# Patient Record
Sex: MALE | Race: WHITE | NOT HISPANIC OR LATINO | Employment: FULL TIME | URBAN - METROPOLITAN AREA
[De-identification: names, ages, dates, MRNs, and addresses within clinical notes are randomized per-mention and may not be internally consistent; named-entity substitution may affect disease eponyms.]

---

## 2017-01-23 ENCOUNTER — ALLSCRIPTS OFFICE VISIT (OUTPATIENT)
Dept: OTHER | Facility: OTHER | Age: 22
End: 2017-01-23

## 2017-01-23 DIAGNOSIS — M75.80 OTHER SHOULDER LESIONS, UNSPECIFIED SHOULDER: ICD-10-CM

## 2017-02-02 ENCOUNTER — APPOINTMENT (OUTPATIENT)
Dept: PHYSICAL THERAPY | Facility: CLINIC | Age: 22
End: 2017-02-02
Payer: COMMERCIAL

## 2017-02-02 DIAGNOSIS — M75.80 OTHER SHOULDER LESIONS, UNSPECIFIED SHOULDER: ICD-10-CM

## 2017-02-02 PROCEDURE — 97161 PT EVAL LOW COMPLEX 20 MIN: CPT

## 2017-02-07 ENCOUNTER — ALLSCRIPTS OFFICE VISIT (OUTPATIENT)
Dept: OTHER | Facility: OTHER | Age: 22
End: 2017-02-07

## 2017-02-08 ENCOUNTER — APPOINTMENT (OUTPATIENT)
Dept: PHYSICAL THERAPY | Facility: CLINIC | Age: 22
End: 2017-02-08
Payer: COMMERCIAL

## 2017-02-08 PROCEDURE — 97140 MANUAL THERAPY 1/> REGIONS: CPT

## 2017-02-08 PROCEDURE — 97110 THERAPEUTIC EXERCISES: CPT

## 2017-02-10 ENCOUNTER — APPOINTMENT (OUTPATIENT)
Dept: PHYSICAL THERAPY | Facility: CLINIC | Age: 22
End: 2017-02-10
Payer: COMMERCIAL

## 2017-02-10 PROCEDURE — 97110 THERAPEUTIC EXERCISES: CPT

## 2017-02-14 RX ORDER — SERTRALINE HYDROCHLORIDE 100 MG/1
100 TABLET, FILM COATED ORAL DAILY
COMMUNITY
Start: 2017-01-02 | End: 2018-03-21 | Stop reason: SDUPTHER

## 2017-02-14 RX ORDER — TRAZODONE HYDROCHLORIDE 50 MG/1
50 TABLET ORAL
COMMUNITY
Start: 2017-01-23 | End: 2018-05-05 | Stop reason: SDUPTHER

## 2017-02-15 ENCOUNTER — APPOINTMENT (OUTPATIENT)
Dept: PHYSICAL THERAPY | Facility: CLINIC | Age: 22
End: 2017-02-15
Payer: COMMERCIAL

## 2017-02-15 PROCEDURE — 97140 MANUAL THERAPY 1/> REGIONS: CPT

## 2017-02-15 PROCEDURE — 97110 THERAPEUTIC EXERCISES: CPT

## 2017-02-16 ENCOUNTER — APPOINTMENT (OUTPATIENT)
Dept: PHYSICAL THERAPY | Facility: CLINIC | Age: 22
End: 2017-02-16
Payer: COMMERCIAL

## 2017-02-16 PROCEDURE — 97140 MANUAL THERAPY 1/> REGIONS: CPT

## 2017-02-16 PROCEDURE — 97110 THERAPEUTIC EXERCISES: CPT

## 2017-02-17 ENCOUNTER — APPOINTMENT (OUTPATIENT)
Dept: PHYSICAL THERAPY | Facility: CLINIC | Age: 22
End: 2017-02-17
Payer: COMMERCIAL

## 2017-02-17 ENCOUNTER — HOSPITAL ENCOUNTER (OUTPATIENT)
Facility: HOSPITAL | Age: 22
Setting detail: OUTPATIENT SURGERY
Discharge: HOME | End: 2017-02-17
Attending: SURGERY | Admitting: SURGERY
Payer: COMMERCIAL

## 2017-02-17 VITALS
WEIGHT: 220.02 LBS | OXYGEN SATURATION: 98 % | BODY MASS INDEX: 30.8 KG/M2 | DIASTOLIC BLOOD PRESSURE: 72 MMHG | SYSTOLIC BLOOD PRESSURE: 128 MMHG | HEART RATE: 64 BPM | TEMPERATURE: 98.8 F | RESPIRATION RATE: 16 BRPM | HEIGHT: 71 IN

## 2017-02-17 DIAGNOSIS — L72.9 FOLLICULAR CYST OF SKIN AND SUBCUTANEOUS TISSUE: ICD-10-CM

## 2017-02-17 PROBLEM — L72.3 SEBACEOUS CYST: Status: ACTIVE | Noted: 2017-02-17

## 2017-02-17 PROCEDURE — 88304 TISSUE EXAM BY PATHOLOGIST: CPT | Performed by: SURGERY

## 2017-02-17 RX ORDER — ACETAMINOPHEN 325 MG/1
650 TABLET ORAL EVERY 6 HOURS PRN
Status: DISCONTINUED | OUTPATIENT
Start: 2017-02-17 | End: 2017-02-17 | Stop reason: HOSPADM

## 2017-02-17 RX ORDER — LIDOCAINE HYDROCHLORIDE 10 MG/ML
INJECTION, SOLUTION INFILTRATION; PERINEURAL AS NEEDED
Status: DISCONTINUED | OUTPATIENT
Start: 2017-02-17 | End: 2017-02-17 | Stop reason: HOSPADM

## 2017-02-22 ENCOUNTER — APPOINTMENT (OUTPATIENT)
Dept: PHYSICAL THERAPY | Facility: CLINIC | Age: 22
End: 2017-02-22
Payer: COMMERCIAL

## 2017-02-22 PROCEDURE — 97140 MANUAL THERAPY 1/> REGIONS: CPT

## 2017-02-22 PROCEDURE — 97110 THERAPEUTIC EXERCISES: CPT

## 2017-02-23 ENCOUNTER — ALLSCRIPTS OFFICE VISIT (OUTPATIENT)
Dept: OTHER | Facility: OTHER | Age: 22
End: 2017-02-23

## 2017-02-24 ENCOUNTER — APPOINTMENT (OUTPATIENT)
Dept: PHYSICAL THERAPY | Facility: CLINIC | Age: 22
End: 2017-02-24
Payer: COMMERCIAL

## 2017-02-24 PROCEDURE — 97110 THERAPEUTIC EXERCISES: CPT

## 2017-03-01 ENCOUNTER — APPOINTMENT (OUTPATIENT)
Dept: PHYSICAL THERAPY | Facility: CLINIC | Age: 22
End: 2017-03-01
Payer: COMMERCIAL

## 2017-03-01 PROCEDURE — 97110 THERAPEUTIC EXERCISES: CPT

## 2017-03-03 ENCOUNTER — APPOINTMENT (OUTPATIENT)
Dept: PHYSICAL THERAPY | Facility: CLINIC | Age: 22
End: 2017-03-03
Payer: COMMERCIAL

## 2017-03-03 ENCOUNTER — GENERIC CONVERSION - ENCOUNTER (OUTPATIENT)
Dept: OTHER | Facility: OTHER | Age: 22
End: 2017-03-03

## 2017-03-03 PROCEDURE — 97110 THERAPEUTIC EXERCISES: CPT

## 2017-03-09 ENCOUNTER — ALLSCRIPTS OFFICE VISIT (OUTPATIENT)
Dept: OTHER | Facility: OTHER | Age: 22
End: 2017-03-09

## 2017-04-28 ENCOUNTER — ALLSCRIPTS OFFICE VISIT (OUTPATIENT)
Dept: OTHER | Facility: OTHER | Age: 22
End: 2017-04-28

## 2017-06-08 ENCOUNTER — GENERIC CONVERSION - ENCOUNTER (OUTPATIENT)
Dept: OTHER | Facility: OTHER | Age: 22
End: 2017-06-08

## 2017-06-22 ENCOUNTER — GENERIC CONVERSION - ENCOUNTER (OUTPATIENT)
Dept: OTHER | Facility: OTHER | Age: 22
End: 2017-06-22

## 2017-07-21 ENCOUNTER — HOSPITAL ENCOUNTER (EMERGENCY)
Facility: HOSPITAL | Age: 22
Discharge: LEFT BEFORE DISCHARGE | End: 2017-07-21
Payer: COMMERCIAL

## 2017-07-21 VITALS
HEART RATE: 68 BPM | TEMPERATURE: 97.8 F | WEIGHT: 233 LBS | BODY MASS INDEX: 32.62 KG/M2 | SYSTOLIC BLOOD PRESSURE: 149 MMHG | HEIGHT: 71 IN | OXYGEN SATURATION: 100 % | DIASTOLIC BLOOD PRESSURE: 70 MMHG | RESPIRATION RATE: 19 BRPM

## 2017-07-21 PROCEDURE — 99283 EMERGENCY DEPT VISIT LOW MDM: CPT

## 2017-07-24 ENCOUNTER — GENERIC CONVERSION - ENCOUNTER (OUTPATIENT)
Dept: OTHER | Facility: OTHER | Age: 22
End: 2017-07-24

## 2017-07-27 ENCOUNTER — ALLSCRIPTS OFFICE VISIT (OUTPATIENT)
Dept: OTHER | Facility: OTHER | Age: 22
End: 2017-07-27

## 2017-07-27 DIAGNOSIS — N20.0 CALCULUS OF KIDNEY: ICD-10-CM

## 2017-09-21 ENCOUNTER — GENERIC CONVERSION - ENCOUNTER (OUTPATIENT)
Dept: OTHER | Facility: OTHER | Age: 22
End: 2017-09-21

## 2017-11-06 ENCOUNTER — GENERIC CONVERSION - ENCOUNTER (OUTPATIENT)
Dept: OTHER | Facility: OTHER | Age: 22
End: 2017-11-06

## 2017-11-14 ENCOUNTER — GENERIC CONVERSION - ENCOUNTER (OUTPATIENT)
Dept: OTHER | Facility: OTHER | Age: 22
End: 2017-11-14

## 2017-11-27 ENCOUNTER — GENERIC CONVERSION - ENCOUNTER (OUTPATIENT)
Dept: OTHER | Facility: OTHER | Age: 22
End: 2017-11-27

## 2017-12-11 ENCOUNTER — GENERIC CONVERSION - ENCOUNTER (OUTPATIENT)
Dept: FAMILY MEDICINE CLINIC | Facility: CLINIC | Age: 22
End: 2017-12-11

## 2018-01-12 VITALS
HEIGHT: 71 IN | DIASTOLIC BLOOD PRESSURE: 70 MMHG | OXYGEN SATURATION: 98 % | BODY MASS INDEX: 32.06 KG/M2 | HEART RATE: 71 BPM | SYSTOLIC BLOOD PRESSURE: 132 MMHG | WEIGHT: 229 LBS

## 2018-01-12 NOTE — PROGRESS NOTES
Assessment    1  Postoperative state (V45 89) (Z98 890)    Plan  Postoperative state    · Follow-up PRN Evaluation and Treatment  Follow-up  Status: Complete  Done:  45SZR8544 10:09AM   Ordered; For: Postoperative state; Ordered By: Yani Barrett Performed:  Due: 34UBR0078    Discussion/Summary    The patient did well after excision of sebaceous cyst from the back  He is discharged from my care and I will be glad to see him if any problem arises in the future  Chief Complaint  Pt is s/p excision of lump from left shoulder  Post-Op  HPI: I had the pleasure of seeing Leatha Khan in the office today for follow-up after excision of sebaceous cyst from the back  He offers no complaints at this time  Active Problems    1  Acute serous otitis media, recurrence not specified, unspecified laterality (381 01)   (H65 00)   2  Acute URI (465 9) (J06 9)   3  Allergic rhinitis due to pollen (477 0) (J30 1)   4  Anxiety, generalized (300 02) (F41 1)   5  Cervicalgia (723 1) (M54 2)   6  Cyst of skin (706 2) (L72 9)   7  Fatigue (780 79) (R53 83)   8  GERD without esophagitis (530 81) (K21 9)   9  Horseshoe kidney (753 3) (Q63 1)   10  Hydrocele (603 9) (N43 3)   11  Malaise and fatigue (780 79) (R53 81,R53 83)   12  Muscle strain (848 9) (T14 8)   13  Neck pain (723 1) (M54 2)   14  Need for influenza vaccination (V04 81) (Z23)   15  Numbness Of Both Hands (782 0)   16  Numbness Of Both Legs (782 0)   17  Right shoulder pain (719 41) (M25 511)   18  Sinusitis (473 9) (J32 9)   19  Tendonitis of shoulder (726 10) (M75 80)    Social History    · Denied: History of Alcohol Use (History)   · Denied: History of Current Smoker   · Denied: History of Drug Use   · Living With Parents   · Marital History - Single   · Never A Smoker   · Occupation:   · [employer] and student   · Sexual Activity Denied   · Denied: History of Sexually Active With Persons At Risk For HIV-related Disease    Current Meds   1  Omeprazole 40 MG Oral Capsule Delayed Release; take 1 capsule daily; Therapy: 79HTL6384 to (Evaluate:50Ihd7152)  Requested for: 30VJJ6104; Last   Rx:06Oct2016 Ordered   2  Sertraline HCl - 100 MG Oral Tablet; take one tablet by mouth every day; Therapy: 33Gax6581 to (Evaluate:90Dys2593)  Requested for: 59XWG1667; Last   Rx:02Jan2017 Ordered   3  TraZODone HCl - 50 MG Oral Tablet; TAKE ONE TABLET BY MOUTH AT BEDTIME; Therapy: 72IEG2591 to (Last Rx:06Mar2017)  Requested for: 79FDL8174 Ordered    Allergies    1  No Known Drug Allergies    2  Seasonal    Vitals   Recorded: 10PYC7490 09:56AM   Temperature 98 5 F, Oral   Heart Rate 70   Respiration 14   Systolic 821, LUE, Sitting   Diastolic 76, LUE, Sitting   Height 5 ft 11 in   Weight 219 lb    BMI Calculated 30 54   BSA Calculated 2 19     Physical Exam    Additional Exam:  The incision from the back is well-healed without evidence of infection        Signatures   Electronically signed by : Armando Jiménez MD; Mar  9 2017 10:10AM EST                       (Author)

## 2018-01-13 VITALS
OXYGEN SATURATION: 98 % | TEMPERATURE: 98.9 F | BODY MASS INDEX: 30.38 KG/M2 | WEIGHT: 217 LBS | DIASTOLIC BLOOD PRESSURE: 80 MMHG | SYSTOLIC BLOOD PRESSURE: 120 MMHG | HEIGHT: 71 IN | HEART RATE: 82 BPM

## 2018-01-14 VITALS
BODY MASS INDEX: 30.4 KG/M2 | DIASTOLIC BLOOD PRESSURE: 90 MMHG | SYSTOLIC BLOOD PRESSURE: 148 MMHG | HEART RATE: 92 BPM | WEIGHT: 218 LBS | OXYGEN SATURATION: 99 %

## 2018-01-14 VITALS
HEART RATE: 76 BPM | TEMPERATURE: 97.9 F | DIASTOLIC BLOOD PRESSURE: 78 MMHG | HEIGHT: 71 IN | BODY MASS INDEX: 30.66 KG/M2 | OXYGEN SATURATION: 98 % | SYSTOLIC BLOOD PRESSURE: 124 MMHG | WEIGHT: 219 LBS

## 2018-01-15 VITALS
RESPIRATION RATE: 14 BRPM | WEIGHT: 215 LBS | HEART RATE: 77 BPM | HEIGHT: 71 IN | BODY MASS INDEX: 30.1 KG/M2 | DIASTOLIC BLOOD PRESSURE: 80 MMHG | SYSTOLIC BLOOD PRESSURE: 116 MMHG | TEMPERATURE: 97.7 F

## 2018-01-18 ENCOUNTER — GENERIC CONVERSION - ENCOUNTER (OUTPATIENT)
Dept: FAMILY MEDICINE CLINIC | Facility: CLINIC | Age: 23
End: 2018-01-18

## 2018-01-22 VITALS
HEART RATE: 70 BPM | RESPIRATION RATE: 14 BRPM | BODY MASS INDEX: 30.66 KG/M2 | WEIGHT: 219 LBS | SYSTOLIC BLOOD PRESSURE: 122 MMHG | DIASTOLIC BLOOD PRESSURE: 76 MMHG | TEMPERATURE: 98.5 F | HEIGHT: 71 IN

## 2018-03-21 DIAGNOSIS — F41.9 ANXIETY: Primary | ICD-10-CM

## 2018-03-22 RX ORDER — SERTRALINE HYDROCHLORIDE 100 MG/1
TABLET, FILM COATED ORAL
Qty: 90 TABLET | Refills: 3 | Status: SHIPPED | OUTPATIENT
Start: 2018-03-22 | End: 2018-11-19 | Stop reason: SDUPTHER

## 2018-05-05 DIAGNOSIS — F51.01 PRIMARY INSOMNIA: Primary | ICD-10-CM

## 2018-05-07 RX ORDER — TRAZODONE HYDROCHLORIDE 50 MG/1
TABLET ORAL
Qty: 30 TABLET | Refills: 5 | Status: SHIPPED | OUTPATIENT
Start: 2018-05-07 | End: 2018-11-12 | Stop reason: SDUPTHER

## 2018-10-03 ENCOUNTER — OFFICE VISIT (OUTPATIENT)
Dept: FAMILY MEDICINE CLINIC | Facility: CLINIC | Age: 23
End: 2018-10-03
Payer: COMMERCIAL

## 2018-10-03 VITALS
HEART RATE: 72 BPM | WEIGHT: 226 LBS | HEIGHT: 71 IN | TEMPERATURE: 97.8 F | OXYGEN SATURATION: 99 % | DIASTOLIC BLOOD PRESSURE: 82 MMHG | SYSTOLIC BLOOD PRESSURE: 122 MMHG | BODY MASS INDEX: 31.64 KG/M2

## 2018-10-03 DIAGNOSIS — J32.9 SINUSITIS, UNSPECIFIED CHRONICITY, UNSPECIFIED LOCATION: Primary | ICD-10-CM

## 2018-10-03 PROCEDURE — 99213 OFFICE O/P EST LOW 20 MIN: CPT | Performed by: FAMILY MEDICINE

## 2018-10-03 NOTE — PATIENT INSTRUCTIONS
Rhinosinusitis   AMBULATORY CARE:   Rhinosinusitis (RS)  is inflammation of your nose and sinuses  It commonly begins as a virus, often as a common cold  Viruses usually last 7 to 10 days and do not need treatment  When the virus does not get better on its own, you may have bacterial RS  This means that bacteria have begun to grow inside your sinuses  Acute RS lasts less than 4 weeks  Chronic RS lasts 12 weeks or more  Recurrent RS is when you have 4 or more episodes of RS in one year  Your signs and symptoms  may be worse when you lie on your back or try to sleep  You may have any of the following:  · Stuffy nose and reduced sense of smell     · Runny nose with thick yellow or green mucus     · Pressure or pain on your face or a headache     · Pain in your teeth or bad breath     · Ear pain or pressure     · Fever or cough     · Tiredness  Seek care immediately if:   · You have double vision or you cannot see  · You have a stiff neck, a fever, or a bad headache  · Your eyeball bulges out or you cannot move your eye  · Your eye and eyelid are red, swollen, and painful  · You cannot open your eye  · You are more sleepy than normal, or you notice changes in your ability to think, move, or talk  · You have swelling of your forehead or scalp  Contact your healthcare provider if:   · Your symptoms are worse or do not improve after 3 to 5 days of treatment  · You have questions or concerns about your condition or care  Treatment for rhinosinusitis  may include any of the following:  · Acetaminophen  decreases pain and fever  It is available without a doctor's order  Ask how much to take and how often to take it  Follow directions  Acetaminophen can cause liver damage if not taken correctly  · NSAIDs , such as ibuprofen, help decrease swelling, pain, and fever  This medicine is available with or without a doctor's order   NSAIDs can cause stomach bleeding or kidney problems in certain people  If you take blood thinner medicine, always ask your healthcare provider if NSAIDs are safe for you  Always read the medicine label and follow directions  · Nasal steroid sprays  decrease inflammation in your nose and sinuses  · Decongestants  reduce swelling and drain mucus in the nose and sinuses  They may help you breathe easier  · Antihistamines  dry mucus in the nose and relieve sneezing  · Antibiotics  treat a bacterial infection and may be needed if your symptoms do not improve or they get worse  · Take your medicine as directed  Contact your healthcare provider if you think your medicine is not helping or if you have side effects  Tell him or her if you are allergic to any medicine  Keep a list of the medicines, vitamins, and herbs you take  Include the amounts, and when and why you take them  Bring the list or the pill bottles to follow-up visits  Carry your medicine list with you in case of an emergency  Self-care:   · Rinse your sinuses  Use a sinus rinse device to rinse your nasal passages with a saline (salt water) solution  This will help thin the mucus in your nose and rinse away pollen and dirt  It will also help reduce swelling so you can breathe normally  Ask your healthcare provider how often to do this  · Breathe in steam   Heat a bowl of water until you see steam  Lean over the bowl and make a tent over your head with a large towel  Breathe deeply for about 20 minutes  Be careful not to get too close to the steam or burn yourself  Do this 3 times a day  You can also breathe deeply when you take a hot shower  · Sleep with your head elevated  Place an extra pillow under your head before you go to sleep to help your sinuses drain  · Drink liquids as directed  Ask your healthcare provider how much liquid to drink each day and which liquids are best for you  Liquids will thin the mucus in your nose and help it drain   Avoid drinks that contain alcohol or caffeine  · Do not smoke, and avoid secondhand smoke  Nicotine and other chemicals in cigarettes and cigars can make your symptoms worse  Ask your healthcare provider for information if you currently smoke and need help to quit  E-cigarettes or smokeless tobacco still contain nicotine  Talk to your healthcare provider before you use these products  Follow up with your healthcare provider as directed: Follow up if your symptoms are worse or not better after 3 to 5 days of treatment  Write down your questions so you remember to ask them during your visits  © 2017 [employer] Information is for End User's use only and may not be sold, redistributed or otherwise used for commercial purposes  All illustrations and images included in CareNotes® are the copyrighted property of A D A M [employer] Inc  or [employer]  The above information is an  only  It is not intended as medical advice for individual conditions or treatments  Talk to your doctor, nurse or pharmacist before following any medical regimen to see if it is safe and effective for you

## 2018-10-03 NOTE — PROGRESS NOTES
Assessment/Plan:           Problem List Items Addressed This Visit     Sinusitis - Primary    Relevant Medications    clarithromycin (BIAXIN XL) 500 MG 24 hr tablet            Subjective:      Patient ID: Jenifer Solo is a 20 y o  male  Patient comes in with a 2 week history of sinus congestion  The following portions of the patient's history were reviewed and updated as appropriate: allergies, current medications, past family history, past medical history, past social history, past surgical history and problem list     Review of Systems   Constitutional: Negative  HENT: Positive for congestion and postnasal drip  Respiratory: Positive for cough  Gastrointestinal: Negative  Objective:      /82   Pulse 72   Temp 97 8 °F (36 6 °C)   Ht 5' 11" (1 803 m)   Wt 103 kg (226 lb)   SpO2 99%   BMI 31 52 kg/m²          Physical Exam   Constitutional: He is oriented to person, place, and time  He appears well-developed and well-nourished  HENT:   Head: Normocephalic and atraumatic  Right Ear: Tympanic membrane and external ear normal    Left Ear: Tympanic membrane and external ear normal    Paranasal sinuses are red and inflamed   Eyes: Pupils are equal, round, and reactive to light  EOM are normal    Neck: Neck supple  Cardiovascular: Normal rate, regular rhythm and normal heart sounds  Pulmonary/Chest: Effort normal and breath sounds normal    Abdominal: Soft  Bowel sounds are normal    Musculoskeletal: Normal range of motion  Neurological: He is alert and oriented to person, place, and time  Skin: Skin is warm and dry  Psychiatric: He has a normal mood and affect   Thought content normal

## 2018-11-12 DIAGNOSIS — F51.01 PRIMARY INSOMNIA: ICD-10-CM

## 2018-11-12 RX ORDER — TRAZODONE HYDROCHLORIDE 50 MG/1
50 TABLET ORAL
Qty: 30 TABLET | Refills: 5 | Status: SHIPPED | OUTPATIENT
Start: 2018-11-12 | End: 2019-05-16 | Stop reason: SDUPTHER

## 2018-11-12 NOTE — TELEPHONE ENCOUNTER
Pt mom called and needs refill for trazodone  [facility]  [phone]

## 2018-11-19 ENCOUNTER — OFFICE VISIT (OUTPATIENT)
Dept: FAMILY MEDICINE CLINIC | Facility: CLINIC | Age: 23
End: 2018-11-19
Payer: COMMERCIAL

## 2018-11-19 VITALS
WEIGHT: 234 LBS | HEART RATE: 80 BPM | BODY MASS INDEX: 32.76 KG/M2 | HEIGHT: 71 IN | TEMPERATURE: 97.7 F | RESPIRATION RATE: 16 BRPM

## 2018-11-19 DIAGNOSIS — M25.561 CHRONIC PAIN OF RIGHT KNEE: ICD-10-CM

## 2018-11-19 DIAGNOSIS — G89.29 CHRONIC PAIN OF RIGHT KNEE: ICD-10-CM

## 2018-11-19 DIAGNOSIS — F41.9 ANXIETY: Primary | ICD-10-CM

## 2018-11-19 PROCEDURE — 3008F BODY MASS INDEX DOCD: CPT | Performed by: FAMILY MEDICINE

## 2018-11-19 PROCEDURE — 1036F TOBACCO NON-USER: CPT | Performed by: FAMILY MEDICINE

## 2018-11-19 PROCEDURE — 99213 OFFICE O/P EST LOW 20 MIN: CPT | Performed by: FAMILY MEDICINE

## 2018-11-19 NOTE — PROGRESS NOTES
Assessment/Plan:       Diagnoses and all orders for this visit:    Anxiety  -     sertraline (ZOLOFT) 50 mg tablet; Take 1 tablet (50 mg total) by mouth daily    Chronic pain of right knee  -     Ambulatory referral to Orthopedic Surgery; Future        No problem-specific Assessment & Plan notes found for this encounter  He is to call in 1 month and let us know how he is doing with the decrease on the sertraline    Subjective:      Patient ID: Maria Luisa Fiore is a 20 y o  male  Patient comes in today for follow-up on his anxiety, he states that he is doing much better  He would like to decrease the sertraline to see if he can eventually weaned himself off of this  He would like to continue on the trazodone however as this helps him sleep  He does complain of right knee pain which he has had for approximately 6 weeks, he states the pain is constant, worse with activity  He states that it slightly improves with rest   He does notice a clicking in his knee when he tries to bend it  The following portions of the patient's history were reviewed and updated as appropriate:   He has a past medical history of Anxiety; GERD (gastroesophageal reflux disease); Liver disorder; and Nephrolithiasis  ,   does not have any pertinent problems on file  ,   has a past surgical history that includes Hernia repair; Lithotripsy; and Mass excision (Left, 2/17/2017)  ,  family history includes Hypertension in his mother  ,   reports that he has never smoked  He has never used smokeless tobacco  He reports that he drinks alcohol  He reports that he does not use drugs  ,  has No Known Allergies     Current Outpatient Prescriptions   Medication Sig Dispense Refill    sertraline (ZOLOFT) 50 mg tablet Take 1 tablet (50 mg total) by mouth daily 30 tablet 0    traZODone (DESYREL) 50 mg tablet Take 1 tablet (50 mg total) by mouth daily at bedtime 30 tablet 5     No current facility-administered medications for this visit  Review of Systems   Constitutional: Negative for chills, fatigue and fever  HENT: Negative for congestion, ear pain, hearing loss, postnasal drip, rhinorrhea and sore throat  Eyes: Negative for pain and visual disturbance  Respiratory: Negative for chest tightness, shortness of breath and wheezing  Cardiovascular: Negative for chest pain and leg swelling  Gastrointestinal: Negative for abdominal distention, abdominal pain, constipation, diarrhea and vomiting  Endocrine: Negative for cold intolerance and heat intolerance  Genitourinary: Negative for difficulty urinating, frequency and urgency  Musculoskeletal: Positive for arthralgias (Right knee)  Negative for gait problem  Skin: Negative for color change  Neurological: Negative for dizziness, tremors, syncope, numbness and headaches  Hematological: Negative for adenopathy  Psychiatric/Behavioral: Negative for agitation, confusion and sleep disturbance  The patient is not nervous/anxious  Objective:  Vitals:    11/19/18 1321   Pulse: 80   Resp: 16   Temp: 97 7 °F (36 5 °C)   Weight: 106 kg (234 lb)   Height: 5' 11" (1 803 m)     Body mass index is 32 64 kg/m²  Physical Exam   Constitutional: He is oriented to person, place, and time  He appears well-developed and well-nourished  HENT:   Head: Normocephalic  Mouth/Throat: Oropharynx is clear and moist    Eyes: Conjunctivae are normal  No scleral icterus  Neck: Normal range of motion  No thyromegaly present  Cardiovascular: Normal rate, regular rhythm and normal heart sounds  No murmur heard  Pulmonary/Chest: Effort normal and breath sounds normal  No respiratory distress  He has no wheezes  Abdominal: Soft  Bowel sounds are normal  He exhibits no distension  There is no tenderness  Musculoskeletal: Normal range of motion  He exhibits no edema or tenderness     Palpable click over the anterior right knee with flexion and extension   Lymphadenopathy:     He has no cervical adenopathy  Neurological: He is alert and oriented to person, place, and time  No cranial nerve deficit  Skin: Skin is warm and dry  No rash noted  No pallor  Psychiatric: He has a normal mood and affect  His behavior is normal    Nursing note and vitals reviewed

## 2018-12-11 ENCOUNTER — OFFICE VISIT (OUTPATIENT)
Dept: OBGYN CLINIC | Facility: CLINIC | Age: 23
End: 2018-12-11
Payer: COMMERCIAL

## 2018-12-11 ENCOUNTER — APPOINTMENT (OUTPATIENT)
Dept: RADIOLOGY | Facility: CLINIC | Age: 23
End: 2018-12-11
Payer: COMMERCIAL

## 2018-12-11 VITALS
HEIGHT: 71 IN | RESPIRATION RATE: 18 BRPM | SYSTOLIC BLOOD PRESSURE: 138 MMHG | DIASTOLIC BLOOD PRESSURE: 80 MMHG | BODY MASS INDEX: 33.15 KG/M2 | WEIGHT: 236.8 LBS | HEART RATE: 56 BPM

## 2018-12-11 DIAGNOSIS — M25.561 RIGHT KNEE PAIN, UNSPECIFIED CHRONICITY: ICD-10-CM

## 2018-12-11 DIAGNOSIS — M25.561 RIGHT KNEE PAIN, UNSPECIFIED CHRONICITY: Primary | ICD-10-CM

## 2018-12-11 DIAGNOSIS — M25.561 CHRONIC PAIN OF RIGHT KNEE: ICD-10-CM

## 2018-12-11 DIAGNOSIS — G89.29 CHRONIC PAIN OF RIGHT KNEE: ICD-10-CM

## 2018-12-11 PROCEDURE — 73562 X-RAY EXAM OF KNEE 3: CPT

## 2018-12-11 PROCEDURE — 99203 OFFICE O/P NEW LOW 30 MIN: CPT | Performed by: ORTHOPAEDIC SURGERY

## 2018-12-11 PROCEDURE — 20610 DRAIN/INJ JOINT/BURSA W/O US: CPT | Performed by: ORTHOPAEDIC SURGERY

## 2018-12-11 RX ORDER — METHYLPREDNISOLONE ACETATE 40 MG/ML
2 INJECTION, SUSPENSION INTRA-ARTICULAR; INTRALESIONAL; INTRAMUSCULAR; SOFT TISSUE
Status: COMPLETED | OUTPATIENT
Start: 2018-12-11 | End: 2018-12-11

## 2018-12-11 RX ORDER — LIDOCAINE HYDROCHLORIDE 5 MG/ML
2 INJECTION, SOLUTION INFILTRATION; PERINEURAL
Status: COMPLETED | OUTPATIENT
Start: 2018-12-11 | End: 2018-12-11

## 2018-12-11 RX ORDER — BUPIVACAINE HYDROCHLORIDE 7.5 MG/ML
2 INJECTION, SOLUTION EPIDURAL; RETROBULBAR
Status: COMPLETED | OUTPATIENT
Start: 2018-12-11 | End: 2018-12-11

## 2018-12-11 RX ADMIN — LIDOCAINE HYDROCHLORIDE 2 ML: 5 INJECTION, SOLUTION INFILTRATION; PERINEURAL at 09:06

## 2018-12-11 RX ADMIN — BUPIVACAINE HYDROCHLORIDE 2 ML: 7.5 INJECTION, SOLUTION EPIDURAL; RETROBULBAR at 09:06

## 2018-12-11 RX ADMIN — METHYLPREDNISOLONE ACETATE 2 ML: 40 INJECTION, SUSPENSION INTRA-ARTICULAR; INTRALESIONAL; INTRAMUSCULAR; SOFT TISSUE at 09:06

## 2018-12-11 NOTE — PROGRESS NOTES
_____________________________________________________  CHIEF COMPLAINT:  Chief Complaint   Patient presents with    Right Knee - Pain         SUBJECTIVE:  Christian Fagan is a 20y o  year old male who presents right knee pain  Patient states it has been going on for several months  Denies any injuries or trauma to the area  He states the pain is over the front and lateral aspect of his knee  He notes pain when his knee is bent for a period of time  He states he has had clicking and catching over the lateral aspect of his knee  He denies taking any anti-inflammatories to help with his pain  He denies any numbness or tingling  He denies any constitutional signs or symptoms                                                   PAST MEDICAL HISTORY:  Past Medical History:   Diagnosis Date    Anxiety     GERD (gastroesophageal reflux disease)     Liver disorder     Nephrolithiasis        PAST SURGICAL HISTORY:  Past Surgical History:   Procedure Laterality Date    ANKLE SURGERY Right 2017    ligament repair    HERNIA REPAIR      LITHOTRIPSY      MASS EXCISION Left 2/17/2017    Procedure: BACK/SHOULDER CYST EXCISION ;  Surgeon: Laurinda Lundborg, MD;  Location: [facility];  Service:        FAMILY HISTORY:  Family History   Problem Relation Age of Onset    Hypertension Mother     No Known Problems Father     Cancer Maternal Grandmother     Diabetes Maternal Grandmother     Alzheimer's disease Maternal Grandfather        SOCIAL HISTORY:  Social History   Substance Use Topics    Smoking status: Never Smoker    Smokeless tobacco: Never Used    Alcohol use Yes      Comment: rarely       MEDICATIONS:    Current Outpatient Prescriptions:     sertraline (ZOLOFT) 50 mg tablet, Take 1 tablet (50 mg total) by mouth daily, Disp: 30 tablet, Rfl: 0    traZODone (DESYREL) 50 mg tablet, Take 1 tablet (50 mg total) by mouth daily at bedtime, Disp: 30 tablet, Rfl: 5    ALLERGIES:  No Known Allergies    REVIEW OF SYSTEMS:  General: no fever, no chills  HEENT:  No loss of hearing or eyesight problems  Eyes:  No red eyes  Respiratory:  No coughing, shortness of breath or wheezing  Cardiovascular:  No chest pain, no palpitations  GI:  Abdomen soft nontender, denies nausea  Endocrine:  No muscle weakness, no frequent urination, no excessive thirst  Urinary:  No dysuria, no incontinence  Musculoskeletal: see HPI and PE  SKIN:  No skin rash, no dry skin  Neurological:  No headaches, no confusion  Psychiatric:  No suicide thoughts, no anxiety, no depression  Review of all other systems is negative    LABS:  HgA1c: No results found for: HGBA1C  BMP: No results found for: GLUCOSE, CALCIUM, NA, K, CO2, CL, BUN, CREATININE    _____________________________________________________  PHYSICAL EXAMINATION:  General: well developed and well nourished, alert, oriented times 3 and appears comfortable  Psychiatric: Normal  HEENT: Trachea Midline, No torticollis  Cardiovascular: No discernable arrhythmia  Pulmonary: No wheezing or stridor  Skin: No masses, erthema, lacerations, fluctation, ulcerations  Neurovascular:   L1-S1 motor and sensory intact, pulses were compared bilaterally and were equal, capillary refill less than 3 sec  MUSCULOSKELETAL EXAMINATION:  Right knee:  No erythema edema or ecchymosis noted  Skin is warm to touch  Tenderness to palpation over the lateral joint line  Range of motion 0-110 degrees  Strength is 5/5 for flexion extension  Negative Lachman test, negative anterior drawer, negative posterior drawer, positive Bunny's test, positive Apley's compression test, knee is stable to valgus and varus stress  Patient is neurovascularly intact     _____________________________________________________  STUDIES REVIEWED:  I personally reviewed the x-rays from 12/11/2018  X-rays demonstrate no acute fractures  Joint space is well preserved  ASSESSMENT/PLAN:    Assessment:   Right knee pain    Possible lateral meniscus tear  Plan:   Patient was given a cortisone injection today  He tolerated it well  He was advised to use some ice over the next 12:48 p m  to help with the pain for the injection was given  He can also use anti-inflammatories  We will start on a course of physical therapy to work on range of motion, stretching, strengthening, and modalities as needed for pain  We will see him back in 4 weeks  If at that point he continues to have lateral joint line pain and clicking we will order an MRI to rule out a meniscal tear      Follow Up:  Four weeks    PROCEDURES PERFORMED:  Large joint arthrocentesis  Date/Time: 12/11/2018 9:06 AM  Consent given by: patient  Site marked: site marked  Timeout: Immediately prior to procedure a time out was called to verify the correct patient, procedure, equipment, support staff and site/side marked as required   Supporting Documentation  Indications: pain   Procedure Details  Location: knee - R knee  Preparation: Patient was prepped and draped in the usual sterile fashion  Needle size: 22 G  Approach: anterolateral  Medications administered: 2 mL bupivacaine (PF) 0 75 %; 2 mL lidocaine 0 5 %; 2 mL methylPREDNISolone acetate 40 mg/mL    Patient tolerance: patient tolerated the procedure well with no immediate complications  Dressing:  Sterile dressing applied

## 2018-12-11 NOTE — PATIENT INSTRUCTIONS
The patient is advised to apply ice or cold packs intermittently as needed to relieve pain  The patient was advised that NSAID-type medications have two very important potential side effects: gastrointestinal irritation including hemorrhage and renal injuries  He was asked to take the medication with food and to stop if he experiences any GI upset  I asked him to call for vomiting, abdominal pain or black/bloody stools  The patient expresses understanding of these issues and questions were answered

## 2019-03-23 DIAGNOSIS — F41.1 ANXIETY, GENERALIZED: Primary | ICD-10-CM

## 2019-03-25 RX ORDER — SERTRALINE HYDROCHLORIDE 100 MG/1
TABLET, FILM COATED ORAL
Qty: 90 TABLET | Refills: 2 | Status: SHIPPED | OUTPATIENT
Start: 2019-03-25 | End: 2019-03-28 | Stop reason: SDUPTHER

## 2019-03-28 DIAGNOSIS — F41.1 ANXIETY, GENERALIZED: ICD-10-CM

## 2019-03-28 RX ORDER — SERTRALINE HYDROCHLORIDE 100 MG/1
TABLET, FILM COATED ORAL
Qty: 90 TABLET | Refills: 2 | Status: SHIPPED | OUTPATIENT
Start: 2019-03-28 | End: 2020-10-26 | Stop reason: HOSPADM

## 2019-05-16 DIAGNOSIS — F51.01 PRIMARY INSOMNIA: ICD-10-CM

## 2019-05-16 RX ORDER — TRAZODONE HYDROCHLORIDE 50 MG/1
50 TABLET ORAL
Qty: 30 TABLET | Refills: 5 | Status: SHIPPED | OUTPATIENT
Start: 2019-05-16 | End: 2019-11-14 | Stop reason: SDUPTHER

## 2019-10-30 PROBLEM — F33.42 RECURRENT MAJOR DEPRESSIVE DISORDER, IN FULL REMISSION (HCC): Status: ACTIVE | Noted: 2019-10-30

## 2019-11-14 DIAGNOSIS — F51.01 PRIMARY INSOMNIA: ICD-10-CM

## 2019-11-14 RX ORDER — TRAZODONE HYDROCHLORIDE 50 MG/1
50 TABLET ORAL
Qty: 90 TABLET | Refills: 1 | Status: SHIPPED | OUTPATIENT
Start: 2019-11-14 | End: 2020-05-18

## 2020-04-07 ENCOUNTER — TELEPHONE (OUTPATIENT)
Dept: FAMILY MEDICINE CLINIC | Facility: CLINIC | Age: 25
End: 2020-04-07

## 2020-04-08 ENCOUNTER — TELEMEDICINE (OUTPATIENT)
Dept: FAMILY MEDICINE CLINIC | Facility: CLINIC | Age: 25
End: 2020-04-08
Payer: OTHER GOVERNMENT

## 2020-04-08 DIAGNOSIS — Z20.828 EXPOSURE TO SARS-ASSOCIATED CORONAVIRUS: ICD-10-CM

## 2020-04-08 DIAGNOSIS — Z20.828 EXPOSURE TO SARS-ASSOCIATED CORONAVIRUS: Primary | ICD-10-CM

## 2020-04-08 PROCEDURE — 87635 SARS-COV-2 COVID-19 AMP PRB: CPT

## 2020-04-08 PROCEDURE — G2012 BRIEF CHECK IN BY MD/QHP: HCPCS | Performed by: FAMILY MEDICINE

## 2020-04-08 RX ORDER — ALBUTEROL SULFATE 90 UG/1
2 AEROSOL, METERED RESPIRATORY (INHALATION) EVERY 6 HOURS PRN
Qty: 1 INHALER | Refills: 5 | Status: SHIPPED | OUTPATIENT
Start: 2020-04-08 | End: 2020-10-26 | Stop reason: HOSPADM

## 2020-04-11 LAB — SARS-COV-2 RNA SPEC QL NAA+PROBE: NOT DETECTED

## 2020-05-17 DIAGNOSIS — F51.01 PRIMARY INSOMNIA: ICD-10-CM

## 2020-05-18 RX ORDER — TRAZODONE HYDROCHLORIDE 50 MG/1
TABLET ORAL
Qty: 30 TABLET | Refills: 5 | Status: SHIPPED | OUTPATIENT
Start: 2020-05-18 | End: 2020-11-23

## 2020-10-23 ENCOUNTER — HOSPITAL ENCOUNTER (INPATIENT)
Facility: HOSPITAL | Age: 25
LOS: 3 days | Discharge: HOME | DRG: 660 | End: 2020-10-26
Attending: EMERGENCY MEDICINE | Admitting: INTERNAL MEDICINE
Payer: COMMERCIAL

## 2020-10-23 ENCOUNTER — APPOINTMENT (EMERGENCY)
Dept: CT IMAGING | Facility: HOSPITAL | Age: 25
DRG: 660 | End: 2020-10-23
Payer: COMMERCIAL

## 2020-10-23 DIAGNOSIS — N20.1 URETERAL CALCULUS: Primary | ICD-10-CM

## 2020-10-23 DIAGNOSIS — N17.9 ACUTE KIDNEY INJURY (HCC): ICD-10-CM

## 2020-10-23 DIAGNOSIS — N20.0 NEPHROLITHIASIS: ICD-10-CM

## 2020-10-23 LAB
ALBUMIN SERPL BCP-MCNC: 4.3 G/DL (ref 3.5–5)
ALP SERPL-CCNC: 54 U/L (ref 46–116)
ALT SERPL W P-5'-P-CCNC: 46 U/L (ref 12–78)
ANION GAP SERPL CALCULATED.3IONS-SCNC: 9 MMOL/L (ref 4–13)
AST SERPL W P-5'-P-CCNC: 19 U/L (ref 5–45)
BACTERIA UR QL AUTO: ABNORMAL /HPF
BASOPHILS # BLD AUTO: 0.04 THOUSANDS/ΜL (ref 0–0.1)
BASOPHILS NFR BLD AUTO: 0 % (ref 0–1)
BILIRUB SERPL-MCNC: 0.5 MG/DL (ref 0.2–1)
BILIRUB UR QL STRIP: NEGATIVE
BUN SERPL-MCNC: 27 MG/DL (ref 5–25)
CALCIUM SERPL-MCNC: 9.7 MG/DL (ref 8.3–10.1)
CHLORIDE SERPL-SCNC: 103 MMOL/L (ref 100–108)
CLARITY UR: CLEAR
CO2 SERPL-SCNC: 28 MMOL/L (ref 21–32)
COLOR UR: YELLOW
CREAT SERPL-MCNC: 2.7 MG/DL (ref 0.6–1.3)
EOSINOPHIL # BLD AUTO: 0.19 THOUSAND/ΜL (ref 0–0.61)
EOSINOPHIL NFR BLD AUTO: 2 % (ref 0–6)
ERYTHROCYTE [DISTWIDTH] IN BLOOD BY AUTOMATED COUNT: 13.1 % (ref 11.6–15.1)
GFR SERPL CREATININE-BSD FRML MDRD: 31 ML/MIN/1.73SQ M
GLUCOSE SERPL-MCNC: 118 MG/DL (ref 65–140)
GLUCOSE UR STRIP-MCNC: NEGATIVE MG/DL
HCT VFR BLD AUTO: 43.5 % (ref 36.5–49.3)
HGB BLD-MCNC: 15 G/DL (ref 12–17)
HGB UR QL STRIP.AUTO: ABNORMAL
IMM GRANULOCYTES # BLD AUTO: 0.04 THOUSAND/UL (ref 0–0.2)
IMM GRANULOCYTES NFR BLD AUTO: 0 % (ref 0–2)
KETONES UR STRIP-MCNC: NEGATIVE MG/DL
LEUKOCYTE ESTERASE UR QL STRIP: NEGATIVE
LYMPHOCYTES # BLD AUTO: 1.28 THOUSANDS/ΜL (ref 0.6–4.47)
LYMPHOCYTES NFR BLD AUTO: 10 % (ref 14–44)
MCH RBC QN AUTO: 30.5 PG (ref 26.8–34.3)
MCHC RBC AUTO-ENTMCNC: 34.5 G/DL (ref 31.4–37.4)
MCV RBC AUTO: 88 FL (ref 82–98)
MONOCYTES # BLD AUTO: 0.52 THOUSAND/ΜL (ref 0.17–1.22)
MONOCYTES NFR BLD AUTO: 4 % (ref 4–12)
NEUTROPHILS # BLD AUTO: 10.21 THOUSANDS/ΜL (ref 1.85–7.62)
NEUTS SEG NFR BLD AUTO: 84 % (ref 43–75)
NITRITE UR QL STRIP: NEGATIVE
NON-SQ EPI CELLS URNS QL MICRO: ABNORMAL /HPF
NRBC BLD AUTO-RTO: 0 /100 WBCS
PH UR STRIP.AUTO: 7 [PH]
PLATELET # BLD AUTO: 201 THOUSANDS/UL (ref 149–390)
PMV BLD AUTO: 11 FL (ref 8.9–12.7)
POTASSIUM SERPL-SCNC: 4 MMOL/L (ref 3.5–5.3)
PROT SERPL-MCNC: 8 G/DL (ref 6.4–8.2)
PROT UR STRIP-MCNC: NEGATIVE MG/DL
RBC # BLD AUTO: 4.92 MILLION/UL (ref 3.88–5.62)
RBC #/AREA URNS AUTO: ABNORMAL /HPF
SODIUM SERPL-SCNC: 140 MMOL/L (ref 136–145)
SP GR UR STRIP.AUTO: 1.01 (ref 1–1.03)
TSH SERPL DL<=0.05 MIU/L-ACNC: 1.14 UIU/ML (ref 0.36–3.74)
UROBILINOGEN UR QL STRIP.AUTO: 0.2 E.U./DL
WBC # BLD AUTO: 12.28 THOUSAND/UL (ref 4.31–10.16)
WBC #/AREA URNS AUTO: ABNORMAL /HPF

## 2020-10-23 PROCEDURE — 99255 IP/OBS CONSLTJ NEW/EST HI 80: CPT | Performed by: INTERNAL MEDICINE

## 2020-10-23 PROCEDURE — 36415 COLL VENOUS BLD VENIPUNCTURE: CPT | Performed by: INTERNAL MEDICINE

## 2020-10-23 PROCEDURE — 99285 EMERGENCY DEPT VISIT HI MDM: CPT

## 2020-10-23 PROCEDURE — 85025 COMPLETE CBC W/AUTO DIFF WBC: CPT | Performed by: INTERNAL MEDICINE

## 2020-10-23 PROCEDURE — G1004 CDSM NDSC: HCPCS

## 2020-10-23 PROCEDURE — 99284 EMERGENCY DEPT VISIT MOD MDM: CPT | Performed by: EMERGENCY MEDICINE

## 2020-10-23 PROCEDURE — 81001 URINALYSIS AUTO W/SCOPE: CPT | Performed by: INTERNAL MEDICINE

## 2020-10-23 PROCEDURE — 96374 THER/PROPH/DIAG INJ IV PUSH: CPT

## 2020-10-23 PROCEDURE — 84443 ASSAY THYROID STIM HORMONE: CPT | Performed by: INTERNAL MEDICINE

## 2020-10-23 PROCEDURE — 99223 1ST HOSP IP/OBS HIGH 75: CPT | Performed by: INTERNAL MEDICINE

## 2020-10-23 PROCEDURE — 74176 CT ABD & PELVIS W/O CONTRAST: CPT

## 2020-10-23 PROCEDURE — 80053 COMPREHEN METABOLIC PANEL: CPT | Performed by: INTERNAL MEDICINE

## 2020-10-23 PROCEDURE — 96361 HYDRATE IV INFUSION ADD-ON: CPT

## 2020-10-23 PROCEDURE — 96375 TX/PRO/DX INJ NEW DRUG ADDON: CPT

## 2020-10-23 RX ORDER — ACETAMINOPHEN 325 MG/1
650 TABLET ORAL EVERY 6 HOURS PRN
Status: DISCONTINUED | OUTPATIENT
Start: 2020-10-23 | End: 2020-10-26 | Stop reason: HOSPADM

## 2020-10-23 RX ORDER — SIMETHICONE 80 MG
80 TABLET,CHEWABLE ORAL 4 TIMES DAILY PRN
Status: DISCONTINUED | OUTPATIENT
Start: 2020-10-23 | End: 2020-10-26 | Stop reason: HOSPADM

## 2020-10-23 RX ORDER — ONDANSETRON 2 MG/ML
4 INJECTION INTRAMUSCULAR; INTRAVENOUS EVERY 6 HOURS PRN
Status: DISCONTINUED | OUTPATIENT
Start: 2020-10-23 | End: 2020-10-23

## 2020-10-23 RX ORDER — KETOROLAC TROMETHAMINE 30 MG/ML
30 INJECTION, SOLUTION INTRAMUSCULAR; INTRAVENOUS ONCE
Status: COMPLETED | OUTPATIENT
Start: 2020-10-23 | End: 2020-10-23

## 2020-10-23 RX ORDER — ONDANSETRON 2 MG/ML
4 INJECTION INTRAMUSCULAR; INTRAVENOUS EVERY 6 HOURS PRN
Status: DISCONTINUED | OUTPATIENT
Start: 2020-10-23 | End: 2020-10-26 | Stop reason: HOSPADM

## 2020-10-23 RX ORDER — SODIUM CHLORIDE 9 MG/ML
200 INJECTION, SOLUTION INTRAVENOUS CONTINUOUS
Status: DISCONTINUED | OUTPATIENT
Start: 2020-10-23 | End: 2020-10-26 | Stop reason: HOSPADM

## 2020-10-23 RX ORDER — ALBUTEROL SULFATE 90 UG/1
2 AEROSOL, METERED RESPIRATORY (INHALATION) EVERY 6 HOURS PRN
Status: DISCONTINUED | OUTPATIENT
Start: 2020-10-23 | End: 2020-10-26 | Stop reason: HOSPADM

## 2020-10-23 RX ORDER — HYDROCODONE BITARTRATE AND ACETAMINOPHEN 5; 325 MG/1; MG/1
1 TABLET ORAL EVERY 4 HOURS PRN
Status: DISCONTINUED | OUTPATIENT
Start: 2020-10-23 | End: 2020-10-26 | Stop reason: HOSPADM

## 2020-10-23 RX ORDER — SODIUM CHLORIDE, SODIUM LACTATE, POTASSIUM CHLORIDE, CALCIUM CHLORIDE 600; 310; 30; 20 MG/100ML; MG/100ML; MG/100ML; MG/100ML
150 INJECTION, SOLUTION INTRAVENOUS CONTINUOUS
Status: DISCONTINUED | OUTPATIENT
Start: 2020-10-23 | End: 2020-10-24

## 2020-10-23 RX ORDER — TAMSULOSIN HYDROCHLORIDE 0.4 MG/1
0.4 CAPSULE ORAL
Status: DISCONTINUED | OUTPATIENT
Start: 2020-10-23 | End: 2020-10-26 | Stop reason: HOSPADM

## 2020-10-23 RX ORDER — HYDROMORPHONE HCL/PF 1 MG/ML
0.5 SYRINGE (ML) INJECTION
Status: DISCONTINUED | OUTPATIENT
Start: 2020-10-23 | End: 2020-10-23

## 2020-10-23 RX ORDER — TRAZODONE HYDROCHLORIDE 50 MG/1
50 TABLET ORAL
Status: DISCONTINUED | OUTPATIENT
Start: 2020-10-23 | End: 2020-10-26 | Stop reason: HOSPADM

## 2020-10-23 RX ORDER — HYDROMORPHONE HCL/PF 1 MG/ML
0.5 SYRINGE (ML) INJECTION
Status: DISCONTINUED | OUTPATIENT
Start: 2020-10-23 | End: 2020-10-26

## 2020-10-23 RX ORDER — ONDANSETRON 2 MG/ML
4 INJECTION INTRAMUSCULAR; INTRAVENOUS ONCE
Status: COMPLETED | OUTPATIENT
Start: 2020-10-23 | End: 2020-10-23

## 2020-10-23 RX ORDER — OXYCODONE HYDROCHLORIDE 5 MG/1
5 TABLET ORAL EVERY 4 HOURS PRN
Status: DISCONTINUED | OUTPATIENT
Start: 2020-10-23 | End: 2020-10-26 | Stop reason: HOSPADM

## 2020-10-23 RX ORDER — CEFAZOLIN SODIUM 2 G/50ML
2000 SOLUTION INTRAVENOUS
Status: COMPLETED | OUTPATIENT
Start: 2020-10-24 | End: 2020-10-24

## 2020-10-23 RX ORDER — TAMSULOSIN HYDROCHLORIDE 0.4 MG/1
0.4 CAPSULE ORAL ONCE
Status: DISCONTINUED | OUTPATIENT
Start: 2020-10-23 | End: 2020-10-23

## 2020-10-23 RX ADMIN — SODIUM CHLORIDE, SODIUM LACTATE, POTASSIUM CHLORIDE, AND CALCIUM CHLORIDE 150 ML/HR: .6; .31; .03; .02 INJECTION, SOLUTION INTRAVENOUS at 20:19

## 2020-10-23 RX ADMIN — TRAZODONE HYDROCHLORIDE 50 MG: 50 TABLET ORAL at 22:05

## 2020-10-23 RX ADMIN — SODIUM CHLORIDE 1000 ML: 0.9 INJECTION, SOLUTION INTRAVENOUS at 13:24

## 2020-10-23 RX ADMIN — SODIUM CHLORIDE 1000 ML: 0.9 INJECTION, SOLUTION INTRAVENOUS at 11:21

## 2020-10-23 RX ADMIN — ONDANSETRON 4 MG: 2 INJECTION INTRAMUSCULAR; INTRAVENOUS at 11:21

## 2020-10-23 RX ADMIN — KETOROLAC TROMETHAMINE 30 MG: 30 INJECTION, SOLUTION INTRAMUSCULAR at 11:21

## 2020-10-23 RX ADMIN — SODIUM CHLORIDE 125 ML/HR: 0.9 INJECTION, SOLUTION INTRAVENOUS at 15:48

## 2020-10-23 RX ADMIN — TAMSULOSIN HYDROCHLORIDE 0.4 MG: 0.4 CAPSULE ORAL at 17:05

## 2020-10-24 ENCOUNTER — ANESTHESIA (INPATIENT)
Dept: PERIOP | Facility: HOSPITAL | Age: 25
DRG: 660 | End: 2020-10-24
Payer: COMMERCIAL

## 2020-10-24 ENCOUNTER — APPOINTMENT (INPATIENT)
Dept: RADIOLOGY | Facility: HOSPITAL | Age: 25
DRG: 660 | End: 2020-10-24
Payer: COMMERCIAL

## 2020-10-24 ENCOUNTER — ANESTHESIA EVENT (INPATIENT)
Dept: PERIOP | Facility: HOSPITAL | Age: 25
DRG: 660 | End: 2020-10-24
Payer: COMMERCIAL

## 2020-10-24 VITALS — HEART RATE: 75 BPM

## 2020-10-24 PROBLEM — T18.108A ESOPHAGEAL FOREIGN BODY: Status: ACTIVE | Noted: 2020-10-24

## 2020-10-24 LAB
ANION GAP SERPL CALCULATED.3IONS-SCNC: 10 MMOL/L (ref 4–13)
BUN SERPL-MCNC: 26 MG/DL (ref 5–25)
CALCIUM SERPL-MCNC: 9 MG/DL (ref 8.3–10.1)
CHLORIDE SERPL-SCNC: 105 MMOL/L (ref 100–108)
CO2 SERPL-SCNC: 26 MMOL/L (ref 21–32)
CREAT SERPL-MCNC: 3.05 MG/DL (ref 0.6–1.3)
GFR SERPL CREATININE-BSD FRML MDRD: 27 ML/MIN/1.73SQ M
GLUCOSE SERPL-MCNC: 126 MG/DL (ref 65–140)
POTASSIUM SERPL-SCNC: 4 MMOL/L (ref 3.5–5.3)
SODIUM SERPL-SCNC: 141 MMOL/L (ref 136–145)

## 2020-10-24 PROCEDURE — C2617 STENT, NON-COR, TEM W/O DEL: HCPCS | Performed by: UROLOGY

## 2020-10-24 PROCEDURE — BT1F1ZZ FLUOROSCOPY OF LEFT KIDNEY, URETER AND BLADDER USING LOW OSMOLAR CONTRAST: ICD-10-PCS | Performed by: UROLOGY

## 2020-10-24 PROCEDURE — 82360 CALCULUS ASSAY QUANT: CPT | Performed by: UROLOGY

## 2020-10-24 PROCEDURE — C1769 GUIDE WIRE: HCPCS | Performed by: UROLOGY

## 2020-10-24 PROCEDURE — 99233 SBSQ HOSP IP/OBS HIGH 50: CPT | Performed by: INTERNAL MEDICINE

## 2020-10-24 PROCEDURE — 0TC78ZZ EXTIRPATION OF MATTER FROM LEFT URETER, VIA NATURAL OR ARTIFICIAL OPENING ENDOSCOPIC: ICD-10-PCS | Performed by: UROLOGY

## 2020-10-24 PROCEDURE — 74420 UROGRAPHY RTRGR +-KUB: CPT

## 2020-10-24 PROCEDURE — 99232 SBSQ HOSP IP/OBS MODERATE 35: CPT | Performed by: INTERNAL MEDICINE

## 2020-10-24 PROCEDURE — NC001 PR NO CHARGE: Performed by: INTERNAL MEDICINE

## 2020-10-24 PROCEDURE — 52356 CYSTO/URETERO W/LITHOTRIPSY: CPT | Performed by: UROLOGY

## 2020-10-24 PROCEDURE — C1758 CATHETER, URETERAL: HCPCS | Performed by: UROLOGY

## 2020-10-24 PROCEDURE — 0T778DZ DILATION OF LEFT URETER WITH INTRALUMINAL DEVICE, VIA NATURAL OR ARTIFICIAL OPENING ENDOSCOPIC: ICD-10-PCS | Performed by: UROLOGY

## 2020-10-24 PROCEDURE — 80048 BASIC METABOLIC PNL TOTAL CA: CPT | Performed by: INTERNAL MEDICINE

## 2020-10-24 PROCEDURE — 82360 CALCULUS ASSAY QUANT: CPT | Performed by: INTERNAL MEDICINE

## 2020-10-24 PROCEDURE — 99253 IP/OBS CNSLTJ NEW/EST LOW 45: CPT | Performed by: UROLOGY

## 2020-10-24 DEVICE — INLAY OPTIMA URETERAL STENT W/O GUIDEWIRE
Type: IMPLANTABLE DEVICE | Site: URETER | Status: FUNCTIONAL
Brand: BARD® INLAY OPTIMA® URETERAL STENT

## 2020-10-24 RX ORDER — FENTANYL CITRATE/PF 50 MCG/ML
50 SYRINGE (ML) INJECTION
Status: DISCONTINUED | OUTPATIENT
Start: 2020-10-24 | End: 2020-10-24 | Stop reason: HOSPADM

## 2020-10-24 RX ORDER — OXYBUTYNIN CHLORIDE 5 MG/1
5 TABLET ORAL 3 TIMES DAILY PRN
Qty: 15 TABLET | Refills: 0 | Status: ON HOLD | OUTPATIENT
Start: 2020-10-24 | End: 2021-02-08 | Stop reason: ALTCHOICE

## 2020-10-24 RX ORDER — PROPOFOL 10 MG/ML
INJECTION, EMULSION INTRAVENOUS AS NEEDED
Status: DISCONTINUED | OUTPATIENT
Start: 2020-10-24 | End: 2020-10-24

## 2020-10-24 RX ORDER — HYDROCODONE BITARTRATE AND ACETAMINOPHEN 5; 325 MG/1; MG/1
1 TABLET ORAL EVERY 6 HOURS PRN
Qty: 20 TABLET | Refills: 0 | Status: SHIPPED | OUTPATIENT
Start: 2020-10-24 | End: 2020-10-27

## 2020-10-24 RX ORDER — ONDANSETRON 2 MG/ML
4 INJECTION INTRAMUSCULAR; INTRAVENOUS ONCE AS NEEDED
Status: DISCONTINUED | OUTPATIENT
Start: 2020-10-24 | End: 2020-10-24 | Stop reason: HOSPADM

## 2020-10-24 RX ORDER — ONDANSETRON 2 MG/ML
INJECTION INTRAMUSCULAR; INTRAVENOUS AS NEEDED
Status: DISCONTINUED | OUTPATIENT
Start: 2020-10-24 | End: 2020-10-24

## 2020-10-24 RX ORDER — HYDROMORPHONE HCL/PF 1 MG/ML
0.4 SYRINGE (ML) INJECTION
Status: DISCONTINUED | OUTPATIENT
Start: 2020-10-24 | End: 2020-10-24 | Stop reason: HOSPADM

## 2020-10-24 RX ORDER — ALBUTEROL SULFATE 2.5 MG/3ML
2.5 SOLUTION RESPIRATORY (INHALATION) ONCE AS NEEDED
Status: DISCONTINUED | OUTPATIENT
Start: 2020-10-24 | End: 2020-10-24 | Stop reason: HOSPADM

## 2020-10-24 RX ORDER — MAGNESIUM HYDROXIDE 1200 MG/15ML
LIQUID ORAL AS NEEDED
Status: DISCONTINUED | OUTPATIENT
Start: 2020-10-24 | End: 2020-10-24 | Stop reason: HOSPADM

## 2020-10-24 RX ORDER — FENTANYL CITRATE 50 UG/ML
INJECTION, SOLUTION INTRAMUSCULAR; INTRAVENOUS AS NEEDED
Status: DISCONTINUED | OUTPATIENT
Start: 2020-10-24 | End: 2020-10-24

## 2020-10-24 RX ORDER — DEXAMETHASONE SODIUM PHOSPHATE 4 MG/ML
INJECTION, SOLUTION INTRA-ARTICULAR; INTRALESIONAL; INTRAMUSCULAR; INTRAVENOUS; SOFT TISSUE AS NEEDED
Status: DISCONTINUED | OUTPATIENT
Start: 2020-10-24 | End: 2020-10-24

## 2020-10-24 RX ORDER — METOCLOPRAMIDE HYDROCHLORIDE 5 MG/ML
10 INJECTION INTRAMUSCULAR; INTRAVENOUS ONCE AS NEEDED
Status: DISCONTINUED | OUTPATIENT
Start: 2020-10-24 | End: 2020-10-24 | Stop reason: HOSPADM

## 2020-10-24 RX ORDER — LIDOCAINE HYDROCHLORIDE 10 MG/ML
INJECTION, SOLUTION EPIDURAL; INFILTRATION; INTRACAUDAL; PERINEURAL AS NEEDED
Status: DISCONTINUED | OUTPATIENT
Start: 2020-10-24 | End: 2020-10-24

## 2020-10-24 RX ORDER — CEFAZOLIN SODIUM 2 G/50ML
2000 SOLUTION INTRAVENOUS ONCE
Status: COMPLETED | OUTPATIENT
Start: 2020-10-24 | End: 2020-10-24

## 2020-10-24 RX ORDER — SODIUM CHLORIDE, SODIUM LACTATE, POTASSIUM CHLORIDE, CALCIUM CHLORIDE 600; 310; 30; 20 MG/100ML; MG/100ML; MG/100ML; MG/100ML
50 INJECTION, SOLUTION INTRAVENOUS CONTINUOUS
Status: DISCONTINUED | OUTPATIENT
Start: 2020-10-24 | End: 2020-10-24

## 2020-10-24 RX ORDER — EPHEDRINE SULFATE 50 MG/ML
INJECTION INTRAVENOUS AS NEEDED
Status: DISCONTINUED | OUTPATIENT
Start: 2020-10-24 | End: 2020-10-24

## 2020-10-24 RX ORDER — PROMETHAZINE HYDROCHLORIDE 25 MG/ML
12.5 INJECTION, SOLUTION INTRAMUSCULAR; INTRAVENOUS ONCE AS NEEDED
Status: DISCONTINUED | OUTPATIENT
Start: 2020-10-24 | End: 2020-10-24 | Stop reason: HOSPADM

## 2020-10-24 RX ADMIN — TAMSULOSIN HYDROCHLORIDE 0.4 MG: 0.4 CAPSULE ORAL at 17:39

## 2020-10-24 RX ADMIN — FENTANYL CITRATE 100 MCG: 50 INJECTION, SOLUTION INTRAMUSCULAR; INTRAVENOUS at 10:22

## 2020-10-24 RX ADMIN — ONDANSETRON 4 MG: 2 INJECTION INTRAMUSCULAR; INTRAVENOUS at 10:23

## 2020-10-24 RX ADMIN — ONDANSETRON 4 MG: 2 INJECTION INTRAMUSCULAR; INTRAVENOUS at 08:27

## 2020-10-24 RX ADMIN — HYDROMORPHONE HYDROCHLORIDE 0.5 MG: 1 INJECTION, SOLUTION INTRAMUSCULAR; INTRAVENOUS; SUBCUTANEOUS at 02:53

## 2020-10-24 RX ADMIN — PROPOFOL 100 MG: 10 INJECTION, EMULSION INTRAVENOUS at 10:23

## 2020-10-24 RX ADMIN — PROPOFOL 200 MG: 10 INJECTION, EMULSION INTRAVENOUS at 10:22

## 2020-10-24 RX ADMIN — HYDROMORPHONE HYDROCHLORIDE 0.5 MG: 1 INJECTION, SOLUTION INTRAMUSCULAR; INTRAVENOUS; SUBCUTANEOUS at 05:58

## 2020-10-24 RX ADMIN — SODIUM CHLORIDE 200 ML/HR: 0.9 INJECTION, SOLUTION INTRAVENOUS at 18:33

## 2020-10-24 RX ADMIN — CEFAZOLIN SODIUM 2000 MG: 2 SOLUTION INTRAVENOUS at 10:11

## 2020-10-24 RX ADMIN — SODIUM CHLORIDE 200 ML/HR: 0.9 INJECTION, SOLUTION INTRAVENOUS at 23:18

## 2020-10-24 RX ADMIN — TRAZODONE HYDROCHLORIDE 50 MG: 50 TABLET ORAL at 21:37

## 2020-10-24 RX ADMIN — CEFAZOLIN SODIUM 2000 MG: 2 SOLUTION INTRAVENOUS at 05:13

## 2020-10-24 RX ADMIN — DEXAMETHASONE SODIUM PHOSPHATE 4 MG: 4 INJECTION, SOLUTION INTRAMUSCULAR; INTRAVENOUS at 10:23

## 2020-10-24 RX ADMIN — LIDOCAINE HYDROCHLORIDE 50 MG: 10 INJECTION, SOLUTION EPIDURAL; INFILTRATION; INTRACAUDAL; PERINEURAL at 10:22

## 2020-10-24 RX ADMIN — EPHEDRINE SULFATE 5 MG: 50 INJECTION INTRAVENOUS at 10:47

## 2020-10-25 LAB
ANION GAP SERPL CALCULATED.3IONS-SCNC: 10 MMOL/L (ref 4–13)
BASOPHILS # BLD AUTO: 0.02 THOUSANDS/ΜL (ref 0–0.1)
BASOPHILS NFR BLD AUTO: 0 % (ref 0–1)
BUN SERPL-MCNC: 27 MG/DL (ref 5–25)
CALCIUM SERPL-MCNC: 8.8 MG/DL (ref 8.3–10.1)
CHLORIDE SERPL-SCNC: 109 MMOL/L (ref 100–108)
CO2 SERPL-SCNC: 25 MMOL/L (ref 21–32)
CREAT SERPL-MCNC: 2.64 MG/DL (ref 0.6–1.3)
EOSINOPHIL # BLD AUTO: 0.02 THOUSAND/ΜL (ref 0–0.61)
EOSINOPHIL NFR BLD AUTO: 0 % (ref 0–6)
ERYTHROCYTE [DISTWIDTH] IN BLOOD BY AUTOMATED COUNT: 13.2 % (ref 11.6–15.1)
GFR SERPL CREATININE-BSD FRML MDRD: 32 ML/MIN/1.73SQ M
GLUCOSE SERPL-MCNC: 104 MG/DL (ref 65–140)
HCT VFR BLD AUTO: 38.3 % (ref 36.5–49.3)
HGB BLD-MCNC: 12.8 G/DL (ref 12–17)
IMM GRANULOCYTES # BLD AUTO: 0.04 THOUSAND/UL (ref 0–0.2)
IMM GRANULOCYTES NFR BLD AUTO: 0 % (ref 0–2)
LYMPHOCYTES # BLD AUTO: 1.59 THOUSANDS/ΜL (ref 0.6–4.47)
LYMPHOCYTES NFR BLD AUTO: 17 % (ref 14–44)
MCH RBC QN AUTO: 30.1 PG (ref 26.8–34.3)
MCHC RBC AUTO-ENTMCNC: 33.4 G/DL (ref 31.4–37.4)
MCV RBC AUTO: 90 FL (ref 82–98)
MONOCYTES # BLD AUTO: 0.69 THOUSAND/ΜL (ref 0.17–1.22)
MONOCYTES NFR BLD AUTO: 7 % (ref 4–12)
NEUTROPHILS # BLD AUTO: 6.99 THOUSANDS/ΜL (ref 1.85–7.62)
NEUTS SEG NFR BLD AUTO: 76 % (ref 43–75)
NRBC BLD AUTO-RTO: 0 /100 WBCS
PLATELET # BLD AUTO: 168 THOUSANDS/UL (ref 149–390)
PMV BLD AUTO: 11.1 FL (ref 8.9–12.7)
POTASSIUM SERPL-SCNC: 3.7 MMOL/L (ref 3.5–5.3)
RBC # BLD AUTO: 4.25 MILLION/UL (ref 3.88–5.62)
SODIUM SERPL-SCNC: 144 MMOL/L (ref 136–145)
WBC # BLD AUTO: 9.35 THOUSAND/UL (ref 4.31–10.16)

## 2020-10-25 PROCEDURE — 82360 CALCULUS ASSAY QUANT: CPT | Performed by: INTERNAL MEDICINE

## 2020-10-25 PROCEDURE — 99232 SBSQ HOSP IP/OBS MODERATE 35: CPT | Performed by: INTERNAL MEDICINE

## 2020-10-25 PROCEDURE — 85025 COMPLETE CBC W/AUTO DIFF WBC: CPT | Performed by: INTERNAL MEDICINE

## 2020-10-25 PROCEDURE — 80048 BASIC METABOLIC PNL TOTAL CA: CPT | Performed by: INTERNAL MEDICINE

## 2020-10-25 RX ADMIN — SODIUM CHLORIDE 200 ML/HR: 0.9 INJECTION, SOLUTION INTRAVENOUS at 13:47

## 2020-10-25 RX ADMIN — SODIUM CHLORIDE 200 ML/HR: 0.9 INJECTION, SOLUTION INTRAVENOUS at 09:01

## 2020-10-25 RX ADMIN — TRAZODONE HYDROCHLORIDE 50 MG: 50 TABLET ORAL at 21:39

## 2020-10-25 RX ADMIN — TAMSULOSIN HYDROCHLORIDE 0.4 MG: 0.4 CAPSULE ORAL at 17:16

## 2020-10-25 RX ADMIN — SODIUM CHLORIDE 200 ML/HR: 0.9 INJECTION, SOLUTION INTRAVENOUS at 23:15

## 2020-10-25 RX ADMIN — SODIUM CHLORIDE 200 ML/HR: 0.9 INJECTION, SOLUTION INTRAVENOUS at 18:27

## 2020-10-26 ENCOUNTER — TELEPHONE (OUTPATIENT)
Dept: UROLOGY | Facility: MEDICAL CENTER | Age: 25
End: 2020-10-26

## 2020-10-26 ENCOUNTER — TELEPHONE (OUTPATIENT)
Dept: NEPHROLOGY | Facility: CLINIC | Age: 25
End: 2020-10-26

## 2020-10-26 VITALS
HEART RATE: 69 BPM | SYSTOLIC BLOOD PRESSURE: 143 MMHG | TEMPERATURE: 98 F | OXYGEN SATURATION: 99 % | BODY MASS INDEX: 32.13 KG/M2 | RESPIRATION RATE: 18 BRPM | HEIGHT: 72 IN | DIASTOLIC BLOOD PRESSURE: 68 MMHG | WEIGHT: 237.22 LBS

## 2020-10-26 DIAGNOSIS — N17.9 ACUTE KIDNEY INJURY (HCC): Primary | ICD-10-CM

## 2020-10-26 LAB
ANION GAP SERPL CALCULATED.3IONS-SCNC: 8 MMOL/L (ref 4–13)
ANION GAP SERPL CALCULATED.3IONS-SCNC: 9 MMOL/L (ref 4–13)
BUN SERPL-MCNC: 24 MG/DL (ref 5–25)
BUN SERPL-MCNC: 27 MG/DL (ref 5–25)
CALCIUM SERPL-MCNC: 7.9 MG/DL (ref 8.3–10.1)
CALCIUM SERPL-MCNC: 8.2 MG/DL (ref 8.3–10.1)
CHLORIDE SERPL-SCNC: 109 MMOL/L (ref 100–108)
CHLORIDE SERPL-SCNC: 111 MMOL/L (ref 100–108)
CO2 SERPL-SCNC: 24 MMOL/L (ref 21–32)
CO2 SERPL-SCNC: 25 MMOL/L (ref 21–32)
CREAT SERPL-MCNC: 2.6 MG/DL (ref 0.6–1.3)
CREAT SERPL-MCNC: 2.6 MG/DL (ref 0.6–1.3)
GFR SERPL CREATININE-BSD FRML MDRD: 32 ML/MIN/1.73SQ M
GFR SERPL CREATININE-BSD FRML MDRD: 32 ML/MIN/1.73SQ M
GLUCOSE SERPL-MCNC: 96 MG/DL (ref 65–140)
GLUCOSE SERPL-MCNC: 97 MG/DL (ref 65–140)
POTASSIUM SERPL-SCNC: 3.7 MMOL/L (ref 3.5–5.3)
POTASSIUM SERPL-SCNC: 3.9 MMOL/L (ref 3.5–5.3)
SODIUM SERPL-SCNC: 143 MMOL/L (ref 136–145)
SODIUM SERPL-SCNC: 143 MMOL/L (ref 136–145)

## 2020-10-26 PROCEDURE — 80048 BASIC METABOLIC PNL TOTAL CA: CPT | Performed by: INTERNAL MEDICINE

## 2020-10-26 PROCEDURE — 99239 HOSP IP/OBS DSCHRG MGMT >30: CPT | Performed by: INTERNAL MEDICINE

## 2020-10-26 PROCEDURE — NC001 PR NO CHARGE: Performed by: NURSE PRACTITIONER

## 2020-10-26 PROCEDURE — 99232 SBSQ HOSP IP/OBS MODERATE 35: CPT | Performed by: INTERNAL MEDICINE

## 2020-10-26 RX ORDER — TAMSULOSIN HYDROCHLORIDE 0.4 MG/1
0.4 CAPSULE ORAL
Qty: 30 CAPSULE | Refills: 0 | Status: SHIPPED | OUTPATIENT
Start: 2020-10-26 | End: 2021-02-10 | Stop reason: HOSPADM

## 2020-10-26 RX ORDER — HYDROMORPHONE HCL/PF 1 MG/ML
0.5 SYRINGE (ML) INJECTION
Status: DISCONTINUED | OUTPATIENT
Start: 2020-10-26 | End: 2020-10-26 | Stop reason: HOSPADM

## 2020-10-26 RX ADMIN — SODIUM CHLORIDE 200 ML/HR: 0.9 INJECTION, SOLUTION INTRAVENOUS at 04:16

## 2020-10-27 ENCOUNTER — TELEMEDICINE (OUTPATIENT)
Dept: FAMILY MEDICINE CLINIC | Facility: CLINIC | Age: 25
End: 2020-10-27
Payer: COMMERCIAL

## 2020-10-27 ENCOUNTER — TRANSITIONAL CARE MANAGEMENT (OUTPATIENT)
Dept: FAMILY MEDICINE CLINIC | Facility: CLINIC | Age: 25
End: 2020-10-27

## 2020-10-27 VITALS — HEIGHT: 72 IN | WEIGHT: 237 LBS | BODY MASS INDEX: 32.1 KG/M2

## 2020-10-27 DIAGNOSIS — N20.1 URETERIC STONE: Primary | ICD-10-CM

## 2020-10-27 DIAGNOSIS — N17.9 ACUTE KIDNEY INJURY (HCC): ICD-10-CM

## 2020-10-27 DIAGNOSIS — G47.00 INSOMNIA, UNSPECIFIED TYPE: ICD-10-CM

## 2020-10-27 PROBLEM — F33.42 RECURRENT MAJOR DEPRESSIVE DISORDER, IN FULL REMISSION (HCC): Status: RESOLVED | Noted: 2019-10-30 | Resolved: 2020-10-27

## 2020-10-27 PROBLEM — J32.9 SINUSITIS: Status: RESOLVED | Noted: 2018-10-03 | Resolved: 2020-10-27

## 2020-10-27 PROCEDURE — 99496 TRANSJ CARE MGMT HIGH F2F 7D: CPT | Performed by: FAMILY MEDICINE

## 2020-10-27 PROCEDURE — 1111F DSCHRG MED/CURRENT MED MERGE: CPT | Performed by: FAMILY MEDICINE

## 2020-10-29 ENCOUNTER — PROCEDURE VISIT (OUTPATIENT)
Dept: UROLOGY | Facility: CLINIC | Age: 25
End: 2020-10-29
Payer: COMMERCIAL

## 2020-10-29 VITALS
BODY MASS INDEX: 31.15 KG/M2 | HEART RATE: 74 BPM | TEMPERATURE: 98 F | SYSTOLIC BLOOD PRESSURE: 122 MMHG | HEIGHT: 72 IN | WEIGHT: 230 LBS | DIASTOLIC BLOOD PRESSURE: 80 MMHG

## 2020-10-29 DIAGNOSIS — N20.1 URETERIC STONE: ICD-10-CM

## 2020-10-29 PROCEDURE — 99211 OFF/OP EST MAY X REQ PHY/QHP: CPT

## 2020-10-29 PROCEDURE — 1111F DSCHRG MED/CURRENT MED MERGE: CPT

## 2020-10-29 PROCEDURE — 3008F BODY MASS INDEX DOCD: CPT

## 2020-10-31 LAB
CALCIUM OXALATE DIHYDRATE MFR STONE IR: 10 %
COLOR STONE: NORMAL
COM MFR STONE: 100 %
COM MFR STONE: 100 %
COM MFR STONE: 90 %
COMMENT-STONE3: NORMAL
COMPOSITION: NORMAL
LABORATORY COMMENT REPORT: NORMAL
PHOTO: NORMAL
SIZE STONE: NORMAL MM
SPEC SOURCE SUBJ: NORMAL
STONE ANALYSIS-IMP: NORMAL
WT STONE: 13 MG
WT STONE: 40 MG
WT STONE: 40 MG

## 2020-11-02 ENCOUNTER — TELEPHONE (OUTPATIENT)
Dept: NEPHROLOGY | Facility: CLINIC | Age: 25
End: 2020-11-02

## 2020-11-06 ENCOUNTER — TELEPHONE (OUTPATIENT)
Dept: NEPHROLOGY | Facility: CLINIC | Age: 25
End: 2020-11-06

## 2020-11-11 ENCOUNTER — TELEPHONE (OUTPATIENT)
Dept: NEPHROLOGY | Facility: CLINIC | Age: 25
End: 2020-11-11

## 2020-11-12 ENCOUNTER — TELEPHONE (OUTPATIENT)
Dept: NEPHROLOGY | Facility: CLINIC | Age: 25
End: 2020-11-12

## 2020-11-21 DIAGNOSIS — F51.01 PRIMARY INSOMNIA: ICD-10-CM

## 2020-11-23 RX ORDER — TRAZODONE HYDROCHLORIDE 50 MG/1
TABLET ORAL
Qty: 90 TABLET | Refills: 1 | Status: SHIPPED | OUTPATIENT
Start: 2020-11-23 | End: 2021-05-27

## 2021-02-03 ENCOUNTER — HOSPITAL ENCOUNTER (EMERGENCY)
Facility: HOSPITAL | Age: 26
Discharge: HOME | End: 2021-02-03
Attending: EMERGENCY MEDICINE | Admitting: INTERNAL MEDICINE
Payer: COMMERCIAL

## 2021-02-03 ENCOUNTER — APPOINTMENT (EMERGENCY)
Dept: CT IMAGING | Facility: HOSPITAL | Age: 26
End: 2021-02-03
Payer: COMMERCIAL

## 2021-02-03 VITALS
DIASTOLIC BLOOD PRESSURE: 80 MMHG | SYSTOLIC BLOOD PRESSURE: 153 MMHG | RESPIRATION RATE: 16 BRPM | OXYGEN SATURATION: 100 % | TEMPERATURE: 97 F | HEART RATE: 74 BPM

## 2021-02-03 DIAGNOSIS — R10.9 FLANK PAIN: Primary | ICD-10-CM

## 2021-02-03 DIAGNOSIS — N20.0 KIDNEY STONE: ICD-10-CM

## 2021-02-03 DIAGNOSIS — N20.1 URETERIC STONE: ICD-10-CM

## 2021-02-03 LAB
ANION GAP SERPL CALCULATED.3IONS-SCNC: 9 MMOL/L (ref 4–13)
BACTERIA UR QL AUTO: ABNORMAL /HPF
BASOPHILS # BLD AUTO: 0.06 THOUSANDS/ΜL (ref 0–0.1)
BASOPHILS NFR BLD AUTO: 1 % (ref 0–1)
BILIRUB UR QL STRIP: NEGATIVE
BUN SERPL-MCNC: 28 MG/DL (ref 5–25)
CALCIUM SERPL-MCNC: 8.6 MG/DL (ref 8.3–10.1)
CHLORIDE SERPL-SCNC: 104 MMOL/L (ref 100–108)
CLARITY UR: CLEAR
CO2 SERPL-SCNC: 28 MMOL/L (ref 21–32)
COLOR UR: ABNORMAL
CREAT SERPL-MCNC: 3.49 MG/DL (ref 0.6–1.3)
EOSINOPHIL # BLD AUTO: 0.48 THOUSAND/ΜL (ref 0–0.61)
EOSINOPHIL NFR BLD AUTO: 7 % (ref 0–6)
ERYTHROCYTE [DISTWIDTH] IN BLOOD BY AUTOMATED COUNT: 13 % (ref 11.6–15.1)
GFR SERPL CREATININE-BSD FRML MDRD: 23 ML/MIN/1.73SQ M
GLUCOSE SERPL-MCNC: 102 MG/DL (ref 65–140)
GLUCOSE UR STRIP-MCNC: NEGATIVE MG/DL
HCT VFR BLD AUTO: 37.3 % (ref 36.5–49.3)
HGB BLD-MCNC: 12.3 G/DL (ref 12–17)
HGB UR QL STRIP.AUTO: ABNORMAL
IMM GRANULOCYTES # BLD AUTO: 0.01 THOUSAND/UL (ref 0–0.2)
IMM GRANULOCYTES NFR BLD AUTO: 0 % (ref 0–2)
KETONES UR STRIP-MCNC: NEGATIVE MG/DL
LEUKOCYTE ESTERASE UR QL STRIP: NEGATIVE
LYMPHOCYTES # BLD AUTO: 1.83 THOUSANDS/ΜL (ref 0.6–4.47)
LYMPHOCYTES NFR BLD AUTO: 26 % (ref 14–44)
MCH RBC QN AUTO: 29.9 PG (ref 26.8–34.3)
MCHC RBC AUTO-ENTMCNC: 33 G/DL (ref 31.4–37.4)
MCV RBC AUTO: 91 FL (ref 82–98)
MONOCYTES # BLD AUTO: 0.74 THOUSAND/ΜL (ref 0.17–1.22)
MONOCYTES NFR BLD AUTO: 11 % (ref 4–12)
NEUTROPHILS # BLD AUTO: 3.91 THOUSANDS/ΜL (ref 1.85–7.62)
NEUTS SEG NFR BLD AUTO: 55 % (ref 43–75)
NITRITE UR QL STRIP: NEGATIVE
NON-SQ EPI CELLS URNS QL MICRO: ABNORMAL /HPF
NRBC BLD AUTO-RTO: 0 /100 WBCS
PH UR STRIP.AUTO: 6 [PH]
PLATELET # BLD AUTO: 192 THOUSANDS/UL (ref 149–390)
PMV BLD AUTO: 11.1 FL (ref 8.9–12.7)
POTASSIUM SERPL-SCNC: 4.1 MMOL/L (ref 3.5–5.3)
PROT UR STRIP-MCNC: NEGATIVE MG/DL
RBC # BLD AUTO: 4.12 MILLION/UL (ref 3.88–5.62)
RBC #/AREA URNS AUTO: ABNORMAL /HPF
SODIUM SERPL-SCNC: 141 MMOL/L (ref 136–145)
SP GR UR STRIP.AUTO: 1.01 (ref 1–1.03)
UROBILINOGEN UR QL STRIP.AUTO: 0.2 E.U./DL
WBC # BLD AUTO: 7.03 THOUSAND/UL (ref 4.31–10.16)
WBC #/AREA URNS AUTO: ABNORMAL /HPF

## 2021-02-03 PROCEDURE — 85025 COMPLETE CBC W/AUTO DIFF WBC: CPT | Performed by: PHYSICIAN ASSISTANT

## 2021-02-03 PROCEDURE — 74176 CT ABD & PELVIS W/O CONTRAST: CPT

## 2021-02-03 PROCEDURE — 80048 BASIC METABOLIC PNL TOTAL CA: CPT | Performed by: PHYSICIAN ASSISTANT

## 2021-02-03 PROCEDURE — 81001 URINALYSIS AUTO W/SCOPE: CPT | Performed by: PHYSICIAN ASSISTANT

## 2021-02-03 PROCEDURE — G1004 CDSM NDSC: HCPCS

## 2021-02-03 PROCEDURE — 96374 THER/PROPH/DIAG INJ IV PUSH: CPT

## 2021-02-03 PROCEDURE — 99285 EMERGENCY DEPT VISIT HI MDM: CPT | Performed by: PHYSICIAN ASSISTANT

## 2021-02-03 PROCEDURE — 87086 URINE CULTURE/COLONY COUNT: CPT | Performed by: PHYSICIAN ASSISTANT

## 2021-02-03 PROCEDURE — 99284 EMERGENCY DEPT VISIT MOD MDM: CPT

## 2021-02-03 PROCEDURE — 96361 HYDRATE IV INFUSION ADD-ON: CPT

## 2021-02-03 PROCEDURE — 36415 COLL VENOUS BLD VENIPUNCTURE: CPT | Performed by: PHYSICIAN ASSISTANT

## 2021-02-03 RX ORDER — TAMSULOSIN HYDROCHLORIDE 0.4 MG/1
0.4 CAPSULE ORAL ONCE
Status: COMPLETED | OUTPATIENT
Start: 2021-02-03 | End: 2021-02-03

## 2021-02-03 RX ORDER — OXYCODONE HYDROCHLORIDE 5 MG/1
5 TABLET ORAL EVERY 6 HOURS PRN
Qty: 12 TABLET | Refills: 0 | Status: SHIPPED | OUTPATIENT
Start: 2021-02-03 | End: 2021-02-06

## 2021-02-03 RX ORDER — TAMSULOSIN HYDROCHLORIDE 0.4 MG/1
0.4 CAPSULE ORAL
Qty: 7 CAPSULE | Refills: 0 | Status: SHIPPED | OUTPATIENT
Start: 2021-02-03 | End: 2021-07-26

## 2021-02-03 RX ORDER — CEFAZOLIN SODIUM 2 G/50ML
2000 SOLUTION INTRAVENOUS ONCE
Status: CANCELLED | OUTPATIENT
Start: 2021-02-03 | End: 2021-02-03

## 2021-02-03 RX ORDER — OXYCODONE HYDROCHLORIDE 5 MG/1
5 TABLET ORAL ONCE
Status: COMPLETED | OUTPATIENT
Start: 2021-02-03 | End: 2021-02-03

## 2021-02-03 RX ORDER — SODIUM CHLORIDE 9 MG/ML
125 INJECTION, SOLUTION INTRAVENOUS CONTINUOUS
Status: DISCONTINUED | OUTPATIENT
Start: 2021-02-03 | End: 2021-02-04 | Stop reason: HOSPADM

## 2021-02-03 RX ORDER — MORPHINE SULFATE 4 MG/ML
4 INJECTION, SOLUTION INTRAMUSCULAR; INTRAVENOUS ONCE
Status: COMPLETED | OUTPATIENT
Start: 2021-02-03 | End: 2021-02-03

## 2021-02-03 RX ADMIN — TAMSULOSIN HYDROCHLORIDE 0.4 MG: 0.4 CAPSULE ORAL at 22:32

## 2021-02-03 RX ADMIN — MORPHINE SULFATE 4 MG: 4 INJECTION INTRAVENOUS at 20:44

## 2021-02-03 RX ADMIN — OXYCODONE HYDROCHLORIDE 5 MG: 5 TABLET ORAL at 22:32

## 2021-02-03 RX ADMIN — SODIUM CHLORIDE 1000 ML: 0.9 INJECTION, SOLUTION INTRAVENOUS at 21:40

## 2021-02-03 NOTE — Clinical Note
Case was discussed with Marc Philip and the patient's admission status was agreed to be Admission Status: observation status to the service of Dr Zahida Lackey

## 2021-02-04 LAB — BACTERIA UR CULT: NORMAL

## 2021-02-04 NOTE — ED PROVIDER NOTES
History  Chief Complaint   Patient presents with    Flank Pain     Pt states he has right sided flank pain that started about 11:30 this morning  Pt states he has a hx of kidney stones and this feels very similar      43-year-old male with relevant past medical history significant for CKD stage 3 and hyperoxaluria who presents to the emergency department for complaint of nonradiating right flank pain beginning at approximately 11:30AM today  Patient has an extensive history of kidney stones beginning at the age of 1years old, diagnosed with hyperoxaluria at age 4, has past approximately 150 kidney stones over the last 15 years, currently follows with [facility], next appt on Friday  Reports this pain feels exactly similar to past stones  His most recent lithotripsy and stenting occurred in October  He is currently being evaluated for therapy with [facility]  He takes potassium citrate currently  He denies any fever chills, nausea or vomiting, abdominal pain or discomfort, constipation or diarrhea, rash or skin color change, urinary symptoms  Prior to Admission Medications   Prescriptions Last Dose Informant Patient Reported?  Taking?   oxybutynin (DITROPAN) 5 mg tablet  Self No No   Sig: Take 1 tablet (5 mg total) by mouth 3 (three) times a day as needed (stent discomfort)   tamsulosin (FLOMAX) 0 4 mg  Self No No   Sig: Take 1 capsule (0 4 mg total) by mouth daily with dinner   traZODone (DESYREL) 50 mg tablet   No No   Sig: TAKE 1 TABLET BY MOUTH EVERYDAY AT BEDTIME      Facility-Administered Medications: None       Past Medical History:   Diagnosis Date    Anxiety     GERD (gastroesophageal reflux disease)     Liver disorder     Nephrolithiasis        Past Surgical History:   Procedure Laterality Date    ANKLE SURGERY Right 2017    ligament repair    FL RETROGRADE PYELOGRAM  10/24/2020    HERNIA REPAIR      LITHOTRIPSY      MASS EXCISION Left 2/17/2017    Procedure: BACK/SHOULDER CYST EXCISION ;  Surgeon: Fazal Arrington MD;  Location: [facility];  Service:     FL CYSTO/URETERO W/LITHOTRIPSY &INDWELL STENT INSRT Left 10/24/2020    Procedure: CYSTOSCOPY URETEROSCOPY WITH LITHOTRIPSY HOLMIUM LASER, RETROGRADE PYELOGRAM AND INSERTION STENT URETERAL;  Surgeon: Yazmin Farah MD;  Location: [facility];  Service: Urology       Family History   Problem Relation Age of Onset    Hypertension Mother     No Known Problems Father     Cancer Maternal Grandmother     Diabetes Maternal Grandmother     Alzheimer's disease Maternal Grandfather      I have reviewed and agree with the history as documented  E-Cigarette/Vaping    E-Cigarette Use Never User      E-Cigarette/Vaping Substances     Social History     Tobacco Use    Smoking status: Never Smoker    Smokeless tobacco: Never Used   Substance Use Topics    Alcohol use: Yes     Frequency: Monthly or less     Comment: rarely    Drug use: No       Review of Systems   Constitutional: Negative for chills, fatigue and fever  Respiratory: Negative for shortness of breath  Cardiovascular: Negative for chest pain and palpitations  Gastrointestinal: Negative for abdominal distention, abdominal pain, constipation, diarrhea, nausea and vomiting  Genitourinary: Positive for flank pain  Negative for decreased urine volume, difficulty urinating, dysuria, frequency, hematuria, testicular pain and urgency  Musculoskeletal: Negative for back pain and myalgias  Skin: Negative for color change and rash  Neurological: Negative for dizziness, weakness, light-headedness and headaches  Hematological: Negative for adenopathy  All other systems reviewed and are negative  Physical Exam  Physical Exam  Vitals signs reviewed  Constitutional:       General: He is awake  He is not in acute distress  Appearance: Normal appearance  He is well-developed and normal weight  He is not toxic-appearing  HENT:      Head: Normocephalic and atraumatic  Mouth/Throat:      Lips: Pink  Mouth: Mucous membranes are moist       Pharynx: Oropharynx is clear  Uvula midline  Eyes:      Extraocular Movements: Extraocular movements intact  Conjunctiva/sclera: Conjunctivae normal       Pupils: Pupils are equal, round, and reactive to light  Neck:      Musculoskeletal: Normal range of motion and neck supple  Cardiovascular:      Rate and Rhythm: Normal rate and regular rhythm  Pulses: Normal pulses  Pulmonary:      Effort: Pulmonary effort is normal       Breath sounds: Normal breath sounds  Abdominal:      General: Bowel sounds are normal  There is no distension  Palpations: Abdomen is soft  There is no hepatomegaly, splenomegaly or mass  Tenderness: There is no abdominal tenderness  There is right CVA tenderness  There is no left CVA tenderness, guarding or rebound  Hernia: No hernia is present  Musculoskeletal: Normal range of motion  Skin:     General: Skin is warm  Capillary Refill: Capillary refill takes less than 2 seconds  Findings: No erythema, lesion or rash  Neurological:      Mental Status: He is alert and oriented to person, place, and time           Vital Signs  ED Triage Vitals   Temperature Pulse Respirations Blood Pressure SpO2   02/03/21 2014 02/03/21 2014 02/03/21 2014 02/03/21 2014 02/03/21 2014   (!) 97 °F (36 1 °C) 74 16 153/80 100 %      Temp Source Heart Rate Source Patient Position - Orthostatic VS BP Location FiO2 (%)   02/03/21 2014 02/03/21 2014 02/03/21 2014 02/03/21 2014 --   Temporal Monitor Sitting Left arm       Pain Score       02/03/21 2044       8           Vitals:    02/03/21 2014   BP: 153/80   Pulse: 74   Patient Position - Orthostatic VS: Sitting         Visual Acuity      ED Medications  Medications   morphine (PF) 4 mg/mL injection 4 mg (4 mg Intravenous Given 2/3/21 2044)   sodium chloride 0 9 % bolus 1,000 mL (0 mL Intravenous Stopped 2/3/21 2231)   tamsulosin (FLOMAX) capsule 0 4 mg (0 4 mg Oral Given 2/3/21 2232)   oxyCODONE (ROXICODONE) IR tablet 5 mg (5 mg Oral Given 2/3/21 2232)       Diagnostic Studies  Results Reviewed     Procedure Component Value Units Date/Time    Urine culture [[id number]] Collected: 02/03/21 2047    Lab Status: Final result Specimen: Urine, Clean Catch Updated: 02/04/21 1623     Urine Culture No Growth <1000 cfu/mL    Urine Microscopic [[id number]]  (Abnormal) Collected: 02/03/21 2047    Lab Status: Final result Specimen: Urine, Clean Catch Updated: 02/03/21 2113     RBC, UA 2-4 /hpf      WBC, UA 30-50 /hpf      Epithelial Cells Occasional /hpf      Bacteria, UA None Seen /hpf     Basic metabolic panel [[id number]]  (Abnormal) Collected: 02/03/21 2046    Lab Status: Final result Specimen: Blood from Arm, Left Updated: 02/03/21 2105     Sodium 141 mmol/L      Potassium 4 1 mmol/L      Chloride 104 mmol/L      CO2 28 mmol/L      ANION GAP 9 mmol/L      BUN 28 mg/dL      Creatinine 3 49 mg/dL      Glucose 102 mg/dL      Calcium 8 6 mg/dL      eGFR 23 ml/min/1 73sq m     Narrative:      [place] guidelines for Chronic Kidney Disease (CKD):     Stage 1 with normal or high GFR (GFR > 90 mL/min/1 73 square meters)    Stage 2 Mild CKD (GFR = 60-89 mL/min/1 73 square meters)    Stage 3A Moderate CKD (GFR = 45-59 mL/min/1 73 square meters)    Stage 3B Moderate CKD (GFR = 30-44 mL/min/1 73 square meters)    Stage 4 Severe CKD (GFR = 15-29 mL/min/1 73 square meters)    Stage 5 End Stage CKD (GFR <15 mL/min/1 73 square meters)  Note: GFR calculation is accurate only with a steady state creatinine    UA w Reflex to Microscopic w Reflex to Culture [[id number]]  (Abnormal) Collected: 02/03/21 2047    Lab Status: Final result Specimen: Urine, Clean Catch Updated: 02/03/21 2054     Color, UA Light Yellow     Clarity, UA Clear     Specific Gravity, UA 1 015     pH, UA 6 0     Leukocytes, UA Negative     Nitrite, UA Negative     Protein, UA Negative mg/dl      Glucose, UA Negative mg/dl      Ketones, UA Negative mg/dl      Urobilinogen, UA 0 2 E U /dl      Bilirubin, UA Negative     Blood, UA Large    CBC and differential [[id number]]  (Abnormal) Collected: 02/03/21 2046    Lab Status: Final result Specimen: Blood from Arm, Left Updated: 02/03/21 2052     WBC 7 03 Thousand/uL      RBC 4 12 Million/uL      Hemoglobin 12 3 g/dL      Hematocrit 37 3 %      MCV 91 fL      MCH 29 9 pg      MCHC 33 0 g/dL      RDW 13 0 %      MPV 11 1 fL      Platelets 442 Thousands/uL      nRBC 0 /100 WBCs      Neutrophils Relative 55 %      Immat GRANS % 0 %      Lymphocytes Relative 26 %      Monocytes Relative 11 %      Eosinophils Relative 7 %      Basophils Relative 1 %      Neutrophils Absolute 3 91 Thousands/µL      Immature Grans Absolute 0 01 Thousand/uL      Lymphocytes Absolute 1 83 Thousands/µL      Monocytes Absolute 0 74 Thousand/µL      Eosinophils Absolute 0 48 Thousand/µL      Basophils Absolute 0 06 Thousands/µL                  CT renal stone study abdomen pelvis wo contrast   Final Result by Elvia Enriquez MD (02/03 2153)      1  Moderate right-sided hydroureteronephrosis secondary to a 6 mm calculus at the distal ureter  2   Bilateral intrarenal calculi again seen with medullary nephrocalcinosis  The study was marked in [facility] for immediate notification  Workstation performed: [id number]                    Procedures  Procedures         ED Course  ED Course as of Feb 07 0636   Wed Feb 03, 2021 2214 CT showing evidence of 6 mm kidney stone causing moderate hydro  Cr up trending but not MECHELLE criteria  Given complex hx, will benefit from admission and urology consult  Uro consulted, NPO after midnight, fluids, flomax, pain control, strain all urine         2216 Patient now refusing admission, stating he has passed stones larger than this current one on his own without procedural intervention, would like pain control and to go home and f/u with nephrologist in the morning  Discussed risks of leaving hospital, including worsening kidney function, acute renal failure, rupture of urinary tract, infection  Still wishes to leave ER tonight  Advised vigorous hydration  SBIRT 20yo+      Most Recent Value   SBIRT (22 yo +)   In order to provide better care to our patients, we are screening all of our patients for alcohol and drug use  Would it be okay to ask you these screening questions? Yes Filed at: 02/03/2021 2026   Initial Alcohol Screen: US AUDIT-C    1  How often do you have a drink containing alcohol? 1 Filed at: 02/03/2021 2026   2  How many drinks containing alcohol do you have on a typical day you are drinking? 1 Filed at: 02/03/2021 2026   3a  Male UNDER 65: How often do you have five or more drinks on one occasion? 1 Filed at: 02/03/2021 2026   Audit-C Score  3 Filed at: 02/03/2021 2026   RADHA: How many times in the past year have you    Used an illegal drug or used a prescription medication for non-medical reasons? Never Filed at: 02/03/2021 2026                    MDM  Number of Diagnoses or Management Options  Flank pain:   Kidney stone:   Diagnosis management comments: On exam, well-appearing male, no acute distress, nontoxic appearance, afebrile, vitals unremarkable, abdomen soft and nontender, positive right flank and CVA tenderness, no rash or skin color change,remainder of exam unremarkable as above      Will check UA for any UTI, labs for any worsening MECHELLE, CT to assess for progression of renal stones and complication such as large hydro, perforation, bleeding       Amount and/or Complexity of Data Reviewed  Clinical lab tests: reviewed and ordered  Tests in the radiology section of CPT®: reviewed and ordered  Discussion of test results with the performing providers: yes  Decide to obtain previous medical records or to obtain history from someone other than the patient: yes  Obtain history from someone other than the patient: yes  Review and summarize past medical records: yes  Discuss the patient with other providers: yes  Independent visualization of images, tracings, or specimens: yes    Patient Progress  Patient progress: improved (See ED course note for dispo and plan  I reviewed and discussed all lab and imaging findings with the patient at bedside  I discussed emergency department return parameters  I answered any and all questions the patient had regarding emergency department course of evaluation and treatment  The patient verbalized understanding of and agreement with plan   )      Disposition  Final diagnoses:   Flank pain   Kidney stone     Time reflects when diagnosis was documented in both MDM as applicable and the Disposition within this note     Time User Action Codes Description Comment    2/3/2021 10:09 PM Alie Rachel Add [R10 9] Flank pain     2/3/2021 10:09 PM Alie Rachel Add [N20 0] Kidney stone     2/3/2021 10:10 PM Kimani Monae Add [N20 1] Ureteric stone ----> hydroureteronephrosis moderate       ED Disposition     ED Disposition Condition Date/Time Comment    AMRYANN  Wed Feb 3, 2021 10:23 PM Date: 2/3/2021  Patient: Monika Jane  Admitted: 2/3/2021  8:22 PM  Attending Provider: Makenna Ba MD    Marcussonam Jane or his authorized caregiver has made the decision for the patient to leave the emergency department against the [employer] of his attending physician  He or his authorized caregiver has been informed and understands the inherent risks, including worsening kidney function progressing to renal failure requiring dialysis, rupture or urinary tract, infection, death  He or  his authorized caregiver has decided to accept the responsibility for this decision  Monika Jane and all necessary parties have been advised that he may return for further evaluation or treatment  His condition at time of discharge was stable     Monika Jane had current vital signs as follows:  BP 153/80 (BP Location: Left arm)   Pulse 74   Temp (!) 97 °F (36 1 °C) (Temporal)   Resp 16         Follow-up Information     Follow up With Specialties Details Why Contact Info Additional [address] [facility] Emergency Department Emergency Medicine Go to  If symptoms worsen [address] Emergency Department, [address] [facility], [facility], [address] [place] [address], [id number]          Discharge Medication List as of 2/3/2021 10:25 PM      START taking these medications    Details   oxyCODONE (ROXICODONE) 5 mg immediate release tablet Take 1 tablet (5 mg total) by mouth every 6 (six) hours as needed for severe pain for up to 3 daysMax Daily Amount: 20 mg, Starting Wed 2/3/2021, Until Sat 2/6/2021, Normal      !! tamsulosin (FLOMAX) 0 4 mg Take 1 capsule (0 4 mg total) by mouth daily with dinner for 7 days, Starting Wed 2/3/2021, Until Wed 2/10/2021, Normal       !! - Potential duplicate medications found  Please discuss with provider  CONTINUE these medications which have NOT CHANGED    Details   oxybutynin (DITROPAN) 5 mg tablet Take 1 tablet (5 mg total) by mouth 3 (three) times a day as needed (stent discomfort), Starting Sat 10/24/2020, Normal      !! tamsulosin (FLOMAX) 0 4 mg Take 1 capsule (0 4 mg total) by mouth daily with dinner, Starting Mon 10/26/2020, Normal      traZODone (DESYREL) 50 mg tablet TAKE 1 TABLET BY MOUTH EVERYDAY AT BEDTIME, Normal       !! - Potential duplicate medications found  Please discuss with provider  No discharge procedures on file      PDMP Review     None          ED Provider  Electronically Signed by           Ely Lowery PA-C  02/07/21 [id number]

## 2021-02-07 ENCOUNTER — APPOINTMENT (EMERGENCY)
Dept: RADIOLOGY | Facility: HOSPITAL | Age: 26
DRG: 660 | End: 2021-02-07
Payer: COMMERCIAL

## 2021-02-07 ENCOUNTER — HOSPITAL ENCOUNTER (INPATIENT)
Facility: HOSPITAL | Age: 26
LOS: 1 days | Discharge: HOME | DRG: 660 | End: 2021-02-10
Attending: EMERGENCY MEDICINE | Admitting: INTERNAL MEDICINE
Payer: COMMERCIAL

## 2021-02-07 DIAGNOSIS — N17.9 ACUTE KIDNEY INJURY (HCC): ICD-10-CM

## 2021-02-07 DIAGNOSIS — N13.2 HYDRONEPHROSIS WITH URINARY OBSTRUCTION DUE TO URETERAL CALCULUS: ICD-10-CM

## 2021-02-07 DIAGNOSIS — E72.53: ICD-10-CM

## 2021-02-07 DIAGNOSIS — N20.1 URETEROLITHIASIS: Primary | ICD-10-CM

## 2021-02-07 LAB
ALBUMIN SERPL BCP-MCNC: 4 G/DL (ref 3.5–5)
ALP SERPL-CCNC: 65 U/L (ref 46–116)
ALT SERPL W P-5'-P-CCNC: 32 U/L (ref 12–78)
ANION GAP SERPL CALCULATED.3IONS-SCNC: 9 MMOL/L (ref 4–13)
AST SERPL W P-5'-P-CCNC: 14 U/L (ref 5–45)
BASOPHILS # BLD AUTO: 0.07 THOUSANDS/ΜL (ref 0–0.1)
BASOPHILS NFR BLD AUTO: 1 % (ref 0–1)
BILIRUB DIRECT SERPL-MCNC: 0.07 MG/DL (ref 0–0.2)
BILIRUB SERPL-MCNC: 0.3 MG/DL (ref 0.2–1)
BUN SERPL-MCNC: 30 MG/DL (ref 5–25)
CALCIUM SERPL-MCNC: 8.8 MG/DL (ref 8.3–10.1)
CHLORIDE SERPL-SCNC: 101 MMOL/L (ref 100–108)
CO2 SERPL-SCNC: 30 MMOL/L (ref 21–32)
CREAT SERPL-MCNC: 3.71 MG/DL (ref 0.6–1.3)
EOSINOPHIL # BLD AUTO: 0.62 THOUSAND/ΜL (ref 0–0.61)
EOSINOPHIL NFR BLD AUTO: 8 % (ref 0–6)
ERYTHROCYTE [DISTWIDTH] IN BLOOD BY AUTOMATED COUNT: 12.4 % (ref 11.6–15.1)
GFR SERPL CREATININE-BSD FRML MDRD: 21 ML/MIN/1.73SQ M
GLUCOSE SERPL-MCNC: 98 MG/DL (ref 65–140)
HCT VFR BLD AUTO: 39.5 % (ref 36.5–49.3)
HGB BLD-MCNC: 13.1 G/DL (ref 12–17)
IMM GRANULOCYTES # BLD AUTO: 0.01 THOUSAND/UL (ref 0–0.2)
IMM GRANULOCYTES NFR BLD AUTO: 0 % (ref 0–2)
LYMPHOCYTES # BLD AUTO: 2.02 THOUSANDS/ΜL (ref 0.6–4.47)
LYMPHOCYTES NFR BLD AUTO: 27 % (ref 14–44)
MCH RBC QN AUTO: 29.7 PG (ref 26.8–34.3)
MCHC RBC AUTO-ENTMCNC: 33.2 G/DL (ref 31.4–37.4)
MCV RBC AUTO: 90 FL (ref 82–98)
MONOCYTES # BLD AUTO: 0.58 THOUSAND/ΜL (ref 0.17–1.22)
MONOCYTES NFR BLD AUTO: 8 % (ref 4–12)
NEUTROPHILS # BLD AUTO: 4.11 THOUSANDS/ΜL (ref 1.85–7.62)
NEUTS SEG NFR BLD AUTO: 56 % (ref 43–75)
NRBC BLD AUTO-RTO: 0 /100 WBCS
PLATELET # BLD AUTO: 216 THOUSANDS/UL (ref 149–390)
PMV BLD AUTO: 11.3 FL (ref 8.9–12.7)
POTASSIUM SERPL-SCNC: 4.1 MMOL/L (ref 3.5–5.3)
PROT SERPL-MCNC: 7.6 G/DL (ref 6.4–8.2)
RBC # BLD AUTO: 4.41 MILLION/UL (ref 3.88–5.62)
SODIUM SERPL-SCNC: 140 MMOL/L (ref 136–145)
WBC # BLD AUTO: 7.41 THOUSAND/UL (ref 4.31–10.16)

## 2021-02-07 PROCEDURE — 96375 TX/PRO/DX INJ NEW DRUG ADDON: CPT

## 2021-02-07 PROCEDURE — 93005 ELECTROCARDIOGRAM TRACING: CPT

## 2021-02-07 PROCEDURE — 85025 COMPLETE CBC W/AUTO DIFF WBC: CPT | Performed by: EMERGENCY MEDICINE

## 2021-02-07 PROCEDURE — 80048 BASIC METABOLIC PNL TOTAL CA: CPT | Performed by: EMERGENCY MEDICINE

## 2021-02-07 PROCEDURE — 99285 EMERGENCY DEPT VISIT HI MDM: CPT

## 2021-02-07 PROCEDURE — 96365 THER/PROPH/DIAG IV INF INIT: CPT

## 2021-02-07 PROCEDURE — 99285 EMERGENCY DEPT VISIT HI MDM: CPT | Performed by: EMERGENCY MEDICINE

## 2021-02-07 PROCEDURE — 80076 HEPATIC FUNCTION PANEL: CPT | Performed by: EMERGENCY MEDICINE

## 2021-02-07 PROCEDURE — 74018 RADEX ABDOMEN 1 VIEW: CPT

## 2021-02-07 PROCEDURE — 36415 COLL VENOUS BLD VENIPUNCTURE: CPT | Performed by: EMERGENCY MEDICINE

## 2021-02-07 RX ORDER — CEFAZOLIN SODIUM 2 G/50ML
2000 SOLUTION INTRAVENOUS ONCE
Status: COMPLETED | OUTPATIENT
Start: 2021-02-07 | End: 2021-02-07

## 2021-02-07 RX ORDER — HYDROMORPHONE HCL/PF 1 MG/ML
0.5 SYRINGE (ML) INJECTION
Status: DISCONTINUED | OUTPATIENT
Start: 2021-02-07 | End: 2021-02-07

## 2021-02-07 RX ORDER — ACETAMINOPHEN 325 MG/1
650 TABLET ORAL EVERY 6 HOURS PRN
Status: DISCONTINUED | OUTPATIENT
Start: 2021-02-07 | End: 2021-02-08 | Stop reason: HOSPADM

## 2021-02-07 RX ORDER — HYDROMORPHONE HCL/PF 1 MG/ML
1 SYRINGE (ML) INJECTION ONCE
Status: COMPLETED | OUTPATIENT
Start: 2021-02-07 | End: 2021-02-07

## 2021-02-07 RX ORDER — HYDROMORPHONE HCL/PF 1 MG/ML
0.5 SYRINGE (ML) INJECTION EVERY 4 HOURS PRN
Status: DISCONTINUED | OUTPATIENT
Start: 2021-02-07 | End: 2021-02-08

## 2021-02-07 RX ORDER — ONDANSETRON 2 MG/ML
4 INJECTION INTRAMUSCULAR; INTRAVENOUS ONCE
Status: COMPLETED | OUTPATIENT
Start: 2021-02-07 | End: 2021-02-07

## 2021-02-07 RX ADMIN — CEFAZOLIN SODIUM 2000 MG: 2 SOLUTION INTRAVENOUS at 22:50

## 2021-02-07 RX ADMIN — SODIUM CHLORIDE 1000 ML: 0.9 INJECTION, SOLUTION INTRAVENOUS at 22:50

## 2021-02-07 RX ADMIN — ONDANSETRON 4 MG: 2 INJECTION INTRAMUSCULAR; INTRAVENOUS at 22:44

## 2021-02-07 RX ADMIN — HYDROMORPHONE HYDROCHLORIDE 1 MG: 1 INJECTION, SOLUTION INTRAMUSCULAR; INTRAVENOUS; SUBCUTANEOUS at 22:44

## 2021-02-08 ENCOUNTER — ANESTHESIA EVENT (OUTPATIENT)
Dept: PERIOP | Facility: HOSPITAL | Age: 26
DRG: 660 | End: 2021-02-08
Payer: COMMERCIAL

## 2021-02-08 ENCOUNTER — ANESTHESIA (OUTPATIENT)
Dept: PERIOP | Facility: HOSPITAL | Age: 26
DRG: 660 | End: 2021-02-08
Payer: COMMERCIAL

## 2021-02-08 ENCOUNTER — APPOINTMENT (OUTPATIENT)
Dept: RADIOLOGY | Facility: HOSPITAL | Age: 26
DRG: 660 | End: 2021-02-08
Payer: COMMERCIAL

## 2021-02-08 ENCOUNTER — TELEPHONE (OUTPATIENT)
Dept: UROLOGY | Facility: MEDICAL CENTER | Age: 26
End: 2021-02-08

## 2021-02-08 VITALS — HEART RATE: 65 BPM

## 2021-02-08 DIAGNOSIS — N20.1 URETERIC STONE: Primary | ICD-10-CM

## 2021-02-08 PROBLEM — E72.53: Status: ACTIVE | Noted: 2021-02-08

## 2021-02-08 PROBLEM — N13.2 HYDRONEPHROSIS WITH URINARY OBSTRUCTION DUE TO URETERAL CALCULUS: Status: ACTIVE | Noted: 2021-02-08

## 2021-02-08 LAB
ANION GAP SERPL CALCULATED.3IONS-SCNC: 10 MMOL/L (ref 4–13)
ATRIAL RATE: 76 BPM
BACTERIA UR QL AUTO: ABNORMAL /HPF
BASOPHILS # BLD AUTO: 0.04 THOUSANDS/ΜL (ref 0–0.1)
BASOPHILS NFR BLD AUTO: 0 % (ref 0–1)
BILIRUB UR QL STRIP: NEGATIVE
BUN SERPL-MCNC: 31 MG/DL (ref 5–25)
CALCIUM SERPL-MCNC: 8.9 MG/DL (ref 8.3–10.1)
CHLORIDE SERPL-SCNC: 105 MMOL/L (ref 100–108)
CLARITY UR: CLEAR
CO2 SERPL-SCNC: 26 MMOL/L (ref 21–32)
COLOR UR: YELLOW
CREAT SERPL-MCNC: 3.79 MG/DL (ref 0.6–1.3)
EOSINOPHIL # BLD AUTO: 0.14 THOUSAND/ΜL (ref 0–0.61)
EOSINOPHIL NFR BLD AUTO: 1 % (ref 0–6)
ERYTHROCYTE [DISTWIDTH] IN BLOOD BY AUTOMATED COUNT: 12.4 % (ref 11.6–15.1)
GFR SERPL CREATININE-BSD FRML MDRD: 21 ML/MIN/1.73SQ M
GLUCOSE P FAST SERPL-MCNC: 110 MG/DL (ref 65–99)
GLUCOSE SERPL-MCNC: 110 MG/DL (ref 65–140)
GLUCOSE UR STRIP-MCNC: NEGATIVE MG/DL
HCT VFR BLD AUTO: 36.8 % (ref 36.5–49.3)
HGB BLD-MCNC: 12.4 G/DL (ref 12–17)
HGB UR QL STRIP.AUTO: ABNORMAL
IMM GRANULOCYTES # BLD AUTO: 0.04 THOUSAND/UL (ref 0–0.2)
IMM GRANULOCYTES NFR BLD AUTO: 0 % (ref 0–2)
KETONES UR STRIP-MCNC: NEGATIVE MG/DL
LEUKOCYTE ESTERASE UR QL STRIP: ABNORMAL
LYMPHOCYTES # BLD AUTO: 1.05 THOUSANDS/ΜL (ref 0.6–4.47)
LYMPHOCYTES NFR BLD AUTO: 10 % (ref 14–44)
MCH RBC QN AUTO: 30.4 PG (ref 26.8–34.3)
MCHC RBC AUTO-ENTMCNC: 33.7 G/DL (ref 31.4–37.4)
MCV RBC AUTO: 90 FL (ref 82–98)
MONOCYTES # BLD AUTO: 0.62 THOUSAND/ΜL (ref 0.17–1.22)
MONOCYTES NFR BLD AUTO: 6 % (ref 4–12)
NEUTROPHILS # BLD AUTO: 8.85 THOUSANDS/ΜL (ref 1.85–7.62)
NEUTS SEG NFR BLD AUTO: 83 % (ref 43–75)
NITRITE UR QL STRIP: NEGATIVE
NON-SQ EPI CELLS URNS QL MICRO: ABNORMAL /HPF
NRBC BLD AUTO-RTO: 0 /100 WBCS
P AXIS: 58 DEGREES
PH UR STRIP.AUTO: 5.5 [PH]
PHOSPHATE SERPL-MCNC: 4.3 MG/DL (ref 2.7–4.5)
PLATELET # BLD AUTO: 174 THOUSANDS/UL (ref 149–390)
PMV BLD AUTO: 11.3 FL (ref 8.9–12.7)
POTASSIUM SERPL-SCNC: 4.8 MMOL/L (ref 3.5–5.3)
PR INTERVAL: 158 MS
PROT UR STRIP-MCNC: NEGATIVE MG/DL
QRS AXIS: 23 DEGREES
QRSD INTERVAL: 78 MS
QT INTERVAL: 356 MS
QTC INTERVAL: 400 MS
RBC # BLD AUTO: 4.08 MILLION/UL (ref 3.88–5.62)
RBC #/AREA URNS AUTO: ABNORMAL /HPF
SODIUM SERPL-SCNC: 141 MMOL/L (ref 136–145)
SP GR UR STRIP.AUTO: 1.02 (ref 1–1.03)
T WAVE AXIS: 30 DEGREES
UROBILINOGEN UR QL STRIP.AUTO: 0.2 E.U./DL
VENTRICULAR RATE: 76 BPM
WBC # BLD AUTO: 10.74 THOUSAND/UL (ref 4.31–10.16)
WBC #/AREA URNS AUTO: ABNORMAL /HPF

## 2021-02-08 PROCEDURE — 93010 ELECTROCARDIOGRAM REPORT: CPT | Performed by: INTERNAL MEDICINE

## 2021-02-08 PROCEDURE — 0TF68ZZ FRAGMENTATION IN RIGHT URETER, VIA NATURAL OR ARTIFICIAL OPENING ENDOSCOPIC: ICD-10-PCS | Performed by: UROLOGY

## 2021-02-08 PROCEDURE — 81001 URINALYSIS AUTO W/SCOPE: CPT | Performed by: PHYSICIAN ASSISTANT

## 2021-02-08 PROCEDURE — BT1D1ZZ FLUOROSCOPY OF RIGHT KIDNEY, URETER AND BLADDER USING LOW OSMOLAR CONTRAST: ICD-10-PCS | Performed by: RADIOLOGY

## 2021-02-08 PROCEDURE — 52356 CYSTO/URETERO W/LITHOTRIPSY: CPT | Performed by: UROLOGY

## 2021-02-08 PROCEDURE — 85025 COMPLETE CBC W/AUTO DIFF WBC: CPT | Performed by: PHYSICIAN ASSISTANT

## 2021-02-08 PROCEDURE — C2617 STENT, NON-COR, TEM W/O DEL: HCPCS | Performed by: UROLOGY

## 2021-02-08 PROCEDURE — 36415 COLL VENOUS BLD VENIPUNCTURE: CPT | Performed by: PHYSICIAN ASSISTANT

## 2021-02-08 PROCEDURE — C1769 GUIDE WIRE: HCPCS | Performed by: UROLOGY

## 2021-02-08 PROCEDURE — 84100 ASSAY OF PHOSPHORUS: CPT | Performed by: PHYSICIAN ASSISTANT

## 2021-02-08 PROCEDURE — 0T768DZ DILATION OF RIGHT URETER WITH INTRALUMINAL DEVICE, VIA NATURAL OR ARTIFICIAL OPENING ENDOSCOPIC: ICD-10-PCS | Performed by: UROLOGY

## 2021-02-08 PROCEDURE — 80048 BASIC METABOLIC PNL TOTAL CA: CPT | Performed by: PHYSICIAN ASSISTANT

## 2021-02-08 PROCEDURE — 99205 OFFICE O/P NEW HI 60 MIN: CPT | Performed by: INTERNAL MEDICINE

## 2021-02-08 PROCEDURE — 99205 OFFICE O/P NEW HI 60 MIN: CPT | Performed by: UROLOGY

## 2021-02-08 PROCEDURE — 99220 PR INITIAL OBSERVATION CARE/DAY 70 MINUTES: CPT | Performed by: PHYSICIAN ASSISTANT

## 2021-02-08 PROCEDURE — C1758 CATHETER, URETERAL: HCPCS | Performed by: UROLOGY

## 2021-02-08 PROCEDURE — 74420 UROGRAPHY RTRGR +-KUB: CPT

## 2021-02-08 RX ORDER — TAMSULOSIN HYDROCHLORIDE 0.4 MG/1
0.4 CAPSULE ORAL
Status: DISCONTINUED | OUTPATIENT
Start: 2021-02-08 | End: 2021-02-10 | Stop reason: HOSPADM

## 2021-02-08 RX ORDER — DOCUSATE SODIUM 100 MG/1
100 CAPSULE, LIQUID FILLED ORAL 2 TIMES DAILY
Status: DISCONTINUED | OUTPATIENT
Start: 2021-02-08 | End: 2021-02-08 | Stop reason: HOSPADM

## 2021-02-08 RX ORDER — TRAZODONE HYDROCHLORIDE 50 MG/1
50 TABLET ORAL
Status: DISCONTINUED | OUTPATIENT
Start: 2021-02-08 | End: 2021-02-08 | Stop reason: HOSPADM

## 2021-02-08 RX ORDER — DEXAMETHASONE SODIUM PHOSPHATE 10 MG/ML
INJECTION, SOLUTION INTRAMUSCULAR; INTRAVENOUS AS NEEDED
Status: DISCONTINUED | OUTPATIENT
Start: 2021-02-08 | End: 2021-02-08

## 2021-02-08 RX ORDER — SODIUM CHLORIDE, SODIUM LACTATE, POTASSIUM CHLORIDE, CALCIUM CHLORIDE 600; 310; 30; 20 MG/100ML; MG/100ML; MG/100ML; MG/100ML
125 INJECTION, SOLUTION INTRAVENOUS CONTINUOUS
Status: DISCONTINUED | OUTPATIENT
Start: 2021-02-08 | End: 2021-02-08 | Stop reason: HOSPADM

## 2021-02-08 RX ORDER — TAMSULOSIN HYDROCHLORIDE 0.4 MG/1
0.4 CAPSULE ORAL
Status: DISCONTINUED | OUTPATIENT
Start: 2021-02-08 | End: 2021-02-08

## 2021-02-08 RX ORDER — LIDOCAINE HYDROCHLORIDE 20 MG/ML
JELLY TOPICAL AS NEEDED
Status: DISCONTINUED | OUTPATIENT
Start: 2021-02-08 | End: 2021-02-08 | Stop reason: HOSPADM

## 2021-02-08 RX ORDER — MAGNESIUM HYDROXIDE 1200 MG/15ML
LIQUID ORAL AS NEEDED
Status: DISCONTINUED | OUTPATIENT
Start: 2021-02-08 | End: 2021-02-08 | Stop reason: HOSPADM

## 2021-02-08 RX ORDER — ONDANSETRON 2 MG/ML
4 INJECTION INTRAMUSCULAR; INTRAVENOUS ONCE AS NEEDED
Status: DISCONTINUED | OUTPATIENT
Start: 2021-02-08 | End: 2021-02-08 | Stop reason: HOSPADM

## 2021-02-08 RX ORDER — HYDROMORPHONE HCL/PF 1 MG/ML
0.5 SYRINGE (ML) INJECTION EVERY 4 HOURS PRN
Status: DISCONTINUED | OUTPATIENT
Start: 2021-02-08 | End: 2021-02-08 | Stop reason: HOSPADM

## 2021-02-08 RX ORDER — FENTANYL CITRATE/PF 50 MCG/ML
25 SYRINGE (ML) INJECTION
Status: DISCONTINUED | OUTPATIENT
Start: 2021-02-08 | End: 2021-02-08 | Stop reason: HOSPADM

## 2021-02-08 RX ORDER — PROPOFOL 10 MG/ML
INJECTION, EMULSION INTRAVENOUS AS NEEDED
Status: DISCONTINUED | OUTPATIENT
Start: 2021-02-08 | End: 2021-02-08

## 2021-02-08 RX ORDER — LIDOCAINE HYDROCHLORIDE 10 MG/ML
INJECTION, SOLUTION EPIDURAL; INFILTRATION; INTRACAUDAL; PERINEURAL AS NEEDED
Status: DISCONTINUED | OUTPATIENT
Start: 2021-02-08 | End: 2021-02-08

## 2021-02-08 RX ORDER — OXYBUTYNIN CHLORIDE 5 MG/1
5 TABLET, EXTENDED RELEASE ORAL DAILY
Status: DISCONTINUED | OUTPATIENT
Start: 2021-02-08 | End: 2021-02-10 | Stop reason: HOSPADM

## 2021-02-08 RX ORDER — ONDANSETRON 2 MG/ML
INJECTION INTRAMUSCULAR; INTRAVENOUS AS NEEDED
Status: DISCONTINUED | OUTPATIENT
Start: 2021-02-08 | End: 2021-02-08

## 2021-02-08 RX ORDER — TRAZODONE HYDROCHLORIDE 50 MG/1
50 TABLET ORAL
Status: DISCONTINUED | OUTPATIENT
Start: 2021-02-08 | End: 2021-02-10 | Stop reason: HOSPADM

## 2021-02-08 RX ORDER — SODIUM CHLORIDE 9 MG/ML
125 INJECTION, SOLUTION INTRAVENOUS CONTINUOUS
Status: DISCONTINUED | OUTPATIENT
Start: 2021-02-08 | End: 2021-02-10

## 2021-02-08 RX ORDER — FENTANYL CITRATE 50 UG/ML
INJECTION, SOLUTION INTRAMUSCULAR; INTRAVENOUS AS NEEDED
Status: DISCONTINUED | OUTPATIENT
Start: 2021-02-08 | End: 2021-02-08

## 2021-02-08 RX ORDER — SODIUM CHLORIDE 9 MG/ML
125 INJECTION, SOLUTION INTRAVENOUS CONTINUOUS
Status: CANCELLED | OUTPATIENT
Start: 2021-02-08

## 2021-02-08 RX ORDER — CEFAZOLIN SODIUM 2 G/50ML
2000 SOLUTION INTRAVENOUS ONCE
Status: COMPLETED | OUTPATIENT
Start: 2021-02-08 | End: 2021-02-08

## 2021-02-08 RX ORDER — OXYCODONE HYDROCHLORIDE 5 MG/1
5 TABLET ORAL EVERY 6 HOURS PRN
Status: DISCONTINUED | OUTPATIENT
Start: 2021-02-08 | End: 2021-02-08 | Stop reason: HOSPADM

## 2021-02-08 RX ORDER — MIDAZOLAM HYDROCHLORIDE 2 MG/2ML
INJECTION, SOLUTION INTRAMUSCULAR; INTRAVENOUS AS NEEDED
Status: DISCONTINUED | OUTPATIENT
Start: 2021-02-08 | End: 2021-02-08

## 2021-02-08 RX ORDER — ONDANSETRON 2 MG/ML
4 INJECTION INTRAMUSCULAR; INTRAVENOUS EVERY 6 HOURS PRN
Status: DISCONTINUED | OUTPATIENT
Start: 2021-02-08 | End: 2021-02-08 | Stop reason: HOSPADM

## 2021-02-08 RX ORDER — PROMETHAZINE HYDROCHLORIDE 25 MG/ML
12.5 INJECTION, SOLUTION INTRAMUSCULAR; INTRAVENOUS ONCE AS NEEDED
Status: DISCONTINUED | OUTPATIENT
Start: 2021-02-08 | End: 2021-02-08 | Stop reason: HOSPADM

## 2021-02-08 RX ORDER — HYDROMORPHONE HCL/PF 1 MG/ML
0.5 SYRINGE (ML) INJECTION
Status: DISCONTINUED | OUTPATIENT
Start: 2021-02-08 | End: 2021-02-08 | Stop reason: HOSPADM

## 2021-02-08 RX ORDER — METOCLOPRAMIDE HYDROCHLORIDE 5 MG/ML
10 INJECTION INTRAMUSCULAR; INTRAVENOUS ONCE AS NEEDED
Status: DISCONTINUED | OUTPATIENT
Start: 2021-02-08 | End: 2021-02-08 | Stop reason: HOSPADM

## 2021-02-08 RX ADMIN — DEXAMETHASONE SODIUM PHOSPHATE 4 MG: 10 INJECTION, SOLUTION INTRAMUSCULAR; INTRAVENOUS at 11:52

## 2021-02-08 RX ADMIN — HYDROMORPHONE HYDROCHLORIDE 0.5 MG: 1 INJECTION, SOLUTION INTRAMUSCULAR; INTRAVENOUS; SUBCUTANEOUS at 00:16

## 2021-02-08 RX ADMIN — PROPOFOL 200 MG: 10 INJECTION, EMULSION INTRAVENOUS at 11:50

## 2021-02-08 RX ADMIN — SODIUM CHLORIDE, SODIUM LACTATE, POTASSIUM CHLORIDE, AND CALCIUM CHLORIDE 125 ML/HR: .6; .31; .03; .02 INJECTION, SOLUTION INTRAVENOUS at 02:11

## 2021-02-08 RX ADMIN — MIDAZOLAM HYDROCHLORIDE 2 MG: 1 INJECTION, SOLUTION INTRAMUSCULAR; INTRAVENOUS at 11:45

## 2021-02-08 RX ADMIN — FENTANYL CITRATE 50 MCG: 50 INJECTION, SOLUTION INTRAMUSCULAR; INTRAVENOUS at 11:48

## 2021-02-08 RX ADMIN — LIDOCAINE HYDROCHLORIDE 100 MG: 10 INJECTION, SOLUTION EPIDURAL; INFILTRATION; INTRACAUDAL; PERINEURAL at 11:49

## 2021-02-08 RX ADMIN — HYDROMORPHONE HYDROCHLORIDE 0.5 MG: 1 INJECTION, SOLUTION INTRAMUSCULAR; INTRAVENOUS; SUBCUTANEOUS at 04:02

## 2021-02-08 RX ADMIN — SODIUM CHLORIDE 125 ML/HR: 0.9 INJECTION, SOLUTION INTRAVENOUS at 16:07

## 2021-02-08 RX ADMIN — TRAZODONE HYDROCHLORIDE 50 MG: 50 TABLET ORAL at 22:46

## 2021-02-08 RX ADMIN — OXYBUTYNIN 5 MG: 5 TABLET, FILM COATED, EXTENDED RELEASE ORAL at 15:59

## 2021-02-08 RX ADMIN — TAMSULOSIN HYDROCHLORIDE 0.4 MG: 0.4 CAPSULE ORAL at 17:34

## 2021-02-08 RX ADMIN — TRAZODONE HYDROCHLORIDE 50 MG: 50 TABLET ORAL at 02:10

## 2021-02-08 RX ADMIN — ONDANSETRON 4 MG: 2 INJECTION INTRAMUSCULAR; INTRAVENOUS at 04:02

## 2021-02-08 RX ADMIN — CEFAZOLIN SODIUM 2000 MG: 2 SOLUTION INTRAVENOUS at 11:19

## 2021-02-08 RX ADMIN — OXYCODONE HYDROCHLORIDE 5 MG: 5 TABLET ORAL at 02:04

## 2021-02-08 RX ADMIN — ONDANSETRON 4 MG: 2 INJECTION INTRAMUSCULAR; INTRAVENOUS at 12:05

## 2021-02-08 RX ADMIN — SODIUM CHLORIDE 125 ML/HR: 0.9 INJECTION, SOLUTION INTRAVENOUS at 22:46

## 2021-02-08 RX ADMIN — OXYCODONE HYDROCHLORIDE 5 MG: 5 TABLET ORAL at 09:16

## 2021-02-08 NOTE — CONSULTS
CONSULT    Patient Name: Delmi Perez  Patient MRN: [id number]  Date of Service: 2/8/2021   Date / Time Note Created: 2/8/2021 8:23 AM   Referring Provider: Mayra Tyler MD    Provider Creating Note: LAURA Schulz    PCP: Saqib Martinez  Attending Provider:  Mayra Tyler MD    Reason for Consult: Flank Pain    HPI:  Delmi Perez is a 43-year-old male with history of primary hyperoxaluria, known to [address] [place] [address] Nephrology for management  Urologically, known to Dr Gill James urologist for significant nephrolithiasis, multiple episodes of spontaneous calculi expulsion and requirement for ureteroscopic stone treatment as well  He presents to [facility] emergency room with chief complaint of right flank pain; not accompanied by fever, chills, dysuria gross hematuria  Acute kidney injury with serum creatinine of 3 7 above baseline of 2 6  He is without leukocytosis or evidence of infection on recent urinalysis  CT of the abdomen and pelvis demonstrate mild right hydronephrosis secondary to 6 mm distal ureteral calculus  Patient is admitted to the Internal Medicine service  Urologic consultation requested for possible surgical intervention  Active Problems:    Patient Active Problem List   Diagnosis    Sebaceous cyst    Anxiety, generalized    Ureteric stone ----> hydroureteronephrosis moderate    Acute kidney injury ([address] )    Insomnia    Hydronephrosis with urinary obstruction due to ureteral calculus    Primary hyperoxaluria type 1 ([address] )            Impressions  · Right Ureteral Calculus  · Right Hydronephrosis  · Renal Colic--secondary to previous  · History of primary hyperoxaluria  · Bilateral nonobstructing intrarenal calculi      Recommendations  1  Initiate aggressive IVFs   2  Flomax  3  Analgesia/Narcotics   4  Anti-emetics   5  ATBs empirically while awaiting culture   6  Strain urine   7   NPO  for OR if no spontaneous expulsion achieved  Explained risk, benefits and potential complications of ureteroscopic stone extraction  Patient has verbalized understanding of possible need for ureteral stent only for any signs of infection and/or technical difficult prohibiting stone retrieval etc ; requiring staged ureteroscopy electively once recovered and infection free as OP  Formal consent by surgeon          Past Medical History:   Diagnosis Date    Anxiety     GERD (gastroesophageal reflux disease)     Liver disorder     Nephrolithiasis        Past Surgical History:   Procedure Laterality Date    ANKLE SURGERY Right 2017    ligament repair    FL RETROGRADE PYELOGRAM  10/24/2020    HERNIA REPAIR      LITHOTRIPSY      MASS EXCISION Left 2/17/2017    Procedure: BACK/SHOULDER CYST EXCISION ;  Surgeon: Jessy Mc MD;  Location: [facility];  Service:     PA CYSTO/URETERO W/LITHOTRIPSY &INDWELL STENT INSRT Left 10/24/2020    Procedure: CYSTOSCOPY URETEROSCOPY WITH LITHOTRIPSY HOLMIUM LASER, RETROGRADE PYELOGRAM AND INSERTION STENT URETERAL;  Surgeon: Emeterio Lange MD;  Location: [facility];  Service: Urology       Family History   Problem Relation Age of Onset    Hypertension Mother     No Known Problems Father     Cancer Maternal Grandmother     Diabetes Maternal Grandmother     Alzheimer's disease Maternal Grandfather        Social History     Socioeconomic History    Marital status: Single     Spouse name: Not on file    Number of children: Not on file    Years of education: Not on file    Highest education level: Not on file   Occupational History     Employer: [employer]   Social Needs    Financial resource strain: Not on file    Food insecurity     Worry: Not on file     Inability: Not on file    Transportation needs     Medical: Not on file     Non-medical: Not on file   Tobacco Use    Smoking status: Never Smoker    Smokeless tobacco: Never Used   Substance and Sexual Activity    Alcohol use: Yes     Frequency: Monthly or less     Comment: rarely    Drug use: No    Sexual activity: Never   Lifestyle    Physical activity     Days per week: Not on file     Minutes per session: Not on file    Stress: Not on file   Relationships    Social connections     Talks on phone: Not on file     Gets together: Not on file     Attends Congregation service: Not on file     Active member of club or organization: Not on file     Attends meetings of clubs or organizations: Not on file     Relationship status: Not on file    Intimate partner violence     Fear of current or ex partner: Not on file     Emotionally abused: Not on file     Physically abused: Not on file     Forced sexual activity: Not on file   Other Topics Concern    Not on file   Social History Narrative    Not on file       No Known Allergies    Review of Systems  10 point review of systems negative except as noted in HPI  Constitutional:   negative for - chills or fever  Cardiovascular:   no chest pain or dyspnea on exertion  Gastrointestinal:   positive for - abdominal pain and nausea/vomiting  Genito-Urinary:   no dysuria, trouble voiding, or hematuria  Neurological:   no TIA or stroke symptoms     Chart Review   Allergies, current medications, history, problem list    Vital Signs  /68   Pulse 69   Temp 98 1 °F (36 7 °C) (Oral)   Resp 18   Ht 6' (1 829 m)   Wt 104 kg (229 lb 4 5 oz)   SpO2 96%   BMI 31 10 kg/m²     Physical Exam  General appearance: alert and oriented, in no acute distress, appears stated age, cooperative and no distress  Head: Normocephalic, without obvious abnormality, atraumatic  Neck: no adenopathy, no carotid bruit, no JVD, supple, symmetrical, trachea midline and thyroid not enlarged, symmetric, no tenderness/mass/nodules  Lungs: clear to auscultation bilaterally  Heart: regular rate and rhythm, S1, S2 normal, no murmur, click, rub or gallop  Abdomen: abnormal findings:  mild tenderness in the lower abdomen  Extremities: extremities normal, warm and well-perfused; no cyanosis, clubbing, or edema  Pulses: 2+ and symmetric  Neurologic: Grossly normal  No urinary drains     Laboratory Studies  Lab Results   Component Value Date    K 4 8 02/08/2021     02/08/2021    CO2 26 02/08/2021    CREATININE 3 79 (H) 02/08/2021    BUN 31 (H) 02/08/2021    PHOS 4 3 02/08/2021     Lab Results   Component Value Date    WBC 10 74 (H) 02/08/2021    RBC 4 08 02/08/2021    HGB 12 4 02/08/2021    HCT 36 8 02/08/2021    MCV 90 02/08/2021    MCH 30 4 02/08/2021    RDW 12 4 02/08/2021     02/08/2021         Imaging and Other Studies  )  Ct Renal Stone Study Abdomen Pelvis Wo Contrast    Result Date: 2/3/2021  Narrative: CT ABDOMEN AND PELVIS WITHOUT IV CONTRAST - LOW DOSE RENAL STONE INDICATION:   Flank pain, kidney stone suspected R flank pain, hx of kidney stones due to hyperoxaluria  COMPARISON:  CT stonehunt on 10/23/2020 TECHNIQUE:  Low dose thin section CT examination of the abdomen and pelvis was performed without intravenous or oral contrast according to a protocol specifically designed to evaluate for urinary tract calculus  Axial, sagittal, and coronal 2D reformatted images were created from the source data and submitted for interpretation  Evaluation for pathology in the abdomen and pelvis that is unrelated to urinary tract calculi is limited  Radiation dose length product (DLP) for this visit:  422 17 mGy-cm   This examination, like all CT scans performed in the [facility], was performed utilizing techniques to minimize radiation dose exposure, including the use of iterative  reconstruction and automated exposure control  FINDINGS: RIGHT KIDNEY AND URETER: Moderate right-sided hydroureteronephrosis secondary to a 6 mm calculus at the distal ureter  There are nonobstructing intrarenal calculi measuring on the order of 5 mm at the upper pole  There are again findings compatible with medullary nephrocalcinosis   Right periureteral fat stranding  LEFT KIDNEY AND URETER: There are nonobstructing intrarenal calculi measuring on the order of 2 to 3 mm in size  Medullary nephrocalcinosis also noted  No hydronephrosis or hydroureter  URINARY BLADDER: Unremarkable  No significant abnormality in the visualized lung bases  Limited low radiation dose noncontrast CT evaluation demonstrates no clinically significant abnormality of liver, spleen, pancreas, or adrenal glands  No calcified gallstones or gallbladder wall thickening noted  No ascites or bulky lymphadenopathy on this limited noncontrast study  Bowel loops appear unremarkable  Limited evaluation demonstrates no evidence to suggest acute appendicitis  No acute fracture or destructive osseous lesion is identified  Impression: 1  Moderate right-sided hydroureteronephrosis secondary to a 6 mm calculus at the distal ureter  2   Bilateral intrarenal calculi again seen with medullary nephrocalcinosis  The study was marked in [facility] for immediate notification   Workstation performed: [id number]       Medications   Current Facility-Administered Medications   Medication Dose Route Frequency Provider Last Rate    acetaminophen  650 mg Oral Q6H PRN Deysi Roman PA-C      docusate sodium  100 mg Oral BID Deysi Roman PA-C      HYDROmorphone  0 5 mg Intravenous Q4H PRN Deysi Roman PA-C      lactated ringers  125 mL/hr Intravenous Continuous Deysi Roman PA-C 125 mL/hr (02/08/21 0211)    ondansetron  4 mg Intravenous Q6H PRN Deysi Roman PA-C      oxyCODONE  5 mg Oral Q6H PRN Deysi Roman PA-C      tamsulosin  0 4 mg Oral Daily With [employer] [place], [place]      traZODone  50 mg Oral HS Deysi Roman PA-C                 [facility]

## 2021-02-08 NOTE — ANESTHESIA PREPROCEDURE EVALUATION
Procedure:  CYSTOSCOPY URETEROSCOPY WITH LITHOTRIPSY HOLMIUM LASER, RETROGRADE PYELOGRAM AND INSERTION STENT URETERAL (Right Bladder)    Relevant Problems   /RENAL   (+) Acute kidney injury ([address] )   (+) Hydronephrosis with urinary obstruction due to ureteral calculus   (+) Primary hyperoxaluria type 1 (H[facility])      NEURO/PSYCH   (+) Anxiety, generalized        Physical Exam    Airway    Mallampati score: III  TM Distance: >3 FB  Neck ROM: full     Dental       Cardiovascular  Cardiovascular exam normal    Pulmonary  Pulmonary exam normal     Other Findings       Johnathan Vega is a 64-year-old male with history of primary hyperoxaluria, known to [address] [place] [address] Nephrology for management  Urologically, known to Dr Ana Charles urologist for significant nephrolithiasis, multiple episodes of spontaneous calculi expulsion and requirement for ureteroscopic stone treatment as well  He presents to [facility] emergency room with chief complaint of right flank pain; not accompanied by fever, chills, dysuria gross hematuria  Acute kidney injury with serum creatinine of 3 7 above baseline of 2 6  He is without leukocytosis or evidence of infection on recent urinalysis  CT of the abdomen and pelvis demonstrate mild right hydronephrosis secondary to 6 mm distal ureteral calculus      Patient is admitted to the Internal Medicine service  Urologic consultation requested for possible surgical intervention         Anesthesia Plan  ASA Score- 2     Anesthesia Type- general with ASA Monitors  Additional Monitors:   Airway Plan:           Plan Factors-    Chart reviewed  Imaging results reviewed  Existing labs reviewed  Patient summary reviewed  Induction- intravenous  Postoperative Plan- Plan for postoperative opioid use  Planned trial extubation    Informed Consent- Anesthetic plan and risks discussed with patient  I personally reviewed this patient with the CRNA   Discussed and agreed on the Anesthesia Plan with the CRNA  Chetna Rogers

## 2021-02-08 NOTE — TELEPHONE ENCOUNTER
Patient had stone removal and has stent on string  This can be removed in 3-5 days    Should follow-up in 3 months with x-ray and ultrasound

## 2021-02-08 NOTE — ASSESSMENT & PLAN NOTE
· POA, creatinine of 3 71  Patient with gradual up trending of creatinine on review of notes, patient's creatinine on 02/03 was 3 49    Previously patient's baseline appears to be near 2 6  · Likely secondary to ureteral obstruction  · Continue IV fluids  · Avoid NSAIDs, nephrotoxic, hypotension  · Bladder scan, UA, urinary retention protocol  · I/Os  · Trend Cr/BUN  · Nephrology consulted, appreciate recommendations

## 2021-02-08 NOTE — ED PROVIDER NOTES
History  Chief Complaint   Patient presents with    Flank Pain     Pt reports right sided flank pain since thursday  30year old male patient presents emergency department with known diagnosis of ureterolithiasis for which she was offered admission during his previous emergency department stay  He refused admission at that point does he want to speak with his nephrology team first   After discussing this with them, return to the emergency department with increased pain, and will be admitted to the medicine service for Urology follow-up tomorrow  History provided by:  Patient   used: No    Flank Pain  Pain quality: aching    Pain severity:  Moderate  Onset quality:  Gradual  Timing:  Constant  Progression:  Worsening  Chronicity:  Recurrent  Relieved by:  Nothing  Worsened by:  Nothing  Ineffective treatments:  None tried  Associated symptoms: no constipation, no diarrhea, no flatus and no shortness of breath        Prior to Admission Medications   Prescriptions Last Dose Informant Patient Reported?  Taking?   oxyCODONE (ROXICODONE) 5 mg immediate release tablet   No No   Sig: Take 1 tablet (5 mg total) by mouth every 6 (six) hours as needed for severe pain for up to 3 daysMax Daily Amount: 20 mg   tamsulosin (FLOMAX) 0 4 mg 2/7/2021 at Unknown time Self No Yes   Sig: Take 1 capsule (0 4 mg total) by mouth daily with dinner   tamsulosin (FLOMAX) 0 4 mg   No No   Sig: Take 1 capsule (0 4 mg total) by mouth daily with dinner for 7 days   traZODone (DESYREL) 50 mg tablet 2/7/2021 at Unknown time  No Yes   Sig: TAKE 1 TABLET BY MOUTH EVERYDAY AT BEDTIME      Facility-Administered Medications: None       Past Medical History:   Diagnosis Date    Anxiety     GERD (gastroesophageal reflux disease)     Kidney stone     Liver disorder     Nephrolithiasis     Primary hyperoxaluria type 1 ([facility] [address] )        Past Surgical History:   Procedure Laterality Date    ANKLE SURGERY Right 2017    ligament repair    CYSTOSCOPY      FL RETROGRADE PYELOGRAM  10/24/2020    HERNIA REPAIR      LITHOTRIPSY      MASS EXCISION Left 2/17/2017    Procedure: BACK/SHOULDER CYST EXCISION ;  Surgeon: Fazal Arrington MD;  Location: [facility];  Service:     CT CYSTO/URETERO W/LITHOTRIPSY &INDWELL STENT INSRT Left 10/24/2020    Procedure: CYSTOSCOPY URETEROSCOPY WITH LITHOTRIPSY HOLMIUM LASER, RETROGRADE PYELOGRAM AND INSERTION STENT URETERAL;  Surgeon: Yazmin Farah MD;  Location: [facility];  Service: Urology       Family History   Problem Relation Age of Onset    Hypertension Mother     No Known Problems Father     Cancer Maternal Grandmother     Diabetes Maternal Grandmother     Alzheimer's disease Maternal Grandfather      I have reviewed and agree with the history as documented  E-Cigarette/Vaping    E-Cigarette Use Never User      E-Cigarette/Vaping Substances     Social History     Tobacco Use    Smoking status: Never Smoker    Smokeless tobacco: Never Used   Substance Use Topics    Alcohol use: Yes     Frequency: Monthly or less     Comment: rarely    Drug use: No       Review of Systems   Respiratory: Negative for shortness of breath  Gastrointestinal: Negative for constipation, diarrhea and flatus  Genitourinary: Positive for flank pain  All other systems reviewed and are negative  Physical Exam  Physical Exam  Vitals signs and nursing note reviewed  Constitutional:       General: He is not in acute distress  Appearance: He is well-developed  He is not diaphoretic  HENT:      Head: Normocephalic and atraumatic  Right Ear: External ear normal       Left Ear: External ear normal    Eyes:      General: No scleral icterus  Right eye: No discharge  Left eye: No discharge  Conjunctiva/sclera: Conjunctivae normal    Neck:      Musculoskeletal: Normal range of motion and neck supple  Thyroid: No thyromegaly  Vascular: No JVD        Trachea: No tracheal deviation  Cardiovascular:      Rate and Rhythm: Normal rate and regular rhythm  Pulmonary:      Effort: Pulmonary effort is normal  No respiratory distress  Breath sounds: Normal breath sounds  No stridor  No wheezing or rales  Abdominal:      General: Bowel sounds are normal  There is no distension  Palpations: Abdomen is soft  Tenderness: There is no abdominal tenderness  Musculoskeletal: Normal range of motion  General: No tenderness or deformity  Skin:     General: Skin is warm and dry  Neurological:      Mental Status: He is alert and oriented to person, place, and time  Cranial Nerves: No cranial nerve deficit        Coordination: Coordination normal    Psychiatric:         Behavior: Behavior normal          Vital Signs  ED Triage Vitals   Temperature Pulse Respirations Blood Pressure SpO2   02/07/21 2153 02/07/21 2152 02/07/21 2152 02/07/21 2152 02/07/21 2152   98 1 °F (36 7 °C) 86 18 156/74 100 %      Temp Source Heart Rate Source Patient Position - Orthostatic VS BP Location FiO2 (%)   02/07/21 2153 02/07/21 2152 02/07/21 2152 02/07/21 2152 --   Oral Monitor Sitting Right arm       Pain Score       02/07/21 2152       8           Vitals:    02/08/21 1315 02/08/21 1330 02/08/21 1400 02/08/21 1505   BP: 134/64 136/65 134/63 130/70   Pulse: 70 71 71 63   Patient Position - Orthostatic VS:             Visual Acuity      ED Medications  Medications   tamsulosin (FLOMAX) capsule 0 4 mg (has no administration in time range)   oxybutynin (DITROPAN-XL) 24 hr tablet 5 mg (5 mg Oral Given 2/8/21 1559)   sodium chloride 0 9 % infusion (125 mL/hr Intravenous New Bag 2/8/21 1607)   sodium chloride 0 9 % bolus 1,000 mL (0 mL Intravenous Stopped 2/7/21 2350)   HYDROmorphone (DILAUDID) injection 1 mg (1 mg Intravenous Given 2/7/21 2244)   ondansetron (ZOFRAN) injection 4 mg (4 mg Intravenous Given 2/7/21 2244)   ceFAZolin (ANCEF) IVPB (premix in dextrose) 2,000 mg 50 mL (0 mg Intravenous Stopped 2/7/21 2320)   ceFAZolin (ANCEF) IVPB (premix in dextrose) 2,000 mg 50 mL (0 mg Intravenous Stopped 2/8/21 1150)       Diagnostic Studies  Results Reviewed     Procedure Component Value Units Date/Time    Basic metabolic panel [[id number]]  (Abnormal) Collected: 02/08/21 0551    Lab Status: Final result Specimen: Blood from Arm, Right Updated: 02/08/21 0619     Sodium 141 mmol/L      Potassium 4 8 mmol/L      Chloride 105 mmol/L      CO2 26 mmol/L      ANION GAP 10 mmol/L      BUN 31 mg/dL      Creatinine 3 79 mg/dL      Glucose 110 mg/dL      Glucose, Fasting 110 mg/dL      Calcium 8 9 mg/dL      eGFR 21 ml/min/1 73sq m     Narrative:      National Kidney Disease Foundation guidelines for Chronic Kidney Disease (CKD):     Stage 1 with normal or high GFR (GFR > 90 mL/min/1 73 square meters)    Stage 2 Mild CKD (GFR = 60-89 mL/min/1 73 square meters)    Stage 3A Moderate CKD (GFR = 45-59 mL/min/1 73 square meters)    Stage 3B Moderate CKD (GFR = 30-44 mL/min/1 73 square meters)    Stage 4 Severe CKD (GFR = 15-29 mL/min/1 73 square meters)    Stage 5 End Stage CKD (GFR <15 mL/min/1 73 square meters)  Note: GFR calculation is accurate only with a steady state creatinine    AM Phosphorus [[id number]]  (Normal) Collected: 02/08/21 0551    Lab Status: Final result Specimen: Blood from Arm, Right Updated: 02/08/21 0619     Phosphorus 4 3 mg/dL     CBC and differential [[id number]]  (Abnormal) Collected: 02/08/21 0551    Lab Status: Final result Specimen: Blood from Arm, Right Updated: 02/08/21 0603     WBC 10 74 Thousand/uL      RBC 4 08 Million/uL      Hemoglobin 12 4 g/dL      Hematocrit 36 8 %      MCV 90 fL      MCH 30 4 pg      MCHC 33 7 g/dL      RDW 12 4 %      MPV 11 3 fL      Platelets 502 Thousands/uL      nRBC 0 /100 WBCs      Neutrophils Relative 83 %      Immat GRANS % 0 %      Lymphocytes Relative 10 %      Monocytes Relative 6 %      Eosinophils Relative 1 %      Basophils Relative 0 % Neutrophils Absolute 8 85 Thousands/µL      Immature Grans Absolute 0 04 Thousand/uL      Lymphocytes Absolute 1 05 Thousands/µL      Monocytes Absolute 0 62 Thousand/µL      Eosinophils Absolute 0 14 Thousand/µL      Basophils Absolute 0 04 Thousands/µL     Urine Microscopic [[id number]]  (Abnormal) Collected: 02/08/21 0204    Lab Status: Final result Specimen: Urine, Clean Catch Updated: 02/08/21 0232     RBC, UA 4-10 /hpf      WBC, UA 2-4 /hpf      Epithelial Cells None Seen /hpf      Bacteria, UA None Seen /hpf     UA w Reflex to Microscopic w Reflex to Culture [[id number]]  (Abnormal) Collected: 02/08/21 0204    Lab Status: Final result Specimen: Urine, Clean Catch Updated: 02/08/21 0214     Color, UA Yellow     Clarity, UA Clear     Specific [place], UA 1 020     pH, UA 5 5     Leukocytes, UA Trace     Nitrite, UA Negative     Protein, UA Negative mg/dl      Glucose, UA Negative mg/dl      Ketones, UA Negative mg/dl      Urobilinogen, UA 0 2 E U /dl      Bilirubin, UA Negative     Blood, UA Small    Basic metabolic panel [[id number]]  (Abnormal) Collected: 02/07/21 2240    Lab Status: Final result Specimen: Blood from Arm, Right Updated: 02/07/21 2305     Sodium 140 mmol/L      Potassium 4 1 mmol/L      Chloride 101 mmol/L      CO2 30 mmol/L      ANION GAP 9 mmol/L      BUN 30 mg/dL      Creatinine 3 71 mg/dL      Glucose 98 mg/dL      Calcium 8 8 mg/dL      eGFR 21 ml/min/1 73sq m     Narrative:      [place] guidelines for Chronic Kidney Disease (CKD):     Stage 1 with normal or high GFR (GFR > 90 mL/min/1 73 square meters)    Stage 2 Mild CKD (GFR = 60-89 mL/min/1 73 square meters)    Stage 3A Moderate CKD (GFR = 45-59 mL/min/1 73 square meters)    Stage 3B Moderate CKD (GFR = 30-44 mL/min/1 73 square meters)    Stage 4 Severe CKD (GFR = 15-29 mL/min/1 73 square meters)    Stage 5 End Stage CKD (GFR <15 mL/min/1 73 square meters)  Note: GFR calculation is accurate only with a steady state creatinine    Hepatic function panel [[id number]]  (Normal) Collected: 02/07/21 2240    Lab Status: Final result Specimen: Blood from Arm, Right Updated: 02/07/21 2305     Total Bilirubin 0 30 mg/dL      Bilirubin, Direct 0 07 mg/dL      Alkaline Phosphatase 65 U/L      AST 14 U/L      ALT 32 U/L      Total Protein 7 6 g/dL      Albumin 4 0 g/dL     CBC and differential [[id number]]  (Abnormal) Collected: 02/07/21 2240    Lab Status: Final result Specimen: Blood from Arm, Right Updated: 02/07/21 2249     WBC 7 41 Thousand/uL      RBC 4 41 Million/uL      Hemoglobin 13 1 g/dL      Hematocrit 39 5 %      MCV 90 fL      MCH 29 7 pg      MCHC 33 2 g/dL      RDW 12 4 %      MPV 11 3 fL      Platelets 998 Thousands/uL      nRBC 0 /100 WBCs      Neutrophils Relative 56 %      Immat GRANS % 0 %      Lymphocytes Relative 27 %      Monocytes Relative 8 %      Eosinophils Relative 8 %      Basophils Relative 1 %      Neutrophils Absolute 4 11 Thousands/µL      Immature Grans Absolute 0 01 Thousand/uL      Lymphocytes Absolute 2 02 Thousands/µL      Monocytes Absolute 0 58 Thousand/µL      Eosinophils Absolute 0 62 Thousand/µL      Basophils Absolute 0 07 Thousands/µL                  XR abdomen 1 view kub   Final Result by Vanesa Church MD (02/08 9256)      Questionable punctate densities are seen projecting in the region of right kidney, which may be artifactual or related to patient's small nonobstructing calculi seen on prior CT  Otherwise, no radiopaque calculi projecting in the region of left kidney, ureters, or urinary bladder  Workstation performed: [id number]         FL retrograde pyelogram    (Results Pending)              Procedures  Procedures         ED Course                             SBIRT 22yo+      Most Recent Value   SBIRT (22 yo +)   In order to provide better care to our patients, we are screening all of our patients for alcohol and drug use   Would it be okay to ask you these screening questions? Yes Filed at: 02/07/2021 2159   Initial Alcohol Screen: US AUDIT-C    1  How often do you have a drink containing alcohol? 1 Filed at: 02/07/2021 2159   2  How many drinks containing alcohol do you have on a typical day you are drinking? 0 Filed at: 02/07/2021 2159   3a  Male UNDER 65: How often do you have five or more drinks on one occasion? 0 Filed at: 02/07/2021 2159   Audit-C Score  1 Filed at: 02/07/2021 2159   RADHA: How many times in the past year have you    Used an illegal drug or used a prescription medication for non-medical reasons? Never Filed at: 02/07/2021 2159                    MDM  Number of Diagnoses or Management Options  Ureterolithiasis: new and requires workup     Amount and/or Complexity of Data Reviewed  Clinical lab tests: ordered and reviewed  Tests in the radiology section of CPT®: ordered and reviewed  Decide to obtain previous medical records or to obtain history from someone other than the patient: yes  Review and summarize past medical records: yes    Patient Progress  Patient progress: stable      Disposition  Final diagnoses:   Ureterolithiasis     Time reflects when diagnosis was documented in both MDM as applicable and the Disposition within this note     Time User Action Codes Description Comment    2/7/2021 10:06 PM Thora Pablo Add [N20 1] Ureterolithiasis     2/8/2021  1:33 AM [facility], [address] [place] [N13 2] Hydronephrosis with urinary obstruction due to ureteral calculus     2/8/2021  1:40 AM Ethna Fortune Add [N17 9] Acute kidney injury ([facility] [address] )     2/8/2021 12:08 PM Velia Morrow Modify [N20 1] Ureterolithiasis       ED Disposition     ED Disposition Condition Date/Time Comment    Admit Stable Sun Feb 7, 2021 10:06 PM Case was discussed with Deya Reinoso and Dr Deisi Bob and the patient's admission status was agreed to be Admission Status: observation status to the service of Dr Victor M Briscoe           Follow-up Information     Follow up With Specialties Details Why Contact Info Additional [address] [facility] [address] [place] [facility] Follow up in 5 day(s) Urology--service will contact patient/caregiver with hospital follow-up appointment date and time once discharged  [address] [id number]  700  [facility] [address] [place] [facility], [address], [address]          Current Discharge Medication List      CONTINUE these medications which have NOT CHANGED    Details   !! tamsulosin (FLOMAX) 0 4 mg Take 1 capsule (0 4 mg total) by mouth daily with dinner  Qty: 30 capsule, Refills: 0    Associated Diagnoses: Ureteral calculus      traZODone (DESYREL) 50 mg tablet TAKE 1 TABLET BY MOUTH EVERYDAY AT BEDTIME  Qty: 90 tablet, Refills: 1    Associated Diagnoses: Primary insomnia      !! tamsulosin (FLOMAX) 0 4 mg Take 1 capsule (0 4 mg total) by mouth daily with dinner for 7 days  Qty: 7 capsule, Refills: 0    Associated Diagnoses: Flank pain; Kidney stone       !! - Potential duplicate medications found  Please discuss with provider  STOP taking these medications       oxyCODONE (ROXICODONE) 5 mg immediate release tablet Comments:   Reason for Stopping:             No discharge procedures on file      PDMP Review     None          ED Provider  Electronically Signed by           Christofer Monae DO  02/08/21 5957

## 2021-02-08 NOTE — ED NOTES
Pt up throughout the night  Frequently verbalizing c/o pain and nausea  Comfort measures and MAR actions performed  Will continue to monitor        Abena Bobo RN  02/08/21 0812

## 2021-02-08 NOTE — OP NOTE
OPERATIVE REPORT  PATIENT NAME: Monika Jane    :  1995  MRN: [id number]  Pt Location: [facility] OR ROOM 04    SURGERY DATE: 2021    Surgeon(s) and Role:     * Jazmine Manriquez MD - Primary    Preop Diagnosis:  Ureterolithiasis [N20 1]    Post-Op Diagnosis Codes:     * Ureterolithiasis [N20 1]    Procedure(s) (LRB):  CYSTOSCOPY URETEROSCOPY WITH LITHOTRIPSY HOLMIUM LASER, RETROGRADE PYELOGRAM AND INSERTION STENT URETERAL (Right)    Specimen(s):  * No specimens in log *    Estimated Blood Loss:   Minimal    Drains:  Ureteral Drain/Stent Left ureter 6 Fr  (Active)   Number of days: 107       Ureteral Drain/Stent Right ureter 6 Fr  (Active)   Site Assessment MINA 21 1300   Dressing Status Clean;Dry; Intact 21 1300   Dressing Type Other (Comment) 21 1300   Number of days: 0       Anesthesia Type:   General    Operative Indications:  Ureterolithiasis [N20 1]      Operative Findings:  1  Distal right ureteral stone  at the ureteral orifice  2  Fragmentation of the stone with a 365 nanometer laser  3  6 x 26 double-J stent placed with string attached the dorsal penis     Complications:   None    Procedure and Technique:  Monika Jane is a 30y o -year-old male  with a history of significant stone disease, he follows with the [address] [facility]  He was admitted with a 6 mm distal right stone  Risk and benefits of ureteroscopy were discussed and reviewed  Informed consent was obtained  The patient was brought to the operating room on 2021  After the smooth induction of LMA anesthesia, the patient was placed in the dorsal lithotomy position  His genitalia was prepped and draped in a sterile fashion  Intravenous antibiotics were administered in the form of Ancef  A timeout was performed with all members of the operative team confirmed the patient's identity, procedure to be performed, and laterality of the case      A 22 [place] rigid cystoscope with 30° lens was inserted  The bladder was thoroughly inspected  It was no evidence of mucosal abnormalities or lesions  There were no calculi identified  Attention was focused on the right ureteral orifice  The stone was clearly  at the right ureteral orifice  A Bard Solo Plus wire was passed proximal to the stone and secured as a safety  A long semirigid ureteroscope was then inserted adjacent to the wire  The stone was identified and was engaged and decimated with a 365nm  holmium laser fiber  The fragments were irrigated out of the ureter into the bladder and there were no additional fragments able to be basket extracted for specimen  The ureteroscope was then repassed multiple times to ensure that the distal ureter was free and clear of any residual stones  We were not able to advance the semi rigid ureteral scope above the pelvic brim  Additional contrast was injected as a roadmap for stent insertion  The ureteroscope was then removed  The wire was backloaded through the cystoscope  The cystoscope was repassed into the bladder  A 6 Russian 26 double-J ureteral stent was then placed over the previously banked safety wire without difficulty  The proximal coil was appreciated in the right renal pelvis and the distal coil was visualized within the bladder  The bladder was emptied and the cystoscope was removed  A string was left in place and secured to the dorsal penis with Tegaderm  2% viscous lidocaine was placed per urethra  Overall the patient tolerated the procedure well and were no complications  The patient was extubated in the operating room and transferred to the PACU in stable condition at the conclusion of the case      Plan-stent on string removal in 3-5 days     I was present for the entire procedure    Patient Disposition:  PACU     SIGNATURE: Jazmine Manriquez MD  DATE: 2021  TIME: 1:26 PM

## 2021-02-08 NOTE — H&P
H&P- Nicci Carrillo 1995, 30 y o  male MRN: [id number]    Unit/Bed#: ED 19 Encounter: [id number]    Primary Care Provider: Corby Balderrama DO   Date and time admitted to hospital: 2/7/2021  9:47 PM        * Hydronephrosis with urinary obstruction due to ureteral calculus  Assessment & Plan  Patient presents with right-sided flank pain, which started on 02/03  · CT scan done on 02/03 showing: Moderate right-sided hydroureteronephrosis secondary to a 6 mm calculus at the distal ureter  2   Bilateral intrarenal calculi again seen with medullary nephrocalcinosis  · Repeat KUB ordered in ED today, pending official read  · ED spoke with Urology, who will see for surgical intervention in a m   UA from 2/3 showed white blood cells without any bacteria, culture negative for growth  Repeat UA ordered, pending  Pain management, due to worsening renal function will avoid NSAIDs  Antiemetics PRN  Continuous IVF  Patient received a dose of cefazolin in ED per urology  Flomax initiated  Strain urine  Urology consulted; recommendations appreciated    Primary hyperoxaluria type 1 ([facility] [address] )  Assessment & Plan  · Patient reports known history  · Reports he follows with [address] nephrology  · Review of note shows patient's baseline creatinine appears to be near 2 6, patient with recent worsening creatinine with creatinine on admission of 3 71  · Nephrology following    Insomnia  Assessment & Plan  · Continue trazodone q h s  P r n  Acute kidney injury ([address] )  Assessment & Plan  · POA, creatinine of 3 71  Patient with gradual up trending of creatinine on review of notes, patient's creatinine on 02/03 was 3 49    Previously patient's baseline appears to be near 2 6  · Likely secondary to ureteral obstruction  · Continue IV fluids  · Avoid NSAIDs, nephrotoxic, hypotension  · Bladder scan, UA, urinary retention protocol  · I/Os  · Trend Cr/BUN  · Nephrology consulted, appreciate recommendations    VTE Prophylaxis: VTE screen not requiring pharmacologic prophylaxis  / sequential compression device   Code Status:  Full code  POLST: POLST is not applicable to this patient    Anticipated Length of Stay:  Patient will be admitted on an Observation basis with an anticipated length of stay of  < 2 midnights  Justification for Hospital Stay:  Patient presents with kidney stone and kidney injury requiring neurology consult and surgery    Total Time for Visit, including Counseling / Coordination of Care: 1 hour  Greater than 50% of this total time spent on direct patient counseling and coordination of care  Chief Complaint:     Chief Complaint   Patient presents with    Flank Pain     Pt reports right sided flank pain since thursday  History of Present Illness:    Vic Feliciano is a 30 y o  male with a past medical history of type 1 hyperoxaluria and frequent kidney stones who presents with right-sided flank pain  Patient was recently seen in the ED on 02/03 for right-sided flank pain, at that time he was found have a 6 mm kidney stone  At that time his creatinine had also worsened to 3 49, and he was to be admitted in seen by Urology, however he refused admission at that time  He now presents due to worsening of right-sided flank pain and nausea  Patient also reports decreased urination today, although he reports he has urinated earlier in the day he believes around 6:00 p m  Patient denies any fevers or chills  Patient denies abdominal pain but does report some nausea  He reports currently his pain is somewhat controlled, reports it is about a 6 5/10 after receiving pain medications  Patient denies any burning or blood in his urine, but does report he feels that if it is more difficult to urinate over the past day  Patient reports he has history of primary hyper type when hyperoxaluriaoxaluria type 1 and follows with Nephrology at [place]    He reports they have been following his creatinine closely and up until 10 days ago it did been stable near 2 6  His creatinine has been up trending since that time, with creatinine of 3 71 today on admission  Review of Systems:    Review of Systems   Constitutional: Positive for appetite change  Negative for activity change, chills, fatigue and fever  HENT: Negative for congestion, postnasal drip, sinus pain and sore throat  Eyes: Negative for visual disturbance  Respiratory: Negative for cough, chest tightness, shortness of breath and wheezing  Cardiovascular: Negative for chest pain, palpitations and leg swelling  Gastrointestinal: Positive for nausea  Negative for blood in stool, constipation and diarrhea  Genitourinary: Positive for decreased urine volume and flank pain  Negative for dysuria and hematuria  Musculoskeletal: Positive for back pain  Negative for myalgias  Skin: Negative for rash  Neurological: Negative for dizziness, light-headedness and headaches  Hematological: Does not bruise/bleed easily  Psychiatric/Behavioral: Negative for behavioral problems  Past Medical and Surgical History:     Past Medical History:   Diagnosis Date    Anxiety     GERD (gastroesophageal reflux disease)     Liver disorder     Nephrolithiasis        Past Surgical History:   Procedure Laterality Date    ANKLE SURGERY Right 2017    ligament repair    FL RETROGRADE PYELOGRAM  10/24/2020    HERNIA REPAIR      LITHOTRIPSY      MASS EXCISION Left 2/17/2017    Procedure: BACK/SHOULDER CYST EXCISION ;  Surgeon: Roxana Snider MD;  Location: [facility];  Service:     TX CYSTO/URETERO W/LITHOTRIPSY &INDWELL STENT INSRT Left 10/24/2020    Procedure: CYSTOSCOPY URETEROSCOPY WITH LITHOTRIPSY HOLMIUM LASER, RETROGRADE PYELOGRAM AND INSERTION STENT URETERAL;  Surgeon: Sarah Cuadra MD;  Location: [facility];  Service: Urology       Meds/Allergies:    Prior to Admission medications    Medication Sig Start Date End Date Taking?  Authorizing Provider   oxybutynin (DITROPAN) 5 mg tablet Take 1 tablet (5 mg total) by mouth 3 (three) times a day as needed (stent discomfort) 10/24/20   Kevin Carias MD   tamsulosin (FLOMAX) 0 4 mg Take 1 capsule (0 4 mg total) by mouth daily with dinner 10/26/20   Soy Miramontes MD   tamsulosin (FLOMAX) 0 4 mg Take 1 capsule (0 4 mg total) by mouth daily with dinner for 7 days 2/3/21 2/10/21  Romulo Alvarez PA-C   traZODone (DESYREL) 50 mg tablet TAKE 1 TABLET BY MOUTH EVERYDAY AT BEDTIME 11/23/20   Yamileth Fraction, DO     I have reviewed home medications with patient personally  Allergies: No Known Allergies    Social History:     Marital Status: Single   Substance Use History:   Social History     Substance and Sexual Activity   Alcohol Use Yes    Frequency: Monthly or less    Comment: rarely     Social History     Tobacco Use   Smoking Status Never Smoker   Smokeless Tobacco Never Used     Social History     Substance and Sexual Activity   Drug Use No       Family History:    non-contributory    Physical Exam:     Vitals:   Blood Pressure: 122/71 (02/08/21 0000)  Pulse: 76 (02/08/21 0000)  Temperature: 98 1 °F (36 7 °C) (02/08/21 0000)  Temp Source: Oral (02/08/21 0000)  Respirations: 18 (02/08/21 0000)  Height: 6' (182 9 cm) (02/08/21 0000)  Weight - Scale: 104 kg (229 lb 4 5 oz) (02/08/21 0000)  SpO2: 96 % (02/08/21 0000)    Physical Exam  Vitals signs reviewed  Constitutional:       General: He is not in acute distress  Appearance: He is not ill-appearing  Comments: Patient is sitting up in bed, no acute distress  Appears stated age  Is pleasant and answers all questions appropriately  Appears uncomfortable   HENT:      Head: Normocephalic and atraumatic  Eyes:      Pupils: Pupils are equal, round, and reactive to light  Neck:      Musculoskeletal: Normal range of motion  Cardiovascular:      Rate and Rhythm: Normal rate and regular rhythm  Pulmonary:      Effort: Pulmonary effort is normal  No respiratory distress        Breath sounds: Normal breath sounds  No wheezing  Abdominal:      General: Bowel sounds are normal       Palpations: Abdomen is soft  Tenderness: There is no abdominal tenderness  There is no right CVA tenderness or left CVA tenderness  Musculoskeletal: Normal range of motion  Skin:     General: Skin is warm and dry  Neurological:      General: No focal deficit present  Mental Status: He is alert and oriented to person, place, and time  Psychiatric:         Mood and Affect: Mood normal          Behavior: Behavior normal          Additional Data:     Lab Results: I have personally reviewed pertinent reports  Results from last 7 days   Lab Units 02/07/21  2240   WBC Thousand/uL 7 41   HEMOGLOBIN g/dL 13 1   HEMATOCRIT % 39 5   PLATELETS Thousands/uL 216   NEUTROS PCT % 56   LYMPHS PCT % 27   MONOS PCT % 8   EOS PCT % 8*     Results from last 7 days   Lab Units 02/07/21  2240   SODIUM mmol/L 140   POTASSIUM mmol/L 4 1   CHLORIDE mmol/L 101   CO2 mmol/L 30   BUN mg/dL 30*   CREATININE mg/dL 3 71*   ANION GAP mmol/L 9   CALCIUM mg/dL 8 8   ALBUMIN g/dL 4 0   TOTAL BILIRUBIN mg/dL 0 30   ALK PHOS U/L 65   ALT U/L 32   AST U/L 14   GLUCOSE RANDOM mg/dL 98                       Imaging: I have personally reviewed pertinent reports  XR abdomen 1 view kub    (Results Pending)       EKG, Pathology, and Other Studies Reviewed on Admission:   · EKG:  Normal sinus rhythm, no ischemic changes    Allscripts / Epic Records Reviewed: Yes     ** Please Note: This note has been constructed using a voice recognition system   **

## 2021-02-08 NOTE — ASSESSMENT & PLAN NOTE
Patient presents with right-sided flank pain, which started on 02/03  · CT scan done on 02/03 showing: Moderate right-sided hydroureteronephrosis secondary to a 6 mm calculus at the distal ureter  2   Bilateral intrarenal calculi again seen with medullary nephrocalcinosis    · Repeat KUB ordered in ED today, pending official read  · ED spoke with Urology, who will see for surgical intervention in a m   UA from 2/3 showed white blood cells without any bacteria, culture negative for growth  Repeat UA ordered, pending  Pain management, due to worsening renal function will avoid NSAIDs  Antiemetics PRN  Continuous IVF  Patient received a dose of cefazolin in ED per urology  Flomax initiated  Strain urine  Urology consulted; recommendations appreciated

## 2021-02-08 NOTE — DISCHARGE INSTRUCTIONS
You have stent on a string, which will be removed in 3-5 days  Please take care not to remove with dressing or undressing  Our office staff will contact you to arrange an appointment for removal     Ureteroscopy   WHAT YOU NEED TO KNOW:   A ureteroscopy is a procedure to examine in the inside of your urinary tract, which includes your urethra, bladder, ureters, and kidneys  A ureteroscope is a small, thin tube with a light and camera on the end  Ureteroscopy can help your healthcare provider diagnose and treat problems in your urinary tract, such as kidney stones  DISCHARGE INSTRUCTIONS:   Medicine:   · Antibiotics  may be given to treat or prevent an infection  · Take your medicine as directed  Contact your healthcare provider if you think your medicine is not helping or if you have side effects  Tell him or her if you are allergic to any medicine  Keep a list of the medicines, vitamins, and herbs you take  Include the amounts, and when and why you take them  Bring the list or the pill bottles to follow-up visits  Carry your medicine list with you in case of an emergency  Follow up with your healthcare provider as directed:  Write down your questions so you remember to ask them during your visits  Drink liquids as directed  Liquids can help prevent kidney stones and urinary tract infections  Drink water and limit the amount of caffeine you drink  Caffeine may be found in coffee, tea, soda, sports drinks, and foods  Ask your healthcare provider how much liquid to drink each day  Contact your healthcare provider if:   · You have a fever  · You cannot urinate  · You have blood in your urine  · You are vomiting  · You have pain in your abdomen or side  · You have questions or concerns about your condition or care  © Copyright [employer] 2018 Information is for End User's use only and may not be sold, redistributed or otherwise used for commercial purposes   All illustrations and images included in CareNotes® are the copyrighted property of A D A M , Inc  or Neptali Mcpherson   The above information is an  only  It is not intended as medical advice for individual conditions or treatments  Talk to your doctor, nurse or pharmacist before following any medical regimen to see if it is safe and effective for you

## 2021-02-08 NOTE — UTILIZATION REVIEW
Initial Clinical Review    Admission: Date/Time/Statement:   Admission Orders (From admission, onward)     Ordered        21 2307  Place in Observation  Once                   Orders Placed This Encounter   Procedures    Place in Observation     Standing Status:   Standing     Number of Occurrences:   1     Order Specific Question:   Level of Care     Answer:   Med Surg [16]     ED Arrival Information     Expected Arrival Acuity Means of Arrival Escorted By Service Admission Type    - 2021 21:43 Urgent Walk-In Self General Medicine Urgent    Arrival Complaint    kidney stone pain        Chief Complaint   Patient presents with    Flank Pain     Pt reports right sided flank pain since thursday  Assessment/Plan: 31 yo male to ED from home w/ R sided flank pain   Seen in ED 2/3 and found to have 6mm stone  Creat was 3 49 pt refused admission  On return w/ nausea , dec urination and pain   Creatine remains elevated at 3 71 , baseline 2 60  Admitted OBs status for hydronephrosis and MECHELLE plan for cont IVF , urology and nephrology consult , strain ua , flomax, pain control , f/u labs and cx   Urology Consult   R ureteral calculus , R hydronephrosis , renal colic   Treat w/ IVF , flomax, analgesics, abx while await cx , NPO for OR if no spontaneous expulsion achieved   Nephrology Consult    MECHELLE creat 3 72 was 3 49 on 2/3   Baseline 2 6-2 8  MECHELLE likely in setting of obstructive renal stone  Scheduled for lithotripsy and possible stent placement   Hold K citrate in setting o MECHELLE   Pain mngt    OP Note   CYSTOSCOPY URETEROSCOPY WITH LITHOTRIPSY HOLMIUM LASER, RETROGRADE PYELOGRAM AND INSERTION STENT URETERAL   Operative Findings:  1  Distal right ureteral stone  at the ureteral orifice  2   Fragmentation of the stone with a 365 nanometer laser  3  6 x 26 double-J stent placed with string attached the dorsal penis   ED Triage Vitals   Temperature Pulse Respirations Blood Pressure SpO2 02/07/21 2153 02/07/21 2152 02/07/21 2152 02/07/21 2152 02/07/21 2152   98 1 °F (36 7 °C) 86 18 156/74 100 %      Temp Source Heart Rate Source Patient Position - Orthostatic VS BP Location FiO2 (%)   02/07/21 2153 02/07/21 2152 02/07/21 2152 02/07/21 2152 --   Oral Monitor Sitting Right arm       Pain Score       02/07/21 2152       8          Wt Readings from Last 1 Encounters:   02/08/21 104 kg (229 lb 4 5 oz)     Additional Vital Signs:   02/08/21 0630  --  69  18  124/68  --  96 %  --  --   02/08/21 0445  --  71  16  124/68  --  92 %  None (Room air)  --   02/08/21 0345  --  75  21  141/70  --  97 %  None (Room air)  --   02/08/21 0342  --  71  20  --  --  98 %  --  --   02/08/21 0145  --  72  16  --  --  98 %  --  --   02/08/21 0000  98 1 °F (36 7 °C)  76  18  122/71  92  96 %  None (Room air)  --   02/07/21 2303  --  --  --  --  --  --  None (Room air)  --   02/07/21 2153  98 1 °F (36 7 °C)  --  --  --  --  --           Pertinent Labs/Diagnostic Test Results:   2/7 KUB     Results from last 7 days   Lab Units 02/08/21 0551 02/07/21 2240 02/03/21  2046   WBC Thousand/uL 10 74* 7 41 7 03   HEMOGLOBIN g/dL 12 4 13 1 12 3   HEMATOCRIT % 36 8 39 5 37 3   PLATELETS Thousands/uL 174 216 192   NEUTROS ABS Thousands/µL 8 85* 4 11 3 91     Results from last 7 days   Lab Units 02/08/21 0551 02/07/21 2240 02/03/21 2046   SODIUM mmol/L 141 140 141   POTASSIUM mmol/L 4 8 4 1 4 1   CHLORIDE mmol/L 105 101 104   CO2 mmol/L 26 30 28   ANION GAP mmol/L 10 9 9   BUN mg/dL 31* 30* 28*   CREATININE mg/dL 3 79* 3 71* 3 49*   EGFR ml/min/1 73sq m 21 21 23   CALCIUM mg/dL 8 9 8 8 8 6   PHOSPHORUS mg/dL 4 3  --   --      Results from last 7 days   Lab Units 02/07/21  2240   AST U/L 14   ALT U/L 32   ALK PHOS U/L 65   TOTAL PROTEIN g/dL 7 6   ALBUMIN g/dL 4 0   TOTAL BILIRUBIN mg/dL 0 30   BILIRUBIN DIRECT mg/dL 0 07     Results from last 7 days   Lab Units 02/08/21  0551 02/07/21  2240 02/03/21  2046   GLUCOSE RANDOM mg/dL 110 98 102     Results from last 7 days   Lab Units 02/08/21  0204 02/03/21 2047   CLARITY UA  Clear Clear   COLOR UA  Yellow Light Yellow   SPEC GRAV UA  1 020 1 015   PH UA  5 5 6 0   GLUCOSE UA mg/dl Negative Negative   KETONES UA mg/dl Negative Negative   BLOOD UA  Small* Large*   PROTEIN UA mg/dl Negative Negative   NITRITE UA  Negative Negative   BILIRUBIN UA  Negative Negative   UROBILINOGEN UA E U /dl 0 2 0 2   LEUKOCYTES UA  Trace* Negative   WBC UA /hpf 2-4 30-50*   RBC UA /hpf 4-10* 2-4   BACTERIA UA /hpf None Seen None Seen   EPITHELIAL CELLS WET PREP /hpf None Seen Occasional     Results from last 7 days   Lab Units 02/03/21 2047   URINE CULTURE  No Growth <1000 cfu/mL     ED Treatment:   Medication Administration from 02/07/2021 2143 to 02/08/2021 9176       Date/Time Order Dose Route Action     02/07/2021 2250 sodium chloride 0 9 % bolus 1,000 mL 1,000 mL Intravenous New Bag     02/07/2021 2244 HYDROmorphone (DILAUDID) injection 1 mg 1 mg Intravenous Given     02/07/2021 2244 ondansetron (ZOFRAN) injection 4 mg 4 mg Intravenous Given     02/07/2021 2250 ceFAZolin (ANCEF) IVPB (premix in dextrose) 2,000 mg 50 mL 2,000 mg Intravenous New Bag     02/08/2021 0016 HYDROmorphone (DILAUDID) injection 0 5 mg 0 5 mg Intravenous Given     02/08/2021 5010 oxyCODONE (ROXICODONE) IR tablet 5 mg 5 mg Oral Given     02/08/2021 0402 HYDROmorphone (DILAUDID) injection 0 5 mg 0 5 mg Intravenous Given     02/08/2021 0210 traZODone (DESYREL) tablet 50 mg 50 mg Oral Given     02/08/2021 0402 ondansetron (ZOFRAN) injection 4 mg 4 mg Intravenous Given     02/08/2021 0211 lactated ringers infusion 125 mL/hr Intravenous New Bag        Past Medical History:   Diagnosis Date    Anxiety     GERD (gastroesophageal reflux disease)     Liver disorder     Nephrolithiasis      Present on Admission:   Acute kidney injury ([facility] [address] )   Insomnia      Admitting Diagnosis: Flank pain [R10 9]  Age/Sex: 30 y o  male  Admission Orders:  Scheduled Medications:  docusate sodium, 100 mg, Oral, BID  tamsulosin, 0 4 mg, Oral, Daily With Dinner  traZODone, 50 mg, Oral, HS      Continuous IV Infusions:  lactated ringers, 125 mL/hr, Intravenous, Continuous      PRN Meds:  acetaminophen, 650 mg, Oral, Q6H PRN  HYDROmorphone, 0 5 mg, Intravenous, Q4H PRN  2/8 x1  ondansetron, 4 mg, Intravenous, Q6H PRN  2/8 x1  oxyCODONE, 5 mg, Oral, Q6H PRN        IP CONSULT TO NEPHROLOGY  IP CONSULT TO UROLOGY    Network Utilization Review Department  ATTENTION: Please call with any questions or concerns to [phone] and carefully listen to the prompts so that you are directed to the right person  All voicemails are confidential   Shaista Chowdhury all requests for admission clinical reviews, approved or denied determinations and any other requests to dedicated fax number below belonging to the campus where the patient is receiving treatment   List of dedicated fax numbers for the Facilities:  [address] DENIALS (Administrative/Medical Necessity) [phone]   [address] (Maternity/NICU/Pediatrics) [phone] [address] Reid Oliveira 6908 (Tenisha Perdomo "[address]) [address] [address] [address] [address] [place] [address] [phone]

## 2021-02-08 NOTE — ASSESSMENT & PLAN NOTE
· Patient reports known history  · Reports he follows with [facility] nephrology  · Review of note shows patient's baseline creatinine appears to be near 2 6, patient with recent worsening creatinine with creatinine on admission of 3 71  · Nephrology following

## 2021-02-08 NOTE — ANESTHESIA POSTPROCEDURE EVALUATION
Post-Op Assessment Note    CV Status:  Stable  Pain Score: 0    Pain management: adequate     Mental Status:  Alert and awake   Hydration Status:  Stable   PONV Controlled:  None   Airway Patency:  Patent and adequate      Post Op Vitals Reviewed: Yes      Staff: Anesthesiologist, CRNA         No complications documented      BP (P) 124/60 (02/08/21 1225)    Temp (P) 97 6 °F (36 4 °C) (02/08/21 1225)    Pulse (P) 82 (02/08/21 1225)   Resp (P) 20 (02/08/21 1225)    SpO2 (P) 100 % (02/08/21 1225)

## 2021-02-08 NOTE — CONSULTS
Consultation - Infectious Disease   Librado Hannah 30 y o  male MRN: [id number]  Unit/Bed#: OR [place] Encounter: [id number]      IMPRESSION & RECOMMENDATIONS:     MECHELLE    Assessment:  · POA, creatinine on admission 3 71 (up from 3 49 on 2/3/21)  · BL creatinine per chart review seems 2 6-2 8  · Patient has history of primary hyperoxaluria type 1 and follows with nephrology and urology at [facility] for recurrent nephrolithiasis with chronic kidney disease  · Presented to the ED on 2/3 because of R flank pain  CT showed moderate R hydronephrosis and 6mm calculus at distal ureter  Patient had MECHELLE at 3 49 at the time and was advised admission for urology evaluation however opted to go home and try to pass stone spontaneously  However was not able to pass the stone and now presents with worsening R flank pain  Denies fever/chills or hematuria  Plan:  · Etiology of MECHELLE is likely post renal in setting of obstructing renal stone  · Scheduled for lithotripsy and possible stent placement by urology at 11:30 this morning  · Continue IVF  · Would hold potassium citrate temporarily in setting of MECHELLE  Can likely resume at time of discharge  · Avoid nephrotoxic agents, including NSAIDs and iodine contrast media  · Pain management as per primary  · On empiric AB until UCx per urology  · Nephrology will follow and monitor creatinine and electrolytes in morning BMP            Have discussed the above management plan in detail with the primary service    Extensive review of the medical records in epic including review of the notes, radiographs, and laboratory results     HISTORY OF PRESENT ILLNESS:  Reason for Consult: MECHELLE  HPI: Librado Hannah is a 30y o  year old male with past medical history of primary hyperoxaluria type I with recurrent nephrolithiasis and chronic kidney disease, who presented initially on 2/3/21 with right sided flank pain and was found to have a 6 mm obstructing stone with moderate hydronephrosis on CT abdomen  He also had MECHELLE with creatinine of 3 49 up from a baseline creatinine of 2 6-2 8  He was advised hospitalization and urology evaluation for possible procedure however opted to go home to see if he would pass the stone spontaneously  He now presents with increased R sided flank pain and believes he has not passed the stone at home  Creatinine has increased to 3 71 > 3 79 today  Patient is on aggressive fluid hydration and scheduled for urology procedure today  On empirical antibiotic coverage with cefazolin until urine culture result per urology  Vital signs are within normal range  X-ray KUB on admission was read as questionable punctate densities seen projecting in the region of right kidney, which may be artifactual or related to patient's small nonobstructing calculi seen on prior CT  Otherwise, no radiopaque calculi projecting in the region of left kidney, ureters, or urinary bladder  REVIEW OF SYSTEMS:  A complete review of systems is negative other than that noted in the HPI      PAST MEDICAL HISTORY:  Past Medical History:   Diagnosis Date    Anxiety     GERD (gastroesophageal reflux disease)     Kidney stone     Liver disorder     Nephrolithiasis     Primary hyperoxaluria type 1 ([address] )      Past Surgical History:   Procedure Laterality Date    ANKLE SURGERY Right 2017    ligament repair    CYSTOSCOPY      FL RETROGRADE PYELOGRAM  10/24/2020    HERNIA REPAIR      LITHOTRIPSY      MASS EXCISION Left 2/17/2017    Procedure: BACK/SHOULDER CYST EXCISION ;  Surgeon: Sharad Connor MD;  Location: [facility];  Service:     W[place] CYSTO/URETERO W/LITHOTRIPSY &INDWELL STENT INSRT Left 10/24/2020    Procedure: CYSTOSCOPY URETEROSCOPY WITH LITHOTRIPSY HOLMIUM LASER, RETROGRADE PYELOGRAM AND INSERTION STENT URETERAL;  Surgeon: Venus Bowen MD;  Location: [facility];  Service: Urology       FAMILY HISTORY:  Non-contributory    SOCIAL HISTORY:  Social History   Social History     Substance and Sexual Activity   Alcohol Use Yes    Frequency: Monthly or less    Comment: rarely     Social History     Substance and Sexual Activity   Drug Use No     Social History     Tobacco Use   Smoking Status Never Smoker   Smokeless Tobacco Never Used       ALLERGIES:  No Known Allergies    MEDICATIONS:  All current active medications have been reviewed  PHYSICAL EXAM:  Temp:  [98 1 °F (36 7 °C)-99 8 °F (37 7 °C)] 99 8 °F (37 7 °C)  HR:  [69-86] 76  Resp:  [13-21] 13  BP: (122-156)/(68-81) 139/81  SpO2:  [92 %-100 %] 100 %  Temp (24hrs), Av 7 °F (37 1 °C), Min:98 1 °F (36 7 °C), Max:99 8 °F (37 7 °C)  Current: Temperature: 99 8 °F (37 7 °C)    Intake/Output Summary (Last 24 hours) at 2021 1144  Last data filed at 2021 2350  Gross per 24 hour   Intake 1050 ml   Output --   Net 1050 ml       General Appearance:  Appearing well, nontoxic, in mild distress from pain   Head:  Normocephalic, without obvious abnormality, atraumatic   Eyes:  Conjunctiva pink and sclera anicteric, both eyes   Back:   Symmetric, no curvature, ROM normal, no CVA tenderness   Lungs:   Clear to auscultation bilaterally, respirations unlabored   Chest Wall:  No tenderness or deformity   Heart:  RRR; no murmur, rub or gallop   Abdomen:   Soft, non-tender, non-distended, positive bowel sounds, bilateral flank pain present R>L    Extremities: No cyanosis, clubbing or edema   Skin: No rashes or lesions  No draining wounds noted  Lymph nodes: Cervical, supraclavicular nodes normal   Neurologic: Alert and oriented times 3, extremity strength 5/5 and symmetric       LABS, IMAGING, & OTHER STUDIES:  Lab Results:  I have personally reviewed pertinent labs    Results from last 7 days   Lab Units 21  0551 21  2046   WBC Thousand/uL 10 74* 7 41 7 03   HEMOGLOBIN g/dL 12 4 13 1 12 3   PLATELETS Thousands/uL 174 216 192     Results from last 7 days   Lab Units 21  0551 21  2046   SODIUM mmol/L 141 140 141   POTASSIUM mmol/L 4 8 4 1 4 1   CHLORIDE mmol/L 105 101 104   CO2 mmol/L 26 30 28   BUN mg/dL 31* 30* 28*   CREATININE mg/dL 3 79* 3 71* 3 49*   EGFR ml/min/1 73sq m 21 21 23   CALCIUM mg/dL 8 9 8 8 8 6   AST U/L  --  14  --    ALT U/L  --  32  --    ALK PHOS U/L  --  65  --      Results from last 7 days   Lab Units 02/03/21 2047   URINE CULTURE  No Growth <1000 cfu/mL                       Imaging Studies:   I have personally reviewed pertinent imaging study reports and images in PACS

## 2021-02-09 LAB
ANION GAP SERPL CALCULATED.3IONS-SCNC: 10 MMOL/L (ref 4–13)
ANION GAP SERPL CALCULATED.3IONS-SCNC: 6 MMOL/L (ref 4–13)
BASOPHILS # BLD AUTO: 0.01 THOUSANDS/ΜL (ref 0–0.1)
BASOPHILS NFR BLD AUTO: 0 % (ref 0–1)
BUN SERPL-MCNC: 36 MG/DL (ref 5–25)
BUN SERPL-MCNC: 40 MG/DL (ref 5–25)
CALCIUM SERPL-MCNC: 9.1 MG/DL (ref 8.3–10.1)
CALCIUM SERPL-MCNC: 9.3 MG/DL (ref 8.3–10.1)
CHLORIDE SERPL-SCNC: 103 MMOL/L (ref 100–108)
CHLORIDE SERPL-SCNC: 105 MMOL/L (ref 100–108)
CO2 SERPL-SCNC: 24 MMOL/L (ref 21–32)
CO2 SERPL-SCNC: 28 MMOL/L (ref 21–32)
CREAT SERPL-MCNC: 3.98 MG/DL (ref 0.6–1.3)
CREAT SERPL-MCNC: 4.03 MG/DL (ref 0.6–1.3)
EOSINOPHIL # BLD AUTO: 0.01 THOUSAND/ΜL (ref 0–0.61)
EOSINOPHIL NFR BLD AUTO: 0 % (ref 0–6)
ERYTHROCYTE [DISTWIDTH] IN BLOOD BY AUTOMATED COUNT: 12.6 % (ref 11.6–15.1)
GFR SERPL CREATININE-BSD FRML MDRD: 19 ML/MIN/1.73SQ M
GFR SERPL CREATININE-BSD FRML MDRD: 19 ML/MIN/1.73SQ M
GLUCOSE P FAST SERPL-MCNC: 114 MG/DL (ref 65–99)
GLUCOSE SERPL-MCNC: 102 MG/DL (ref 65–140)
GLUCOSE SERPL-MCNC: 114 MG/DL (ref 65–140)
HCT VFR BLD AUTO: 37.7 % (ref 36.5–49.3)
HGB BLD-MCNC: 12.5 G/DL (ref 12–17)
IMM GRANULOCYTES # BLD AUTO: 0.06 THOUSAND/UL (ref 0–0.2)
IMM GRANULOCYTES NFR BLD AUTO: 1 % (ref 0–2)
LYMPHOCYTES # BLD AUTO: 1.38 THOUSANDS/ΜL (ref 0.6–4.47)
LYMPHOCYTES NFR BLD AUTO: 12 % (ref 14–44)
MCH RBC QN AUTO: 30.2 PG (ref 26.8–34.3)
MCHC RBC AUTO-ENTMCNC: 33.2 G/DL (ref 31.4–37.4)
MCV RBC AUTO: 91 FL (ref 82–98)
MONOCYTES # BLD AUTO: 0.71 THOUSAND/ΜL (ref 0.17–1.22)
MONOCYTES NFR BLD AUTO: 6 % (ref 4–12)
NEUTROPHILS # BLD AUTO: 9.76 THOUSANDS/ΜL (ref 1.85–7.62)
NEUTS SEG NFR BLD AUTO: 81 % (ref 43–75)
NRBC BLD AUTO-RTO: 0 /100 WBCS
PLATELET # BLD AUTO: 182 THOUSANDS/UL (ref 149–390)
PMV BLD AUTO: 11.1 FL (ref 8.9–12.7)
POTASSIUM SERPL-SCNC: 4.7 MMOL/L (ref 3.5–5.3)
POTASSIUM SERPL-SCNC: 4.8 MMOL/L (ref 3.5–5.3)
RBC # BLD AUTO: 4.14 MILLION/UL (ref 3.88–5.62)
SODIUM SERPL-SCNC: 137 MMOL/L (ref 136–145)
SODIUM SERPL-SCNC: 139 MMOL/L (ref 136–145)
WBC # BLD AUTO: 11.93 THOUSAND/UL (ref 4.31–10.16)

## 2021-02-09 PROCEDURE — 90686 IIV4 VACC NO PRSV 0.5 ML IM: CPT | Performed by: FAMILY MEDICINE

## 2021-02-09 PROCEDURE — 99232 SBSQ HOSP IP/OBS MODERATE 35: CPT | Performed by: INTERNAL MEDICINE

## 2021-02-09 PROCEDURE — 99233 SBSQ HOSP IP/OBS HIGH 50: CPT | Performed by: INTERNAL MEDICINE

## 2021-02-09 PROCEDURE — 85025 COMPLETE CBC W/AUTO DIFF WBC: CPT | Performed by: INTERNAL MEDICINE

## 2021-02-09 PROCEDURE — 80048 BASIC METABOLIC PNL TOTAL CA: CPT | Performed by: INTERNAL MEDICINE

## 2021-02-09 PROCEDURE — 90471 IMMUNIZATION ADMIN: CPT | Performed by: FAMILY MEDICINE

## 2021-02-09 RX ADMIN — INFLUENZA VIRUS VACCINE 0.5 ML: 15; 15; 15; 15 SUSPENSION INTRAMUSCULAR at 18:09

## 2021-02-09 RX ADMIN — SODIUM CHLORIDE 125 ML/HR: 0.9 INJECTION, SOLUTION INTRAVENOUS at 16:14

## 2021-02-09 RX ADMIN — TAMSULOSIN HYDROCHLORIDE 0.4 MG: 0.4 CAPSULE ORAL at 18:02

## 2021-02-09 RX ADMIN — OXYBUTYNIN 5 MG: 5 TABLET, FILM COATED, EXTENDED RELEASE ORAL at 09:11

## 2021-02-09 RX ADMIN — SODIUM CHLORIDE 125 ML/HR: 0.9 INJECTION, SOLUTION INTRAVENOUS at 07:00

## 2021-02-09 RX ADMIN — TRAZODONE HYDROCHLORIDE 50 MG: 50 TABLET ORAL at 21:58

## 2021-02-09 NOTE — ASSESSMENT & PLAN NOTE
History of primary hyperoxaluria  Patient follows with nephrologist: Dr Vickie Arango  at [address], most recent visit after external records on February 5, 2021 at that documented plan:  Current outpatient prescriptions:  1) Lumasiran sodium 94 5 mg/0 5ml sc soln, inject 324 mg under the skin every 30 days for 3 doses  2) Potassium citrate 10 meq (1080 mg) po tbcr, take 1 tablet by mouth 2 times a day  Patient stayed that although he already has Lumasiran injection he has not been able to administer it, he is coordinated with nursing services to get the injection  Potassium citrate has been held by Nephrology Services in light of worsening acute kidney injury

## 2021-02-09 NOTE — ASSESSMENT & PLAN NOTE
Post renal Sha ID in the setting of right-sided hydronephrosis due to obstructive right ureteral stone  Status post cystourethroscopy with right ureteral stent and lithotripsy on February 8, 2021  Patient's creatinine has worsened today to 3 98 from 379  Nephrology more, recommendation to increase fluids;  NaCl 0 9% at 125 cc/hour  Trend renal function  Avoid nephrotoxin medications

## 2021-02-09 NOTE — PROGRESS NOTES
NEPHROLOGY PROGRESS NOTE    Patient: Valerie Suggs               Sex: male          DOA: 2/7/2021  9:47 PM   Date of Birth: @        Age: @        LOS:  LOS: 0 days   2/9/2021    REASON FOR THE CONSULTATION:  Further management of MECHELLE    HPI     This is a 30 y o  male admitted for Hydronephrosis with urinary obstruction due to ureteral calculus     SUBJECTIVE     - overnight breathing has remained stable and patient was also found to be nonoliguric  According to patient he has been passing stone since surgery yesterday  According to patient his urine is currently clear in color  Patient denies nausea, vomiting, headache or dizziness today  - Reviewed last 24 hrs events     CURRENT MEDICATIONS       Current Facility-Administered Medications:     influenza vaccine, quadrivalent (FLUARIX) IM injection 0 5 mL, 0 5 mL, Intramuscular, Once, Gilbert Plata MD    oxybutynin (DITROPAN-XL) 24 hr tablet 5 mg, 5 mg, Oral, Daily, Ana Reinoso MD, 5 mg at 02/09/21 0911    sodium chloride 0 9 % infusion, 125 mL/hr, Intravenous, Continuous, Aga Novoa MD, Last Rate: 125 mL/hr at 02/09/21 0700, 125 mL/hr at 02/09/21 0700    tamsulosin (FLOMAX) capsule 0 4 mg, 0 4 mg, Oral, Daily With Dinner, Ana Reinoso MD, 0 4 mg at 02/08/21 1734    traZODone (DESYREL) tablet 50 mg, 50 mg, Oral, HS, Jonny Simon PA-C, 50 mg at 02/08/21 2246    OBJECTIVE     Current Weight: Weight - Scale: 104 kg (229 lb 4 5 oz)  /60   Pulse (!) 51   Temp 98 2 °F (36 8 °C)   Resp 17   Ht 6' (1 829 m)   Wt 104 kg (229 lb 4 5 oz)   SpO2 96%   BMI 31 10 kg/m²   Vitals:    02/09/21 0702   BP: 117/60   Pulse: (!) 51   Resp: 17   Temp: 98 2 °F (36 8 °C)   SpO2: 96%     Body mass index is 31 1 kg/m²      Intake/Output Summary (Last 24 hours) at 2/9/2021 1122  Last data filed at 2/9/2021 1052  Gross per 24 hour   Intake 2702 09 ml   Output 3800 ml   Net -1097 91 ml       PHYSICAL EXAMINATION     Physical Exam  Constitutional:       General: He is not in acute distress  HENT:      Head: Normocephalic and atraumatic  Eyes:      General: No scleral icterus  Neck:      Musculoskeletal: Neck supple  Vascular: No JVD  Cardiovascular:      Rate and Rhythm: Normal rate  Pulmonary:      Effort: No accessory muscle usage or respiratory distress  Abdominal:      General: There is no distension  Palpations: Abdomen is soft  Musculoskeletal:      Right hand: He exhibits no laceration  Left hand: He exhibits no laceration  Skin:     General: Skin is warm  Comments: No Jaundice    Neurological:      Mental Status: He is alert  Psychiatric:         Speech: He is communicative  Behavior: Behavior is not combative  LAB RESULTS     Results from last 7 days   Lab Units 02/09/21  0516 02/08/21  0551 02/07/21  2240 02/03/21  2046   WBC Thousand/uL 11 93* 10 74* 7 41 7 03   HEMOGLOBIN g/dL 12 5 12 4 13 1 12 3   HEMATOCRIT % 37 7 36 8 39 5 37 3   PLATELETS Thousands/uL 182 174 216 192   SODIUM mmol/L 139 141 140 141   POTASSIUM mmol/L 4 7 4 8 4 1 4 1   CHLORIDE mmol/L 105 105 101 104   CO2 mmol/L 24 26 30 28   BUN mg/dL 36* 31* 30* 28*   CREATININE mg/dL 3 98* 3 79* 3 71* 3 49*   EGFR ml/min/1 73sq m 19 21 21 23   CALCIUM mg/dL 9 3 8 9 8 8 8 6   PHOSPHORUS mg/dL  --  4 3  --   --        I have personally reviewed the old medical records and patient's previously known baseline creatinine level is ~ 2 6-2 8    RADIOLOGY RESULTS      XR abdomen 1 view kub   Final Result by Tim Ruiz MD (02/08 3735)      Questionable punctate densities are seen projecting in the region of right kidney, which may be artifactual or related to patient's small nonobstructing calculi seen on prior CT  Otherwise, no radiopaque calculi projecting in the region of left kidney, ureters, or urinary bladder               Workstation performed: [id number]         FL retrograde pyelogram    (Results Pending) PLAN / RECOMMENDATIONS      1  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  Present on admission, suspected due to obstructive uropathy  Upon review of old medical records, previously known baseline creatinine is around 2 6-2 8  Admission creatinine was 3 71 and patient was found to have right hydronephrosis and now s/p lithotripsy with right ureteral stent placement by Urology yesterday  Currently patient is nonoliguric although patient's renal function has worsened to current creatinine of 3 98 today  Since breathing is stable and patient is nonoliguric, plan to continue IV fluids today  Plan to check BMP at 4,00 p m  And also with AM labs  No urgent indication for initiation of dialysis today  Overall above mentioned plan was also d/w Robinson Campbell MD  Nephrology  2/9/2021        Portions of the record may have been created with voice recognition software  Occasional wrong word or "sound a like" substitutions may have occurred due to the inherent limitations of voice recognition software  Read the chart carefully and recognize, using context, where substitutions have occurred

## 2021-02-09 NOTE — PROGRESS NOTES
Progress Note - Berline Severance 1995, 30 y o  male MRN: [id number]    Unit/Bed#: -01 Encounter: [id number]    Primary Care Provider: Dominic Lee DO   Date and time admitted to hospital: 2/7/2021  9:47 PM        * Hydronephrosis with urinary obstruction due to ureteral calculus  Assessment & Plan  CT renal stone study abdomen and pelvis without contrast on February 3, 2021 showed:  -moderate right-sided hydroureteronephrosis secondary to 6 mm calculus in the distal ureter   -bilateral intrarenal calculi again seen, with medullary nephrocalcinosis  Status post cystourethroscopy with right ureteral stent lithotripsy on February 8, 2021  Urologist on board, planning to remove stent on 3-5 days status post surgical procedure  Continue oxybutynin 5 mg oral daily  Continue tamsulosin 0 4 mg oral daily with dinner      Acute kidney injury [facility])  Assessment & Plan  Post renal Sha ID in the setting of right-sided hydronephrosis due to obstructive right ureteral stone  Status post cystourethroscopy with right ureteral stent and lithotripsy on February 8, 2021  Patient's creatinine has worsened today to 3 98 from 379  Nephrology more, recommendation to increase fluids; NaCl 0 9% at 125 cc/hour  Trend renal function  Avoid nephrotoxin medications    Primary hyperoxaluria type 1 ([facility] [address] )  Assessment & Plan  History of primary hyperoxaluria  Patient follows with nephrologist: Dr Oksana Alva  at [address], most recent visit after external records on February 5, 2021 at that documented plan:  Current outpatient prescriptions:  1) Lumasiran sodium 94 5 mg/0 5ml sc soln, inject 324 mg under the skin every 30 days for 3 doses  2) Potassium citrate 10 meq (1080 mg) po tbcr, take 1 tablet by mouth 2 times a day  Patient stayed that although he already has Lumasiran injection he has not been able to administer it, he is coordinated with nursing services to get the injection    Potassium citrate has been held by Nephrology Services in light of worsening acute kidney injury  Insomnia  Assessment & Plan  Continue trazodone 50 mg oral daily at bedtime  Progress Note   Nicci Carrillo 30 y o  male MRN: [id number]    Unit/Bed#: -01 Encounter: [id number]    Subjective:   Patient has been seen at bedside, was alert and cooperative  At the time of my assessment patient denied any abdominal or lower back pain  States he was able to sleep well overnight  Patient did mention that he noticed bloody urine the 1st time he urinate after cystourethroscopy but after that he has been having clear urine  Denies dysuria  Has remained afebrile  Eating and drinking well  Denies changes in bowel habits  As per nursing staff patient had an uneventful night  Objective:     Vitals: Blood pressure 125/59, pulse 59, temperature 98 2 °F (36 8 °C), resp  rate 17, height 6' (1 829 m), weight 104 kg (229 lb 4 5 oz), SpO2 97 %  ,Body mass index is 31 1 kg/m²  Intake/Output Summary (Last 24 hours) at 2/9/2021 1451  Last data filed at 2/9/2021 1350  Gross per 24 hour   Intake 2352 09 ml   Output 3775 ml   Net -1422 91 ml         Physical Exam:     Physical Exam  Constitutional:       Appearance: Normal appearance  HENT:      Head: Normocephalic and atraumatic  Nose: Nose normal       Mouth/Throat:      Mouth: Mucous membranes are moist    Eyes:      Extraocular Movements: Extraocular movements intact  Conjunctiva/sclera: Conjunctivae normal    Neck:      Musculoskeletal: Normal range of motion and neck supple  Cardiovascular:      Rate and Rhythm: Normal rate and regular rhythm  Pulses: Normal pulses  Heart sounds: Normal heart sounds  No murmur  No friction rub  No gallop  Pulmonary:      Effort: Pulmonary effort is normal  No respiratory distress  Breath sounds: No wheezing, rhonchi or rales  Abdominal:      General: Abdomen is flat  There is no distension  Tenderness: There is no abdominal tenderness  There is no guarding  Hernia: No hernia is present  Musculoskeletal: Normal range of motion  General: No tenderness or deformity  Right lower leg: No edema  Left lower leg: No edema  Skin:     General: Skin is warm and dry  Capillary Refill: Capillary refill takes less than 2 seconds  Neurological:      General: No focal deficit present  Mental Status: He is alert and oriented to person, place, and time  Psychiatric:         Mood and Affect: Mood normal          Behavior: Behavior normal            Invasive Devices     Peripheral Intravenous Line            Peripheral IV 02/07/21 Right Antecubital 1 day          Drain            Ureteral Drain/Stent Left ureter 6 Fr  108 days    Ureteral Drain/Stent Right ureter 6 Fr  1 day                        CBC:   Lab Results   Component Value Date    WBC 11 93 (H) 02/09/2021    HGB 12 5 02/09/2021    HCT 37 7 02/09/2021    MCV 91 02/09/2021     02/09/2021    MCH 30 2 02/09/2021    MCHC 33 2 02/09/2021    RDW 12 6 02/09/2021    MPV 11 1 02/09/2021    NRBC 0 02/09/2021   ,   CMP:   Lab Results   Component Value Date    K 4 7 02/09/2021     02/09/2021    CO2 24 02/09/2021    BUN 36 (H) 02/09/2021    CREATININE 3 98 (H) 02/09/2021    CALCIUM 9 3 02/09/2021    EGFR 19 02/09/2021   ,   PT/INR: No results found for: PT, INR,   Troponin: No results found for: TROPONINI,     Imaging Studies: I have personally reviewed pertinent reports  Counseling / Coordination of Care  Total time spent today  30 minutes  Greater than 50% of total time was spent with the patient and / or family counseling and / or coordination of care  I did personally talk the patient's mother Mrs Mj Blackwell at [phone], I gave her detailed information on patient's clinical condition, all questions were appropriately answered  Patient's mother concerns were satisfied    We will keep her updated with patient's clinical condition

## 2021-02-09 NOTE — ASSESSMENT & PLAN NOTE
CT renal stone study abdomen and pelvis without contrast on February 3, 2021 showed:  -moderate right-sided hydroureteronephrosis secondary to 6 mm calculus in the distal ureter   -bilateral intrarenal calculi again seen, with medullary nephrocalcinosis  Status post cystourethroscopy with right ureteral stent lithotripsy on February 8, 2021  Urologist on board, planning to remove stent on 3-5 days status post surgical procedure    Continue oxybutynin 5 mg oral daily  Continue tamsulosin 0 4 mg oral daily with dinner

## 2021-02-09 NOTE — PLAN OF CARE
Problem: PAIN - ADULT  Goal: Verbalizes/displays adequate comfort level or baseline comfort level  Description: Interventions:  - Encourage patient to monitor pain and request assistance  - Assess pain using appropriate pain scale  - Administer analgesics based on type and severity of pain and evaluate response  - Implement non-pharmacological measures as appropriate and evaluate response  - Consider cultural and social influences on pain and pain management  - Notify physician/advanced practitioner if interventions unsuccessful or patient reports new pain  Outcome: Progressing     Problem: INFECTION - ADULT  Goal: Absence or prevention of progression during hospitalization  Description: INTERVENTIONS:  - Assess and monitor for signs and symptoms of infection  - Monitor lab/diagnostic results  - Monitor all insertion sites, i e  indwelling lines, tubes, and drains  - Monitor endotracheal if appropriate and nasal secretions for changes in amount and color  - [place] appropriate cooling/warming therapies per order  - Administer medications as ordered  - Instruct and encourage patient and family to use good hand hygiene technique  - Identify and instruct in appropriate isolation precautions for identified infection/condition  Outcome: Progressing     Problem: DISCHARGE PLANNING  Goal: Discharge to home or other facility with appropriate resources  Description: INTERVENTIONS:  - Identify barriers to discharge w/patient and caregiver  - Arrange for needed discharge resources and transportation as appropriate  - Identify discharge learning needs (meds, wound care, etc )  - Arrange for interpretive services to assist at discharge as needed  - Refer to Case Management Department for coordinating discharge planning if the patient needs post-hospital services based on physician/advanced practitioner order or complex needs related to functional status, cognitive ability, or social support system  Outcome: Progressing Problem: Knowledge Deficit  Goal: Patient/family/caregiver demonstrates understanding of disease process, treatment plan, medications, and discharge instructions  Description: Complete learning assessment and assess knowledge base  Interventions:  - Provide teaching at level of understanding  - Provide teaching via preferred learning methods  Outcome: Progressing     Problem: Nutrition/Hydration-ADULT  Goal: Nutrient/Hydration intake appropriate for improving, restoring or maintaining nutritional needs  Description: Monitor and assess patient's nutrition/hydration status for malnutrition  Collaborate with interdisciplinary team and initiate plan and interventions as ordered  Monitor patient's weight and dietary intake as ordered or per policy  Utilize nutrition screening tool and intervene as necessary  Determine patient's food preferences and provide high-protein, high-caloric foods as appropriate       INTERVENTIONS:  - Monitor oral intake, urinary output, labs, and treatment plans  - Assess nutrition and hydration status and recommend course of action  - Evaluate amount of meals eaten  - Assist patient with eating if necessary   - Allow adequate time for meals  - Recommend/ encourage appropriate diets, oral nutritional supplements, and vitamin/mineral supplements  - Order, calculate, and assess calorie counts as needed  - Recommend, monitor, and adjust tube feedings and TPN/PPN based on assessed needs  - Assess need for intravenous fluids  - Provide specific nutrition/hydration education as appropriate  - Include patient/family/caregiver in decisions related to nutrition  Outcome: Progressing     Problem: GENITOURINARY - ADULT  Goal: Maintains or returns to baseline urinary function  Description: INTERVENTIONS:  - Assess urinary function  - Encourage oral fluids to ensure adequate hydration if ordered  - Administer IV fluids as ordered to ensure adequate hydration  - Administer ordered medications as needed  - Offer frequent toileting  - Follow urinary retention protocol if ordered  Outcome: Progressing  Goal: Absence of urinary retention  Description: INTERVENTIONS:  - Assess patients ability to void and empty bladder  - Monitor I/O  - Bladder scan as needed  - Discuss with physician/AP medications to alleviate retention as needed  - Discuss catheterization for long term situations as appropriate  Outcome: Progressing

## 2021-02-09 NOTE — UTILIZATION REVIEW
Continued Stay Review    OBS order 2/7 @ 2304 converted to IP on 2/9 @ 1305 for continued treatment of MECHELEL with worsening creatine r/t obstructive uropathy     Level of Care Med Surg    Estimated length of stay More than 2 Midnights    Certification I certify that inpatient services are medically necessary for this patient for a duration of greater than two midnights  See H&P and MD Progress Notes for additional information about the patient's course of treatment  Date:   2/9                       Current Patient Class:  OBS  Current Level of Care: MS    HPI:25 y o  male initially admitted on 2/7 for R sided flank pain , + hydronephrosis     Assessment/Plan:     2/9 Nephrology Note   Overnight pt was found to be nonoliguric   MECHELLE on top of CKD suspect d/t obstructive uropathy   Previously known baseline 2 6-2 8 , admission 3 71 worsened today to 3 98  plan to cont IVF and recheck am labs    No urgent indication for dialysis today     2/8 OP Note   CYSTOSCOPY URETEROSCOPY WITH LITHOTRIPSY HOLMIUM LASER, RETROGRADE PYELOGRAM AND INSERTION STENT URETERAL     Pertinent Labs/Diagnostic Results:     Results from last 7 days   Lab Units 02/09/21  0516 02/08/21  0551 02/07/21 2240 02/03/21  2046   WBC Thousand/uL 11 93* 10 74* 7 41 7 03   HEMOGLOBIN g/dL 12 5 12 4 13 1 12 3   HEMATOCRIT % 37 7 36 8 39 5 37 3   PLATELETS Thousands/uL 182 174 216 192   NEUTROS ABS Thousands/µL 9 76* 8 85* 4 11 3 91     Results from last 7 days   Lab Units 02/09/21  0516 02/08/21  0551 02/07/21 2240 02/03/21  2046   SODIUM mmol/L 139 141 140 141   POTASSIUM mmol/L 4 7 4 8 4 1 4 1   CHLORIDE mmol/L 105 105 101 104   CO2 mmol/L 24 26 30 28   ANION GAP mmol/L 10 10 9 9   BUN mg/dL 36* 31* 30* 28*   CREATININE mg/dL 3 98* 3 79* 3 71* 3 49*   EGFR ml/min/1 73sq m 19 21 21 23   CALCIUM mg/dL 9 3 8 9 8 8 8 6   PHOSPHORUS mg/dL  --  4 3  --   --      Results from last 7 days   Lab Units 02/07/21  2240   AST U/L 14   ALT U/L 32   ALK PHOS U/L 65   TOTAL PROTEIN g/dL 7 6   ALBUMIN g/dL 4 0   TOTAL BILIRUBIN mg/dL 0 30   BILIRUBIN DIRECT mg/dL 0 07     Results from last 7 days   Lab Units 02/09/21  0516 02/08/21  0551 02/07/21  2240 02/03/21  2046   GLUCOSE RANDOM mg/dL 114 110 98 102     Results from last 7 days   Lab Units 02/08/21  0204 02/03/21  2047   CLARITY UA  Clear Clear   COLOR UA  Yellow Light Yellow   SPEC GRAV UA  1 020 1 015   PH UA  5 5 6 0   GLUCOSE UA mg/dl Negative Negative   KETONES UA mg/dl Negative Negative   BLOOD UA  Small* Large*   PROTEIN UA mg/dl Negative Negative   NITRITE UA  Negative Negative   BILIRUBIN UA  Negative Negative   UROBILINOGEN UA E U /dl 0 2 0 2   LEUKOCYTES UA  Trace* Negative   WBC UA /hpf 2-4 30-50*   RBC UA /hpf 4-10* 2-4   BACTERIA UA /hpf None Seen None Seen   EPITHELIAL CELLS WET PREP /hpf None Seen Occasional     Results from last 7 days   Lab Units 02/03/21 2047   URINE CULTURE  No Growth <1000 cfu/mL       Vital Signs:   02/09/21 07:02:32  98 2 °F (36 8 °C)  51Abnormal   17  117/60  79  96 %           Medications:   Scheduled Medications:  influenza vaccine, 0 5 mL, Intramuscular, Once  oxybutynin, 5 mg, Oral, Daily  tamsulosin, 0 4 mg, Oral, Daily With Dinner  traZODone, 50 mg, Oral, HS      Continuous IV Infusions:  sodium chloride, 125 mL/hr, Intravenous, Continuous      PRN Meds:       Discharge Plan: D     Network Utilization Review Department  ATTENTION: Please call with any questions or concerns to [phone] and carefully listen to the prompts so that you are directed to the right person  All voicemails are confidential   Eun Muss all requests for admission clinical reviews, approved or denied determinations and any other requests to dedicated fax number below belonging to the campus where the patient is receiving treatment   List of dedicated fax numbers for the Facilities:  [address] [place] [address] DENIALS (Administrative/Medical Necessity) [phone]   [address] (Maternity/NICU/Pediatrics) [phone] [id number] [facility] [address] [place] [address]  [phone]   Suri Oliveira 6067 (  Vee Perdomo "Kat" 103) [address] [place] [address] [place] [address] [phone]   [address] 951 [phone]

## 2021-02-10 VITALS
HEART RATE: 57 BPM | HEIGHT: 72 IN | TEMPERATURE: 98 F | BODY MASS INDEX: 31.05 KG/M2 | RESPIRATION RATE: 18 BRPM | WEIGHT: 229.28 LBS | DIASTOLIC BLOOD PRESSURE: 80 MMHG | OXYGEN SATURATION: 99 % | SYSTOLIC BLOOD PRESSURE: 151 MMHG

## 2021-02-10 LAB
ANION GAP SERPL CALCULATED.3IONS-SCNC: 4 MMOL/L (ref 4–13)
BASOPHILS # BLD AUTO: 0.07 THOUSANDS/ΜL (ref 0–0.1)
BASOPHILS NFR BLD AUTO: 1 % (ref 0–1)
BUN SERPL-MCNC: 38 MG/DL (ref 5–25)
CALCIUM SERPL-MCNC: 8.9 MG/DL (ref 8.3–10.1)
CHLORIDE SERPL-SCNC: 107 MMOL/L (ref 100–108)
CO2 SERPL-SCNC: 29 MMOL/L (ref 21–32)
CREAT SERPL-MCNC: 3.82 MG/DL (ref 0.6–1.3)
EOSINOPHIL # BLD AUTO: 0.18 THOUSAND/ΜL (ref 0–0.61)
EOSINOPHIL NFR BLD AUTO: 2 % (ref 0–6)
ERYTHROCYTE [DISTWIDTH] IN BLOOD BY AUTOMATED COUNT: 12.7 % (ref 11.6–15.1)
GFR SERPL CREATININE-BSD FRML MDRD: 20 ML/MIN/1.73SQ M
GLUCOSE SERPL-MCNC: 93 MG/DL (ref 65–140)
HCT VFR BLD AUTO: 35 % (ref 36.5–49.3)
HGB BLD-MCNC: 11.3 G/DL (ref 12–17)
IMM GRANULOCYTES # BLD AUTO: 0.02 THOUSAND/UL (ref 0–0.2)
IMM GRANULOCYTES NFR BLD AUTO: 0 % (ref 0–2)
LYMPHOCYTES # BLD AUTO: 2.5 THOUSANDS/ΜL (ref 0.6–4.47)
LYMPHOCYTES NFR BLD AUTO: 33 % (ref 14–44)
MCH RBC QN AUTO: 30.1 PG (ref 26.8–34.3)
MCHC RBC AUTO-ENTMCNC: 32.3 G/DL (ref 31.4–37.4)
MCV RBC AUTO: 93 FL (ref 82–98)
MONOCYTES # BLD AUTO: 0.5 THOUSAND/ΜL (ref 0.17–1.22)
MONOCYTES NFR BLD AUTO: 7 % (ref 4–12)
NEUTROPHILS # BLD AUTO: 4.28 THOUSANDS/ΜL (ref 1.85–7.62)
NEUTS SEG NFR BLD AUTO: 57 % (ref 43–75)
NRBC BLD AUTO-RTO: 0 /100 WBCS
PLATELET # BLD AUTO: 168 THOUSANDS/UL (ref 149–390)
PMV BLD AUTO: 11.3 FL (ref 8.9–12.7)
POTASSIUM SERPL-SCNC: 4.7 MMOL/L (ref 3.5–5.3)
RBC # BLD AUTO: 3.76 MILLION/UL (ref 3.88–5.62)
SODIUM SERPL-SCNC: 140 MMOL/L (ref 136–145)
WBC # BLD AUTO: 7.55 THOUSAND/UL (ref 4.31–10.16)

## 2021-02-10 PROCEDURE — 99232 SBSQ HOSP IP/OBS MODERATE 35: CPT | Performed by: INTERNAL MEDICINE

## 2021-02-10 PROCEDURE — 99239 HOSP IP/OBS DSCHRG MGMT >30: CPT | Performed by: INTERNAL MEDICINE

## 2021-02-10 PROCEDURE — 85025 COMPLETE CBC W/AUTO DIFF WBC: CPT | Performed by: INTERNAL MEDICINE

## 2021-02-10 PROCEDURE — 80048 BASIC METABOLIC PNL TOTAL CA: CPT | Performed by: INTERNAL MEDICINE

## 2021-02-10 RX ORDER — OXYBUTYNIN CHLORIDE 5 MG/1
5 TABLET, EXTENDED RELEASE ORAL DAILY
Qty: 30 TABLET | Refills: 0 | Status: SHIPPED | OUTPATIENT
Start: 2021-02-11 | End: 2021-07-26

## 2021-02-10 RX ADMIN — OXYBUTYNIN 5 MG: 5 TABLET, FILM COATED, EXTENDED RELEASE ORAL at 09:32

## 2021-02-10 RX ADMIN — SODIUM CHLORIDE 125 ML/HR: 0.9 INJECTION, SOLUTION INTRAVENOUS at 00:22

## 2021-02-10 RX ADMIN — SODIUM CHLORIDE 125 ML/HR: 0.9 INJECTION, SOLUTION INTRAVENOUS at 08:21

## 2021-02-10 NOTE — DISCHARGE INSTR - AVS FIRST PAGE
· Follow up with primary care physician in 1 week after hospital discharge  · Follow-up with urology within 1 week for stent removal   · You can keep an appointment with [facility] nephrologist in 3 months  · Get the blood test: BMP on Monday to confirm downward trend of creatinine and to monitor potassium level  · Continue taking home medications  · New medication:  Oxybutynin 5 mg once daily  · You have stent on a string, which will be removed in 3-5 days  Please take care not to remove with dressing or undressing  Urology office staff will contact you to arrange an appointment for removal     · Return to the ED if symptoms recur or worsen

## 2021-02-10 NOTE — UTILIZATION REVIEW
Notification of Discharge  This is a Notification of Discharge from our facility [address]  Please be advised that this patient has been discharge from our facility  Below you will find the admission and discharge date and time including the patients disposition  PRESENTATION DATE: 2/7/2021  9:47 PM  OBS ADMISSION DATE: 02/07/2021  IP ADMISSION DATE: 2/9/21 1305   DISCHARGE DATE: 2/10/2021  2:40 PM  DISPOSITION: Home/Self Care Home/Self Care   Admission Orders listed below:  Admission Orders (From admission, onward)     Ordered        02/09/21 1305  Inpatient Admission  Once         02/07/21 2307  Place in Observation  Once                   Please contact the UR Department if additional information is required to close this patient's authorization/case  [address] [facility] Utilization Review Department  Main: [phone] x carefully listen to the prompts  All voicemails are confidential   [email or web address]  Send all requests for admission clinical reviews, approved or denied determinations and any other requests to dedicated fax number below belonging to the campus where the patient is receiving treatment   List of dedicated fax numbers:  [address] DENIALS (Administrative/Medical Necessity) [phone]   [address]  (Maternity/NICU/Pediatrics) [phone]   Wilfred Aguilera [phone]   Rhonda [facility] [phone]   [facility] [phone]   [address] [phone]   [facility]  [phone]   [address] [place] [address], [address] W  2401 [facility] 1000 [facility] [phone]

## 2021-02-10 NOTE — DISCHARGE SUMMARY
Discharge- Vic Feliciano 1995, 30 y o  male MRN: [id number]    Unit/Bed#: -01 Encounter: [id number]    Primary Care Provider: Georgena Alpers, DO   Date and time admitted to hospital: 2/7/2021  9:47 PM        * Hydronephrosis with urinary obstruction due to ureteral calculus  Assessment & Plan  CT renal stone study abdomen and pelvis without contrast on February 3, 2021 showed:  -moderate right-sided hydroureteronephrosis secondary to 6 mm calculus in the distal ureter   -bilateral intrarenal calculi again seen, with medullary nephrocalcinosis  Status post cystourethroscopy with right ureteral stent lithotripsy on February 8, 2021  Urologist on board, planning to remove stent on 3-5 days status post surgical procedure  Continue oxybutynin 5 mg oral daily  Continue tamsulosin 0 4 mg oral daily with dinner      Primary hyperoxaluria type 1 ([address] )  Assessment & Plan  History of primary hyperoxaluria  Patient follows with nephrologist: Dr Varghese Estrada  at [address], most recent visit after external records on February 5, 2021 at that documented plan:  Current outpatient prescriptions:  1) Lumasiran sodium 94 5 mg/0 5ml sc soln, inject 324 mg under the skin every 30 days for 3 doses  2) Potassium citrate 10 meq (1080 mg) po tbcr, take 1 tablet by mouth 2 times a day  Patient stayed that although he already has Lumasiran injection he has not been able to administer it, he is coordinated with nursing services to get the injection  Potassium citrate has been held by Nephrology Services in light of worsening acute kidney injury  As per nephrology he can resume potassium citrate after hospital discharge  Insomnia  Assessment & Plan  Continue trazodone 50 mg oral daily at bedtime  Acute kidney injury [facility])  Assessment & Plan  Post renal MECHELLE in the setting of right-sided hydronephrosis due to obstructive right ureteral stone    Status post cystourethroscopy with right ureteral stent and lithotripsy on February 8, 2021  Patient's creatinine has worsened today to 3 82  Nephrology on board, appreciate recommendations  He was advised to get BMP on Monday to monitor renal function and potassium  He can keep his appointment with CHERY Knapp nephrologist in 3 months as per Nephrology  Discharging Physician / Practitioner: Adalid Starr MD  PCP: Chito Hughes DO  Admission Date:   Admission Orders (From admission, onward)     Ordered        02/09/21 1305  Inpatient Admission  Once         02/07/21 2307  Place in Observation  Once                   Discharge Date: 02/10/21    Resolved Problems  Date Reviewed: 2/10/2021    None          Consultations During Hospital Stay:  · Nephrology, Urology    Procedures Performed:   · Cystourethroscopy with right ureteral stent lithotripsy on February 8, 2021    Significant Findings / Test Results:   FL retrograde pyelogram   Final Result by Eva Hays MD (02/09 2601)      Fluoroscopy provided for procedure guidance  Please see separate procedure note for details  Workstation performed: [id number]         XR abdomen 1 view kub   Final Result by Geoff Duval MD (02/08 3735)      Questionable punctate densities are seen projecting in the region of right kidney, which may be artifactual or related to patient's small nonobstructing calculi seen on prior CT  Otherwise, no radiopaque calculi projecting in the region of left kidney, ureters, or urinary bladder  Workstation performed: [id number]             Incidental Findings:   · As above    Test Results Pending at Discharge (will require follow up): · None     Outpatient Tests Requested:  · BMP    Complications:  None    Reason for Admission:  Hydronephrosis with urinary obstruction due to ureteric calculus    Hospital Course:     Jackie Stewart is a 30 y o  male patient who originally presented to the hospital on 2/7/2021 due to with right-sided flank pain    Patient was recently seen in the ED on 02/03 for right-sided flank pain, at that time he was found have a 6 mm kidney stone  At that time his creatinine had also worsened to 3 49, and he was to be admitted in seen by Urology, however he refused admission at that time  He presented due to worsening of right-sided flank pain and nausea and decreased urination  He was evaluated by Urology for ureteral stone and Nephrology for MECHELLE on CKD  He was found to have moderate right-sided hydroureteronephrosis secondary to 6 mm calculus in the distal ureter  Cystourethroscopy with right ureteral stent lithotripsy was done on February 8, 2021  He was advised to get BMP on Monday to monitor renal function and potassium level  He was also recommended to follow up with Urology for stent removal within 3-5 days after hospital discharge  Please see above list of diagnoses and related plan for additional information  Condition at Discharge: good     Discharge Day Visit / Exam:     Subjective:  He was seen and examined at the bedside  He reports no new complaint  He reports his pain is improved, urinating well, no bloody urine  No acute event overnight  Vitals: Blood Pressure: 151/80 (02/10/21 0748)  Pulse: 57 (02/10/21 0748)  Temperature: 98 °F (36 7 °C) (02/10/21 0748)  Temp Source: Temporal (02/08/21 1104)  Respirations: 18 (02/10/21 0748)  Height: 6' (182 9 cm) (02/08/21 0000)  Weight - Scale: 104 kg (229 lb 4 5 oz) (02/08/21 0000)  SpO2: 99 % (02/10/21 0748)  Exam:   Physical Exam  Vitals signs and nursing note reviewed  Constitutional:       General: He is not in acute distress  Appearance: Normal appearance  He is obese  He is not ill-appearing, toxic-appearing or diaphoretic  HENT:      Head: Normocephalic and atraumatic  Eyes:      General: No scleral icterus  Extraocular Movements: Extraocular movements intact        Conjunctiva/sclera: Conjunctivae normal    Neck:      Musculoskeletal: Normal range of motion and neck supple  Cardiovascular:      Rate and Rhythm: Normal rate and regular rhythm  Pulses: Normal pulses  Heart sounds: Normal heart sounds  No murmur  Pulmonary:      Effort: Pulmonary effort is normal  No respiratory distress  Breath sounds: Normal breath sounds  No stridor  No wheezing  Abdominal:      General: Bowel sounds are normal  There is no distension  Palpations: Abdomen is soft  Tenderness: There is no abdominal tenderness  There is no guarding  Musculoskeletal: Normal range of motion  Right lower leg: No edema  Left lower leg: No edema  Skin:     General: Skin is warm  Capillary Refill: Capillary refill takes less than 2 seconds  Neurological:      Mental Status: He is alert and oriented to person, place, and time  Psychiatric:         Mood and Affect: Mood normal          Discussion with Family:  Discussed with the patient  He is agreeable to above plans  Discharge instructions/Information to patient and family:   See after visit summary for information provided to patient and family  Provisions for Follow-Up Care:  See after visit summary for information related to follow-up care and any pertinent home health orders  Disposition:     Home    For Discharges to [facility] SNF:   · Not Applicable to this Patient - Not Applicable to this Patient    Planned Readmission: No     Discharge Statement:  I spent 30 minutes discharging the patient  This time was spent on the day of discharge  I had direct contact with the patient on the day of discharge  Greater than 50% of the total time was spent examining patient, answering all patient questions, arranging and discussing plan of care with patient as well as directly providing post-discharge instructions  Additional time then spent on discharge activities      Discharge Medications:  See after visit summary for reconciled discharge medications provided to patient and family        ** Please Note: This note has been constructed using a voice recognition system **

## 2021-02-10 NOTE — PLAN OF CARE
Problem: PAIN - ADULT  Goal: Verbalizes/displays adequate comfort level or baseline comfort level  Description: Interventions:  - Encourage patient to monitor pain and request assistance  - Assess pain using appropriate pain scale  - Administer analgesics based on type and severity of pain and evaluate response  - Implement non-pharmacological measures as appropriate and evaluate response  - Consider cultural and social influences on pain and pain management  - Notify physician/advanced practitioner if interventions unsuccessful or patient reports new pain  Outcome: Progressing     Problem: INFECTION - ADULT  Goal: Absence or prevention of progression during hospitalization  Description: INTERVENTIONS:  - Assess and monitor for signs and symptoms of infection  - Monitor lab/diagnostic results  - Monitor all insertion sites, i e  indwelling lines, tubes, and drains  - Monitor endotracheal if appropriate and nasal secretions for changes in amount and color  - [place] appropriate cooling/warming therapies per order  - Administer medications as ordered  - Instruct and encourage patient and family to use good hand hygiene technique  - Identify and instruct in appropriate isolation precautions for identified infection/condition  Outcome: Progressing     Problem: DISCHARGE PLANNING  Goal: Discharge to home or other facility with appropriate resources  Description: INTERVENTIONS:  - Identify barriers to discharge w/patient and caregiver  - Arrange for needed discharge resources and transportation as appropriate  - Identify discharge learning needs (meds, wound care, etc )  - Arrange for interpretive services to assist at discharge as needed  - Refer to Case Management Department for coordinating discharge planning if the patient needs post-hospital services based on physician/advanced practitioner order or complex needs related to functional status, cognitive ability, or social support system  Outcome: Progressing Problem: Knowledge Deficit  Goal: Patient/family/caregiver demonstrates understanding of disease process, treatment plan, medications, and discharge instructions  Description: Complete learning assessment and assess knowledge base  Interventions:  - Provide teaching at level of understanding  - Provide teaching via preferred learning methods  Outcome: Progressing     Problem: Nutrition/Hydration-ADULT  Goal: Nutrient/Hydration intake appropriate for improving, restoring or maintaining nutritional needs  Description: Monitor and assess patient's nutrition/hydration status for malnutrition  Collaborate with interdisciplinary team and initiate plan and interventions as ordered  Monitor patient's weight and dietary intake as ordered or per policy  Utilize nutrition screening tool and intervene as necessary  Determine patient's food preferences and provide high-protein, high-caloric foods as appropriate       INTERVENTIONS:  - Monitor oral intake, urinary output, labs, and treatment plans  - Assess nutrition and hydration status and recommend course of action  - Evaluate amount of meals eaten  - Assist patient with eating if necessary   - Allow adequate time for meals  - Recommend/ encourage appropriate diets, oral nutritional supplements, and vitamin/mineral supplements  - Order, calculate, and assess calorie counts as needed  - Recommend, monitor, and adjust tube feedings and TPN/PPN based on assessed needs  - Assess need for intravenous fluids  - Provide specific nutrition/hydration education as appropriate  - Include patient/family/caregiver in decisions related to nutrition  Outcome: Progressing     Problem: GENITOURINARY - ADULT  Goal: Maintains or returns to baseline urinary function  Description: INTERVENTIONS:  - Assess urinary function  - Encourage oral fluids to ensure adequate hydration if ordered  - Administer IV fluids as ordered to ensure adequate hydration  - Administer ordered medications as needed  - Offer frequent toileting  - Follow urinary retention protocol if ordered  Outcome: Progressing  Goal: Absence of urinary retention  Description: INTERVENTIONS:  - Assess patients ability to void and empty bladder  - Monitor I/O  - Bladder scan as needed  - Discuss with physician/AP medications to alleviate retention as needed  - Discuss catheterization for long term situations as appropriate  Outcome: Progressing     Problem: Potential for Falls  Goal: Patient will remain free of falls  Description: INTERVENTIONS:  - Assess patient frequently for physical needs  -  Identify cognitive and physical deficits and behaviors that affect risk of falls    -  [place] fall precautions as indicated by assessment   - Educate patient/family on patient safety including physical limitations  - Instruct patient to call for assistance with activity based on assessment  - Modify environment to reduce risk of injury  - Consider OT/PT consult to assist with strengthening/mobility  Outcome: Progressing

## 2021-02-10 NOTE — ASSESSMENT & PLAN NOTE
Post renal MECHELLE in the setting of right-sided hydronephrosis due to obstructive right ureteral stone  Status post cystourethroscopy with right ureteral stent and lithotripsy on February 8, 2021  Patient's creatinine has worsened today to 3 82  Nephrology on board, appreciate recommendations  He was advised to get BMP on Monday to monitor renal function and potassium  He can keep his appointment with CHERY Knapp nephrologist in 3 months as per Nephrology

## 2021-02-10 NOTE — ASSESSMENT & PLAN NOTE
History of primary hyperoxaluria  Patient follows with nephrologist: Dr Varghese Estrada  at [address], most recent visit after external records on February 5, 2021 at that documented plan:  Current outpatient prescriptions:  1) Lumasiran sodium 94 5 mg/0 5ml sc soln, inject 324 mg under the skin every 30 days for 3 doses  2) Potassium citrate 10 meq (1080 mg) po tbcr, take 1 tablet by mouth 2 times a day  Patient stayed that although he already has Lumasiran injection he has not been able to administer it, he is coordinated with nursing services to get the injection  Potassium citrate has been held by Nephrology Services in light of worsening acute kidney injury  As per nephrology he can resume potassium citrate after hospital discharge

## 2021-02-10 NOTE — PROGRESS NOTES
NEPHROLOGY PROGRESS NOTE    Patient: Daysi Jensen               Sex: male          DOA: 2/7/2021  9:47 PM   Date of Birth: @        Age: @        LOS:  LOS: 1 day   2/10/2021    REASON FOR THE CONSULTATION: MECHELLE      HPI     This is a 30 y o  male admitted for Hydronephrosis with urinary obstruction due to ureteral calculus     SUBJECTIVE     - Patient denies any symptoms including fever/chills, nausea, vomiting, headache or dizziness today  Flank pain has resolved  Reports mild discomfort during urination from the stent  Passed several stones yesterday  Denies hematuria  - Reviewed last 24 hrs events     CURRENT MEDICATIONS       Current Facility-Administered Medications:     oxybutynin (DITROPAN-XL) 24 hr tablet 5 mg, 5 mg, Oral, Daily, Estuardo Ayala MD, 5 mg at 02/10/21 0932    sodium chloride 0 9 % infusion, 125 mL/hr, Intravenous, Continuous, Emmett Bunch MD, Last Rate: 125 mL/hr at 02/10/21 0821, 125 mL/hr at 02/10/21 0821    tamsulosin (FLOMAX) capsule 0 4 mg, 0 4 mg, Oral, Daily With Natalie Moss MD, 0 4 mg at 02/09/21 1802    traZODone (DESYREL) tablet 50 mg, 50 mg, Oral, HS, Shaggy Ramirez PA-C, 50 mg at 02/09/21 2158    OBJECTIVE     Current Weight: Weight - Scale: 104 kg (229 lb 4 5 oz)  /80   Pulse 57   Temp 98 °F (36 7 °C)   Resp 18   Ht 6' (1 829 m)   Wt 104 kg (229 lb 4 5 oz)   SpO2 99%   BMI 31 10 kg/m²   Vitals:    02/10/21 0748   BP: 151/80   Pulse: 57   Resp: 18   Temp: 98 °F (36 7 °C)   SpO2: 99%     Body mass index is 31 1 kg/m²  Intake/Output Summary (Last 24 hours) at 2/10/2021 1156  Last data filed at 2/10/2021 0900  Gross per 24 hour   Intake 2090 ml   Output 3450 ml   Net -1360 ml       PHYSICAL EXAMINATION     Physical Exam  Constitutional:       General: He is not in acute distress  Appearance: Normal appearance  He is not ill-appearing     HENT:      Mouth/Throat:      Mouth: Mucous membranes are moist       Pharynx: Oropharynx is clear  Eyes:      General: No scleral icterus  Conjunctiva/sclera: Conjunctivae normal    Neck:      Musculoskeletal: Normal range of motion and neck supple  Cardiovascular:      Rate and Rhythm: Normal rate and regular rhythm  Pulses: Normal pulses  Heart sounds: Normal heart sounds  No murmur  No friction rub  No gallop  Pulmonary:      Effort: Pulmonary effort is normal  No respiratory distress  Breath sounds: Normal breath sounds  No stridor  No wheezing, rhonchi or rales  Abdominal:      General: Abdomen is flat  Bowel sounds are normal       Palpations: Abdomen is soft  Tenderness: There is no abdominal tenderness  There is no right CVA tenderness or left CVA tenderness  Musculoskeletal: Normal range of motion  Right lower leg: No edema  Left lower leg: No edema  Skin:     General: Skin is warm and dry  Neurological:      General: No focal deficit present  Mental Status: He is alert and oriented to person, place, and time     Psychiatric:         Mood and Affect: Mood normal          Behavior: Behavior normal            LAB RESULTS     Results from last 7 days   Lab Units 02/10/21  0451 02/09/21  1604 02/09/21  0516 02/08/21  0551 02/07/21  2240 02/03/21  2046   WBC Thousand/uL 7 55  --  11 93* 10 74* 7 41 7 03   HEMOGLOBIN g/dL 11 3*  --  12 5 12 4 13 1 12 3   HEMATOCRIT % 35 0*  --  37 7 36 8 39 5 37 3   PLATELETS Thousands/uL 168  --  182 174 216 192   SODIUM mmol/L 140 137 139 141 140 141   POTASSIUM mmol/L 4 7 4 8 4 7 4 8 4 1 4 1   CHLORIDE mmol/L 107 103 105 105 101 104   CO2 mmol/L 29 28 24 26 30 28   BUN mg/dL 38* 40* 36* 31* 30* 28*   CREATININE mg/dL 3 82* 4 03* 3 98* 3 79* 3 71* 3 49*   EGFR ml/min/1 73sq m 20 19 19 21 21 23   CALCIUM mg/dL 8 9 9 1 9 3 8 9 8 8 8 6   PHOSPHORUS mg/dL  --   --   --  4 3  --   --        I have personally reviewed the old medical records and patient's previously known baseline creatinine level is ~ 2 6-2 8    RADIOLOGY RESULTS      FL retrograde pyelogram   Final Result by Sudha Becker MD (02/09 1321)      Fluoroscopy provided for procedure guidance  Please see separate procedure note for details  Workstation performed: [id number]         XR abdomen 1 view kub   Final Result by Vanesa Church MD (02/08 3227)      Questionable punctate densities are seen projecting in the region of right kidney, which may be artifactual or related to patient's small nonobstructing calculi seen on prior CT  Otherwise, no radiopaque calculi projecting in the region of left kidney, ureters, or urinary bladder  Workstation performed: [id number]             PLAN / RECOMMENDATIONS      1  MECHELLE in setting of obstructing nephrolithiasis   Assessment:  · POA, creatinine on admission 3 71 (up from 3 49 on 2/3/21)  · BL creatinine per chart review seems 2 6-2 8  · Patient has history of primary hyperoxaluria type 1 and follows with nephrology at [facility] for recurrent nephrolithiasis with chronic kidney disease  · Presented to the ED on 2/3 because of R flank pain  CT showed moderate R hydronephrosis and 6mm calculus at distal ureter  Patient had MECHELLE at 3 49 at the time and was advised admission for urology evaluation however opted to go home and try to pass stone spontaneously  However was not able to pass the stone and now presents with worsening R flank pain  Denies fever/chills or hematuria  · Patient underwent lithotripsy and R ureteral stent placement on 2/8       Plan:  · Creatinine improved from 4 03 yesterday to 3 82 today  · Can discontinue IVF and stimulate p o  fluid intake  · Stable for discharge from nephrology standpoint  Patient can keep his appointment with [facility] nephrologist in 3 months  · Can resume potassium citrate on discharge  · Would get BMP on Monday to confirm downward trend of creatinine and to monitor potassium level       Disposition: stable for discharge from nephrology standpoint  Overall above mentioned plan discussed with attending nephrologist, Dr Mikey York  Hollandale Speaker, MD  [facility] PGY-2 resident   2/10/2021        Portions of the record may have been created with voice recognition software  Occasional wrong word or "sound a like" substitutions may have occurred due to the inherent limitations of voice recognition software  Read the chart carefully and recognize, using context, where substitutions have occurred

## 2021-02-10 NOTE — PLAN OF CARE
Problem: PAIN - ADULT  Goal: Verbalizes/displays adequate comfort level or baseline comfort level  Description: Interventions:  - Encourage patient to monitor pain and request assistance  - Assess pain using appropriate pain scale  - Administer analgesics based on type and severity of pain and evaluate response  - Implement non-pharmacological measures as appropriate and evaluate response  - Consider cultural and social influences on pain and pain management  - Notify physician/advanced practitioner if interventions unsuccessful or patient reports new pain  Outcome: Progressing     Problem: INFECTION - ADULT  Goal: Absence or prevention of progression during hospitalization  Description: INTERVENTIONS:  - Assess and monitor for signs and symptoms of infection  - Monitor lab/diagnostic results  - Monitor all insertion sites, i e  indwelling lines, tubes, and drains  - Monitor endotracheal if appropriate and nasal secretions for changes in amount and color  - [place] appropriate cooling/warming therapies per order  - Administer medications as ordered  - Instruct and encourage patient and family to use good hand hygiene technique  - Identify and instruct in appropriate isolation precautions for identified infection/condition  Outcome: Progressing     Problem: DISCHARGE PLANNING  Goal: Discharge to home or other facility with appropriate resources  Description: INTERVENTIONS:  - Identify barriers to discharge w/patient and caregiver  - Arrange for needed discharge resources and transportation as appropriate  - Identify discharge learning needs (meds, wound care, etc )  - Arrange for interpretive services to assist at discharge as needed  - Refer to Case Management Department for coordinating discharge planning if the patient needs post-hospital services based on physician/advanced practitioner order or complex needs related to functional status, cognitive ability, or social support system  Outcome: Progressing Problem: Knowledge Deficit  Goal: Patient/family/caregiver demonstrates understanding of disease process, treatment plan, medications, and discharge instructions  Description: Complete learning assessment and assess knowledge base  Interventions:  - Provide teaching at level of understanding  - Provide teaching via preferred learning methods  Outcome: Progressing     Problem: Nutrition/Hydration-ADULT  Goal: Nutrient/Hydration intake appropriate for improving, restoring or maintaining nutritional needs  Description: Monitor and assess patient's nutrition/hydration status for malnutrition  Collaborate with interdisciplinary team and initiate plan and interventions as ordered  Monitor patient's weight and dietary intake as ordered or per policy  Utilize nutrition screening tool and intervene as necessary  Determine patient's food preferences and provide high-protein, high-caloric foods as appropriate       INTERVENTIONS:  - Monitor oral intake, urinary output, labs, and treatment plans  - Assess nutrition and hydration status and recommend course of action  - Evaluate amount of meals eaten  - Assist patient with eating if necessary   - Allow adequate time for meals  - Recommend/ encourage appropriate diets, oral nutritional supplements, and vitamin/mineral supplements  - Order, calculate, and assess calorie counts as needed  - Recommend, monitor, and adjust tube feedings and TPN/PPN based on assessed needs  - Assess need for intravenous fluids  - Provide specific nutrition/hydration education as appropriate  - Include patient/family/caregiver in decisions related to nutrition  Outcome: Progressing     Problem: GENITOURINARY - ADULT  Goal: Maintains or returns to baseline urinary function  Description: INTERVENTIONS:  - Assess urinary function  - Encourage oral fluids to ensure adequate hydration if ordered  - Administer IV fluids as ordered to ensure adequate hydration  - Administer ordered medications as needed  - Offer frequent toileting  - Follow urinary retention protocol if ordered  Outcome: Progressing  Goal: Absence of urinary retention  Description: INTERVENTIONS:  - Assess patients ability to void and empty bladder  - Monitor I/O  - Bladder scan as needed  - Discuss with physician/AP medications to alleviate retention as needed  - Discuss catheterization for long term situations as appropriate  Outcome: Progressing     Problem: Potential for Falls  Goal: Patient will remain free of falls  Description: INTERVENTIONS:  - Assess patient frequently for physical needs  -  Identify cognitive and physical deficits and behaviors that affect risk of falls    -  [place] fall precautions as indicated by assessment   - Educate patient/family on patient safety including physical limitations  - Instruct patient to call for assistance with activity based on assessment  - Modify environment to reduce risk of injury  - Consider OT/PT consult to assist with strengthening/mobility  Outcome: Progressing

## 2021-02-11 ENCOUNTER — TRANSITIONAL CARE MANAGEMENT (OUTPATIENT)
Dept: FAMILY MEDICINE CLINIC | Facility: CLINIC | Age: 26
End: 2021-02-11

## 2021-02-11 NOTE — TELEPHONE ENCOUNTER
Patient returned call to the office  accepted call from call center   Patient scheduled for tomorrow at 9:00 for stent on string removal

## 2021-02-12 ENCOUNTER — PROCEDURE VISIT (OUTPATIENT)
Dept: UROLOGY | Facility: CLINIC | Age: 26
End: 2021-02-12
Payer: COMMERCIAL

## 2021-02-12 VITALS
HEIGHT: 71 IN | SYSTOLIC BLOOD PRESSURE: 122 MMHG | BODY MASS INDEX: 31.64 KG/M2 | WEIGHT: 226 LBS | HEART RATE: 76 BPM | DIASTOLIC BLOOD PRESSURE: 70 MMHG | TEMPERATURE: 98.2 F

## 2021-02-12 DIAGNOSIS — N20.0 NEPHROLITHIASIS: ICD-10-CM

## 2021-02-12 PROCEDURE — 3008F BODY MASS INDEX DOCD: CPT

## 2021-02-12 PROCEDURE — 1111F DSCHRG MED/CURRENT MED MERGE: CPT

## 2021-02-12 PROCEDURE — 1036F TOBACCO NON-USER: CPT

## 2021-02-12 PROCEDURE — 99211 OFF/OP EST MAY X REQ PHY/QHP: CPT

## 2021-02-12 NOTE — PATIENT INSTRUCTIONS
Ureteral Stent Placement   AMBULATORY CARE:   What you need to know about ureteral stent placement:  Ureteral stent placement is a procedure to open a blocked or narrow ureter  The ureter is the tube that carries urine from your kidney into your bladder  A stent is a thin hollow plastic tube used to hold your ureter open and allow urine to flow  The stent may stay in for several weeks  Long-term stents will stay in longer and need to be replaced within a certain period of time  How to prepare for ureteral stent placement:  Your healthcare provider will talk to you about how to prepare for this procedure  He or she may tell you not to eat or drink anything after midnight on the day of your procedure  Your provider will tell you what medicines to take or not take on the day of your procedure  You may be given an antibiotic through your IV to help prevent a bacterial infection  Arrange for a ride home after your procedure if you will have general anesthesia during this procedure  What will happen during ureteral stent placement:   · You may be given general anesthesia to keep you asleep and free from pain during the procedure  You may instead be given local anesthesia to numb the urethra  With local anesthesia, you may still feel pressure or pushing during the procedure, but you should not feel any pain  Your healthcare provider will use x-rays and contrast liquid to find the area where the stent needs to be placed  · A cystoscope (small tube with a light and camera on the end) will be placed into your bladder through your urethra  The urethra is the tube that urine flows through when you urinate  A wire will be put through the scope into your ureter and moved close to your kidney  The stent will be pushed over the wire into your ureter  The wire is used to guide the stent  It will be removed when the stent is in place   A string that is attached to the end of the stent may be left hanging down through the urethra and out of the body  This string may be used to remove the stent later on  What will happen after ureteral stent placement:  You may have pain when you urinate, or around your bladder or kidney  You may also need to urinate more frequently than normal, or feel a sudden, urgent need to urinate  You may have blood or brown discharge from your urethra for 48 to 72 hours  You may see blood in your urine  These symptoms are common and should get better with time  Risks of ureteral stent placement:  Your ureter may be damaged during the procedure and you will need surgery to fix it  You may need surgery if the stent cannot be put in safely  The stent may become blocked or move out of place  If the stent remains in place for a long time, minerals and bacteria may grow over it  This can cause a blockage or a bladder infection  Seek care immediately if:   · You urinate very little or not at all  · You have severe pain in your abdomen, even after you take medicine  · You have heavy bleeding from your urethra  · You see large blood clots in your urine, or your urine is bright red  Contact your healthcare provider or urologist if:   · You have a fever or chills  · You feel like you need to urinate very often  · Your symptoms get worse, or you develop new symptoms  · You have questions or concerns about your condition or care  Medicines: You may  need any of the following:  · Acetaminophen  decreases pain and fever  It is available without a doctor's order  Ask how much to take and how often to take it  Follow directions  Read the labels of all other medicines you are using to see if they also contain acetaminophen, or ask your doctor or pharmacist  Acetaminophen can cause liver damage if not taken correctly  Do not use more than 4 grams (4,000 milligrams) total of acetaminophen in one day  · Prescription pain medicine  may be given   Ask your healthcare provider how to take this medicine safely  Some prescription pain medicines contain acetaminophen  Do not take other medicines that contain acetaminophen without talking to your healthcare provider  Too much acetaminophen may cause liver damage  Prescription pain medicine may cause constipation  Ask your healthcare provider how to prevent or treat constipation  · Antibiotics  help prevent or treat bacterial infections  Your healthcare provider may prescribe these for you while you have a ureteral stent  · Take your medicine as directed  Contact your healthcare provider if you think your medicine is not helping or if you have side effects  Tell him or her if you are allergic to any medicine  Keep a list of the medicines, vitamins, and herbs you take  Include the amounts, and when and why you take them  Bring the list or the pill bottles to follow-up visits  Carry your medicine list with you in case of an emergency  Self-care:   · Drink liquids as directed  Liquids will help flush your urinary tract and prevent infection  Ask your healthcare provider how much liquid to drink each day and which liquids are best for you  · Ask when you can return to daily activities  You may be able to return to normal activities the day after your stent placement  Follow up with your urologist as directed: You will need regular follow-up visits with your urologist as long as you have a stent  He or she will check to make sure the stent is working properly  He or she will remove your temporary stent in several weeks  Your provider may do urine cultures to check for infection  Write down your questions so you remember to ask them during your visits  © Copyright [address] Information is for End User's use only and may not be sold, redistributed or otherwise used for commercial purposes   All illustrations and images included in CareNotes® are the copyrighted property of A D A M , Inc  or [facility] [employer]by [employer]  The above information is an  only  It is not intended as medical advice for individual conditions or treatments  Talk to your doctor, nurse or pharmacist before following any medical regimen to see if it is safe and effective for you

## 2021-02-12 NOTE — PROGRESS NOTES
2/12/2021  Christian Colindres is a 30 y o  male  [id number]        Diagnosis  Chief Complaint     Nephrolithiasis          Patient is s/p right ureteroscopy with stone extraction on 2/8/21 with Dr Noemi Mendoza  Follow up in 3 months with KUB and US prior to visit      Procedure Stent with String Removal    Vitals:    02/12/21 0904   BP: 122/70   BP Location: Right arm   Patient Position: Sitting   Cuff Size: Standard   Pulse: 76   Temp: 98 2 °F (36 8 °C)   Weight: 103 kg (226 lb)   Height: 5' 11" (1 803 m)       Stent with string removed intact without difficulty  Reviewed post stent removal symptoms including flank pain, dysuria, and hematuria  Instructed patient to increase oral fluid intake  Encouraged the use of NSAIDS and other prescribed pain medication as needed for discomfort  Patient instructed to call the office or report to the ER for uncontrolled pain, fever, chills, nausea or vomiting         Emilia Sanford RN

## 2021-03-30 DIAGNOSIS — Z23 ENCOUNTER FOR IMMUNIZATION: ICD-10-CM

## 2021-05-05 ENCOUNTER — TELEPHONE (OUTPATIENT)
Dept: UROLOGY | Facility: CLINIC | Age: 26
End: 2021-05-05

## 2021-05-05 NOTE — TELEPHONE ENCOUNTER
Letter mailed to patient that Dr Delmer Perkins will no longer be seeing patients in the [facility] location and asked patient to call back and schedule apt with any provider in [place] or can follow ZP to [place]

## 2021-05-25 DIAGNOSIS — F51.01 PRIMARY INSOMNIA: ICD-10-CM

## 2021-05-25 RX ORDER — TRAZODONE HYDROCHLORIDE 50 MG/1
TABLET ORAL
Qty: 90 TABLET | Refills: 1 | OUTPATIENT
Start: 2021-05-25

## 2021-05-26 DIAGNOSIS — F51.01 PRIMARY INSOMNIA: ICD-10-CM

## 2021-05-26 NOTE — TELEPHONE ENCOUNTER
Patient called and said that he went to the pharmacy to  his refill but it was not there  Can you please call him back when this is taken care of?  He is in need of this medication

## 2021-05-26 NOTE — TELEPHONE ENCOUNTER
T/c from pts mom - are looking for this refill - told her that Dr Jesus Antonio spoke with pt via [employer] and told him only a small supply would be sent until pt is seen in office again -- are looking for this small/courtesy refill  Please advise

## 2021-05-27 RX ORDER — TRAZODONE HYDROCHLORIDE 50 MG/1
50 TABLET ORAL
Qty: 90 TABLET | Refills: 0 | OUTPATIENT
Start: 2021-05-27

## 2021-05-27 RX ORDER — TRAZODONE HYDROCHLORIDE 50 MG/1
TABLET ORAL
Qty: 30 TABLET | Refills: 0 | Status: SHIPPED | OUTPATIENT
Start: 2021-05-27 | End: 2021-06-28 | Stop reason: SDUPTHER

## 2021-05-27 NOTE — TELEPHONE ENCOUNTER
Pt still has not made an appt to be seen  One time 30 day supply sent  Pt has not been seen in the office since 2018 and has called for refills but has failed to schedule an appt

## 2021-06-01 NOTE — TELEPHONE ENCOUNTER
Blake for pt - told him a refill was sent but no further refills will be sent until he schedules a follow up appt

## 2021-06-28 DIAGNOSIS — F51.01 PRIMARY INSOMNIA: ICD-10-CM

## 2021-06-28 RX ORDER — TRAZODONE HYDROCHLORIDE 50 MG/1
50 TABLET ORAL
Qty: 30 TABLET | Refills: 0 | Status: SHIPPED | OUTPATIENT
Start: 2021-06-28 | End: 2021-07-21

## 2021-07-21 DIAGNOSIS — F51.01 PRIMARY INSOMNIA: ICD-10-CM

## 2021-07-21 RX ORDER — TRAZODONE HYDROCHLORIDE 50 MG/1
TABLET ORAL
Qty: 30 TABLET | Refills: 0 | Status: SHIPPED | OUTPATIENT
Start: 2021-07-21 | End: 2021-07-26 | Stop reason: SDUPTHER

## 2021-07-26 ENCOUNTER — OFFICE VISIT (OUTPATIENT)
Dept: FAMILY MEDICINE CLINIC | Facility: CLINIC | Age: 26
End: 2021-07-26

## 2021-07-26 VITALS
OXYGEN SATURATION: 99 % | RESPIRATION RATE: 16 BRPM | SYSTOLIC BLOOD PRESSURE: 120 MMHG | HEIGHT: 71 IN | BODY MASS INDEX: 30.52 KG/M2 | DIASTOLIC BLOOD PRESSURE: 90 MMHG | WEIGHT: 218 LBS | HEART RATE: 59 BPM | TEMPERATURE: 97.4 F

## 2021-07-26 DIAGNOSIS — G47.00 INSOMNIA, UNSPECIFIED TYPE: ICD-10-CM

## 2021-07-26 DIAGNOSIS — Z13.6 SCREENING FOR CARDIOVASCULAR CONDITION: ICD-10-CM

## 2021-07-26 DIAGNOSIS — F51.01 PRIMARY INSOMNIA: ICD-10-CM

## 2021-07-26 DIAGNOSIS — N17.9 ACUTE KIDNEY INJURY (HCC): ICD-10-CM

## 2021-07-26 DIAGNOSIS — Z00.00 ANNUAL PHYSICAL EXAM: Primary | ICD-10-CM

## 2021-07-26 PROCEDURE — 99212 OFFICE O/P EST SF 10 MIN: CPT | Performed by: FAMILY MEDICINE

## 2021-07-26 RX ORDER — TRAZODONE HYDROCHLORIDE 50 MG/1
50 TABLET ORAL
Qty: 90 TABLET | Refills: 3 | Status: SHIPPED | OUTPATIENT
Start: 2021-07-26 | End: 2022-07-29

## 2021-07-26 RX ORDER — LUMASIRAN 94.5 MG/.5ML
INJECTION, SOLUTION SUBCUTANEOUS
COMMUNITY
End: 2022-05-10

## 2021-07-26 NOTE — PROGRESS NOTES
[facility] [address] [facility]    NAME: Girish Schultz  AGE: 27 y o  SEX: male  : 1995     DATE: 2021     Assessment and Plan:     Problem List Items Addressed This Visit        Genitourinary    Acute kidney injury [facility])     Follow-up with nephrologist as scheduled            Other    Insomnia      Other Visit Diagnoses     Annual physical exam    -  Primary    BMI 30 0-30 9,adult        Screening for cardiovascular condition        Relevant Orders    Ambulatory referral to Cardiology          Immunizations and preventive care screenings were discussed with patient today  Appropriate education was printed on patient's after visit summary  Counseling:  Alcohol/drug use: discussed moderation in alcohol intake, the recommendations for healthy alcohol use, and avoidance of illicit drug use  · Dental Health: discussed importance of regular tooth brushing, flossing, and dental visits  BMI Counseling: Body mass index is 30 4 kg/m²  The BMI is above normal  Nutrition recommendations include decreasing portion sizes and encouraging healthy choices of fruits and vegetables  Exercise recommendations include moderate physical activity 150 minutes/week  No pharmacotherapy was ordered  Return in 1 year (on 2022)  Chief Complaint:     Chief Complaint   Patient presents with    Follow-up     BMI f/u       History of Present Illness:     Adult Annual Physical   Patient here for a comprehensive physical exam  The patient reports no problems  Diet and Physical Activity  · Diet/Nutrition: well balanced diet  · Exercise: moderate cardiovascular exercise        Depression Screening  PHQ-9 Depression Screening    PHQ-9:   Frequency of the following problems over the past two weeks:      Little interest or pleasure in doing things: 0 - not at all  Feeling down, depressed, or hopeless: 0 - not at all  PHQ-2 Score: 0       General Health  · Sleep: sleeps well  · Hearing: normal - bilateral   · Vision: no vision problems  · Dental: regular dental visits   Health  · History of STDs?: no      Review of Systems:     Review of Systems   Constitutional: Negative for chills, fatigue and fever  HENT: Negative for congestion, ear pain, hearing loss, postnasal drip, rhinorrhea and sore throat  Eyes: Negative for pain and visual disturbance  Respiratory: Negative for chest tightness, shortness of breath and wheezing  Cardiovascular: Negative for chest pain and leg swelling  Gastrointestinal: Negative for abdominal distention, abdominal pain, constipation, diarrhea and vomiting  Endocrine: Negative for cold intolerance and heat intolerance  Genitourinary: Negative for difficulty urinating, frequency and urgency  Musculoskeletal: Negative for arthralgias and gait problem  Skin: Negative for color change  Neurological: Negative for dizziness, tremors, syncope, numbness and headaches  Hematological: Negative for adenopathy  Psychiatric/Behavioral: Positive for sleep disturbance  Negative for agitation and confusion  The patient is not nervous/anxious         Past Medical History:     Past Medical History:   Diagnosis Date    Anxiety     GERD (gastroesophageal reflux disease)     Kidney stone     Liver disorder     Nephrolithiasis     Primary hyperoxaluria type 1 ([facility] [address] )       Past Surgical History:     Past Surgical History:   Procedure Laterality Date    ANKLE SURGERY Right 2017    ligament repair    CYSTOSCOPY      FL RETROGRADE PYELOGRAM  10/24/2020    FL RETROGRADE PYELOGRAM  2/8/2021    HERNIA REPAIR      LITHOTRIPSY      MASS EXCISION Left 2/17/2017    Procedure: BACK/SHOULDER CYST EXCISION ;  Surgeon: Anam Boston MD;  Location: [facility];  Service:     CA CYSTO/URETERO W/LITHOTRIPSY &INDWELL STENT INSRT Left 10/24/2020    Procedure: CYSTOSCOPY URETEROSCOPY WITH LITHOTRIPSY HOLMIUM LASER, RETROGRADE PYELOGRAM AND INSERTION STENT URETERAL;  Surgeon: Azam Davies MD;  [facility];  Service: Urology    [place] CYSTO/URETERO W/LITHOTRIPSY &INDWELL STENT INSRT Right 2/8/2021    Procedure: CYSTOSCOPY URETEROSCOPY WITH LITHOTRIPSY HOLMIUM LASER, RETROGRADE PYELOGRAM AND INSERTION STENT URETERAL;  Surgeon: Kedar Alvarez MD;  [facility];  Service: Urology      Social History:     Social History     Socioeconomic History    Marital status: Single     Spouse name: None    Number of children: None    Years of education: None    Highest education level: None   Occupational History     Employer: [employer]   Tobacco Use    Smoking status: Never Smoker    Smokeless tobacco: Never Used   Vaping Use    Vaping Use: Never used   Substance and Sexual Activity    Alcohol use: Yes     Comment: rarely    Drug use: No    Sexual activity: Not Currently     Partners: Female   Other Topics Concern    None   Social History Narrative    None     Social Determinants of Health     Financial Resource Strain:     Difficulty of Paying Living Expenses:    Food Insecurity:     Worried About Running Out of Food in the Last Year:     Ran Out of Food in the Last Year:    Transportation Needs:     Lack of Transportation (Medical):      Lack of Transportation (Non-Medical):    Physical Activity:     Days of Exercise per Week:     Minutes of Exercise per Session:    Stress:     Feeling of Stress :    Social Connections:     Frequency of Communication with Friends and Family:     Frequency of Social Gatherings with Friends and Family:     Attends Baptism Services:     Active Member of Clubs or Organizations:     Attends Club or Organization Meetings:     Marital Status:    Intimate Partner Violence:     Fear of Current or Ex-Partner:     Emotionally Abused:     Physically Abused:     Sexually Abused:       Family History:     Family History   Problem Relation Age of Onset  Hypertension Mother     No Known Problems Father     Cancer Maternal Grandmother     Diabetes Maternal Grandmother     Alzheimer's disease Maternal Grandfather       Current Medications:     Current Outpatient Medications   Medication Sig Dispense Refill    citric acid-potassium citrate (POLYCITRA K) 1,100-334 mg/5 mL solution Take by mouth 3 (three) times a day with meals      Lumasiran Sodium (Oxlumo) 94 5 MG/0 5ML SOLN Inject under the skin 1 injection once every three months      traZODone (DESYREL) 50 mg tablet TAKE 1 TABLET BY MOUTH DAILY AT BEDTIME 30 tablet 0     No current facility-administered medications for this visit  Allergies:     No Known Allergies   Physical Exam:     /90   Pulse 59   Temp (!) 97 4 °F (36 3 °C)   Resp 16   Ht 5' 11" (1 803 m)   Wt 98 9 kg (218 lb)   SpO2 99%   BMI 30 40 kg/m²     Physical Exam  Constitutional:       Appearance: He is well-developed  HENT:      Head: Normocephalic  Right Ear: External ear normal       Left Ear: External ear normal       Nose: Nose normal    Eyes:      Conjunctiva/sclera: Conjunctivae normal       Pupils: Pupils are equal, round, and reactive to light  Neck:      Thyroid: No thyromegaly  Cardiovascular:      Rate and Rhythm: Normal rate and regular rhythm  Heart sounds: Normal heart sounds  Pulmonary:      Effort: Pulmonary effort is normal       Breath sounds: Normal breath sounds  Abdominal:      General: Bowel sounds are normal       Palpations: Abdomen is soft  Musculoskeletal:         General: Normal range of motion  Cervical back: Normal range of motion  Skin:     General: Skin is warm and dry  Neurological:      Mental Status: He is alert and oriented to person, place, and time     Psychiatric:         Behavior: Behavior normal           Joice Goldmann, DO [id number] [facility]

## 2021-07-26 NOTE — PATIENT INSTRUCTIONS

## 2022-05-10 ENCOUNTER — TELEPHONE (OUTPATIENT)
Dept: FAMILY MEDICINE CLINIC | Facility: CLINIC | Age: 27
End: 2022-05-10

## 2022-05-10 ENCOUNTER — TELEMEDICINE (OUTPATIENT)
Dept: FAMILY MEDICINE CLINIC | Facility: CLINIC | Age: 27
End: 2022-05-10
Payer: COMMERCIAL

## 2022-05-10 VITALS — BODY MASS INDEX: 30.48 KG/M2 | WEIGHT: 225 LBS | HEIGHT: 72 IN

## 2022-05-10 DIAGNOSIS — U07.1 COVID-19: ICD-10-CM

## 2022-05-10 DIAGNOSIS — U07.1 COVID-19: Primary | ICD-10-CM

## 2022-05-10 PROCEDURE — 1036F TOBACCO NON-USER: CPT | Performed by: PHYSICIAN ASSISTANT

## 2022-05-10 PROCEDURE — 3725F SCREEN DEPRESSION PERFORMED: CPT | Performed by: PHYSICIAN ASSISTANT

## 2022-05-10 PROCEDURE — 99214 OFFICE O/P EST MOD 30 MIN: CPT | Performed by: PHYSICIAN ASSISTANT

## 2022-05-10 PROCEDURE — 3008F BODY MASS INDEX DOCD: CPT | Performed by: PHYSICIAN ASSISTANT

## 2022-05-10 RX ORDER — POTASSIUM CITRATE 10 MEQ/1
TABLET, EXTENDED RELEASE ORAL
COMMUNITY
End: 2022-08-08 | Stop reason: ALTCHOICE

## 2022-05-10 RX ORDER — COVID-19 ANTIGEN TEST
KIT MISCELLANEOUS
COMMUNITY
Start: 2022-05-09 | End: 2022-08-08

## 2022-05-10 NOTE — PROGRESS NOTES
COVID-19 Outpatient Progress Note    Assessment/Plan:    Problem List Items Addressed This Visit     None      Visit Diagnoses     COVID-19    -  Primary    Relevant Medications    nirmatrelvir & ritonavir (Paxlovid) tablet therapy pack         Disposition:     Patient has COVID-19 infection  Based off CDC guidelines, they were recommended to isolate for 5 days from the date of the positive test  If they remain asymptomatic, isolation may be ended followed by 5 days of wearing a mask when around othes to minimize risk of infecting others  If they have a fever, continue to stay home until fever resolves for at least 24 hours  Discussed symptom directed medication options with patient  Patient meets criteria for PAXLOVID and they have been counseled appropriately according to EUA documentation released by the FDA  After discussion, patient agrees to treatment  [address] is an investigational medicine used to treat mild-to-moderate COVID-19 in adults and children (15years of age and older weighing at least 80 pounds (40 kg)) with positive results of direct SARS-CoV-2 viral testing, and who are at high risk for progression to severe COVID-19, including hospitalization or death  PAXLOVID is investigational because it is still being studied  There is limited information about the safety and effectiveness of using PAXLOVID to treat people with mild-to-moderate COVID-19  The FDA has authorized the emergency use of PAXLOVID for the treatment of mild-tomoderate COVID-19 in adults and children (15years of age and older weighing at least 80 pounds (40 kg)) with a positive test for the virus that causes COVID-19, and who are at high risk for progression to severe COVID-19, including hospitalization or death, under an EUA  What should I tell my healthcare provider before I take PAXLOVID?     Tell your healthcare provider if you:  - Have any allergies  - Have liver or kidney disease  - Are pregnant or plan to become pregnant  - Are breastfeeding a child  - Have any serious illnesses    Tell your healthcare provider about all the medicines you take, including prescription and over-the-counter medicines, vitamins, and herbal supplements  Some medicines may interact with PAXLOVID and may cause serious side effects  Keep a list of your medicines to show your healthcare provider and pharmacist when you get a new medicine  You can ask your healthcare provider or pharmacist for a list of medicines that interact with PAXLOVID  Do not start taking a new medicine without telling your healthcare provider  Your healthcare provider can tell you if it is safe to take PAXLOVID with other medicines  Tell your healthcare provider if you are taking combined hormonal contraceptive  PAXLOVID may affect how your birth control pills work  Females who are able to become pregnant should use another effective alternative form of contraception or an additional barrier method of contraception  Talk to your healthcare provider if you have any questions about contraceptive methods that might be right for you  How do I take PAXLOVID? PAXLOVID consists of 2 medicines: nirmatrelvir and ritonavir  - Take 2 pink tablets of nirmatrelvir with 1 white tablet of ritonavir by mouth 2 times each day (in the morning and in the evening) for 5 days  For each dose, take all 3 tablets at the same time  - If you have kidney disease, talk to your healthcare provider  You may need a different dose  - Swallow the tablets whole  Do not chew, break, or crush the tablets  - Take PAXLOVID with or without food  - Do not stop taking PAXLOVID without talking to your healthcare provider, even if you feel better  - If you miss a dose of PAXLOVID within 8 hours of the time it is usually taken, take it as soon as you remember  If you miss a dose by more than 8 hours, skip the missed dose and take the next dose at your regular time   Do not take 2 doses of PAXLOVID at the same time  - If you take too much PAXLOVID, call your healthcare provider or go to the nearest hospital emergency room right away  - If you are taking a ritonavir- or cobicistat-containing medicine to treat hepatitis C or Human Immunodeficiency Virus (HIV), you should continue to take your medicine as prescribed by your healthcare provider   - Talk to your healthcare provider if you do not feel better or if you feel worse after 5 days  Who should generally not take PAXLOVID? Do not take PAXLOVID if:  You are allergic to nirmatrelvir, ritonavir, or any of the ingredients in PAXLOVID  You are taking any of the following medicines:  - Alfuzosin  - Pethidine, piroxicam, propoxyphene  - Ranolazine  - Amiodarone, dronedarone, flecainide, propafenone, quinidine  - Colchicine  - Lurasidone, pimozide, clozapine  - Dihydroergotamine, ergotamine, methylergonovine  - Lovastatin, simvastatin  - Sildenafil (Revatio®) for pulmonary arterial hypertension (PAH)  - Triazolam, oral midazolam  - Apalutamide  - Carbamazepine, phenobarbital, phenytoin  - Rifampin  - St  Alessandros Wort (hypericum perforatum)    What are the important possible side effects of PAXLOVID? Possible side effects of PAXLOVID are:  - Liver Problems  Tell your healthcare provider right away if you have any of these signs and symptoms of liver problems: loss of appetite, yellowing of your skin and the whites of eyes (jaundice), dark-colored urine, pale colored stools and itchy skin, stomach area (abdominal) pain  - Resistance to HIV Medicines  If you have untreated HIV infection, PAXLOVID may lead to some HIV medicines not working as well in the future  - Other possible side effects include: altered sense of taste, diarrhea, high blood pressure, or muscle aches    These are not all the possible side effects of PAXLOVID  Not many people have taken PAXLOVID  Serious and unexpected side effects may happen   [address] is still being studied, so it is possible that all of the risks are not known at this time  What other treatment choices are there? Like [place] may allow for the emergency use of other medicines to treat people with COVID-19  Go to https://Pipelinefx/ for information on the emergency use of other medicines that are authorized by FDA to treat people with COVID-19  Your healthcare provider may talk with you about clinical trials for which you may be eligible  It is your choice to be treated or not to be treated with PAXLOVID  Should you decide not to receive it or for your child not to receive it, it will not change your standard medical care  What if I am pregnant or breastfeeding? There is no experience treating pregnant women or breastfeeding mothers with PAXLOVID  For a mother and unborn baby, the benefit of taking PAXLOVID may be greater than the risk from the treatment  If you are pregnant, discuss your options and specific situation with your healthcare provider  It is recommended that you use effective barrier contraception or do not have sexual activity while taking PAXLOVID  If you are breastfeeding, discuss your options and specific situation with your healthcare provider  How do I report side effects with PAXLOVID? Contact your healthcare provider if you have any side effects that bother you or do not go away  Report side effects to FDA MedWatch at www fda gov/medwatch or call 6-472-GBB7522 or you can report side effects to [facility]  at the contact information provided below  Website Fax number Telephone number   [employer] [phone]     How should I store Vijay Cason? Store PAXLOVID tablets at room temperature between 68°F to 77°F (20°C to 25°C)      Full fact sheet for patients, parents, and caregivers can be found at: Ja vera    I have spent 5 minutes directly with the patient  Greater than 50% of this time was spent in counseling/coordination of care regarding: diagnostic results, prognosis, risks and benefits of treatment options, instructions for management, patient and family education, importance of treatment compliance, risk factor reductions and impressions  Encounter provider Nadege Echols PA-C    Provider located at [facility] [address] [facility]  [address]  [facility] [address]  [place] [id number] [place] [address]  [phone]    Recent Visits  No visits were found meeting these conditions  Showing recent visits within past 7 days and meeting all other requirements  Today's Visits  Date Type Provider Dept   05/10/22 Telemedicine Nadege Echols PA-C  [facility] [address]   Showing today's visits and meeting all other requirements  Future Appointments  No visits were found meeting these conditions  Showing future appointments within next 150 days and meeting all other requirements     This virtual check-in was done via [employer] [address] and patient was informed that this is a secure, HIPAA-compliant platform  He agrees to proceed  Patient agrees to participate in a virtual check in via telephone or video visit instead of presenting to the office to address urgent/immediate medical needs  Patient is aware this is a billable service  After connecting through [facility], the patient was identified by name and date of birth  Nikita Franklin was informed that this was a telemedicine visit and that the exam was being conducted confidentially over secure lines  My office door was closed  No one else was in the room  Juliocesarsuleiman Franklin acknowledged consent and understanding of privacy and security of the telemedicine visit   I informed the patient that I have reviewed his record in Epic and presented the opportunity for him to ask any questions regarding the visit today  The patient agreed to participate  Verification of patient location:  Patient is located in the following state in which I hold an active license: [place]    Subjective:   Crystal Sanchez is a 27 y o  male who has been screened for COVID-19  Symptom change since last report: unchanged  Patient's symptoms include fatigue, nasal congestion, sore throat and myalgias  Patient denies fever, chills, rhinorrhea, cough, shortness of breath, nausea, vomiting, diarrhea and headaches  - Date of symptom onset: 5/9/2022  - Date of positive COVID-19 test: 5/9/2022  COVID-19 vaccination status: Fully vaccinated (primary series)    Hossein Amor has been staying home and has isolated themselves in his home  He is taking care to not share personal items and is cleaning all surfaces that are touched often, like counters, tabletops, and doorknobs using household cleaning sprays or wipes  He is wearing a mask when he leaves his room       Lab Results   Component Value Date    SARSCOV2 Not Detected 04/08/2020     Past Medical History:   Diagnosis Date    Anxiety     GERD (gastroesophageal reflux disease)     Kidney stone     Liver disorder     Nephrolithiasis     Primary hyperoxaluria type 1 ([facility] [address] )      Past Surgical History:   Procedure Laterality Date    ANKLE SURGERY Right 2017    ligament repair    CYSTOSCOPY      FL RETROGRADE PYELOGRAM  10/24/2020    FL RETROGRADE PYELOGRAM  2/8/2021    HERNIA REPAIR      LITHOTRIPSY      MASS EXCISION Left 2/17/2017    Procedure: BACK/SHOULDER CYST EXCISION ;  Surgeon: Stacy Lunsford MD;  [facility];  Service:     [place] CYSTO/URETERO W/LITHOTRIPSY &INDWELL STENT INSRT Left 10/24/2020    Procedure: CYSTOSCOPY URETEROSCOPY WITH LITHOTRIPSY HOLMIUM LASER, RETROGRADE PYELOGRAM AND INSERTION STENT URETERAL;  Surgeon: Moise Oquendo MD;  [facility];  Service: Urology    [place] CYSTO/URETERO W/LITHOTRIPSY &INDWELL [facility] Right 2/8/2021    Procedure: CYSTOSCOPY URETEROSCOPY WITH LITHOTRIPSY HOLMIUM LASER, RETROGRADE PYELOGRAM AND INSERTION STENT URETERAL;  Surgeon: Marquis Trent MD;  [facility];  Service: Urology     Current Outpatient Medications   Medication Sig Dispense Refill    traZODone (DESYREL) 50 mg tablet Take 1 tablet (50 mg total) by mouth daily at bedtime 90 tablet 3    Flowflex COVID-19 Ag Home Test KIT       nirmatrelvir & ritonavir (Paxlovid) tablet therapy pack Take 2 tablets by mouth 2 (two) times a day for 5 days Take 1 nirmatrelvir tablet + 1 ritonavir tablet together per dose 20 tablet 0    potassium citrate (UROCIT-K) 10 mEq potassium citrate ER 10 mEq (1,080 mg) tablet,extended release   TAKE 1 TABLET BY MOUTH TWICE A DAY       No current facility-administered medications for this visit  Allergies   Allergen Reactions    Seasonal Ic [Cholestatin] Sneezing       Review of Systems   Constitutional: Positive for fatigue  Negative for chills and fever  HENT: Positive for congestion and sore throat  Negative for rhinorrhea  Respiratory: Negative for cough and shortness of breath  Gastrointestinal: Negative for diarrhea, nausea and vomiting  Musculoskeletal: Positive for myalgias  Neurological: Negative for headaches  Objective:    Vitals:    05/10/22 0950   Weight: 102 kg (225 lb)   Height: 6' (1 829 m)       Physical Exam  Vitals and nursing note reviewed  Constitutional:       Appearance: He is well-developed  HENT:      Head: Normocephalic and atraumatic  Eyes:      Conjunctiva/sclera: Conjunctivae normal    Pulmonary:      Effort: Pulmonary effort is normal    Musculoskeletal:      Cervical back: Normal range of motion  Neurological:      Mental Status: He is alert and oriented to person, place, and time     Psychiatric:         Mood and Affect: Mood normal          Behavior: Behavior normal          VIRTUAL VISIT DISCLAIMER    Blaire Sanchez verbally agrees to participate in [employer]  Pt is aware that [employer] could be limited without vital signs or the ability to perform a full hands-on physical exam  Milton Irizarry understands he or the provider may request at any time to terminate the video visit and request the patient to seek care or treatment in person

## 2022-05-10 NOTE — TELEPHONE ENCOUNTER
Ronaldo Caldera from [address] [facility] called to verify  prescription sent in [place] [facility] - nirmatrelvir & ritonavir (Paxlovid) tablet therapy pack  Usually pharmacy dispenses quantity of 30 pills - 3 pills twice a day  [facility] sent in a prescription of quantity of 20 which is approved only for renally impaired pt  If pt was dx'd with dx above, please add a note to Pharmacy and/or sent a new prescription for pt  Please return call to # [phone] to discuss further       Please advise

## 2022-05-18 ENCOUNTER — TELEPHONE (OUTPATIENT)
Dept: FAMILY MEDICINE CLINIC | Facility: CLINIC | Age: 27
End: 2022-05-18

## 2022-05-18 NOTE — TELEPHONE ENCOUNTER
Have pt take Robitussin DM on a regular basis for a few days   If not improving make in office appointment

## 2022-05-18 NOTE — TELEPHONE ENCOUNTER
T/c from pt -- since finishing Paxlovid on Saturday has developed a cough which has gotten worse over the last 2 days  Pt asking what he should do?   Please advise      [phone]

## 2022-07-29 DIAGNOSIS — F51.01 PRIMARY INSOMNIA: ICD-10-CM

## 2022-07-29 RX ORDER — TRAZODONE HYDROCHLORIDE 50 MG/1
TABLET ORAL
Qty: 90 TABLET | Refills: 3 | Status: SHIPPED | OUTPATIENT
Start: 2022-07-29

## 2022-08-08 ENCOUNTER — OFFICE VISIT (OUTPATIENT)
Dept: FAMILY MEDICINE CLINIC | Facility: CLINIC | Age: 27
End: 2022-08-08
Payer: COMMERCIAL

## 2022-08-08 VITALS
OXYGEN SATURATION: 99 % | TEMPERATURE: 97.5 F | BODY MASS INDEX: 30.83 KG/M2 | HEART RATE: 74 BPM | SYSTOLIC BLOOD PRESSURE: 118 MMHG | WEIGHT: 227.6 LBS | DIASTOLIC BLOOD PRESSURE: 70 MMHG | HEIGHT: 72 IN

## 2022-08-08 DIAGNOSIS — N18.4 CHRONIC KIDNEY DISEASE, STAGE 4 (SEVERE) (HCC): ICD-10-CM

## 2022-08-08 DIAGNOSIS — Z13.6 SCREENING FOR CARDIOVASCULAR CONDITION: ICD-10-CM

## 2022-08-08 DIAGNOSIS — E72.53: ICD-10-CM

## 2022-08-08 DIAGNOSIS — Z00.00 ANNUAL PHYSICAL EXAM: Primary | ICD-10-CM

## 2022-08-08 DIAGNOSIS — N17.9 ACUTE KIDNEY INJURY (HCC): ICD-10-CM

## 2022-08-08 PROCEDURE — 99395 PREV VISIT EST AGE 18-39: CPT | Performed by: FAMILY MEDICINE

## 2022-08-08 NOTE — PATIENT INSTRUCTIONS

## 2022-08-08 NOTE — PROGRESS NOTES
[facility] [address] [place] [address]    NAME: Harpal Gallagher  AGE: 32 y o  SEX: male  : 1995     DATE: 2022     Assessment and Plan:     Problem List Items Addressed This Visit        Genitourinary    Acute kidney injury ([facility] [address] )    Relevant Orders    CBC    Comprehensive metabolic panel    Primary hyperoxaluria type 1 ([facility] [address] )    Chronic kidney disease, stage 4 (severe) ([facility] [address] )       Other    BMI 30 0-30 9,adult      Other Visit Diagnoses     Annual physical exam    -  Primary    Screening for cardiovascular condition        Relevant Orders    Lipid panel        He will get his blood work done, and we will call with results, evaluate the need for a nephrologist pending his blood work  Immunizations and preventive care screenings were discussed with patient today  Appropriate education was printed on patient's after visit summary  Counseling:  Exercise: the importance of regular exercise/physical activity was discussed  Recommend exercise 3-5 times per week for at least 30 minutes  BMI Counseling: Body mass index is 30 87 kg/m²  The BMI is above normal  Nutrition recommendations include decreasing portion sizes and encouraging healthy choices of fruits and vegetables  Exercise recommendations include moderate physical activity 150 minutes/week  No pharmacotherapy was ordered  Rationale for BMI follow-up plan is due to patient being overweight or obese  Return in 1 year (on 2023)  Chief Complaint:     Chief Complaint   Patient presents with    Annual Exam      History of Present Illness:     Adult Annual Physical   Patient here for a comprehensive physical exam  The patient reports no problems  He has not done any blood work or seen his nephrologist in the last year and a half due to insurance issues  Diet and Physical Activity  Diet/Nutrition: well balanced diet  Exercise: walking        Depression Screening  PHQ-2/9 Depression Screening         General Health  Sleep: sleeps well  Hearing: normal - bilateral   Vision: no vision problems  Dental: regular dental visits   Health  History of STDs?: no      Review of Systems:     Review of Systems   Constitutional: Negative for chills, fatigue and fever  HENT: Negative for congestion, ear pain, hearing loss, postnasal drip, rhinorrhea and sore throat  Eyes: Negative for pain and visual disturbance  Respiratory: Negative for chest tightness, shortness of breath and wheezing  Cardiovascular: Negative for chest pain and leg swelling  Gastrointestinal: Negative for abdominal distention, abdominal pain, constipation, diarrhea and vomiting  Endocrine: Negative for cold intolerance and heat intolerance  Genitourinary: Negative for difficulty urinating, frequency and urgency  Musculoskeletal: Negative for arthralgias and gait problem  Skin: Negative for color change  Neurological: Negative for dizziness, tremors, syncope, numbness and headaches  Hematological: Negative for adenopathy  Psychiatric/Behavioral: Negative for agitation, confusion and sleep disturbance  The patient is not nervous/anxious         Past Medical History:     Past Medical History:   Diagnosis Date    Anxiety     GERD (gastroesophageal reflux disease)     Kidney stone     Liver disorder     Nephrolithiasis     Primary hyperoxaluria type 1 ([facility] [address] )       Past Surgical History:     Past Surgical History:   Procedure Laterality Date    ANKLE SURGERY Right 2017    ligament repair    CYSTOSCOPY      FL RETROGRADE PYELOGRAM  10/24/2020    FL RETROGRADE PYELOGRAM  2/8/2021    HERNIA REPAIR      LITHOTRIPSY      MASS EXCISION Left 2/17/2017    Procedure: BACK/SHOULDER CYST EXCISION ;  Surgeon: Kristina Lawrence MD;  Location: [facility];  Service:     IA CYSTO/URETERO W/LITHOTRIPSY &INDWELL STENT INSRT Left 10/24/2020    Procedure: CYSTOSCOPY URETEROSCOPY WITH LITHOTRIPSY HOLMIUM LASER, RETROGRADE PYELOGRAM AND INSERTION STENT URETERAL;  Surgeon: Zohra Maria MD;  [facility];  Service: Urology    SD CYSTO/URETERO W/LITHOTRIPSY &INDWELL STENT INSRT Right 2/8/2021    Procedure: CYSTOSCOPY URETEROSCOPY WITH LITHOTRIPSY HOLMIUM LASER, RETROGRADE PYELOGRAM AND INSERTION STENT URETERAL;  Surgeon: Estuardo Soliman MD;  [facility];  Service: Urology      Social History:     Social History     Socioeconomic History    Marital status: Single     Spouse name: None    Number of children: None    Years of education: None    Highest education level: None   Occupational History     Employer: [employer]   Tobacco Use    Smoking status: Never Smoker    Smokeless tobacco: Never Used   Vaping Use    Vaping Use: Never used   Substance and Sexual Activity    Alcohol use: Yes     Comment: rarely    Drug use: No    Sexual activity: Not Currently     Partners: Female   Other Topics Concern    None   Social History Narrative    None     Social Determinants of Health     Financial Resource Strain: Not on file   Food Insecurity: Not on file   Transportation Needs: Not on file   Physical Activity: Not on file   Stress: Not on file   Social Connections: Not on file   Intimate Partner Violence: Not on file   Housing Stability: Not on file      Family History:     Family History   Problem Relation Age of Onset    Hypertension Mother     No Known Problems Father     Cancer Maternal Grandmother     Diabetes Maternal Grandmother     Alzheimer's disease Maternal Grandfather       Current Medications:     Current Outpatient Medications   Medication Sig Dispense Refill    traZODone (DESYREL) 50 mg tablet TAKE 1 TABLET BY MOUTH DAILY AT BEDTIME 90 tablet 3     No current facility-administered medications for this visit  Allergies:      Allergies   Allergen Reactions    Seasonal Ic [Cholestatin] Sneezing      Physical Exam:     /70   Pulse 74   Temp 97 5 °F (36 4 °C)   Ht 6' (1 829 m)   Wt 103 kg (227 lb 9 6 oz)   SpO2 99%   BMI 30 87 kg/m²     Physical Exam  Constitutional:       Appearance: He is well-developed  HENT:      Head: Normocephalic  Right Ear: External ear normal       Left Ear: External ear normal       Nose: Nose normal    Eyes:      Conjunctiva/sclera: Conjunctivae normal       Pupils: Pupils are equal, round, and reactive to light  Neck:      Thyroid: No thyromegaly  Cardiovascular:      Rate and Rhythm: Normal rate and regular rhythm  Heart sounds: Normal heart sounds  Pulmonary:      Effort: Pulmonary effort is normal       Breath sounds: Normal breath sounds  Abdominal:      General: Bowel sounds are normal       Palpations: Abdomen is soft  Musculoskeletal:         General: Normal range of motion  Cervical back: Normal range of motion  Skin:     General: Skin is warm and dry  Neurological:      Mental Status: He is alert and oriented to person, place, and time     Psychiatric:         Behavior: Behavior normal           DO Jonah Mason [id number] [facility]

## 2022-08-10 DIAGNOSIS — N18.4 CHRONIC KIDNEY DISEASE, STAGE 4 (SEVERE) (HCC): Primary | ICD-10-CM

## 2022-08-10 LAB
ALBUMIN SERPL-MCNC: 4.5 G/DL (ref 4.1–5.2)
ALBUMIN/GLOB SERPL: 2.1 {RATIO} (ref 1.2–2.2)
ALP SERPL-CCNC: 63 IU/L (ref 44–121)
ALT SERPL-CCNC: 16 IU/L (ref 0–44)
AST SERPL-CCNC: 15 IU/L (ref 0–40)
BASOPHILS # BLD AUTO: 0.1 X10E3/UL (ref 0–0.2)
BASOPHILS NFR BLD AUTO: 1 %
BILIRUB SERPL-MCNC: 0.7 MG/DL (ref 0–1.2)
BUN SERPL-MCNC: 19 MG/DL (ref 6–20)
BUN/CREAT SERPL: 7 (ref 9–20)
CALCIUM SERPL-MCNC: 9.4 MG/DL (ref 8.7–10.2)
CHLORIDE SERPL-SCNC: 101 MMOL/L (ref 96–106)
CHOLEST SERPL-MCNC: 180 MG/DL (ref 100–199)
CO2 SERPL-SCNC: 25 MMOL/L (ref 20–29)
CREAT SERPL-MCNC: 2.63 MG/DL (ref 0.76–1.27)
EGFR: 33 ML/MIN/1.73
EOSINOPHIL # BLD AUTO: 0.3 X10E3/UL (ref 0–0.4)
EOSINOPHIL NFR BLD AUTO: 4 %
ERYTHROCYTE [DISTWIDTH] IN BLOOD BY AUTOMATED COUNT: 13.7 % (ref 11.6–15.4)
GLOBULIN SER-MCNC: 2.1 G/DL (ref 1.5–4.5)
GLUCOSE SERPL-MCNC: 93 MG/DL (ref 65–99)
HCT VFR BLD AUTO: 47.7 % (ref 37.5–51)
HDLC SERPL-MCNC: 44 MG/DL
HGB BLD-MCNC: 16.4 G/DL (ref 13–17.7)
IMM GRANULOCYTES # BLD: 0 X10E3/UL (ref 0–0.1)
IMM GRANULOCYTES NFR BLD: 0 %
LDLC SERPL CALC-MCNC: 107 MG/DL (ref 0–99)
LYMPHOCYTES # BLD AUTO: 1.5 X10E3/UL (ref 0.7–3.1)
LYMPHOCYTES NFR BLD AUTO: 18 %
MCH RBC QN AUTO: 31.1 PG (ref 26.6–33)
MCHC RBC AUTO-ENTMCNC: 34.4 G/DL (ref 31.5–35.7)
MCV RBC AUTO: 90 FL (ref 79–97)
MONOCYTES # BLD AUTO: 0.7 X10E3/UL (ref 0.1–0.9)
MONOCYTES NFR BLD AUTO: 8 %
NEUTROPHILS # BLD AUTO: 6 X10E3/UL (ref 1.4–7)
NEUTROPHILS NFR BLD AUTO: 69 %
PLATELET # BLD AUTO: 206 X10E3/UL (ref 150–450)
POTASSIUM SERPL-SCNC: 4.2 MMOL/L (ref 3.5–5.2)
PROT SERPL-MCNC: 6.6 G/DL (ref 6–8.5)
RBC # BLD AUTO: 5.28 X10E6/UL (ref 4.14–5.8)
SL AMB VLDL CHOLESTEROL CALC: 29 MG/DL (ref 5–40)
SODIUM SERPL-SCNC: 140 MMOL/L (ref 134–144)
TRIGL SERPL-MCNC: 164 MG/DL (ref 0–149)
WBC # BLD AUTO: 8.7 X10E3/UL (ref 3.4–10.8)

## 2022-10-10 DIAGNOSIS — N20.1 URETERIC STONE: Primary | ICD-10-CM

## 2022-10-10 RX ORDER — TAMSULOSIN HYDROCHLORIDE 0.4 MG/1
0.4 CAPSULE ORAL
Qty: 30 CAPSULE | Refills: 0 | Status: SHIPPED | OUTPATIENT
Start: 2022-10-10

## 2022-10-10 RX ORDER — HYDROCODONE BITARTRATE AND ACETAMINOPHEN 5; 325 MG/1; MG/1
1 TABLET ORAL EVERY 6 HOURS PRN
Qty: 20 TABLET | Refills: 0 | Status: SHIPPED | OUTPATIENT
Start: 2022-10-10

## 2022-11-01 DIAGNOSIS — N20.1 URETERIC STONE: ICD-10-CM

## 2022-11-01 RX ORDER — TAMSULOSIN HYDROCHLORIDE 0.4 MG/1
CAPSULE ORAL
Qty: 90 CAPSULE | Refills: 1 | Status: SHIPPED | OUTPATIENT
Start: 2022-11-01

## 2023-01-17 ENCOUNTER — TELEPHONE (OUTPATIENT)
Dept: NEPHROLOGY | Facility: CLINIC | Age: 28
End: 2023-01-17

## 2023-01-17 DIAGNOSIS — N18.4 CKD (CHRONIC KIDNEY DISEASE) STAGE 4, GFR 15-29 ML/MIN (HCC): Primary | ICD-10-CM

## 2023-01-18 ENCOUNTER — APPOINTMENT (OUTPATIENT)
Dept: LAB | Facility: CLINIC | Age: 28
End: 2023-01-18

## 2023-01-18 DIAGNOSIS — N18.4 CKD (CHRONIC KIDNEY DISEASE) STAGE 4, GFR 15-29 ML/MIN (HCC): ICD-10-CM

## 2023-01-18 LAB
25(OH)D3 SERPL-MCNC: 16.5 NG/ML (ref 30–100)
ANION GAP SERPL CALCULATED.3IONS-SCNC: 7 MMOL/L (ref 4–13)
BACTERIA UR QL AUTO: ABNORMAL /HPF
BASOPHILS # BLD AUTO: 0.05 THOUSANDS/ÂΜL (ref 0–0.1)
BASOPHILS NFR BLD AUTO: 1 % (ref 0–1)
BILIRUB UR QL STRIP: NEGATIVE
BUN SERPL-MCNC: 24 MG/DL (ref 5–25)
CALCIUM SERPL-MCNC: 9.4 MG/DL (ref 8.3–10.1)
CHLORIDE SERPL-SCNC: 106 MMOL/L (ref 96–108)
CLARITY UR: CLEAR
CO2 SERPL-SCNC: 26 MMOL/L (ref 21–32)
COLOR UR: ABNORMAL
CREAT SERPL-MCNC: 2.82 MG/DL (ref 0.6–1.3)
CREAT UR-MCNC: 134 MG/DL
EOSINOPHIL # BLD AUTO: 0.45 THOUSAND/ÂΜL (ref 0–0.61)
EOSINOPHIL NFR BLD AUTO: 6 % (ref 0–6)
ERYTHROCYTE [DISTWIDTH] IN BLOOD BY AUTOMATED COUNT: 13.4 % (ref 11.6–15.1)
GFR SERPL CREATININE-BSD FRML MDRD: 28 ML/MIN/1.73SQ M
GLUCOSE P FAST SERPL-MCNC: 95 MG/DL (ref 65–99)
GLUCOSE UR STRIP-MCNC: NEGATIVE MG/DL
HCT VFR BLD AUTO: 45.2 % (ref 36.5–49.3)
HGB BLD-MCNC: 15.5 G/DL (ref 12–17)
HGB UR QL STRIP.AUTO: ABNORMAL
IMM GRANULOCYTES # BLD AUTO: 0.01 THOUSAND/UL (ref 0–0.2)
IMM GRANULOCYTES NFR BLD AUTO: 0 % (ref 0–2)
KETONES UR STRIP-MCNC: NEGATIVE MG/DL
LEUKOCYTE ESTERASE UR QL STRIP: NEGATIVE
LYMPHOCYTES # BLD AUTO: 1.8 THOUSANDS/ÂΜL (ref 0.6–4.47)
LYMPHOCYTES NFR BLD AUTO: 26 % (ref 14–44)
MCH RBC QN AUTO: 30.9 PG (ref 26.8–34.3)
MCHC RBC AUTO-ENTMCNC: 34.3 G/DL (ref 31.4–37.4)
MCV RBC AUTO: 90 FL (ref 82–98)
MONOCYTES # BLD AUTO: 0.47 THOUSAND/ÂΜL (ref 0.17–1.22)
MONOCYTES NFR BLD AUTO: 7 % (ref 4–12)
MUCOUS THREADS UR QL AUTO: ABNORMAL
NEUTROPHILS # BLD AUTO: 4.2 THOUSANDS/ÂΜL (ref 1.85–7.62)
NEUTS SEG NFR BLD AUTO: 60 % (ref 43–75)
NITRITE UR QL STRIP: NEGATIVE
NON-SQ EPI CELLS URNS QL MICRO: ABNORMAL /HPF
NRBC BLD AUTO-RTO: 0 /100 WBCS
PH UR STRIP.AUTO: 6 [PH]
PHOSPHATE SERPL-MCNC: 3.3 MG/DL (ref 2.7–4.5)
PLATELET # BLD AUTO: 256 THOUSANDS/UL (ref 149–390)
PMV BLD AUTO: 10.8 FL (ref 8.9–12.7)
POTASSIUM SERPL-SCNC: 3.7 MMOL/L (ref 3.5–5.3)
PROT UR STRIP-MCNC: ABNORMAL MG/DL
PROT UR-MCNC: 114 MG/DL
PROT/CREAT UR: 0.85 MG/G{CREAT} (ref 0–0.1)
PTH-INTACT SERPL-MCNC: 61.7 PG/ML (ref 18.4–80.1)
RBC # BLD AUTO: 5.02 MILLION/UL (ref 3.88–5.62)
RBC #/AREA URNS AUTO: ABNORMAL /HPF
SODIUM SERPL-SCNC: 139 MMOL/L (ref 135–147)
SP GR UR STRIP.AUTO: 1.01 (ref 1–1.03)
UROBILINOGEN UR STRIP-ACNC: <2 MG/DL
WBC # BLD AUTO: 6.98 THOUSAND/UL (ref 4.31–10.16)
WBC #/AREA URNS AUTO: ABNORMAL /HPF

## 2023-01-18 PROCEDURE — 82570 ASSAY OF URINE CREATININE: CPT

## 2023-01-18 PROCEDURE — 84156 ASSAY OF PROTEIN URINE: CPT

## 2023-01-18 PROCEDURE — 81001 URINALYSIS AUTO W/SCOPE: CPT

## 2023-01-19 ENCOUNTER — TELEPHONE (OUTPATIENT)
Dept: NEPHROLOGY | Facility: CLINIC | Age: 28
End: 2023-01-19

## 2023-01-19 NOTE — TELEPHONE ENCOUNTER
Patient has an appointment tomorrow with Dr Nguyen Payment  It looks that patient doesn't have active coverage  I called and spoke with patient as per patient he is waiting from the [employer] insurance to give the office a call to see if [employer] is  active

## 2023-01-20 ENCOUNTER — OFFICE VISIT (OUTPATIENT)
Dept: NEPHROLOGY | Facility: CLINIC | Age: 28
End: 2023-01-20

## 2023-01-20 ENCOUNTER — TELEPHONE (OUTPATIENT)
Dept: NEPHROLOGY | Facility: CLINIC | Age: 28
End: 2023-01-20

## 2023-01-20 VITALS
TEMPERATURE: 98 F | SYSTOLIC BLOOD PRESSURE: 130 MMHG | OXYGEN SATURATION: 100 % | BODY MASS INDEX: 32.61 KG/M2 | DIASTOLIC BLOOD PRESSURE: 100 MMHG | HEART RATE: 104 BPM | WEIGHT: 240.8 LBS | RESPIRATION RATE: 16 BRPM | HEIGHT: 72 IN

## 2023-01-20 DIAGNOSIS — M89.9 CHRONIC KIDNEY DISEASE-MINERAL AND BONE DISORDER: ICD-10-CM

## 2023-01-20 DIAGNOSIS — N18.4 CHRONIC KIDNEY DISEASE, STAGE 4 (SEVERE) (HCC): ICD-10-CM

## 2023-01-20 DIAGNOSIS — N13.2 HYDRONEPHROSIS WITH URINARY OBSTRUCTION DUE TO URETERAL CALCULUS: ICD-10-CM

## 2023-01-20 DIAGNOSIS — E83.9 CHRONIC KIDNEY DISEASE-MINERAL AND BONE DISORDER: ICD-10-CM

## 2023-01-20 DIAGNOSIS — N20.1 URETERIC STONE: ICD-10-CM

## 2023-01-20 DIAGNOSIS — E72.53: ICD-10-CM

## 2023-01-20 DIAGNOSIS — N17.9 ACUTE KIDNEY INJURY (HCC): Primary | ICD-10-CM

## 2023-01-20 DIAGNOSIS — N18.9 CHRONIC KIDNEY DISEASE-MINERAL AND BONE DISORDER: ICD-10-CM

## 2023-01-20 NOTE — PROGRESS NOTES
NEPHROLOGY OFFICE FOLLOW UP  Carlos Calhoun 32 y o  male MRN: [id number]    Encounter: [id number] 1/20/2023    REASON FOR VISIT: Carlos Calhoun is a 32 y o  male who is here on 1/20/2023 for Chronic Kidney Disease and Follow-up    HPI:    Thaddeus Baer came in today for follow-up of CKD  61-year-old gentleman with history of primary oxaluria leading to recurrent kidney stone and CKD    Patient was seen by Dr Mitchel Brower in January 2021  He lost follow-up because of insurance reason  In between he was seen in [facility] for the primary oxaluria and CKD    He is overall feeling well  He had a few kidney stone last year    Still has a flank pain    Denies any chest pain palpitation    Denies any urinary complaints      REVIEW OF SYSTEMS:    Review of Systems   Constitutional: Negative for activity change and fatigue  HENT: Negative for congestion and ear discharge  Eyes: Negative for photophobia and pain  Respiratory: Negative for apnea and choking  Cardiovascular: Negative for chest pain and palpitations  Gastrointestinal: Negative for abdominal distention and blood in stool  Endocrine: Negative for heat intolerance and polyphagia  Genitourinary: Negative for flank pain and urgency  Musculoskeletal: Positive for back pain  Negative for neck pain and neck stiffness  Skin: Negative for color change and wound  Allergic/Immunologic: Negative for food allergies and immunocompromised state  Neurological: Negative for seizures and facial asymmetry  Hematological: Negative for adenopathy  Does not bruise/bleed easily  Psychiatric/Behavioral: Negative for self-injury and suicidal ideas           PAST MEDICAL HISTORY:  Past Medical History:   Diagnosis Date   • Anxiety    • GERD (gastroesophageal reflux disease)    • Kidney stone    • Liver disorder    • Nephrolithiasis    • Primary hyperoxaluria type 1 ([facility] [address] )        PAST SURGICAL HISTORY:  Past Surgical History:   Procedure Laterality Date   • ANKLE SURGERY Right 2017    ligament repair   • CYSTOSCOPY     • FL RETROGRADE PYELOGRAM  10/24/2020   • FL RETROGRADE PYELOGRAM  2/8/2021   • HERNIA REPAIR     • LITHOTRIPSY     • MASS EXCISION Left 2/17/2017    Procedure: BACK/SHOULDER CYST EXCISION ;  Surgeon: Tia Bullock MD;  [facility];  Service:    • DE CYSTO/URETERO W/LITHOTRIPSY &INDWELL STENT INSRT Left 10/24/2020    Procedure: CYSTOSCOPY URETEROSCOPY WITH LITHOTRIPSY HOLMIUM LASER, RETROGRADE PYELOGRAM AND INSERTION STENT URETERAL;  Surgeon: Amara Valdovinos MD;  [facility];  Service: Urology   • DE CYSTO/URETERO W/LITHOTRIPSY &INDWELL STENT INSRT Right 2/8/2021    Procedure: CYSTOSCOPY URETEROSCOPY WITH LITHOTRIPSY HOLMIUM LASER, RETROGRADE PYELOGRAM AND INSERTION STENT URETERAL;  Surgeon: Kenia Fernandez MD;  [facility];  Service: Urology       SOCIAL HISTORY:  Social History     Substance and Sexual Activity   Alcohol Use Yes    Comment: rarely     Social History     Substance and Sexual Activity   Drug Use No     Social History     Tobacco Use   Smoking Status Never   Smokeless Tobacco Never       FAMILY HISTORY:  Family History   Problem Relation Age of Onset   • Hypertension Mother    • No Known Problems Father    • Cancer Maternal Grandmother    • Diabetes Maternal Grandmother    • Alzheimer's disease Maternal Grandfather        MEDICATIONS:    Current Outpatient Medications:   •  HYDROcodone-acetaminophen (Norco) 5-325 mg per tablet, Take 1 tablet by mouth every 6 (six) hours as needed for pain Max Daily Amount: 4 tablets (Patient taking differently: Take 1 tablet by mouth if needed for pain), Disp: 20 tablet, Rfl: 0  •  tamsulosin (FLOMAX) 0 4 mg, TAKE 1 CAPSULE BY MOUTH EVERY DAY WITH DINNER (Patient taking differently: if needed), Disp: 90 capsule, Rfl: 1  •  traZODone (DESYREL) 50 mg tablet, TAKE 1 TABLET BY MOUTH DAILY AT BEDTIME, Disp: 90 tablet, Rfl: 3    PHYSICAL EXAM:  Vitals:    01/20/23 1044   BP: 130/100 BP Location: Right arm   Patient Position: Sitting   Pulse: 104   Resp: 16   Temp: 98 °F (36 7 °C)   TempSrc: Temporal   SpO2: 100%   Weight: 109 kg (240 lb 12 8 oz)   Height: 6' (1 829 m)     Body mass index is 32 66 kg/m²  Physical Exam  Constitutional:       General: He is not in acute distress  Appearance: He is well-developed  He is obese  HENT:      Head: Normocephalic  Eyes:      General: No scleral icterus  Conjunctiva/sclera: Conjunctivae normal    Neck:      Vascular: No JVD  Cardiovascular:      Rate and Rhythm: Normal rate  Heart sounds: Normal heart sounds  Pulmonary:      Effort: Pulmonary effort is normal       Breath sounds: No wheezing  Abdominal:      Palpations: Abdomen is soft  Tenderness: There is no abdominal tenderness  Musculoskeletal:         General: Normal range of motion  Cervical back: Neck supple  Skin:     General: Skin is warm  Findings: No rash  Neurological:      Mental Status: He is alert and oriented to person, place, and time     Psychiatric:         Behavior: Behavior normal          LAB RESULTS:  Results for orders placed or performed in visit on 72/48/57   Basic metabolic panel   Result Value Ref Range    Sodium 139 135 - 147 mmol/L    Potassium 3 7 3 5 - 5 3 mmol/L    Chloride 106 96 - 108 mmol/L    CO2 26 21 - 32 mmol/L    ANION GAP 7 4 - 13 mmol/L    BUN 24 5 - 25 mg/dL    Creatinine 2 82 (H) 0 60 - 1 30 mg/dL    Glucose, Fasting 95 65 - 99 mg/dL    Calcium 9 4 8 3 - 10 1 mg/dL    eGFR 28 ml/min/1 73sq m   CBC and differential   Result Value Ref Range    WBC 6 98 4 31 - 10 16 Thousand/uL    RBC 5 02 3 88 - 5 62 Million/uL    Hemoglobin 15 5 12 0 - 17 0 g/dL    Hematocrit 45 2 36 5 - 49 3 %    MCV 90 82 - 98 fL    MCH 30 9 26 8 - 34 3 pg    MCHC 34 3 31 4 - 37 4 g/dL    RDW 13 4 11 6 - 15 1 %    MPV 10 8 8 9 - 12 7 fL    Platelets 609 573 - 768 Thousands/uL    nRBC 0 /100 WBCs    Neutrophils Relative 60 43 - 75 %    Immat GRANS % 0 0 - 2 %    Lymphocytes Relative 26 14 - 44 %    Monocytes Relative 7 4 - 12 %    Eosinophils Relative 6 0 - 6 %    Basophils Relative 1 0 - 1 %    Neutrophils Absolute 4 20 1 85 - 7 62 Thousands/µL    Immature Grans Absolute 0 01 0 00 - 0 20 Thousand/uL    Lymphocytes Absolute 1 80 0 60 - 4 47 Thousands/µL    Monocytes Absolute 0 47 0 17 - 1 22 Thousand/µL    Eosinophils Absolute 0 45 0 00 - 0 61 Thousand/µL    Basophils Absolute 0 05 0 00 - 0 10 Thousands/µL   Phosphorus   Result Value Ref Range    Phosphorus 3 3 2 7 - 4 5 mg/dL   PTH, intact   Result Value Ref Range    PTH 61 7 18 4 - 80 1 pg/mL   Vitamin D 25 hydroxy   Result Value Ref Range    Vit D, 25-Hydroxy 16 5 (L) 30 0 - 100 0 ng/mL       ASSESSMENT and PLAN:      Chronic kidney disease, stage 4 (severe) ([facility])  Lab Results   Component Value Date    EGFR 28 01/18/2023    EGFR 33 (L) 08/09/2022    EGFR 20 02/10/2021    CREATININE 2 82 (H) 01/18/2023    CREATININE 2 63 (H) 08/09/2022    CREATININE 3 82 (H) 02/10/2021   Patient does seems to be advanced CKD likely because of nephrolithiasis  Fluctuating based on hydration    Pathophysiology of kidney disease discussed with the patient  Advise hydration and avoiding nephrotoxic medication    Chronic kidney disease-mineral and bone disorder  Lab Results   Component Value Date    EGFR 28 01/18/2023    EGFR 33 (L) 08/09/2022    EGFR 20 02/10/2021    CREATININE 2 82 (H) 01/18/2023    CREATININE 2 63 (H) 08/09/2022    CREATININE 3 82 (H) 02/10/2021   PTH and phosphorus are within acceptable range  Vitamin D is running low but he will avoid any supplement because of recurrent kidney stone    Primary hyperoxaluria type 1 ([address] )  Does have genetic disorder  Will order 24 urine analysis for further work-up    Hydronephrosis with urinary obstruction due to ureteral calculus  I will order CT scan of the abdomen for kidney stone or any hydronephrosis in view of worsening renal function    Advised to follow-up with urologist      Everything discussed at length with the patient  He will do CT scan urine test and blood work in 1 month and I will see him back in 2 months        Portions of the record may have been created with voice recognition software  Occasional wrong word or "sound a like" substitutions may have occurred due to the inherent limitations of voice recognition software  Read the chart carefully and recognize, using context, where substitutions have occurred  If you have any questions, please contact the dictating provider

## 2023-01-20 NOTE — ASSESSMENT & PLAN NOTE
Lab Results   Component Value Date    EGFR 28 01/18/2023    EGFR 33 (L) 08/09/2022    EGFR 20 02/10/2021    CREATININE 2 82 (H) 01/18/2023    CREATININE 2 63 (H) 08/09/2022    CREATININE 3 82 (H) 02/10/2021   PTH and phosphorus are within acceptable range    Vitamin D is running low but he will avoid any supplement because of recurrent kidney stone

## 2023-01-20 NOTE — TELEPHONE ENCOUNTER
I called [employer] @ [phone] and spoke to Sebastien Choi () to check eligible for the patient and as of 1/202/2023 and according to Sebastien Choi () this patient plan terminated on 12/31/2022  The reference number for this call is: [id number]  Allie Madera,       I did call the patient and the patient is aware that is [employer] P[employer] plan terminated on 12/31/2022  Patient stated that this is a problem with his Employer and the patient is aware that until he gets his insurance straighten out that he will be a selfpay  Patient [employer] Estimate is scanned into the patient chart

## 2023-01-20 NOTE — ASSESSMENT & PLAN NOTE
Lab Results   Component Value Date    EGFR 28 01/18/2023    EGFR 33 (L) 08/09/2022    EGFR 20 02/10/2021    CREATININE 2 82 (H) 01/18/2023    CREATININE 2 63 (H) 08/09/2022    CREATININE 3 82 (H) 02/10/2021   Patient does seems to be advanced CKD likely because of nephrolithiasis  Fluctuating based on hydration    Pathophysiology of kidney disease discussed with the patient    Advise hydration and avoiding nephrotoxic medication

## 2023-01-20 NOTE — ASSESSMENT & PLAN NOTE
I will order CT scan of the abdomen for kidney stone or any hydronephrosis in view of worsening renal function    Advised to follow-up with urologist

## 2023-01-26 ENCOUNTER — HOSPITAL ENCOUNTER (OUTPATIENT)
Dept: CT IMAGING | Facility: CLINIC | Age: 28
Discharge: HOME | End: 2023-01-26

## 2023-01-26 DIAGNOSIS — N13.2 HYDRONEPHROSIS WITH URINARY OBSTRUCTION DUE TO URETERAL CALCULUS: ICD-10-CM

## 2023-01-26 DIAGNOSIS — N18.4 CHRONIC KIDNEY DISEASE, STAGE 4 (SEVERE) (HCC): ICD-10-CM

## 2023-01-26 DIAGNOSIS — N20.1 URETERIC STONE: ICD-10-CM

## 2023-01-26 DIAGNOSIS — E72.53: ICD-10-CM

## 2023-01-27 ENCOUNTER — OFFICE VISIT (OUTPATIENT)
Dept: FAMILY MEDICINE CLINIC | Facility: CLINIC | Age: 28
End: 2023-01-27

## 2023-01-27 VITALS
HEIGHT: 72 IN | WEIGHT: 238 LBS | OXYGEN SATURATION: 99 % | SYSTOLIC BLOOD PRESSURE: 122 MMHG | DIASTOLIC BLOOD PRESSURE: 74 MMHG | BODY MASS INDEX: 32.23 KG/M2 | HEART RATE: 80 BPM | TEMPERATURE: 97.4 F

## 2023-01-27 DIAGNOSIS — N18.4 CHRONIC KIDNEY DISEASE, STAGE 4 (SEVERE) (HCC): Primary | ICD-10-CM

## 2023-01-27 DIAGNOSIS — E83.9 CHRONIC KIDNEY DISEASE-MINERAL AND BONE DISORDER: ICD-10-CM

## 2023-01-27 DIAGNOSIS — N18.9 CHRONIC KIDNEY DISEASE-MINERAL AND BONE DISORDER: ICD-10-CM

## 2023-01-27 DIAGNOSIS — M89.9 CHRONIC KIDNEY DISEASE-MINERAL AND BONE DISORDER: ICD-10-CM

## 2023-01-27 DIAGNOSIS — N13.2 HYDRONEPHROSIS WITH URINARY OBSTRUCTION DUE TO URETERAL CALCULUS: ICD-10-CM

## 2023-01-27 NOTE — ASSESSMENT & PLAN NOTE
Lab Results   Component Value Date    EGFR 28 01/18/2023    EGFR 33 (L) 08/09/2022    EGFR 20 02/10/2021    CREATININE 2 82 (H) 01/18/2023    CREATININE 2 63 (H) 08/09/2022    CREATININE 3 82 (H) 02/10/2021   Follow-up with his nephrologist as scheduled, discussed with him increasing his fluids to at least 64 ounces of fluid a day, decreasing his protein intake  Recommended he look online for low protein diet options

## 2023-01-27 NOTE — PROGRESS NOTES
Name: Verner Pitman      : 1995      MRN: [id number]  Encounter Provider: Megan Alston DO  Encounter Date: 2023   Encounter department: [facility] [address] [place] [address]     1  Chronic kidney disease, stage 4 (severe) ([facility])  Assessment & Plan:  Lab Results   Component Value Date    EGFR 28 2023    EGFR 33 (L) 2022    EGFR 20 02/10/2021    CREATININE 2 82 (H) 2023    CREATININE 2 63 (H) 2022    CREATININE 3 82 (H) 02/10/2021   Follow-up with his nephrologist as scheduled, discussed with him increasing his fluids to at least 64 ounces of fluid a day, decreasing his protein intake  Recommended he look online for low protein diet options  2  Chronic kidney disease-mineral and bone disorder  Assessment & Plan:  Lab Results   Component Value Date    EGFR 28 2023    EGFR 33 (L) 2022    EGFR 20 02/10/2021    CREATININE 2 82 (H) 2023    CREATININE 2 63 (H) 2022    CREATININE 3 82 (H) 02/10/2021   Seeing nephrology, he does have an appointment with urology      3  Hydronephrosis with urinary obstruction due to ureteral calculus  Assessment & Plan:  Urology evaluation to see if anything needs to be done for his volume of stones  BMI Counseling: Body mass index is 32 28 kg/m²  The BMI is above normal  Nutrition recommendations include decreasing portion sizes and encouraging healthy choices of fruits and vegetables  Exercise recommendations include moderate physical activity 150 minutes/week  No pharmacotherapy was ordered  Rationale for BMI follow-up plan is due to patient being overweight or obese  Depression Screening and Follow-up Plan: Patient was screened for depression during today's encounter  They screened negative with a PHQ-2 score of 0  Subjective      Patient was in today for nephrology follow-up, he is concerned that dialysis was mentioned to him    He has reestablished with nephrology and is undergoing follow-up work-up  He states he is feeling well, his creatinine has slightly improved  It was recommended that he have an appoint with urology which she has scheduled  Review of Systems   Constitutional: Negative for chills, fatigue and fever  HENT: Negative for congestion, ear pain, hearing loss, postnasal drip, rhinorrhea and sore throat  Eyes: Negative for pain and visual disturbance  Respiratory: Negative for chest tightness, shortness of breath and wheezing  Cardiovascular: Negative for chest pain and leg swelling  Gastrointestinal: Negative for abdominal distention, abdominal pain, constipation, diarrhea and vomiting  Endocrine: Negative for cold intolerance and heat intolerance  Genitourinary: Negative for difficulty urinating, frequency and urgency  Musculoskeletal: Negative for arthralgias and gait problem  Skin: Negative for color change  Neurological: Negative for dizziness, tremors, syncope, numbness and headaches  Hematological: Negative for adenopathy  Psychiatric/Behavioral: Negative for agitation, confusion and sleep disturbance  The patient is not nervous/anxious  Current Outpatient Medications on File Prior to Visit   Medication Sig   • traZODone (DESYREL) 50 mg tablet TAKE 1 TABLET BY MOUTH EVERYDAY AT BEDTIME   • [DISCONTINUED] HYDROcodone-acetaminophen (Norco) 5-325 mg per tablet Take 1 tablet by mouth every 6 (six) hours as needed for pain Max Daily Amount: 4 tablets (Patient not taking: Reported on 1/27/2023)   • [DISCONTINUED] tamsulosin (FLOMAX) 0 4 mg TAKE 1 CAPSULE BY MOUTH EVERY DAY WITH DINNER (Patient not taking: Reported on 1/27/2023)       Objective     /74 (BP Location: Left arm, Patient Position: Sitting, Cuff Size: Large)   Pulse 80   Temp (!) 97 4 °F (36 3 °C)   Ht 6' (1 829 m)   Wt 108 kg (238 lb)   SpO2 99%   BMI 32 28 kg/m²     Physical Exam  Vitals and nursing note reviewed     Constitutional: Appearance: He is well-developed  HENT:      Head: Normocephalic  Eyes:      General: No scleral icterus  Conjunctiva/sclera: Conjunctivae normal    Neck:      Thyroid: No thyromegaly  Cardiovascular:      Rate and Rhythm: Normal rate and regular rhythm  Heart sounds: Normal heart sounds  No murmur heard  Pulmonary:      Effort: Pulmonary effort is normal  No respiratory distress  Breath sounds: Normal breath sounds  No wheezing  Abdominal:      General: Bowel sounds are normal  There is no distension  Palpations: Abdomen is soft  Tenderness: There is no abdominal tenderness  Musculoskeletal:         General: No tenderness  Normal range of motion  Cervical back: Normal range of motion  Lymphadenopathy:      Cervical: No cervical adenopathy  Skin:     General: Skin is warm and dry  Coloration: Skin is not pale  Findings: No rash  Neurological:      Mental Status: He is alert and oriented to person, place, and time  Cranial Nerves: No cranial nerve deficit     Psychiatric:         Behavior: Behavior normal        Carlos Shea DO

## 2023-01-27 NOTE — ASSESSMENT & PLAN NOTE
Lab Results   Component Value Date    EGFR 28 01/18/2023    EGFR 33 (L) 08/09/2022    EGFR 20 02/10/2021    CREATININE 2 82 (H) 01/18/2023    CREATININE 2 63 (H) 08/09/2022    CREATININE 3 82 (H) 02/10/2021   Seeing nephrology, he does have an appointment with urology

## 2023-02-01 NOTE — PROGRESS NOTES
2/2/2023      Chief Complaint   Patient presents with   • Nephrolithiasis         Assessment and Plan    32 y o  male     1  Nephrolithiasis   2  CKD, stage 4  - CT stone study (1/26/23) showing intrarenal, non-obstructing stones  No hydronephrosis  - Asymptomatic at this time  - Continue to follow up with nephrology  - Follow up in 1 year with [facility] and [address] prior to visit  - Call with any questions or concerns in the meantime  - All questions answered; patient understands and agrees with plan        History of Present Illness  Arias Sullivan is a 32 y o  male patient with history of nephrolithiasis and chronic kidney disease here for follow up  Patient has had multiple urinary tract calculi with multiple  intervention  Passed small fragments in the past year  He has chronic kidney disease and follows closely with nephrology  Patient had recent CT stone study showing intrarenal nonobstructing stones with no hydronephrosis  Denies gross hematuria, fever, chills, nausea, vomiting  Review of Systems   Constitutional: Negative for activity change, appetite change, chills and fever  HENT: Negative for congestion and trouble swallowing  Respiratory: Negative for cough and shortness of breath  Cardiovascular: Negative for chest pain, palpitations and leg swelling  Gastrointestinal: Negative for abdominal pain, constipation, diarrhea, nausea and vomiting  Genitourinary: Negative for difficulty urinating, dysuria, flank pain, frequency, hematuria and urgency  Musculoskeletal: Negative for back pain and gait problem  Skin: Negative for wound  Allergic/Immunologic: Negative for immunocompromised state  Neurological: Negative for dizziness and syncope  Hematological: Does not bruise/bleed easily  Psychiatric/Behavioral: Negative for confusion  All other systems reviewed and are negative            AUA SYMPTOM SCORE    Flowsheet Row Most Recent Value   AUA SYMPTOM SCORE    How often have you had a sensation of not emptying your bladder completely after you finished urinating? 2 (P)     How often have you had to urinate again less than two hours after you finished urinating? 2 (P)     How often have you found you stopped and started again several times when you urinate? 1 (P)     How often have you found it difficult to postpone urination? 0 (P)     How often have you had a weak urinary stream? 1 (P)     How often have you had to push or strain to begin urination? 2 (P)     How many times did you most typically get up to urinate from the time you went to bed at night until the time you got up in the morning? 5 (P)     Quality of Life: If you were to spend the rest of your life with your urinary condition just the way it is now, how would you feel about that? 2 (P)     AUA SYMPTOM SCORE 13 (P)            Vitals  Vitals:    02/02/23 0858   BP: 124/72   Pulse: 60   SpO2: 98%   Weight: 107 kg (236 lb)   Height: 6' (1 829 m)       Physical Exam  Constitutional:       General: He is not in acute distress  Appearance: Normal appearance  He is not ill-appearing, toxic-appearing or diaphoretic  HENT:      Head: Normocephalic  Nose: No congestion  Eyes:      General: No scleral icterus  Right eye: No discharge  Left eye: No discharge  Conjunctiva/sclera: Conjunctivae normal       Pupils: Pupils are equal, round, and reactive to light  Pulmonary:      Effort: Pulmonary effort is normal    Musculoskeletal:      Cervical back: Normal range of motion  Skin:     General: Skin is warm and dry  Coloration: Skin is not jaundiced or pale  Findings: No bruising, erythema, lesion or rash  Neurological:      General: No focal deficit present  Mental Status: He is alert and oriented to person, place, and time  Mental status is at baseline        Gait: Gait normal    Psychiatric:         Mood and Affect: Mood normal          Behavior: Behavior normal          Thought Content: Thought content normal          Judgment: Judgment normal            Past History  Past Medical History:   Diagnosis Date   • Anxiety    • Chronic kidney disease    • GERD (gastroesophageal reflux disease)    • Kidney stone    • Liver disorder    • Nephrolithiasis    • Primary hyperoxaluria type 1 (HCC)      Social History     Socioeconomic History   • Marital status: Single     Spouse name: None   • Number of children: None   • Years of education: None   • Highest education level: None   Occupational History     Employer: [employer]   Tobacco Use   • Smoking status: Never   • Smokeless tobacco: Never   Vaping Use   • Vaping Use: Never used   Substance and Sexual Activity   • Alcohol use: Not Currently     Comment: rarely   • Drug use: No   • Sexual activity: Not Currently     Partners: Female   Other Topics Concern   • None   Social History Narrative   • None     Social Determinants of Health     Financial Resource Strain: Not on file   Food Insecurity: Not on file   Transportation Needs: Not on file   Physical Activity: Not on file   Stress: Not on file   Social Connections: Not on file   Intimate Partner Violence: Not on file   Housing Stability: Not on file     Social History     Tobacco Use   Smoking Status Never   Smokeless Tobacco Never     Family History   Problem Relation Age of Onset   • Hypertension Mother    • No Known Problems Father    • Cancer Maternal Grandmother         Bladder   • Diabetes Maternal Grandmother    • Alzheimer's disease Maternal Grandfather        The following portions of the patient's history were reviewed and updated as appropriate: allergies, current medications, past medical history, past social history, past surgical history and problem list     Results  No results found for this or any previous visit (from the past 1 hour(s))  ]  No results found for: PSA  Lab Results   Component Value Date    CALCIUM 9 4 01/18/2023    K 3 7 01/18/2023    CO2 26 01/18/2023     01/18/2023 BUN 24 01/18/2023    CREATININE 2 82 (H) 01/18/2023     Lab Results   Component Value Date    WBC 6 98 01/18/2023    HGB 15 5 01/18/2023    HCT 45 2 01/18/2023    MCV 90 01/18/2023     01/18/2023       Lorenzo Geiger PA-C

## 2023-02-02 ENCOUNTER — OFFICE VISIT (OUTPATIENT)
Dept: UROLOGY | Facility: CLINIC | Age: 28
End: 2023-02-02

## 2023-02-02 VITALS
SYSTOLIC BLOOD PRESSURE: 124 MMHG | BODY MASS INDEX: 31.97 KG/M2 | WEIGHT: 236 LBS | HEART RATE: 60 BPM | OXYGEN SATURATION: 98 % | HEIGHT: 72 IN | DIASTOLIC BLOOD PRESSURE: 72 MMHG

## 2023-02-02 DIAGNOSIS — N20.0 NEPHROLITHIASIS: Primary | ICD-10-CM

## 2023-02-10 ENCOUNTER — TELEPHONE (OUTPATIENT)
Dept: NEPHROLOGY | Facility: CLINIC | Age: 28
End: 2023-02-10

## 2023-02-16 ENCOUNTER — OFFICE VISIT (OUTPATIENT)
Dept: FAMILY MEDICINE CLINIC | Facility: CLINIC | Age: 28
End: 2023-02-16

## 2023-02-16 VITALS
OXYGEN SATURATION: 99 % | TEMPERATURE: 97.4 F | BODY MASS INDEX: 31.97 KG/M2 | HEIGHT: 72 IN | DIASTOLIC BLOOD PRESSURE: 88 MMHG | HEART RATE: 86 BPM | SYSTOLIC BLOOD PRESSURE: 120 MMHG | WEIGHT: 236 LBS

## 2023-02-16 DIAGNOSIS — R05.2 SUBACUTE COUGH: ICD-10-CM

## 2023-02-16 DIAGNOSIS — D22.9 CHANGE IN SKIN MOLE: Primary | ICD-10-CM

## 2023-02-16 NOTE — PROGRESS NOTES
Name: Nakul Abebe      : 1995      MRN: [id number]  Encounter Provider: LAURA Hodges  Encounter Date: 2023   Encounter department: [facility] [address]     1  Change in skin mole  Comments:  Referred to dermatology, also given contact information for [facility]  Orders:  -     Ambulatory Referral to Dermatology; Future    2  Subacute cough  Comments:  Advised to monitor for triggers, humidified air at night  Can try anti-histamine/decongestant  To return for worsening/concerning sx  Subjective      Patient presents to the office for evaluation of skin moles  Patient states he has multiple moles to his anterior neck and chest wall area  As per patient, has been present for years  States last week some of his moles changed in color and size  Some redness and irritation to one of them  Patient denies recent trauma to area, denies diaphoresis, unexplained weight loss, fever, chills  Patient also complains of continued cough since last month  Patient states he had a "cold" last month  States symptoms were mild and did not require any treatment, but notes ever since he got over his initial illness has been dealing with a daily persistent cough  Patient notes cough happens throughout the day, but is worse at night  Patient denies environmental allergies or history of asthma  Patient denies symptoms related to acid reflux  Patient denies shortness of breath, wheezing, hemoptysis, fever, chills  Denies sinus issues, postnasal drip  Notes cough as dry and nonproductive  Has not tried any over-the-counter products for cough  Review of Systems   Constitutional: Negative for chills, diaphoresis, fatigue, fever and unexpected weight change  HENT: Negative for congestion, ear pain, postnasal drip, rhinorrhea, sinus pressure, sinus pain, sore throat and trouble swallowing  Eyes: Negative for photophobia and visual disturbance  Respiratory: Positive for cough  Negative for choking, chest tightness, shortness of breath and wheezing  Cardiovascular: Negative for chest pain and palpitations  Gastrointestinal: Negative for diarrhea, nausea and vomiting  Genitourinary: Negative for decreased urine volume  Musculoskeletal: Negative for arthralgias and myalgias  Skin: Positive for color change (to skin moles)  Negative for rash  Neurological: Negative for dizziness, weakness, numbness and headaches  Hematological: Negative for adenopathy  Does not bruise/bleed easily  Psychiatric/Behavioral: Negative for confusion  Current Outpatient Medications on File Prior to Visit   Medication Sig   • traZODone (DESYREL) 50 mg tablet TAKE 1 TABLET BY MOUTH EVERYDAY AT BEDTIME       Objective     /88 (BP Location: Left arm, Patient Position: Sitting, Cuff Size: Standard)   Pulse 86   Temp (!) 97 4 °F (36 3 °C) (Tympanic)   Ht 6' (1 829 m)   Wt 107 kg (236 lb)   SpO2 99%   BMI 32 01 kg/m²     Physical Exam  Vitals reviewed  Constitutional:       General: He is not in acute distress  Appearance: Normal appearance  He is not ill-appearing  HENT:      Head: Normocephalic and atraumatic  Right Ear: Tympanic membrane, ear canal and external ear normal       Left Ear: Tympanic membrane, ear canal and external ear normal       Nose: Nose normal       Mouth/Throat:      Mouth: Mucous membranes are moist       Pharynx: Oropharynx is clear  Eyes:      Conjunctiva/sclera: Conjunctivae normal       Pupils: Pupils are equal, round, and reactive to light  Cardiovascular:      Rate and Rhythm: Normal rate and regular rhythm  Pulses: Normal pulses  Heart sounds: Normal heart sounds  Pulmonary:      Effort: Pulmonary effort is normal       Breath sounds: Normal breath sounds  No wheezing     Abdominal:      General: Bowel sounds are normal       Palpations: Abdomen is soft  Tenderness: There is no abdominal tenderness  Musculoskeletal:         General: Normal range of motion  Cervical back: Normal range of motion and neck supple  Right lower leg: No edema  Left lower leg: No edema  Lymphadenopathy:      Cervical: No cervical adenopathy  Skin:     General: Skin is warm and dry  Capillary Refill: Capillary refill takes less than 2 seconds  Findings: Lesion present  Comments: Multiple lesions/skin moles noted to anterior neck/mid-sternal area  Area 1: 1 5 cm x 1 cm (on anterior neck)  Area 2: 1 3 cm x 0 6 cm  Area 3: 1 5 cm vertical linear row  Area 4: 7 cm vertical linear row   Neurological:      General: No focal deficit present  Mental Status: He is alert and oriented to person, place, and time     Psychiatric:         Mood and Affect: Mood normal          Behavior: Behavior normal        Ivin Severs, CRNP

## 2023-03-13 ENCOUNTER — TELEPHONE (OUTPATIENT)
Dept: NEPHROLOGY | Facility: CLINIC | Age: 28
End: 2023-03-13

## 2023-03-13 NOTE — TELEPHONE ENCOUNTER
I called and left a message on machine for patient to return our call about obtaining his insurance information for his upcoming appointment with Dr Sergio Perkins for 3/27/2023  I also explained that I was setting up an Estimate for his upcoming appointment in the amount of $105 with a Co-Pay of $30 for the day of his office visit with not insurance  Patient good tavares estimate is scanned into patient chart and it was also mailed to the patient

## 2023-08-14 ENCOUNTER — OFFICE VISIT (OUTPATIENT)
Dept: FAMILY MEDICINE CLINIC | Facility: CLINIC | Age: 28
End: 2023-08-14

## 2023-08-14 VITALS
SYSTOLIC BLOOD PRESSURE: 122 MMHG | HEART RATE: 84 BPM | TEMPERATURE: 96.6 F | OXYGEN SATURATION: 99 % | WEIGHT: 236.4 LBS | DIASTOLIC BLOOD PRESSURE: 82 MMHG | HEIGHT: 72 IN | BODY MASS INDEX: 32.02 KG/M2

## 2023-08-14 DIAGNOSIS — F41.1 ANXIETY, GENERALIZED: ICD-10-CM

## 2023-08-14 DIAGNOSIS — Z13.6 SCREENING FOR CARDIOVASCULAR CONDITION: ICD-10-CM

## 2023-08-14 DIAGNOSIS — Z00.00 ANNUAL PHYSICAL EXAM: Primary | ICD-10-CM

## 2023-08-14 PROCEDURE — 99395 PREV VISIT EST AGE 18-39: CPT | Performed by: FAMILY MEDICINE

## 2023-08-14 RX ORDER — TAMSULOSIN HYDROCHLORIDE 0.4 MG/1
0.4 CAPSULE ORAL
COMMUNITY
End: 2023-08-21

## 2023-08-14 NOTE — PROGRESS NOTES
[address]  [address] [facility]    NAME: Adriana Salcedo  AGE: 25 y.o. SEX: male  : 1995     DATE: 2023     Assessment and Plan:     Problem List Items Addressed This Visit        Other    Anxiety, generalized    Relevant Orders    TSH, 3rd generation   Other Visit Diagnoses     Annual physical exam    -  Primary    Screening for cardiovascular condition        Relevant Orders    Lipid panel          Immunizations and preventive care screenings were discussed with patient today. Appropriate education was printed on patient's after visit summary. Counseling:  Dental Health: discussed importance of regular tooth brushing, flossing, and dental visits. Depression Screening and Follow-up Plan: Patient was screened for depression during today's encounter. They screened negative with a PHQ-2 score of 0. Return in 1 year (on 2024). Chief Complaint:     Chief Complaint   Patient presents with   • Physical Exam     Says he's taking tamselosin that's for kidney stones. Not on list of medications anymore. History of Present Illness:     Adult Annual Physical   Patient here for a comprehensive physical exam. The patient reports problems - fatigue. Diet and Physical Activity  Diet/Nutrition: well balanced diet. Exercise: moderate cardiovascular exercise. Depression Screening  PHQ-2/9 Depression Screening    Little interest or pleasure in doing things: 0 - not at all  Feeling down, depressed, or hopeless: 0 - not at all  PHQ-2 Score: 0  PHQ-2 Interpretation: Negative depression screen       General Health  Sleep: sleeps well. Hearing: normal - bilateral.  Vision: no vision problems. Dental: regular dental visits.  Health  History of STDs?: no.     Review of Systems:     Review of Systems   Constitutional: Positive for fatigue. Negative for chills and fever.    HENT: Negative for congestion, ear pain, hearing loss, postnasal drip, rhinorrhea and sore throat. Eyes: Negative for pain and visual disturbance. Respiratory: Negative for chest tightness, shortness of breath and wheezing. Cardiovascular: Negative for chest pain and leg swelling. Gastrointestinal: Negative for abdominal distention, abdominal pain, constipation, diarrhea and vomiting. Endocrine: Negative for cold intolerance and heat intolerance. Genitourinary: Negative for difficulty urinating, frequency and urgency. Musculoskeletal: Negative for arthralgias and gait problem. Skin: Negative for color change. Neurological: Negative for dizziness, tremors, syncope, numbness and headaches. Hematological: Negative for adenopathy. Psychiatric/Behavioral: Negative for agitation, confusion and sleep disturbance. The patient is not nervous/anxious.        Past Medical History:     Past Medical History:   Diagnosis Date   • Anxiety    • Chronic kidney disease    • GERD (gastroesophageal reflux disease)    • Kidney stone    • Liver disorder    • Nephrolithiasis    • Primary hyperoxaluria type 1 ([address])       Past Surgical History:     Past Surgical History:   Procedure Laterality Date   • ANKLE SURGERY Right 2017    ligament repair   • CYSTOSCOPY     • FL RETROGRADE PYELOGRAM  10/24/2020   • FL RETROGRADE PYELOGRAM  02/08/2021   • HERNIA REPAIR     • KIDNEY STONE SURGERY     • LITHOTRIPSY     • MASS EXCISION Left 02/17/2017    Procedure: BACK/SHOULDER CYST EXCISION ;  Surgeon: Jayden Brownlee MD;  Location: [facility];  Service:    • VA CYSTO/URETERO W/LITHOTRIPSY &INDWELL STENT INSRT Left 10/24/2020    Procedure: CYSTOSCOPY URETEROSCOPY WITH LITHOTRIPSY HOLMIUM LASER, RETROGRADE PYELOGRAM AND INSERTION STENT URETERAL;  Surgeon: Kristina Singer MD;  Location: [facility];  Service: Urology   • VA CYSTO/URETERO W/LITHOTRIPSY &INDWELL STENT INSRT Right 02/08/2021    Procedure: CYSTOSCOPY URETEROSCOPY WITH LITHOTRIPSY HOLMIUM LASER, RETROGRADE PYELOGRAM AND INSERTION STENT URETERAL;  Surgeon: Clive Bailey MD;  Location: [facility];  Service: Urology      Social History:     Social History     Socioeconomic History   • Marital status: Single     Spouse name: None   • Number of children: None   • Years of education: None   • Highest education level: None   Occupational History     Employer: [employer]   Tobacco Use   • Smoking status: Never   • Smokeless tobacco: Never   Vaping Use   • Vaping Use: Never used   Substance and Sexual Activity   • Alcohol use: Yes     Comment: rarely   • Drug use: No   • Sexual activity: Not Currently     Partners: Female   Other Topics Concern   • None   Social History Narrative   • None     Social Determinants of Health     Financial Resource Strain: Not on file   Food Insecurity: Not on file   Transportation Needs: Not on file   Physical Activity: Not on file   Stress: Not on file   Social Connections: Not on file   Intimate Partner Violence: Not on file   Housing Stability: Not on file      Family History:     Family History   Problem Relation Age of Onset   • Hypertension Mother    • No Known Problems Father    • Cancer Maternal Grandmother         Bladder   • Diabetes Maternal Grandmother    • Alzheimer's disease Maternal Grandfather       Current Medications:     Current Outpatient Medications   Medication Sig Dispense Refill   • tamsulosin (FLOMAX) 0.4 mg Take 0.4 mg by mouth daily with dinner     • traZODone (DESYREL) 50 mg tablet TAKE 1 TABLET BY MOUTH EVERYDAY AT BEDTIME 90 tablet 3     No current facility-administered medications for this visit. Allergies:     No Known Allergies   Physical Exam:     /82 (BP Location: Left arm, Patient Position: Sitting, Cuff Size: Standard)   Pulse 84   Temp (!) 96.6 °F (35.9 °C) (Tympanic)   Ht 6' (1.829 m)   Wt 107 kg (236 lb 6.4 oz)   SpO2 99%   BMI 32.06 kg/m²     Physical Exam  Constitutional:       Appearance: He is well-developed. HENT:      Head: Normocephalic. Right Ear: External ear normal.      Left Ear: External ear normal.      Nose: Nose normal.   Eyes:      Conjunctiva/sclera: Conjunctivae normal.      Pupils: Pupils are equal, round, and reactive to light. Neck:      Thyroid: No thyromegaly. Cardiovascular:      Rate and Rhythm: Normal rate and regular rhythm. Heart sounds: Normal heart sounds. Pulmonary:      Effort: Pulmonary effort is normal.      Breath sounds: Normal breath sounds. Abdominal:      General: Bowel sounds are normal.      Palpations: Abdomen is soft. Musculoskeletal:         General: Normal range of motion. Cervical back: Normal range of motion. Skin:     General: Skin is warm and dry. Neurological:      Mental Status: He is alert and oriented to person, place, and time.    Psychiatric:         Behavior: Behavior normal.          Salud Carr DO   [address] [place] [address]

## 2023-08-21 ENCOUNTER — HOSPITAL ENCOUNTER (INPATIENT)
Facility: HOSPITAL | Age: 28
LOS: 11 days | Discharge: HOME | DRG: 469 | End: 2023-09-01
Attending: EMERGENCY MEDICINE | Admitting: INTERNAL MEDICINE
Payer: COMMERCIAL

## 2023-08-21 ENCOUNTER — NURSE TRIAGE (OUTPATIENT)
Dept: OTHER | Facility: OTHER | Age: 28
End: 2023-08-21

## 2023-08-21 ENCOUNTER — OFFICE VISIT (OUTPATIENT)
Dept: FAMILY MEDICINE CLINIC | Facility: CLINIC | Age: 28
End: 2023-08-21

## 2023-08-21 ENCOUNTER — HOSPITAL ENCOUNTER (OUTPATIENT)
Dept: CT IMAGING | Facility: HOSPITAL | Age: 28
Discharge: HOME | End: 2023-08-21

## 2023-08-21 ENCOUNTER — APPOINTMENT (OUTPATIENT)
Dept: LAB | Facility: HOSPITAL | Age: 28
End: 2023-08-21

## 2023-08-21 VITALS
OXYGEN SATURATION: 98 % | DIASTOLIC BLOOD PRESSURE: 88 MMHG | HEIGHT: 72 IN | BODY MASS INDEX: 31.31 KG/M2 | TEMPERATURE: 98.9 F | HEART RATE: 104 BPM | WEIGHT: 231.2 LBS | SYSTOLIC BLOOD PRESSURE: 124 MMHG

## 2023-08-21 DIAGNOSIS — R05.1 ACUTE COUGH: ICD-10-CM

## 2023-08-21 DIAGNOSIS — R11.14 BILIOUS VOMITING WITH NAUSEA: ICD-10-CM

## 2023-08-21 DIAGNOSIS — N17.9 ACUTE RENAL FAILURE (ARF) (HCC): Primary | ICD-10-CM

## 2023-08-21 DIAGNOSIS — R05.1 ACUTE COUGH: Primary | ICD-10-CM

## 2023-08-21 DIAGNOSIS — N18.4 CHRONIC KIDNEY DISEASE, STAGE 4 (SEVERE) (HCC): ICD-10-CM

## 2023-08-21 DIAGNOSIS — Z13.6 SCREENING FOR CARDIOVASCULAR CONDITION: ICD-10-CM

## 2023-08-21 DIAGNOSIS — F41.1 ANXIETY, GENERALIZED: ICD-10-CM

## 2023-08-21 DIAGNOSIS — N17.9 AKI (ACUTE KIDNEY INJURY) (HCC): ICD-10-CM

## 2023-08-21 PROBLEM — E87.20 METABOLIC ACIDOSIS: Status: ACTIVE | Noted: 2023-08-21

## 2023-08-21 PROBLEM — D64.9 ANEMIA: Status: ACTIVE | Noted: 2023-08-21

## 2023-08-21 PROBLEM — E87.6 HYPOKALEMIA: Status: ACTIVE | Noted: 2023-08-21

## 2023-08-21 LAB
ALBUMIN SERPL BCP-MCNC: 4.8 G/DL (ref 3.5–5)
ALP SERPL-CCNC: 60 U/L (ref 34–104)
ALT SERPL W P-5'-P-CCNC: 15 U/L (ref 7–52)
AMYLASE SERPL-CCNC: 72 IU/L (ref 29–103)
ANION GAP SERPL CALCULATED.3IONS-SCNC: 14 MMOL/L
ANION GAP SERPL CALCULATED.3IONS-SCNC: 16 MMOL/L
AST SERPL W P-5'-P-CCNC: 16 U/L (ref 13–39)
BACTERIA UR QL AUTO: ABNORMAL /HPF
BACTERIA UR QL AUTO: ABNORMAL /HPF
BASE EX.OXY STD BLDV CALC-SCNC: 68.4 % (ref 60–80)
BASE EXCESS BLDV CALC-SCNC: -8.2 MMOL/L
BASOPHILS # BLD AUTO: 0.06 THOUSANDS/ÂΜL (ref 0–0.1)
BASOPHILS NFR BLD AUTO: 1 % (ref 0–1)
BILIRUB SERPL-MCNC: 0.41 MG/DL (ref 0.2–1)
BILIRUB UR QL STRIP: NEGATIVE
BILIRUB UR QL STRIP: NEGATIVE
BUN SERPL-MCNC: 76 MG/DL (ref 5–25)
BUN SERPL-MCNC: 78 MG/DL (ref 5–25)
CALCIUM SERPL-MCNC: 9.3 MG/DL (ref 8.4–10.2)
CALCIUM SERPL-MCNC: 9.6 MG/DL (ref 8.4–10.2)
CHLORIDE SERPL-SCNC: 101 MMOL/L (ref 96–108)
CHLORIDE SERPL-SCNC: 104 MMOL/L (ref 96–108)
CHOLEST SERPL-MCNC: 197 MG/DL
CLARITY UR: CLEAR
CLARITY UR: CLEAR
CO2 SERPL-SCNC: 19 MMOL/L (ref 21–32)
CO2 SERPL-SCNC: 21 MMOL/L (ref 21–32)
COLOR UR: ABNORMAL
COLOR UR: COLORLESS
CREAT SERPL-MCNC: 14.58 MG/DL (ref 0.6–1.3)
CREAT SERPL-MCNC: 14.7 MG/DL (ref 0.6–1.3)
EOSINOPHIL # BLD AUTO: 0.3 THOUSAND/ÂΜL (ref 0–0.61)
EOSINOPHIL NFR BLD AUTO: 3 % (ref 0–6)
ERYTHROCYTE [DISTWIDTH] IN BLOOD BY AUTOMATED COUNT: 12.6 % (ref 11.6–15.1)
GFR SERPL CREATININE-BSD FRML MDRD: 3 ML/MIN/1.73SQ M
GFR SERPL CREATININE-BSD FRML MDRD: 3 ML/MIN/1.73SQ M
GLUCOSE P FAST SERPL-MCNC: 102 MG/DL (ref 65–99)
GLUCOSE SERPL-MCNC: 152 MG/DL (ref 65–140)
GLUCOSE UR STRIP-MCNC: NEGATIVE MG/DL
GLUCOSE UR STRIP-MCNC: NEGATIVE MG/DL
HCO3 BLDV-SCNC: 18.3 MMOL/L (ref 24–30)
HCT VFR BLD AUTO: 26.6 % (ref 36.5–49.3)
HDLC SERPL-MCNC: 32 MG/DL
HGB BLD-MCNC: 9.3 G/DL (ref 12–17)
HGB UR QL STRIP.AUTO: ABNORMAL
HGB UR QL STRIP.AUTO: ABNORMAL
IMM GRANULOCYTES # BLD AUTO: 0.02 THOUSAND/UL (ref 0–0.2)
IMM GRANULOCYTES NFR BLD AUTO: 0 % (ref 0–2)
KETONES UR STRIP-MCNC: NEGATIVE MG/DL
KETONES UR STRIP-MCNC: NEGATIVE MG/DL
LDLC SERPL CALC-MCNC: 127 MG/DL (ref 0–100)
LEUKOCYTE ESTERASE UR QL STRIP: ABNORMAL
LEUKOCYTE ESTERASE UR QL STRIP: ABNORMAL
LIPASE SERPL-CCNC: 38 U/L (ref 11–82)
LYMPHOCYTES # BLD AUTO: 1.2 THOUSANDS/ÂΜL (ref 0.6–4.47)
LYMPHOCYTES NFR BLD AUTO: 14 % (ref 14–44)
MCH RBC QN AUTO: 30.9 PG (ref 26.8–34.3)
MCHC RBC AUTO-ENTMCNC: 35 G/DL (ref 31.4–37.4)
MCV RBC AUTO: 88 FL (ref 82–98)
MONOCYTES # BLD AUTO: 0.68 THOUSAND/ÂΜL (ref 0.17–1.22)
MONOCYTES NFR BLD AUTO: 8 % (ref 4–12)
NEUTROPHILS # BLD AUTO: 6.58 THOUSANDS/ÂΜL (ref 1.85–7.62)
NEUTS SEG NFR BLD AUTO: 74 % (ref 43–75)
NITRITE UR QL STRIP: NEGATIVE
NITRITE UR QL STRIP: NEGATIVE
NON-SQ EPI CELLS URNS QL MICRO: ABNORMAL /HPF
NON-SQ EPI CELLS URNS QL MICRO: ABNORMAL /HPF
NONHDLC SERPL-MCNC: 165 MG/DL
NRBC BLD AUTO-RTO: 0 /100 WBCS
O2 CT BLDV-SCNC: 9.6 ML/DL
PCO2 BLDV: 41.5 MM HG (ref 42–50)
PH BLDV: 7.26 [PH] (ref 7.3–7.4)
PH UR STRIP.AUTO: 5.5 [PH]
PH UR STRIP.AUTO: 5.5 [PH]
PLATELET # BLD AUTO: 275 THOUSANDS/UL (ref 149–390)
PMV BLD AUTO: 10.7 FL (ref 8.9–12.7)
PO2 BLDV: 40.6 MM HG (ref 35–45)
POTASSIUM SERPL-SCNC: 3.4 MMOL/L (ref 3.5–5.3)
POTASSIUM SERPL-SCNC: 3.7 MMOL/L (ref 3.5–5.3)
PROT SERPL-MCNC: 8.2 G/DL (ref 6.4–8.4)
PROT UR STRIP-MCNC: ABNORMAL MG/DL
PROT UR STRIP-MCNC: ABNORMAL MG/DL
RBC # BLD AUTO: 3.01 MILLION/UL (ref 3.88–5.62)
RBC #/AREA URNS AUTO: ABNORMAL /HPF
RBC #/AREA URNS AUTO: ABNORMAL /HPF
SARS-COV-2 AG UPPER RESP QL IA: NEGATIVE
SODIUM SERPL-SCNC: 136 MMOL/L (ref 135–147)
SODIUM SERPL-SCNC: 139 MMOL/L (ref 135–147)
SP GR UR STRIP.AUTO: 1.01 (ref 1–1.03)
SP GR UR STRIP.AUTO: 1.01 (ref 1–1.03)
TRIGL SERPL-MCNC: 189 MG/DL
TSH SERPL DL<=0.05 MIU/L-ACNC: 1.55 UIU/ML (ref 0.45–4.5)
UROBILINOGEN UR STRIP-ACNC: <2 MG/DL
UROBILINOGEN UR STRIP-ACNC: <2 MG/DL
VALID CONTROL: NORMAL
WBC # BLD AUTO: 8.84 THOUSAND/UL (ref 4.31–10.16)
WBC #/AREA URNS AUTO: ABNORMAL /HPF
WBC #/AREA URNS AUTO: ABNORMAL /HPF

## 2023-08-21 PROCEDURE — 80048 BASIC METABOLIC PNL TOTAL CA: CPT | Performed by: PHYSICIAN ASSISTANT

## 2023-08-21 PROCEDURE — 5A1D70Z PERFORMANCE OF URINARY FILTRATION, INTERMITTENT, LESS THAN 6 HOURS PER DAY: ICD-10-PCS | Performed by: RADIOLOGY

## 2023-08-21 PROCEDURE — 96361 HYDRATE IV INFUSION ADD-ON: CPT

## 2023-08-21 PROCEDURE — 87811 SARS-COV-2 COVID19 W/OPTIC: CPT

## 2023-08-21 PROCEDURE — 85025 COMPLETE CBC W/AUTO DIFF WBC: CPT

## 2023-08-21 PROCEDURE — 99285 EMERGENCY DEPT VISIT HI MDM: CPT | Performed by: PHYSICIAN ASSISTANT

## 2023-08-21 PROCEDURE — 99214 OFFICE O/P EST MOD 30 MIN: CPT

## 2023-08-21 PROCEDURE — 87636 SARSCOV2 & INF A&B AMP PRB: CPT

## 2023-08-21 PROCEDURE — 81001 URINALYSIS AUTO W/SCOPE: CPT

## 2023-08-21 PROCEDURE — 83690 ASSAY OF LIPASE: CPT

## 2023-08-21 PROCEDURE — 84443 ASSAY THYROID STIM HORMONE: CPT

## 2023-08-21 PROCEDURE — 80061 LIPID PANEL: CPT

## 2023-08-21 PROCEDURE — 36415 COLL VENOUS BLD VENIPUNCTURE: CPT

## 2023-08-21 PROCEDURE — G1004 CDSM NDSC: HCPCS

## 2023-08-21 PROCEDURE — 82150 ASSAY OF AMYLASE: CPT

## 2023-08-21 PROCEDURE — 87086 URINE CULTURE/COLONY COUNT: CPT

## 2023-08-21 PROCEDURE — 87086 URINE CULTURE/COLONY COUNT: CPT | Performed by: INTERNAL MEDICINE

## 2023-08-21 PROCEDURE — 82805 BLOOD GASES W/O2 SATURATION: CPT | Performed by: PHYSICIAN ASSISTANT

## 2023-08-21 PROCEDURE — 96374 THER/PROPH/DIAG INJ IV PUSH: CPT

## 2023-08-21 PROCEDURE — 99223 1ST HOSP IP/OBS HIGH 75: CPT | Performed by: INTERNAL MEDICINE

## 2023-08-21 PROCEDURE — 02H633Z INSERTION OF INFUSION DEVICE INTO RIGHT ATRIUM, PERCUTANEOUS APPROACH: ICD-10-PCS | Performed by: RADIOLOGY

## 2023-08-21 PROCEDURE — 71250 CT THORAX DX C-: CPT

## 2023-08-21 PROCEDURE — 80053 COMPREHEN METABOLIC PANEL: CPT

## 2023-08-21 PROCEDURE — 99284 EMERGENCY DEPT VISIT MOD MDM: CPT

## 2023-08-21 PROCEDURE — 0JH63XZ INSERTION OF TUNNELED VASCULAR ACCESS DEVICE INTO CHEST SUBCUTANEOUS TISSUE AND FASCIA, PERCUTANEOUS APPROACH: ICD-10-PCS | Performed by: RADIOLOGY

## 2023-08-21 PROCEDURE — 74176 CT ABD & PELVIS W/O CONTRAST: CPT

## 2023-08-21 PROCEDURE — 81001 URINALYSIS AUTO W/SCOPE: CPT | Performed by: INTERNAL MEDICINE

## 2023-08-21 RX ORDER — FLUTICASONE PROPIONATE 50 MCG
1 SPRAY, SUSPENSION (ML) NASAL DAILY
Qty: 15.8 ML | Refills: 1 | Status: SHIPPED | OUTPATIENT
Start: 2023-08-21

## 2023-08-21 RX ORDER — FAMOTIDINE 20 MG/1
20 TABLET, FILM COATED ORAL 2 TIMES DAILY
Qty: 60 TABLET | Refills: 1 | Status: SHIPPED | OUTPATIENT
Start: 2023-08-21

## 2023-08-21 RX ORDER — ONDANSETRON 4 MG/1
4 TABLET, FILM COATED ORAL EVERY 8 HOURS PRN
Qty: 20 TABLET | Refills: 0 | Status: SHIPPED | OUTPATIENT
Start: 2023-08-21

## 2023-08-21 RX ORDER — TRAZODONE HYDROCHLORIDE 50 MG/1
50 TABLET ORAL
Status: DISCONTINUED | OUTPATIENT
Start: 2023-08-21 | End: 2023-09-01 | Stop reason: HOSPADM

## 2023-08-21 RX ORDER — SODIUM CHLORIDE, SODIUM LACTATE, POTASSIUM CHLORIDE, CALCIUM CHLORIDE 600; 310; 30; 20 MG/100ML; MG/100ML; MG/100ML; MG/100ML
175 INJECTION, SOLUTION INTRAVENOUS CONTINUOUS
Status: DISCONTINUED | OUTPATIENT
Start: 2023-08-21 | End: 2023-08-21

## 2023-08-21 RX ORDER — ONDANSETRON 2 MG/ML
4 INJECTION INTRAMUSCULAR; INTRAVENOUS ONCE
Status: COMPLETED | OUTPATIENT
Start: 2023-08-21 | End: 2023-08-21

## 2023-08-21 RX ORDER — ACETAMINOPHEN 325 MG/1
650 TABLET ORAL EVERY 6 HOURS PRN
Status: DISCONTINUED | OUTPATIENT
Start: 2023-08-21 | End: 2023-09-01 | Stop reason: HOSPADM

## 2023-08-21 RX ORDER — DEXTROMETHORPHAN HYDROBROMIDE AND PROMETHAZINE HYDROCHLORIDE 15; 6.25 MG/5ML; MG/5ML
5 SYRUP ORAL 4 TIMES DAILY PRN
Qty: 240 ML | Refills: 1 | Status: SHIPPED | OUTPATIENT
Start: 2023-08-21 | End: 2023-09-01

## 2023-08-21 RX ORDER — SODIUM CHLORIDE 9 MG/ML
125 INJECTION, SOLUTION INTRAVENOUS CONTINUOUS
Status: DISCONTINUED | OUTPATIENT
Start: 2023-08-21 | End: 2023-08-22

## 2023-08-21 RX ADMIN — ONDANSETRON 4 MG: 2 INJECTION INTRAMUSCULAR; INTRAVENOUS at 20:30

## 2023-08-21 RX ADMIN — SODIUM CHLORIDE 1000 ML: 0.9 INJECTION, SOLUTION INTRAVENOUS at 20:30

## 2023-08-21 RX ADMIN — TRAZODONE HYDROCHLORIDE 50 MG: 50 TABLET ORAL at 22:12

## 2023-08-21 RX ADMIN — SODIUM CHLORIDE 125 ML/HR: 0.9 INJECTION, SOLUTION INTRAVENOUS at 22:23

## 2023-08-21 RX ADMIN — SODIUM CHLORIDE, SODIUM LACTATE, POTASSIUM CHLORIDE, AND CALCIUM CHLORIDE 2000 ML: .6; .31; .03; .02 INJECTION, SOLUTION INTRAVENOUS at 21:46

## 2023-08-21 NOTE — TELEPHONE ENCOUNTER
Reason for Disposition  • [1] Caller has URGENT question (includes prescribed medication questions) AND [2] triager unable to answer question    Answer Assessment - Initial Assessment Questions  1. MAIN CONCERN OR SYMPTOM:  "What is your main concern right now?" "What question do you have?" "What's the main symptom you're worried about?" (e.g., breathing difficulty, cough, fever. pain)      Had an appointment with Jaocbo Lagunas today; labs and CT ordered. Results came across through [address] [place] [address] and patient  Would like to know if he should go to ED now or wait until tomorrow.     Protocols used: RECENT MEDICAL VISIT FOR ILLNESS FOLLOW-UP CALL-ADULT-

## 2023-08-21 NOTE — TELEPHONE ENCOUNTER
Dr. Lopez updated on bilirubin results of 8.5 at 42 hours of life (Low intermediate). No new orders. Ok to discharge home.    Regarding: test results concern  ----- Message from Ellyn Cardoza sent at 8/21/2023  5:38 PM EDT -----  "I had blood work done today and I have a history of kidney stone and my levels were high.  I was told based on the results I may have to go to the Ed"

## 2023-08-21 NOTE — PROGRESS NOTES
Assessment/Plan:         Problem List Items Addressed This Visit        Genitourinary    Chronic kidney disease, stage 4 (severe) ([facility])    Relevant Orders    Comprehensive metabolic panel    CBC and differential   Other Visit Diagnoses     Acute cough    -  Primary    CT, symptomatic treatment, concerned with fluid over load, also could be from acid reflux from vomiting. Will call with results of imaging. Relevant Medications    fluticasone (FLONASE) 50 mcg/act nasal spray    promethazine-dextromethorphan (PHENERGAN-DM) 6.25-15 mg/5 mL oral syrup    Other Relevant Orders    CT chest abdomen pelvis wo contrast    Bilious vomiting with nausea        Have CT, worried about pylonephritis due to kidney stones, will do non con due to kidney functions, tx symptoms instructions given. BRATY diet. Relevant Medications    ondansetron (ZOFRAN) 4 mg tablet    famotidine (PEPCID) 20 mg tablet    Other Relevant Orders    Amylase    Lipase    Urinalysis with microscopic    Urine culture    CT chest abdomen pelvis wo contrast            Subjective:      Patient ID: Malini Le is a 25 y.o. male. Alex Mora is here because he passed a large kidney stone last Monday night and has been throwing up since. He started with a cough on Friday. Tuesday-Thursday he vomited once in the am and has been throwing up more every day since. He is hydrating as best as he can with water. No food is staying down. He has lost 5 lbs since last Monday. The following portions of the patient's history were reviewed and updated as appropriate:   Past Medical History:  He has a past medical history of Anxiety, Chronic kidney disease, GERD (gastroesophageal reflux disease), Kidney stone, Liver disorder, Nephrolithiasis, and Primary hyperoxaluria type 1 ([address]). ,  _______________________________________________________________________  Medical Problems:  does not have any pertinent problems on file.,  _______________________________________________________________________  Past Surgical History:   has a past surgical history that includes Hernia repair; Lithotripsy; Mass excision (Left, 02/17/2017); Ankle surgery (Right, 2017); pr cysto/uretero w/lithotripsy &indwell stent insrt (Left, 10/24/2020); FL retrograde pyelogram (10/24/2020); Cystoscopy; pr cysto/uretero w/lithotripsy &indwell stent insrt (Right, 02/08/2021); FL retrograde pyelogram (02/08/2021); and Kidney stone surgery. ,  _______________________________________________________________________  Family History:  family history includes Alzheimer's disease in his maternal grandfather; Cancer in his maternal grandmother; Diabetes in his maternal grandmother; Hypertension in his mother; No Known Problems in his father.,  _______________________________________________________________________  Social History:   reports that he has never smoked. He has never used smokeless tobacco. He reports current alcohol use. He reports that he does not use drugs. ,  _______________________________________________________________________  Allergies:  has No Known Allergies. .  _______________________________________________________________________  Current Outpatient Medications   Medication Sig Dispense Refill   • famotidine (PEPCID) 20 mg tablet Take 1 tablet (20 mg total) by mouth 2 (two) times a day 60 tablet 1   • fluticasone (FLONASE) 50 mcg/act nasal spray 1 spray into each nostril daily 15.8 mL 1   • ondansetron (ZOFRAN) 4 mg tablet Take 1 tablet (4 mg total) by mouth every 8 (eight) hours as needed for nausea or vomiting 20 tablet 0   • promethazine-dextromethorphan (PHENERGAN-DM) 6.25-15 mg/5 mL oral syrup Take 5 mL by mouth 4 (four) times a day as needed for cough 240 mL 1   • traZODone (DESYREL) 50 mg tablet TAKE 1 TABLET BY MOUTH EVERYDAY AT BEDTIME 90 tablet 3     No current facility-administered medications for this visit. _______________________________________________________________________  Review of Systems   Constitutional: Negative for chills, diaphoresis, fatigue and fever. HENT: Negative for congestion, ear pain, postnasal drip, rhinorrhea, sinus pressure, sinus pain and sore throat. Eyes: Negative for pain and visual disturbance. Respiratory: Negative for cough, chest tightness, shortness of breath and wheezing. Cardiovascular: Negative for chest pain and palpitations. Gastrointestinal: Positive for vomiting. Negative for abdominal pain, constipation and diarrhea. Genitourinary: Negative for dysuria, frequency and hematuria. Musculoskeletal: Positive for back pain. Negative for arthralgias. Skin: Negative for color change and rash. Neurological: Positive for headaches. Negative for dizziness, seizures, syncope and light-headedness. Psychiatric/Behavioral: Negative for dysphoric mood and sleep disturbance. The patient is not nervous/anxious. All other systems reviewed and are negative. Objective:  Vitals:    08/21/23 1339   BP: 124/88   BP Location: Left arm   Patient Position: Sitting   Cuff Size: Standard   Pulse: 104   Temp: 98.9 °F (37.2 °C)   SpO2: 98%   Weight: 105 kg (231 lb 3.2 oz)   Height: 6' (1.829 m)     Body mass index is 31.36 kg/m².      Physical Exam

## 2023-08-22 LAB
ANION GAP SERPL CALCULATED.3IONS-SCNC: 10 MMOL/L
ANION GAP SERPL CALCULATED.3IONS-SCNC: 12 MMOL/L
ANION GAP SERPL CALCULATED.3IONS-SCNC: 12 MMOL/L
BACTERIA UR CULT: NORMAL
BUN SERPL-MCNC: 72 MG/DL (ref 5–25)
BUN SERPL-MCNC: 74 MG/DL (ref 5–25)
BUN SERPL-MCNC: 75 MG/DL (ref 5–25)
CALCIUM SERPL-MCNC: 8.1 MG/DL (ref 8.4–10.2)
CALCIUM SERPL-MCNC: 8.3 MG/DL (ref 8.4–10.2)
CALCIUM SERPL-MCNC: 8.6 MG/DL (ref 8.4–10.2)
CHLORIDE SERPL-SCNC: 106 MMOL/L (ref 96–108)
CHLORIDE SERPL-SCNC: 106 MMOL/L (ref 96–108)
CHLORIDE SERPL-SCNC: 107 MMOL/L (ref 96–108)
CO2 SERPL-SCNC: 18 MMOL/L (ref 21–32)
CO2 SERPL-SCNC: 21 MMOL/L (ref 21–32)
CO2 SERPL-SCNC: 21 MMOL/L (ref 21–32)
CREAT SERPL-MCNC: 13.57 MG/DL (ref 0.6–1.3)
CREAT SERPL-MCNC: 14.36 MG/DL (ref 0.6–1.3)
CREAT SERPL-MCNC: 14.36 MG/DL (ref 0.6–1.3)
CREAT UR-MCNC: 70.8 MG/DL
FLUAV RNA RESP QL NAA+PROBE: NEGATIVE
FLUBV RNA RESP QL NAA+PROBE: NEGATIVE
GFR SERPL CREATININE-BSD FRML MDRD: 4 ML/MIN/1.73SQ M
GLUCOSE SERPL-MCNC: 120 MG/DL (ref 65–140)
GLUCOSE SERPL-MCNC: 93 MG/DL (ref 65–140)
GLUCOSE SERPL-MCNC: 94 MG/DL (ref 65–140)
MICROALBUMIN UR-MCNC: 158 MG/L (ref 0–20)
MICROALBUMIN/CREAT 24H UR: 223 MG/G CREATININE (ref 0–30)
POTASSIUM SERPL-SCNC: 3.3 MMOL/L (ref 3.5–5.3)
POTASSIUM SERPL-SCNC: 3.5 MMOL/L (ref 3.5–5.3)
POTASSIUM SERPL-SCNC: 3.6 MMOL/L (ref 3.5–5.3)
SARS-COV-2 RNA RESP QL NAA+PROBE: NEGATIVE
SODIUM 24H UR-SCNC: 61 MOL/L
SODIUM SERPL-SCNC: 137 MMOL/L (ref 135–147)
SODIUM SERPL-SCNC: 137 MMOL/L (ref 135–147)
SODIUM SERPL-SCNC: 139 MMOL/L (ref 135–147)
UUN 24H UR-MCNC: 231 MG/DL

## 2023-08-22 PROCEDURE — 84540 ASSAY OF URINE/UREA-N: CPT | Performed by: INTERNAL MEDICINE

## 2023-08-22 PROCEDURE — 84300 ASSAY OF URINE SODIUM: CPT | Performed by: INTERNAL MEDICINE

## 2023-08-22 PROCEDURE — 82570 ASSAY OF URINE CREATININE: CPT | Performed by: INTERNAL MEDICINE

## 2023-08-22 PROCEDURE — 80048 BASIC METABOLIC PNL TOTAL CA: CPT | Performed by: INTERNAL MEDICINE

## 2023-08-22 PROCEDURE — 99233 SBSQ HOSP IP/OBS HIGH 50: CPT | Performed by: INTERNAL MEDICINE

## 2023-08-22 PROCEDURE — 99255 IP/OBS CONSLTJ NEW/EST HI 80: CPT | Performed by: INTERNAL MEDICINE

## 2023-08-22 PROCEDURE — 82043 UR ALBUMIN QUANTITATIVE: CPT | Performed by: INTERNAL MEDICINE

## 2023-08-22 PROCEDURE — 36415 COLL VENOUS BLD VENIPUNCTURE: CPT | Performed by: INTERNAL MEDICINE

## 2023-08-22 RX ORDER — POTASSIUM CHLORIDE 20 MEQ/1
20 TABLET, EXTENDED RELEASE ORAL ONCE
Status: COMPLETED | OUTPATIENT
Start: 2023-08-22 | End: 2023-08-22

## 2023-08-22 RX ORDER — ONDANSETRON 2 MG/ML
4 INJECTION INTRAMUSCULAR; INTRAVENOUS ONCE
Status: COMPLETED | OUTPATIENT
Start: 2023-08-22 | End: 2023-08-22

## 2023-08-22 RX ADMIN — POTASSIUM CHLORIDE 20 MEQ: 1500 TABLET, EXTENDED RELEASE ORAL at 10:19

## 2023-08-22 RX ADMIN — SODIUM BICARBONATE 125 ML/HR: 84 INJECTION, SOLUTION INTRAVENOUS at 20:16

## 2023-08-22 RX ADMIN — ONDANSETRON 4 MG: 2 INJECTION INTRAMUSCULAR; INTRAVENOUS at 17:46

## 2023-08-22 RX ADMIN — TRAZODONE HYDROCHLORIDE 50 MG: 50 TABLET ORAL at 22:23

## 2023-08-22 RX ADMIN — SODIUM BICARBONATE 125 ML/HR: 84 INJECTION, SOLUTION INTRAVENOUS at 10:18

## 2023-08-22 RX ADMIN — ACETAMINOPHEN 650 MG: 325 TABLET, FILM COATED ORAL at 19:07

## 2023-08-22 NOTE — PLAN OF CARE
Problem: PAIN - ADULT  Goal: Verbalizes/displays adequate comfort level or baseline comfort level  Description: Interventions:  - Encourage patient to monitor pain and request assistance  - Assess pain using appropriate pain scale  - Administer analgesics based on type and severity of pain and evaluate response  - Implement non-pharmacological measures as appropriate and evaluate response  - Consider cultural and social influences on pain and pain management  - Notify physician/advanced practitioner if interventions unsuccessful or patient reports new pain  Outcome: Progressing     Problem: INFECTION - ADULT  Goal: Absence or prevention of progression during hospitalization  Description: INTERVENTIONS:  - Assess and monitor for signs and symptoms of infection  - Monitor lab/diagnostic results  - Monitor all insertion sites, i.e. indwelling lines, tubes, and drains  - Monitor endotracheal if appropriate and nasal secretions for changes in amount and color  - Bureau appropriate cooling/warming therapies per order  - Administer medications as ordered  - Instruct and encourage patient and family to use good hand hygiene technique  - Identify and instruct in appropriate isolation precautions for identified infection/condition  Outcome: Progressing  Goal: Absence of fever/infection during neutropenic period  Description: INTERVENTIONS:  - Monitor WBC    Outcome: Progressing     Problem: SAFETY ADULT  Goal: Patient will remain free of falls  Description: INTERVENTIONS:  - Educate patient/family on patient safety including physical limitations  - Instruct patient to call for assistance with activity   - Consult OT/PT to assist with strengthening/mobility   - Keep Call bell within reach  - Keep bed low and locked with side rails adjusted as appropriate  - Keep care items and personal belongings within reach  - Initiate and maintain comfort rounds  - Make Fall Risk Sign visible to staff  - Offer Toileting every  Hours, in advance of need  - Initiate/Maintain alarm  - Obtain necessary fall risk management equipment:   - Apply yellow socks and bracelet for high fall risk patients  - Consider moving patient to room near nurses station  Outcome: Progressing  Goal: Maintain or return to baseline ADL function  Description: INTERVENTIONS:  -  Assess patient's ability to carry out ADLs; assess patient's baseline for ADL function and identify physical deficits which impact ability to perform ADLs (bathing, care of mouth/teeth, toileting, grooming, dressing, etc.)  - Assess/evaluate cause of self-care deficits   - Assess range of motion  - Assess patient's mobility; develop plan if impaired  - Assess patient's need for assistive devices and provide as appropriate  - Encourage maximum independence but intervene and supervise when necessary  - Involve family in performance of ADLs  - Assess for home care needs following discharge   - Consider OT consult to assist with ADL evaluation and planning for discharge  - Provide patient education as appropriate  Outcome: Progressing  Goal: Maintains/Returns to pre admission functional level  Description: INTERVENTIONS:  - Perform BMAT or MOVE assessment daily.   - Set and communicate daily mobility goal to care team and patient/family/caregiver. - Collaborate with rehabilitation services on mobility goals if consulted  - Perform Range of Motion  times a day. - Reposition patient every  hours.   - Dangle patient  times a day  - Stand patient  times a day  - Ambulate patient  times a day  - Out of bed to chair  times a day   - Out of bed for meal times a day  - Out of bed for toileting  - Record patient progress and toleration of activity level   Outcome: Progressing     Problem: DISCHARGE PLANNING  Goal: Discharge to home or other facility with appropriate resources  Description: INTERVENTIONS:  - Identify barriers to discharge w/patient and caregiver  - Arrange for needed discharge resources and transportation as appropriate  - Identify discharge learning needs (meds, wound care, etc.)  - Arrange for interpretive services to assist at discharge as needed  - Refer to Case Management Department for coordinating discharge planning if the patient needs post-hospital services based on physician/advanced practitioner order or complex needs related to functional status, cognitive ability, or social support system  Outcome: Progressing     Problem: Knowledge Deficit  Goal: Patient/family/caregiver demonstrates understanding of disease process, treatment plan, medications, and discharge instructions  Description: Complete learning assessment and assess knowledge base.   Interventions:  - Provide teaching at level of understanding  - Provide teaching via preferred learning methods  Outcome: Progressing

## 2023-08-22 NOTE — CONSULTS
NEPHROLOGY CONSULTATION NOTE    Patient: Kevin Soto               Sex: male          DOA: 8/21/2023  7:53 PM   YOB: 1995         Age: 25 y.o.         LOS:  LOS: 1 day   Encounter Date: 8/22/2023    REFERRING PHYSICIAN: DO Zahira Monte / CONSULTATION: Further management of MECHELLE    DATE OF CONSULTATION / SERVICE: 8/22/2023    ADMISSION DIAGNOSIS: Acute renal failure (ARF) [facility])     Chief Complaint   Patient presents with   • Abnormal Lab     Pt reports his was told to come in due to his creatinine levels and GFR. HPI     This is 47-year-old male, presented to ER for further management of abnormal blood work. On admission patient was found to have elevated creatinine and nephrology were consulted for further management of MECHELLE. I have reviewed patient's chart yesterday but physically seen and examined patient earlier this morning. Upon review of old medical record from epic chart review and also from [facility], patient has not been followed by nephrologist at [facility] and patient has underlying CKD stage IV with previously known baseline creatinine thought to be around 2.8. Her admission creatinine was 14.7. 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 In the past patient has received Lumasiran for 3 months but due to lack of insurance has stopped receiving that medication. According to patient he is not even using potassium citrate due to advanced chronic kidney disease and trying to hydrate himself for management of nephrolithiasis. Patient thought that he may have passed a kidney stone last week and has contacted PCP who has ordered CT scan along with blood work which revealed elevated creatinine and was advised to go to ER for further evaluation.   Patient is currently receiving IV fluid and breathing is currently stable. According to patient he was not nauseated earlier but now feeling better. Currently patient denies nausea, vomiting, headache, dizziness, abdominal pain, constipation or rash.     PAST MEDICAL HISTORY     Past Medical History:   Diagnosis Date   • Anxiety    • Chronic kidney disease    • GERD (gastroesophageal reflux disease)    • Kidney stone    • Liver disorder    • Nephrolithiasis    • Primary hyperoxaluria type 1 ([address])        PAST SURGICAL HISTORY     Past Surgical History:   Procedure Laterality Date   • ANKLE SURGERY Right 2017    ligament repair   • CYSTOSCOPY     • FL RETROGRADE PYELOGRAM  10/24/2020   • FL RETROGRADE PYELOGRAM  02/08/2021   • HERNIA REPAIR     • KIDNEY STONE SURGERY     • LITHOTRIPSY     • MASS EXCISION Left 02/17/2017    Procedure: BACK/SHOULDER CYST EXCISION ;  Surgeon: Malinda Garcia MD;  Location: [facility];  Service:    • FL CYSTO/URETERO W/LITHOTRIPSY &INDWELL STENT INSRT Left 10/24/2020    Procedure: CYSTOSCOPY URETEROSCOPY WITH LITHOTRIPSY HOLMIUM LASER, RETROGRADE PYELOGRAM AND INSERTION STENT URETERAL;  Surgeon: Kay Watson MD;  Location: [facility];  Service: Urology   • FL CYSTO/URETERO W/LITHOTRIPSY &INDWELL STENT INSRT Right 02/08/2021    Procedure: CYSTOSCOPY URETEROSCOPY WITH LITHOTRIPSY HOLMIUM LASER, RETROGRADE PYELOGRAM AND INSERTION STENT URETERAL;  Surgeon: Margarita Claros MD;  Location: [facility];  Service: Urology       ALLERGIES     No Known Allergies    SOCIAL HISTORY     Social History     Substance and Sexual Activity   Alcohol Use Yes    Comment: rarely     Social History     Substance and Sexual Activity   Drug Use No     Social History     Tobacco Use   Smoking Status Never   Smokeless Tobacco Never       FAMILY HISTORY     Family History   Problem Relation Age of Onset   • Hypertension Mother    • No Known Problems Father    • Cancer Maternal Grandmother         Bladder   • Diabetes Maternal Grandmother    • Alzheimer's disease Maternal Grandfather        CURRENT MEDICATIONS       Current Facility-Administered Medications:   •  acetaminophen (TYLENOL) tablet 650 mg, 650 mg, Oral, Q6H PRN, Janice Ortega DO  •  sodium bicarbonate 75 mEq in sodium chloride 0.45 % 1,000 mL infusion, 125 mL/hr, Intravenous, Continuous, Teresita Maya MD, Last Rate: 125 mL/hr at 08/22/23 1018, 125 mL/hr at 08/22/23 1018  •  traZODone (DESYREL) tablet 50 mg, 50 mg, Oral, HS, Janice Ortega DO, 50 mg at 08/21/23 2212    REVIEW OF SYSTEMS     Complete 10 points of review of systems were obtained and discussed in length with patient today. Complete 10 points of review of systems were negative/unremarkable except mentioned in the HPI section. OBJECTIVE     Current Weight: Weight - Scale: 105 kg (231 lb)  /77   Pulse 76   Temp 98.1 °F (36.7 °C)   Resp 18   Ht 6' (1.829 m)   Wt 105 kg (231 lb)   SpO2 99%   BMI 31.33 kg/m²   Vitals:    08/22/23 0848   BP: 126/77   Pulse: 76   Resp: 18   Temp: 98.1 °F (36.7 °C)   SpO2: 99%     Body mass index is 31.33 kg/m². Intake/Output Summary (Last 24 hours) at 8/22/2023 1257  Last data filed at 8/22/2023 0656  Gross per 24 hour   Intake 3000 ml   Output 1000 ml   Net 2000 ml       PHYSICAL EXAMINATION     Physical Exam  Constitutional:       General: He is not in acute distress. HENT:      Right Ear: External ear normal.   Eyes:      Conjunctiva/sclera:      Right eye: No hemorrhage. Neck:      Thyroid: No thyromegaly. Pulmonary:      Effort: No accessory muscle usage or respiratory distress. Abdominal:      General: There is no distension. Musculoskeletal:         General: No swelling. Skin:     General: Skin is warm. Coloration: Skin is not jaundiced. Psychiatric:         Behavior: Behavior is not combative.            LAB RESULTS        Results from last 7 days   Lab Units 08/22/23  0612 08/21/23  2021 08/21/23  1529   WBC Thousand/uL  --   --  8.84   HEMOGLOBIN g/dL  --   --  9.3*   HEMATOCRIT %  --   --  26.6*   PLATELETS Thousands/uL  --   --  275   SODIUM mmol/L 137 136 139   POTASSIUM mmol/L 3.3* 3.4* 3.7   CHLORIDE mmol/L 107 101 104   CO2 mmol/L 18* 21 19*   BUN mg/dL 72* 76* 78*   CREATININE mg/dL 13.57* 14.58* 14.70*   EGFR ml/min/1.73sq m 4 3 3   CALCIUM mg/dL 8.1* 9.3 9.6       I have personally reviewed the old medical records and patient's previously known baseline creatinine level is ~2.8    RADIOLOGY RESULTS     No orders to display       PLAN / RECOMMENDATIONS      1. MECHELLE on top of CKD stage IV. Present on admission, exact etiology is currently unclear but suspect patient may have component of underlying intravascular volume depletion versus progression of underlying chronic kidney disease. Upon review of old medical record from epic chart review and also from [facility], new baseline creatinine thought to be around 2.8 and now being followed by nephrologist at [facility]. Patient has underlying recurrent nephrolithiasis and genetic testing done in the past showed PH 1 mutation and was previously followed by nephrologist at [facility]. According to patient due to advanced chronic kidney disease he is no longer taking potassium citrate and trying to hydrate himself for management of nephrolithiasis. Patient would eventually need liver and kidney transplant in the future per primary nephrologist.    Patient's admission creatinine was 14.7. Started on IV fluid and overnight renal function has slightly improved though remains significantly above the baseline with current creatinine of 13.57. Patient was previously feeling nauseous but no longer feeling nauseous and also does not have any other uremic symptoms. No urgent indication for initiation of dialysis at this point.   Patient has underlying metabolic acidosis with bicarb of 18 and will plan to change IV fluid to 0.45% saline with 75 meq of bicarb at 125 mL/h today. Plan to check urine lites for further evaluation. Patient had underwent CT scan of abdomen and pelvis on admission which showed medullary nephrocalcinosis with multiple bilateral nonobstructive stone without any hydronephrosis. Discussed at length with patient today that if found to have worsening renal function or develops uremic symptoms, would consider initiation of dialysis. Plan to check BMP every 8 hours today. 2.  Hypokalemia. Multifactorial with current potassium of 3.3. Plan potassium chloride 20 meq p.o. x1 dose today. Thank you for the consultation to participate in patient's care. I have personally discussed my overall above mentioned plan and recommendation with the current [facility] physician and they agreed with my plan of changing IV fluid with bicarb and monitor renal function    Ifrah Engle MD  Nephrology  8/22/2023        Portions of the record may have been created with voice recognition software. Occasional wrong word or "sound a like" substitutions may have occurred due to the inherent limitations of voice recognition software. Read the chart carefully and recognize, using context, where substitutions have occurred.

## 2023-08-22 NOTE — PROGRESS NOTES
[address]  Progress Note  Name: Jane Faustin  MRN: [id number]  Unit/Bed#: -01 I Date of Admission: 8/21/2023   Date of Service: 8/22/2023 I Hospital Day: 1    Assessment/Plan   * Acute renal failure (ARF) ([address])  Assessment & Plan  · Patient with hx of CKD 4 coming in secondary to abnormal creatinine  · Patient states that about 1 week ago. Symptoms consistent with his prior kidney stone and did pass kidney stone about that time. Over the past couple days patient is woken up with episodes of nausea and vomiting. Patient denies any fevers chills, lightheadedness or dizziness on exam.  · Patient recently passed kidney stone  · Baseline kidney function appears to be around 2.6-2.8    · Change fluids to bicarbonate infusion  · Monitor BMP  · Avoid nephrotoxins  · Monitor urine output  · If no significant improvement patient may need renal replacement therapy during this hospitalization. Metabolic acidosis  Assessment & Plan  · pH 7.26 on presentation  · Secondary to underlying kidney dysfunction likely related to obstructive uropathy  · CT negative for any hydronephrosis  · Bicarbonate infusion    Anemia  Assessment & Plan  · Baseline hemoglobin appears to be around 11-12  · Hemoglobin 9.3 currently  · Patient denies any hematuria, bright red blood per rectum, hematemesis or melena  · In the setting of chronic disease, IV fluids  · Monitor hemoglobin    Hypokalemia  Assessment & Plan  · Potassium 3.4 on presentation  · Replace  · Recheck         VTE Pharmacologic Prophylaxis:   Pharmacologic: Pharmacologic VTE Prophylaxis contraindicated due to Patient is ambulatory  Mechanical VTE Prophylaxis in Place: Yes    Patient Centered Rounds: I have performed bedside rounds with nursing staff today.     Discussions with Specialists or Other Care Team Provider: Discussed with care management team    Education and Discussions with Family / Patient: Discussed with patient himself, he did not request me to talk to anyone today    Time Spent for Care: 1 hour. More than 50% of total time spent on counseling and coordination of care as described above. Current Length of Stay: 1 day(s)    Current Patient Status: Inpatient   Certification Statement: The patient will continue to require additional inpatient hospital stay due to need for IV fluids, kidney failure    Discharge Plan: Once stable 48-72h    Code Status: Level 1 - Full Code      Subjective:     Patient related this morning. He does mention feeling overall well. He does mention some nausea but denies any chest pain vomiting tingling  Kidney functions abnormal    Objective:     Vitals:   Temp (24hrs), Av.3 °F (36.8 °C), Min:97.9 °F (36.6 °C), Max:98.9 °F (37.2 °C)    Temp:  [97.9 °F (36.6 °C)-98.9 °F (37.2 °C)] 98.1 °F (36.7 °C)  HR:  [] 76  Resp:  [17-18] 18  BP: (103-177)/(54-91) 126/77  SpO2:  [93 %-100 %] 99 %  Body mass index is 31.33 kg/m². Input and Output Summary (last 24 hours): Intake/Output Summary (Last 24 hours) at 2023 1044  Last data filed at 2023 0656  Gross per 24 hour   Intake 3000 ml   Output 1000 ml   Net 2000 ml       Physical Exam:     Physical Exam  Vitals and nursing note reviewed. Constitutional:       Appearance: Normal appearance. He is obese. Comments: Male patient in bed, awake   HENT:      Head: Normocephalic and atraumatic. Right Ear: External ear normal.      Left Ear: External ear normal.      Nose: Nose normal. No congestion or rhinorrhea. Mouth/Throat:      Mouth: Mucous membranes are dry. Pharynx: Oropharynx is clear. No oropharyngeal exudate or posterior oropharyngeal erythema. Eyes:      General: No scleral icterus. Right eye: No discharge. Left eye: No discharge. Pupils: Pupils are equal, round, and reactive to light. Neck:      Vascular: No carotid bruit. Cardiovascular:      Rate and Rhythm: Normal rate and regular rhythm. Pulses: Normal pulses. Heart sounds: No murmur heard. No friction rub. No gallop. Pulmonary:      Effort: Pulmonary effort is normal. No respiratory distress. Breath sounds: Normal breath sounds. No stridor. No wheezing, rhonchi or rales. Abdominal:      General: Abdomen is flat. Bowel sounds are normal. There is no distension. Palpations: Abdomen is soft. There is no mass. Tenderness: There is no abdominal tenderness. There is no guarding or rebound. Hernia: No hernia is present. Musculoskeletal:         General: No swelling, tenderness, deformity or signs of injury. Normal range of motion. Cervical back: Normal range of motion. No rigidity. No muscular tenderness. Lymphadenopathy:      Cervical: No cervical adenopathy. Skin:     General: Skin is warm and dry. Capillary Refill: Capillary refill takes less than 2 seconds. Coloration: Skin is not jaundiced or pale. Findings: No bruising or erythema. Neurological:      General: No focal deficit present. Mental Status: He is alert and oriented to person, place, and time. Mental status is at baseline. Cranial Nerves: No cranial nerve deficit. Sensory: No sensory deficit. Motor: No weakness. Coordination: Coordination normal.      Deep Tendon Reflexes: Reflexes normal.   Psychiatric:         Mood and Affect: Mood normal.         Behavior: Behavior normal.         Thought Content:  Thought content normal.         Judgment: Judgment normal.         Additional Data:     Labs:    Results from last 7 days   Lab Units 08/21/23  1529   WBC Thousand/uL 8.84   HEMOGLOBIN g/dL 9.3*   HEMATOCRIT % 26.6*   PLATELETS Thousands/uL 275   NEUTROS PCT % 74   LYMPHS PCT % 14   MONOS PCT % 8   EOS PCT % 3     Results from last 7 days   Lab Units 08/22/23  0612 08/21/23  2021 08/21/23  1529   SODIUM mmol/L 137   < > 139   POTASSIUM mmol/L 3.3*   < > 3.7   CHLORIDE mmol/L 107   < > 104   CO2 mmol/L 18* < > 19*   BUN mg/dL 72*   < > 78*   CREATININE mg/dL 13.57*   < > 14.70*   ANION GAP mmol/L 12   < > 16   CALCIUM mg/dL 8.1*   < > 9.6   ALBUMIN g/dL  --   --  4.8   TOTAL BILIRUBIN mg/dL  --   --  0.41   ALK PHOS U/L  --   --  60   ALT U/L  --   --  15   AST U/L  --   --  16   GLUCOSE RANDOM mg/dL 94   < >  --     < > = values in this interval not displayed. * I Have Reviewed All Lab Data Listed Above. * Additional Pertinent Lab Tests Reviewed: [address] Admission Reviewed      Recent Cultures (last 7 days):           Last 24 Hours Medication List:   Current Facility-Administered Medications   Medication Dose Route Frequency Provider Last Rate   • acetaminophen  650 mg Oral Q6H PRN Luly Jameson DO     • sodium bicarbonate 75 mEq in sodium chloride 0.45 % 1,000 mL infusion  125 mL/hr Intravenous Continuous Domenic Liao  mL/hr (08/22/23 1018)   • traZODone  50 mg Oral HS Luly Jameson DO          Today, Patient Was Seen By: Joe Ramon MD    ** Please Note: Dictation voice to text software may have been used in the creation of this document.  **

## 2023-08-22 NOTE — UTILIZATION REVIEW
Initial Clinical Review    Admission: Date/Time/Statement:   Admission Orders (From admission, onward)     Ordered        08/21/23 2140  Inpatient Admission  Once                      Orders Placed This Encounter   Procedures   • Inpatient Admission     Standing Status:   Standing     Number of Occurrences:   1     Order Specific Question:   Level of Care     Answer:   Med Surg [16]     Order Specific Question:   Estimated length of stay     Answer:   More than 2 Midnights     Order Specific Question:   Certification     Answer:   I certify that inpatient services are medically necessary for this patient for a duration of greater than two midnights. See H&P and MD Progress Notes for additional information about the patient's course of treatment. ED Arrival Information     Expected   8/21/2023 18:30    Arrival   8/21/2023 18:36    Acuity   Urgent            Means of arrival   Walk-In    Escorted by   Family Member    Service   Hospitalist    Admission type   Emergency            Arrival complaint   Follow up for lab results           Chief Complaint   Patient presents with   • Abnormal Lab     Pt reports his was told to come in due to his creatinine levels and GFR. Initial Presentation: 25 y.o. male to ED from home w/ abnormal lab . 1 week ago sx consistent w/ kidney stone . Woke up w/ N/V . PMH of hyperoxaluria . CR 14.58, baseline 2.6-2.8. admitted IP status w/ acute renal failure and metabolic acidosis . Plan for IVF , nephrology consult . CT neg for hydronephrosis . Anemia 9.2 baseline 11-12 , monitor . K 3.4 monitor kidney function . Date:8/22   Day 2: change IVF to bicarb infusion , monitor BMP , urine output . K 3.4 replaced and recheck . Mm dry . CR 13.57 decreasing slowly . Cont IVF and monitor .      ED Triage Vitals [08/21/23 1854]   Temperature Pulse Respirations Blood Pressure SpO2   97.9 °F (36.6 °C) 101 18 (!) 177/84 100 %      Temp Source Heart Rate Source Patient Position - Orthostatic VS BP Location FiO2 (%)   Tympanic Monitor Sitting Left arm --      Pain Score       --          Wt Readings from Last 1 Encounters:   08/21/23 105 kg (231 lb)     Additional Vital Signs:   08/22/23 08:48:08 98.1 °F (36.7 °C) 76 18 126/77 93 99 % -- --   08/22/23 0800 -- 53 Abnormal  18 111/59 81 96 % None (Room air) Lying   08/22/23 0730 -- 55 18 117/58 83 98 % None (Room air) Sitting   08/22/23 0700 -- 67 18 117/71 89 97 % None (Room air) Lying   08/22/23 0630 -- 53 Abnormal  18 110/54 78 97 % None (Room air) Sitting   08/22/23 0600 -- 53 Abnormal  17 121/71 91 99 % None (Room air) Sitting   08/22/23 0430 -- 55 18 123/66 88 96 % None (Room air) Sitting   08/22/23 0330 -- 55 17 107/64 81 96 % None (Room air) Sitting   08/22/23 0230 -- 53 Abnormal  18 118/73 92 97 % None (Room air) Sitting   08/22/23 0200 -- 54 Abnormal  17 107/59 78 98 % -- Sitting   08/22/23 0100 -- 63 18 117/62 84 96 % None (Room air) Sitting   08/22/23 0030 -- 72 17 119/82 96 93 % None (Room air) Sitting   08/22/23 0000 -- 64 18 119/78 95 97 % None (Room air) Sitting   08/21/23 2330 -- 84 17 103/54 74 97 % None (Room air) Sitting   08/21/23 2300 -- 99 18 112/57 80 94 % -- Sitting   08/21/23 2230 -- 75 17 116/68 87 99 % None (Room air) Sitting   08/21/23 2215 -- 83 17 127/74 93 100 % None (Room air) --   08/21/23 2000 -- 97 17 169/91 121 99 % None (Room air) Sitting       Pertinent Labs/Diagnostic Test Results:   8/21 CT abd 1. No acute findings in the chest, abdomen or pelvis.     2. Medullary nephrocalcinosis evidenced by diffuse hyperattenuation of the renal pyramids bilaterally, which has increased from prior exams.     3. Multiple bilateral nonobstructing renal calculi.  No hydronephrosis.     No orders to display     Results from last 7 days   Lab Units 08/21/23  1503   SARS-COV-2  Negative     Results from last 7 days   Lab Units 08/21/23  1529   WBC Thousand/uL 8.84   HEMOGLOBIN g/dL 9.3*   HEMATOCRIT % 26.6*   PLATELETS Thousands/uL 275 NEUTROS ABS Thousands/µL 6.58         Results from last 7 days   Lab Units 08/22/23  0612 08/21/23 2021 08/21/23  1529   SODIUM mmol/L 137 136 139   POTASSIUM mmol/L 3.3* 3.4* 3.7   CHLORIDE mmol/L 107 101 104   CO2 mmol/L 18* 21 19*   ANION GAP mmol/L 12 14 16   BUN mg/dL 72* 76* 78*   CREATININE mg/dL 13.57* 14.58* 14.70*   EGFR ml/min/1.73sq m 4 3 3   CALCIUM mg/dL 8.1* 9.3 9.6     Results from last 7 days   Lab Units 08/21/23  1529   AST U/L 16   ALT U/L 15   ALK PHOS U/L 60   TOTAL PROTEIN g/dL 8.2   ALBUMIN g/dL 4.8   TOTAL BILIRUBIN mg/dL 0.41     Results from last 7 days   Lab Units 08/22/23  0612 08/21/23 2021   GLUCOSE RANDOM mg/dL 94 152*     Results from last 7 days   Lab Units 08/21/23 2021   PH MADALYN  7.262*   PCO2 MADALYN mm Hg 41.5*   PO2 MADALYN mm Hg 40.6   HCO3 MADALNY mmol/L 18.3*   BASE EXC MADALYN mmol/L -8.2   O2 CONTENT MADALYN ml/dL 9.6   O2 HGB, VENOUS % 68.4     Results from last 7 days   Lab Units 08/21/23  1529   TSH 3RD GENERATON uIU/mL 1.546     Results from last 7 days   Lab Units 08/21/23  1529   LIPASE u/L 38   AMYLASE IU/L 72     Results from last 7 days   Lab Units 08/22/23  1100 08/21/23  2207 08/21/23  1529   CLARITY UA   --  Clear Clear   COLOR UA   --  Colorless Light Yellow   SPEC GRAV UA   --  1.008 1.013   PH UA   --  5.5 5.5   GLUCOSE UA mg/dl  --  Negative Negative   KETONES UA mg/dl  --  Negative Negative   BLOOD UA   --  Small* Moderate*   PROTEIN UA mg/dl  --  50 (1+)* 100 (2+)*   NITRITE UA   --  Negative Negative   BILIRUBIN UA   --  Negative Negative   UROBILINOGEN UA (BE) mg/dl  --  <2.0 <2.0   LEUKOCYTES UA   --  Small* Moderate*   WBC UA /hpf  --  10-20* 20-30*   RBC UA /hpf  --  1-2 1-2   BACTERIA UA /hpf  --  None Seen None Seen   EPITHELIAL CELLS WET PREP /hpf  --  None Seen None Seen   SODIUM UR  61  --   --      Results from last 7 days   Lab Units 08/21/23  1503   INFLUENZA A PCR  Negative   INFLUENZA B PCR  Negative           ED Treatment:   Medication Administration from 08/21/2023 1800 to 08/22/2023 0840       Date/Time Order Dose Route Action     08/21/2023 2030 EDT sodium chloride 0.9 % bolus 1,000 mL 1,000 mL Intravenous New Bag     08/21/2023 2030 EDT ondansetron [facility]) injection 4 mg 4 mg Intravenous Given     08/21/2023 2146 EDT lactated ringers bolus 2,000 mL 2,000 mL Intravenous New Bag     08/21/2023 2212 EDT traZODone (DESYREL) tablet 50 mg 50 mg Oral Given     08/21/2023 2223 EDT sodium chloride 0.9 % infusion 125 mL/hr Intravenous New Bag        Past Medical History:   Diagnosis Date   • Anxiety    • Chronic kidney disease    • GERD (gastroesophageal reflux disease)    • Kidney stone    • Liver disorder    • Nephrolithiasis    • Primary hyperoxaluria type 1 ([address])      Present on Admission:  **None**      Admitting Diagnosis: Abnormal laboratory test [R89.9]  Acute renal failure (ARF) ([facility]) [N17.9]  MECHELLE (acute kidney injury) ([address]) [N17.9]  Age/Sex: 25 y.o. male  Admission Orders:  Scheduled Medications:  traZODone, 50 mg, Oral, HS      Continuous IV Infusions:  sodium bicarbonate 75 mEq in sodium chloride 0.45 % 1,000 mL infusion, 125 mL/hr, Intravenous, Continuous      PRN Meds:  acetaminophen, 650 mg, Oral, Q6H PRN      Up and OOB   Reg diet     IP CONSULT TO NEPHROLOGY    Network Utilization Review Department  ATTENTION: Please call with any questions or concerns to [phone] and carefully listen to the prompts so that you are directed to the right person. All voicemails are confidential.  Beny Echeverria all requests for admission clinical reviews, approved or denied determinations and any other requests to dedicated fax number below belonging to the campus where the patient is receiving treatment.  List of dedicated fax numbers for the Facilities:  FACILITY NAME UR FAX NUMBER   ADMISSION DENIALS (Administrative/Medical Necessity) [phone] 2303 [facility] (Maternity/NICU/Pediatrics) 800 [facility] [phone]   [facility] Yasmani Serra [address] [phone] 1505 [facility] [address] [place] [id number] [facility] [phone]   [address] [place] [address] [phone]

## 2023-08-22 NOTE — ASSESSMENT & PLAN NOTE
· pH 7.26 on presentation  · Secondary to underlying kidney dysfunction likely related to obstructive uropathy  · CT negative for any hydronephrosis

## 2023-08-22 NOTE — ASSESSMENT & PLAN NOTE
· pH 7.26 on presentation  · Secondary to underlying kidney dysfunction likely related to obstructive uropathy  · CT negative for any hydronephrosis  · Bicarbonate infusion

## 2023-08-22 NOTE — ED PROVIDER NOTES
History  Chief Complaint   Patient presents with   • Abnormal Lab     Pt reports his was told to come in due to his creatinine levels and GFR. This is a 24-year-old male patient with primary hyperoxaluria type I coming in for evaluation of abnormal outpatient lab testing. He states about 1 week ago he had symptoms that he felt were consistent with prior kidney stone and ultimately did pass a fairly large stone per his report. Since then each day he is woken up and had an episode of nausea and vomiting. Progressively worsening, first 1 episode today than 2 and has now had 3 episodes so far today. He denies any abdominal pain chest pain or shortness of breath. He has not had any fevers or chills. He went for outpatient testing today, had labs as well as a CT abdomen pelvis which showed no acute abnormalities but did show:    "Medullary nephrocalcinosis evidenced by diffuse hyperattenuation of the renal pyramids bilaterally, which has increased from prior exams"  Pt follows with [facility] nephrology, Dr. Erasto Chatman. History provided by:  Patient   used: No        Prior to Admission Medications   Prescriptions Last Dose Informant Patient Reported?  Taking?   famotidine (PEPCID) 20 mg tablet   No No   Sig: Take 1 tablet (20 mg total) by mouth 2 (two) times a day   fluticasone (FLONASE) 50 mcg/act nasal spray   No No   Si spray into each nostril daily   ondansetron (ZOFRAN) 4 mg tablet   No No   Sig: Take 1 tablet (4 mg total) by mouth every 8 (eight) hours as needed for nausea or vomiting   promethazine-dextromethorphan (PHENERGAN-DM) 6.25-15 mg/5 mL oral syrup   No No   Sig: Take 5 mL by mouth 4 (four) times a day as needed for cough   traZODone (DESYREL) 50 mg tablet  Self No No   Sig: TAKE 1 TABLET BY MOUTH EVERYDAY AT BEDTIME      Facility-Administered Medications: None       Past Medical History:   Diagnosis Date   • Anxiety    • Chronic kidney disease    • GERD (gastroesophageal reflux disease)    • Kidney stone    • Liver disorder    • Nephrolithiasis    • Primary hyperoxaluria type 1 [facility])        Past Surgical History:   Procedure Laterality Date   • ANKLE SURGERY Right 2017    ligament repair   • CYSTOSCOPY     • FL RETROGRADE PYELOGRAM  10/24/2020   • FL RETROGRADE PYELOGRAM  02/08/2021   • HERNIA REPAIR     • KIDNEY STONE SURGERY     • LITHOTRIPSY     • MASS EXCISION Left 02/17/2017    Procedure: BACK/SHOULDER CYST EXCISION ;  Surgeon: Malinda Garcia MD;  [facility];  Service:    • NM CYSTO/URETERO W/LITHOTRIPSY &INDWELL STENT INSRT Left 10/24/2020    Procedure: CYSTOSCOPY URETEROSCOPY WITH LITHOTRIPSY HOLMIUM LASER, RETROGRADE PYELOGRAM AND INSERTION STENT URETERAL;  Surgeon: Kay Watson MD;  Location: [facility];  Service: Urology   • NM CYSTO/URETERO W/LITHOTRIPSY &INDWELL STENT INSRT Right 02/08/2021    Procedure: CYSTOSCOPY URETEROSCOPY WITH LITHOTRIPSY HOLMIUM LASER, RETROGRADE PYELOGRAM AND INSERTION STENT URETERAL;  Surgeon: Margarita Claros MD;  [facility]: [facility];  Service: Urology       Family History   Problem Relation Age of Onset   • Hypertension Mother    • No Known Problems Father    • Cancer Maternal Grandmother         Bladder   • Diabetes Maternal Grandmother    • Alzheimer's disease Maternal Grandfather      I have reviewed and agree with the history as documented. E-Cigarette/Vaping   • E-Cigarette Use Never User      E-Cigarette/Vaping Substances     Social History     Tobacco Use   • Smoking status: Never   • Smokeless tobacco: Never   Vaping Use   • Vaping Use: Never used   Substance Use Topics   • Alcohol use: Yes     Comment: rarely   • Drug use: No       Review of Systems   Constitutional: Negative for chills and fever. HENT: Negative for ear pain and sore throat. Eyes: Negative for pain and visual disturbance. Respiratory: Negative for cough and shortness of breath. Cardiovascular: Negative for chest pain and palpitations. Gastrointestinal: Positive for nausea and vomiting. Negative for abdominal pain. Genitourinary: Negative for dysuria and hematuria. Musculoskeletal: Negative for arthralgias and back pain. Skin: Negative for color change and rash. Neurological: Negative for seizures and syncope. All other systems reviewed and are negative. Physical Exam  Physical Exam  Vitals and nursing note reviewed. Constitutional:       General: He is not in acute distress. Appearance: He is well-developed. HENT:      Head: Normocephalic and atraumatic. Eyes:      Conjunctiva/sclera: Conjunctivae normal.   Cardiovascular:      Rate and Rhythm: Normal rate and regular rhythm. Heart sounds: No murmur heard. Pulmonary:      Effort: Pulmonary effort is normal. No respiratory distress. Breath sounds: Normal breath sounds. Abdominal:      Palpations: Abdomen is soft. Tenderness: There is no abdominal tenderness. Musculoskeletal:         General: No swelling. Cervical back: Neck supple. Skin:     General: Skin is warm and dry. Capillary Refill: Capillary refill takes less than 2 seconds. Neurological:      Mental Status: He is alert.    Psychiatric:         Mood and Affect: Mood normal.         Vital Signs  ED Triage Vitals [08/21/23 1854]   Temperature Pulse Respirations Blood Pressure SpO2   97.9 °F (36.6 °C) 101 18 (!) 177/84 100 %      Temp Source Heart Rate Source Patient Position - Orthostatic VS BP Location FiO2 (%)   Tympanic Monitor Sitting Left arm --      Pain Score       --           Vitals:    08/21/23 1854 08/21/23 2000 08/21/23 2215   BP: (!) 177/84 169/91 127/74   Pulse: 101 97 83   Patient Position - Orthostatic VS: Sitting Sitting          Visual Acuity      ED Medications  Medications   lactated ringers bolus 2,000 mL (2,000 mL Intravenous New Bag 8/21/23 2146)   traZODone (DESYREL) tablet 50 mg (50 mg Oral Given 8/21/23 2212)   acetaminophen (TYLENOL) tablet 650 mg (has no administration in time range)   sodium chloride 0.9 % infusion (125 mL/hr Intravenous New Bag 8/21/23 2223)   sodium chloride 0.9 % bolus 1,000 mL (0 mL Intravenous Stopped 8/21/23 2145)   ondansetron (ZOFRAN) injection 4 mg (4 mg Intravenous Given 8/21/23 2030)       Diagnostic Studies  Results Reviewed     Procedure Component Value Units Date/Time    Urine Microscopic [[id number]]  (Abnormal) Collected: 08/21/23 2207    Lab Status: Final result Specimen: Urine, Clean Catch Updated: 08/21/23 2217     RBC, UA 1-2 /hpf      WBC, UA 10-20 /hpf      Epithelial Cells None Seen /hpf      Bacteria, UA None Seen /hpf     Urine culture [[id number]] Collected: 08/21/23 2207    Lab Status:  In process Specimen: Urine, Clean Catch Updated: 08/21/23 2217    UA w Reflex to Microscopic w Reflex to Culture [[id number]]  (Abnormal) Collected: 08/21/23 2207    Lab Status: Final result Specimen: Urine, Clean Catch Updated: 08/21/23 2215     Color, UA Colorless     Clarity, UA Clear     Specific Gravity, UA 1.008     pH, UA 5.5     Leukocytes, UA Small     Nitrite, UA Negative     Protein, UA 50 (1+) mg/dl      Glucose, UA Negative mg/dl      Ketones, UA Negative mg/dl      Urobilinogen, UA <2.0 mg/dl      Bilirubin, UA Negative     Occult Blood, UA Small    Basic metabolic panel [[id number]]  (Abnormal) Collected: 08/21/23 2021    Lab Status: Final result Specimen: Blood from Arm, Left Updated: 08/21/23 2042     Sodium 136 mmol/L      Potassium 3.4 mmol/L      Chloride 101 mmol/L      CO2 21 mmol/L      ANION GAP 14 mmol/L      BUN 76 mg/dL      Creatinine 14.58 mg/dL      Glucose 152 mg/dL      Calcium 9.3 mg/dL      eGFR 3 ml/min/1.73sq m     Narrative:      [place] guidelines for Chronic Kidney Disease (CKD):   •  Stage 1 with normal or high GFR (GFR > 90 mL/min/1.73 square meters)  •  Stage 2 Mild CKD (GFR = 60-89 mL/min/1.73 square meters)  •  Stage 3A Moderate CKD (GFR = 45-59 mL/min/1.73 square meters)  • Stage 3B Moderate CKD (GFR = 30-44 mL/min/1.73 square meters)  •  Stage 4 Severe CKD (GFR = 15-29 mL/min/1.73 square meters)  •  Stage 5 End Stage CKD (GFR <15 mL/min/1.73 square meters)  Note: GFR calculation is accurate only with a steady state creatinine    Blood gas, venous [[id number]]  (Abnormal) Collected: 08/21/23 2021    Lab Status: Final result Specimen: Blood from Arm, Left Updated: 08/21/23 2028     pH, Jose 7.262     pCO2, Jose 41.5 mm Hg      pO2, Jose 40.6 mm Hg      HCO3, Jose 18.3 mmol/L      Base Excess, Jose -8.2 mmol/L      O2 Content, Jose 9.6 ml/dL      O2 HGB, VENOUS 68.4 %                  No orders to display              Procedures  Procedures         ED Course                                             Medical Decision Making  Differential diagnosis includes but is not limited to acute kidney injury, obstruction, intrinsic renal failure, hypovolemia    Plan discussed with nephrology  MDM 78-year-old with MECHELLE. Discussed with nephrology. Aggressive fluid hydration and recheck creatinine/GFR in the morning. Admit to medicine. Patient in agreement. Spoke to both nephrology here as well as patient's nephrology group at [facility]. Patient understands and agrees with this plan. Amount and/or Complexity of Data Reviewed  Labs: ordered. Risk  Prescription drug management. Decision regarding hospitalization.           Disposition  Final diagnoses:   MECHELLE (acute kidney injury) ([address])     Time reflects when diagnosis was documented in both MDM as applicable and the Disposition within this note     Time User Action Codes Description Comment    8/21/2023  9:26 PM Anila [place] Add [N17.9] Acute renal failure (ARF) ([address])     8/21/2023  9:43 PM Peter Thurman L Add [N17.9] MECHELLE (acute kidney injury) [facility])       ED Disposition     ED Disposition   Admit    Condition   Stable    Date/Time   Mon Aug 21, 2023  9:43 PM    Comment   Case was discussed with Dr. Antoinette Puri and the patient's admission status was agreed to be Admission Status: inpatient status to the service of Dr. Marta Foss . Follow-up Information    None         Patient's Medications   Discharge Prescriptions    No medications on file       No discharge procedures on file.     PDMP Review       Value Time User    PDMP Reviewed  Yes 10/10/2022 11:54 AM Mery Chaves DO          ED Provider  Electronically Signed by           Rae Vega PA-C  08/21/23 1900

## 2023-08-22 NOTE — ASSESSMENT & PLAN NOTE
· Patient coming in secondary to abnormal lab value as outpatient  · Patient states that about 1 week ago. Symptoms consistent with his prior kidney stone and did pass kidney stone about that time. Over the past couple days patient is woken up with episodes of nausea and vomiting.   Patient denies any fevers chills, lightheadedness or dizziness on exam.  · Patient recently passed kidney stone  · Baseline kidney function appears to be around 2.6-2.8  · Creatinine 14.58  · Placed on normal saline 125 ml/hour  · Nephrology consulted

## 2023-08-22 NOTE — ASSESSMENT & PLAN NOTE
· Baseline hemoglobin appears to be around 11-12  · Hemoglobin 9.3 currently  · Patient denies any hematuria, bright red blood per rectum, hematemesis or melena  · In the setting of chronic disease, IV fluids  · Monitor hemoglobin

## 2023-08-22 NOTE — H&P
[address]  H&P  Name: Helen Crespo 25 y.o. male I MRN: [id number]  Unit/Bed#: ED 24 I Date of Admission: 8/21/2023   Date of Service: 8/21/2023 I Hospital Day: 0      Assessment/Plan   * Acute renal failure (ARF) ([address])  Assessment & Plan  · Patient coming in secondary to abnormal lab value as outpatient  · Patient states that about 1 week ago. Symptoms consistent with his prior kidney stone and did pass kidney stone about that time. Over the past couple days patient is woken up with episodes of nausea and vomiting. Patient denies any fevers chills, lightheadedness or dizziness on exam.  · Patient recently passed kidney stone  · Baseline kidney function appears to be around 2.6-2.8  · Creatinine 14.58  · Placed on normal saline 125 ml/hour  · Nephrology consulted    Metabolic acidosis  Assessment & Plan  · pH 7.26 on presentation  · Secondary to underlying kidney dysfunction likely related to obstructive uropathy  · CT negative for any hydronephrosis    Anemia  Assessment & Plan  · Baseline hemoglobin appears to be around 11-12  · Hemoglobin 9.3 currently  · Patient denies any hematuria, bright red blood per rectum, hematemesis or melena  · Continue to monitor    Hypokalemia  Assessment & Plan  · Potassium 3.4 on presentation  · Monitor at this time given kidney function    VTE Pharmacologic Prophylaxis: VTE Score: 1 Low Risk (Score 0-2) - Encourage Ambulation. Code Status: Level 1 - Full Code   Discussion with family: Patient declined call to . Anticipated Length of Stay: Patient will be admitted on an inpatient basis with an anticipated length of stay of greater than 2 midnights secondary to Acute kidney failure.     Total Time Spent on Date of Encounter in care of patient: 65 minutes This time was spent on one or more of the following: performing physical exam; counseling and coordination of care; obtaining or reviewing history; documenting in the medical record; reviewing/ordering tests, medications or procedures; communicating with other healthcare professionals and discussing with patient's family/caregivers. Chief Complaint: Abnormal lab    History of Present Illness:  Amara Lopez is a 25 y.o. male with a PMH of hyperoxaluria type I who presents with abnormal lab. Patient states about 1 week ago he had symptoms which were consistent with kidney stone as he has had multiple in the past.  Since that day patient states he has been waking up with nausea and vomiting. Patient denies any recent fever, chills, lightheadedness, or dizziness. Review of Systems:  Review of Systems   Constitutional: Negative for chills, fatigue, fever and unexpected weight change. HENT: Negative for congestion, ear pain, sore throat and tinnitus. Eyes: Negative for visual disturbance. Respiratory: Negative for cough, chest tightness, shortness of breath and wheezing. Cardiovascular: Negative for chest pain, palpitations and leg swelling. Gastrointestinal: Positive for nausea and vomiting. Negative for abdominal pain, constipation and diarrhea. Genitourinary: Negative for dysuria and frequency. Skin: Negative for rash. Neurological: Negative for dizziness, weakness, light-headedness, numbness and headaches. All other systems reviewed and are negative.       Past Medical and Surgical History:   Past Medical History:   Diagnosis Date   • Anxiety    • Chronic kidney disease    • GERD (gastroesophageal reflux disease)    • Kidney stone    • Liver disorder    • Nephrolithiasis    • Primary hyperoxaluria type 1 ([address])        Past Surgical History:   Procedure Laterality Date   • ANKLE SURGERY Right 2017    ligament repair   • CYSTOSCOPY     • FL RETROGRADE PYELOGRAM  10/24/2020   • FL RETROGRADE PYELOGRAM  02/08/2021   • HERNIA REPAIR     • KIDNEY STONE SURGERY     • LITHOTRIPSY     • MASS EXCISION Left 02/17/2017    Procedure: BACK/SHOULDER CYST EXCISION ;  Surgeon: Monica Zimmer Ibis Rodríguez MD;  Location[facility];  Service:    • [place] CYSTO/URETERO W/LITHOTRIPSY &INDWELL STENT INSRT Left 10/24/2020    Procedure: CYSTOSCOPY URETEROSCOPY WITH LITHOTRIPSY HOLMIUM LASER, RETROGRADE PYELOGRAM AND INSERTION STENT URETERAL;  Surgeon: David Olivier MD;  [facility];  Service: Urology   • [place] CYSTO/URETERO W/LITHOTRIPSY &INDWELL STENT INSRT Right 02/08/2021    Procedure: CYSTOSCOPY URETEROSCOPY WITH LITHOTRIPSY HOLMIUM LASER, RETROGRADE PYELOGRAM AND INSERTION STENT URETERAL;  Surgeon: Krysta Abdi MD;  Location: [facility];  Service: Urology       Meds/Allergies:  Prior to Admission medications    Medication Sig Start Date End Date Taking? Authorizing Provider   famotidine (PEPCID) 20 mg tablet Take 1 tablet (20 mg total) by mouth 2 (two) times a day 8/21/23   LAURA Basurto   fluticasone [facility]) 50 mcg/act nasal spray 1 spray into each nostril daily 8/21/23   LAURA Basurto   ondansetron [facility]) 4 mg tablet Take 1 tablet (4 mg total) by mouth every 8 (eight) hours as needed for nausea or vomiting 8/21/23   LAURA Basurto   promethazine-dextromethorphan [facility]) 6.25-15 mg/5 mL oral syrup Take 5 mL by mouth 4 (four) times a day as needed for cough 8/21/23   LAURA Basurto   traZODone (DESYREL) 50 mg tablet TAKE 1 TABLET BY MOUTH EVERYDAY AT BEDTIME 1/24/23   Julio Walsh DO   tamsulosin (FLOMAX) 0.4 mg Take 0.4 mg by mouth daily with dinner  Patient not taking: Reported on 8/21/2023 8/21/23  Historical Provider, MD     I have reviewed home medications with patient personally.     Allergies: No Known Allergies    Social History:  Marital Status: Single   Patient Pre-hospital Living Situation: Home  Patient Pre-hospital Level of Mobility: walks    Substance Use History:   Social History     Substance and Sexual Activity   Alcohol Use Yes    Comment: rarely     Social History     Tobacco Use   Smoking Status Never   Smokeless Tobacco Never     Social History     Substance and Sexual Activity   Drug Use No       Family History:  Family History   Problem Relation Age of Onset   • Hypertension Mother    • No Known Problems Father    • Cancer Maternal Grandmother         Bladder   • Diabetes Maternal Grandmother    • Alzheimer's disease Maternal Grandfather        Physical Exam:     Vitals:   Blood Pressure: 169/91 (08/21/23 2000)  Pulse: 97 (08/21/23 2000)  Temperature: 97.9 °F (36.6 °C) (08/21/23 1854)  Temp Source: Tympanic (08/21/23 1854)  Respirations: 17 (08/21/23 2000)  Height: 6' (182.9 cm) (08/21/23 1854)  Weight - Scale: 105 kg (231 lb) (08/21/23 1854)  SpO2: 99 % (08/21/23 2000)    Physical Exam  Vitals and nursing note reviewed. Constitutional:       General: He is not in acute distress. Appearance: He is well-developed. HENT:      Head: Normocephalic and atraumatic. Eyes:      General: No scleral icterus. Conjunctiva/sclera: Conjunctivae normal.   Cardiovascular:      Rate and Rhythm: Normal rate and regular rhythm. Heart sounds: Normal heart sounds. No murmur heard. No friction rub. No gallop. Pulmonary:      Effort: Pulmonary effort is normal. No respiratory distress. Breath sounds: Normal breath sounds. No wheezing or rales. Abdominal:      General: Bowel sounds are normal. There is no distension. Palpations: Abdomen is soft. Tenderness: There is no abdominal tenderness. Musculoskeletal:         General: Normal range of motion. Skin:     General: Skin is warm. Findings: No rash. Neurological:      Mental Status: He is alert and oriented to person, place, and time.         Additional Data:     Lab Results:  Results from last 7 days   Lab Units 08/21/23  1529   WBC Thousand/uL 8.84   HEMOGLOBIN g/dL 9.3*   HEMATOCRIT % 26.6*   PLATELETS Thousands/uL 275   NEUTROS PCT % 74   LYMPHS PCT % 14   MONOS PCT % 8   EOS PCT % 3     Results from last 7 days   Lab Units 08/21/23 2021 08/21/23  1529   SODIUM mmol/L 136 139   POTASSIUM mmol/L 3.4* 3.7   CHLORIDE mmol/L 101 104   CO2 mmol/L 21 19*   BUN mg/dL 76* 78*   CREATININE mg/dL 14.58* 14.70*   ANION GAP mmol/L 14 16   CALCIUM mg/dL 9.3 9.6   ALBUMIN g/dL  --  4.8   TOTAL BILIRUBIN mg/dL  --  0.41   ALK PHOS U/L  --  60   ALT U/L  --  15   AST U/L  --  16   GLUCOSE RANDOM mg/dL 152*  --                        Lines/Drains:  Invasive Devices     Peripheral Intravenous Line  Duration           Peripheral IV 08/21/23 Distal;Left;Upper;Ventral (anterior) Arm <1 day          Drain  Duration           Ureteral Drain/Stent Left ureter 6 Fr. 1031 days    Ureteral Drain/Stent Right ureter 6 Fr. 924 days                    Imaging: Reviewed radiology reports from this admission including: abdominal/pelvic CT  No orders to display         ** Please Note: This note has been constructed using a voice recognition system.  **

## 2023-08-22 NOTE — ASSESSMENT & PLAN NOTE
· Baseline hemoglobin appears to be around 11-12  · Hemoglobin 9.3 currently  · Patient denies any hematuria, bright red blood per rectum, hematemesis or melena  · Continue to monitor

## 2023-08-22 NOTE — ASSESSMENT & PLAN NOTE
· Patient with hx of CKD 4 coming in secondary to abnormal creatinine  · Patient states that about 1 week ago. Symptoms consistent with his prior kidney stone and did pass kidney stone about that time. Over the past couple days patient is woken up with episodes of nausea and vomiting. Patient denies any fevers chills, lightheadedness or dizziness on exam.  · Patient recently passed kidney stone  · Baseline kidney function appears to be around 2.6-2.8    · Change fluids to bicarbonate infusion  · Monitor BMP  · Avoid nephrotoxins  · Monitor urine output  · If no significant improvement patient may need renal replacement therapy during this hospitalization.

## 2023-08-23 ENCOUNTER — TELEPHONE (OUTPATIENT)
Dept: FAMILY MEDICINE CLINIC | Facility: CLINIC | Age: 28
End: 2023-08-23

## 2023-08-23 LAB
ANION GAP SERPL CALCULATED.3IONS-SCNC: 11 MMOL/L
ANION GAP SERPL CALCULATED.3IONS-SCNC: 12 MMOL/L
ANION GAP SERPL CALCULATED.3IONS-SCNC: 13 MMOL/L
BACTERIA UR CULT: NORMAL
BASOPHILS # BLD AUTO: 0.05 THOUSANDS/ÂΜL (ref 0–0.1)
BASOPHILS NFR BLD AUTO: 1 % (ref 0–1)
BUN SERPL-MCNC: 73 MG/DL (ref 5–25)
BUN SERPL-MCNC: 73 MG/DL (ref 5–25)
BUN SERPL-MCNC: 74 MG/DL (ref 5–25)
CALCIUM SERPL-MCNC: 8.2 MG/DL (ref 8.4–10.2)
CHLORIDE SERPL-SCNC: 104 MMOL/L (ref 96–108)
CHLORIDE SERPL-SCNC: 104 MMOL/L (ref 96–108)
CHLORIDE SERPL-SCNC: 106 MMOL/L (ref 96–108)
CO2 SERPL-SCNC: 21 MMOL/L (ref 21–32)
CO2 SERPL-SCNC: 24 MMOL/L (ref 21–32)
CO2 SERPL-SCNC: 24 MMOL/L (ref 21–32)
CREAT SERPL-MCNC: 14.37 MG/DL (ref 0.6–1.3)
CREAT SERPL-MCNC: 14.44 MG/DL (ref 0.6–1.3)
CREAT SERPL-MCNC: 15.17 MG/DL (ref 0.6–1.3)
EOSINOPHIL # BLD AUTO: 0.32 THOUSAND/ÂΜL (ref 0–0.61)
EOSINOPHIL NFR BLD AUTO: 4 % (ref 0–6)
ERYTHROCYTE [DISTWIDTH] IN BLOOD BY AUTOMATED COUNT: 12.3 % (ref 11.6–15.1)
GFR SERPL CREATININE-BSD FRML MDRD: 3 ML/MIN/1.73SQ M
GFR SERPL CREATININE-BSD FRML MDRD: 3 ML/MIN/1.73SQ M
GFR SERPL CREATININE-BSD FRML MDRD: 4 ML/MIN/1.73SQ M
GLUCOSE SERPL-MCNC: 102 MG/DL (ref 65–140)
GLUCOSE SERPL-MCNC: 105 MG/DL (ref 65–140)
GLUCOSE SERPL-MCNC: 110 MG/DL (ref 65–140)
HBV CORE AB SER QL: NORMAL
HBV SURFACE AB SER-ACNC: 6.51 MIU/ML
HBV SURFACE AG SER QL: NORMAL
HCT VFR BLD AUTO: 21.3 % (ref 36.5–49.3)
HGB BLD-MCNC: 7.4 G/DL (ref 12–17)
IMM GRANULOCYTES # BLD AUTO: 0.02 THOUSAND/UL (ref 0–0.2)
IMM GRANULOCYTES NFR BLD AUTO: 0 % (ref 0–2)
LYMPHOCYTES # BLD AUTO: 1.51 THOUSANDS/ÂΜL (ref 0.6–4.47)
LYMPHOCYTES NFR BLD AUTO: 21 % (ref 14–44)
MCH RBC QN AUTO: 30.2 PG (ref 26.8–34.3)
MCHC RBC AUTO-ENTMCNC: 34.7 G/DL (ref 31.4–37.4)
MCV RBC AUTO: 87 FL (ref 82–98)
MONOCYTES # BLD AUTO: 0.56 THOUSAND/ÂΜL (ref 0.17–1.22)
MONOCYTES NFR BLD AUTO: 8 % (ref 4–12)
NEUTROPHILS # BLD AUTO: 4.88 THOUSANDS/ÂΜL (ref 1.85–7.62)
NEUTS SEG NFR BLD AUTO: 66 % (ref 43–75)
NRBC BLD AUTO-RTO: 0 /100 WBCS
PLATELET # BLD AUTO: 210 THOUSANDS/UL (ref 149–390)
PMV BLD AUTO: 10.7 FL (ref 8.9–12.7)
POTASSIUM SERPL-SCNC: 3.2 MMOL/L (ref 3.5–5.3)
RBC # BLD AUTO: 2.45 MILLION/UL (ref 3.88–5.62)
SODIUM SERPL-SCNC: 139 MMOL/L (ref 135–147)
SODIUM SERPL-SCNC: 139 MMOL/L (ref 135–147)
SODIUM SERPL-SCNC: 141 MMOL/L (ref 135–147)
WBC # BLD AUTO: 7.34 THOUSAND/UL (ref 4.31–10.16)

## 2023-08-23 PROCEDURE — 85025 COMPLETE CBC W/AUTO DIFF WBC: CPT | Performed by: INTERNAL MEDICINE

## 2023-08-23 PROCEDURE — 99233 SBSQ HOSP IP/OBS HIGH 50: CPT | Performed by: INTERNAL MEDICINE

## 2023-08-23 PROCEDURE — 80048 BASIC METABOLIC PNL TOTAL CA: CPT | Performed by: INTERNAL MEDICINE

## 2023-08-23 PROCEDURE — 86706 HEP B SURFACE ANTIBODY: CPT | Performed by: INTERNAL MEDICINE

## 2023-08-23 PROCEDURE — 86704 HEP B CORE ANTIBODY TOTAL: CPT | Performed by: INTERNAL MEDICINE

## 2023-08-23 PROCEDURE — 99232 SBSQ HOSP IP/OBS MODERATE 35: CPT | Performed by: FAMILY MEDICINE

## 2023-08-23 PROCEDURE — 87340 HEPATITIS B SURFACE AG IA: CPT | Performed by: INTERNAL MEDICINE

## 2023-08-23 RX ORDER — POTASSIUM CHLORIDE 20 MEQ/1
20 TABLET, EXTENDED RELEASE ORAL ONCE
Status: COMPLETED | OUTPATIENT
Start: 2023-08-23 | End: 2023-08-23

## 2023-08-23 RX ADMIN — SODIUM BICARBONATE 125 ML/HR: 84 INJECTION, SOLUTION INTRAVENOUS at 14:37

## 2023-08-23 RX ADMIN — SODIUM BICARBONATE 125 ML/HR: 84 INJECTION, SOLUTION INTRAVENOUS at 23:07

## 2023-08-23 RX ADMIN — ACETAMINOPHEN 650 MG: 325 TABLET, FILM COATED ORAL at 13:13

## 2023-08-23 RX ADMIN — POTASSIUM CHLORIDE 20 MEQ: 1500 TABLET, EXTENDED RELEASE ORAL at 09:14

## 2023-08-23 RX ADMIN — SODIUM BICARBONATE 125 ML/HR: 84 INJECTION, SOLUTION INTRAVENOUS at 04:40

## 2023-08-23 RX ADMIN — TRAZODONE HYDROCHLORIDE 50 MG: 50 TABLET ORAL at 22:39

## 2023-08-23 NOTE — PLAN OF CARE
Problem: PAIN - ADULT  Goal: Verbalizes/displays adequate comfort level or baseline comfort level  Description: Interventions:  - Encourage patient to monitor pain and request assistance  - Assess pain using appropriate pain scale  - Administer analgesics based on type and severity of pain and evaluate response  - Implement non-pharmacological measures as appropriate and evaluate response  - Consider cultural and social influences on pain and pain management  - Notify physician/advanced practitioner if interventions unsuccessful or patient reports new pain  8/22/2023 2237 by Rianna Sorto RN  Outcome: Progressing  8/22/2023 2236 by Rianna Sorto RN  Outcome: Progressing     Problem: INFECTION - ADULT  Goal: Absence or prevention of progression during hospitalization  Description: INTERVENTIONS:  - Assess and monitor for signs and symptoms of infection  - Monitor lab/diagnostic results  - Monitor all insertion sites, i.e. indwelling lines, tubes, and drains  - Monitor endotracheal if appropriate and nasal secretions for changes in amount and color  - [place] appropriate cooling/warming therapies per order  - Administer medications as ordered  - Instruct and encourage patient and family to use good hand hygiene technique  - Identify and instruct in appropriate isolation precautions for identified infection/condition  8/22/2023 2237 by Rianna Sorto RN  Outcome: Progressing  8/22/2023 2236 by Rianna Sorto RN  Outcome: Progressing     Problem: METABOLIC, FLUID AND ELECTROLYTES - ADULT  Goal: Fluid balance maintained  Description: INTERVENTIONS:  - Monitor labs   - Monitor I/O and WT  - Instruct patient on fluid and nutrition as appropriate  - Assess for signs & symptoms of volume excess or deficit  Outcome: Progressing

## 2023-08-23 NOTE — PLAN OF CARE
Problem: PAIN - ADULT  Goal: Verbalizes/displays adequate comfort level or baseline comfort level  Description: Interventions:  - Encourage patient to monitor pain and request assistance  - Assess pain using appropriate pain scale  - Administer analgesics based on type and severity of pain and evaluate response  - Implement non-pharmacological measures as appropriate and evaluate response  - Consider cultural and social influences on pain and pain management  - Notify physician/advanced practitioner if interventions unsuccessful or patient reports new pain  Outcome: Progressing     Problem: INFECTION - ADULT  Goal: Absence or prevention of progression during hospitalization  Description: INTERVENTIONS:  - Assess and monitor for signs and symptoms of infection  - Monitor lab/diagnostic results  - Monitor all insertion sites, i.e. indwelling lines, tubes, and drains  - Monitor endotracheal if appropriate and nasal secretions for changes in amount and color  - Eva appropriate cooling/warming therapies per order  - Administer medications as ordered  - Instruct and encourage patient and family to use good hand hygiene technique  - Identify and instruct in appropriate isolation precautions for identified infection/condition  Outcome: Progressing     Problem: SAFETY ADULT  Goal: Patient will remain free of falls  Description: INTERVENTIONS:  - Educate patient/family on patient safety including physical limitations  - Instruct patient to call for assistance with activity   - Consult OT/PT to assist with strengthening/mobility   - Keep Call bell within reach  - Keep bed low and locked with side rails adjusted as appropriate  - Keep care items and personal belongings within reach  - Initiate and maintain comfort rounds  - Make Fall Risk Sign visible to staff  - Offer Toileting every 2 Hours, in advance of need  - Initiate/Maintain bed alarm  - Obtain necessary fall risk management equipment: call bell within reach   - Apply yellow socks and bracelet for high fall risk patients  - Consider moving patient to room near nurses station  Outcome: Progressing     Problem: SAFETY ADULT  Goal: Maintains/Returns to pre admission functional level  Description: INTERVENTIONS:  - Perform BMAT or MOVE assessment daily.   - Set and communicate daily mobility goal to care team and patient/family/caregiver. - Collaborate with rehabilitation services on mobility goals if consulted  - Perform Range of Motion 3 times a day. - Reposition patient every 3 hours.   - Dangle patient 3 times a day  - Stand patient 3 times a day  - Ambulate patient 3 times a day  - Out of bed to chair 3 times a day   - Out of bed for meals 3 times a day  - Out of bed for toileting  - Record patient progress and toleration of activity level   Outcome: Progressing

## 2023-08-23 NOTE — PROGRESS NOTES
NEPHROLOGY PROGRESS NOTE    Patient: Elton Mcguire               Sex: male          DOA: 8/21/2023  7:53 PM   YOB: 1995        Age: 25 y.o.         LOS:  LOS: 2 days   Encounter Date: 8/23/2023    REASON FOR THE CONSULTATION: Further management of MECHELLE    HPI     This is a 25 y.o. male admitted for Acute renal failure (ARF) ([address])     SUBJECTIVE     - Patient denies nausea, vomiting, headache or dizziness today. - Reviewed last 24 hrs events     CURRENT MEDICATIONS       Current Facility-Administered Medications:   •  acetaminophen (TYLENOL) tablet 650 mg, 650 mg, Oral, Q6H PRN, Orpah Bombard, DO, 650 mg at 08/22/23 1907  •  sodium bicarbonate 75 mEq in sodium chloride 0.45 % 1,000 mL infusion, 125 mL/hr, Intravenous, Continuous, Leatha Kebede MD, Last Rate: 125 mL/hr at 08/23/23 0440, 125 mL/hr at 08/23/23 0440  •  traZODone (DESYREL) tablet 50 mg, 50 mg, Oral, HS, Orpah Bombard, DO, 50 mg at 08/22/23 2223    OBJECTIVE     Current Weight: Weight - Scale: 105 kg (231 lb)  /70   Pulse 73   Temp 98.2 °F (36.8 °C)   Resp 18   Ht 6' (1.829 m)   Wt 105 kg (231 lb)   SpO2 99%   BMI 31.33 kg/m²   Vitals:    08/23/23 0720   BP: 116/70   Pulse: 73   Resp: 18   Temp: 98.2 °F (36.8 °C)   SpO2: 99%     Body mass index is 31.33 kg/m². Intake/Output Summary (Last 24 hours) at 8/23/2023 1239  Last data filed at 8/23/2023 0900  Gross per 24 hour   Intake 1080 ml   Output 2300 ml   Net -1220 ml       PHYSICAL EXAMINATION     Physical Exam  Constitutional:       General: He is not in acute distress. HENT:      Right Ear: External ear normal.   Eyes:      Conjunctiva/sclera:      Right eye: No hemorrhage. Neck:      Thyroid: No thyromegaly. Pulmonary:      Effort: No accessory muscle usage or respiratory distress. Abdominal:      General: There is no distension. Musculoskeletal:         General: No swelling. Skin:     General: Skin is warm. Coloration: Skin is not jaundiced. Psychiatric:         Behavior: Behavior is not combative. LAB RESULTS     Results from last 7 days   Lab Units 08/23/23  0426 08/22/23  2208 08/22/23  1402 08/22/23  0612 08/21/23 2021 08/21/23  1529   WBC Thousand/uL 7.34  --   --   --   --  8.84   HEMOGLOBIN g/dL 7.4*  --   --   --   --  9.3*   HEMATOCRIT % 21.3*  --   --   --   --  26.6*   PLATELETS Thousands/uL 210  --   --   --   --  275   SODIUM mmol/L 139 139 137 137 136 139   POTASSIUM mmol/L 3.2* 3.5 3.6 3.3* 3.4* 3.7   CHLORIDE mmol/L 106 106 106 107 101 104   CO2 mmol/L 21 21 21 18* 21 19*   BUN mg/dL 74* 74* 75* 72* 76* 78*   CREATININE mg/dL 14.37* 14.36* 14.36* 13.57* 14.58* 14.70*   EGFR ml/min/1.73sq m 4 4 4 4 3 3   CALCIUM mg/dL 8.2* 8.3* 8.6 8.1* 9.3 9.6       I have personally reviewed the old medical records and patient's previously known baseline creatinine level is ~2.8    RADIOLOGY RESULTS      No orders to display       PLAN / RECOMMENDATIONS      1. MECHELLE on top of CKD stage IV. Present on admission, urine lites were intrinsic in nature and renal function has failed to improve in last 24 hours suggesting patient may have progression of underlying chronic kidney disease. Upon review of old medical record from epic chart review and also from [facility], currently been followed by nephrologist at [facility] and previously known baseline creatinine was thought to be around 2.8. 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 Patient was previously being followed by nephrologist at [facility] but according to patient for his recurrent nephrolithiasis, due to advanced chronic kidney disease, he has been drinking lots of water and no longer taking potassium citrate. CT scan of abdomen pelvis done on admission showed medullary nephrocalcinosis with multiple bilateral nonobstructive kidney stones without hydronephrosis.     Patient's admission creatinine was 14.7 and with the use of IV fluid yesterday morning creatinine was 13.57 and IV fluid was continued overnight but patient's renal function has failed to improve further and current creatinine of 14.37. Patient also vomited twice this morning and I think patient is getting initial uremic symptoms. Since patient is not in volume overload state and does not have any other uremic symptoms, we will plan to continue IV fluids with bicarb today and monitor serial renal function. Discussed in length with patient today that if renal function fails to improve in last 24 hours, will recommend initiation of dialysis which patient is in agreement. 2.  Hypokalemia. Multifactorial with current potassium of 3.2. Plan potassium chloride 20 meq p.o. x1 dose today. 3.  Anemia. Multifactorial with current hemoglobin of 7.4. Plan to check iron panel. Recommend blood transfusion if hemoglobin level drops below 7. Overall above mentioned plan and recommendations were also d/w current [facility] physician and they agreed with my plan of monitoring renal function on IV fluid today    Janae Conrad MD  Nephrology  8/23/2023        Portions of the record may have been created with voice recognition software. Occasional wrong word or "sound a like" substitutions may have occurred due to the inherent limitations of voice recognition software. Read the chart carefully and recognize, using context, where substitutions have occurred.

## 2023-08-23 NOTE — ASSESSMENT & PLAN NOTE
C/c : + To hospital with symptoms of intermittent nausea/vomiting. No fever/chills/diarrhea/abdominal pain. No urinary symptoms/urgency/frequency/hematuria. 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 with symptoms of frequent kidney stones     · Creatinine 14+ on admission  · Baseline kidney function appears to be around 2.6-2.8  · Nephrology on board  · IV fluids :  0.45% saline with 75 meq of bicarb at 125 mL/h today  · If no significant improvement patient may need HD during this hospitalization.   · Monitor urine output closely  · Monitor creatinine closely

## 2023-08-23 NOTE — PROGRESS NOTES
[address]  Progress Note  Name: Vazquez Rogers  MRN: [id number]  Unit/Bed#: -01 I Date of Admission: 8/21/2023   Date of Service: 8/23/2023 I Hospital Day: 2    Assessment/Plan   * Acute renal failure (ARF) ([address])  Assessment & Plan  C/c : + To hospital with symptoms of intermittent nausea/vomiting. No fever/chills/diarrhea/abdominal pain. No urinary symptoms/urgency/frequency/hematuria. 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 with symptoms of frequent kidney stones     · Creatinine 14+ on admission  · Baseline kidney function appears to be around 2.6-2.8  · Nephrology on board  · IV fluids :  0.45% saline with 75 meq of bicarb at 125 mL/h today  · If no significant improvement patient may need HD during this hospitalization. · Monitor urine output closely  · Monitor creatinine closely    Hypokalemia  Assessment & Plan  · Being repleted    Metabolic acidosis  Assessment & Plan  · pH 7.26 on presentation  · Secondary to underlying kidney dysfunction likely related to obstructive uropathy  · CT negative for any hydronephrosis  · Bicarbonate infusion    Anemia  Assessment & Plan  · Baseline hemoglobin appears to be around 11-12  · Hemoglobin 7.4 currently(9.3 on admission); partly dilutional  · Patient denies any hematuria, bright red blood per rectum, hematemesis or melena  · In the setting of chronic disease, IV fluids  · Monitor hemoglobin closely         VTE Pharmacologic Prophylaxis:   Pharmacologic: ambulatory    Patient Centered Rounds: I have performed bedside rounds with nursing staff today. Discussions with Specialists or Other Care Team Provider: nephrology    Education and Discussions with Family / Patient: dw patient    Time Spent for Care: 30 minutes. More than 50% of total time spent on counseling and coordination of care as described above. Current Length of Stay: 2 day(s)    Current Patient Status: Inpatient   Certification Statement:  The patient will continue to require additional inpatient hospital stay due to needs HD ? Discharge Plan: Undetermined    Code Status: Level 1 - Full Code      Subjective:   Having sx of nausea/ vomiting this am    Objective:     Vitals:   Temp (24hrs), Av.1 °F (36.7 °C), Min:98 °F (36.7 °C), Max:98.2 °F (36.8 °C)    Temp:  [98 °F (36.7 °C)-98.2 °F (36.8 °C)] 98.2 °F (36.8 °C)  HR:  [68-73] 73  Resp:  [18] 18  BP: (116-123)/(68-70) 116/70  SpO2:  [97 %-100 %] 99 %  Body mass index is 31.33 kg/m². Input and Output Summary (last 24 hours): Intake/Output Summary (Last 24 hours) at 2023 1022  Last data filed at 2023 0900  Gross per 24 hour   Intake 1080 ml   Output 2700 ml   Net -1620 ml       Physical Exam:     Physical Exam  Constitutional:       General: He is not in acute distress. Appearance: He is obese. He is not toxic-appearing. HENT:      Mouth/Throat:      Mouth: Mucous membranes are moist.      Pharynx: Oropharynx is clear. Cardiovascular:      Rate and Rhythm: Normal rate and regular rhythm. Pulses: Normal pulses. Pulmonary:      Effort: Pulmonary effort is normal. No respiratory distress. Breath sounds: Normal breath sounds. Abdominal:      General: Abdomen is flat. Bowel sounds are normal. There is no distension. Palpations: Abdomen is soft. Tenderness: There is no abdominal tenderness. Musculoskeletal:      Right lower leg: No edema. Left lower leg: No edema. Neurological:      General: No focal deficit present. Mental Status: He is alert and oriented to person, place, and time.    Psychiatric:         Mood and Affect: Mood normal.           Additional Data:     Labs:    Results from last 7 days   Lab Units 23  0426   WBC Thousand/uL 7.34   HEMOGLOBIN g/dL 7.4*   HEMATOCRIT % 21.3*   PLATELETS Thousands/uL 210   NEUTROS PCT % 66   LYMPHS PCT % 21   MONOS PCT % 8   EOS PCT % 4     Results from last 7 days   Lab Units 23  0426 08/21/23 2021 08/21/23  1529   SODIUM mmol/L 139   < > 139   POTASSIUM mmol/L 3.2*   < > 3.7   CHLORIDE mmol/L 106   < > 104   CO2 mmol/L 21   < > 19*   BUN mg/dL 74*   < > 78*   CREATININE mg/dL 14.37*   < > 14.70*   ANION GAP mmol/L 12   < > 16   CALCIUM mg/dL 8.2*   < > 9.6   ALBUMIN g/dL  --   --  4.8   TOTAL BILIRUBIN mg/dL  --   --  0.41   ALK PHOS U/L  --   --  60   ALT U/L  --   --  15   AST U/L  --   --  16   GLUCOSE RANDOM mg/dL 110   < >  --     < > = values in this interval not displayed. * I Have Reviewed All Lab Data Listed Above. * Additional Pertinent Lab Tests Reviewed: All Labs Within Last 24 Hours Reviewed    Recent Cultures (last 7 days):     Results from last 7 days   Lab Units 08/21/23 2207 08/21/23  1529   URINE CULTURE  No Growth <1000 cfu/mL No Growth <1000 cfu/mL       Last 24 Hours Medication List:   Current Facility-Administered Medications   Medication Dose Route Frequency Provider Last Rate   • acetaminophen  650 mg Oral Q6H PRN Anila Spray, DO     • sodium bicarbonate 75 mEq in sodium chloride 0.45 % 1,000 mL infusion  125 mL/hr Intravenous Continuous Rivera Cardoza  mL/hr (08/23/23 0440)   • traZODone  50 mg Oral HS Anila Spray, DO          Today, Patient Was Seen By: Roc Murillo M.D.     ** Please Note: Dictation voice to text software may have been used in the creation of this document.  **

## 2023-08-23 NOTE — TELEPHONE ENCOUNTER
Spoke with patient- he is currently hospitalized at [facility]. They are telling him that he will need to start dialysis tomorrow. He is apprehensive and wants to know Dr. Leno Valdovinos opinion. He said that it sounded like he had options besides the dialysis and he wants to know what Dr. Amadou Aguilar thinks. He stated that Dr. Amadou Aguilar is aware of what is going on with him. Please call patient [phone]    Thank you!

## 2023-08-23 NOTE — CASE MANAGEMENT
Case Management Assessment & Discharge Planning Note    Patient name Mavis Rack  Location /-32 MRN [id number]  : 1995 Date 2023       Current Admission Date: 2023  Current Admission Diagnosis:Acute renal failure (ARF) [facility])   Patient Active Problem List    Diagnosis Date Noted   • Hypokalemia 2023   • Anemia    • Metabolic acidosis    • Chronic kidney disease-mineral and bone disorder 2023   • BMI 30.0-30.9,adult 2022   • Chronic kidney disease, stage 4 (severe) ([facility]) 2022   • Hydronephrosis with urinary obstruction due to ureteral calculus 2021   • Primary hyperoxaluria type 1 ([facility]) 2021   • Insomnia 10/27/2020   • Ureteric stone ----> hydroureteronephrosis moderate 10/23/2020   • Acute renal failure (ARF) ([facility]) 10/23/2020   • Sebaceous cyst 2017   • Anxiety, generalized 2015      LOS (days): 2  Geometric Mean LOS (GMLOS) (days):   Days to GMLOS:     OBJECTIVE:    Risk of Unplanned Readmission Score: 11.51         Current admission status: Inpatient       Preferred Pharmacy:   [id number] [address] [address]  Phone: [phone] Fax: [phone]    [id number] [place] - [address] [place] [address]  Phone: [phone] Fax: [phone]    Primary Care Provider: Devon Curry DO    Primary Insurance:   Secondary Insurance:     ASSESSMENT:  Reena Valle [address] Representative - Mother   Primary Phone: [phone] [facility]  Home Phone: [phone]               Advance Directives  Does patient have a [address]?: Yes  Does patient have Advance Directives?: Yes  Advance Directives: Power of  for health care  Primary Contact: Reena [address],Alan A Representative - Mother  Primary Phone: [phone]         Readmission Root Cause  30 Day Readmission: No    Patient Information  Mental Status: Alert  During Assessment patient was accompanied by: Not accompanied during assessment  Assessment information provided by[de-identified] Patient  Primary Caregiver: Self  Support Systems: Parent  [place] of Residence: [address] [place] [address] do you live in?: [address] entry access options.  Select all that apply.: No steps to enter home  Type of Current Residence: 2 story home  Upon entering residence, is there a bedroom on the main floor (no further steps)?: Yes  Upon entering residence, is there a bathroom on the main floor (no further steps)?: Yes  In the last 12 months, was there a time when you were not able to pay the mortgage or rent on time?: No  In the last 12 months, how many places have you lived?: 1  In the last 12 months, was there a time when you did not have a steady place to sleep or slept in a shelter (including now)?: No  Homeless/housing insecurity resource given?: No  Living Arrangements: Lives w/ Parent(s)  Is patient a ?: No    Activities of Daily Living Prior to Admission  Functional Status: Independent  Completes ADLs independently?: Yes  Ambulates independently?: Yes  Does patient use assisted devices?: No  Does patient currently own DME?: No  Does patient have a history of Outpatient Therapy (PT/OT)?: No  Does the patient have a history of Short-Term Rehab?: No  Does patient have a history of HHC?: No  Does patient currently have [address]?: No         Patient Information Continued  Income Source: Employed  Does patient have prescription coverage?: Yes  Within the past 12 months, you worried that your food would run out before you got the money to buy more.: Never true  Within the past 12 months, the food you bought just didn't last and you didn't have money to get more.: Never true  Food insecurity resource given?: No  Does patient receive dialysis treatments?: No  Does patient have a history of substance abuse?: No  Does patient have a history of Mental Health Diagnosis?: No         Means of Transportation  Means of Transport to Appts[de-identified] Drives Self  In the past 12 months, has lack of transportation kept you from medical appointments or from getting medications?: No  In the past 12 months, has lack of transportation kept you from meetings, work, or from getting things needed for daily living?: No  Was application for public transport provided?: No        DISCHARGE DETAILS:    Discharge planning discussed with[de-identified] Pt at bedside  [place] of Choice: Yes  Comments - Freedom of Choice: CM met with pt at bedside and introduced self/role. Pt is alert and oriented x3 able to make his needs knwon and encouraged to do so. FOC discussed with pt at length. Pt has no hx of DME or VNA/STR. Pt is aware and encouraged to seek CM for any questions or concerns. CM continues to follow.   CM contacted family/caregiver?: No- see comments (Pt declined)  Were Treatment Team discharge recommendations reviewed with patient/caregiver?: Yes  Did patient/caregiver verbalize understanding of patient care needs?: Yes  Were patient/caregiver advised of the risks associated with not following Treatment Team discharge recommendations?: Yes    Contacts  Reason/Outcome: Continuity of Care, Discharge [address]         Is the patient interested in [address] [facility] at discharge?: No    DME Referral Provided  Referral made for DME?: No    Other Referral/Resources/Interventions Provided:  Interventions: None Indicated    Would you like to participate in our [facility]" service program?  : No - Declined    Treatment Team Recommendation: Home  Discharge Destination Plan[de-identified] Home  Transport at Discharge : Family

## 2023-08-23 NOTE — ASSESSMENT & PLAN NOTE
· Baseline hemoglobin appears to be around 11-12  · Hemoglobin 7.4 currently(9.3 on admission); partly dilutional  · Patient denies any hematuria, bright red blood per rectum, hematemesis or melena  · In the setting of chronic disease, IV fluids  · Monitor hemoglobin closely

## 2023-08-23 NOTE — TELEPHONE ENCOUNTER
I reviewed his blood work and he had a dramatic drop in his kidney function. I do think he should consider dialysis at least for now and see if his kidneys recover.

## 2023-08-24 ENCOUNTER — APPOINTMENT (OUTPATIENT)
Dept: NON INVASIVE DIAGNOSTICS | Facility: HOSPITAL | Age: 28
DRG: 469 | End: 2023-08-24
Payer: COMMERCIAL

## 2023-08-24 LAB
ANION GAP SERPL CALCULATED.3IONS-SCNC: 14 MMOL/L
ANION GAP SERPL CALCULATED.3IONS-SCNC: 15 MMOL/L
BASOPHILS # BLD AUTO: 0.05 THOUSANDS/ÂΜL (ref 0–0.1)
BASOPHILS NFR BLD AUTO: 1 % (ref 0–1)
BUN SERPL-MCNC: 76 MG/DL (ref 5–25)
BUN SERPL-MCNC: 76 MG/DL (ref 5–25)
CALCIUM SERPL-MCNC: 8.3 MG/DL (ref 8.4–10.2)
CALCIUM SERPL-MCNC: 8.4 MG/DL (ref 8.4–10.2)
CHLORIDE SERPL-SCNC: 102 MMOL/L (ref 96–108)
CHLORIDE SERPL-SCNC: 102 MMOL/L (ref 96–108)
CO2 SERPL-SCNC: 25 MMOL/L (ref 21–32)
CO2 SERPL-SCNC: 25 MMOL/L (ref 21–32)
CREAT SERPL-MCNC: 15.64 MG/DL (ref 0.6–1.3)
CREAT SERPL-MCNC: 15.76 MG/DL (ref 0.6–1.3)
EOSINOPHIL # BLD AUTO: 0.32 THOUSAND/ÂΜL (ref 0–0.61)
EOSINOPHIL NFR BLD AUTO: 4 % (ref 0–6)
ERYTHROCYTE [DISTWIDTH] IN BLOOD BY AUTOMATED COUNT: 12.4 % (ref 11.6–15.1)
FERRITIN SERPL-MCNC: 283 NG/ML (ref 24–336)
GFR SERPL CREATININE-BSD FRML MDRD: 3 ML/MIN/1.73SQ M
GFR SERPL CREATININE-BSD FRML MDRD: 3 ML/MIN/1.73SQ M
GLUCOSE SERPL-MCNC: 102 MG/DL (ref 65–140)
GLUCOSE SERPL-MCNC: 91 MG/DL (ref 65–140)
HBV CORE AB SER QL: NORMAL
HBV CORE IGM SER QL: NORMAL
HBV SURFACE AB SER-ACNC: 4.77 MIU/ML
HBV SURFACE AG SER QL: NORMAL
HCT VFR BLD AUTO: 21.1 % (ref 36.5–49.3)
HCV AB SER QL: NORMAL
HGB BLD-MCNC: 7.4 G/DL (ref 12–17)
IMM GRANULOCYTES # BLD AUTO: 0.02 THOUSAND/UL (ref 0–0.2)
IMM GRANULOCYTES NFR BLD AUTO: 0 % (ref 0–2)
INR PPP: 1 (ref 0.84–1.19)
IRON SATN MFR SERPL: >73 % (ref 15–50)
IRON SERPL-MCNC: 152 UG/DL (ref 50–212)
LYMPHOCYTES # BLD AUTO: 1.39 THOUSANDS/ÂΜL (ref 0.6–4.47)
LYMPHOCYTES NFR BLD AUTO: 18 % (ref 14–44)
MCH RBC QN AUTO: 30.3 PG (ref 26.8–34.3)
MCHC RBC AUTO-ENTMCNC: 35.1 G/DL (ref 31.4–37.4)
MCV RBC AUTO: 87 FL (ref 82–98)
MONOCYTES # BLD AUTO: 0.57 THOUSAND/ÂΜL (ref 0.17–1.22)
MONOCYTES NFR BLD AUTO: 7 % (ref 4–12)
NEUTROPHILS # BLD AUTO: 5.37 THOUSANDS/ÂΜL (ref 1.85–7.62)
NEUTS SEG NFR BLD AUTO: 70 % (ref 43–75)
NRBC BLD AUTO-RTO: 0 /100 WBCS
PLATELET # BLD AUTO: 211 THOUSANDS/UL (ref 149–390)
PMV BLD AUTO: 10.6 FL (ref 8.9–12.7)
POTASSIUM SERPL-SCNC: 3.3 MMOL/L (ref 3.5–5.3)
POTASSIUM SERPL-SCNC: 3.3 MMOL/L (ref 3.5–5.3)
PROTHROMBIN TIME: 13.8 SECONDS (ref 11.6–14.5)
RBC # BLD AUTO: 2.44 MILLION/UL (ref 3.88–5.62)
SODIUM SERPL-SCNC: 141 MMOL/L (ref 135–147)
SODIUM SERPL-SCNC: 142 MMOL/L (ref 135–147)
TIBC SERPL-MCNC: <207 UG/DL (ref 250–450)
UIBC SERPL-MCNC: <55 UG/DL (ref 155–355)
WBC # BLD AUTO: 7.72 THOUSAND/UL (ref 4.31–10.16)

## 2023-08-24 PROCEDURE — 85610 PROTHROMBIN TIME: CPT

## 2023-08-24 PROCEDURE — 36558 INSERT TUNNELED CV CATH: CPT

## 2023-08-24 PROCEDURE — NC001 PR NO CHARGE

## 2023-08-24 PROCEDURE — 85025 COMPLETE CBC W/AUTO DIFF WBC: CPT | Performed by: INTERNAL MEDICINE

## 2023-08-24 PROCEDURE — 77001 FLUOROGUIDE FOR VEIN DEVICE: CPT

## 2023-08-24 PROCEDURE — 99232 SBSQ HOSP IP/OBS MODERATE 35: CPT | Performed by: FAMILY MEDICINE

## 2023-08-24 PROCEDURE — 83550 IRON BINDING TEST: CPT | Performed by: INTERNAL MEDICINE

## 2023-08-24 PROCEDURE — C1750 CATH, HEMODIALYSIS,LONG-TERM: HCPCS

## 2023-08-24 PROCEDURE — 86705 HEP B CORE ANTIBODY IGM: CPT | Performed by: INTERNAL MEDICINE

## 2023-08-24 PROCEDURE — 99153 MOD SED SAME PHYS/QHP EA: CPT

## 2023-08-24 PROCEDURE — 87081 CULTURE SCREEN ONLY: CPT | Performed by: FAMILY MEDICINE

## 2023-08-24 PROCEDURE — 83540 ASSAY OF IRON: CPT | Performed by: INTERNAL MEDICINE

## 2023-08-24 PROCEDURE — 76937 US GUIDE VASCULAR ACCESS: CPT | Performed by: RADIOLOGY

## 2023-08-24 PROCEDURE — 86803 HEPATITIS C AB TEST: CPT | Performed by: INTERNAL MEDICINE

## 2023-08-24 PROCEDURE — 36558 INSERT TUNNELED CV CATH: CPT | Performed by: RADIOLOGY

## 2023-08-24 PROCEDURE — 99233 SBSQ HOSP IP/OBS HIGH 50: CPT | Performed by: INTERNAL MEDICINE

## 2023-08-24 PROCEDURE — 77002 NEEDLE LOCALIZATION BY XRAY: CPT | Performed by: RADIOLOGY

## 2023-08-24 PROCEDURE — 80048 BASIC METABOLIC PNL TOTAL CA: CPT | Performed by: INTERNAL MEDICINE

## 2023-08-24 PROCEDURE — C1894 INTRO/SHEATH, NON-LASER: HCPCS

## 2023-08-24 PROCEDURE — 99152 MOD SED SAME PHYS/QHP 5/>YRS: CPT | Performed by: RADIOLOGY

## 2023-08-24 PROCEDURE — 86706 HEP B SURFACE ANTIBODY: CPT | Performed by: INTERNAL MEDICINE

## 2023-08-24 PROCEDURE — 99152 MOD SED SAME PHYS/QHP 5/>YRS: CPT

## 2023-08-24 PROCEDURE — 76937 US GUIDE VASCULAR ACCESS: CPT

## 2023-08-24 PROCEDURE — 82728 ASSAY OF FERRITIN: CPT | Performed by: INTERNAL MEDICINE

## 2023-08-24 PROCEDURE — 86704 HEP B CORE ANTIBODY TOTAL: CPT | Performed by: INTERNAL MEDICINE

## 2023-08-24 PROCEDURE — 87340 HEPATITIS B SURFACE AG IA: CPT | Performed by: INTERNAL MEDICINE

## 2023-08-24 RX ORDER — ONDANSETRON 2 MG/ML
4 INJECTION INTRAMUSCULAR; INTRAVENOUS EVERY 6 HOURS PRN
Status: DISCONTINUED | OUTPATIENT
Start: 2023-08-24 | End: 2023-09-01 | Stop reason: HOSPADM

## 2023-08-24 RX ORDER — MIDAZOLAM HYDROCHLORIDE 2 MG/2ML
INJECTION, SOLUTION INTRAMUSCULAR; INTRAVENOUS AS NEEDED
Status: COMPLETED | OUTPATIENT
Start: 2023-08-24 | End: 2023-08-24

## 2023-08-24 RX ORDER — CEFAZOLIN SODIUM 2 G/50ML
SOLUTION INTRAVENOUS
Status: COMPLETED | OUTPATIENT
Start: 2023-08-24 | End: 2023-08-24

## 2023-08-24 RX ORDER — FENTANYL CITRATE 50 UG/ML
INJECTION, SOLUTION INTRAMUSCULAR; INTRAVENOUS AS NEEDED
Status: COMPLETED | OUTPATIENT
Start: 2023-08-24 | End: 2023-08-24

## 2023-08-24 RX ADMIN — MIDAZOLAM HYDROCHLORIDE 1 MG: 1 INJECTION, SOLUTION INTRAMUSCULAR; INTRAVENOUS at 17:30

## 2023-08-24 RX ADMIN — MIDAZOLAM HYDROCHLORIDE 1 MG: 1 INJECTION, SOLUTION INTRAMUSCULAR; INTRAVENOUS at 17:45

## 2023-08-24 RX ADMIN — ACETAMINOPHEN 650 MG: 325 TABLET, FILM COATED ORAL at 21:09

## 2023-08-24 RX ADMIN — FENTANYL CITRATE 50 MCG: 50 INJECTION, SOLUTION INTRAMUSCULAR; INTRAVENOUS at 17:45

## 2023-08-24 RX ADMIN — Medication 10 ML: at 17:39

## 2023-08-24 RX ADMIN — TRAZODONE HYDROCHLORIDE 50 MG: 50 TABLET ORAL at 21:08

## 2023-08-24 RX ADMIN — ONDANSETRON 4 MG: 2 INJECTION INTRAMUSCULAR; INTRAVENOUS at 08:03

## 2023-08-24 RX ADMIN — FENTANYL CITRATE 50 MCG: 50 INJECTION, SOLUTION INTRAMUSCULAR; INTRAVENOUS at 17:31

## 2023-08-24 RX ADMIN — SODIUM BICARBONATE 125 ML/HR: 84 INJECTION, SOLUTION INTRAVENOUS at 07:33

## 2023-08-24 RX ADMIN — CEFAZOLIN SODIUM 2000 MG: 2 SOLUTION INTRAVENOUS at 17:29

## 2023-08-24 RX ADMIN — ONDANSETRON 4 MG: 2 INJECTION INTRAMUSCULAR; INTRAVENOUS at 18:25

## 2023-08-24 NOTE — ASSESSMENT & PLAN NOTE
C/c : + To hospital with symptoms of intermittent nausea/vomiting. No fever/chills/diarrhea/abdominal pain. No urinary symptoms/urgency/frequency/hematuria. 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 with symptoms of frequent kidney stones     · Creatinine 14+ on admission  · Baseline kidney function appears to be around 2.6-2.8  · Nephrology on board  · IV fluids :  0.45% saline with 75 meq of bicarb at 125 mL/h today  · Plan for tunneled dialysis catheter and initiating hemodialysis today.   · Monitor urine output closely  · Monitor creatinine closely

## 2023-08-24 NOTE — PROGRESS NOTES
NEPHROLOGY PROGRESS NOTE    Patient: Jefe Sheridan               Sex: male          DOA: 8/21/2023  7:53 PM   YOB: 1995        Age: 25 y.o.         LOS:  LOS: 3 days   Encounter Date: 8/24/2023    REASON FOR THE CONSULTATION: Further management of MECHELLE    HPI     This is a 25 y.o. male admitted for Acute renal failure (ARF) ([address])     SUBJECTIVE     -Patient vomited 3 times this morning. Breathing is currently stable. Patient denies headache or dizziness today. - Reviewed last 24 hrs events     CURRENT MEDICATIONS       Current Facility-Administered Medications:   •  acetaminophen (TYLENOL) tablet 650 mg, 650 mg, Oral, Q6H PRN, Daysi Look, DO, 650 mg at 08/23/23 1313  •  epoetin coleen (EPOGEN,PROCRIT) injection 5,000 Units, 5,000 Units, Intravenous, Once per day on Tue Thu Sat, Davonte William MD  •  ondansetron [facility]) injection 4 mg, 4 mg, Intravenous, Q6H PRN, Vilma Friedman MD, 4 mg at 08/24/23 0803  •  traZODone (DESYREL) tablet 50 mg, 50 mg, Oral, HS, Daysi Look, DO, 50 mg at 08/23/23 2239    OBJECTIVE     Current Weight: Weight - Scale: 105 kg (231 lb)  /76   Pulse 81   Temp 98.2 °F (36.8 °C)   Resp 16   Ht 6' (1.829 m)   Wt 105 kg (231 lb)   SpO2 97%   BMI 31.33 kg/m²   Vitals:    08/24/23 0650   BP: 143/76   Pulse: 81   Resp: 16   Temp: 98.2 °F (36.8 °C)   SpO2: 97%     Body mass index is 31.33 kg/m². Intake/Output Summary (Last 24 hours) at 8/24/2023 1005  Last data filed at 8/24/2023 0901  Gross per 24 hour   Intake --   Output 870 ml   Net -870 ml       PHYSICAL EXAMINATION     Physical Exam  Constitutional:       General: He is not in acute distress. HENT:      Right Ear: External ear normal.   Eyes:      Conjunctiva/sclera:      Right eye: No hemorrhage. Neck:      Thyroid: No thyromegaly. Pulmonary:      Effort: No accessory muscle usage or respiratory distress. Abdominal:      General: There is no distension.    Musculoskeletal:         General: No swelling. Skin:     General: Skin is warm. Coloration: Skin is not jaundiced. Psychiatric:         Behavior: Behavior is not combative. LAB RESULTS     Results from last 7 days   Lab Units 08/24/23  0752 08/24/23  0538 08/23/23  2316 08/23/23  1424 08/23/23  0426 08/22/23  2208 08/22/23  1402 08/22/23  0612 08/21/23 2021 08/21/23  1529   WBC Thousand/uL  --  7.72  --   --  7.34  --   --   --   --  8.84   HEMOGLOBIN g/dL  --  7.4*  --   --  7.4*  --   --   --   --  9.3*   HEMATOCRIT %  --  21.1*  --   --  21.3*  --   --   --   --  26.6*   PLATELETS Thousands/uL  --  211  --   --  210  --   --   --   --  275   SODIUM mmol/L 141  --  141 139 139 139 137 137   < > 139   POTASSIUM mmol/L 3.3*  --  3.2* 3.2* 3.2* 3.5 3.6 3.3*   < > 3.7   CHLORIDE mmol/L 102  --  104 104 106 106 106 107   < > 104   CO2 mmol/L 25  --  24 24 21 21 21 18*   < > 19*   BUN mg/dL 76*  --  73* 73* 74* 74* 75* 72*   < > 78*   CREATININE mg/dL 15.64*  --  15.17* 14.44* 14.37* 14.36* 14.36* 13.57*   < > 14.70*   EGFR ml/min/1.73sq m 3  --  3 3 4 4 4 4   < > 3   CALCIUM mg/dL 8.3*  --  8.2* 8.2* 8.2* 8.3* 8.6 8.1*   < > 9.6    < > = values in this interval not displayed. I have personally reviewed the old medical records and patient's previously known baseline creatinine level is ~ 2.8    RADIOLOGY RESULTS      IR tunneled dialysis catheter placement    (Results Pending)       PLAN / RECOMMENDATIONS      1. MECHELLE on top of CKD stage IV. Present on admission, urine lites were intrinsic in nature and renal function has failed to improve with IV fluid suggesting patient may have progression of underlying chronic kidney disease and now I think patient may have progressed to ESRD    Upon review of old medical record from epic chart review and also from [facility], patient is currently being followed by nephrologist at [facility] and previously known baseline creatinine was thought to be around 2.8. 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 According to patient at this point due to advanced chronic kidney disease he is just drinking more water and not taking any medication including potassium citrate for kidney stone    Patient was admitted with elevated creatinine of 14.7 which is failed to improve despite of IV fluid and morning creatinine was 15.17 with a EGFR of 3 and BUN of 76. Patient also been vomiting for last few days and in the last 24 hours she has vomited 3 times including sips of juice this morning. Discussed overall case in length with patient today and suspect patient is getting uremic symptoms [vomiting] due to progression of underlying chronic kidney disease. Dialysis been discussed in length with patient yesterday and also again today and patient is agreeable for initiation of hemodialysis in current clinical setting. Dialysis procedure has been fully reviewed with the patient and written informed consent has been obtained. I think patient has progressed to ESRD and will consult interventional radiologist for tunneled dialysis catheter placement for initiation of dialysis and depending on dialysis catheter placement timing, may plan for his dialysis today/tomorrow. We will also plan to check renal hepatitis panel, PTH, phosphorus and vitamin D level. Will change diet to n.p.o. for now but diet can be resumed after tunneled dialysis catheter placement    We will plan to discontinue IV fluid at this point. Plan to check renal hepatitis panel. Also consult  for outpatient dialysis unit set up with [address] [place] [address] unit since patient being followed by nephrologist at [address] [place] [address]    2. Anemia. Multifactorial and suspect due to anemia of chronic kidney disease/now in ESRD.   Iron panel is not showing iron deficiency and current hemoglobin is 7.4 which is below the goal and plan to start patient on Epogen 5000 units with dialysis tentatively on Monday Wednesday Friday schedule. Recommend blood transfusion if hemoglobin level drops below 7.    3.  Hypokalemia. Multifactorial with current potassium of 3.2. Since we are planning to start patient on dialysis, hold off potassium supplementation today. Overall above mentioned plan and recommendations were also d/w current [facility] physician and they agreed with my plan of initiation of hemodialysis. Beny Brown MD  Nephrology  8/24/2023        Portions of the record may have been created with voice recognition software. Occasional wrong word or "sound a like" substitutions may have occurred due to the inherent limitations of voice recognition software. Read the chart carefully and recognize, using context, where substitutions have occurred.

## 2023-08-24 NOTE — PLAN OF CARE
Problem: PAIN - ADULT  Goal: Verbalizes/displays adequate comfort level or baseline comfort level  Description: Interventions:  - Encourage patient to monitor pain and request assistance  - Assess pain using appropriate pain scale  - Administer analgesics based on type and severity of pain and evaluate response  - Implement non-pharmacological measures as appropriate and evaluate response  - Consider cultural and social influences on pain and pain management  - Notify physician/advanced practitioner if interventions unsuccessful or patient reports new pain  Outcome: Progressing     Problem: INFECTION - ADULT  Goal: Absence or prevention of progression during hospitalization  Description: INTERVENTIONS:  - Assess and monitor for signs and symptoms of infection  - Monitor lab/diagnostic results  - Monitor all insertion sites, i.e. indwelling lines, tubes, and drains  - Monitor endotracheal if appropriate and nasal secretions for changes in amount and color  - [place] appropriate cooling/warming therapies per order  - Administer medications as ordered  - Instruct and encourage patient and family to use good hand hygiene technique  - Identify and instruct in appropriate isolation precautions for identified infection/condition  Outcome: Progressing  Goal: Absence of fever/infection during neutropenic period  Description: INTERVENTIONS:  - Monitor WBC    Outcome: Progressing     Problem: SAFETY ADULT  Goal: Patient will remain free of falls  Description: INTERVENTIONS:  - Educate patient/family on patient safety including physical limitations  - Instruct patient to call for assistance with activity   - Consult OT/PT to assist with strengthening/mobility   - Keep Call bell within reach  - Keep bed low and locked with side rails adjusted as appropriate  - Keep care items and personal belongings within reach  - Initiate and maintain comfort rounds  - Make Fall Risk Sign visible to staff  - Offer Toileting every 2 Hours, in advance of need  - Initiate/Maintain bed alarm  - Obtain necessary fall risk management equipment:   - Apply yellow socks and bracelet for high fall risk patients  - Consider moving patient to room near nurses station  Outcome: Progressing  Goal: Maintain or return to baseline ADL function  Description: INTERVENTIONS:  -  Assess patient's ability to carry out ADLs; assess patient's baseline for ADL function and identify physical deficits which impact ability to perform ADLs (bathing, care of mouth/teeth, toileting, grooming, dressing, etc.)  - Assess/evaluate cause of self-care deficits   - Assess range of motion  - Assess patient's mobility; develop plan if impaired  - Assess patient's need for assistive devices and provide as appropriate  - Encourage maximum independence but intervene and supervise when necessary  - Involve family in performance of ADLs  - Assess for home care needs following discharge   - Consider OT consult to assist with ADL evaluation and planning for discharge  - Provide patient education as appropriate  Outcome: Progressing  Goal: Maintains/Returns to pre admission functional level  Description: INTERVENTIONS:  - Perform BMAT or MOVE assessment daily.   - Set and communicate daily mobility goal to care team and patient/family/caregiver. - Collaborate with rehabilitation services on mobility goals if consulted  - Perform Range of Motion 2 times a day. - Reposition patient every 2 hours.   - Dangle patient 2 times a day  - Stand patient 2 times a day  - Ambulate patient 2 times a day  - Out of bed to chair 2 times a day   - Out of bed for meals 2 times a day  - Out of bed for toileting  - Record patient progress and toleration of activity level   Outcome: Progressing     Problem: DISCHARGE PLANNING  Goal: Discharge to home or other facility with appropriate resources  Description: INTERVENTIONS:  - Identify barriers to discharge w/patient and caregiver  - Arrange for needed discharge resources and transportation as appropriate  - Identify discharge learning needs (meds, wound care, etc.)  - Arrange for interpretive services to assist at discharge as needed  - Refer to Case Management Department for coordinating discharge planning if the patient needs post-hospital services based on physician/advanced practitioner order or complex needs related to functional status, cognitive ability, or social support system  Outcome: Progressing     Problem: Knowledge Deficit  Goal: Patient/family/caregiver demonstrates understanding of disease process, treatment plan, medications, and discharge instructions  Description: Complete learning assessment and assess knowledge base.   Interventions:  - Provide teaching at level of understanding  - Provide teaching via preferred learning methods  Outcome: Progressing     Problem: METABOLIC, FLUID AND ELECTROLYTES - ADULT  Goal: Fluid balance maintained  Description: INTERVENTIONS:  - Monitor labs   - Monitor I/O and WT  - Instruct patient on fluid and nutrition as appropriate  - Assess for signs & symptoms of volume excess or deficit  Outcome: Progressing

## 2023-08-24 NOTE — CASE MANAGEMENT
Case Management Progress Note    Patient name Tao Wyatt  Location /-57 MRN [id number]  : 1995 Date 2023       LOS (days): 3  Geometric Mean LOS (GMLOS) (days):   Days to GMLOS:        OBJECTIVE:        Current admission status: Inpatient  Preferred Pharmacy:   [facility]/pharmacy #[id number] [address] [place] [address]  Phone: [phone] Fax: [phone]    [facility]/pharmacy #[id number]- [place] [address] [place] [address] - [address] [facility] [place] [address]  Phone: [phone] Fax: [phone]    Primary Care Provider: Chay Presley DO    Primary Insurance: [employer] PENDING  Secondary Insurance:     PROGRESS NOTE:  As per [facility], pt is a new HD start. Referral sent to [facility] as per pt request for review via [address] Pt is aware and in agreement. CM continues to follow.

## 2023-08-24 NOTE — BRIEF OP NOTE (RAD/CATH)
INTERVENTIONAL RADIOLOGY PROCEDURE NOTE    Date: 8/24/2023    Procedure: Tunneled dialysis catheter placement  Procedure Summary     Date:  Room / Location:     Anesthesia Start:  Anesthesia Stop:     Procedure:  Diagnosis:     Scheduled Providers:  Responsible Provider:     Anesthesia Type: Not recorded ASA Status: Not recorded          Preoperative diagnosis:   1. Acute renal failure (ARF) ([address])    2. MECHELLE (acute kidney injury) ([address])         Postoperative diagnosis: Same. Surgeon: Dash Ahuja MD     Assistant: None. No qualified resident was available. Blood loss: 4 mL    Specimens: None     Findings: Successful right IJV tunneled dialysis catheter placement. Complications: None immediate.     Anesthesia: conscious sedation and local

## 2023-08-24 NOTE — ASSESSMENT & PLAN NOTE
· Baseline hemoglobin appears to be around 11-12  · Hemoglobin 7.4 currently(9.3 on admission); partly dilutional  · Patient denies any hematuria, bright red blood per rectum, hematemesis or melena  · In the setting of chronic disease, IV fluids  · Iron panel ordered  · Monitor hemoglobin closely

## 2023-08-24 NOTE — PROGRESS NOTES
[address]  Progress Note  Name: Vazquez Rogers  MRN: [id number]  Unit/Bed#: -01 I Date of Admission: 8/21/2023   Date of Service: 8/24/2023 I Hospital Day: 3    Assessment/Plan   * Acute renal failure (ARF) ([address])  Assessment & Plan  C/c : + To hospital with symptoms of intermittent nausea/vomiting. No fever/chills/diarrhea/abdominal pain. No urinary symptoms/urgency/frequency/hematuria. 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 with symptoms of frequent kidney stones     · Creatinine 14+ on admission  · Baseline kidney function appears to be around 2.6-2.8  · Nephrology on board  · IV fluids :  0.45% saline with 75 meq of bicarb at 125 mL/h today  · Plan for tunneled dialysis catheter and initiating hemodialysis today. · Monitor urine output closely  · Monitor creatinine closely    Hypokalemia  Assessment & Plan  · repleted    Metabolic acidosis  Assessment & Plan  · pH 7.26 on presentation  · Secondary to underlying kidney dysfunction likely related to obstructive uropathy  · CT negative for any hydronephrosis  · Bicarbonate infusion    Anemia  Assessment & Plan  · Baseline hemoglobin appears to be around 11-12  · Hemoglobin 7.4 currently(9.3 on admission); partly dilutional  · Patient denies any hematuria, bright red blood per rectum, hematemesis or melena  · In the setting of chronic disease, IV fluids  · Iron panel ordered  · Monitor hemoglobin closely           VTE Pharmacologic Prophylaxis:   Pharmacologic: ambulatory    Patient Centered Rounds: I have performed bedside rounds with nursing staff today. Discussions with Specialists or Other Care Team Provider: nephrology    Education and Discussions with Family / Patient: patient    Time Spent for Care: 30 minutes. More than 50% of total time spent on counseling and coordination of care as described above.     Current Length of Stay: 3 day(s)    Current Patient Status: Inpatient   Certification Statement: The patient will continue to require additional inpatient hospital stay due to initiate dialysis    Discharge Plan: ?48hrs    Code Status: Level 1 - Full Code      Subjective:   Still nauseous/intermittent vomiting     Objective:     Vitals:   Temp (24hrs), Av.2 °F (36.8 °C), Min:98.2 °F (36.8 °C), Max:98.2 °F (36.8 °C)    Temp:  [98.2 °F (36.8 °C)] 98.2 °F (36.8 °C)  HR:  [74-81] 81  Resp:  [16-18] 16  BP: (122-143)/(68-76) 143/76  SpO2:  [97 %-99 %] 97 %  Body mass index is 31.33 kg/m². Input and Output Summary (last 24 hours): Intake/Output Summary (Last 24 hours) at 2023 5848  Last data filed at 2023 1439  Gross per 24 hour   Intake --   Output 420 ml   Net -420 ml       Physical Exam:     Physical Exam  Constitutional:       General: He is not in acute distress. Appearance: He is obese. He is not toxic-appearing. HENT:      Mouth/Throat:      Mouth: Mucous membranes are moist.   Cardiovascular:      Rate and Rhythm: Normal rate and regular rhythm. Pulses: Normal pulses. Pulmonary:      Effort: Pulmonary effort is normal. No respiratory distress. Breath sounds: Normal breath sounds. Abdominal:      General: Abdomen is flat. Bowel sounds are normal.      Palpations: Abdomen is soft. Neurological:      General: No focal deficit present. Mental Status: He is alert and oriented to person, place, and time.          Additional Data:     Labs:    Results from last 7 days   Lab Units 23  0538   WBC Thousand/uL 7.72   HEMOGLOBIN g/dL 7.4*   HEMATOCRIT % 21.1*   PLATELETS Thousands/uL 211   NEUTROS PCT % 70   LYMPHS PCT % 18   MONOS PCT % 7   EOS PCT % 4     Results from last 7 days   Lab Units 23  0752 23  1529   SODIUM mmol/L 141   < > 139   POTASSIUM mmol/L 3.3*   < > 3.7   CHLORIDE mmol/L 102   < > 104   CO2 mmol/L 25   < > 19*   BUN mg/dL 76*   < > 78*   CREATININE mg/dL 15.64*   < > 14.70*   ANION GAP mmol/L 14   < > 16   CALCIUM mg/dL 8.3*   < > 9.6   ALBUMIN g/dL  --   --  4.8   TOTAL BILIRUBIN mg/dL  --   --  0.41   ALK PHOS U/L  --   --  60   ALT U/L  --   --  15   AST U/L  --   --  16   GLUCOSE RANDOM mg/dL 102   < >  --     < > = values in this interval not displayed. * I Have Reviewed All Lab Data Listed Above. * Additional Pertinent Lab Tests Reviewed: All Labs Within Last 24 Hours Reviewed      Recent Cultures (last 7 days):     Results from last 7 days   Lab Units 08/21/23  2207 08/21/23  1529   URINE CULTURE  No Growth <1000 cfu/mL No Growth <1000 cfu/mL       Last 24 Hours Medication List:   Current Facility-Administered Medications   Medication Dose Route Frequency Provider Last Rate   • acetaminophen  650 mg Oral Q6H PRN Thuy Álvarez DO     • ondansetron  4 mg Intravenous Q6H PRN Emmett Tavarez MD     • sodium bicarbonate 75 mEq in sodium chloride 0.45 % 1,000 mL infusion  125 mL/hr Intravenous Continuous Oscar Goldberg  mL/hr (08/24/23 9764)   • traZODone  50 mg Oral HS Thuy Álvarez DO          Today, Patient Was Seen By: Kadi Hemphill M.D.     ** Please Note: Dictation voice to text software may have been used in the creation of this document.  **

## 2023-08-24 NOTE — DISCHARGE INSTRUCTIONS
Perma-cath Placement   WHAT YOU NEED TO KNOW:   A perma-cath is a catheter placed through a vein into or near your right atrium. Your right atrium is the right upper chamber of your heart. A perma-cath is used for dialysis in an emergency or until a long-term device is ready to use. After your procedure, you will have some pain and swelling on your chest and neck. You may have some bruises on your chest and neck. You may also have 2 dressings, one on your chest and one on your neck. DISCHARGE INSTRUCTIONS:   Call 911 for any of the following: You feel lightheaded, short of breath, and have chest pain. Your catheter comes out   Contact Interventional Radiology at [phone] Maria PATIENTS: Contact Interventional Radiology at [phone]) Cheryl Barrera PATIENTS: Contact Interventional Radiology at [phone]) if:  Blood soaks through your bandage. You have new swelling in your arm, neck, face, or chest on your right side. Your catheter gets wet. Your bruises or pain get worse. You have a fever or chills. Persistent nausea or vomiting. Your incision is red, swollen, or draining pus. You have questions or concerns about your condition or care. Self-care:       Resume your normal diet. Keep your dressings dry. Do not take a shower or swim. You may take a tub bath, but do not get your dressings wet. Water in your wound can cause bacteria to grow and cause an infection. If your dressing gets wet, dry it off and cover it with dry sterile gauze. Call your healthcare provider. Do not use soaps or ointments. Do not change your dressings. Your healthcare provider or dialysis nurse will change your dressings. Your dressings should stay in place until your healthcare provider removes them. The dressing on your chest will stay as long as you have the catheter in place. The dressing prevents infection. Do not remove the red and blue caps from the end of your catheter.  The caps prevent air from getting into your catheter. Follow up with your healthcare provider as directed: Write down your questions so you remember to ask them during your visits.

## 2023-08-24 NOTE — TELEMEDICINE
e-Consult (IPC)  - Interventional Radiology  Gala Horner 25 y.o. male MRN: [id number]  Unit/Bed#: -01 Encounter: [id number]          Interventional Radiology has been consulted to evaluate Gala Bullocks    We were consulted by Nephrology concerning this patient with acute renal failure. Inpatient Consult to IR  Consult performed by: [facility], [facility]  Consult ordered by: Nate Huerta MD        08/24/23    Assessment/Recommendation:   Martinez Calles, 28y male with a pmh of CKD stage IV. Patient with Acute renal failure. Latest Reference Range & Units 08/24/23 07:52   BUN 5 - 25 mg/dL 76 (H)   Creatinine 0.60 - 1.30 mg/dL 15.64 (H)   (H): Data is abnormally high   Latest Reference Range & Units 08/24/23 07:52   eGFR ml/min/1.73sq m 3     Nephrology discussed with patient that CKD has progressed to ESRD and the need for the patient to start hemodialysis. Patient is agreeable for the initiation of hemodialysis. Will need INR - order stat draw. Plan -  Tunneled hemodialysis catheter placement this afternoon - timing to be determined by team availability. NPO  Pending INR. Feel free to reach out to IR with any questions or concerns. 11-20 minutes, >50% of the total time devoted to medical consultative verbal/EMR discussion between providers. Written report will be generated in the EMR. Thank you for allowing Interventional Radiology to participate in the care of Galasang Horner. Please don't hesitate to call or TigerText us with any questions.      [facility] Cloyce Dakins

## 2023-08-25 ENCOUNTER — APPOINTMENT (INPATIENT)
Dept: DIALYSIS | Facility: HOSPITAL | Age: 28
DRG: 469 | End: 2023-08-25
Payer: COMMERCIAL

## 2023-08-25 LAB
25(OH)D3 SERPL-MCNC: 22.1 NG/ML (ref 30–100)
ANION GAP SERPL CALCULATED.3IONS-SCNC: 13 MMOL/L
BASOPHILS # BLD AUTO: 0.05 THOUSANDS/ÂΜL (ref 0–0.1)
BASOPHILS NFR BLD AUTO: 1 % (ref 0–1)
BUN SERPL-MCNC: 79 MG/DL (ref 5–25)
CALCIUM SERPL-MCNC: 8 MG/DL (ref 8.4–10.2)
CHLORIDE SERPL-SCNC: 102 MMOL/L (ref 96–108)
CO2 SERPL-SCNC: 25 MMOL/L (ref 21–32)
CREAT SERPL-MCNC: 17.07 MG/DL (ref 0.6–1.3)
EOSINOPHIL # BLD AUTO: 0.17 THOUSAND/ÂΜL (ref 0–0.61)
EOSINOPHIL NFR BLD AUTO: 2 % (ref 0–6)
ERYTHROCYTE [DISTWIDTH] IN BLOOD BY AUTOMATED COUNT: 12.4 % (ref 11.6–15.1)
GFR SERPL CREATININE-BSD FRML MDRD: 3 ML/MIN/1.73SQ M
GLUCOSE SERPL-MCNC: 165 MG/DL (ref 65–140)
HCT VFR BLD AUTO: 22 % (ref 36.5–49.3)
HGB BLD-MCNC: 7.7 G/DL (ref 12–17)
IMM GRANULOCYTES # BLD AUTO: 0.02 THOUSAND/UL (ref 0–0.2)
IMM GRANULOCYTES NFR BLD AUTO: 0 % (ref 0–2)
LYMPHOCYTES # BLD AUTO: 1.12 THOUSANDS/ÂΜL (ref 0.6–4.47)
LYMPHOCYTES NFR BLD AUTO: 14 % (ref 14–44)
MCH RBC QN AUTO: 30.6 PG (ref 26.8–34.3)
MCHC RBC AUTO-ENTMCNC: 35 G/DL (ref 31.4–37.4)
MCV RBC AUTO: 87 FL (ref 82–98)
MONOCYTES # BLD AUTO: 0.45 THOUSAND/ÂΜL (ref 0.17–1.22)
MONOCYTES NFR BLD AUTO: 6 % (ref 4–12)
NEUTROPHILS # BLD AUTO: 6.17 THOUSANDS/ÂΜL (ref 1.85–7.62)
NEUTS SEG NFR BLD AUTO: 77 % (ref 43–75)
NRBC BLD AUTO-RTO: 0 /100 WBCS
PHOSPHATE SERPL-MCNC: 5.7 MG/DL (ref 2.7–4.5)
PLATELET # BLD AUTO: 217 THOUSANDS/UL (ref 149–390)
PMV BLD AUTO: 10.1 FL (ref 8.9–12.7)
POTASSIUM SERPL-SCNC: 3.4 MMOL/L (ref 3.5–5.3)
PTH-INTACT SERPL-MCNC: 233.6 PG/ML (ref 12–88)
RBC # BLD AUTO: 2.52 MILLION/UL (ref 3.88–5.62)
SODIUM SERPL-SCNC: 140 MMOL/L (ref 135–147)
WBC # BLD AUTO: 7.98 THOUSAND/UL (ref 4.31–10.16)

## 2023-08-25 PROCEDURE — 80048 BASIC METABOLIC PNL TOTAL CA: CPT | Performed by: INTERNAL MEDICINE

## 2023-08-25 PROCEDURE — 82306 VITAMIN D 25 HYDROXY: CPT | Performed by: INTERNAL MEDICINE

## 2023-08-25 PROCEDURE — 83970 ASSAY OF PARATHORMONE: CPT | Performed by: INTERNAL MEDICINE

## 2023-08-25 PROCEDURE — 84100 ASSAY OF PHOSPHORUS: CPT | Performed by: INTERNAL MEDICINE

## 2023-08-25 PROCEDURE — 99232 SBSQ HOSP IP/OBS MODERATE 35: CPT | Performed by: FAMILY MEDICINE

## 2023-08-25 PROCEDURE — 90935 HEMODIALYSIS ONE EVALUATION: CPT | Performed by: INTERNAL MEDICINE

## 2023-08-25 PROCEDURE — 85025 COMPLETE CBC W/AUTO DIFF WBC: CPT | Performed by: INTERNAL MEDICINE

## 2023-08-25 RX ADMIN — EPOETIN ALFA 3000 UNITS: 3000 SOLUTION INTRAVENOUS; SUBCUTANEOUS at 10:38

## 2023-08-25 RX ADMIN — EPOETIN ALFA 2000 UNITS: 2000 SOLUTION INTRAVENOUS; SUBCUTANEOUS at 10:38

## 2023-08-25 RX ADMIN — ONDANSETRON 4 MG: 2 INJECTION INTRAMUSCULAR; INTRAVENOUS at 12:36

## 2023-08-25 RX ADMIN — ONDANSETRON 4 MG: 2 INJECTION INTRAMUSCULAR; INTRAVENOUS at 06:58

## 2023-08-25 RX ADMIN — TRAZODONE HYDROCHLORIDE 50 MG: 50 TABLET ORAL at 22:14

## 2023-08-25 RX ADMIN — ACETAMINOPHEN 650 MG: 325 TABLET, FILM COATED ORAL at 12:36

## 2023-08-25 NOTE — PROGRESS NOTES
[address]  Progress Note  Name: Andie Ortiz  MRN: [id number]  Unit/Bed#: -01 I Date of Admission: 8/21/2023   Date of Service: 8/25/2023 I Hospital Day: 4    Assessment/Plan   * Acute renal failure (ARF) ([address])  Assessment & Plan  C/c : + To hospital with symptoms of intermittent nausea/vomiting. No fever/chills/diarrhea/abdominal pain. No urinary symptoms/urgency/frequency/hematuria. 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 with symptoms of frequent kidney stones     · Creatinine 14+ on admission  · Baseline kidney function appears to be around 2.6-2.8  · Nephrology on board  · S/p IV fluids :  0.45% saline with 75 meq of bicarb at 125 mL/h with no improvement in kidney function   · S/p rt IVJ tunneled dialysis catheter placement on 8/24  · initiating hemodialysis today. · CM aware, helping arrange chair time at an OP dialysis unit    Metabolic acidosis  Assessment & Plan  · pH 7.26 on presentation  · Secondary to underlying kidney dysfunction likely related to obstructive uropathy  · CT negative for any hydronephrosis  · Bicarbonate infusion  · resolved    Anemia  Assessment & Plan  · Baseline hemoglobin appears to be around 11-12  · Hemoglobin 7.7 currently(9.3 on admission); partly dilutional  · Patient denies any hematuria, bright red blood per rectum, hematemesis or melena  · In the setting of chronic disease, IV fluids  · Iron panel ordered  · epogen with dialysis  · Monitor hemoglobin closely         VTE Pharmacologic Prophylaxis:   Pharmacologic: ambulatory    Patient Centered Rounds: I have performed bedside rounds with nursing staff today. Discussions with Specialists or Other Care Team Provider: nephrology    Education and Discussions with Family / Patient: patient    Time Spent for Care: 30 minutes. More than 50% of total time spent on counseling and coordination of care as described above.     Current Length of Stay: 4 day(s)    Current Patient Status: Inpatient   Certification Statement: The patient will continue to require additional inpatient hospital stay due to needs dialysis    Discharge Plan: once op dialysis chair arranged    Code Status: Level 1 - Full Code      Subjective:   No new concerns  Had dialysis cath placed yday, initiate HD today    Objective:     Vitals:   Temp (24hrs), Av.9 °F (36.6 °C), Min:97.8 °F (36.6 °C), Max:98 °F (36.7 °C)    Temp:  [97.8 °F (36.6 °C)-98 °F (36.7 °C)] 98 °F (36.7 °C)  HR:  [67-90] 74  Resp:  [16-20] 18  BP: (115-158)/(64-91) 148/80  SpO2:  [93 %-100 %] 93 %  Body mass index is 31.33 kg/m². Input and Output Summary (last 24 hours): Intake/Output Summary (Last 24 hours) at 2023 0946  Last data filed at 2023 0850  Gross per 24 hour   Intake 1000 ml   Output 1300 ml   Net -300 ml       Physical Exam:     Physical Exam  Constitutional:       General: He is not in acute distress. Appearance: He is obese. He is not toxic-appearing. HENT:      Mouth/Throat:      Mouth: Mucous membranes are moist.      Pharynx: Oropharynx is clear. Cardiovascular:      Rate and Rhythm: Normal rate and regular rhythm. Pulses: Normal pulses. Pulmonary:      Effort: Pulmonary effort is normal.      Breath sounds: Normal breath sounds. Abdominal:      General: Abdomen is flat. Bowel sounds are normal.      Palpations: Abdomen is soft. Musculoskeletal:      Right lower leg: No edema. Left lower leg: No edema. Neurological:      Mental Status: He is alert and oriented to person, place, and time.            Additional Data:     Labs:    Results from last 7 days   Lab Units 23  0846   WBC Thousand/uL 7.98   HEMOGLOBIN g/dL 7.7*   HEMATOCRIT % 22.0*   PLATELETS Thousands/uL 217   NEUTROS PCT % 77*   LYMPHS PCT % 14   MONOS PCT % 6   EOS PCT % 2     Results from last 7 days   Lab Units 23  0846 23  1529   SODIUM mmol/L 140   < > 139   POTASSIUM mmol/L 3.4*   < > 3.7 CHLORIDE mmol/L 102   < > 104   CO2 mmol/L 25   < > 19*   BUN mg/dL 79*   < > 78*   CREATININE mg/dL 17.07*   < > 14.70*   ANION GAP mmol/L 13   < > 16   CALCIUM mg/dL 8.0*   < > 9.6   ALBUMIN g/dL  --   --  4.8   TOTAL BILIRUBIN mg/dL  --   --  0.41   ALK PHOS U/L  --   --  60   ALT U/L  --   --  15   AST U/L  --   --  16   GLUCOSE RANDOM mg/dL 165*   < >  --     < > = values in this interval not displayed. Results from last 7 days   Lab Units 08/24/23  1358   INR  1.00                     * I Have Reviewed All Lab Data Listed Above. * Additional Pertinent Lab Tests Reviewed: All Labs Within Last 24 Hours Reviewed      Recent Cultures (last 7 days):     Results from last 7 days   Lab Units 08/21/23  2207 08/21/23  1529   URINE CULTURE  No Growth <1000 cfu/mL No Growth <1000 cfu/mL       Last 24 Hours Medication List:   Current Facility-Administered Medications   Medication Dose Route Frequency Provider Last Rate   • acetaminophen  650 mg Oral Q6H PRN Ansel Boxer, DO     • epoetin coleen  2,000 Units Subcutaneous Once per day on Mon Wed Fri Rush Skiff, MD     • epoetin coleen  3,000 Units Subcutaneous Once per day on Mon Wed Fri Rush Skiff, MD     • ondansetron  4 mg Intravenous Q6H PRN Angus Hernandez MD     • traZODone  50 mg Oral HS Ansel Boxer, DO          Today, Patient Was Seen By: Carolina Canada M.D.     ** Please Note: Dictation voice to text software may have been used in the creation of this document.  **

## 2023-08-25 NOTE — ASSESSMENT & PLAN NOTE
· Baseline hemoglobin appears to be around 11-12  · Hemoglobin 7.7 currently(9.3 on admission); partly dilutional  · Patient denies any hematuria, bright red blood per rectum, hematemesis or melena  · In the setting of chronic disease, IV fluids  · Iron panel ordered  · epogen with dialysis  · Monitor hemoglobin closely

## 2023-08-25 NOTE — PLAN OF CARE
Problem: PAIN - ADULT  Goal: Verbalizes/displays adequate comfort level or baseline comfort level  Description: Interventions:  - Encourage patient to monitor pain and request assistance  - Assess pain using appropriate pain scale  - Administer analgesics based on type and severity of pain and evaluate response  - Implement non-pharmacological measures as appropriate and evaluate response  - Consider cultural and social influences on pain and pain management  - Notify physician/advanced practitioner if interventions unsuccessful or patient reports new pain  Outcome: Progressing     Problem: INFECTION - ADULT  Goal: Absence or prevention of progression during hospitalization  Description: INTERVENTIONS:  - Assess and monitor for signs and symptoms of infection  - Monitor lab/diagnostic results  - Monitor all insertion sites, i.e. indwelling lines, tubes, and drains  - Monitor endotracheal if appropriate and nasal secretions for changes in amount and color  - [place] appropriate cooling/warming therapies per order  - Administer medications as ordered  - Instruct and encourage patient and family to use good hand hygiene technique  - Identify and instruct in appropriate isolation precautions for identified infection/condition  Outcome: Progressing  Goal: Absence of fever/infection during neutropenic period  Description: INTERVENTIONS:  - Monitor WBC    Outcome: Progressing     Problem: SAFETY ADULT  Goal: Patient will remain free of falls  Description: INTERVENTIONS:  - Educate patient/family on patient safety including physical limitations  - Instruct patient to call for assistance with activity   - Consult OT/PT to assist with strengthening/mobility   - Keep Call bell within reach  - Keep bed low and locked with side rails adjusted as appropriate  - Keep care items and personal belongings within reach  - Initiate and maintain comfort rounds  - Make Fall Risk Sign visible to staff  - Offer Toileting every 2 Hours, in advance of need  - Initiate/Maintain bed alarm  - Obtain necessary fall risk management equipment:   - Apply yellow socks and bracelet for high fall risk patients  - Consider moving patient to room near nurses station  Outcome: Progressing  Goal: Maintain or return to baseline ADL function  Description: INTERVENTIONS:  -  Assess patient's ability to carry out ADLs; assess patient's baseline for ADL function and identify physical deficits which impact ability to perform ADLs (bathing, care of mouth/teeth, toileting, grooming, dressing, etc.)  - Assess/evaluate cause of self-care deficits   - Assess range of motion  - Assess patient's mobility; develop plan if impaired  - Assess patient's need for assistive devices and provide as appropriate  - Encourage maximum independence but intervene and supervise when necessary  - Involve family in performance of ADLs  - Assess for home care needs following discharge   - Consider OT consult to assist with ADL evaluation and planning for discharge  - Provide patient education as appropriate  Outcome: Progressing  Goal: Maintains/Returns to pre admission functional level  Description: INTERVENTIONS:  - Perform BMAT or MOVE assessment daily.   - Set and communicate daily mobility goal to care team and patient/family/caregiver. - Collaborate with rehabilitation services on mobility goals if consulted  - Perform Range of Motion 2 times a day. - Reposition patient every 2 hours.   - Dangle patient 2 times a day  - Stand patient 2 times a day  - Ambulate patient 2 times a day  - Out of bed to chair 2 times a day   - Out of bed for meals 2 times a day  - Out of bed for toileting  - Record patient progress and toleration of activity level   Outcome: Progressing     Problem: DISCHARGE PLANNING  Goal: Discharge to home or other facility with appropriate resources  Description: INTERVENTIONS:  - Identify barriers to discharge w/patient and caregiver  - Arrange for needed discharge resources and transportation as appropriate  - Identify discharge learning needs (meds, wound care, etc.)  - Arrange for interpretive services to assist at discharge as needed  - Refer to Case Management Department for coordinating discharge planning if the patient needs post-hospital services based on physician/advanced practitioner order or complex needs related to functional status, cognitive ability, or social support system  Outcome: Progressing     Problem: Knowledge Deficit  Goal: Patient/family/caregiver demonstrates understanding of disease process, treatment plan, medications, and discharge instructions  Description: Complete learning assessment and assess knowledge base.   Interventions:  - Provide teaching at level of understanding  - Provide teaching via preferred learning methods  Outcome: Progressing     Problem: METABOLIC, FLUID AND ELECTROLYTES - ADULT  Goal: Fluid balance maintained  Description: INTERVENTIONS:  - Monitor labs   - Monitor I/O and WT  - Instruct patient on fluid and nutrition as appropriate  - Assess for signs & symptoms of volume excess or deficit  Outcome: Progressing  Goal: Electrolytes maintained within normal limits  Description: INTERVENTIONS:  - Monitor labs and assess patient for signs and symptoms of electrolyte imbalances  - Administer electrolyte replacement as ordered  - Monitor response to electrolyte replacements, including repeat lab results as appropriate  - Instruct patient on fluid and nutrition as appropriate  Outcome: Progressing

## 2023-08-25 NOTE — ASSESSMENT & PLAN NOTE
C/c : + To hospital with symptoms of intermittent nausea/vomiting. No fever/chills/diarrhea/abdominal pain. No urinary symptoms/urgency/frequency/hematuria. 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 with symptoms of frequent kidney stones     · Creatinine 14+ on admission  · Baseline kidney function appears to be around 2.6-2.8  · Nephrology on board  · S/p IV fluids :  0.45% saline with 75 meq of bicarb at 125 mL/h with no improvement in kidney function   · S/p rt IVJ tunneled dialysis catheter placement on 8/24  · initiating hemodialysis today.   · CM aware, helping arrange chair time at an OP dialysis unit

## 2023-08-25 NOTE — ASSESSMENT & PLAN NOTE
· pH 7.26 on presentation  · Secondary to underlying kidney dysfunction likely related to obstructive uropathy  · CT negative for any hydronephrosis  · Bicarbonate infusion  · resolved

## 2023-08-25 NOTE — PLAN OF CARE
Problem: PAIN - ADULT  Goal: Verbalizes/displays adequate comfort level or baseline comfort level  Description: Interventions:  - Encourage patient to monitor pain and request assistance  - Assess pain using appropriate pain scale  - Administer analgesics based on type and severity of pain and evaluate response  - Implement non-pharmacological measures as appropriate and evaluate response  - Consider cultural and social influences on pain and pain management  - Notify physician/advanced practitioner if interventions unsuccessful or patient reports new pain  Outcome: Progressing     Problem: INFECTION - ADULT  Goal: Absence or prevention of progression during hospitalization  Description: INTERVENTIONS:  - Assess and monitor for signs and symptoms of infection  - Monitor lab/diagnostic results  - Monitor all insertion sites, i.e. indwelling lines, tubes, and drains  - Monitor endotracheal if appropriate and nasal secretions for changes in amount and color  - [place] appropriate cooling/warming therapies per order  - Administer medications as ordered  - Instruct and encourage patient and family to use good hand hygiene technique  - Identify and instruct in appropriate isolation precautions for identified infection/condition  Outcome: Progressing  Goal: Absence of fever/infection during neutropenic period  Description: INTERVENTIONS:  - Monitor WBC    Outcome: Progressing     Problem: SAFETY ADULT  Goal: Patient will remain free of falls  Description: INTERVENTIONS:  - Educate patient/family on patient safety including physical limitations  - Instruct patient to call for assistance with activity   - Consult OT/PT to assist with strengthening/mobility   - Keep Call bell within reach  - Keep bed low and locked with side rails adjusted as appropriate  - Keep care items and personal belongings within reach  - Initiate and maintain comfort rounds  - Make Fall Risk Sign visible to staff  -   - Initiate/Maintain bed alarm  - Obtain necessary fall risk management equipment:   - Apply yellow socks and bracelet for high fall risk patients  - Consider moving patient to room near nurses station  Outcome: Progressing  Goal: Maintain or return to baseline ADL function  Description: INTERVENTIONS:  -  Assess patient's ability to carry out ADLs; assess patient's baseline for ADL function and identify physical deficits which impact ability to perform ADLs (bathing, care of mouth/teeth, toileting, grooming, dressing, etc.)  - Assess/evaluate cause of self-care deficits   - Assess range of motion  - Assess patient's mobility; develop plan if impaired  - Assess patient's need for assistive devices and provide as appropriate  - Encourage maximum independence but intervene and supervise when necessary  - Involve family in performance of ADLs  - Assess for home care needs following discharge   - Consider OT consult to assist with ADL evaluation and planning for discharge  - Provide patient education as appropriate  Outcome: Progressing  Goal: Maintains/Returns to pre admission functional level  Description: INTERVENTIONS:  - Perform BMAT or MOVE assessment daily.   - Set and communicate daily mobility goal to care team and patient/family/caregiver.    - Collaborate with rehabilitation services on mobility goals if consulte  -  - Dangle patient 3   times a day  - Stand patient 3 times a day  - Ambulate patient 3 times a day  - Out of bed to chair 3 times a day   - Out of bed for meals 3 times a day  - Out of bed for toileting  - Record patient progress and toleration of activity level   Outcome: Progressing     Problem: DISCHARGE PLANNING  Goal: Discharge to home or other facility with appropriate resources  Description: INTERVENTIONS:  - Identify barriers to discharge w/patient and caregiver  - Arrange for needed discharge resources and transportation as appropriate  - Identify discharge learning needs (meds, wound care, etc.)  - Arrange for interpretive services to assist at discharge as needed  - Refer to Case Management Department for coordinating discharge planning if the patient needs post-hospital services based on physician/advanced practitioner order or complex needs related to functional status, cognitive ability, or social support system  Outcome: Progressing     Problem: Knowledge Deficit  Goal: Patient/family/caregiver demonstrates understanding of disease process, treatment plan, medications, and discharge instructions  Description: Complete learning assessment and assess knowledge base.   Interventions:  - Provide teaching at level of understanding  - Provide teaching via preferred learning methods  Outcome: Progressing     Problem: METABOLIC, FLUID AND ELECTROLYTES - ADULT  Goal: Fluid balance maintained  Description: INTERVENTIONS:  - Monitor labs   - Monitor I/O and WT  - Instruct patient on fluid and nutrition as appropriate  - Assess for signs & symptoms of volume excess or deficit  Outcome: Progressing     Problem: METABOLIC, FLUID AND ELECTROLYTES - ADULT  Goal: Fluid balance maintained  Description: INTERVENTIONS:  - Monitor labs   - Monitor I/O and WT  - Instruct patient on fluid and nutrition as appropriate  - Assess for signs & symptoms of volume excess or deficit  Outcome: Progressing

## 2023-08-25 NOTE — PROGRESS NOTES
HEMODIALYSIS ROUNDING NOTE    Patient: Angela England               Sex: male          DOA: 8/21/2023  7:53 PM   YOB: 1995         Age: 25 y.o.         LOS:  LOS: 4 days   Encounter Date: 8/25/2023    REASON FOR THE CONSULTATION:  Further management of ESRD    HPI     This is a 25 y.o. male admitted for Acute renal failure (ARF) ([address])     SUBJECTIVE     - Patient was seen during 1st hemodialysis today. Patient denies nausea / vomiting / headache / dizziness / SOB / chest pain during hemodialysis. - Reviewed last 24 hrs events     CURRENT MEDICATIONS       Current Facility-Administered Medications:   •  acetaminophen (TYLENOL) tablet 650 mg, 650 mg, Oral, Q6H PRN, Redd Platts, DO, 650 mg at 08/24/23 2109  •  epoetin coleen (EPOGEN,PROCRIT) injection 2,000 Units, 2,000 Units, Subcutaneous, Once per day on Mon Wed Fri, Amalia Jewell MD  •  epoetin coleen (EPOGEN,PROCRIT) injection 3,000 Units, 3,000 Units, Subcutaneous, Once per day on Mon Wed Fri, Amalia Jewell MD  •  [facility]ansetron [facility]) injection 4 mg, 4 mg, Intravenous, Q6H PRN, Tatyana Willson MD, 4 mg at 08/25/23 3086  •  traZODone (DESYREL) tablet 50 mg, 50 mg, Oral, HS, Redd Felipe, DO, 50 mg at 08/24/23 2108    OBJECTIVE     Current Weight: Weight - Scale: 105 kg (231 lb)  /75   Pulse 74   Temp 98 °F (36.7 °C)   Resp 18   Ht 6' (1.829 m)   Wt 105 kg (231 lb)   SpO2 93%   BMI 31.33 kg/m²   Vitals:    08/25/23 0930   BP: 134/75   Pulse:    Resp: 18   Temp:    SpO2:      Body mass index is 31.33 kg/m². Intake/Output Summary (Last 24 hours) at 8/25/2023 1006  Last data filed at 8/25/2023 0850  Gross per 24 hour   Intake 1000 ml   Output 1300 ml   Net -300 ml       PHYSICAL EXAMINATION     Physical Exam  Constitutional:       General: He is not in acute distress. HENT:      Right Ear: External ear normal.   Eyes:      Conjunctiva/sclera:      Right eye: No hemorrhage. Neck:      Thyroid: No thyromegaly.    Pulmonary: Effort: No accessory muscle usage or respiratory distress. Abdominal:      General: There is no distension. Skin:     General: Skin is warm. Coloration: Skin is not jaundiced. Psychiatric:         Behavior: Behavior is not combative. LAB RESULTS     Results from last 7 days   Lab Units 08/25/23  0846 08/24/23  1358 08/24/23  0752 08/24/23  0538 08/23/23  2316 08/23/23  1424 08/23/23  0426 08/22/23  2208 08/21/23 2021 08/21/23  1529   WBC Thousand/uL 7.98  --   --  7.72  --   --  7.34  --   --  8.84   HEMOGLOBIN g/dL 7.7*  --   --  7.4*  --   --  7.4*  --   --  9.3*   HEMATOCRIT % 22.0*  --   --  21.1*  --   --  21.3*  --   --  26.6*   PLATELETS Thousands/uL 217  --   --  211  --   --  210  --   --  275   SODIUM mmol/L 140 142 141  --  141 139 139 139   < > 139   POTASSIUM mmol/L 3.4* 3.3* 3.3*  --  3.2* 3.2* 3.2* 3.5   < > 3.7   CHLORIDE mmol/L 102 102 102  --  104 104 106 106   < > 104   CO2 mmol/L 25 25 25  --  24 24 21 21   < > 19*   BUN mg/dL 79* 76* 76*  --  73* 73* 74* 74*   < > 78*   CREATININE mg/dL 17.07* 15.76* 15.64*  --  15.17* 14.44* 14.37* 14.36*   < > 14.70*   EGFR ml/min/1.73sq m 3 3 3  --  3 3 4 4   < > 3   CALCIUM mg/dL 8.0* 8.4 8.3*  --  8.2* 8.2* 8.2* 8.3*   < > 9.6   PHOSPHORUS mg/dL 5.7*  --   --   --   --   --   --   --   --   --     < > = values in this interval not displayed. RADIOLOGY RESULTS     IR tunneled dialysis catheter placement   Final Result by Radha Guzman MD (08/24 1811)      Insertion of right IJV tunneled dialysis catheter, with tip in the expected location of the right atrium. Plan:      The catheter may be used immediately. Workstation performed: [id number]             PLAN / RECOMMENDATIONS     1. End Stage Renal Disease. Patient was admitted with MECHELLE on top of CKD stage IV and thought to have progressed to ESRD. Tunneled hemodialysis catheter was placed by IR yesterday. Plan for Stimol dialysis today.   Plan second hemodialysis tomorrow. Pending outpatient dialysis unit approval at [facility] unit. Pending PTH and vitamin D level. At 8:51 AM on 8/25/2023, I saw and examined patient during 1st hemodialysis treatment. The patient was receiving hemodialysis for treatment of end stage renal disease. I have also reviewed vital signs, intake and output, lab results and recent events, and agreed with today's dialysis orders. Access: No issue    Sherice Pennington MD  Nephrology  8/25/2023        Portions of the record may have been created with voice recognition software. Occasional wrong word or "sound a like" substitutions may have occurred due to the inherent limitations of voice recognition software. Read the chart carefully and recognize, using context, where substitutions have occurred.

## 2023-08-25 NOTE — CASE MANAGEMENT
Case Management Progress Note    Patient name Collette Prude  Location /-03 MRN [id number]  : 1995 Date 2023       LOS (days): 4  Geometric Mean LOS (GMLOS) (days):   Days to GMLOS:        OBJECTIVE:        Current admission status: Inpatient  Preferred Pharmacy:   [id number] [address] [place] [address]  Phone: [phone] Fax: [phone]    [id number] [place] [facility] [address] [facility] [place] [address]  Phone: [phone] Fax: [phone]    Primary Care Provider: Fred Montenegro DO    Primary Insurance: [employer] PENDING  Secondary Insurance:     PROGRESS NOTE:    CM contacted [facility] OP Dialysis. Matt Del Rio is assigned to the case [phone]. . They are still in the process of reviewing the clinicals and their Business Office plans to reach out to patient to discuss financial status as he is MA pending. CM emailed [facility] financial counselors for status of Path applic. Barbara Vora- Dr. Miguel Sepulveda  updated that the referral for OP Dialysis is still pending financials for MA eligibility and clinical review  and no confirmed OP schedule at this time. Patient updated.

## 2023-08-25 NOTE — PLAN OF CARE
Post-Dialysis RN Treatment Note    Blood Pressure:  Pre  141/74 mm/Hg  Post 142/74 mmHg   EDW  TBD kg    Weight:  Pre  108.7 kg   Post 108.7 kg   Mode of weight measurement: Standing scale   Volume Removed 0  ml    Treatment duration  120 minutes    NS given     Treatment shortened? No   Medications given during Rx Epogen 5000 U   Estimated Kt/V  NA   Access type: CVC   Access Issues:  NA   Report called to primary nurse   Radha Barry RN      Goal of today's treatment is to introduce Pt to HD and answer all concerns and questions.   Pt will be run even today, balancing of electrolytes    Problem: METABOLIC, FLUID AND ELECTROLYTES - ADULT  Goal: Fluid balance maintained  Description: INTERVENTIONS:  - Monitor labs   - Monitor I/O and WT  - Instruct patient on fluid and nutrition as appropriate  - Assess for signs & symptoms of volume excess or deficit  8/25/2023 0907 by Rebeka Joy RN  Outcome: Progressing  8/25/2023 0906 by Rebeka Joy RN  Outcome: Progressing  Goal: Electrolytes maintained within normal limits  Description: INTERVENTIONS:  - Monitor labs and assess patient for signs and symptoms of electrolyte imbalances  - Administer electrolyte replacement as ordered  - Monitor response to electrolyte replacements, including repeat lab results as appropriate  - Instruct patient on fluid and nutrition as appropriate  Outcome: Progressing

## 2023-08-26 ENCOUNTER — APPOINTMENT (INPATIENT)
Dept: DIALYSIS | Facility: HOSPITAL | Age: 28
DRG: 469 | End: 2023-08-26
Payer: COMMERCIAL

## 2023-08-26 PROBLEM — E87.6 HYPOKALEMIA: Status: RESOLVED | Noted: 2023-08-21 | Resolved: 2023-08-26

## 2023-08-26 PROBLEM — E87.20 METABOLIC ACIDOSIS: Status: RESOLVED | Noted: 2023-08-21 | Resolved: 2023-08-26

## 2023-08-26 LAB
ANION GAP SERPL CALCULATED.3IONS-SCNC: 11 MMOL/L
BASOPHILS # BLD AUTO: 0.05 THOUSANDS/ÂΜL (ref 0–0.1)
BASOPHILS NFR BLD AUTO: 1 % (ref 0–1)
BUN SERPL-MCNC: 59 MG/DL (ref 5–25)
CALCIUM SERPL-MCNC: 8.1 MG/DL (ref 8.4–10.2)
CHLORIDE SERPL-SCNC: 100 MMOL/L (ref 96–108)
CO2 SERPL-SCNC: 29 MMOL/L (ref 21–32)
CREAT SERPL-MCNC: 15.27 MG/DL (ref 0.6–1.3)
EOSINOPHIL # BLD AUTO: 0.44 THOUSAND/ÂΜL (ref 0–0.61)
EOSINOPHIL NFR BLD AUTO: 6 % (ref 0–6)
ERYTHROCYTE [DISTWIDTH] IN BLOOD BY AUTOMATED COUNT: 12.3 % (ref 11.6–15.1)
GFR SERPL CREATININE-BSD FRML MDRD: 3 ML/MIN/1.73SQ M
GLUCOSE SERPL-MCNC: 101 MG/DL (ref 65–140)
HCT VFR BLD AUTO: 22.1 % (ref 36.5–49.3)
HGB BLD-MCNC: 7.7 G/DL (ref 12–17)
IMM GRANULOCYTES # BLD AUTO: 0.03 THOUSAND/UL (ref 0–0.2)
IMM GRANULOCYTES NFR BLD AUTO: 0 % (ref 0–2)
LYMPHOCYTES # BLD AUTO: 1.71 THOUSANDS/ÂΜL (ref 0.6–4.47)
LYMPHOCYTES NFR BLD AUTO: 22 % (ref 14–44)
MCH RBC QN AUTO: 30.8 PG (ref 26.8–34.3)
MCHC RBC AUTO-ENTMCNC: 34.8 G/DL (ref 31.4–37.4)
MCV RBC AUTO: 88 FL (ref 82–98)
MONOCYTES # BLD AUTO: 0.69 THOUSAND/ÂΜL (ref 0.17–1.22)
MONOCYTES NFR BLD AUTO: 9 % (ref 4–12)
MRSA NOSE QL CULT: NORMAL
NEUTROPHILS # BLD AUTO: 4.88 THOUSANDS/ÂΜL (ref 1.85–7.62)
NEUTS SEG NFR BLD AUTO: 62 % (ref 43–75)
NRBC BLD AUTO-RTO: 0 /100 WBCS
PLATELET # BLD AUTO: 205 THOUSANDS/UL (ref 149–390)
PMV BLD AUTO: 10.6 FL (ref 8.9–12.7)
POTASSIUM SERPL-SCNC: 3.5 MMOL/L (ref 3.5–5.3)
RBC # BLD AUTO: 2.5 MILLION/UL (ref 3.88–5.62)
SODIUM SERPL-SCNC: 140 MMOL/L (ref 135–147)
WBC # BLD AUTO: 7.8 THOUSAND/UL (ref 4.31–10.16)

## 2023-08-26 PROCEDURE — 90935 HEMODIALYSIS ONE EVALUATION: CPT | Performed by: INTERNAL MEDICINE

## 2023-08-26 PROCEDURE — 85025 COMPLETE CBC W/AUTO DIFF WBC: CPT | Performed by: INTERNAL MEDICINE

## 2023-08-26 PROCEDURE — 99232 SBSQ HOSP IP/OBS MODERATE 35: CPT | Performed by: FAMILY MEDICINE

## 2023-08-26 PROCEDURE — 80048 BASIC METABOLIC PNL TOTAL CA: CPT | Performed by: INTERNAL MEDICINE

## 2023-08-26 RX ADMIN — ACETAMINOPHEN 650 MG: 325 TABLET, FILM COATED ORAL at 14:39

## 2023-08-26 RX ADMIN — ONDANSETRON 4 MG: 2 INJECTION INTRAMUSCULAR; INTRAVENOUS at 14:36

## 2023-08-26 RX ADMIN — TRAZODONE HYDROCHLORIDE 50 MG: 50 TABLET ORAL at 22:12

## 2023-08-26 RX ADMIN — ONDANSETRON 4 MG: 2 INJECTION INTRAMUSCULAR; INTRAVENOUS at 07:53

## 2023-08-26 NOTE — ASSESSMENT & PLAN NOTE
C/c : + To hospital with symptoms of intermittent nausea/vomiting. No fever/chills/diarrhea/abdominal pain. No urinary symptoms/urgency/frequency/hematuria.   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

## 2023-08-26 NOTE — CASE MANAGEMENT
Case Management Progress Note    Patient name Nate Jhaveri  Location /-02 MRN [id number]  : 1995 Date 2023       LOS (days): 5  Geometric Mean LOS (GMLOS) (days):   Days to GMLOS:        OBJECTIVE:        Current admission status: Inpatient  Preferred Pharmacy:   [id number] - [address] [place] [address]  Phone: [phone] Fax: [phone]    [id number] [address] [place] [address] [facility] [address] [facility] [place] [address]  Phone: [phone] Fax: [phone]    Primary Care Provider: Dena Frankel, DO    Primary Insurance: [employer] PENDING  Secondary Insurance:     PROGRESS NOTE:  Updated clinicals provided to [facility] via [address] Pt referral continue to be pending. CM continues to follow.

## 2023-08-26 NOTE — PROGRESS NOTES
[address]  Progress Note  Name: Jose Rodriguez  MRN: [id number]  Unit/Bed#: -01 I Date of Admission: 8/21/2023   Date of Service: 8/26/2023 I Hospital Day: 5    Assessment/Plan   * Acute renal failure (ARF) ([address])  Assessment & Plan  C/c : + To hospital with symptoms of intermittent nausea/vomiting. No fever/chills/diarrhea/abdominal pain. No urinary symptoms/urgency/frequency/hematuria. 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 with symptoms of frequent kidney stones     · Creatinine 14+ on admission  · Baseline kidney function appears to be around 2.6-2.8  · Nephrology on board  · S/p IV fluids :  0.45% saline with 75 meq of bicarb at 125 mL/h with no improvement in kidney function   · S/p rt IVJ tunneled dialysis catheter placement on 8/24  · initiated hemodialysis   · CM aware, helping arrange chair time at an OP dialysis unit    Metabolic acidosis-resolved as of 8/26/2023  Assessment & Plan  · pH 7.26 on presentation  · Secondary to underlying kidney dysfunction likely related to obstructive uropathy  · CT negative for any hydronephrosis  · Bicarbonate infusion  · resolved    Anemia  Assessment & Plan  · Baseline hemoglobin appears to be around 11-12  · Hemoglobin 7.7 currently(9.3 on admission); partly dilutional  · Patient denies any hematuria, bright red blood per rectum, hematemesis or melena  · In the setting of chronic disease, IV fluids  · Iron panel ordered  · epogen with dialysis  · Monitor hemoglobin closely           VTE Pharmacologic Prophylaxis:   Pharmacologic: ambulatory    Patient Centered Rounds: I have performed bedside rounds with nursing staff today. Discussions with Specialists or Other Care Team Provider: nephrology    Education and Discussions with Family / Patient: [place] patient    Time Spent for Care: 30 minutes. More than 50% of total time spent on counseling and coordination of care as described above.     Current Length of Stay: 5 day(s)    Current Patient Status: Inpatient   Certification Statement: The patient will continue to require additional inpatient hospital stay due to needs op dialysis chair arranged    Discharge Plan: once op services arranged    Code Status: Level 1 - Full Code      Subjective:   No new concerns  Dialysis yday went smoothly per patient. Another session scheduled this afternoon    Objective:     Vitals:   Temp (24hrs), Av.5 °F (36.9 °C), Min:98 °F (36.7 °C), Max:98.8 °F (37.1 °C)    Temp:  [98 °F (36.7 °C)-98.8 °F (37.1 °C)] 98.8 °F (37.1 °C)  HR:  [67-91] 91  Resp:  [15-19] 19  BP: (130-150)/(62-79) 150/79  SpO2:  [95 %-98 %] 98 %  Body mass index is 31.33 kg/m². Input and Output Summary (last 24 hours): Intake/Output Summary (Last 24 hours) at 2023 7216  Last data filed at 2023 0701  Gross per 24 hour   Intake 1540 ml   Output 1850 ml   Net -310 ml       Physical Exam:     Physical Exam  Constitutional:       General: He is not in acute distress. Appearance: He is obese. He is not toxic-appearing. HENT:      Mouth/Throat:      Mouth: Mucous membranes are moist.      Pharynx: Oropharynx is clear. Cardiovascular:      Rate and Rhythm: Normal rate and regular rhythm. Pulses: Normal pulses. Pulmonary:      Effort: Pulmonary effort is normal. No respiratory distress. Breath sounds: Normal breath sounds. Abdominal:      General: Abdomen is flat. Bowel sounds are normal.      Palpations: Abdomen is soft. Neurological:      General: No focal deficit present. Mental Status: He is alert and oriented to person, place, and time.        Additional Data:     Labs:    Results from last 7 days   Lab Units 23  0443   WBC Thousand/uL 7.80   HEMOGLOBIN g/dL 7.7*   HEMATOCRIT % 22.1*   PLATELETS Thousands/uL 205   NEUTROS PCT % 62   LYMPHS PCT % 22   MONOS PCT % 9   EOS PCT % 6     Results from last 7 days   Lab Units 23  0443 23  1529   SODIUM mmol/L 140   < > 139   POTASSIUM mmol/L 3.5   < > 3.7   CHLORIDE mmol/L 100   < > 104   CO2 mmol/L 29   < > 19*   BUN mg/dL 59*   < > 78*   CREATININE mg/dL 15.27*   < > 14.70*   ANION GAP mmol/L 11   < > 16   CALCIUM mg/dL 8.1*   < > 9.6   ALBUMIN g/dL  --   --  4.8   TOTAL BILIRUBIN mg/dL  --   --  0.41   ALK PHOS U/L  --   --  60   ALT U/L  --   --  15   AST U/L  --   --  16   GLUCOSE RANDOM mg/dL 101   < >  --     < > = values in this interval not displayed. Results from last 7 days   Lab Units 08/24/23  1358   INR  1.00                       * I Have Reviewed All Lab Data Listed Above. * Additional Pertinent Lab Tests Reviewed: All Labs Within Last 24 Hours Reviewed      Recent Cultures (last 7 days):     Results from last 7 days   Lab Units 08/21/23  2207 08/21/23  1529   URINE CULTURE  No Growth <1000 cfu/mL No Growth <1000 cfu/mL       Last 24 Hours Medication List:   Current Facility-Administered Medications   Medication Dose Route Frequency Provider Last Rate   • acetaminophen  650 mg Oral Q6H PRN Farideh De La O DO     • epoetin coleen  2,000 Units Subcutaneous Once per day on Mon Wed Fri Matthias Gregg MD     • epoetin coleen  3,000 Units Subcutaneous Once per day on Mon Wed Fri Matthias Gregg MD     • ondansetron  4 mg Intravenous Q6H PRN Jeannine Pascal MD     • traZODone  50 mg Oral HS Farideh De La O DO          Today, Patient Was Seen By: Dickson Gore M.D.     ** Please Note: Dictation voice to text software may have been used in the creation of this document.  **

## 2023-08-26 NOTE — PLAN OF CARE
Problem: PAIN - ADULT  Goal: Verbalizes/displays adequate comfort level or baseline comfort level  Description: Interventions:  - Encourage patient to monitor pain and request assistance  - Assess pain using appropriate pain scale  - Administer analgesics based on type and severity of pain and evaluate response  - Implement non-pharmacological measures as appropriate and evaluate response  - Consider cultural and social influences on pain and pain management  - Notify physician/advanced practitioner if interventions unsuccessful or patient reports new pain  Outcome: Progressing     Problem: INFECTION - ADULT  Goal: Absence or prevention of progression during hospitalization  Description: INTERVENTIONS:  - Assess and monitor for signs and symptoms of infection  - Monitor lab/diagnostic results  - Monitor all insertion sites, i.e. indwelling lines, tubes, and drains  - Monitor endotracheal if appropriate and nasal secretions for changes in amount and color  - [place] appropriate cooling/warming therapies per order  - Administer medications as ordered  - Instruct and encourage patient and family to use good hand hygiene technique  - Identify and instruct in appropriate isolation precautions for identified infection/condition  Outcome: Progressing  Goal: Absence of fever/infection during neutropenic period  Description: INTERVENTIONS:  - Monitor WBC    Outcome: Progressing     Problem: SAFETY ADULT  Goal: Patient will remain free of falls  Description: INTERVENTIONS:  - Educate patient/family on patient safety including physical limitations  - Instruct patient to call for assistance with activity   - Consult OT/PT to assist with strengthening/mobility   - Keep Call bell within reach  - Keep bed low and locked with side rails adjusted as appropriate  - Keep care items and personal belongings within reach  - Initiate and maintain comfort rounds  - Make Fall Risk Sign visible to staff  - Offer Toileting every 2 Hours, in advance of need  - Initiate/Maintain bed alarm  - Obtain necessary fall risk management equipment:   - Apply yellow socks and bracelet for high fall risk patients  - Consider moving patient to room near nurses station  Outcome: Progressing  Goal: Maintain or return to baseline ADL function  Description: INTERVENTIONS:  -  Assess patient's ability to carry out ADLs; assess patient's baseline for ADL function and identify physical deficits which impact ability to perform ADLs (bathing, care of mouth/teeth, toileting, grooming, dressing, etc.)  - Assess/evaluate cause of self-care deficits   - Assess range of motion  - Assess patient's mobility; develop plan if impaired  - Assess patient's need for assistive devices and provide as appropriate  - Encourage maximum independence but intervene and supervise when necessary  - Involve family in performance of ADLs  - Assess for home care needs following discharge   - Consider OT consult to assist with ADL evaluation and planning for discharge  - Provide patient education as appropriate  Outcome: Progressing  Goal: Maintains/Returns to pre admission functional level  Description: INTERVENTIONS:  - Perform BMAT or MOVE assessment daily.   - Set and communicate daily mobility goal to care team and patient/family/caregiver. - Collaborate with rehabilitation services on mobility goals if consulted  - Perform Range of Motion 2 times a day. - Reposition patient every 2 hours.   - Dangle patient 2 times a day  - Stand patient 2 times a day  - Ambulate patient 2 times a day  - Out of bed to chair 2 times a day   - Out of bed for meals 2 times a day  - Out of bed for toileting  - Record patient progress and toleration of activity level   Outcome: Progressing     Problem: DISCHARGE PLANNING  Goal: Discharge to home or other facility with appropriate resources  Description: INTERVENTIONS:  - Identify barriers to discharge w/patient and caregiver  - Arrange for needed discharge resources and transportation as appropriate  - Identify discharge learning needs (meds, wound care, etc.)  - Arrange for interpretive services to assist at discharge as needed  - Refer to Case Management Department for coordinating discharge planning if the patient needs post-hospital services based on physician/advanced practitioner order or complex needs related to functional status, cognitive ability, or social support system  Outcome: Progressing     Problem: Knowledge Deficit  Goal: Patient/family/caregiver demonstrates understanding of disease process, treatment plan, medications, and discharge instructions  Description: Complete learning assessment and assess knowledge base.   Interventions:  - Provide teaching at level of understanding  - Provide teaching via preferred learning methods  Outcome: Progressing     Problem: METABOLIC, FLUID AND ELECTROLYTES - ADULT  Goal: Fluid balance maintained  Description: INTERVENTIONS:  - Monitor labs   - Monitor I/O and WT  - Instruct patient on fluid and nutrition as appropriate  - Assess for signs & symptoms of volume excess or deficit  Outcome: Progressing  Goal: Electrolytes maintained within normal limits  Description: INTERVENTIONS:  - Monitor labs and assess patient for signs and symptoms of electrolyte imbalances  - Administer electrolyte replacement as ordered  - Monitor response to electrolyte replacements, including repeat lab results as appropriate  - Instruct patient on fluid and nutrition as appropriate  Outcome: Progressing

## 2023-08-26 NOTE — PLAN OF CARE
Problem: PAIN - ADULT  Goal: Verbalizes/displays adequate comfort level or baseline comfort level  Description: Interventions:  - Encourage patient to monitor pain and request assistance  - Assess pain using appropriate pain scale  - Administer analgesics based on type and severity of pain and evaluate response  - Implement non-pharmacological measures as appropriate and evaluate response  - Consider cultural and social influences on pain and pain management  - Notify physician/advanced practitioner if interventions unsuccessful or patient reports new pain  Outcome: Progressing     Problem: INFECTION - ADULT  Goal: Absence or prevention of progression during hospitalization  Description: INTERVENTIONS:  - Assess and monitor for signs and symptoms of infection  - Monitor lab/diagnostic results  - Monitor all insertion sites, i.e. indwelling lines, tubes, and drains  - Monitor endotracheal if appropriate and nasal secretions for changes in amount and color  - [place] appropriate cooling/warming therapies per order  - Administer medications as ordered  - Instruct and encourage patient and family to use good hand hygiene technique  - Identify and instruct in appropriate isolation precautions for identified infection/condition  Outcome: Progressing  Goal: Absence of fever/infection during neutropenic period  Description: INTERVENTIONS:  - Monitor WBC    Outcome: Progressing     Problem: SAFETY ADULT  Goal: Patient will remain free of falls  Description: INTERVENTIONS:  - Educate patient/family on patient safety including physical limitations  - Instruct patient to call for assistance with activity   - Consult OT/PT to assist with strengthening/mobility   - Keep Call bell within reach  - Keep bed low and locked with side rails adjusted as appropriate  - Keep care items and personal belongings within reach  - Initiate and maintain comfort rounds  - Make Fall Risk Sign visible to staff  - Offer Toileting every 2 Hours, in advance of need  - Initiate/Maintain bed alarm  - Obtain necessary fall risk management equipment  - Apply yellow socks and bracelet for high fall risk patients  - Consider moving patient to room near nurses station  Outcome: Progressing  Goal: Maintain or return to baseline ADL function  Description: INTERVENTIONS:  -  Assess patient's ability to carry out ADLs; assess patient's baseline for ADL function and identify physical deficits which impact ability to perform ADLs (bathing, care of mouth/teeth, toileting, grooming, dressing, etc.)  - Assess/evaluate cause of self-care deficits   - Assess range of motion  - Assess patient's mobility; develop plan if impaired  - Assess patient's need for assistive devices and provide as appropriate  - Encourage maximum independence but intervene and supervise when necessary  - Involve family in performance of ADLs  - Assess for home care needs following discharge   - Consider OT consult to assist with ADL evaluation and planning for discharge  - Provide patient education as appropriate  Outcome: Progressing  Goal: Maintains/Returns to pre admission functional level  Description: INTERVENTIONS:  - Perform BMAT or MOVE assessment daily.   - Set and communicate daily mobility goal to care team and patient/family/caregiver. - Collaborate with rehabilitation services on mobility goals if consulted  - Perform Range of Motion 2 times a day. - Reposition patient every 2 hours.   - Dangle patient 2 times a day  - Stand patient 2 times a day  - Ambulate patient 2 times a day  - Out of bed to chair 2 times a day   - Out of bed for meals 2 times a day  - Out of bed for toileting  - Record patient progress and toleration of activity level   Outcome: Progressing     Problem: DISCHARGE PLANNING  Goal: Discharge to home or other facility with appropriate resources  Description: INTERVENTIONS:  - Identify barriers to discharge w/patient and caregiver  - Arrange for needed discharge resources and transportation as appropriate  - Identify discharge learning needs (meds, wound care, etc.)  - Arrange for interpretive services to assist at discharge as needed  - Refer to Case Management Department for coordinating discharge planning if the patient needs post-hospital services based on physician/advanced practitioner order or complex needs related to functional status, cognitive ability, or social support system  Outcome: Progressing     Problem: Knowledge Deficit  Goal: Patient/family/caregiver demonstrates understanding of disease process, treatment plan, medications, and discharge instructions  Description: Complete learning assessment and assess knowledge base.   Interventions:  - Provide teaching at level of understanding  - Provide teaching via preferred learning methods  Outcome: Progressing     Problem: METABOLIC, FLUID AND ELECTROLYTES - ADULT  Goal: Fluid balance maintained  Description: INTERVENTIONS:  - Monitor labs   - Monitor I/O and WT  - Instruct patient on fluid and nutrition as appropriate  - Assess for signs & symptoms of volume excess or deficit  Outcome: Progressing  Goal: Electrolytes maintained within normal limits  Description: INTERVENTIONS:  - Monitor labs and assess patient for signs and symptoms of electrolyte imbalances  - Administer electrolyte replacement as ordered  - Monitor response to electrolyte replacements, including repeat lab results as appropriate  - Instruct patient on fluid and nutrition as appropriate  Outcome: Progressing

## 2023-08-26 NOTE — HEMODIALYSIS
Post-Dialysis RN Treatment Note    Blood Pressure:  Pre 142/85  mm/Hg  Post 129/57 mmHg   EDW TBD kg    Weight:  Pre 111   kg   Post 111   kg   Mode of weight measurement:    Volume Removed  0 ml    Treatment duration 150 minutes    NS given     Treatment shortened?     Medications given during Rx    Estimated Kt/V  NA   Access type: CVC   Access Issues:  NA   Report called to primary nurse   Maia Hardin RN

## 2023-08-26 NOTE — PROGRESS NOTES
HEMODIALYSIS ROUNDING NOTE    Patient: Ferny Benites               Sex: male          DOA: 8/21/2023  7:53 PM   YOB: 1995         Age: 25 y.o.         LOS:  LOS: 5 days   Encounter Date: 8/26/2023    REASON FOR THE CONSULTATION:  Further management of ESRD    HPI     This is a 25 y.o. male admitted for Acute renal failure (ARF) ([address])     SUBJECTIVE     - Patient was seen during hemodialysis today. Patient has vomited earlier this morning.  - Reviewed last 24 hrs events     CURRENT MEDICATIONS       Current Facility-Administered Medications:   •  acetaminophen (TYLENOL) tablet 650 mg, 650 mg, Oral, Q6H PRN, Jeovany Collin, DO, 650 mg at 08/26/23 1439  •  epoetin coleen (EPOGEN,PROCRIT) injection 2,000 Units, 2,000 Units, Subcutaneous, Once per day on Mon Wed Fri, Zafar Thurston MD, 2,000 Units at 08/25/23 1038  •  epoetin coleen (EPOGEN,PROCRIT) injection 3,000 Units, 3,000 Units, Subcutaneous, Once per day on Mon Wed Fri, Zafar Thurston MD, 3,000 Units at 08/25/23 1038  •  ondansetron [facility]) injection 4 mg, 4 mg, Intravenous, Q6H PRN, Vicenta Serrano MD, 4 mg at 08/26/23 1436  •  traZODone (DESYREL) tablet 50 mg, 50 mg, Oral, HS, Jeovany Collin, DO, 50 mg at 08/25/23 2214    OBJECTIVE     Current Weight: Weight - Scale: 105 kg (231 lb)  /92   Pulse 76   Temp 98.3 °F (36.8 °C)   Resp 18   Ht 6' (1.829 m)   Wt 105 kg (231 lb)   SpO2 98%   BMI 31.33 kg/m²   Vitals:    08/26/23 1429   BP: 157/92   Pulse: 76   Resp: 18   Temp: 98.3 °F (36.8 °C)   SpO2: 98%     Body mass index is 31.33 kg/m². Intake/Output Summary (Last 24 hours) at 8/26/2023 1854  Last data filed at 8/26/2023 1801  Gross per 24 hour   Intake 1120 ml   Output 2051 ml   Net -931 ml       PHYSICAL EXAMINATION     Physical Exam  Constitutional:       General: He is not in acute distress. HENT:      Right Ear: External ear normal.   Eyes:      Conjunctiva/sclera:      Right eye: No hemorrhage.   Neck:      Thyroid: No thyromegaly. Pulmonary:      Effort: No accessory muscle usage or respiratory distress. Abdominal:      General: There is no distension. Skin:     General: Skin is warm. Coloration: Skin is not jaundiced. Psychiatric:         Behavior: Behavior is not combative. LAB RESULTS     Results from last 7 days   Lab Units 08/26/23  0443 08/25/23  0846 08/24/23  1358 08/24/23  0752 08/24/23  0538 08/23/23  2316 08/23/23  1424 08/23/23  0426 08/21/23 2021 08/21/23  1529   WBC Thousand/uL 7.80 7.98  --   --  7.72  --   --  7.34  --  8.84   HEMOGLOBIN g/dL 7.7* 7.7*  --   --  7.4*  --   --  7.4*  --  9.3*   HEMATOCRIT % 22.1* 22.0*  --   --  21.1*  --   --  21.3*  --  26.6*   PLATELETS Thousands/uL 205 217  --   --  211  --   --  210  --  275   SODIUM mmol/L 140 140 142 141  --  141 139 139   < > 139   POTASSIUM mmol/L 3.5 3.4* 3.3* 3.3*  --  3.2* 3.2* 3.2*   < > 3.7   CHLORIDE mmol/L 100 102 102 102  --  104 104 106   < > 104   CO2 mmol/L 29 25 25 25  --  24 24 21   < > 19*   BUN mg/dL 59* 79* 76* 76*  --  73* 73* 74*   < > 78*   CREATININE mg/dL 15.27* 17.07* 15.76* 15.64*  --  15.17* 14.44* 14.37*   < > 14.70*   EGFR ml/min/1.73sq m 3 3 3 3  --  3 3 4   < > 3   CALCIUM mg/dL 8.1* 8.0* 8.4 8.3*  --  8.2* 8.2* 8.2*   < > 9.6   PHOSPHORUS mg/dL  --  5.7*  --   --   --   --   --   --   --   --     < > = values in this interval not displayed. RADIOLOGY RESULTS     IR tunneled dialysis catheter placement   Final Result by Josephine Crow MD (08/24 1811)      Insertion of right IJV tunneled dialysis catheter, with tip in the expected location of the right atrium. Plan:      The catheter may be used immediately. Workstation performed: [id number]             PLAN / RECOMMENDATIONS     1. End Stage Renal Disease. Patient has progressed to ESRD    Plan 2nd hemodialysis today. On 8/26/2023, I saw and examined patient during hemodialysis treatment.  The patient was receiving hemodialysis for treatment of end stage renal disease. I have also reviewed vital signs, intake and output, lab results and recent events, and agreed with today's dialysis orders. Access: No issue    Ricco Kirk MD  Nephrology  8/26/2023        Portions of the record may have been created with voice recognition software. Occasional wrong word or "sound a like" substitutions may have occurred due to the inherent limitations of voice recognition software. Read the chart carefully and recognize, using context, where substitutions have occurred.

## 2023-08-27 LAB
ANION GAP SERPL CALCULATED.3IONS-SCNC: 10 MMOL/L
BASOPHILS # BLD AUTO: 0.05 THOUSANDS/ÂΜL (ref 0–0.1)
BASOPHILS NFR BLD AUTO: 1 % (ref 0–1)
BUN SERPL-MCNC: 41 MG/DL (ref 5–25)
CALCIUM SERPL-MCNC: 8.5 MG/DL (ref 8.4–10.2)
CHLORIDE SERPL-SCNC: 100 MMOL/L (ref 96–108)
CO2 SERPL-SCNC: 29 MMOL/L (ref 21–32)
CREAT SERPL-MCNC: 12.2 MG/DL (ref 0.6–1.3)
EOSINOPHIL # BLD AUTO: 0.52 THOUSAND/ÂΜL (ref 0–0.61)
EOSINOPHIL NFR BLD AUTO: 7 % (ref 0–6)
ERYTHROCYTE [DISTWIDTH] IN BLOOD BY AUTOMATED COUNT: 12.3 % (ref 11.6–15.1)
GFR SERPL CREATININE-BSD FRML MDRD: 4 ML/MIN/1.73SQ M
GLUCOSE SERPL-MCNC: 99 MG/DL (ref 65–140)
HCT VFR BLD AUTO: 21.7 % (ref 36.5–49.3)
HGB BLD-MCNC: 7.4 G/DL (ref 12–17)
IMM GRANULOCYTES # BLD AUTO: 0.03 THOUSAND/UL (ref 0–0.2)
IMM GRANULOCYTES NFR BLD AUTO: 0 % (ref 0–2)
LYMPHOCYTES # BLD AUTO: 1.49 THOUSANDS/ÂΜL (ref 0.6–4.47)
LYMPHOCYTES NFR BLD AUTO: 20 % (ref 14–44)
MCH RBC QN AUTO: 30.1 PG (ref 26.8–34.3)
MCHC RBC AUTO-ENTMCNC: 34.1 G/DL (ref 31.4–37.4)
MCV RBC AUTO: 88 FL (ref 82–98)
MONOCYTES # BLD AUTO: 0.6 THOUSAND/ÂΜL (ref 0.17–1.22)
MONOCYTES NFR BLD AUTO: 8 % (ref 4–12)
NEUTROPHILS # BLD AUTO: 4.86 THOUSANDS/ÂΜL (ref 1.85–7.62)
NEUTS SEG NFR BLD AUTO: 64 % (ref 43–75)
NRBC BLD AUTO-RTO: 0 /100 WBCS
PLATELET # BLD AUTO: 180 THOUSANDS/UL (ref 149–390)
PMV BLD AUTO: 11 FL (ref 8.9–12.7)
POTASSIUM SERPL-SCNC: 3.8 MMOL/L (ref 3.5–5.3)
RBC # BLD AUTO: 2.46 MILLION/UL (ref 3.88–5.62)
SODIUM SERPL-SCNC: 139 MMOL/L (ref 135–147)
WBC # BLD AUTO: 7.55 THOUSAND/UL (ref 4.31–10.16)

## 2023-08-27 PROCEDURE — 99232 SBSQ HOSP IP/OBS MODERATE 35: CPT | Performed by: INTERNAL MEDICINE

## 2023-08-27 PROCEDURE — 99232 SBSQ HOSP IP/OBS MODERATE 35: CPT | Performed by: FAMILY MEDICINE

## 2023-08-27 PROCEDURE — 85025 COMPLETE CBC W/AUTO DIFF WBC: CPT | Performed by: INTERNAL MEDICINE

## 2023-08-27 PROCEDURE — 80048 BASIC METABOLIC PNL TOTAL CA: CPT | Performed by: FAMILY MEDICINE

## 2023-08-27 RX ADMIN — TRAZODONE HYDROCHLORIDE 50 MG: 50 TABLET ORAL at 22:00

## 2023-08-27 RX ADMIN — ONDANSETRON 4 MG: 2 INJECTION INTRAMUSCULAR; INTRAVENOUS at 21:59

## 2023-08-27 RX ADMIN — PHENOL 1 SPRAY: 1.4 SPRAY ORAL at 04:39

## 2023-08-27 RX ADMIN — ONDANSETRON 4 MG: 2 INJECTION INTRAMUSCULAR; INTRAVENOUS at 13:52

## 2023-08-27 RX ADMIN — ACETAMINOPHEN 650 MG: 325 TABLET, FILM COATED ORAL at 15:46

## 2023-08-27 RX ADMIN — ONDANSETRON 4 MG: 2 INJECTION INTRAMUSCULAR; INTRAVENOUS at 07:18

## 2023-08-27 RX ADMIN — ACETAMINOPHEN 650 MG: 325 TABLET, FILM COATED ORAL at 04:37

## 2023-08-27 NOTE — PLAN OF CARE
Problem: PAIN - ADULT  Goal: Verbalizes/displays adequate comfort level or baseline comfort level  Description: Interventions:  - Encourage patient to monitor pain and request assistance  - Assess pain using appropriate pain scale  - Administer analgesics based on type and severity of pain and evaluate response  - Implement non-pharmacological measures as appropriate and evaluate response  - Consider cultural and social influences on pain and pain management  - Notify physician/advanced practitioner if interventions unsuccessful or patient reports new pain  Outcome: Progressing     Problem: INFECTION - ADULT  Goal: Absence or prevention of progression during hospitalization  Description: INTERVENTIONS:  - Assess and monitor for signs and symptoms of infection  - Monitor lab/diagnostic results  - Monitor all insertion sites, i.e. indwelling lines, tubes, and drains  - Monitor endotracheal if appropriate and nasal secretions for changes in amount and color  - [place] appropriate cooling/warming therapies per order  - Administer medications as ordered  - Instruct and encourage patient and family to use good hand hygiene technique  - Identify and instruct in appropriate isolation precautions for identified infection/condition  Outcome: Progressing  Goal: Absence of fever/infection during neutropenic period  Description: INTERVENTIONS:  - Monitor WBC    Outcome: Progressing     Problem: SAFETY ADULT  Goal: Patient will remain free of falls  Description: INTERVENTIONS:  - Educate patient/family on patient safety including physical limitations  - Instruct patient to call for assistance with activity   - Consult OT/PT to assist with strengthening/mobility   - Keep Call bell within reach  - Keep bed low and locked with side rails adjusted as appropriate  - Keep care items and personal belongings within reach  - Initiate and maintain comfort rounds  - Make Fall Risk Sign visible to staff  - Offer Toileting every 2 Hours, in advance of need  - Initiate/Maintain bed alarm  - Obtain necessary fall risk management equipment: chair alarm  - Apply yellow socks and bracelet for high fall risk patients  - Consider moving patient to room near nurses station  Outcome: Progressing  Goal: Maintain or return to baseline ADL function  Description: INTERVENTIONS:  -  Assess patient's ability to carry out ADLs; assess patient's baseline for ADL function and identify physical deficits which impact ability to perform ADLs (bathing, care of mouth/teeth, toileting, grooming, dressing, etc.)  - Assess/evaluate cause of self-care deficits   - Assess range of motion  - Assess patient's mobility; develop plan if impaired  - Assess patient's need for assistive devices and provide as appropriate  - Encourage maximum independence but intervene and supervise when necessary  - Involve family in performance of ADLs  - Assess for home care needs following discharge   - Consider OT consult to assist with ADL evaluation and planning for discharge  - Provide patient education as appropriate  Outcome: Progressing  Goal: Maintains/Returns to pre admission functional level  Description: INTERVENTIONS:  - Perform BMAT or MOVE assessment daily.   - Set and communicate daily mobility goal to care team and patient/family/caregiver. - Collaborate with rehabilitation services on mobility goals if consulted  - Perform Range of Motion 3 times a day. - Reposition patient every 3 hours.   - Dangle patient 3 times a day  - Stand patient 3 times a day  - Ambulate patient 3 times a day  - Out of bed to chair 3 times a day   - Out of bed for meals 3 times a day  - Out of bed for toileting  - Record patient progress and toleration of activity level   Outcome: Progressing     Problem: DISCHARGE PLANNING  Goal: Discharge to home or other facility with appropriate resources  Description: INTERVENTIONS:  - Identify barriers to discharge w/patient and caregiver  - Arrange for needed discharge resources and transportation as appropriate  - Identify discharge learning needs (meds, wound care, etc.)  - Arrange for interpretive services to assist at discharge as needed  - Refer to Case Management Department for coordinating discharge planning if the patient needs post-hospital services based on physician/advanced practitioner order or complex needs related to functional status, cognitive ability, or social support system  Outcome: Progressing     Problem: Knowledge Deficit  Goal: Patient/family/caregiver demonstrates understanding of disease process, treatment plan, medications, and discharge instructions  Description: Complete learning assessment and assess knowledge base.   Interventions:  - Provide teaching at level of understanding  - Provide teaching via preferred learning methods  Outcome: Progressing     Problem: METABOLIC, FLUID AND ELECTROLYTES - ADULT  Goal: Fluid balance maintained  Description: INTERVENTIONS:  - Monitor labs   - Monitor I/O and WT  - Instruct patient on fluid and nutrition as appropriate  - Assess for signs & symptoms of volume excess or deficit  Outcome: Progressing  Goal: Electrolytes maintained within normal limits  Description: INTERVENTIONS:  - Monitor labs and assess patient for signs and symptoms of electrolyte imbalances  - Administer electrolyte replacement as ordered  - Monitor response to electrolyte replacements, including repeat lab results as appropriate  - Instruct patient on fluid and nutrition as appropriate  Outcome: Progressing  Goal: Glucose maintained within target range  Description: INTERVENTIONS:  - Monitor Blood Glucose as ordered  - Assess for signs and symptoms of hyperglycemia and hypoglycemia  - Administer ordered medications to maintain glucose within target range  - Assess nutritional intake and initiate nutrition service referral as needed  Outcome: Progressing     Problem: GASTROINTESTINAL - ADULT  Goal: Minimal or absence of nausea and/or vomiting  Description: INTERVENTIONS:  - Administer IV fluids if ordered to ensure adequate hydration  - Maintain NPO status until nausea and vomiting are resolved  - Nasogastric tube if ordered  - Administer ordered antiemetic medications as needed  - Provide nonpharmacologic comfort measures as appropriate  - Advance diet as tolerated, if ordered  - Consider nutrition services referral to assist patient with adequate nutrition and appropriate food choices  Outcome: Progressing  Goal: Maintains adequate nutritional intake  Description: INTERVENTIONS:  - Monitor percentage of each meal consumed  - Identify factors contributing to decreased intake, treat as appropriate  - Assist with meals as needed  - Monitor I&O, weight, and lab values if indicated  - Obtain nutrition services referral as needed  Outcome: Progressing     Problem: SKIN/TISSUE INTEGRITY - ADULT  Goal: Skin Integrity remains intact(Skin Breakdown Prevention)  Description: Assess:  -Perform Martinez assessment every   -Clean and moisturize skin every   -Inspect skin when repositioning, toileting, and assisting with ADLS  -Assess under medical devices such as  every   -Assess extremities for adequate circulation and sensation     Bed Management:  -Have minimal linens on bed & keep smooth, unwrinkled  -Change linens as needed when moist or perspiring  -Avoid sitting or lying in one position for more than  hours while in bed  -Keep HOB at degrees     Toileting:  -Offer bedside commode  -Assess for incontinence every   -Use incontinent care products after each incontinent episode such as     Activity:  -Mobilize patient  times a day  -Encourage activity and walks on unit  -Encourage or provide ROM exercises   -Turn and reposition patient every  Hours  -Use appropriate equipment to lift or move patient in bed  -Instruct/ Assist with weight shifting every  when out of bed in chair  -Consider limitation of chair time  hour intervals    Skin Care:  -Avoid use of baby powder, tape, friction and shearing, hot water or constrictive clothing  -Relieve pressure over bony prominences using   -Do not massage red bony areas    Next Steps:  -Teach patient strategies to minimize risks such as    -Consider consults to  interdisciplinary teams such as   Outcome: Progressing     Problem: HEMATOLOGIC - ADULT  Goal: Maintains hematologic stability  Description: INTERVENTIONS  - Assess for signs and symptoms of bleeding or hemorrhage  - Monitor labs  - Administer supportive blood products/factors as ordered and appropriate  Outcome: Progressing

## 2023-08-27 NOTE — PROGRESS NOTES
[address]  Progress Note  Name: Nighat De Paz  MRN: [id number]  Unit/Bed#: -01 I Date of Admission: 2023   Date of Service: 2023 I Hospital Day: 6    Assessment/Plan   * Acute renal failure (ARF) ([address])  Assessment & Plan  C/c : + To hospital with symptoms of intermittent nausea/vomiting. No fever/chills/diarrhea/abdominal pain. No urinary symptoms/urgency/frequency/hematuria. 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 with symptoms of frequent kidney stones     · Creatinine 14+ on admission  · Baseline kidney function appears to be around 2.6-2.8  · Nephrology on board  · S/p IV fluids :  0.45% saline with 75 meq of bicarb at 125 mL/h with no improvement in kidney function   · S/p rt IVJ tunneled dialysis catheter placement on   · initiated hemodialysis   · CM aware, helping arrange chair time at an OP dialysis unit    Anemia  Assessment & Plan  · Baseline hemoglobin appears to be around 11-12  · Hemoglobin 7.4 currently(9.3 on admission)  · Patient denies any hematuria, bright red blood per rectum, hematemesis or melena  · In the setting of chronic disease, IV fluids  · Iron panel ordered  · epogen with dialysis  · Monitor hemoglobin closely         VTE Pharmacologic Prophylaxis:   Pharmacologic: ambulatory    Education and Discussions with Family / Patient: patient    Time Spent for Care: 30 minutes. More than 50% of total time spent on counseling and coordination of care as described above.     Current Length of Stay: 6 day(s)    Current Patient Status: Inpatient   Certification Statement: The patient will continue to require additional inpatient hospital stay due to needs OP dialysis chair arranged    Discharge Plan: ?once dialysis chair arranged    Code Status: Level 1 - Full Code      Subjective:   Nausea/vomiting mostly with meals improved with Zofran  Otherwise denies any acute concerns    Objective:     Vitals:   Temp (24hrs), Av.3 °F (36.8 °C), Min:98.2 °F (36.8 °C), Max:98.5 °F (36.9 °C)    Temp:  [98.2 °F (36.8 °C)-98.5 °F (36.9 °C)] 98.2 °F (36.8 °C)  HR:  [57-76] 71  Resp:  [15-20] 18  BP: (115-157)/(57-92) 128/73  SpO2:  [96 %-100 %] 96 %  Body mass index is 31.33 kg/m². Input and Output Summary (last 24 hours): Intake/Output Summary (Last 24 hours) at 8/27/2023 0934  Last data filed at 8/27/2023 0401  Gross per 24 hour   Intake 740 ml   Output 1575 ml   Net -835 ml       Physical Exam:     Physical Exam  Constitutional:       General: He is not in acute distress. Appearance: He is obese. He is not toxic-appearing. HENT:      Mouth/Throat:      Mouth: Mucous membranes are moist.      Pharynx: Oropharynx is clear. Cardiovascular:      Rate and Rhythm: Normal rate and regular rhythm. Pulses: Normal pulses. Pulmonary:      Effort: Pulmonary effort is normal.      Breath sounds: Normal breath sounds. Abdominal:      General: Abdomen is flat. Bowel sounds are normal.      Palpations: Abdomen is soft. Musculoskeletal:      Right lower leg: No edema. Left lower leg: No edema. Neurological:      Mental Status: He is alert and oriented to person, place, and time.        Additional Data:     Labs:    Results from last 7 days   Lab Units 08/27/23  0431   WBC Thousand/uL 7.55   HEMOGLOBIN g/dL 7.4*   HEMATOCRIT % 21.7*   PLATELETS Thousands/uL 180   NEUTROS PCT % 64   LYMPHS PCT % 20   MONOS PCT % 8   EOS PCT % 7*     Results from last 7 days   Lab Units 08/27/23  0431 08/21/23 2021 08/21/23  1529   SODIUM mmol/L 139   < > 139   POTASSIUM mmol/L 3.8   < > 3.7   CHLORIDE mmol/L 100   < > 104   CO2 mmol/L 29   < > 19*   BUN mg/dL 41*   < > 78*   CREATININE mg/dL 12.20*   < > 14.70*   ANION GAP mmol/L 10   < > 16   CALCIUM mg/dL 8.5   < > 9.6   ALBUMIN g/dL  --   --  4.8   TOTAL BILIRUBIN mg/dL  --   --  0.41   ALK PHOS U/L  --   --  60   ALT U/L  --   --  15   AST U/L  --   --  16   GLUCOSE RANDOM mg/dL 99   < >  -- < > = values in this interval not displayed. Results from last 7 days   Lab Units 08/24/23  1358   INR  1.00                       * I Have Reviewed All Lab Data Listed Above. * Additional Pertinent Lab Tests Reviewed: All Labs Within Last 24 Hours Reviewed    Recent Cultures (last 7 days):     Results from last 7 days   Lab Units 08/21/23  2207 08/21/23  1529   URINE CULTURE  No Growth <1000 cfu/mL No Growth <1000 cfu/mL       Last 24 Hours Medication List:   Current Facility-Administered Medications   Medication Dose Route Frequency Provider Last Rate   • acetaminophen  650 mg Oral Q6H PRN Roberta Ramirez DO     • epoetin coleen  2,000 Units Subcutaneous Once per day on Mon Wed Fri Marium Moore MD     • epoetin coleen  3,000 Units Subcutaneous Once per day on Mon Wed Fri Marium Moore MD     • ondansetron  4 mg Intravenous Q6H PRN Adri Guzmán MD     • phenol  1 spray Mouth/Throat Q2H PRN Adri Guzmán MD     • traZODone  50 mg Oral HS Roberta Ramirez DO          Today, Patient Was Seen By: Sabiha Cesar M.D.     ** Please Note: Dictation voice to text software may have been used in the creation of this document.  **

## 2023-08-27 NOTE — PROGRESS NOTES
NEPHROLOGY PROGRESS NOTE    Patient: Patrizia Goldman               Sex: male          DOA: 8/21/2023  7:53 PM   YOB: 1995        Age: 25 y.o.          LOS:  LOS: 6 days         Encounter Date: 8/27/2023    REASON FOR THE CONSULTATION:  Further management of  MECHELLE    HPI     This is a 25 y.o. male admitted for Acute renal failure (ARF) ([address])     SUBJECTIVE     -Underwent dialysis yesterday. According to patient vomited once earlier this morning feeling better after receiving Zofran. Patient denies headache / dizziness / SOB / chest pain today. - Reviewed last 24 hrs events     CURRENT MEDICATIONS       Current Facility-Administered Medications:   •  acetaminophen (TYLENOL) tablet 650 mg, 650 mg, Oral, Q6H PRN, Tomma Crea, DO, 650 mg at 08/27/23 8253  •  epoetin coleen (EPOGEN,PROCRIT) injection 2,000 Units, 2,000 Units, Subcutaneous, Once per day on Mon Wed Fri, Darrell Mcdaniel MD, 2,000 Units at 08/25/23 1038  •  epoetin coleen (EPOGEN,PROCRIT) injection 3,000 Units, 3,000 Units, Subcutaneous, Once per day on Mon Wed Fri, Darrell Mcdaniel MD, 3,000 Units at 08/25/23 1038  •  ondansetron [facility]) injection 4 mg, 4 mg, Intravenous, Q6H PRN, Jennifer Ellis MD, 4 mg at 08/27/23 1527  •  phenol (CHLORASEPTIC) 1.4 % mucosal liquid 1 spray, 1 spray, Mouth/Throat, Q2H PRN, Jennifer Ellis MD, 1 spray at 08/27/23 0439  •  traZODone (DESYREL) tablet 50 mg, 50 mg, Oral, HS, Tomma Crea, DO, 50 mg at 08/26/23 2212    OBJECTIVE     Current Weight: Weight - Scale: 105 kg (231 lb)  /73   Pulse 71   Temp 98.2 °F (36.8 °C)   Resp 18   Ht 6' (1.829 m)   Wt 105 kg (231 lb)   SpO2 96%   BMI 31.33 kg/m²   Vitals:    08/27/23 0753   BP: 128/73   Pulse: 71   Resp: 18   Temp: 98.2 °F (36.8 °C)   SpO2: 96%     Body mass index is 31.33 kg/m².     Intake/Output Summary (Last 24 hours) at 8/27/2023 1003  Last data filed at 8/27/2023 0401  Gross per 24 hour   Intake 740 ml   Output 1575 ml   Net -835 ml PHYSICAL EXAMINATION     Physical Exam  Constitutional:       General: He is not in acute distress. HENT:      Right Ear: External ear normal.   Eyes:      Conjunctiva/sclera:      Right eye: No hemorrhage. Neck:      Thyroid: No thyromegaly. Pulmonary:      Effort: No accessory muscle usage or respiratory distress. Abdominal:      General: There is no distension. Skin:     General: Skin is warm. Coloration: Skin is not jaundiced. Psychiatric:         Behavior: Behavior is not combative. LAB RESULTS     Results from last 7 days   Lab Units 08/27/23  0431 08/26/23  0443 08/25/23  0846 08/24/23  1358 08/24/23  0752 08/24/23  0538 08/23/23  2316 08/23/23  1424 08/23/23  0426 08/21/23  2021 08/21/23  1529   WBC Thousand/uL 7.55 7.80 7.98  --   --  7.72  --   --  7.34  --  8.84   HEMOGLOBIN g/dL 7.4* 7.7* 7.7*  --   --  7.4*  --   --  7.4*  --  9.3*   HEMATOCRIT % 21.7* 22.1* 22.0*  --   --  21.1*  --   --  21.3*  --  26.6*   PLATELETS Thousands/uL 180 205 217  --   --  211  --   --  210  --  275   SODIUM mmol/L 139 140 140 142 141  --  141 139 139   < > 139   POTASSIUM mmol/L 3.8 3.5 3.4* 3.3* 3.3*  --  3.2* 3.2* 3.2*   < > 3.7   CHLORIDE mmol/L 100 100 102 102 102  --  104 104 106   < > 104   CO2 mmol/L 29 29 25 25 25  --  24 24 21   < > 19*   BUN mg/dL 41* 59* 79* 76* 76*  --  73* 73* 74*   < > 78*   CREATININE mg/dL 12.20* 15.27* 17.07* 15.76* 15.64*  --  15.17* 14.44* 14.37*   < > 14.70*   EGFR ml/min/1.73sq m 4 3 3 3 3  --  3 3 4   < > 3   CALCIUM mg/dL 8.5 8.1* 8.0* 8.4 8.3*  --  8.2* 8.2* 8.2*   < > 9.6   PHOSPHORUS mg/dL  --   --  5.7*  --   --   --   --   --   --   --   --     < > = values in this interval not displayed. RADIOLOGY RESULTS      IR tunneled dialysis catheter placement   Final Result by Berlin Lema MD (08/24 1811)      Insertion of right IJV tunneled dialysis catheter, with tip in the expected location of the right atrium.       Plan:      The catheter may be used immediately. Workstation performed: [id number]             PLAN / RECOMMENDATIONS      1. ESRD. Patient had MECHELLE on top of CKD stage IV and suspected to have progression of underlying chronic kidney disease to ESRD and was started on dialysis during the hospital stay. Underwent second hemodialysis yesterday. Currently medically stable and no urgent need for dialysis today    We will plan third hemodialysis tomorrow and plan to keep patient tentatively on Monday Wednesday Friday dialysis schedule. Pending outpatient dialysis unit approval at [facility] dialysis unit. 2.  Secondary hyperparathyroidism of renal origin. PTH level is 233 [8/25/2023] which is at goal for ESRD and monitor without use of calcitriol. Recommend checking PTH level next month in outpatient dialysis clinic. 3.  Anemia. Multifactorial, suspect due to anemia of chronic kidney disease/now in ESRD. Current hemoglobin is 7.4 which is below the goal.  Iron panel is not showing iron deficiency. Continue Epogen 5000 units with dialysis. Recommend blood transfusion if hemoglobin level drops below 7    Overall above mentioned plan and recommendations were also d/w current [facility] physician and they agreed with my plan of next dialysis tomorrow. Jose Crenshaw MD  Nephrology  8/27/2023        Portions of the record may have been created with voice recognition software. Occasional wrong word or "sound a like" substitutions may have occurred due to the inherent limitations of voice recognition software. Read the chart carefully and recognize, using context, where substitutions have occurred.

## 2023-08-27 NOTE — ASSESSMENT & PLAN NOTE
· Baseline hemoglobin appears to be around 11-12  · Hemoglobin 7.4 currently(9.3 on admission)  · Patient denies any hematuria, bright red blood per rectum, hematemesis or melena  · In the setting of chronic disease, IV fluids  · Iron panel ordered  · epogen with dialysis  · Monitor hemoglobin closely

## 2023-08-28 ENCOUNTER — APPOINTMENT (INPATIENT)
Dept: DIALYSIS | Facility: HOSPITAL | Age: 28
DRG: 469 | End: 2023-08-28
Payer: COMMERCIAL

## 2023-08-28 LAB
ANION GAP SERPL CALCULATED.3IONS-SCNC: 11 MMOL/L
BASOPHILS # BLD AUTO: 0.06 THOUSANDS/ÂΜL (ref 0–0.1)
BASOPHILS NFR BLD AUTO: 1 % (ref 0–1)
BUN SERPL-MCNC: 45 MG/DL (ref 5–25)
CALCIUM SERPL-MCNC: 8.8 MG/DL (ref 8.4–10.2)
CHLORIDE SERPL-SCNC: 100 MMOL/L (ref 96–108)
CO2 SERPL-SCNC: 28 MMOL/L (ref 21–32)
CREAT SERPL-MCNC: 14.98 MG/DL (ref 0.6–1.3)
EOSINOPHIL # BLD AUTO: 0.5 THOUSAND/ÂΜL (ref 0–0.61)
EOSINOPHIL NFR BLD AUTO: 5 % (ref 0–6)
ERYTHROCYTE [DISTWIDTH] IN BLOOD BY AUTOMATED COUNT: 12.3 % (ref 11.6–15.1)
GFR SERPL CREATININE-BSD FRML MDRD: 3 ML/MIN/1.73SQ M
GLUCOSE SERPL-MCNC: 98 MG/DL (ref 65–140)
HCT VFR BLD AUTO: 23 % (ref 36.5–49.3)
HGB BLD-MCNC: 8.1 G/DL (ref 12–17)
IMM GRANULOCYTES # BLD AUTO: 0.04 THOUSAND/UL (ref 0–0.2)
IMM GRANULOCYTES NFR BLD AUTO: 0 % (ref 0–2)
LYMPHOCYTES # BLD AUTO: 1.57 THOUSANDS/ÂΜL (ref 0.6–4.47)
LYMPHOCYTES NFR BLD AUTO: 17 % (ref 14–44)
MCH RBC QN AUTO: 31.2 PG (ref 26.8–34.3)
MCHC RBC AUTO-ENTMCNC: 35.2 G/DL (ref 31.4–37.4)
MCV RBC AUTO: 89 FL (ref 82–98)
MONOCYTES # BLD AUTO: 0.6 THOUSAND/ÂΜL (ref 0.17–1.22)
MONOCYTES NFR BLD AUTO: 7 % (ref 4–12)
NEUTROPHILS # BLD AUTO: 6.41 THOUSANDS/ÂΜL (ref 1.85–7.62)
NEUTS SEG NFR BLD AUTO: 70 % (ref 43–75)
NRBC BLD AUTO-RTO: 0 /100 WBCS
PLATELET # BLD AUTO: 204 THOUSANDS/UL (ref 149–390)
PMV BLD AUTO: 10.5 FL (ref 8.9–12.7)
POTASSIUM SERPL-SCNC: 3.7 MMOL/L (ref 3.5–5.3)
RBC # BLD AUTO: 2.6 MILLION/UL (ref 3.88–5.62)
SODIUM SERPL-SCNC: 139 MMOL/L (ref 135–147)
WBC # BLD AUTO: 9.18 THOUSAND/UL (ref 4.31–10.16)

## 2023-08-28 PROCEDURE — 99232 SBSQ HOSP IP/OBS MODERATE 35: CPT | Performed by: FAMILY MEDICINE

## 2023-08-28 PROCEDURE — 80048 BASIC METABOLIC PNL TOTAL CA: CPT | Performed by: FAMILY MEDICINE

## 2023-08-28 PROCEDURE — 85025 COMPLETE CBC W/AUTO DIFF WBC: CPT | Performed by: FAMILY MEDICINE

## 2023-08-28 PROCEDURE — 99232 SBSQ HOSP IP/OBS MODERATE 35: CPT | Performed by: INTERNAL MEDICINE

## 2023-08-28 RX ADMIN — ONDANSETRON 4 MG: 2 INJECTION INTRAMUSCULAR; INTRAVENOUS at 07:27

## 2023-08-28 RX ADMIN — EPOETIN ALFA 3000 UNITS: 3000 SOLUTION INTRAVENOUS; SUBCUTANEOUS at 10:18

## 2023-08-28 RX ADMIN — ACETAMINOPHEN 650 MG: 325 TABLET, FILM COATED ORAL at 11:28

## 2023-08-28 RX ADMIN — EPOETIN ALFA 2000 UNITS: 2000 SOLUTION INTRAVENOUS; SUBCUTANEOUS at 10:18

## 2023-08-28 RX ADMIN — ONDANSETRON 4 MG: 2 INJECTION INTRAMUSCULAR; INTRAVENOUS at 16:38

## 2023-08-28 RX ADMIN — TRAZODONE HYDROCHLORIDE 50 MG: 50 TABLET ORAL at 22:02

## 2023-08-28 NOTE — PROGRESS NOTES
NEPHROLOGY PROGRESS NOTE    Patient: Malini Le               Sex: male          DOA: 8/21/2023  7:53 PM   YOB: 1995        Age:  25 y.o.        LOS:  LOS: 7 days       HPI     Patient with acute on chronic renal failure started on dialysis on this admission    SUBJECTIVE     Patient seen on dialysis    Tolerating quite well    Urine output remained low    No chest pain no palpitation or shortness of breath    CURRENT MEDICATIONS       Current Facility-Administered Medications:   •  acetaminophen (TYLENOL) tablet 650 mg, 650 mg, Oral, Q6H PRN, Bindu Coke, DO, 650 mg at 08/28/23 1128  •  epoetin coleen (EPOGEN,PROCRIT) injection 2,000 Units, 2,000 Units, Subcutaneous, Once per day on Mon Wed Fri, Mike Jennings MD, 2,000 Units at 08/28/23 1018  •  epoetin coleen (EPOGEN,PROCRIT) injection 3,000 Units, 3,000 Units, Subcutaneous, Once per day on Mon Wed Fri, Mike Jennings MD, 3,000 Units at 08/28/23 1018  •  ondansetron (ZOFRAN) injection 4 mg, 4 mg, Intravenous, Q6H PRN, Cesar Cano MD, 4 mg at 08/28/23 4118  •  phenol (CHLORASEPTIC) 1.4 % mucosal liquid 1 spray, 1 spray, Mouth/Throat, Q2H PRN, Cesar Cano MD, 1 spray at 08/27/23 0439  •  traZODone (DESYREL) tablet 50 mg, 50 mg, Oral, HS, Bindu Collins, DO, 50 mg at 08/27/23 2200    OBJECTIVE     Current Weight: Weight - Scale: 105 kg (231 lb)  Vitals:    08/28/23 1040   BP: 127/56   Pulse: 69   Resp: 16   Temp: 98.4 °F (36.9 °C)   SpO2:        Intake/Output Summary (Last 24 hours) at 8/28/2023 1447  Last data filed at 8/28/2023 1030  Gross per 24 hour   Intake 1160 ml   Output 1225 ml   Net -65 ml       PHYSICAL EXAMINATION     Physical Exam  Constitutional:       General: He is not in acute distress. Appearance: He is well-developed. HENT:      Head: Normocephalic. Eyes:      General: No scleral icterus. Conjunctiva/sclera: Conjunctivae normal.   Neck:      Vascular: No JVD.    Cardiovascular:      Rate and Rhythm: Normal rate. Heart sounds: Normal heart sounds. Pulmonary:      Effort: Pulmonary effort is normal.      Breath sounds: No wheezing. Abdominal:      Palpations: Abdomen is soft. Tenderness: There is no abdominal tenderness. Musculoskeletal:         General: Normal range of motion. Cervical back: Neck supple. Skin:     General: Skin is warm. Findings: No rash. Neurological:      Mental Status: He is alert and oriented to person, place, and time. Psychiatric:         Behavior: Behavior normal.          LAB RESULTS     Results from last 7 days   Lab Units 08/28/23  0734 08/27/23  0431 08/26/23  0443 08/25/23  0846 08/24/23  1358 08/24/23  0752 08/24/23  0538 08/23/23  2316 08/23/23  1424 08/23/23  0426 08/21/23  2021 08/21/23  1529   WBC Thousand/uL 9.18 7.55 7.80 7.98  --   --  7.72  --   --  7.34  --  8.84   HEMOGLOBIN g/dL 8.1* 7.4* 7.7* 7.7*  --   --  7.4*  --   --  7.4*  --  9.3*   HEMATOCRIT % 23.0* 21.7* 22.1* 22.0*  --   --  21.1*  --   --  21.3*  --  26.6*   PLATELETS Thousands/uL 204 180 205 217  --   --  211  --   --  210  --  275   POTASSIUM mmol/L 3.7 3.8 3.5 3.4* 3.3* 3.3*  --  3.2*   < > 3.2*   < > 3.7   CHLORIDE mmol/L 100 100 100 102 102 102  --  104   < > 106   < > 104   CO2 mmol/L 28 29 29 25 25 25  --  24   < > 21   < > 19*   BUN mg/dL 45* 41* 59* 79* 76* 76*  --  73*   < > 74*   < > 78*   CREATININE mg/dL 14.98* 12.20* 15.27* 17.07* 15.76* 15.64*  --  15.17*   < > 14.37*   < > 14.70*   EGFR ml/min/1.73sq m 3 4 3 3 3 3  --  3   < > 4   < > 3   CALCIUM mg/dL 8.8 8.5 8.1* 8.0* 8.4 8.3*  --  8.2*   < > 8.2*   < > 9.6   PHOSPHORUS mg/dL  --   --   --  5.7*  --   --   --   --   --   --   --   --     < > = values in this interval not displayed. RADIOLOGY RESULTS      No results found for this or any previous visit. No results found for this or any previous visit. No results found for this or any previous visit.     No results found for this or any previous visit. No results found for this or any previous visit. No results found for this or any previous visit. PLAN / RECOMMENDATIONS      Acute on chronic renal failure: On dialysis possible dialysis dependent. Will require monitoring though    Patient seen on dialysis. 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 Being monitored at [facility] for possible transplant    Hypertension: Very well controlled    Disposition: Okay to be discharged renal point of view. Will need outpatient dialysis unit before discharge    Cesar Art MD  Nephrology  8/28/2023        Portions of the record may have been created with voice recognition software. Occasional wrong word or "sound a like" substitutions may have occurred due to the inherent limitations of voice recognition software. Read the chart carefully and recognize, using context, where substitutions have occurred.

## 2023-08-28 NOTE — CASE MANAGEMENT
Case Management Discharge Planning Note    Patient name Galilea Womack  Location /-50 MRN [id number]  : 1995 Date 2023       Current Admission Date: 2023  Current Admission Diagnosis:Acute renal failure (ARF) [facility])   Patient Active Problem List    Diagnosis Date Noted   • Anemia 2023   • Chronic kidney disease-mineral and bone disorder 2023   • BMI 30.0-30.9,adult 2022   • Chronic kidney disease, stage 4 (severe) ([facility]) 2022   • Hydronephrosis with urinary obstruction due to ureteral calculus 2021   • Primary hyperoxaluria type 1 ([facility]) 2021   • Insomnia 10/27/2020   • Ureteric stone ----> hydroureteronephrosis moderate 10/23/2020   • Acute renal failure (ARF) ([facility]) 10/23/2020   • Sebaceous cyst 2017   • Anxiety, generalized 2015      LOS (days): 7  Geometric Mean LOS (GMLOS) (days):   Days to GMLOS:     OBJECTIVE:  Risk of Unplanned Readmission Score: 9.59         Current admission status: Inpatient   Preferred Pharmacy:   [id number] [address] [place] [address] [place] [address]  Phone: [phone] Fax: [phone]    [id number] [place] [address] [address]  Phone: [phone] Fax: [phone]    Primary Care Provider: Emir Dela Cruz DO    Primary Insurance: [employer] MCGILL PENDING  Secondary Insurance:     DISCHARGE DETAILS:  CM called and spoke with [facility] liarhina Bray at [phone] for an update on pt referral. As per [facility], pt is awaiting financial clearance . Pt is MA pending and [facility] is made aware. CM continues to be available to assist with pt dc plans.

## 2023-08-28 NOTE — CASE MANAGEMENT
Case Management Discharge Planning Note    Patient name Renetta Barrera  Location /-54 MRN [id number]  : 1995 Date 2023       Current Admission Date: 2023  Current Admission Diagnosis:Acute renal failure (ARF) [facility])   Patient Active Problem List    Diagnosis Date Noted   • Anemia 2023   • Chronic kidney disease-mineral and bone disorder 2023   • BMI 30.0-30.9,adult 2022   • Chronic kidney disease, stage 4 (severe) ([address]) 2022   • Hydronephrosis with urinary obstruction due to ureteral calculus 2021   • Primary hyperoxaluria type 1 ([facility]) 2021   • Insomnia 10/27/2020   • Ureteric stone ----> hydroureteronephrosis moderate 10/23/2020   • Acute renal failure (ARF) ([facility]) 10/23/2020   • Sebaceous cyst 2017   • Anxiety, generalized 2015      LOS (days): 7  Geometric Mean LOS (GMLOS) (days):   Days to GMLOS:     OBJECTIVE:  Risk of Unplanned Readmission Score: 9.62         Current admission status: Inpatient   Preferred Pharmacy:   [id number] [address] [address]  Phone: [phone] Fax: [phone]    [id number] [address] [address]  Phone: [phone] Fax: [phone]    Primary Care Provider: Asberry Cogan, DO    Primary Insurance: [employer] MCGILL PENDING  Secondary Insurance:     DISCHARGE DETAILS:   CM received a call from [facility] fiance specialist at [phone] and informed CM that due to pt diagnosis ( ARF), [facility] is requesting a single payment agreement with [facility] to offer a chair time to pt. CM TT director of CM and CC and made them aware. CM spoke with pt and informed pt that tx  Cost is $400. Pt would have to pay $29215 out of pocket until insurance kicks in. Pt is unable to afford cost as pt is MA pending. Pt is now open for referral to be sent to [facility].  [facility] via [address] [place] [address] Ave. Dr. Win Spikes made aware. CM continues to follow and assist with pt dc plans.

## 2023-08-28 NOTE — PLAN OF CARE
Post-Dialysis RN Treatment Note    Blood Pressure:  Pre 148/81  mm/Hg  Post 127/56  mmHg   EDW TBD  kg    Weight: 106.9    kg   Post 106.9 kg   Mode of weight measurement:    Volume Removed 0  ml    Treatment duration 180 minutes    NS given     Treatment shortened?     Medications given during Rx Epogen 5000    Estimated Kt/V  NA   Access type: CVC   Access Issues: NA    Report called to primary nurse   YES  Kizzy FINCH          Goal of treatment is the continued introduction to HD and expectations of treatment  Problem: METABOLIC, FLUID AND ELECTROLYTES - ADULT  Goal: Fluid balance maintained  Description: INTERVENTIONS:  - Monitor labs   - Monitor I/O and WT  - Instruct patient on fluid and nutrition as appropriate  - Assess for signs & symptoms of volume excess or deficit  Outcome: Progressing  Goal: Electrolytes maintained within normal limits  Description: INTERVENTIONS:  - Monitor labs and assess patient for signs and symptoms of electrolyte imbalances  - Administer electrolyte replacement as ordered  - Monitor response to electrolyte replacements, including repeat lab results as appropriate  - Instruct patient on fluid and nutrition as appropriate  Outcome: Progressing

## 2023-08-28 NOTE — PLAN OF CARE
Problem: SAFETY ADULT  Goal: Patient will remain free of falls  Description: INTERVENTIONS:  - Educate patient/family on patient safety including physical limitations  - Instruct patient to call for assistance with activity   - Consult OT/PT to assist with strengthening/mobility   - Keep Call bell within reach  - Keep bed low and locked with side rails adjusted as appropriate  - Keep care items and personal belongings within reach  - Initiate and maintain comfort rounds  - Make Fall Risk Sign visible to staff  - Offer Toileting every 2 Hours, in advance of need  - Initiate/Maintain bedalarm  - Obtain necessary fall risk management equipment:   - Apply yellow socks and bracelet for high fall risk patients  - Consider moving patient to room near nurses station  Outcome: Progressing     Problem: PAIN - ADULT  Goal: Verbalizes/displays adequate comfort level or baseline comfort level  Description: Interventions:  - Encourage patient to monitor pain and request assistance  - Assess pain using appropriate pain scale  - Administer analgesics based on type and severity of pain and evaluate response  - Implement non-pharmacological measures as appropriate and evaluate response  - Consider cultural and social influences on pain and pain management  - Notify physician/advanced practitioner if interventions unsuccessful or patient reports new pain  Outcome: Progressing     Problem: INFECTION - ADULT  Goal: Absence or prevention of progression during hospitalization  Description: INTERVENTIONS:  - Assess and monitor for signs and symptoms of infection  - Monitor lab/diagnostic results  - Monitor all insertion sites, i.e. indwelling lines, tubes, and drains  - Monitor endotracheal if appropriate and nasal secretions for changes in amount and color  - [place] appropriate cooling/warming therapies per order  - Administer medications as ordered  - Instruct and encourage patient and family to use good hand hygiene technique  - Identify and instruct in appropriate isolation precautions for identified infection/condition  Outcome: Progressing  Goal: Absence of fever/infection during neutropenic period  Description: INTERVENTIONS:  - Monitor WBC    Outcome: Progressing     Problem: SAFETY ADULT  Goal: Patient will remain free of falls  Description: INTERVENTIONS:  - Educate patient/family on patient safety including physical limitations  - Instruct patient to call for assistance with activity   - Consult OT/PT to assist with strengthening/mobility   - Keep Call bell within reach  - Keep bed low and locked with side rails adjusted as appropriate  - Keep care items and personal belongings within reach  - Initiate and maintain comfort rounds  - Make Fall Risk Sign visible to staff  - Offer Toileting every 2 Hours, in advance of need  - Initiate/Maintain bedalarm  - Obtain necessary fall risk management equipment:   - Apply yellow socks and bracelet for high fall risk patients  - Consider moving patient to room near nurses station  Outcome: Progressing  Goal: Maintain or return to baseline ADL function  Description: INTERVENTIONS:  -  Assess patient's ability to carry out ADLs; assess patient's baseline for ADL function and identify physical deficits which impact ability to perform ADLs (bathing, care of mouth/teeth, toileting, grooming, dressing, etc.)  - Assess/evaluate cause of self-care deficits   - Assess range of motion  - Assess patient's mobility; develop plan if impaired  - Assess patient's need for assistive devices and provide as appropriate  - Encourage maximum independence but intervene and supervise when necessary  - Involve family in performance of ADLs  - Assess for home care needs following discharge   - Consider OT consult to assist with ADL evaluation and planning for discharge  - Provide patient education as appropriate  Outcome: Progressing  Goal: Maintains/Returns to pre admission functional level  Description: INTERVENTIONS:  - Perform BMAT or MOVE assessment daily.   - Set and communicate daily mobility goal to care team and patient/family/caregiver. - Collaborate with rehabilitation services on mobility goals if consulted  - Perform Range of Motion 3 times a day. - Reposition patient every 2 hours. - Dangle patient 3 times a day  - Stand patient 3 times a day  - Ambulate patient 3 times a day  - Out of bed to chair 3 times a day   - Out of bed for meals 3 times a day  - Out of bed for toileting  - Record patient progress and toleration of activity level   Outcome: Progressing     Problem: DISCHARGE PLANNING  Goal: Discharge to home or other facility with appropriate resources  Description: INTERVENTIONS:  - Identify barriers to discharge w/patient and caregiver  - Arrange for needed discharge resources and transportation as appropriate  - Identify discharge learning needs (meds, wound care, etc.)  - Arrange for interpretive services to assist at discharge as needed  - Refer to Case Management Department for coordinating discharge planning if the patient needs post-hospital services based on physician/advanced practitioner order or complex needs related to functional status, cognitive ability, or social support system  Outcome: Progressing     Problem: Knowledge Deficit  Goal: Patient/family/caregiver demonstrates understanding of disease process, treatment plan, medications, and discharge instructions  Description: Complete learning assessment and assess knowledge base.   Interventions:  - Provide teaching at level of understanding  - Provide teaching via preferred learning methods  Outcome: Progressing     Problem: METABOLIC, FLUID AND ELECTROLYTES - ADULT  Goal: Fluid balance maintained  Description: INTERVENTIONS:  - Monitor labs   - Monitor I/O and WT  - Instruct patient on fluid and nutrition as appropriate  - Assess for signs & symptoms of volume excess or deficit  Outcome: Progressing  Goal: Electrolytes maintained within normal limits  Description: INTERVENTIONS:  - Monitor labs and assess patient for signs and symptoms of electrolyte imbalances  - Administer electrolyte replacement as ordered  - Monitor response to electrolyte replacements, including repeat lab results as appropriate  - Instruct patient on fluid and nutrition as appropriate  Outcome: Progressing  Goal: Glucose maintained within target range  Description: INTERVENTIONS:  - Monitor Blood Glucose as ordered  - Assess for signs and symptoms of hyperglycemia and hypoglycemia  - Administer ordered medications to maintain glucose within target range  - Assess nutritional intake and initiate nutrition service referral as needed  Outcome: Progressing     Problem: GASTROINTESTINAL - ADULT  Goal: Minimal or absence of nausea and/or vomiting  Description: INTERVENTIONS:  - Administer IV fluids if ordered to ensure adequate hydration  - Maintain NPO status until nausea and vomiting are resolved  - Nasogastric tube if ordered  - Administer ordered antiemetic medications as needed  - Provide nonpharmacologic comfort measures as appropriate  - Advance diet as tolerated, if ordered  - Consider nutrition services referral to assist patient with adequate nutrition and appropriate food choices  Outcome: Progressing  Goal: Maintains adequate nutritional intake  Description: INTERVENTIONS:  - Monitor percentage of each meal consumed  - Identify factors contributing to decreased intake, treat as appropriate  - Assist with meals as needed  - Monitor I&O, weight, and lab values if indicated  - Obtain nutrition services referral as needed  Outcome: Progressing     Problem: SKIN/TISSUE INTEGRITY - ADULT  Goal: Skin Integrity remains intact(Skin Breakdown Prevention)  Description: Assess:  -Perform Martinez assessment every 2  -Clean and moisturize skin every 2  -Inspect skin when repositioning, toileting, and assisting with ADLS  -Assess under medical devices such as wedges every   -Assess extremities for adequate circulation and sensation     Bed Management:  -Have minimal linens on bed & keep smooth, unwrinkled  -Change linens as needed when moist or perspiring  -Avoid sitting or lying in one position for more than 2 hours while in bed  -Keep HOB at 30degrees     Toileting:  -Offer bedside commode  -Assess for incontinence every 2  -Use incontinent care products after each incontinent episode such as calazime    Activity:  -Mobilize patient 3 times a day  -Encourage activity and walks on unit  -Encourage or provide ROM exercises   -Turn and reposition patient every 2 Hours  -Use appropriate equipment to lift or move patient in bed  -Instruct/ Assist with weight shifting every 2 when out of bed in chair  -Consider limitation of chair time 2 hour intervals    Skin Care:  -Avoid use of baby powder, tape, friction and shearing, hot water or constrictive clothing  -Relieve pressure over bony prominences using wedges  -Do not massage red bony areas    Next Steps:  -Teach patient strategies to minimize risks such as    -Consider consults to  interdisciplinary teams such as wound  Outcome: Progressing     Problem: HEMATOLOGIC - ADULT  Goal: Maintains hematologic stability  Description: INTERVENTIONS  - Assess for signs and symptoms of bleeding or hemorrhage  - Monitor labs  - Administer supportive blood products/factors as ordered and appropriate  Outcome: Progressing

## 2023-08-28 NOTE — ASSESSMENT & PLAN NOTE
· Baseline hemoglobin appears to be around 11-12  · Hemoglobin 8.1 currently(9.3 on admission)  · Patient denies any hematuria, bright red blood per rectum, hematemesis or melena  · In the setting of chronic disease, IV fluids  · Iron panel ordered  · epogen with dialysis

## 2023-08-28 NOTE — PROGRESS NOTES
[address]  Progress Note  Name: Sharri Damon  MRN: [id number]  Unit/Bed#: -01 I Date of Admission: 8/21/2023   Date of Service: 8/28/2023 I Hospital Day: 7    Assessment/Plan   * Acute renal failure (ARF) ([address])  Assessment & Plan  C/c : + To hospital with symptoms of intermittent nausea/vomiting. No fever/chills/diarrhea/abdominal pain. No urinary symptoms/urgency/frequency/hematuria. 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 with symptoms of frequent kidney stones     · Creatinine 14+ on admission  · Baseline kidney function appears to be around 2.6-2.8  · Nephrology on board  · S/p IV fluids :  0.45% saline with 75 meq of bicarb at 125 mL/h with no improvement in kidney function   · S/p rt IVJ tunneled dialysis catheter placement on 8/24  · initiated hemodialysis   · CM aware, helping arrange chair time at an OP dialysis unit    Anemia  Assessment & Plan  · Baseline hemoglobin appears to be around 11-12  · Hemoglobin 8.1 currently(9.3 on admission)  · Patient denies any hematuria, bright red blood per rectum, hematemesis or melena  · In the setting of chronic disease, IV fluids  · Iron panel ordered  · epogen with dialysis           VTE Pharmacologic Prophylaxis:   Pharmacologic: ambulatory    Patient Centered Rounds: I have performed bedside rounds with nursing staff today. Discussions with Specialists or Other Care Team Provider: nephrology    Education and Discussions with Family / Patient: patient    Time Spent for Care: 30 minutes. More than 50% of total time spent on counseling and coordination of care as described above.     Current Length of Stay: 7 day(s)    Current Patient Status: Inpatient   Certification Statement: The patient will continue to require additional inpatient hospital stay due to needs op dialysis chair arranged    Discharge Plan: ?24hrs    Code Status: Level 1 - Full Code      Subjective:   No new concerns    Objective:     Vitals: Temp (24hrs), Av.3 °F (36.8 °C), Min:97.9 °F (36.6 °C), Max:98.5 °F (36.9 °C)    Temp:  [97.9 °F (36.6 °C)-98.5 °F (36.9 °C)] 97.9 °F (36.6 °C)  HR:  [54-75] 59  Resp:  [16-18] 16  BP: (122-155)/(46-86) 122/66  SpO2:  [97 %-100 %] 97 %  Body mass index is 31.33 kg/m². Input and Output Summary (last 24 hours): Intake/Output Summary (Last 24 hours) at 2023 1232  Last data filed at 2023 1030  Gross per 24 hour   Intake 1160 ml   Output 1225 ml   Net -65 ml       Physical Exam:     Physical Exam  Constitutional:       General: He is not in acute distress. Appearance: He is normal weight. He is not toxic-appearing. HENT:      Mouth/Throat:      Mouth: Mucous membranes are moist.      Pharynx: Oropharynx is clear. Cardiovascular:      Rate and Rhythm: Normal rate and regular rhythm. Pulses: Normal pulses. Pulmonary:      Effort: Pulmonary effort is normal. No respiratory distress. Breath sounds: Normal breath sounds. Abdominal:      General: Abdomen is flat. Bowel sounds are normal.      Palpations: Abdomen is soft. Neurological:      General: No focal deficit present. Mental Status: He is alert and oriented to person, place, and time.            Additional Data:     Labs:    Results from last 7 days   Lab Units 23  0734   WBC Thousand/uL 9.18   HEMOGLOBIN g/dL 8.1*   HEMATOCRIT % 23.0*   PLATELETS Thousands/uL 204   NEUTROS PCT % 70   LYMPHS PCT % 17   MONOS PCT % 7   EOS PCT % 5     Results from last 7 days   Lab Units 23  0734 23  1529   SODIUM mmol/L 139   < > 139   POTASSIUM mmol/L 3.7   < > 3.7   CHLORIDE mmol/L 100   < > 104   CO2 mmol/L 28   < > 19*   BUN mg/dL 45*   < > 78*   CREATININE mg/dL 14.98*   < > 14.70*   ANION GAP mmol/L 11   < > 16   CALCIUM mg/dL 8.8   < > 9.6   ALBUMIN g/dL  --   --  4.8   TOTAL BILIRUBIN mg/dL  --   --  0.41   ALK PHOS U/L  --   --  60   ALT U/L  --   --  15   AST U/L  --   --  16   GLUCOSE RANDOM mg/dL 98   < >  --     < > = values in this interval not displayed. Results from last 7 days   Lab Units 08/24/23  1358   INR  1.00                   * I Have Reviewed All Lab Data Listed Above. * Additional Pertinent Lab Tests Reviewed: All Labs Within Last 24 Hours Reviewed      Recent Cultures (last 7 days):     Results from last 7 days   Lab Units 08/21/23  2207 08/21/23  1529   URINE CULTURE  No Growth <1000 cfu/mL No Growth <1000 cfu/mL       Last 24 Hours Medication List:   Current Facility-Administered Medications   Medication Dose Route Frequency Provider Last Rate   • acetaminophen  650 mg Oral Q6H PRN Anila Spray, DO     • epoetin coleen  2,000 Units Subcutaneous Once per day on Mon Wed Fri Rivera Cardoza MD     • epoetin coleen  3,000 Units Subcutaneous Once per day on Mon Wed Fri Rivera Cardoza MD     • ondansetron  4 mg Intravenous Q6H PRN Willi Nolen MD     • phenol  1 spray Mouth/Throat Q2H PRN Willi Nolen MD     • traZODone  50 mg Oral HS Anila Spray, DO          Today, Patient Was Seen By: Roc Murillo M.D.     ** Please Note: Dictation voice to text software may have been used in the creation of this document.  **

## 2023-08-29 ENCOUNTER — TELEPHONE (OUTPATIENT)
Dept: NEPHROLOGY | Facility: CLINIC | Age: 28
End: 2023-08-29

## 2023-08-29 ENCOUNTER — APPOINTMENT (INPATIENT)
Dept: RADIOLOGY | Facility: HOSPITAL | Age: 28
DRG: 469 | End: 2023-08-29
Payer: COMMERCIAL

## 2023-08-29 PROBLEM — N17.9 ACUTE RENAL FAILURE SUPERIMPOSED ON STAGE 5 CHRONIC KIDNEY DISEASE, NOT ON CHRONIC DIALYSIS (HCC): Status: ACTIVE | Noted: 2023-08-29

## 2023-08-29 PROBLEM — N18.5 ACUTE RENAL FAILURE SUPERIMPOSED ON STAGE 5 CHRONIC KIDNEY DISEASE, NOT ON CHRONIC DIALYSIS (HCC): Status: ACTIVE | Noted: 2023-08-29

## 2023-08-29 PROCEDURE — 71045 X-RAY EXAM CHEST 1 VIEW: CPT

## 2023-08-29 PROCEDURE — 99232 SBSQ HOSP IP/OBS MODERATE 35: CPT | Performed by: FAMILY MEDICINE

## 2023-08-29 PROCEDURE — 99232 SBSQ HOSP IP/OBS MODERATE 35: CPT | Performed by: INTERNAL MEDICINE

## 2023-08-29 RX ADMIN — TRAZODONE HYDROCHLORIDE 50 MG: 50 TABLET ORAL at 22:20

## 2023-08-29 RX ADMIN — ONDANSETRON 4 MG: 2 INJECTION INTRAMUSCULAR; INTRAVENOUS at 07:42

## 2023-08-29 RX ADMIN — ONDANSETRON 4 MG: 2 INJECTION INTRAMUSCULAR; INTRAVENOUS at 16:39

## 2023-08-29 NOTE — PROGRESS NOTES
[address]  Progress Note  Name: Brielle Robb  MRN: [id number]  Unit/Bed#: -01 I Date of Admission: 8/21/2023   Date of Service: 8/29/2023 I Hospital Day: 8    Assessment/Plan   * Acute renal failure superimposed on stage 5 chronic kidney disease, not on chronic dialysis [facility])  Assessment & Plan  Lab Results   Component Value Date    EGFR 3 08/28/2023    EGFR 4 08/27/2023    EGFR 3 08/26/2023    CREATININE 14.98 (H) 08/28/2023    CREATININE 12.20 (H) 08/27/2023    CREATININE 15.27 (H) 08/26/2023   C/c : + To hospital with symptoms of intermittent nausea/vomiting. No fever/chills/diarrhea/abdominal pain. No urinary symptoms/urgency/frequency/hematuria. 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 with symptoms of frequent kidney stones     · Creatinine 14+ on admission  · Baseline kidney function appears to be around 2.6-2.8  · Nephrology on board  · S/p IV fluids :  0.45% saline with 75 meq of bicarb at 125 mL/h with no improvement in kidney function   · S/p rt IVJ tunneled dialysis catheter placement on 8/24  · initiated hemodialysis   · CM aware, helping arrange chair time at an OP dialysis unit    Anemia  Assessment & Plan  · Baseline hemoglobin appears to be around 11-12  · Hemoglobin 8.1 currently(9.3 on admission)  · Patient denies any hematuria, bright red blood per rectum, hematemesis or melena  · In the setting of chronic disease, IV fluids  · Iron panel ordered  · epogen with dialysis           VTE Pharmacologic Prophylaxis:   Pharmacologic: ambulatory    Patient Centered Rounds: I have performed bedside rounds with nursing staff today. Discussions with Specialists or Other Care Team Provider: nephrology    Education and Discussions with Family / Patient: mother at bedside    Time Spent for Care: 30 minutes. More than 50% of total time spent on counseling and coordination of care as described above.     Current Length of Stay: 8 day(s)    Current Patient Status: Inpatient   Certification Statement: The patient will continue to require additional inpatient hospital stay due to await OP dialysis chair    Discharge Plan: once op services arranged    Code Status: Level 1 - Full Code      Subjective:   Continues to have nausea mostly associated with meals. Typically takes Zofran prior to eating. Objective:     Vitals:   Temp (24hrs), Av.6 °F (37 °C), Min:98.4 °F (36.9 °C), Max:98.8 °F (37.1 °C)    Temp:  [98.4 °F (36.9 °C)-98.8 °F (37.1 °C)] 98.5 °F (36.9 °C)  HR:  [54-96] 74  Resp:  [16-18] 16  BP: (122-155)/(46-81) 124/71  SpO2:  [95 %-100 %] 95 %  Body mass index is 31.33 kg/m². Input and Output Summary (last 24 hours): Intake/Output Summary (Last 24 hours) at 2023 0853  Last data filed at 2023 8641  Gross per 24 hour   Intake 780 ml   Output 1525 ml   Net -745 ml       Physical Exam:     Physical Exam  Constitutional:       General: He is not in acute distress. Appearance: He is obese. He is not toxic-appearing. HENT:      Mouth/Throat:      Mouth: Mucous membranes are moist.      Pharynx: Oropharynx is clear. Cardiovascular:      Rate and Rhythm: Normal rate and regular rhythm. Pulses: Normal pulses. Pulmonary:      Effort: Pulmonary effort is normal. No respiratory distress. Breath sounds: Normal breath sounds. Abdominal:      General: Abdomen is flat. Bowel sounds are normal.      Palpations: Abdomen is soft. Musculoskeletal:      Right lower leg: No edema. Left lower leg: No edema. Neurological:      Mental Status: He is alert and oriented to person, place, and time.        Additional Data:     Labs:    Results from last 7 days   Lab Units 23  0734   WBC Thousand/uL 9.18   HEMOGLOBIN g/dL 8.1*   HEMATOCRIT % 23.0*   PLATELETS Thousands/uL 204   NEUTROS PCT % 70   LYMPHS PCT % 17   MONOS PCT % 7   EOS PCT % 5     Results from last 7 days   Lab Units 23  0734   SODIUM mmol/L 139   POTASSIUM mmol/L 3.7   CHLORIDE mmol/L 100   CO2 mmol/L 28   BUN mg/dL 45*   CREATININE mg/dL 14.98*   ANION GAP mmol/L 11   CALCIUM mg/dL 8.8   GLUCOSE RANDOM mg/dL 98     Results from last 7 days   Lab Units 08/24/23  1358   INR  1.00                       * I Have Reviewed All Lab Data Listed Above. * Additional Pertinent Lab Tests Reviewed: All Labs Within Last 24 Hours Reviewed    Recent Cultures (last 7 days):           Last 24 Hours Medication List:   Current Facility-Administered Medications   Medication Dose Route Frequency Provider Last Rate   • acetaminophen  650 mg Oral Q6H PRN Luann Levi DO     • epoetin coleen  2,000 Units Subcutaneous Once per day on Mon Wed Fri Darrell Mcdaniel MD     • epoetin coleen  3,000 Units Subcutaneous Once per day on Mon Wed Fri Darrell Mcdaniel MD     • ondansetron  4 mg Intravenous Q6H PRN Jennifer Ellis MD     • phenol  1 spray Mouth/Throat Q2H PRN Jennifer Ellis MD     • traZODone  50 mg Oral HS Luann Levi DO          Today, Patient Was Seen By: Verna Ball M.D.     ** Please Note: Dictation voice to text software may have been used in the creation of this document.  **

## 2023-08-29 NOTE — TELEPHONE ENCOUNTER
Good Afternoon, Dr. Rhea Silva received a called from Dr. Yeimi Hunt from [facility] requesting to speak with you about a patient that is currently admitted at [facility] seen by you. Patient's name is Jennifer Valle : 1995 MRN# [id number]. Dr. Yeimi Hunt telephone number is [phone].  Thank you

## 2023-08-29 NOTE — CASE MANAGEMENT
Case Management Discharge Planning Note    Patient name Galilea Womack  Location /-90 MRN [id number]  : 1995 Date 2023       Current Admission Date: 2023  Current Admission Diagnosis:Acute renal failure superimposed on stage 5 chronic kidney disease, not on chronic dialysis [facility])   Patient Active Problem List    Diagnosis Date Noted   • Acute renal failure superimposed on stage 5 chronic kidney disease, not on chronic dialysis ([address]) 2023   • Anemia 2023   • Chronic kidney disease-mineral and bone disorder 2023   • BMI 30.0-30.9,adult 2022   • Chronic kidney disease, stage 4 (severe) ([address]) 2022   • Hydronephrosis with urinary obstruction due to ureteral calculus 2021   • Primary hyperoxaluria type 1 ([address] ) 2021   • Insomnia 10/27/2020   • Ureteric stone ----> hydroureteronephrosis moderate 10/23/2020   • Sebaceous cyst 2017   • Anxiety, generalized 2015      LOS (days): 8  Geometric Mean LOS (GMLOS) (days):   Days to GMLOS:     OBJECTIVE:  Risk of Unplanned Readmission Score: 9.75         Current admission status: Inpatient   Preferred Pharmacy:   [id number] [address] [place] [address]  Phone: [phone] Fax: [phone]    [id number] [address]  Phone: [phone] Fax: [phone]    Primary Care Provider: Emir Dela Cruz DO    Primary Insurance: [employer] PENDING  Secondary Insurance:     DISCHARGE DETAILS:  As per SLIM, pt continue to medically cleared for discharge pending out patient dialysis set up. Pt continue to be financially pending with [facility] and [facility]. CM continues to follow.

## 2023-08-29 NOTE — PROGRESS NOTES
NEPHROLOGY PROGRESS NOTE    Patient: Collette Prude               Sex: male          DOA: 8/21/2023  7:53 PM   YOB: 1995        Age:  25 y.o.        LOS:  LOS: 8 days       HPI     Patient with acute on chronic renal failure requiring dialysis    SUBJECTIVE     Overall feeling well denies any acute complaint    No chest pain no palpitation    No shortness of breath    CURRENT MEDICATIONS       Current Facility-Administered Medications:   •  acetaminophen (TYLENOL) tablet 650 mg, 650 mg, Oral, Q6H PRN, Don Orozco DO, 650 mg at 08/28/23 1128  •  epoetin coleen (EPOGEN,PROCRIT) injection 2,000 Units, 2,000 Units, Subcutaneous, Once per day on Mon Wed Fri, Elbert López MD, 2,000 Units at 08/28/23 1018  •  epoetin coleen (EPOGEN,PROCRIT) injection 3,000 Units, 3,000 Units, Subcutaneous, Once per day on Mon Wed Fri, Elbert López MD, 3,000 Units at 08/28/23 1018  •  ondansetron (ZOFRAN) injection 4 mg, 4 mg, Intravenous, Q6H PRN, Lisset Pantoja MD, 4 mg at 08/29/23 0742  •  phenol (CHLORASEPTIC) 1.4 % mucosal liquid 1 spray, 1 spray, Mouth/Throat, Q2H PRN, Lisset Pantoja MD, 1 spray at 08/27/23 0439  •  traZODone (DESYREL) tablet 50 mg, 50 mg, Oral, HS, Don Orozco DO, 50 mg at 08/28/23 2202    OBJECTIVE     Current Weight: Weight - Scale: 105 kg (231 lb)  Vitals:    08/29/23 0649   BP: 124/71   Pulse: 74   Resp: 16   Temp: 98.5 °F (36.9 °C)   SpO2: 95%       Intake/Output Summary (Last 24 hours) at 8/29/2023 1530  Last data filed at 8/29/2023 6709  Gross per 24 hour   Intake --   Output 1025 ml   Net -1025 ml       PHYSICAL EXAMINATION     Physical Exam  Constitutional:       General: He is not in acute distress. Appearance: He is well-developed. HENT:      Head: Normocephalic. Eyes:      General: No scleral icterus. Conjunctiva/sclera: Conjunctivae normal.   Neck:      Vascular: No JVD. Cardiovascular:      Rate and Rhythm: Normal rate.       Heart sounds: Normal heart sounds. Pulmonary:      Effort: Pulmonary effort is normal.      Breath sounds: No wheezing. Abdominal:      Palpations: Abdomen is soft. Tenderness: There is no abdominal tenderness. Musculoskeletal:         General: Normal range of motion. Cervical back: Neck supple. Skin:     General: Skin is warm. Findings: No rash. Neurological:      Mental Status: He is alert and oriented to person, place, and time. Psychiatric:         Behavior: Behavior normal.          LAB RESULTS     Results from last 7 days   Lab Units 08/28/23  0734 08/27/23  0431 08/26/23  0443 08/25/23  0846 08/24/23  1358 08/24/23  0752 08/24/23  0538 08/23/23  2316 08/23/23  1424 08/23/23  0426   WBC Thousand/uL 9.18 7.55 7.80 7.98  --   --  7.72  --   --  7.34   HEMOGLOBIN g/dL 8.1* 7.4* 7.7* 7.7*  --   --  7.4*  --   --  7.4*   HEMATOCRIT % 23.0* 21.7* 22.1* 22.0*  --   --  21.1*  --   --  21.3*   PLATELETS Thousands/uL 204 180 205 217  --   --  211  --   --  210   POTASSIUM mmol/L 3.7 3.8 3.5 3.4* 3.3* 3.3*  --  3.2*   < > 3.2*   CHLORIDE mmol/L 100 100 100 102 102 102  --  104   < > 106   CO2 mmol/L 28 29 29 25 25 25  --  24   < > 21   BUN mg/dL 45* 41* 59* 79* 76* 76*  --  73*   < > 74*   CREATININE mg/dL 14.98* 12.20* 15.27* 17.07* 15.76* 15.64*  --  15.17*   < > 14.37*   EGFR ml/min/1.73sq m 3 4 3 3 3 3  --  3   < > 4   CALCIUM mg/dL 8.8 8.5 8.1* 8.0* 8.4 8.3*  --  8.2*   < > 8.2*   PHOSPHORUS mg/dL  --   --   --  5.7*  --   --   --   --   --   --     < > = values in this interval not displayed. RADIOLOGY RESULTS      Results for orders placed during the hospital encounter of 08/21/23    XR chest portable    Narrative  CHEST    INDICATION:   ckd. COMPARISON:  None    EXAM PERFORMED/VIEWS:  XR CHEST PORTABLE      FINDINGS:    Right IJ dialysis catheter in place    Cardiomediastinal silhouette appears unremarkable. The lungs are clear. No pneumothorax or pleural effusion.     Osseous structures appear within normal limits for patient age. Impression  Right dialysis catheter present. No acute cardiopulmonary disease. Workstation performed: [id number]    No results found for this or any previous visit. No results found for this or any previous visit. No results found for this or any previous visit. No results found for this or any previous visit. No results found for this or any previous visit. PLAN / RECOMMENDATIONS      Acute on chronic failure: At present seems to be dialysis dependent. 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 Waiting for outpatient dialysis placement    Ravi Smith MD  Nephrology  8/29/2023        Portions of the record may have been created with voice recognition software. Occasional wrong word or "sound a like" substitutions may have occurred due to the inherent limitations of voice recognition software. Read the chart carefully and recognize, using context, where substitutions have occurred.

## 2023-08-29 NOTE — ASSESSMENT & PLAN NOTE
Lab Results   Component Value Date    EGFR 3 08/28/2023    EGFR 4 08/27/2023    EGFR 3 08/26/2023    CREATININE 14.98 (H) 08/28/2023    CREATININE 12.20 (H) 08/27/2023    CREATININE 15.27 (H) 08/26/2023   C/c : + To hospital with symptoms of intermittent nausea/vomiting. No fever/chills/diarrhea/abdominal pain. No urinary symptoms/urgency/frequency/hematuria.   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

## 2023-08-29 NOTE — PLAN OF CARE
Problem: PAIN - ADULT  Goal: Verbalizes/displays adequate comfort level or baseline comfort level  Description: Interventions:  - Encourage patient to monitor pain and request assistance  - Assess pain using appropriate pain scale  - Administer analgesics based on type and severity of pain and evaluate response  - Implement non-pharmacological measures as appropriate and evaluate response  - Consider cultural and social influences on pain and pain management  - Notify physician/advanced practitioner if interventions unsuccessful or patient reports new pain  Outcome: Progressing     Problem: INFECTION - ADULT  Goal: Absence or prevention of progression during hospitalization  Description: INTERVENTIONS:  - Assess and monitor for signs and symptoms of infection  - Monitor lab/diagnostic results  - Monitor all insertion sites, i.e. indwelling lines, tubes, and drains  - Monitor endotracheal if appropriate and nasal secretions for changes in amount and color  - [place] appropriate cooling/warming therapies per order  - Administer medications as ordered  - Instruct and encourage patient and family to use good hand hygiene technique  - Identify and instruct in appropriate isolation precautions for identified infection/condition  Outcome: Progressing  Goal: Absence of fever/infection during neutropenic period  Description: INTERVENTIONS:  - Monitor WBC    Outcome: Progressing     Problem: SAFETY ADULT  Goal: Patient will remain free of falls  Description: INTERVENTIONS:  - Educate patient/family on patient safety including physical limitations  - Instruct patient to call for assistance with activity   - Consult OT/PT to assist with strengthening/mobility   - Keep Call bell within reach  - Keep bed low and locked with side rails adjusted as appropriate  - Keep care items and personal belongings within reach  - Initiate and maintain comfort rounds  - Make Fall Risk Sign visible to staff  - Offer Toileting every 2 Hours, in advance of need  - Initiate/Maintain bed alarm  - Obtain necessary fall risk management equipment:   - Apply yellow socks and bracelet for high fall risk patients  - Consider moving patient to room near nurses station  Outcome: Progressing  Goal: Maintain or return to baseline ADL function  Description: INTERVENTIONS:  -  Assess patient's ability to carry out ADLs; assess patient's baseline for ADL function and identify physical deficits which impact ability to perform ADLs (bathing, care of mouth/teeth, toileting, grooming, dressing, etc.)  - Assess/evaluate cause of self-care deficits   - Assess range of motion  - Assess patient's mobility; develop plan if impaired  - Assess patient's need for assistive devices and provide as appropriate  - Encourage maximum independence but intervene and supervise when necessary  - Involve family in performance of ADLs  - Assess for home care needs following discharge   - Consider OT consult to assist with ADL evaluation and planning for discharge  - Provide patient education as appropriate  Outcome: Progressing  Goal: Maintains/Returns to pre admission functional level  Description: INTERVENTIONS:  - Perform BMAT or MOVE assessment daily.   - Set and communicate daily mobility goal to care team and patient/family/caregiver. - Collaborate with rehabilitation services on mobility goals if consulted  - Perform Range of Motion 2 times a day. - Reposition patient every 2 hours.   - Dangle patient 2 times a day  - Stand patient 2 times a day  - Ambulate patient 2 times a day  - Out of bed to chair 2 times a day   - Out of bed for meals 2 times a day  - Out of bed for toileting  - Record patient progress and toleration of activity level   Outcome: Progressing     Problem: DISCHARGE PLANNING  Goal: Discharge to home or other facility with appropriate resources  Description: INTERVENTIONS:  - Identify barriers to discharge w/patient and caregiver  - Arrange for needed discharge resources and transportation as appropriate  - Identify discharge learning needs (meds, wound care, etc.)  - Arrange for interpretive services to assist at discharge as needed  - Refer to Case Management Department for coordinating discharge planning if the patient needs post-hospital services based on physician/advanced practitioner order or complex needs related to functional status, cognitive ability, or social support system  Outcome: Progressing     Problem: Knowledge Deficit  Goal: Patient/family/caregiver demonstrates understanding of disease process, treatment plan, medications, and discharge instructions  Description: Complete learning assessment and assess knowledge base.   Interventions:  - Provide teaching at level of understanding  - Provide teaching via preferred learning methods  Outcome: Progressing     Problem: METABOLIC, FLUID AND ELECTROLYTES - ADULT  Goal: Fluid balance maintained  Description: INTERVENTIONS:  - Monitor labs   - Monitor I/O and WT  - Instruct patient on fluid and nutrition as appropriate  - Assess for signs & symptoms of volume excess or deficit  Outcome: Progressing  Goal: Electrolytes maintained within normal limits  Description: INTERVENTIONS:  - Monitor labs and assess patient for signs and symptoms of electrolyte imbalances  - Administer electrolyte replacement as ordered  - Monitor response to electrolyte replacements, including repeat lab results as appropriate  - Instruct patient on fluid and nutrition as appropriate  Outcome: Progressing  Goal: Glucose maintained within target range  Description: INTERVENTIONS:  - Monitor Blood Glucose as ordered  - Assess for signs and symptoms of hyperglycemia and hypoglycemia  - Administer ordered medications to maintain glucose within target range  - Assess nutritional intake and initiate nutrition service referral as needed  Outcome: Progressing     Problem: GASTROINTESTINAL - ADULT  Goal: Minimal or absence of nausea and/or vomiting  Description: INTERVENTIONS:  - Administer IV fluids if ordered to ensure adequate hydration  - Maintain NPO status until nausea and vomiting are resolved  - Nasogastric tube if ordered  - Administer ordered antiemetic medications as needed  - Provide nonpharmacologic comfort measures as appropriate  - Advance diet as tolerated, if ordered  - Consider nutrition services referral to assist patient with adequate nutrition and appropriate food choices  Outcome: Progressing  Goal: Maintains adequate nutritional intake  Description: INTERVENTIONS:  - Monitor percentage of each meal consumed  - Identify factors contributing to decreased intake, treat as appropriate  - Assist with meals as needed  - Monitor I&O, weight, and lab values if indicated  - Obtain nutrition services referral as needed  Outcome: Progressing          Problem: HEMATOLOGIC - ADULT  Goal: Maintains hematologic stability  Description: INTERVENTIONS  - Assess for signs and symptoms of bleeding or hemorrhage  - Monitor labs  - Administer supportive blood products/factors as ordered and appropriate  Outcome: Progressing

## 2023-08-30 ENCOUNTER — APPOINTMENT (INPATIENT)
Dept: DIALYSIS | Facility: HOSPITAL | Age: 28
DRG: 469 | End: 2023-08-30
Attending: INTERNAL MEDICINE
Payer: COMMERCIAL

## 2023-08-30 LAB
ANION GAP SERPL CALCULATED.3IONS-SCNC: 11 MMOL/L
BASOPHILS # BLD AUTO: 0.05 THOUSANDS/ÂΜL (ref 0–0.1)
BASOPHILS NFR BLD AUTO: 1 % (ref 0–1)
BUN SERPL-MCNC: 38 MG/DL (ref 5–25)
CALCIUM SERPL-MCNC: 9 MG/DL (ref 8.4–10.2)
CHLORIDE SERPL-SCNC: 100 MMOL/L (ref 96–108)
CO2 SERPL-SCNC: 27 MMOL/L (ref 21–32)
CREAT SERPL-MCNC: 15.32 MG/DL (ref 0.6–1.3)
EOSINOPHIL # BLD AUTO: 0.38 THOUSAND/ÂΜL (ref 0–0.61)
EOSINOPHIL NFR BLD AUTO: 4 % (ref 0–6)
ERYTHROCYTE [DISTWIDTH] IN BLOOD BY AUTOMATED COUNT: 12.4 % (ref 11.6–15.1)
GFR SERPL CREATININE-BSD FRML MDRD: 3 ML/MIN/1.73SQ M
GLUCOSE SERPL-MCNC: 147 MG/DL (ref 65–140)
HCT VFR BLD AUTO: 22.5 % (ref 36.5–49.3)
HGB BLD-MCNC: 7.5 G/DL (ref 12–17)
IMM GRANULOCYTES # BLD AUTO: 0.03 THOUSAND/UL (ref 0–0.2)
IMM GRANULOCYTES NFR BLD AUTO: 0 % (ref 0–2)
LYMPHOCYTES # BLD AUTO: 1.17 THOUSANDS/ÂΜL (ref 0.6–4.47)
LYMPHOCYTES NFR BLD AUTO: 13 % (ref 14–44)
MCH RBC QN AUTO: 30.2 PG (ref 26.8–34.3)
MCHC RBC AUTO-ENTMCNC: 33.3 G/DL (ref 31.4–37.4)
MCV RBC AUTO: 91 FL (ref 82–98)
MONOCYTES # BLD AUTO: 0.51 THOUSAND/ÂΜL (ref 0.17–1.22)
MONOCYTES NFR BLD AUTO: 6 % (ref 4–12)
NEUTROPHILS # BLD AUTO: 6.66 THOUSANDS/ÂΜL (ref 1.85–7.62)
NEUTS SEG NFR BLD AUTO: 76 % (ref 43–75)
NRBC BLD AUTO-RTO: 0 /100 WBCS
PLATELET # BLD AUTO: 212 THOUSANDS/UL (ref 149–390)
PMV BLD AUTO: 10.7 FL (ref 8.9–12.7)
POTASSIUM SERPL-SCNC: 3.8 MMOL/L (ref 3.5–5.3)
RBC # BLD AUTO: 2.48 MILLION/UL (ref 3.88–5.62)
SODIUM SERPL-SCNC: 138 MMOL/L (ref 135–147)
WBC # BLD AUTO: 8.8 THOUSAND/UL (ref 4.31–10.16)

## 2023-08-30 PROCEDURE — 85025 COMPLETE CBC W/AUTO DIFF WBC: CPT | Performed by: FAMILY MEDICINE

## 2023-08-30 PROCEDURE — 90935 HEMODIALYSIS ONE EVALUATION: CPT | Performed by: INTERNAL MEDICINE

## 2023-08-30 PROCEDURE — 99232 SBSQ HOSP IP/OBS MODERATE 35: CPT | Performed by: FAMILY MEDICINE

## 2023-08-30 PROCEDURE — 80048 BASIC METABOLIC PNL TOTAL CA: CPT | Performed by: FAMILY MEDICINE

## 2023-08-30 RX ADMIN — ONDANSETRON 4 MG: 2 INJECTION INTRAMUSCULAR; INTRAVENOUS at 15:54

## 2023-08-30 RX ADMIN — TRAZODONE HYDROCHLORIDE 50 MG: 50 TABLET ORAL at 23:00

## 2023-08-30 RX ADMIN — EPOETIN ALFA 3000 UNITS: 3000 SOLUTION INTRAVENOUS; SUBCUTANEOUS at 14:30

## 2023-08-30 RX ADMIN — ACETAMINOPHEN 650 MG: 325 TABLET, FILM COATED ORAL at 13:47

## 2023-08-30 RX ADMIN — ONDANSETRON 4 MG: 2 INJECTION INTRAMUSCULAR; INTRAVENOUS at 07:34

## 2023-08-30 RX ADMIN — EPOETIN ALFA 2000 UNITS: 2000 SOLUTION INTRAVENOUS; SUBCUTANEOUS at 14:30

## 2023-08-30 NOTE — ASSESSMENT & PLAN NOTE
· Baseline hemoglobin appears to be around 11-12  · Hemoglobin 7.5 currently(9.3 on admission)  · Patient denies any hematuria, bright red blood per rectum, hematemesis or melena  · In the setting of chronic disease, IV fluids  · Iron panel ordered  · epogen with dialysis

## 2023-08-30 NOTE — PROGRESS NOTES
[address]  Progress Note  Name: Treva Allen  MRN: [id number]  Unit/Bed#: -01 I Date of Admission: 8/21/2023   Date of Service: 8/30/2023 I Hospital Day: 9    Assessment/Plan   * Acute renal failure superimposed on stage 5 chronic kidney disease, not on chronic dialysis [facility])  Assessment & Plan  Lab Results   Component Value Date    EGFR 3 08/30/2023    EGFR 3 08/28/2023    EGFR 4 08/27/2023    CREATININE 15.32 (H) 08/30/2023    CREATININE 14.98 (H) 08/28/2023    CREATININE 12.20 (H) 08/27/2023   C/c : + To hospital with symptoms of intermittent nausea/vomiting. No fever/chills/diarrhea/abdominal pain. No urinary symptoms/urgency/frequency/hematuria. 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 with symptoms of frequent kidney stones     · Creatinine 14+ on admission  · Baseline kidney function appears to be around 2.6-2.8  · Nephrology on board  · S/p IV fluids :  0.45% saline with 75 meq of bicarb at 125 mL/h with no improvement in kidney function   · S/p rt IVJ tunneled dialysis catheter placement on 8/24  · initiated hemodialysis   · CM aware, helping arrange chair time at an OP dialysis unit    Anemia  Assessment & Plan  · Baseline hemoglobin appears to be around 11-12  · Hemoglobin 7.5 currently(9.3 on admission)  · Patient denies any hematuria, bright red blood per rectum, hematemesis or melena  · In the setting of chronic disease, IV fluids  · Iron panel ordered  · epogen with dialysis           VTE Pharmacologic Prophylaxis:   Pharmacologic: ambulatory    Patient Centered Rounds: I have performed bedside rounds with nursing staff today. Discussions with Specialists or Other Care Team Provider: cm    Education and Discussions with Family / Patient: dw patient    Time Spent for Care: 30 minutes. More than 50% of total time spent on counseling and coordination of care as described above.     Current Length of Stay: 9 day(s)    Current Patient Status: Inpatient   Certification Statement: The patient will continue to require additional inpatient hospital stay due to needs op dialysis chair arranged    Discharge Plan: once op services arranged    Code Status: Level 1 - Full Code      Subjective:   Undergoing dialysis today  No new concerns     Objective:     Vitals:   Temp (24hrs), Av.4 °F (36.9 °C), Min:97.7 °F (36.5 °C), Max:99.2 °F (37.3 °C)    Temp:  [97.7 °F (36.5 °C)-99.2 °F (37.3 °C)] 99.2 °F (37.3 °C)  HR:  [50-95] 88  Resp:  [18] 18  BP: (134-164)/(34-89) 142/71  SpO2:  [97 %-98 %] 97 %  Body mass index is 31.6 kg/m². Input and Output Summary (last 24 hours): Intake/Output Summary (Last 24 hours) at 2023 1518  Last data filed at 2023 1330  Gross per 24 hour   Intake 860 ml   Output 4750 ml   Net -3890 ml       Physical Exam:     Physical Exam  Constitutional:       General: He is not in acute distress. Appearance: He is obese. He is not toxic-appearing. HENT:      Mouth/Throat:      Mouth: Mucous membranes are moist.      Pharynx: Oropharynx is clear. Cardiovascular:      Rate and Rhythm: Normal rate and regular rhythm. Pulses: Normal pulses. Pulmonary:      Effort: Pulmonary effort is normal. No respiratory distress. Breath sounds: Normal breath sounds. Abdominal:      General: Abdomen is flat. Bowel sounds are normal.      Palpations: Abdomen is soft. Neurological:      Mental Status: He is alert and oriented to person, place, and time.            Additional Data:     Labs:    Results from last 7 days   Lab Units 23  0858   WBC Thousand/uL 8.80   HEMOGLOBIN g/dL 7.5*   HEMATOCRIT % 22.5*   PLATELETS Thousands/uL 212   NEUTROS PCT % 76*   LYMPHS PCT % 13*   MONOS PCT % 6   EOS PCT % 4     Results from last 7 days   Lab Units 23  0858   SODIUM mmol/L 138   POTASSIUM mmol/L 3.8   CHLORIDE mmol/L 100   CO2 mmol/L 27   BUN mg/dL 38*   CREATININE mg/dL 15.32*   ANION GAP mmol/L 11   CALCIUM mg/dL 9.0 GLUCOSE RANDOM mg/dL 147*     Results from last 7 days   Lab Units 08/24/23  1358   INR  1.00                   * I Have Reviewed All Lab Data Listed Above. * Additional Pertinent Lab Tests Reviewed: All Labs Within Last 24 Hours Reviewed    Recent Cultures (last 7 days):           Last 24 Hours Medication List:   Current Facility-Administered Medications   Medication Dose Route Frequency Provider Last Rate   • acetaminophen  650 mg Oral Q6H PRN Kerrie Davis, DO     • epoetin coleen  2,000 Units Intravenous Once per day on Mon Wed Fri Fran Figueredo MD     • epoetin coleen  3,000 Units Intravenous Once per day on Mon Wed Fri Fran Figueredo MD     • ondansetron  4 mg Intravenous Q6H PRN Jihan Hall MD     • phenol  1 spray Mouth/Throat Q2H PRN Jihan Hall MD     • traZODone  50 mg Oral HS Kerrie Davis, DO          Today, Patient Was Seen By: Margareth Courtney M.D.     ** Please Note: Dictation voice to text software may have been used in the creation of this document.  **

## 2023-08-30 NOTE — PROGRESS NOTES
HEMODIALYSIS ROUNDING NOTE    Patient: Morgan Rothman               Sex: male          DOA: 8/21/2023  7:53 PM   YOB: 1995        Age:  25 y.o.        LOS:  LOS: 9 days             SUBJECTIVE     - Patient was seen during hemodialysis today. - Reviewed last 24 hrs events    Tolerating well and denies any acute complaint    No chest pain no palpitation or shortness of breath    CURRENT MEDICATIONS       Current Facility-Administered Medications:   •  acetaminophen (TYLENOL) tablet 650 mg, 650 mg, Oral, Q6H PRN, Wilhelminia [place], DO, 650 mg at 08/30/23 1347  •  epoetin coleen (EPOGEN,PROCRIT) injection 2,000 Units, 2,000 Units, Intravenous, Once per day on Mon Wed Fri, Samantha Santiago MD, 2,000 Units at 08/30/23 1430  •  epoetin coleen (EPOGEN,PROCRIT) injection 3,000 Units, 3,000 Units, Intravenous, Once per day on Mon Wed Fri, Samantha Santiago MD, 3,000 Units at 08/30/23 1430  •  [facility] injection 4 mg, 4 mg, Intravenous, Q6H PRN, Krystina Lara MD, 4 mg at 08/30/23 0734  •  phenol (CHLORASEPTIC) 1.4 % mucosal liquid 1 spray, 1 spray, Mouth/Throat, Q2H PRN, Krystina Lara MD, 1 spray at 08/27/23 0439  •  traZODone (DESYREL) tablet 50 mg, 50 mg, Oral, HS, Wilhelminia [place], DO, 50 mg at 08/29/23 2220    OBJECTIVE     Current Weight: Weight - Scale: 106 kg (233 lb 0.4 oz)  Vitals:    08/30/23 1305   BP: 142/71   Pulse: 88   Resp: 18   Temp:    SpO2:        Intake/Output Summary (Last 24 hours) at 8/30/2023 1440  Last data filed at 8/30/2023 1330  Gross per 24 hour   Intake 860 ml   Output 4750 ml   Net -3890 ml       PHYSICAL EXAMINATION     Physical Exam  Constitutional:       General: He is not in acute distress. Appearance: He is well-developed. HENT:      Head: Normocephalic. Eyes:      General: No scleral icterus. Conjunctiva/sclera: Conjunctivae normal.   Neck:      Vascular: No JVD. Cardiovascular:      Rate and Rhythm: Normal rate. Heart sounds: Normal heart sounds. Pulmonary:      Effort: Pulmonary effort is normal.      Breath sounds: No wheezing. Abdominal:      Palpations: Abdomen is soft. Tenderness: There is no abdominal tenderness. Musculoskeletal:         General: Normal range of motion. Cervical back: Neck supple. Skin:     General: Skin is warm. Findings: No rash. Neurological:      Mental Status: He is alert and oriented to person, place, and time. Psychiatric:         Behavior: Behavior normal.           LAB RESULTS     Results from last 7 days   Lab Units 08/30/23  0858 08/28/23  0734 08/27/23  0431 08/26/23  0443 08/25/23  0846 08/24/23  1358 08/24/23  0752 08/24/23  0538   WBC Thousand/uL 8.80 9.18 7.55 7.80 7.98  --   --  7.72   HEMOGLOBIN g/dL 7.5* 8.1* 7.4* 7.7* 7.7*  --   --  7.4*   HEMATOCRIT % 22.5* 23.0* 21.7* 22.1* 22.0*  --   --  21.1*   PLATELETS Thousands/uL 212 204 180 205 217  --   --  211   POTASSIUM mmol/L 3.8 3.7 3.8 3.5 3.4* 3.3* 3.3*  --    CHLORIDE mmol/L 100 100 100 100 102 102 102  --    CO2 mmol/L 27 28 29 29 25 25 25  --    BUN mg/dL 38* 45* 41* 59* 79* 76* 76*  --    CREATININE mg/dL 15.32* 14.98* 12.20* 15.27* 17.07* 15.76* 15.64*  --    EGFR ml/min/1.73sq m 3 3 4 3 3 3 3  --    CALCIUM mg/dL 9.0 8.8 8.5 8.1* 8.0* 8.4 8.3*  --    PHOSPHORUS mg/dL  --   --   --   --  5.7*  --   --   --        RADIOLOGY RESULTS     Results for orders placed during the hospital encounter of 08/21/23    XR chest portable    Narrative  CHEST    INDICATION:   ckd. COMPARISON:  None    EXAM PERFORMED/VIEWS:  XR CHEST PORTABLE      FINDINGS:    Right IJ dialysis catheter in place    Cardiomediastinal silhouette appears unremarkable. The lungs are clear. No pneumothorax or pleural effusion. Osseous structures appear within normal limits for patient age. Impression  Right dialysis catheter present. No acute cardiopulmonary disease.             Workstation performed: [id number]    No results found for this or any previous visit. No results found for this or any previous visit. No results found for this or any previous visit. No results found for this or any previous visit. No results found for this or any previous visit. PLAN / RECOMMENDATIONS     1. End Stage Renal Disease:       I saw and examined patient during hemodialysis treatment. The patient was receiving hemodialysis for treatment of end stage renal disease. I have also reviewed vital signs, intake and output, lab results and recent events, and agreed with today's dialysis order. Access: No issue    2. Anemia:   Lucio Patterson MD  Nephrology  8/30/2023        Portions of the record may have been created with voice recognition software. Occasional wrong word or "sound a like" substitutions may have occurred due to the inherent limitations of voice recognition software. Read the chart carefully and recognize, using context, where substitutions have occurred.

## 2023-08-30 NOTE — PLAN OF CARE
Problem: PAIN - ADULT  Goal: Verbalizes/displays adequate comfort level or baseline comfort level  Description: Interventions:  - Encourage patient to monitor pain and request assistance  - Assess pain using appropriate pain scale  - Administer analgesics based on type and severity of pain and evaluate response  - Implement non-pharmacological measures as appropriate and evaluate response  - Consider cultural and social influences on pain and pain management  - Notify physician/advanced practitioner if interventions unsuccessful or patient reports new pain  Outcome: Progressing     Problem: INFECTION - ADULT  Goal: Absence or prevention of progression during hospitalization  Description: INTERVENTIONS:  - Assess and monitor for signs and symptoms of infection  - Monitor lab/diagnostic results  - Monitor all insertion sites, i.e. indwelling lines, tubes, and drains  - Monitor endotracheal if appropriate and nasal secretions for changes in amount and color  - [place] appropriate cooling/warming therapies per order  - Administer medications as ordered  - Instruct and encourage patient and family to use good hand hygiene technique  - Identify and instruct in appropriate isolation precautions for identified infection/condition  Outcome: Progressing  Goal: Absence of fever/infection during neutropenic period  Description: INTERVENTIONS:  - Monitor WBC    Outcome: Progressing     Problem: SAFETY ADULT  Goal: Patient will remain free of falls  Description: INTERVENTIONS:  - Educate patient/family on patient safety including physical limitations  - Instruct patient to call for assistance with activity   - Consult OT/PT to assist with strengthening/mobility   - Keep Call bell within reach  - Keep bed low and locked with side rails adjusted as appropriate  - Keep care items and personal belongings within reach  - Initiate and maintain comfort rounds  - Make Fall Risk Sign visible to staff  - Offer Toileting every 2 Hours, in advance of need  - Initiate/Maintain bed alarm  - Obtain necessary fall risk management equipment  - Apply yellow socks and bracelet for high fall risk patients  - Consider moving patient to room near nurses station  Outcome: Progressing  Goal: Maintain or return to baseline ADL function  Description: INTERVENTIONS:  -  Assess patient's ability to carry out ADLs; assess patient's baseline for ADL function and identify physical deficits which impact ability to perform ADLs (bathing, care of mouth/teeth, toileting, grooming, dressing, etc.)  - Assess/evaluate cause of self-care deficits   - Assess range of motion  - Assess patient's mobility; develop plan if impaired  - Assess patient's need for assistive devices and provide as appropriate  - Encourage maximum independence but intervene and supervise when necessary  - Involve family in performance of ADLs  - Assess for home care needs following discharge   - Consider OT consult to assist with ADL evaluation and planning for discharge  - Provide patient education as appropriate  Outcome: Progressing  Goal: Maintains/Returns to pre admission functional level  Description: INTERVENTIONS:  - Perform BMAT or MOVE assessment daily.   - Set and communicate daily mobility goal to care team and patient/family/caregiver. - Collaborate with rehabilitation services on mobility goals if consulted  - Perform Range of Motion 2 times a day. - Reposition patient every 2 hours.   - Dangle patient 2 times a day  - Stand patient 2 times a day  - Ambulate patient 2 times a day  - Out of bed to chair 2 times a day   - Out of bed for meals 2 times a day  - Out of bed for toileting  - Record patient progress and toleration of activity level   Outcome: Progressing     Problem: DISCHARGE PLANNING  Goal: Discharge to home or other facility with appropriate resources  Description: INTERVENTIONS:  - Identify barriers to discharge w/patient and caregiver  - Arrange for needed discharge resources and transportation as appropriate  - Identify discharge learning needs (meds, wound care, etc.)  - Arrange for interpretive services to assist at discharge as needed  - Refer to Case Management Department for coordinating discharge planning if the patient needs post-hospital services based on physician/advanced practitioner order or complex needs related to functional status, cognitive ability, or social support system  Outcome: Progressing     Problem: Knowledge Deficit  Goal: Patient/family/caregiver demonstrates understanding of disease process, treatment plan, medications, and discharge instructions  Description: Complete learning assessment and assess knowledge base.   Interventions:  - Provide teaching at level of understanding  - Provide teaching via preferred learning methods  Outcome: Progressing     Problem: METABOLIC, FLUID AND ELECTROLYTES - ADULT  Goal: Fluid balance maintained  Description: INTERVENTIONS:  - Monitor labs   - Monitor I/O and WT  - Instruct patient on fluid and nutrition as appropriate  - Assess for signs & symptoms of volume excess or deficit  Outcome: Progressing  Goal: Electrolytes maintained within normal limits  Description: INTERVENTIONS:  - Monitor labs and assess patient for signs and symptoms of electrolyte imbalances  - Administer electrolyte replacement as ordered  - Monitor response to electrolyte replacements, including repeat lab results as appropriate  - Instruct patient on fluid and nutrition as appropriate  Outcome: Progressing  Goal: Glucose maintained within target range  Description: INTERVENTIONS:  - Monitor Blood Glucose as ordered  - Assess for signs and symptoms of hyperglycemia and hypoglycemia  - Administer ordered medications to maintain glucose within target range  - Assess nutritional intake and initiate nutrition service referral as needed  Outcome: Progressing     Problem: GASTROINTESTINAL - ADULT  Goal: Minimal or absence of nausea and/or vomiting  Description: INTERVENTIONS:  - Administer IV fluids if ordered to ensure adequate hydration  - Maintain NPO status until nausea and vomiting are resolved  - Nasogastric tube if ordered  - Administer ordered antiemetic medications as needed  - Provide nonpharmacologic comfort measures as appropriate  - Advance diet as tolerated, if ordered  - Consider nutrition services referral to assist patient with adequate nutrition and appropriate food choices  Outcome: Progressing  Goal: Maintains adequate nutritional intake  Description: INTERVENTIONS:  - Monitor percentage of each meal consumed  - Identify factors contributing to decreased intake, treat as appropriate  - Assist with meals as needed  - Monitor I&O, weight, and lab values if indicated  - Obtain nutrition services referral as needed  Outcome: Progressing

## 2023-08-30 NOTE — PLAN OF CARE
Patient presents for a 4 hour HD session on a 4K2.25Ca bath for a serum potassium of 3.8 mmol/L drawn today with a net UF goal of 3 L as tolerated per discussion with Dr. Lakesha Godfrey.    Post-Dialysis RN Treatment Note    Blood Pressure:  Pre 143/82 mm/Hg  Post 142/71 mmHg   EDW  TBD kg    Weight:  Pre 108.7 kg   Post 105.7 kg   Mode of weight measurement: Bed Scale   Volume Removed  3000 ml    Treatment duration 240 minutes    NS given  No    Treatment shortened?  No   Medications given during Rx 5,000 units Epogen   Estimated Kt/V  Not Applicable   Access type: Permacath/TDC   Access Issues: No    Report called to primary nurse   Yes, Anusha Red RN     Problem: METABOLIC, FLUID AND ELECTROLYTES - ADULT  Goal: Fluid balance maintained  Description: INTERVENTIONS:  - Monitor labs   - Monitor I/O and WT  - Instruct patient on fluid and nutrition as appropriate  - Assess for signs & symptoms of volume excess or deficit  Outcome: Progressing  Goal: Electrolytes maintained within normal limits  Description: INTERVENTIONS:  - Monitor labs and assess patient for signs and symptoms of electrolyte imbalances  - Administer electrolyte replacement as ordered  - Monitor response to electrolyte replacements, including repeat lab results as appropriate  - Instruct patient on fluid and nutrition as appropriate  Outcome: Progressing

## 2023-08-30 NOTE — ASSESSMENT & PLAN NOTE
Lab Results   Component Value Date    EGFR 3 08/30/2023    EGFR 3 08/28/2023    EGFR 4 08/27/2023    CREATININE 15.32 (H) 08/30/2023    CREATININE 14.98 (H) 08/28/2023    CREATININE 12.20 (H) 08/27/2023   C/c : + To hospital with symptoms of intermittent nausea/vomiting. No fever/chills/diarrhea/abdominal pain. No urinary symptoms/urgency/frequency/hematuria.   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

## 2023-08-31 ENCOUNTER — APPOINTMENT (INPATIENT)
Dept: DIALYSIS | Facility: HOSPITAL | Age: 28
DRG: 469 | End: 2023-08-31
Payer: COMMERCIAL

## 2023-08-31 PROCEDURE — 90935 HEMODIALYSIS ONE EVALUATION: CPT | Performed by: INTERNAL MEDICINE

## 2023-08-31 PROCEDURE — 99232 SBSQ HOSP IP/OBS MODERATE 35: CPT | Performed by: STUDENT IN AN ORGANIZED HEALTH CARE EDUCATION/TRAINING PROGRAM

## 2023-08-31 RX ORDER — GUAIFENESIN 100 MG/5ML
200 SOLUTION ORAL EVERY 4 HOURS PRN
Status: DISCONTINUED | OUTPATIENT
Start: 2023-08-31 | End: 2023-09-01 | Stop reason: HOSPADM

## 2023-08-31 RX ADMIN — GUAIFENESIN 200 MG: 200 SOLUTION ORAL at 17:46

## 2023-08-31 RX ADMIN — ACETAMINOPHEN 650 MG: 325 TABLET, FILM COATED ORAL at 16:22

## 2023-08-31 RX ADMIN — TRAZODONE HYDROCHLORIDE 50 MG: 50 TABLET ORAL at 22:00

## 2023-08-31 RX ADMIN — ACETAMINOPHEN 650 MG: 325 TABLET, FILM COATED ORAL at 23:17

## 2023-08-31 RX ADMIN — ONDANSETRON 4 MG: 2 INJECTION INTRAMUSCULAR; INTRAVENOUS at 15:56

## 2023-08-31 RX ADMIN — ONDANSETRON 4 MG: 2 INJECTION INTRAMUSCULAR; INTRAVENOUS at 07:36

## 2023-08-31 NOTE — PLAN OF CARE
3000 ml as tolerated, 4 hrs, 4K bath  Problem: METABOLIC, FLUID AND ELECTROLYTES - ADULT  Goal: Fluid balance maintained  Description: INTERVENTIONS:  - Monitor labs   - Monitor I/O and WT  - Instruct patient on fluid and nutrition as appropriate  - Assess for signs & symptoms of volume excess or deficit  Outcome: Progressing  Goal: Electrolytes maintained within normal limits  Description: INTERVENTIONS:  - Monitor labs and assess patient for signs and symptoms of electrolyte imbalances  - Administer electrolyte replacement as ordered  - Monitor response to electrolyte replacements, including repeat lab results as appropriate  - Instruct patient on fluid and nutrition as appropriate  Outcome: Progressing

## 2023-08-31 NOTE — PROGRESS NOTES
[address]  Progress Note  Name: Andie Ortiz  MRN: [id number]  Unit/Bed#: -01 I Date of Admission: 8/21/2023   Date of Service: 8/31/2023 I Hospital Day: 10    Assessment/Plan   * Acute renal failure superimposed on stage 5 chronic kidney disease, not on chronic dialysis [facility])  Assessment & Plan  Lab Results   Component Value Date    EGFR 3 08/30/2023    EGFR 3 08/28/2023    EGFR 4 08/27/2023    CREATININE 15.32 (H) 08/30/2023    CREATININE 14.98 (H) 08/28/2023    CREATININE 12.20 (H) 08/27/2023   C/c : + To hospital with symptoms of intermittent nausea/vomiting. No fever/chills/diarrhea/abdominal pain. No urinary symptoms/urgency/frequency/hematuria. 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 with symptoms of frequent kidney stones     · Creatinine 14+ on admission  · Baseline kidney function appears to be around 2.6-2.8  · Nephrology on board  · S/p rt IVJ tunneled dialysis catheter placement on 8/24  · initiated hemodialysis   · ot was dialyzed this am.   · CM aware, helping arrange chair time at an OP dialysis unit    Anemia  Assessment & Plan  · Baseline hemoglobin appears to be around 11-12  · Hemoglobin 7.5 currently(9.3 on admission)  · Patient denies any hematuria, bright red blood per rectum, hematemesis or melena  · In the setting of chronic disease, IV fluids  · Iron panel ordered  · epogen with dialysis             VTE Pharmacologic Prophylaxis: VTE Score: 1 Low Risk (Score 0-2) - Encourage Ambulation. Patient Centered Rounds: I performed bedside rounds with nursing staff today. Discussions with Specialists or Other Care Team Provider: Nephrology    Current Length of Stay: 10 day(s)  Current Patient Status: Inpatient   Certification Statement: The patient will continue to require additional inpatient hospital stay due to MECHELLE on HD  Discharge Plan: Anticipate discharge in 48-72 hrs to home.     Code Status: Level 1 - Full Code    Subjective: Appears comfortable nondistressed. Reports decreased urine output however also mentions decreased p.o. intake due to just being here and not doing much. Denies chest pain, dyspnea, fever, chills, nausea, vomiting, any other new complaints. Objective:     Vitals:   Temp (24hrs), Av.5 °F (36.9 °C), Min:98.4 °F (36.9 °C), Max:98.6 °F (37 °C)    Temp:  [98.4 °F (36.9 °C)-98.6 °F (37 °C)] 98.6 °F (37 °C)  HR:  [] 60  Resp:  [14-18] 18  BP: (115-164)/(49-81) 131/59  SpO2:  [90 %-99 %] 90 %  Body mass index is 31.6 kg/m². Input and Output Summary (last 24 hours): Intake/Output Summary (Last 24 hours) at 2023 1659  Last data filed at 2023 1529  Gross per 24 hour   Intake 940 ml   Output 3002 ml   Net -2062 ml       Physical Exam:   Physical Exam  Constitutional:       General: He is not in acute distress. Appearance: Normal appearance. He is normal weight. He is not ill-appearing, toxic-appearing or diaphoretic. HENT:      Head: Normocephalic and atraumatic. Eyes:      Pupils: Pupils are equal, round, and reactive to light. Cardiovascular:      Rate and Rhythm: Normal rate. Pulses: Normal pulses. Pulmonary:      Effort: Pulmonary effort is normal. No respiratory distress. Breath sounds: Normal breath sounds. No wheezing. Abdominal:      General: Bowel sounds are normal. There is no distension. Palpations: Abdomen is soft. Tenderness: There is no abdominal tenderness. Musculoskeletal:      Cervical back: No rigidity. Neurological:      Mental Status: He is alert and oriented to person, place, and time.    Psychiatric:         Mood and Affect: Mood normal.         Behavior: Behavior normal.          Additional Data:     Labs:  Results from last 7 days   Lab Units 23  0858   WBC Thousand/uL 8.80   HEMOGLOBIN g/dL 7.5*   HEMATOCRIT % 22.5*   PLATELETS Thousands/uL 212   NEUTROS PCT % 76*   LYMPHS PCT % 13*   MONOS PCT % 6   EOS PCT % 4     Results from last 7 days   Lab Units 08/30/23  0858   SODIUM mmol/L 138   POTASSIUM mmol/L 3.8   CHLORIDE mmol/L 100   CO2 mmol/L 27   BUN mg/dL 38*   CREATININE mg/dL 15.32*   ANION GAP mmol/L 11   CALCIUM mg/dL 9.0   GLUCOSE RANDOM mg/dL 147*                       Lines/Drains:  Invasive Devices     Peripheral Intravenous Line  Duration           Peripheral IV 08/25/23 Proximal;Right;Ventral (anterior) Forearm 6 days    Peripheral IV 08/29/23 Left;Proximal;Ventral (anterior) Forearm 1 day          Hemodialysis Catheter  Duration           HD Permanent Double Catheter 6 days          Drain  Duration           Ureteral Drain/Stent Left ureter 6 Fr. 1041 days    Ureteral Drain/Stent Right ureter 6 Fr. 934 days                    Recent Cultures (last 7 days):         Last 24 Hours Medication List:   Current Facility-Administered Medications   Medication Dose Route Frequency Provider Last Rate   • acetaminophen  650 mg Oral Q6H PRN Sarahi Contreras DO     • epoetin coleen  2,000 Units Intravenous Once per day on Mon Wed Fri Luis Miguel Rogers MD     • epoetin coleen  3,000 Units Intravenous Once per day on Mon Wed Fri Luis Miguel Rogers MD     • guaiFENesin  200 mg Oral Q4H PRN Dimitry IRAHETA MD     • ondansetron  4 mg Intravenous Q6H PRN Wesley Contreras MD     • phenol  1 spray Mouth/Throat Q2H PRN Wesley Contreras MD     • traZODone  50 mg Oral HS Sarahi Contreras DO          Today, Patient Was Seen By: Nain Simon MD    **Please Note: This note may have been constructed using a voice recognition system. **

## 2023-08-31 NOTE — CASE MANAGEMENT
Case Management Progress Note    Patient name Teresa Coy  Location /-43 MRN [id number]  : 1995 Date 2023       LOS (days): 10  Geometric Mean LOS (GMLOS) (days):   Days to GMLOS:        OBJECTIVE:        Current admission status: Inpatient  Preferred Pharmacy:   [facility]pharm[facility][id number] [address] [place] [address]  Phone: [phone] Fax: [phone]    [facility]/pharmacy #[id number] [place] [place] - [address] [place] [address]  Phone: [phone] Fax: [phone]    Primary Care Provider: Jacek Calloway DO    Primary Insurance: [employer] PENDING  Secondary Insurance:     PROGRESS NOTE:  CM provided [facility] Cover with hospital agreement form. Pt is pending financial clearance. CM continues to follow. Pt is aware.

## 2023-08-31 NOTE — PLAN OF CARE
Post-Dialysis RN Treatment Note    Blood Pressure:  Pre: 164/81 mm/Hg  Post: 131/59 mmHg   EDW: TBD    Weight:  Pre: 105.0 kg   Post: 103.0 kg   Mode of weight measurement: Standing Scale pre treatment   Volume Removed: 2000 ml    Treatment duration: 180 minutes    NS given  No    Treatment shortened?  Yes, describe: System clotting, patient feeling dizzy and cramping   Medications given during Rx None Reported   Estimated Kt/V  Not Applicable   Access type: Permacath/TDC   Access Issues: No    Report called to primary nurse   Yes, Fátima    Problem: METABOLIC, FLUID AND ELECTROLYTES - ADULT  Goal: Fluid balance maintained  Description: INTERVENTIONS:  - Monitor labs   - Monitor I/O and WT  - Instruct patient on fluid and nutrition as appropriate  - Assess for signs & symptoms of volume excess or deficit  Outcome: Progressing  Goal: Electrolytes maintained within normal limits  Description: INTERVENTIONS:  - Monitor labs and assess patient for signs and symptoms of electrolyte imbalances  - Administer electrolyte replacement as ordered  - Monitor response to electrolyte replacements, including repeat lab results as appropriate  - Instruct patient on fluid and nutrition as appropriate  Outcome: Progressing

## 2023-08-31 NOTE — ASSESSMENT & PLAN NOTE
Lab Results   Component Value Date    EGFR 3 08/30/2023    EGFR 3 08/28/2023    EGFR 4 08/27/2023    CREATININE 15.32 (H) 08/30/2023    CREATININE 14.98 (H) 08/28/2023    CREATININE 12.20 (H) 08/27/2023   C/c : + To hospital with symptoms of intermittent nausea/vomiting. No fever/chills/diarrhea/abdominal pain. No urinary symptoms/urgency/frequency/hematuria.   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 with symptoms of frequent kidney stones     · Creatinine 14+ on admission  · Baseline kidney function appears to be around 2.6-2.8  · Nephrology on board  · S/p rt IVJ tunneled dialysis catheter placement on 8/24  · initiated hemodialysis   · ot was dialyzed this am.   · CM aware, helping arrange chair time at an OP dialysis unit

## 2023-08-31 NOTE — PLAN OF CARE
Problem: Knowledge Deficit  Goal: Patient/family/caregiver demonstrates understanding of disease process, treatment plan, medications, and discharge instructions  Description: Complete learning assessment and assess knowledge base.   Interventions:  - Provide teaching at level of understanding  - Provide teaching via preferred learning methods  Outcome: Progressing     Problem: METABOLIC, FLUID AND ELECTROLYTES - ADULT  Goal: Fluid balance maintained  Description: INTERVENTIONS:  - Monitor labs   - Monitor I/O and WT  - Instruct patient on fluid and nutrition as appropriate  - Assess for signs & symptoms of volume excess or deficit  Outcome: Progressing     Problem: METABOLIC, FLUID AND ELECTROLYTES - ADULT  Goal: Electrolytes maintained within normal limits  Description: INTERVENTIONS:  - Monitor labs and assess patient for signs and symptoms of electrolyte imbalances  - Administer electrolyte replacement as ordered  - Monitor response to electrolyte replacements, including repeat lab results as appropriate  - Instruct patient on fluid and nutrition as appropriate  Outcome: Progressing     Problem: GASTROINTESTINAL - ADULT  Goal: Minimal or absence of nausea and/or vomiting  Description: INTERVENTIONS:  - Administer IV fluids if ordered to ensure adequate hydration  - Maintain NPO status until nausea and vomiting are resolved  - Nasogastric tube if ordered  - Administer ordered antiemetic medications as needed  - Provide nonpharmacologic comfort measures as appropriate  - Advance diet as tolerated, if ordered  - Consider nutrition services referral to assist patient with adequate nutrition and appropriate food choices  Outcome: Progressing

## 2023-08-31 NOTE — PROGRESS NOTES
HEMODIALYSIS ROUNDING NOTE    Patient: Marilia Silva               Sex: male          DOA: 8/21/2023  7:53 PM   YOB: 1995        Age:  25 y.o.        LOS:  LOS: 10 days             SUBJECTIVE     - Patient was seen during hemodialysis today. - Reviewed last 24 hrs events    Overall tolerating well    No acute complaint    CURRENT MEDICATIONS       Current Facility-Administered Medications:   •  acetaminophen (TYLENOL) tablet 650 mg, 650 mg, Oral, Q6H PRN, Theoplis Cluster, DO, 650 mg at 08/30/23 1347  •  epoetin coleen (EPOGEN,PROCRIT) injection 2,000 Units, 2,000 Units, Intravenous, Once per day on Mon Wed Fri, Pablo Dickinson MD, 2,000 Units at 08/30/23 1430  •  epoetin coleen (EPOGEN,PROCRIT) injection 3,000 Units, 3,000 Units, Intravenous, Once per day on Mon Wed Fri, Pablo Dickinson MD, 3,000 Units at 08/30/23 1430  •  ondansetron [facility]) injection 4 mg, 4 mg, Intravenous, Q6H PRN, Luis Díaz MD, 4 mg at 08/31/23 0736  •  phenol (CHLORASEPTIC) 1.4 % mucosal liquid 1 spray, 1 spray, Mouth/Throat, Q2H PRN, Luis Díaz MD, 1 spray at 08/27/23 0439  •  traZODone (DESYREL) tablet 50 mg, 50 mg, Oral, HS, Theoplis Cluster, DO, 50 mg at 08/30/23 2300    OBJECTIVE     Current Weight: Weight - Scale: 106 kg (233 lb 0.4 oz)  Vitals:    08/31/23 1430   BP: 131/76   Pulse: 92   Resp: 16   Temp:    SpO2: 97%       Intake/Output Summary (Last 24 hours) at 8/31/2023 1502  Last data filed at 8/31/2023 1225  Gross per 24 hour   Intake 440 ml   Output 250 ml   Net 190 ml       PHYSICAL EXAMINATION     Physical Exam  Constitutional:       General: He is not in acute distress. Appearance: He is well-developed. HENT:      Head: Normocephalic. Eyes:      General: No scleral icterus. Conjunctiva/sclera: Conjunctivae normal.   Neck:      Vascular: No JVD. Cardiovascular:      Rate and Rhythm: Normal rate. Heart sounds: Normal heart sounds.    Pulmonary:      Effort: Pulmonary effort is normal. Breath sounds: No wheezing. Abdominal:      Palpations: Abdomen is soft. Tenderness: There is no abdominal tenderness. Musculoskeletal:         General: Normal range of motion. Cervical back: Neck supple. Skin:     General: Skin is warm. Findings: No rash. Neurological:      Mental Status: He is alert and oriented to person, place, and time. Psychiatric:         Behavior: Behavior normal.           LAB RESULTS     Results from last 7 days   Lab Units 08/30/23  0858 08/28/23  0734 08/27/23  0431 08/26/23  0443 08/25/23  0846   WBC Thousand/uL 8.80 9.18 7.55 7.80 7.98   HEMOGLOBIN g/dL 7.5* 8.1* 7.4* 7.7* 7.7*   HEMATOCRIT % 22.5* 23.0* 21.7* 22.1* 22.0*   PLATELETS Thousands/uL 212 204 180 205 217   POTASSIUM mmol/L 3.8 3.7 3.8 3.5 3.4*   CHLORIDE mmol/L 100 100 100 100 102   CO2 mmol/L 27 28 29 29 25   BUN mg/dL 38* 45* 41* 59* 79*   CREATININE mg/dL 15.32* 14.98* 12.20* 15.27* 17.07*   EGFR ml/min/1.73sq m 3 3 4 3 3   CALCIUM mg/dL 9.0 8.8 8.5 8.1* 8.0*   PHOSPHORUS mg/dL  --   --   --   --  5.7*       RADIOLOGY RESULTS     Results for orders placed during the hospital encounter of 08/21/23    XR chest portable    Narrative  CHEST    INDICATION:   ckd. COMPARISON:  None    EXAM PERFORMED/VIEWS:  XR CHEST PORTABLE      FINDINGS:    Right IJ dialysis catheter in place    Cardiomediastinal silhouette appears unremarkable. The lungs are clear. No pneumothorax or pleural effusion. Osseous structures appear within normal limits for patient age. Impression  Right dialysis catheter present. No acute cardiopulmonary disease. Workstation performed: [id number]    No results found for this or any previous visit. No results found for this or any previous visit. No results found for this or any previous visit. No results found for this or any previous visit. No results found for this or any previous visit. PLAN / RECOMMENDATIONS     1.  End Stage Renal Disease:       I saw and examined patient during hemodialysis treatment. The patient was receiving hemodialysis for treatment of end stage renal disease. I have also reviewed vital signs, intake and output, lab results and recent events, and agreed with today's dialysis order. Access: No issue    2. Anemia:   Roberta Montano MD  Nephrology  8/31/2023        Portions of the record may have been created with voice recognition software. Occasional wrong word or "sound a like" substitutions may have occurred due to the inherent limitations of voice recognition software. Read the chart carefully and recognize, using context, where substitutions have occurred.

## 2023-09-01 ENCOUNTER — APPOINTMENT (INPATIENT)
Dept: DIALYSIS | Facility: HOSPITAL | Age: 28
DRG: 469 | End: 2023-09-01
Payer: COMMERCIAL

## 2023-09-01 ENCOUNTER — TRANSITIONAL CARE MANAGEMENT (OUTPATIENT)
Dept: FAMILY MEDICINE CLINIC | Facility: CLINIC | Age: 28
End: 2023-09-01

## 2023-09-01 VITALS
HEART RATE: 88 BPM | HEIGHT: 72 IN | DIASTOLIC BLOOD PRESSURE: 69 MMHG | BODY MASS INDEX: 31.56 KG/M2 | RESPIRATION RATE: 16 BRPM | WEIGHT: 233.03 LBS | OXYGEN SATURATION: 96 % | TEMPERATURE: 98.6 F | SYSTOLIC BLOOD PRESSURE: 145 MMHG

## 2023-09-01 LAB
ANION GAP SERPL CALCULATED.3IONS-SCNC: 10 MMOL/L
BUN SERPL-MCNC: 19 MG/DL (ref 5–25)
CALCIUM SERPL-MCNC: 9.9 MG/DL (ref 8.4–10.2)
CHLORIDE SERPL-SCNC: 94 MMOL/L (ref 96–108)
CO2 SERPL-SCNC: 35 MMOL/L (ref 21–32)
CREAT SERPL-MCNC: 9.46 MG/DL (ref 0.6–1.3)
ERYTHROCYTE [DISTWIDTH] IN BLOOD BY AUTOMATED COUNT: 12.8 % (ref 11.6–15.1)
GFR SERPL CREATININE-BSD FRML MDRD: 6 ML/MIN/1.73SQ M
GLUCOSE SERPL-MCNC: 117 MG/DL (ref 65–140)
HCT VFR BLD AUTO: 26.1 % (ref 36.5–49.3)
HGB BLD-MCNC: 8.5 G/DL (ref 12–17)
MCH RBC QN AUTO: 30.1 PG (ref 26.8–34.3)
MCHC RBC AUTO-ENTMCNC: 32.6 G/DL (ref 31.4–37.4)
MCV RBC AUTO: 93 FL (ref 82–98)
PLATELET # BLD AUTO: 223 THOUSANDS/UL (ref 149–390)
PMV BLD AUTO: 10.5 FL (ref 8.9–12.7)
POTASSIUM SERPL-SCNC: 4.2 MMOL/L (ref 3.5–5.3)
RBC # BLD AUTO: 2.82 MILLION/UL (ref 3.88–5.62)
SODIUM SERPL-SCNC: 139 MMOL/L (ref 135–147)
WBC # BLD AUTO: 9.26 THOUSAND/UL (ref 4.31–10.16)

## 2023-09-01 PROCEDURE — 85027 COMPLETE CBC AUTOMATED: CPT | Performed by: STUDENT IN AN ORGANIZED HEALTH CARE EDUCATION/TRAINING PROGRAM

## 2023-09-01 PROCEDURE — 80048 BASIC METABOLIC PNL TOTAL CA: CPT | Performed by: STUDENT IN AN ORGANIZED HEALTH CARE EDUCATION/TRAINING PROGRAM

## 2023-09-01 PROCEDURE — 99239 HOSP IP/OBS DSCHRG MGMT >30: CPT | Performed by: STUDENT IN AN ORGANIZED HEALTH CARE EDUCATION/TRAINING PROGRAM

## 2023-09-01 PROCEDURE — 90935 HEMODIALYSIS ONE EVALUATION: CPT | Performed by: INTERNAL MEDICINE

## 2023-09-01 RX ORDER — CALCITRIOL 0.25 UG/1
0.25 CAPSULE, LIQUID FILLED ORAL 3 TIMES WEEKLY
Qty: 12 CAPSULE | Refills: 0 | Status: SHIPPED | OUTPATIENT
Start: 2023-09-04

## 2023-09-01 RX ORDER — CALCITRIOL 0.25 UG/1
0.25 CAPSULE, LIQUID FILLED ORAL 3 TIMES WEEKLY
Status: DISCONTINUED | OUTPATIENT
Start: 2023-09-01 | End: 2023-09-01 | Stop reason: HOSPADM

## 2023-09-01 RX ADMIN — EPOETIN ALFA 2000 UNITS: 2000 SOLUTION INTRAVENOUS; SUBCUTANEOUS at 12:40

## 2023-09-01 RX ADMIN — ONDANSETRON 4 MG: 2 INJECTION INTRAMUSCULAR; INTRAVENOUS at 07:33

## 2023-09-01 RX ADMIN — CALCITRIOL CAPSULES 0.25 MCG 0.25 MCG: 0.25 CAPSULE ORAL at 12:19

## 2023-09-01 RX ADMIN — EPOETIN ALFA 3000 UNITS: 3000 SOLUTION INTRAVENOUS; SUBCUTANEOUS at 12:40

## 2023-09-01 RX ADMIN — ACETAMINOPHEN 650 MG: 325 TABLET, FILM COATED ORAL at 12:19

## 2023-09-01 NOTE — PROGRESS NOTES
HEMODIALYSIS ROUNDING NOTE    Patient: Jw Leigh               Sex: male          DOA: 8/21/2023  7:53 PM   YOB: 1995        Age:  25 y.o.        LOS:  LOS: 11 days   9/1/2023    REASON FOR THE CONSULTATION:  Further management of ESRD    HPI     This is a 25 y.o. male admitted for Acute renal failure superimposed on stage 5 chronic kidney disease, not on chronic dialysis ([address])     SUBJECTIVE     - Patient was seen during hemodialysis today. Patient denies nausea / vomiting / headache / dizziness / SOB / chest pain during hemodialysis. - Reviewed last 24 hrs events     CURRENT MEDICATIONS       Current Facility-Administered Medications:   •  acetaminophen (TYLENOL) tablet 650 mg, 650 mg, Oral, Q6H PRN, Maria E Linog, DO, 650 mg at 08/31/23 2317  •  epoetin coleen (EPOGEN,PROCRIT) injection 2,000 Units, 2,000 Units, Intravenous, Once per day on Mon Wed Fri, Ramon Goldberg MD, 2,000 Units at 08/30/23 1430  •  epoetin coleen (EPOGEN,PROCRIT) injection 3,000 Units, 3,000 Units, Intravenous, Once per day on Mon Wed Fri, Ramon Goldberg MD, 3,000 Units at 08/30/23 1430  •  guaiFENesin (ROBITUSSIN) oral liquid 200 mg, 200 mg, Oral, Q4H PRN, Kasi IRAHETA MD, 200 mg at 08/31/23 1746  •  ondansetron (ZOFRAN) injection 4 mg, 4 mg, Intravenous, Q6H PRN, Brooke Villatoro MD, 4 mg at 09/01/23 0733  •  phenol (CHLORASEPTIC) 1.4 % mucosal liquid 1 spray, 1 spray, Mouth/Throat, Q2H PRN, Brooke Villatoro MD, 1 spray at 08/27/23 0439  •  traZODone (DESYREL) tablet 50 mg, 50 mg, Oral, HS, Maria E Linog, DO, 50 mg at 08/31/23 2200    OBJECTIVE     Current Weight: Weight - Scale: 106 kg (233 lb 0.4 oz)  Vitals:    09/01/23 1100   BP: 122/70   Pulse: 80   Resp: 16   Temp:    SpO2:      Body mass index is 31.6 kg/m².     Intake/Output Summary (Last 24 hours) at 9/1/2023 1126  Last data filed at 9/1/2023 0900  Gross per 24 hour   Intake 1020 ml   Output 2752 ml   Net -1732 ml       PHYSICAL EXAMINATION     Physical Exam  HENT:      Head: Normocephalic and atraumatic. Eyes:      Pupils: Pupils are equal, round, and reactive to light. Neck:      Vascular: No JVD. Cardiovascular:      Rate and Rhythm: Normal rate and regular rhythm. Heart sounds: Normal heart sounds. No murmur heard. No friction rub. Pulmonary:      Effort: Pulmonary effort is normal.      Breath sounds: Normal breath sounds. Abdominal:      General: Bowel sounds are normal. There is no distension. Palpations: Abdomen is soft. Tenderness: There is no abdominal tenderness. There is no rebound. Musculoskeletal:         General: No tenderness. Cervical back: Neck supple. Skin:     General: Skin is dry. Findings: No rash. Neurological:      Mental Status: He is alert and oriented to person, place, and time. LAB RESULTS     Results from last 7 days   Lab Units 09/01/23  0852 08/30/23  0858 08/28/23  0734 08/27/23  0431 08/26/23  0443   WBC Thousand/uL 9.26 8.80 9.18 7.55 7.80   HEMOGLOBIN g/dL 8.5* 7.5* 8.1* 7.4* 7.7*   HEMATOCRIT % 26.1* 22.5* 23.0* 21.7* 22.1*   PLATELETS Thousands/uL 223 212 204 180 205   POTASSIUM mmol/L 4.2 3.8 3.7 3.8 3.5   CHLORIDE mmol/L 94* 100 100 100 100   CO2 mmol/L 35* 27 28 29 29   BUN mg/dL 19 38* 45* 41* 59*   CREATININE mg/dL 9.46* 15.32* 14.98* 12.20* 15.27*   EGFR ml/min/1.73sq m 6 3 3 4 3   CALCIUM mg/dL 9.9 9.0 8.8 8.5 8.1*       RADIOLOGY RESULTS     Results for orders placed during the hospital encounter of 08/21/23    XR chest portable    Narrative  CHEST    INDICATION:   ckd. COMPARISON:  None    EXAM PERFORMED/VIEWS:  XR CHEST PORTABLE      FINDINGS:    Right IJ dialysis catheter in place    Cardiomediastinal silhouette appears unremarkable. The lungs are clear. No pneumothorax or pleural effusion. Osseous structures appear within normal limits for patient age. Impression  Right dialysis catheter present.     No acute cardiopulmonary disease. Workstation performed: [id number]    No results found for this or any previous visit. PLAN / RECOMMENDATIONS     1. End Stage Renal Disease. I saw and examined patient during hemodialysis treatment at 9:12 AM. The patient was receiving hemodialysis for treatment of end stage renal disease. I have also reviewed vital signs, intake and output, lab results and recent events, and agreed with today's dialysis order. Undergoing 4 hours hemodialysis with target UF of 2 L.    2. Hypertension: Patient does not have history of hypertension. Blood pressures within acceptable range    3. Anemia: Hemoglobin is 8.5 g/dL and below target. He remains on Epogen. 4: Bone mineral: PTH is 233.6. Initiate patient on calcitriol 0.25 mcg on Mondays, Wednesdays and Friday    5.  Access: Permacath in place    Disposition:     Celina Waddell MD  Nephrology  9/1/2023

## 2023-09-01 NOTE — PLAN OF CARE
Problem: PAIN - ADULT  Goal: Verbalizes/displays adequate comfort level or baseline comfort level  Description: Interventions:  - Encourage patient to monitor pain and request assistance  - Assess pain using appropriate pain scale  - Administer analgesics based on type and severity of pain and evaluate response  - Implement non-pharmacological measures as appropriate and evaluate response  - Consider cultural and social influences on pain and pain management  - Notify physician/advanced practitioner if interventions unsuccessful or patient reports new pain  Outcome: Progressing     Problem: INFECTION - ADULT  Goal: Absence or prevention of progression during hospitalization  Description: INTERVENTIONS:  - Assess and monitor for signs and symptoms of infection  - Monitor lab/diagnostic results  - Monitor all insertion sites, i.e. indwelling lines, tubes, and drains  - Monitor endotracheal if appropriate and nasal secretions for changes in amount and color  - [place] appropriate cooling/warming therapies per order  - Administer medications as ordered  - Instruct and encourage patient and family to use good hand hygiene technique  - Identify and instruct in appropriate isolation precautions for identified infection/condition  Outcome: Progressing

## 2023-09-01 NOTE — CASE MANAGEMENT
Case Management Progress Note    Patient name Jefe Coop  Location /-82 MRN [id number]  : 1995 Date 2023       LOS (days): 11  Geometric Mean LOS (GMLOS) (days):   Days to GMLOS:        OBJECTIVE:        Current admission status: Inpatient  Preferred Pharmacy:   [id number] [address] [place] [address]  Phone: [phone] Fax: [phone]    [id number] [address] [address] [place] [address], [place] - [address] [facility] [place] [address]  Phone: [phone] Fax: [phone]    Primary Care Provider: Ruddy Perry DO    Primary Insurance: [employer] MCGILL PENDING  Secondary Insurance:     PROGRESS NOTE:    CM received notice from [facility] via [address] that patient is approved to start OP Dialysis on  -  shift. This was confirmed even though it is Labor day Holiday. CM confirmed with Klever Sherman at [address], who will be calling patient tomorrow  to confirm and give exact time for for the first day - may be as early as 2pm.  SLIM updated , who confirmed with Nephrology, that patient is cleared for d/c today,  Patient updated , given contact information to [facility]. ,should he have questions. His Mother will transport home from the hospital and will also transport him to OP Dialysis moving forward . Plan- Cleared for d/c home and will start OP Dialysis on  at [facility] on -  shift. His Mother will provide transportation.

## 2023-09-01 NOTE — HEMODIALYSIS
Post-Dialysis RN Treatment Note    Blood Pressure:  Pre 147/79 mm/Hg  Post 139/70 mmHg   EDW  TBD kg    Weight:  Pre 104.6 kg   Post 103.1 kg   Mode of weight measurement: Standing Scale   Volume Removed  1247 ml    Treatment duration 190 minutes    NS given  No    Treatment shortened?  Yes, describe: system clotting, Dr Blanca Garcia aware, treatment ended 50 minutes ear;y   Medications given during Rx Epogen   Estimated Kt/V  N/A   Access type: Permacath/TDC   Access Issues: No    Report called to primary nurse   Yes Nata Lo RN

## 2023-09-01 NOTE — DISCHARGE SUMMARY
[address]  Discharge- Nancyyissel Garcia 1995, 25 y.o. male MRN: [id number]  Unit/Bed#: -01 Encounter: [id number]  Primary Care Provider: Alexandria Briceno DO   Date and time admitted to hospital: 8/21/2023  7:53 PM    * Acute renal failure superimposed on stage 5 chronic kidney disease, not on chronic dialysis [facility])  Assessment & Plan  Lab Results   Component Value Date    EGFR 6 09/01/2023    EGFR 3 08/30/2023    EGFR 3 08/28/2023    CREATININE 9.46 (H) 09/01/2023    CREATININE 15.32 (H) 08/30/2023    CREATININE 14.98 (H) 08/28/2023   C/c : + To hospital with symptoms of intermittent nausea/vomiting. No fever/chills/diarrhea/abdominal pain. No urinary symptoms/urgency/frequency/hematuria. 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 with symptoms of frequent kidney stones     · Creatinine 14+ on admission  · Baseline kidney function appears to be around 2.6-2.8  · Nephrology on board  · S/p rt IVJ tunneled dialysis catheter placement on 8/24  · Patient has been tolerating dialysis well. · Currently creatinine 9.46. · Was dialyzed this morning. · Case management confirmed patient will be dialyzed on Monday morning at [facility]. · Patient was started on calcitriol 0.25 mcg 3 times weekly per nephrology recommendation. · Continue with dialysis [facility] schedule. · Cleared by nephrology for discharge. Anemia  Assessment & Plan  · Baseline hemoglobin appears to be around 11-12  · Hemoglobin 8.5 currently(9.3 on admission)  · Patient denies any hematuria, bright red blood per rectum, hematemesis or melena  · He is on Epogen per nephrology. · Outpatient follow-up.       Medical Problems     Resolved Problems  Date Reviewed: 9/1/2023          Resolved    Hypokalemia 8/26/2023     Resolved by  Chandrika oBurne MD    Metabolic acidosis 0/04/7529     Resolved by  Chandrika Bourne MD        Discharging Physician / Practitioner: Alex Orozco MD  PCP: Rustam Lino [address], DO  Admission Date:   Admission Orders (From admission, onward)     Ordered        08/21/23 2140  Inpatient Admission  Once                      Discharge Date: 09/01/23    Consultations During Hospital Stay:  · Nephrology    Procedures Performed:   · Dialysis catheter placement initiated on hemodialysis on this admission. Reason for Admission: Acute renal failure on CKD. Hospital Course:   Ferny Benites is a 25 y.o. male patient with past medical history of CKD, hyperoxaluria, anemia, nephrolithiasis who originally presented to the hospital on 8/21/2023 due to having some episode of nausea, vomiting. In the past patient has received Lumasiran for 3 months but due to lack of insurance simply cannot stop receiving that medication. Patient reports that he was not using potassium citrate due to advanced CKD. Patient was followed up by primary care doctor with CT scan and lab work which revealed significant elevated creatinine and he was referred to emergency room. Baseline creatinine was around 2- 3 and presented to emergency room and noted with creatinine more than 14. Patient was seen by nephrology and eventually started on hemodialysis on this admission. Has IR guided dialysis catheter in place. She has been tolerating dialysis well. Currently on Monday Wednesday Friday schedule. He was dialyzed this morning. He is hemodynamically stable for discharge. Cleared by nephrology for discharge. Case management has confirmed outpatient dialysis chair at \[facility]\"" and confirmed that he will be dialyzed on Monday morning despite of holiday weekend. Above has been discussed with patient. No other events reported. Refer to earlier notes for further clarification. Please see above list of diagnoses and related plan for additional information. Condition at Discharge: good    Discharge Day Visit / Exam:   Subjective: Seen during AM rounds. Patient was dialyzed this morning.   Currently he reports feeling much better overall and eager to go home. No other events reported. Vitals: Blood Pressure: 145/69 (09/01/23 1211)  Pulse: 88 (09/01/23 1204)  Temperature: 98.6 °F (37 °C) (09/01/23 0842)  Temp Source: Oral (09/01/23 0842)  Respirations: 16 (09/01/23 1204)  Height: 6' (182.9 cm) (08/21/23 1854)  Weight - Scale: 106 kg (233 lb 0.4 oz) (08/30/23 1300)  SpO2: 96 % (09/01/23 0727)  Exam:   Physical Exam  Constitutional:       General: He is not in acute distress. Appearance: Normal appearance. He is normal weight. He is not ill-appearing, toxic-appearing or diaphoretic. HENT:      Head: Normocephalic and atraumatic. Eyes:      Pupils: Pupils are equal, round, and reactive to light. Cardiovascular:      Rate and Rhythm: Normal rate. Pulses: Normal pulses. Pulmonary:      Effort: Pulmonary effort is normal. No respiratory distress. Breath sounds: Normal breath sounds. No wheezing. Abdominal:      General: Bowel sounds are normal. There is no distension. Palpations: Abdomen is soft. Tenderness: There is no abdominal tenderness. Musculoskeletal:      Cervical back: No rigidity. Neurological:      Mental Status: He is alert and oriented to person, place, and time. Psychiatric:         Mood and Affect: Mood normal.         Behavior: Behavior normal.           Discharge instructions/Information to patient and family:   See after visit summary for information provided to patient and family. Provisions for Follow-Up Care:  See after visit summary for information related to follow-up care and any pertinent home health orders. Disposition:   Home    Planned Readmission:      Discharge Statement:  I spent 35 minutes discharging the patient. This time was spent on the day of discharge. I had direct contact with the patient on the day of discharge.  Greater than 50% of the total time was spent examining patient, answering all patient questions, arranging and discussing plan of care with patient as well as directly providing post-discharge instructions. Additional time then spent on discharge activities. Discharge Medications:  See after visit summary for reconciled discharge medications provided to patient and/or family.       **Please Note: This note may have been constructed using a voice recognition system**

## 2023-09-01 NOTE — DISCHARGE INSTR - AVS FIRST PAGE
Recommend consult with primary care provider in 3 to 5 days of discharge. Continue patient follow-up with nephrology.

## 2023-09-01 NOTE — PLAN OF CARE
Problem: PAIN - ADULT  Goal: Verbalizes/displays adequate comfort level or baseline comfort level  Description: Interventions:  - Encourage patient to monitor pain and request assistance  - Assess pain using appropriate pain scale  - Administer analgesics based on type and severity of pain and evaluate response  - Implement non-pharmacological measures as appropriate and evaluate response  - Consider cultural and social influences on pain and pain management  - Notify physician/advanced practitioner if interventions unsuccessful or patient reports new pain  Outcome: Adequate for Discharge     Problem: INFECTION - ADULT  Goal: Absence or prevention of progression during hospitalization  Description: INTERVENTIONS:  - Assess and monitor for signs and symptoms of infection  - Monitor lab/diagnostic results  - Monitor all insertion sites, i.e. indwelling lines, tubes, and drains  - Monitor endotracheal if appropriate and nasal secretions for changes in amount and color  - Arbon appropriate cooling/warming therapies per order  - Administer medications as ordered  - Instruct and encourage patient and family to use good hand hygiene technique  - Identify and instruct in appropriate isolation precautions for identified infection/condition  Outcome: Adequate for Discharge  Goal: Absence of fever/infection during neutropenic period  Description: INTERVENTIONS:  - Monitor WBC    Outcome: Adequate for Discharge     Problem: SAFETY ADULT  Goal: Patient will remain free of falls  Description: INTERVENTIONS:  - Educate patient/family on patient safety including physical limitations  - Instruct patient to call for assistance with activity   - Consult OT/PT to assist with strengthening/mobility   - Keep Call bell within reach  - Keep bed low and locked with side rails adjusted as appropriate  - Keep care items and personal belongings within reach  - Initiate and maintain comfort rounds  - Make Fall Risk Sign visible to staff  - Offer Toileting every 2 Hours, in advance of need  - Initiate/Maintain bed alarm  - Obtain necessary fall risk management equipment: 2  - Apply yellow socks and bracelet for high fall risk patients  - Consider moving patient to room near nurses station  Outcome: Adequate for Discharge  Goal: Maintain or return to baseline ADL function  Description: INTERVENTIONS:  -  Assess patient's ability to carry out ADLs; assess patient's baseline for ADL function and identify physical deficits which impact ability to perform ADLs (bathing, care of mouth/teeth, toileting, grooming, dressing, etc.)  - Assess/evaluate cause of self-care deficits   - Assess range of motion  - Assess patient's mobility; develop plan if impaired  - Assess patient's need for assistive devices and provide as appropriate  - Encourage maximum independence but intervene and supervise when necessary  - Involve family in performance of ADLs  - Assess for home care needs following discharge   - Consider OT consult to assist with ADL evaluation and planning for discharge  - Provide patient education as appropriate  Outcome: Adequate for Discharge  Goal: Maintains/Returns to pre admission functional level  Description: INTERVENTIONS:  - Perform BMAT or MOVE assessment daily.   - Set and communicate daily mobility goal to care team and patient/family/caregiver. - Collaborate with rehabilitation services on mobility goals if consulted  - Perform Range of Motion 2 times a day. - Reposition patient every 2 hours.   - Dangle patient 2 times a day  - Stand patient 2 times a day  - Ambulate patient 2 times a day  - Out of bed to chair 2 times a day   - Out of bed for meals 2 times a day  - Out of bed for toileting  - Record patient progress and toleration of activity level   Outcome: Adequate for Discharge     Problem: DISCHARGE PLANNING  Goal: Discharge to home or other facility with appropriate resources  Description: INTERVENTIONS:  - Identify barriers to discharge w/patient and caregiver  - Arrange for needed discharge resources and transportation as appropriate  - Identify discharge learning needs (meds, wound care, etc.)  - Arrange for interpretive services to assist at discharge as needed  - Refer to Case Management Department for coordinating discharge planning if the patient needs post-hospital services based on physician/advanced practitioner order or complex needs related to functional status, cognitive ability, or social support system  Outcome: Adequate for Discharge     Problem: Knowledge Deficit  Goal: Patient/family/caregiver demonstrates understanding of disease process, treatment plan, medications, and discharge instructions  Description: Complete learning assessment and assess knowledge base.   Interventions:  - Provide teaching at level of understanding  - Provide teaching via preferred learning methods  Outcome: Adequate for Discharge     Problem: METABOLIC, FLUID AND ELECTROLYTES - ADULT  Goal: Fluid balance maintained  Description: INTERVENTIONS:  - Monitor labs   - Monitor I/O and WT  - Instruct patient on fluid and nutrition as appropriate  - Assess for signs & symptoms of volume excess or deficit  Outcome: Adequate for Discharge  Goal: Electrolytes maintained within normal limits  Description: INTERVENTIONS:  - Monitor labs and assess patient for signs and symptoms of electrolyte imbalances  - Administer electrolyte replacement as ordered  - Monitor response to electrolyte replacements, including repeat lab results as appropriate  - Instruct patient on fluid and nutrition as appropriate  Outcome: Adequate for Discharge  Goal: Glucose maintained within target range  Description: INTERVENTIONS:  - Monitor Blood Glucose as ordered  - Assess for signs and symptoms of hyperglycemia and hypoglycemia  - Administer ordered medications to maintain glucose within target range  - Assess nutritional intake and initiate nutrition service referral as needed  Outcome: Adequate for Discharge     Problem: GASTROINTESTINAL - ADULT  Goal: Minimal or absence of nausea and/or vomiting  Description: INTERVENTIONS:  - Administer IV fluids if ordered to ensure adequate hydration  - Maintain NPO status until nausea and vomiting are resolved  - Nasogastric tube if ordered  - Administer ordered antiemetic medications as needed  - Provide nonpharmacologic comfort measures as appropriate  - Advance diet as tolerated, if ordered  - Consider nutrition services referral to assist patient with adequate nutrition and appropriate food choices  Outcome: Adequate for Discharge  Goal: Maintains adequate nutritional intake  Description: INTERVENTIONS:  - Monitor percentage of each meal consumed  - Identify factors contributing to decreased intake, treat as appropriate  - Assist with meals as needed  - Monitor I&O, weight, and lab values if indicated  - Obtain nutrition services referral as needed  Outcome: Adequate for Discharge     Problem: HEMATOLOGIC - ADULT  Goal: Maintains hematologic stability  Description: INTERVENTIONS  - Assess for signs and symptoms of bleeding or hemorrhage  - Monitor labs  - Administer supportive blood products/factors as ordered and appropriate  Outcome: Adequate for Discharge     Problem: Nutrition/Hydration-ADULT  Goal: Nutrient/Hydration intake appropriate for improving, restoring or maintaining nutritional needs  Description: Monitor and assess patient's nutrition/hydration status for malnutrition. Collaborate with interdisciplinary team and initiate plan and interventions as ordered. Monitor patient's weight and dietary intake as ordered or per policy. Utilize nutrition screening tool and intervene as necessary. Determine patient's food preferences and provide high-protein, high-caloric foods as appropriate.      INTERVENTIONS:  - Monitor oral intake, urinary output, labs, and treatment plans  - Assess nutrition and hydration status and recommend course of action  - Evaluate amount of meals eaten  - Assist patient with eating if necessary   - Allow adequate time for meals  - Recommend/ encourage appropriate diets, oral nutritional supplements, and vitamin/mineral supplements  - Order, calculate, and assess calorie counts as needed  - Recommend, monitor, and adjust tube feedings and TPN/PPN based on assessed needs  - Assess need for intravenous fluids  - Provide specific nutrition/hydration education as appropriate  - Include patient/family/caregiver in decisions related to nutrition  Outcome: Adequate for Discharge

## 2023-09-01 NOTE — PLAN OF CARE
Pt on HD treatment for 4 hours with a uf goal of 1/2 L as tolerated. Pt on a 4k 2.5 alley bath, pt on a uf profile 2/ potassium 3.8 on 08/30/23, labs pending.   Problem: METABOLIC, FLUID AND ELECTROLYTES - ADULT  Goal: Fluid balance maintained  Description: INTERVENTIONS:  - Monitor labs   - Monitor I/O and WT  - Instruct patient on fluid and nutrition as appropriate  - Assess for signs & symptoms of volume excess or deficit  Outcome: Progressing  Goal: Electrolytes maintained within normal limits  Description: INTERVENTIONS:  - Monitor labs and assess patient for signs and symptoms of electrolyte imbalances  - Administer electrolyte replacement as ordered  - Monitor response to electrolyte replacements, including repeat lab results as appropriate  - Instruct patient on fluid and nutrition as appropriate  Outcome: Progressing

## 2023-09-01 NOTE — ASSESSMENT & PLAN NOTE
Lab Results   Component Value Date    EGFR 6 09/01/2023    EGFR 3 08/30/2023    EGFR 3 08/28/2023    CREATININE 9.46 (H) 09/01/2023    CREATININE 15.32 (H) 08/30/2023    CREATININE 14.98 (H) 08/28/2023   C/c : + To hospital with symptoms of intermittent nausea/vomiting. No fever/chills/diarrhea/abdominal pain. No urinary symptoms/urgency/frequency/hematuria.   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 with symptoms of frequent kidney stones     · Creatinine 14+ on admission  · Baseline kidney function appears to be around 2.6-2.8  · Nephrology on board  · S/p rt IVJ tunneled dialysis catheter placement on 8/24  · initiated hemodialysis   · ot was dialyzed this am.   · CM aware, helping arrange chair time at an OP dialysis unit

## 2023-09-01 NOTE — NURSING NOTE
Patient left at this time with no needs. All belongings sent with patient. AVS reviewed. IV and Masbrittneyo removed.

## 2023-09-04 DIAGNOSIS — R05.1 ACUTE COUGH: ICD-10-CM

## 2023-09-04 DIAGNOSIS — R11.14 BILIOUS VOMITING WITH NAUSEA: ICD-10-CM

## 2023-09-04 RX ORDER — FAMOTIDINE 20 MG/1
20 TABLET, FILM COATED ORAL 2 TIMES DAILY
Qty: 180 TABLET | Refills: 1 | Status: SHIPPED | OUTPATIENT
Start: 2023-09-04

## 2023-09-05 ENCOUNTER — TELEPHONE (OUTPATIENT)
Dept: FAMILY MEDICINE CLINIC | Facility: CLINIC | Age: 28
End: 2023-09-05

## 2023-09-05 RX ORDER — FLUTICASONE PROPIONATE 50 MCG
SPRAY, SUSPENSION (ML) NASAL
Qty: 24 ML | Refills: 2 | Status: SHIPPED | OUTPATIENT
Start: 2023-09-05

## 2023-09-11 ENCOUNTER — OFFICE VISIT (OUTPATIENT)
Dept: FAMILY MEDICINE CLINIC | Facility: CLINIC | Age: 28
End: 2023-09-11

## 2023-09-11 VITALS
HEIGHT: 72 IN | DIASTOLIC BLOOD PRESSURE: 82 MMHG | OXYGEN SATURATION: 100 % | SYSTOLIC BLOOD PRESSURE: 122 MMHG | HEART RATE: 64 BPM | TEMPERATURE: 97.7 F | WEIGHT: 229.8 LBS | BODY MASS INDEX: 31.13 KG/M2

## 2023-09-11 DIAGNOSIS — K21.9 GASTROESOPHAGEAL REFLUX DISEASE WITHOUT ESOPHAGITIS: ICD-10-CM

## 2023-09-11 DIAGNOSIS — N13.2 HYDRONEPHROSIS WITH URINARY OBSTRUCTION DUE TO URETERAL CALCULUS: ICD-10-CM

## 2023-09-11 DIAGNOSIS — Z99.2 ANEMIA DUE TO CHRONIC KIDNEY DISEASE, ON CHRONIC DIALYSIS (HCC): ICD-10-CM

## 2023-09-11 DIAGNOSIS — M89.9 CHRONIC KIDNEY DISEASE-MINERAL AND BONE DISORDER: ICD-10-CM

## 2023-09-11 DIAGNOSIS — N17.9 ACUTE RENAL FAILURE SUPERIMPOSED ON STAGE 5 CHRONIC KIDNEY DISEASE, NOT ON CHRONIC DIALYSIS, UNSPECIFIED ACUTE RENAL FAILURE TYPE (HCC): Primary | ICD-10-CM

## 2023-09-11 DIAGNOSIS — N18.6 ANEMIA DUE TO CHRONIC KIDNEY DISEASE, ON CHRONIC DIALYSIS (HCC): ICD-10-CM

## 2023-09-11 DIAGNOSIS — D63.1 ANEMIA DUE TO CHRONIC KIDNEY DISEASE, ON CHRONIC DIALYSIS (HCC): ICD-10-CM

## 2023-09-11 DIAGNOSIS — N18.9 CHRONIC KIDNEY DISEASE-MINERAL AND BONE DISORDER: ICD-10-CM

## 2023-09-11 DIAGNOSIS — N18.5 ACUTE RENAL FAILURE SUPERIMPOSED ON STAGE 5 CHRONIC KIDNEY DISEASE, NOT ON CHRONIC DIALYSIS, UNSPECIFIED ACUTE RENAL FAILURE TYPE (HCC): Primary | ICD-10-CM

## 2023-09-11 DIAGNOSIS — R11.14 BILIOUS VOMITING WITH NAUSEA: ICD-10-CM

## 2023-09-11 DIAGNOSIS — E83.9 CHRONIC KIDNEY DISEASE-MINERAL AND BONE DISORDER: ICD-10-CM

## 2023-09-11 PROBLEM — N18.4 CHRONIC KIDNEY DISEASE, STAGE 4 (SEVERE) (HCC): Status: RESOLVED | Noted: 2022-08-08 | Resolved: 2023-09-11

## 2023-09-11 PROCEDURE — 99495 TRANSJ CARE MGMT MOD F2F 14D: CPT | Performed by: FAMILY MEDICINE

## 2023-09-11 RX ORDER — OMEPRAZOLE 40 MG/1
40 CAPSULE, DELAYED RELEASE ORAL DAILY
Qty: 30 CAPSULE | Refills: 1 | Status: SHIPPED | OUTPATIENT
Start: 2023-09-11

## 2023-09-11 RX ORDER — ONDANSETRON 4 MG/1
4 TABLET, FILM COATED ORAL EVERY 8 HOURS PRN
Qty: 50 TABLET | Refills: 0 | Status: SHIPPED | OUTPATIENT
Start: 2023-09-11

## 2023-09-11 NOTE — PROGRESS NOTES
Name: Terrie Houston      : 1995      MRN: [id number]  Encounter Provider: Malini Jeffers DO  Encounter Date: 2023   Encounter department: [address] [place] [address] [address]     1. Acute renal failure superimposed on stage 5 chronic kidney disease, not on chronic dialysis, unspecified acute renal failure type ([address] [facility])    2. Chronic kidney disease-mineral and bone disorder    3. Hydronephrosis with urinary obstruction due to ureteral calculus    4. Anemia due to chronic kidney disease, on chronic dialysis ([address] [facility])    5. Gastroesophageal reflux disease without esophagitis  -     omeprazole (PriLOSEC) 40 MG capsule; Take 1 capsule (40 mg total) by mouth daily    6. Bilious vomiting with nausea  Comments:  Have CT, worried about pylonephritis due to kidney stones, will do non con due to kidney functions, tx symptoms instructions given. BRATY diet. Orders:  -     ondansetron (ZOFRAN) 4 mg tablet; Take 1 tablet (4 mg total) by mouth every 8 (eight) hours as needed for nausea or vomiting     He is to continue his dialysis 3 days weekly. With his nephrologist as scheduled. He was given refills of his Zofran to take as needed. We will start him on omeprazole to see if this helps with his cough and reflux. He will get his routine blood work done with dialysis to monitor his anemia. We will see him back for his regular checkup. Subjective      TCM Call     Date and time call was made  2023 11:26 AM    Hospital care reviewed  Records reviewed    Patient was hospitialized at  [facility]    Date of Admission  23    Date of discharge  23    Diagnosis  Acute renal failure superimposed on stage 5 chronic kidney   disease, not on chronic dialysis    Disposition  Home    Were the patients medications reviewed and updated  Yes    Current Symptoms  None      TCM Call     Post hospital issues  None    Should patient be enrolled in anticoag monitoring? No    Scheduled for follow up? Yes    Did you obtain your prescribed medications  Yes    Do you need help managing your prescriptions or medications  No    Is transportation to your appointment needed  No    I have advised the patient to call PCP with any new or worsening symptoms    Luis Zavala [address]  Family    The type of support provided  Emotional    Do you have social support  Yes, as much as I need    Are you recieving any outpatient services  No    Are you recieving home care services  No    Are you using any community resources  No    Current waiver services  No    Have you fallen in the last 12 months  No    Interperter language line needed  No    Counseling  Patient    Counseling topics  Activities of daily living; patient and family   education    Comments  [facility] schuduled for 9/11 at 11 am. at [phone] location. Patient comes in today for transitional care management, he was admitted to [address] [place] [address] with acute renal failure. He was ultimately started on dialysis. He is doing his dialysis 3 days a week. He does complain of some nausea and reflux symptoms. Otherwise he is tolerating his dialysis well. He was noted to have a significant anemia and this is being followed by his nephrologist.  He is trying to decide if he wants to pursue peritoneal dialysis or continue hemodialysis while he is placed on a kidney transplant list.    Review of Systems   Constitutional: Negative for chills, fatigue and fever. HENT: Negative for congestion, ear pain, hearing loss, postnasal drip, rhinorrhea and sore throat. Eyes: Negative for pain and visual disturbance. Respiratory: Positive for cough. Negative for chest tightness, shortness of breath and wheezing. Cardiovascular: Negative for chest pain and leg swelling. Gastrointestinal: Positive for nausea. Negative for abdominal distention, abdominal pain, constipation, diarrhea and vomiting. Endocrine: Negative for cold intolerance and heat intolerance. Genitourinary: Negative for difficulty urinating, frequency and urgency. Musculoskeletal: Negative for arthralgias and gait problem. Skin: Negative for color change. Neurological: Negative for dizziness, tremors, syncope, numbness and headaches. Hematological: Negative for adenopathy. Psychiatric/Behavioral: Negative for agitation, confusion and sleep disturbance. The patient is not nervous/anxious. Current Outpatient Medications on File Prior to Visit   Medication Sig   • calcitriol (ROCALTROL) 0.25 mcg capsule Take 1 capsule (0.25 mcg total) by mouth 3 (three) times a week Do not start before September 4, 2023. • traZODone (DESYREL) 50 mg tablet TAKE 1 TABLET BY MOUTH EVERYDAY AT BEDTIME   • [DISCONTINUED] ondansetron (ZOFRAN) 4 mg tablet Take 1 tablet (4 mg total) by mouth every 8 (eight) hours as needed for nausea or vomiting   • famotidine (PEPCID) 20 mg tablet TAKE 1 TABLET BY MOUTH TWICE A DAY (Patient not taking: Reported on 9/11/2023)   • fluticasone (FLONASE) 50 mcg/act nasal spray SPRAY 1 SPRAY INTO EACH NOSTRIL EVERY DAY (Patient not taking: Reported on 9/11/2023)       Objective     /82 (BP Location: Left arm, Patient Position: Sitting, Cuff Size: Standard)   Pulse 64   Temp 97.7 °F (36.5 °C) (Tympanic)   Ht 6' (1.829 m)   Wt 104 kg (229 lb 12.8 oz)   SpO2 100%   BMI 31.17 kg/m²     Physical Exam  Vitals and nursing note reviewed. Constitutional:       Appearance: He is well-developed. HENT:      Head: Normocephalic. Eyes:      General: No scleral icterus. Conjunctiva/sclera: Conjunctivae normal.   Neck:      Thyroid: No thyromegaly. Cardiovascular:      Rate and Rhythm: Normal rate and regular rhythm. Heart sounds: Normal heart sounds. No murmur heard. Pulmonary:      Effort: Pulmonary effort is normal. No respiratory distress. Breath sounds: Normal breath sounds. No wheezing. Abdominal:      General: Bowel sounds are normal. There is no distension. Palpations: Abdomen is soft. Tenderness: There is no abdominal tenderness. Musculoskeletal:         General: No tenderness. Normal range of motion. Cervical back: Normal range of motion. Lymphadenopathy:      Cervical: No cervical adenopathy. Skin:     General: Skin is warm and dry. Coloration: Skin is not pale. Findings: No rash. Comments: Catheter in place in the right upper chest   Neurological:      Mental Status: He is alert and oriented to person, place, and time. Cranial Nerves: No cranial nerve deficit.    Psychiatric:         Behavior: Behavior normal.       José Miguel Ohara DO

## 2023-09-25 ENCOUNTER — TELEPHONE (OUTPATIENT)
Dept: NEPHROLOGY | Facility: CLINIC | Age: 28
End: 2023-09-25

## 2023-10-02 ENCOUNTER — TELEPHONE (OUTPATIENT)
Dept: FAMILY MEDICINE CLINIC | Facility: CLINIC | Age: 28
End: 2023-10-02

## 2023-10-02 NOTE — TELEPHONE ENCOUNTER
I spoke with the patient he stated that he is on his way to dialysis right now, Provider is aware, no need to see him today he will discuss it with the doctors during his dialysis.  Patient will let us know if they needed him to follow up with the primary

## 2023-10-02 NOTE — TELEPHONE ENCOUNTER
Patient called to say since starting dialysis he is experiencing numbness in fingers and toes. Also, today his fingertips and toes on right foot are bluish color. Patient is concerned. Please advise.  [phone]

## 2023-10-03 ENCOUNTER — OFFICE VISIT (OUTPATIENT)
Dept: FAMILY MEDICINE CLINIC | Facility: CLINIC | Age: 28
End: 2023-10-03

## 2023-10-03 VITALS
HEART RATE: 98 BPM | HEIGHT: 72 IN | TEMPERATURE: 98.5 F | BODY MASS INDEX: 30.5 KG/M2 | DIASTOLIC BLOOD PRESSURE: 82 MMHG | SYSTOLIC BLOOD PRESSURE: 126 MMHG | WEIGHT: 225.2 LBS | OXYGEN SATURATION: 98 %

## 2023-10-03 DIAGNOSIS — N18.6 END STAGE RENAL DISEASE ON DIALYSIS (HCC): ICD-10-CM

## 2023-10-03 DIAGNOSIS — Z99.2 ANEMIA DUE TO CHRONIC KIDNEY DISEASE, ON CHRONIC DIALYSIS (HCC): ICD-10-CM

## 2023-10-03 DIAGNOSIS — N18.6 ANEMIA DUE TO CHRONIC KIDNEY DISEASE, ON CHRONIC DIALYSIS (HCC): ICD-10-CM

## 2023-10-03 DIAGNOSIS — D63.1 ANEMIA DUE TO CHRONIC KIDNEY DISEASE, ON CHRONIC DIALYSIS (HCC): ICD-10-CM

## 2023-10-03 DIAGNOSIS — E72.53: ICD-10-CM

## 2023-10-03 DIAGNOSIS — G62.9 NEUROPATHY: Primary | ICD-10-CM

## 2023-10-03 DIAGNOSIS — Z99.2 END STAGE RENAL DISEASE ON DIALYSIS (HCC): ICD-10-CM

## 2023-10-03 PROBLEM — N17.9 ACUTE RENAL FAILURE SUPERIMPOSED ON STAGE 5 CHRONIC KIDNEY DISEASE, NOT ON CHRONIC DIALYSIS (HCC): Status: RESOLVED | Noted: 2023-08-29 | Resolved: 2023-10-03

## 2023-10-03 PROBLEM — N18.5 ACUTE RENAL FAILURE SUPERIMPOSED ON STAGE 5 CHRONIC KIDNEY DISEASE, NOT ON CHRONIC DIALYSIS (HCC): Status: RESOLVED | Noted: 2023-08-29 | Resolved: 2023-10-03

## 2023-10-03 PROCEDURE — 99214 OFFICE O/P EST MOD 30 MIN: CPT | Performed by: FAMILY MEDICINE

## 2023-10-03 RX ORDER — GABAPENTIN 100 MG/1
100 CAPSULE ORAL 3 TIMES DAILY PRN
Qty: 30 CAPSULE | Refills: 3 | Status: SHIPPED | OUTPATIENT
Start: 2023-10-03

## 2023-10-03 NOTE — TELEPHONE ENCOUNTER
Pt called - interested in an appt with , pt aware Jacquelyn File doesn't work on Tuesdays in our offices. I offered pt several openings with other providers for today, pt declined to see other provider but Jacquelyn File. I also offered an appt for tomorrow afternoon @ 1619 - "to squeeze him in as per provider", pt declined - stating he has a dialysis again, pt said he will call back, call ended.

## 2023-10-03 NOTE — TELEPHONE ENCOUNTER
I took care of it. Pt left a vm on our 1619 office line (earlier ) : Hi, my name is Ben Trejo, date of birth [birth year]. I called earlier and spoke to a nurse about an issue I was having and she spoke to Doctor Elijah Rodriguez and he had asked me to come in today but I had a dialysis treatment today so he said to call back depending on what they told me and they told me to make an appointment with him as soon as possible. So just wanted to call and get that scheduled for tomorrow, which is Tuesday. So if you could just give me a call at [phone], I would appreciate it. Thank you.  Jacob."

## 2023-10-03 NOTE — PROGRESS NOTES
Name: Amara Lopez      : 1995      MRN: [id number]  Encounter Provider: Richie Stacy MD  Encounter Date: 10/3/2023   Encounter department: [address] [place] [address]     1. Neuropathy  Assessment & Plan:  Discussed that this is most likely related to his dialysis and he is to discuss this with his nephrologist.    Orders:  -     gabapentin (Neurontin) 100 mg capsule; Take 1 capsule (100 mg total) by mouth 3 (three) times a day as needed (neuropathy)    2. Primary hyperoxaluria type 1 ([address])    3. End stage renal disease on dialysis ([address])    4. Anemia due to chronic kidney disease, on chronic dialysis ([address])         Subjective     Patient comes in with a 6-week history of numbness and bluish discoloration of his fingers and toes comes and goes. This began when he started on dialysis for end-stage renal disease due to his hyperoxaluria. His nephrologist has referred him here. Review of Systems   Constitutional: Negative. Respiratory: Negative.         Past Medical History:   Diagnosis Date   • Anxiety    • Chronic kidney disease    • GERD (gastroesophageal reflux disease)    • Kidney stone    • Liver disorder    • Nephrolithiasis    • Primary hyperoxaluria type 1 ([address])      Past Surgical History:   Procedure Laterality Date   • ANKLE SURGERY Right 2017    ligament repair   • CYSTOSCOPY     • FL RETROGRADE PYELOGRAM  10/24/2020   • FL RETROGRADE PYELOGRAM  2021   • HERNIA REPAIR     • IR TUNNELED DIALYSIS CATHETER PLACEMENT  2023   • KIDNEY STONE SURGERY     • LITHOTRIPSY     • MASS EXCISION Left 2017    Procedure: BACK/SHOULDER CYST EXCISION ;  Surgeon: Ayleen Campbell MD;  Location: [facility];  Service:    • NC CYSTO/URETERO W/LITHOTRIPSY &INDWELL STENT INSRT Left 10/24/2020    Procedure: CYSTOSCOPY URETEROSCOPY WITH LITHOTRIPSY HOLMIUM LASER, RETROGRADE PYELOGRAM AND INSERTION STENT URETERAL;  Surgeon: Claribel Harris MD; Location: [facility];  Service: Urology   • AK CYSTO/URETERO W/LITHOTRIPSY &INDWELL STENT INSRT Right 02/08/2021    Procedure: CYSTOSCOPY URETEROSCOPY WITH LITHOTRIPSY HOLMIUM LASER, RETROGRADE PYELOGRAM AND INSERTION STENT URETERAL;  Surgeon: Zaira Gorman MD;  Location: [facility];  Service: Urology     Family History   Problem Relation Age of Onset   • Hypertension Mother    • No Known Problems Father    • Cancer Maternal Grandmother         Bladder   • Diabetes Maternal Grandmother    • Alzheimer's disease Maternal Grandfather      Social History     Socioeconomic History   • Marital status: Single     Spouse name: None   • Number of children: None   • Years of education: None   • Highest education level: None   Occupational History     Employer: [employer]   Tobacco Use   • Smoking status: Never   • Smokeless tobacco: Never   Vaping Use   • Vaping Use: Never used   Substance and Sexual Activity   • Alcohol use: Yes     Comment: rarely   • Drug use: No   • Sexual activity: Not Currently     Partners: Female   Other Topics Concern   • None   Social History Narrative   • None     Social Determinants of Health     Financial Resource Strain: Not on file   Food Insecurity: No Food Insecurity (8/23/2023)    Hunger Vital Sign    • Worried About Running Out of Food in the Last Year: Never true    • Ran Out of Food in the Last Year: Never true   Transportation Needs: No Transportation Needs (8/23/2023)    PRAPARE - Transportation    • Lack of Transportation (Medical): No    • Lack of Transportation (Non-Medical):  No   Physical Activity: Not on file   Stress: Not on file   Social Connections: Not on file   Intimate Partner Violence: Not on file   Housing Stability: Low Risk  (8/23/2023)    Housing Stability Vital Sign    • Unable to Pay for Housing in the Last Year: No    • Number of Places Lived in the Last Year: 1    • Unstable Housing in the Last Year: No     Current Outpatient Medications on File Prior to Visit Medication Sig   • calcitriol (ROCALTROL) 0.25 mcg capsule Take 1 capsule (0.25 mcg total) by mouth 3 (three) times a week Do not start before September 4, 2023. • omeprazole (PriLOSEC) 40 MG capsule Take 1 capsule (40 mg total) by mouth daily   • ondansetron (ZOFRAN) 4 mg tablet Take 1 tablet (4 mg total) by mouth every 8 (eight) hours as needed for nausea or vomiting   • traZODone (DESYREL) 50 mg tablet TAKE 1 TABLET BY MOUTH EVERYDAY AT BEDTIME   • fluticasone (FLONASE) 50 mcg/act nasal spray SPRAY 1 SPRAY INTO EACH NOSTRIL EVERY DAY (Patient not taking: Reported on 9/11/2023)   • [DISCONTINUED] famotidine (PEPCID) 20 mg tablet TAKE 1 TABLET BY MOUTH TWICE A DAY (Patient not taking: Reported on 9/11/2023)     No Known Allergies  Immunization History   Administered Date(s) Administered   • DTaP 5 1995, 1995, 1993, 12/23/1994, 07/21/1998   • Hep B, Adolescent or Pediatric 1993, 1993, 1993   • Hib (PRP-OMP) 1993, 1993, 06/09/1994   • INFLUENZA 10/26/2018   • IPV 1993, 1993, 1993, 12/23/1994, 07/21/1998   • Influenza Quadrivalent Preservative Free 3 years and older IM 10/26/2018   • Influenza, injectable, quadrivalent, preservative free 0.5 mL 10/26/2018, 10/23/2019, 02/09/2021   • Influenza, seasonal, injectable 11/18/2008, 09/22/2012, 11/06/2016   • MMR 09/09/1994, 07/21/1998   • Meningococcal 07/27/2009   • Meningococcal, Unknown Serogroups 07/27/2009   • Pneumococcal Conjugate 13-Valent 09/14/2004   • Td (adult), adsorbed 07/02/2011   • Tdap 07/29/2009   • Varicella 08/26/2003, 07/27/2009       Objective     /82 (BP Location: Left arm, Patient Position: Sitting)   Pulse 98   Temp 98.5 °F (36.9 °C)   Ht 6' (1.829 m)   Wt 102 kg (225 lb 3.2 oz)   SpO2 98%   BMI 30.54 kg/m²     Physical Exam  Constitutional:       Appearance: Normal appearance. HENT:      Head: Normocephalic and atraumatic.    Musculoskeletal:      Comments: Good peripheral pulses in his arms and legs. Skin:     Capillary Refill: Capillary refill takes less than 2 seconds. Neurological:      Mental Status: He is alert.    Psychiatric:         Mood and Affect: Mood normal.         Behavior: Behavior normal.       Renee Nunez MD

## 2023-10-03 NOTE — ASSESSMENT & PLAN NOTE
Discussed that this is most likely related to his dialysis and he is to discuss this with his nephrologist.

## 2023-10-10 DIAGNOSIS — Z99.2 END STAGE RENAL DISEASE ON DIALYSIS (HCC): Primary | ICD-10-CM

## 2023-10-10 DIAGNOSIS — N18.6 END STAGE RENAL DISEASE ON DIALYSIS (HCC): Primary | ICD-10-CM

## 2023-10-11 ENCOUNTER — HOSPITAL ENCOUNTER (EMERGENCY)
Facility: HOSPITAL | Age: 28
Discharge: HOME | End: 2023-10-11
Attending: EMERGENCY MEDICINE | Admitting: EMERGENCY MEDICINE

## 2023-10-11 VITALS
BODY MASS INDEX: 30.48 KG/M2 | DIASTOLIC BLOOD PRESSURE: 96 MMHG | HEIGHT: 72 IN | RESPIRATION RATE: 16 BRPM | TEMPERATURE: 98.9 F | HEART RATE: 77 BPM | WEIGHT: 225 LBS | OXYGEN SATURATION: 96 % | SYSTOLIC BLOOD PRESSURE: 137 MMHG

## 2023-10-11 DIAGNOSIS — G57.93 NEUROPATHIC PAIN OF BOTH FEET: Primary | ICD-10-CM

## 2023-10-11 PROCEDURE — 99283 EMERGENCY DEPT VISIT LOW MDM: CPT

## 2023-10-11 RX ORDER — LIDOCAINE HYDROCHLORIDE 20 MG/ML
JELLY TOPICAL AS NEEDED
Qty: 30 ML | Refills: 0 | Status: SHIPPED | OUTPATIENT
Start: 2023-10-11

## 2023-10-11 NOTE — ED PROVIDER NOTES
History  Chief Complaint   Patient presents with    Medical Problem     Pt states he woke up this morning with a tingling feeling in his feet and states it was painful, pt states the symptoms have improved since his arrival to ED     75-year-old male patient who is on dialysis Monday Wednesday Friday because of increased oxalate in his blood. For the last 4 weeks she has had intermittent neuropathic burning pain of his feet was seen by his PCP and was told that it may be a side effect dialysis. He had an episode this morning that woke him up at 5:00 and now is resolved. No color changes in his feet. He is not having any other complaints besides the neuropathy. He is just concerned that he does not have an answer. They are trying to schedule him to have dialysis 5 days a week because of the oxalate level. He had dialysis 1130 today. He is in no acute distress. Prior to Admission Medications   Prescriptions Last Dose Informant Patient Reported? Taking?   calcitriol (ROCALTROL) 0.25 mcg capsule   No No   Sig: Take 1 capsule (0.25 mcg total) by mouth 3 (three) times a week Do not start before September 4, 2023.    fluticasone (FLONASE) 50 mcg/act nasal spray   No No   Sig: SPRAY 1 SPRAY INTO EACH NOSTRIL EVERY DAY   Patient not taking: Reported on 9/11/2023   gabapentin (Neurontin) 100 mg capsule   No No   Sig: Take 1 capsule (100 mg total) by mouth 3 (three) times a day as needed (neuropathy)   omeprazole (PriLOSEC) 40 MG capsule   No No   Sig: Take 1 capsule (40 mg total) by mouth daily   ondansetron (ZOFRAN) 4 mg tablet   No No   Sig: Take 1 tablet (4 mg total) by mouth every 8 (eight) hours as needed for nausea or vomiting   traZODone (DESYREL) 50 mg tablet  Self No No   Sig: TAKE 1 TABLET BY MOUTH EVERYDAY AT BEDTIME      Facility-Administered Medications: None       Past Medical History:   Diagnosis Date    Anxiety     Chronic kidney disease     GERD (gastroesophageal reflux disease)     Kidney stone Liver disorder     Nephrolithiasis     Primary hyperoxaluria type 1 [facility])        Past Surgical History:   Procedure Laterality Date    ANKLE SURGERY Right 2017    ligament repair    CYSTOSCOPY      FL RETROGRADE PYELOGRAM  10/24/2020    FL RETROGRADE PYELOGRAM  02/08/2021    HERNIA REPAIR      IR TUNNELED DIALYSIS CATHETER PLACEMENT  8/24/2023    KIDNEY STONE SURGERY      LITHOTRIPSY      MASS EXCISION Left 02/17/2017    Procedure: BACK/SHOULDER CYST EXCISION ;  Surgeon: Jared Bailey MD;  [facility];  Service:     MT CYSTO/URETERO W/LITHOTRIPSY &INDWELL STENT INSRT Left 10/24/2020    Procedure: CYSTOSCOPY URETEROSCOPY WITH LITHOTRIPSY HOLMIUM LASER, RETROGRADE PYELOGRAM AND INSERTION STENT URETERAL;  Surgeon: Aki Cruz MD;  [facility];  Service: Urology    MT CYSTO/URETERO W/LITHOTRIPSY &INDWELL STENT INSRT Right 02/08/2021    Procedure: CYSTOSCOPY URETEROSCOPY WITH LITHOTRIPSY HOLMIUM LASER, RETROGRADE PYELOGRAM AND INSERTION STENT URETERAL;  Surgeon: Arleth Antunez MD;  [facility];  Service: Urology       Family History   Problem Relation Age of Onset    Hypertension Mother     No Known Problems Father     Cancer Maternal Grandmother         Bladder    Diabetes Maternal Grandmother     Alzheimer's disease Maternal Grandfather      I have reviewed and agree with the history as documented. E-Cigarette/Vaping    E-Cigarette Use Never User      E-Cigarette/Vaping Substances    Nicotine No     THC No     CBD No     Flavoring No     Other No     Unknown No      Social History     Tobacco Use    Smoking status: Never    Smokeless tobacco: Never   Vaping Use    Vaping Use: Never used   Substance Use Topics    Alcohol use: Yes     Comment: rarely    Drug use: No       Review of Systems   Constitutional:  Negative for diaphoresis, fatigue and fever. HENT:  Negative for congestion, ear pain, nosebleeds and sore throat.     Eyes:  Negative for photophobia, pain, discharge and visual disturbance. Respiratory:  Negative for cough, choking, chest tightness, shortness of breath and wheezing. Cardiovascular:  Negative for chest pain and palpitations. Gastrointestinal:  Negative for abdominal distention, abdominal pain, diarrhea and vomiting. Genitourinary:  Negative for dysuria, flank pain and frequency. Musculoskeletal:  Negative for back pain, gait problem and joint swelling. Skin:  Negative for color change and rash. Neurological:  Positive for numbness (neuropathic pain in feet). Negative for dizziness, tremors, syncope, weakness and headaches. Psychiatric/Behavioral:  Negative for behavioral problems and confusion. The patient is not nervous/anxious. All other systems reviewed and are negative. Physical Exam  Physical Exam  Vitals and nursing note reviewed. Constitutional:       General: He is not in acute distress. Appearance: Normal appearance. He is well-developed. He is not ill-appearing, toxic-appearing or diaphoretic. HENT:      Head: Normocephalic and atraumatic. Right Ear: Tympanic membrane, ear canal and external ear normal.      Left Ear: Tympanic membrane, ear canal and external ear normal.      Nose: Nose normal.      Mouth/Throat:      Mouth: Mucous membranes are moist.      Pharynx: Oropharynx is clear. No oropharyngeal exudate or posterior oropharyngeal erythema. Eyes:      General: No scleral icterus. Right eye: No discharge. Left eye: No discharge. Conjunctiva/sclera: Conjunctivae normal.      Pupils: Pupils are equal, round, and reactive to light. Cardiovascular:      Rate and Rhythm: Normal rate and regular rhythm. Pulmonary:      Effort: Pulmonary effort is normal.      Breath sounds: Normal breath sounds. Abdominal:      General: Bowel sounds are normal.      Palpations: Abdomen is soft. Tenderness: There is no abdominal tenderness. Musculoskeletal:         General: Normal range of motion. Cervical back: Normal range of motion and neck supple. Right lower leg: No edema. Left lower leg: No edema. Comments: There is no sign of trauma deformity of the feet dorsalis pedis and posterior tibial pulse +2 neurovascular intact lower extremity. Does state that he has a little tingling sensation upon palpation of his toes this is unchanged from 4 weeks ago according to patient. Also no sign of trauma or infection   Skin:     General: Skin is warm. Capillary Refill: Capillary refill takes less than 2 seconds. Neurological:      General: No focal deficit present. Mental Status: He is alert and oriented to person, place, and time. Mental status is at baseline. Psychiatric:         Mood and Affect: Mood normal.         Behavior: Behavior normal.         Vital Signs  ED Triage Vitals [10/11/23 0534]   Temperature Pulse Respirations Blood Pressure SpO2   98.9 °F (37.2 °C) 77 16 137/96 96 %      Temp Source Heart Rate Source Patient Position - Orthostatic VS BP Location FiO2 (%)   Oral Monitor Sitting Left arm --      Pain Score       --           Vitals:    10/11/23 0534   BP: 137/96   Pulse: 77   Patient Position - Orthostatic VS: Sitting         Visual Acuity      ED Medications  Medications - No data to display    Diagnostic Studies  Results Reviewed       None                   No orders to display              Procedures  Procedures         ED Course                               SBIRT 20yo+      Flowsheet Row Most Recent Value   Initial Alcohol Screen: US AUDIT-C     1. How often do you have a drink containing alcohol? 1 Filed at: 10/11/2023 0536   2. How many drinks containing alcohol do you have on a typical day you are drinking? 0 Filed at: 10/11/2023 0536   3a. Male UNDER 65: How often do you have five or more drinks on one occasion? 0 Filed at: 10/11/2023 0536   Audit-C Score 1 Filed at: 10/11/2023 6997   RADHA: How many times in the past year have you. ..     Used an illegal drug or used a prescription medication for non-medical reasons? Never Filed at: 10/11/2023 0536                      Medical Decision Making  75-year-old male patient who is on dialysis comes in for neuropathic pain that is intermittent for the last 4 weeks. It woke him up this morning so he came in his family doctor has him on Neurontin and explained to him according to the note that I reviewed that he is getting neuropathic pain secondary to his dialysis. He has no other symptoms and a prescription of dialysis today. Differential diagnosis includes not limited to peripheral neuropathy secondary to dialysis, neuropathy secondary to oxalate excess, circulation issue unlikely has good pulses and cap refill. Problems Addressed:  Neuropathic pain of both feet: acute illness or injury     Details: Has been having issue for approximate 4 weeks is on Neurontin without improvement. Today I will give him lidocaine gel and given ambulatory referral ASAP to neurology for possible EMG and further evaluation    Amount and/or Complexity of Data Reviewed  Independent Historian: parent     Details: Mother was present who helped with past medical history  External Data Reviewed: notes. Details: I did review previous notes to gain past medical history and history of present illness  Labs: ordered. Details: Labs were not ordered today. He is scheduled for dialysis 1130 and having no complaints of chest pain or shortness of breath patient verbally understood and agreed with no blood work today during the visit  Discussion of management or test interpretation with external provider(s): Using joint decision-making with the patient patient agrees to lidocaine gel discharge with ambulatory referral to neurology for further evaluation and to return worsening symptoms questions comments concerns follow-up with dialysis today he verbalized understanding and agreement    Risk  Prescription drug management. Disposition  Final diagnoses:   Neuropathic pain of both feet     Time reflects when diagnosis was documented in both MDM as applicable and the Disposition within this note       Time User Action Codes Description Comment    10/11/2023  6:38 AM Ferny Gray Add [G57.93] Neuropathic pain of both feet           ED Disposition       ED Disposition   Discharge    Condition   Stable    Date/Time   Wed Oct 11, 2023 [address] discharge to home/self care.                    Follow-up Information       Follow up With Specialties Details Why Contact Info Additional Information    Neurology [address] [place] [address] Neurology Schedule an appointment as soon as possible for a visit   [place] [phone] Neurology [address] [place] [address], [address], [place], [place], [address] [place]            Discharge Medication List as of 10/11/2023  6:43 AM        START taking these medications    Details   lidocaine (XYLOCAINE) 2 % topical gel Apply topically as needed for mild pain, Starting Wed 10/11/2023, Normal           CONTINUE these medications which have NOT CHANGED    Details   calcitriol (ROCALTROL) 0.25 mcg capsule Take 1 capsule (0.25 mcg total) by mouth 3 (three) times a week Do not start before September 4, 2023., Starting Mon 9/4/2023, Normal      fluticasone (FLONASE) 50 mcg/act nasal spray SPRAY 1 SPRAY INTO EACH NOSTRIL EVERY DAY, Normal      gabapentin (Neurontin) 100 mg capsule Take 1 capsule (100 mg total) by mouth 3 (three) times a day as needed (neuropathy), Starting Tue 10/3/2023, Normal      omeprazole (PriLOSEC) 40 MG capsule Take 1 capsule (40 mg total) by mouth daily, Starting Mon 9/11/2023, Normal      ondansetron (ZOFRAN) 4 mg tablet Take 1 tablet (4 mg total) by mouth every 8 (eight) hours as needed for nausea or vomiting, Starting Mon 9/11/2023, Normal      traZODone (DESYREL) 50 mg tablet TAKE 1 TABLET BY MOUTH EVERYDAY AT BEDTIME, Normal                 PDMP Review         Value Time User    PDMP Reviewed  Yes 10/10/2022 11:54 AM Jacqueline Briones DO            ED Provider  Electronically Signed by             Ernst Corey, PA-C  10/11/23 3103

## 2023-10-11 NOTE — DISCHARGE INSTRUCTIONS
Call the neurologist listed to follow-up     Follow-up with dialysis 1130 today as planned    Return with any worsening symptoms questions comments or concerns

## 2023-10-16 ENCOUNTER — TELEPHONE (OUTPATIENT)
Dept: NEUROLOGY | Facility: CLINIC | Age: 28
End: 2023-10-16

## 2023-10-16 NOTE — TELEPHONE ENCOUNTER
Pt came into the [facility] Neurology office with discharge paperwork from [facility] ED date 10-11-23 showing a STAT referral for Neurology. (Pt stated he had called last week and has no received a call back. Pt stated his nephrologist and PCP are in agreement the pt needs to be seen by neurology ASAP. Advised I will forward a message to our MA who will call and triage pt to gather additional information. Pt was appreciative and voiced clear understanding. NOTE: I have held the HFU slot on 10/18 with Dr Peter Calhoun @ 11:00 am however it was only a 30 minute slot. Rosa Elena Mantilla, please contact pt to triage and forward your findings to Dr Peter Calhoun if appropriate or to the appropriate physicians. Thank you!

## 2023-10-30 ENCOUNTER — CONSULT (OUTPATIENT)
Dept: VASCULAR SURGERY | Facility: CLINIC | Age: 28
End: 2023-10-30

## 2023-10-30 VITALS — SYSTOLIC BLOOD PRESSURE: 132 MMHG | HEIGHT: 72 IN | DIASTOLIC BLOOD PRESSURE: 84 MMHG | BODY MASS INDEX: 30.52 KG/M2

## 2023-10-30 DIAGNOSIS — I73.9 CLAUDICATION OF BOTH LOWER EXTREMITIES (HCC): Primary | ICD-10-CM

## 2023-10-30 DIAGNOSIS — N18.6 END STAGE RENAL DISEASE ON DIALYSIS (HCC): ICD-10-CM

## 2023-10-30 DIAGNOSIS — G62.9 NEUROPATHY: ICD-10-CM

## 2023-10-30 DIAGNOSIS — Z99.2 END STAGE RENAL DISEASE ON DIALYSIS (HCC): ICD-10-CM

## 2023-10-30 PROCEDURE — 99243 OFF/OP CNSLTJ NEW/EST LOW 30: CPT | Performed by: NURSE PRACTITIONER

## 2023-10-30 NOTE — PROGRESS NOTES
Assessment/Plan:    Neuropathy  24 yo male non-smoker with ESRD on HD via catheter (new start 8/25/23), neuropathy, and anxiety presents with c/o BUE and BLE numbness, tingling and burning pain that began after the start of HD. He presents for vascular evaluation. - c/o new onset b/l hands and feet numbness after start of HD 8/25/23. Symptoms were constant x 1 month, now have improved with some better days then other.  - describes as burning pain. - no wounds on finger tips or BLE  - c/o b/l below knee fatigue "pre-cramp" in calf with ambulating short distances.  - BUE/ BLE warm, M/S  intact  - evidence of hair growth on feet and toes. - 2+ radial pulses bilaterally  - 2+ femoral pulses bilaterally  - 1+ PT pulses with monophasic DP signals    Plan:  - LEAD for baseline  - Return as previously scheduled to review (Dr Germaine Cervantes 11/20/23- vein mapping review for AVF creation)  - encouraged ambulation  - keep extremities warm and protected. Diagnoses and all orders for this visit:    Claudication of both lower extremities ([address])  -     VAS lower limb arterial duplex, complete bilateral; Future    Neuropathy  -     VAS lower limb arterial duplex, complete bilateral; Future    End stage renal disease on dialysis (HCC)  -     VAS lower limb arterial duplex, complete bilateral; Future          Subjective:      Patient ID: Kevin Soto is a 25 y.o. male. New patient, presents for eval of b/l upper & LE numbness. Denies open wounds, reports numbness in extremities x 2  month. HPI  See assessment and plan. 24 yo male male, non-smoker , non diabetic with ESRD newly started on HD 8/25/2023 presents with complaints of bilateral upper and lower extremity numbness since starting hemodialysis. Patient describes bilateral hand and feet numbness and burning sensation. Patient describes as constant and unbearable for 1 month after starting HD however has improved over the last month.   BUE/BLE warm, motor sensation intact with palpable pulses. Patient also describes bilateral below-knee pain "like it is going to cramp" with ambulation. Patient denies rest pain. No wounds. We discussed pathophysiology of PAD. We will get lower extremity duplex for baseline imaging. Symptoms most consistent with neuropathy secondary to ESRD. Patient is nondiabetic. We will get imaging and have patient return as already scheduled. Patient has visit with Dr. Wade Jaquez to review vein mapping in preparation for AVF creation. Further recommendations to come after imaging. Encouraged proper dressing, and keeping extremities warm and protected. Patient and family agreeable to plan without further questions. The following portions of the patient's history were reviewed and updated as appropriate: allergies, current medications, past family history, past medical history, past social history, past surgical history, and problem list.    Review of Systems   Constitutional: Negative. HENT: Negative. Eyes: Negative. Respiratory: Negative. Cardiovascular: Negative. Gastrointestinal: Negative. Endocrine: Negative. Genitourinary: Negative. Musculoskeletal: Negative. Skin: Negative. Allergic/Immunologic: Negative. Neurological:  Positive for numbness. Hematological: Negative. Psychiatric/Behavioral: Negative. I have reviewed and made appropriate changes to the review of systems input by the medical assistant. Objective:      /84 (BP Location: Right arm, Patient Position: Sitting, Cuff Size: Standard)   Ht 6' (1.829 m)   BMI 30.52 kg/m²          Physical Exam  Vitals reviewed. Constitutional:       General: He is not in acute distress. Cardiovascular:      Rate and Rhythm: Normal rate. Pulses:           Radial pulses are 2+ on the right side and 2+ on the left side. Femoral pulses are 2+ on the right side and 2+ on the left side.        Dorsalis pedis pulses are detected w/ Doppler on the right side and detected w/ Doppler on the left side. Posterior tibial pulses are 1+ on the right side and 1+ on the left side. Pulmonary:      Effort: Pulmonary effort is normal. No respiratory distress. Musculoskeletal:         General: Normal range of motion. Right lower leg: No edema. Left lower leg: No edema. Skin:     General: Skin is warm. Coloration: Skin is pale. Comments: Hair growth on feet and toes   Neurological:      Mental Status: He is alert and oriented to person, place, and time. Sensory: No sensory deficit. Motor: No weakness. Psychiatric:         Behavior: Behavior normal.         I have spent a total time of 25 minutes on 10/30/23 in caring for this patient including Instructions for management, Patient and family education, Risk factor reductions, Impressions, Documenting in the medical record, Reviewing / ordering tests, medicine, procedures  , and Obtaining or reviewing history  .       Vitals:    10/30/23 0951   BP: 132/84   BP Location: Right arm   Patient Position: Sitting   Cuff Size: Standard   Height: 6' (1.829 m)       Patient Active Problem List   Diagnosis    Sebaceous cyst    Anxiety, generalized    Ureteric stone ----> hydroureteronephrosis moderate    Insomnia    Hydronephrosis with urinary obstruction due to ureteral calculus    Primary hyperoxaluria type 1 (HCC)    BMI 30.0-30.9,adult    Chronic kidney disease-mineral and bone disorder    Anemia    Gastroesophageal reflux disease without esophagitis    End stage renal disease on dialysis [facility])    Neuropathy       Past Surgical History:   Procedure Laterality Date    ANKLE SURGERY Right 2017    ligament repair    CYSTOSCOPY      FL RETROGRADE PYELOGRAM  10/24/2020    FL RETROGRADE PYELOGRAM  02/08/2021    HERNIA REPAIR      IR TUNNELED DIALYSIS CATHETER PLACEMENT  8/24/2023    KIDNEY STONE SURGERY      LITHOTRIPSY      MASS EXCISION Left 02/17/2017 Procedure: BACK/SHOULDER CYST EXCISION ;  Surgeon: Zeina Green MD;  [facility];  Service:     NM CYSTO/URETERO W/LITHOTRIPSY &INDWELL STENT INSRT Left 10/24/2020    Procedure: CYSTOSCOPY URETEROSCOPY WITH LITHOTRIPSY HOLMIUM LASER, RETROGRADE PYELOGRAM AND INSERTION STENT URETERAL;  Surgeon: Jaclyn Arthur MD;  [facility];  Service: Urology    NM CYSTO/URETERO W/LITHOTRIPSY &INDWELL STENT INSRT Right 02/08/2021    Procedure: CYSTOSCOPY URETEROSCOPY WITH LITHOTRIPSY HOLMIUM LASER, RETROGRADE PYELOGRAM AND INSERTION STENT URETERAL;  Surgeon: Rima Lee MD;  [facility];  Service: Urology       Family History   Problem Relation Age of Onset    Hypertension Mother     No Known Problems Father     Cancer Maternal Grandmother         Bladder    Diabetes Maternal Grandmother     Alzheimer's disease Maternal Grandfather        Social History     Socioeconomic History    Marital status: Single     Spouse name: Not on file    Number of children: Not on file    Years of education: Not on file    Highest education level: Not on file   Occupational History     Employer: [employer]   Tobacco Use    Smoking status: Never    Smokeless tobacco: Never   Vaping Use    Vaping Use: Never used   Substance and Sexual Activity    Alcohol use: Yes     Comment: rarely    Drug use: No    Sexual activity: Not Currently     Partners: Female   Other Topics Concern    Not on file   Social History Narrative    Not on file     Social Determinants of Health     Financial Resource Strain: Not on file   Food Insecurity: No Food Insecurity (8/23/2023)    Hunger Vital Sign     Worried About Running Out of Food in the Last Year: Never true     Ran Out of Food in the Last Year: Never true   Transportation Needs: No Transportation Needs (8/23/2023)    PRAPARE - Transportation     Lack of Transportation (Medical): No     Lack of Transportation (Non-Medical):  No   Physical Activity: Not on file   Stress: Not on file   Social Connections: Not on file   Intimate Partner Violence: Not on file   Housing Stability: Low Risk  (8/23/2023)    Housing Stability Vital Sign     Unable to Pay for Housing in the Last Year: No     Number of Places Lived in the Last Year: 1     Unstable Housing in the Last Year: No       No Known Allergies      Current Outpatient Medications:     calcitriol (ROCALTROL) 0.25 mcg capsule, Take 1 capsule (0.25 mcg total) by mouth 3 (three) times a week Do not start before September 4, 2023., Disp: 12 capsule, Rfl: 0    gabapentin (Neurontin) 100 mg capsule, Take 1 capsule (100 mg total) by mouth 3 (three) times a day as needed (neuropathy), Disp: 30 capsule, Rfl: 3    lidocaine (XYLOCAINE) 2 % topical gel, Apply topically as needed for mild pain, Disp: 30 mL, Rfl: 0    ondansetron (ZOFRAN) 4 mg tablet, Take 1 tablet (4 mg total) by mouth every 8 (eight) hours as needed for nausea or vomiting, Disp: 50 tablet, Rfl: 0    traZODone (DESYREL) 50 mg tablet, TAKE 1 TABLET BY MOUTH EVERYDAY AT BEDTIME, Disp: 90 tablet, Rfl: 3

## 2023-10-30 NOTE — ASSESSMENT & PLAN NOTE
24 yo male non-smoker with ESRD on HD via catheter (new start 8/25/23), neuropathy, and anxiety presents with c/o BUE and BLE numbness, tingling and burning pain that began after the start of HD. He presents for vascular evaluation. - c/o new onset b/l hands and feet numbness after start of HD 8/25/23. Symptoms were constant x 1 month, now have improved with some better days then other.  - describes as burning pain. - no wounds on finger tips or BLE  - c/o b/l below knee fatigue "pre-cramp" in calf with ambulating short distances.  - BUE/ BLE warm, M/S  intact  - evidence of hair growth on feet and toes. - 2+ radial pulses bilaterally  - 2+ femoral pulses bilaterally  - 1+ PT pulses with monophasic DP signals    Plan:  - LEAD for baseline  - Return as previously scheduled to review (Dr Domenic Dawson 11/20/23- vein mapping review for AVF creation)  - encouraged ambulation  - keep extremities warm and protected.

## 2023-10-30 NOTE — PATIENT INSTRUCTIONS
Extremity arterial duplex for baseline imaging  Return as scheduled with Dr. Nathan Pryor to review.   Keep extremities warm and protected

## 2023-11-03 ENCOUNTER — TELEPHONE (OUTPATIENT)
Dept: NEUROLOGY | Facility: CLINIC | Age: 28
End: 2023-11-03

## 2023-11-09 ENCOUNTER — HOSPITAL ENCOUNTER (OUTPATIENT)
Dept: VASCULAR ULTRASOUND | Facility: HOSPITAL | Age: 28
Discharge: HOME | End: 2023-11-09
Attending: INTERNAL MEDICINE

## 2023-11-09 DIAGNOSIS — N18.6 END STAGE RENAL DISEASE ON DIALYSIS (HCC): ICD-10-CM

## 2023-11-09 DIAGNOSIS — Z99.2 END STAGE RENAL DISEASE ON DIALYSIS (HCC): ICD-10-CM

## 2023-11-09 PROCEDURE — 93985 DUP-SCAN HEMO COMPL BI STD: CPT | Performed by: SURGERY

## 2023-11-09 PROCEDURE — 93985 DUP-SCAN HEMO COMPL BI STD: CPT

## 2023-11-13 ENCOUNTER — TELEPHONE (OUTPATIENT)
Dept: NEUROLOGY | Facility: CLINIC | Age: 28
End: 2023-11-13

## 2023-11-13 NOTE — TELEPHONE ENCOUNTER
Patient called trying reschedule cancelled appointment. I offered soonest appointment in [facility] office which was 12/4/23 with Dr Becky Coy and patient refused appt, looking elsewhere.     Closing referral for now

## 2023-11-14 ENCOUNTER — HOSPITAL ENCOUNTER (OUTPATIENT)
Dept: VASCULAR ULTRASOUND | Facility: HOSPITAL | Age: 28
Discharge: HOME | End: 2023-11-14
Payer: COMMERCIAL

## 2023-11-14 ENCOUNTER — TELEPHONE (OUTPATIENT)
Dept: FAMILY MEDICINE CLINIC | Facility: CLINIC | Age: 28
End: 2023-11-14

## 2023-11-14 DIAGNOSIS — G62.9 NEUROPATHY: ICD-10-CM

## 2023-11-14 DIAGNOSIS — Z99.2 END STAGE RENAL DISEASE ON DIALYSIS (HCC): ICD-10-CM

## 2023-11-14 DIAGNOSIS — I73.9 CLAUDICATION OF BOTH LOWER EXTREMITIES (HCC): ICD-10-CM

## 2023-11-14 DIAGNOSIS — N18.6 END STAGE RENAL DISEASE ON DIALYSIS (HCC): ICD-10-CM

## 2023-11-14 PROCEDURE — 93923 UPR/LXTR ART STDY 3+ LVLS: CPT | Performed by: SURGERY

## 2023-11-14 PROCEDURE — 93925 LOWER EXTREMITY STUDY: CPT | Performed by: SURGERY

## 2023-11-14 PROCEDURE — 93925 LOWER EXTREMITY STUDY: CPT

## 2023-11-14 PROCEDURE — 93923 UPR/LXTR ART STDY 3+ LVLS: CPT

## 2023-11-14 NOTE — TELEPHONE ENCOUNTER
Can you put a STAT Ambulatory Neurology order in for him? He had one from the ER las month. The doctor he was scheduled with was ill and cancelled his appointment. They could not get him in until Dec. 4th. Patient said that was too long, he already waited a month to get in and it got cancelled. He is looking around to see if he can get in anywhere sooner than that.

## 2023-11-15 NOTE — TELEPHONE ENCOUNTER
Okay if he finds somewhere sooner, let us know that I can put in a referral for them. He already has a stat referral in for our neurology.

## 2023-11-17 NOTE — PROGRESS NOTES
Assessment/Plan:    End stage renal disease on dialysis [facility])  Lab Results   Component Value Date    EGFR 6 09/01/2023    EGFR 3 08/30/2023    EGFR 3 08/28/2023    CREATININE 9.46 (H) 09/01/2023    CREATININE 15.32 (H) 08/30/2023    CREATININE 14.98 (H) 08/28/2023     We will do the labs and he is likely a 78-year-old male presents with end-stage renal disease and vein mapping previously completed. He is right-handed and has appropriate left upper extremity cephalic vein for formation of a brachiocephalic fistula. Subjective:      Patient ID: Audrie Landau is a 25 y.o. male. Patient presents today to discuss HD access. Vein mapping done 11/9/23. R handed, on dialysis. Also review FLORIDALMA 11/14/23. HPI  Mr. Cande Mina is a 78-year-old male who has primary hyperoxaluria type I. He is on a transplant list at [facility] for both liver and kidney. He presents currently undergoing dialysis via right chest catheter. He underwent vein mapping which identified left upper extremity brachiocephalic fistula options. We also discussed lower extremity symptoms that he has been experiencing. He claims that he gets cramping in his calfs bilaterally after only walking 50 yards. He also experiences painful discoloration to his toes as well as his fingers bilaterally. He is aware that oxalate deposition can occur in his kidneys, liver, cardiac tissue and also extremities and soft tissues. I suspect this is what is causing his current symptoms. I did review his arterial duplex which demonstrated elevated velocities in the superficial femoral arteries bilaterally. I fear that arteriographic evaluation and possible intervention could leave him predisposed to things like dissection and/or stent thrombosis in his future. At this time I recommended he seek evaluation to rheumatology to determine if there is a treatment mechanism to manage the symptoms outside of surgical intervention.     Review of Systems   Constitutional: Negative. HENT: Negative. Eyes: Negative. Respiratory: Negative. Cardiovascular: Negative. Gastrointestinal: Negative. Endocrine: Negative. Genitourinary: Negative. Musculoskeletal:         Leg pain  Leg cramping with walking   Skin: Negative. Allergic/Immunologic: Negative. Neurological:  Positive for numbness. Hematological: Negative. Psychiatric/Behavioral: Negative. Objective:      /98 (BP Location: Right arm, Patient Position: Sitting)   Pulse 70   Ht 6' (1.829 m)   Wt 103 kg (226 lb)   BMI 30.65 kg/m²          Physical Exam  Constitutional:       Appearance: Normal appearance. HENT:      Head: Normocephalic and atraumatic. Nose: Nose normal. No rhinorrhea. Eyes:      Extraocular Movements: Extraocular movements intact. Pupils: Pupils are equal, round, and reactive to light. Cardiovascular:      Rate and Rhythm: Normal rate and regular rhythm. Pulses:           Radial pulses are 2+ on the right side and 2+ on the left side. Dorsalis pedis pulses are 2+ on the right side and 2+ on the left side. Comments: No extremity wounds  Pulmonary:      Effort: Pulmonary effort is normal.      Breath sounds: No stridor. Abdominal:      General: There is no distension. Tenderness: There is no abdominal tenderness. Musculoskeletal:         General: No tenderness. Normal range of motion. Cervical back: Normal range of motion and neck supple. Skin:     General: Skin is warm. Capillary Refill: Capillary refill takes less than 2 seconds. Coloration: Skin is not jaundiced. Neurological:      General: No focal deficit present. Mental Status: He is alert and oriented to person, place, and time.    Psychiatric:         Mood and Affect: Mood normal.         Behavior: Behavior normal.     Operative Scheduling Information:    Hospital:  [facility]    Physician:  Me    Surgery: JOHN brachiocephalic fistula    Urgency:  Standard    Level:  Level 4: Outpatients to be scheduled for screening procedures and elective surgery that can be delayed for longer than one month without reasonable expectation of detriment to patient.     Case Length:  Normal    Post-op Bed:  Outpatient    OR Table:  Standard    Equipment Needs:  None    Medication Instructions:  None    Hydration:  No

## 2023-11-17 NOTE — H&P (VIEW-ONLY)
Assessment/Plan:    End stage renal disease on dialysis [facility])  Lab Results   Component Value Date    EGFR 6 09/01/2023    EGFR 3 08/30/2023    EGFR 3 08/28/2023    CREATININE 9.46 (H) 09/01/2023    CREATININE 15.32 (H) 08/30/2023    CREATININE 14.98 (H) 08/28/2023     We will do the labs and he is likely a 68-year-old male presents with end-stage renal disease and vein mapping previously completed. He is right-handed and has appropriate left upper extremity cephalic vein for formation of a brachiocephalic fistula. Subjective:      Patient ID: Jame Officer is a 25 y.o. male. Patient presents today to discuss HD access. Vein mapping done 11/9/23. R handed, on dialysis. Also review FLORIDALMA 11/14/23. HPI  Mr. Saqib Sanchez is a 68-year-old male who has primary hyperoxaluria type I. He is on a transplant list at [facility] for both liver and kidney. He presents currently undergoing dialysis via right chest catheter. He underwent vein mapping which identified left upper extremity brachiocephalic fistula options. We also discussed lower extremity symptoms that he has been experiencing. He claims that he gets cramping in his calfs bilaterally after only walking 50 yards. He also experiences painful discoloration to his toes as well as his fingers bilaterally. He is aware that oxalate deposition can occur in his kidneys, liver, cardiac tissue and also extremities and soft tissues. I suspect this is what is causing his current symptoms. I did review his arterial duplex which demonstrated elevated velocities in the superficial femoral arteries bilaterally. I fear that arteriographic evaluation and possible intervention could leave him predisposed to things like dissection and/or stent thrombosis in his future. At this time I recommended he seek evaluation to rheumatology to determine if there is a treatment mechanism to manage the symptoms outside of surgical intervention.     Review of Systems   Constitutional: Negative. HENT: Negative. Eyes: Negative. Respiratory: Negative. Cardiovascular: Negative. Gastrointestinal: Negative. Endocrine: Negative. Genitourinary: Negative. Musculoskeletal:         Leg pain  Leg cramping with walking   Skin: Negative. Allergic/Immunologic: Negative. Neurological:  Positive for numbness. Hematological: Negative. Psychiatric/Behavioral: Negative. Objective:      /98 (BP Location: Right arm, Patient Position: Sitting)   Pulse 70   Ht 6' (1.829 m)   Wt 103 kg (226 lb)   BMI 30.65 kg/m²          Physical Exam  Constitutional:       Appearance: Normal appearance. HENT:      Head: Normocephalic and atraumatic. Nose: Nose normal. No rhinorrhea. Eyes:      Extraocular Movements: Extraocular movements intact. Pupils: Pupils are equal, round, and reactive to light. Cardiovascular:      Rate and Rhythm: Normal rate and regular rhythm. Pulses:           Radial pulses are 2+ on the right side and 2+ on the left side. Dorsalis pedis pulses are 2+ on the right side and 2+ on the left side. Comments: No extremity wounds  Pulmonary:      Effort: Pulmonary effort is normal.      Breath sounds: No stridor. Abdominal:      General: There is no distension. Tenderness: There is no abdominal tenderness. Musculoskeletal:         General: No tenderness. Normal range of motion. Cervical back: Normal range of motion and neck supple. Skin:     General: Skin is warm. Capillary Refill: Capillary refill takes less than 2 seconds. Coloration: Skin is not jaundiced. Neurological:      General: No focal deficit present. Mental Status: He is alert and oriented to person, place, and time.    Psychiatric:         Mood and Affect: Mood normal.         Behavior: Behavior normal.     Operative Scheduling Information:    Hospital:  [address] [place] [address]    Physician:  Me    Surgery: JOHN brachiocephalic fistula    Urgency:  Standard    Level:  Level 4: Outpatients to be scheduled for screening procedures and elective surgery that can be delayed for longer than one month without reasonable expectation of detriment to patient.     Case Length:  Normal    Post-op Bed:  Outpatient    OR Table:  Standard    Equipment Needs:  None    Medication Instructions:  None    Hydration:  No

## 2023-11-20 ENCOUNTER — TELEPHONE (OUTPATIENT)
Dept: VASCULAR SURGERY | Facility: CLINIC | Age: 28
End: 2023-11-20

## 2023-11-20 ENCOUNTER — OFFICE VISIT (OUTPATIENT)
Dept: FAMILY MEDICINE CLINIC | Facility: CLINIC | Age: 28
End: 2023-11-20
Payer: COMMERCIAL

## 2023-11-20 ENCOUNTER — OFFICE VISIT (OUTPATIENT)
Dept: VASCULAR SURGERY | Facility: CLINIC | Age: 28
End: 2023-11-20
Payer: COMMERCIAL

## 2023-11-20 VITALS
DIASTOLIC BLOOD PRESSURE: 90 MMHG | OXYGEN SATURATION: 98 % | BODY MASS INDEX: 30.61 KG/M2 | SYSTOLIC BLOOD PRESSURE: 142 MMHG | HEIGHT: 72 IN | WEIGHT: 226 LBS | HEART RATE: 72 BPM

## 2023-11-20 VITALS
SYSTOLIC BLOOD PRESSURE: 142 MMHG | WEIGHT: 226 LBS | BODY MASS INDEX: 30.61 KG/M2 | HEART RATE: 70 BPM | DIASTOLIC BLOOD PRESSURE: 98 MMHG | HEIGHT: 72 IN

## 2023-11-20 DIAGNOSIS — G62.9 NEUROPATHY: ICD-10-CM

## 2023-11-20 DIAGNOSIS — E72.53: Primary | ICD-10-CM

## 2023-11-20 DIAGNOSIS — N18.6 END STAGE RENAL DISEASE ON DIALYSIS (HCC): ICD-10-CM

## 2023-11-20 DIAGNOSIS — Z99.2 END STAGE RENAL DISEASE ON DIALYSIS (HCC): ICD-10-CM

## 2023-11-20 PROCEDURE — 99214 OFFICE O/P EST MOD 30 MIN: CPT | Performed by: SURGERY

## 2023-11-20 PROCEDURE — 99214 OFFICE O/P EST MOD 30 MIN: CPT | Performed by: FAMILY MEDICINE

## 2023-11-20 RX ORDER — CHLORHEXIDINE GLUCONATE ORAL RINSE 1.2 MG/ML
15 SOLUTION DENTAL ONCE
OUTPATIENT
Start: 2023-11-20 | End: 2023-11-20

## 2023-11-20 NOTE — TELEPHONE ENCOUNTER
Spoke to patient in office hours to schedule surgery 12-14-3          Operative Scheduling Information:     Hospital:  [facility]     Physician:  Me     Surgery: LUE brachiocephalic fistula     Urgency:  Standard     Level:  Level 4: Outpatients to be scheduled for screening procedures and elective surgery that can be delayed for longer than one month without reasonable expectation of detriment to patient.      Case Length:  Normal     Post-op Bed:  Outpatient     OR Table:  Standard     Equipment Needs:  None     Medication Instructions:  None     Hydration:  No

## 2023-11-20 NOTE — LETTER
November 20, 2023     Brooks Lacey DO  [id number] [place] [address]    Patient: Tito Dasilva   YOB: 1995   Date of Visit: 11/20/2023       Dear Dr. Shaquille Ball: Thank you for referring Roc Murillo to me for evaluation. Below are my notes for this consultation. If you have questions, please do not hesitate to call me. I look forward to following your patient along with you. Sincerely,        Laurel Rivera MD        CC: MD Katarzyna Carvalho. Yoan Booth MD  11/20/2023 11:51 AM  Sign when Signing Visit  Assessment/Plan:    End stage renal disease on dialysis [facility])  Lab Results   Component Value Date    EGFR 6 09/01/2023    EGFR 3 08/30/2023    EGFR 3 08/28/2023    CREATININE 9.46 (H) 09/01/2023    CREATININE 15.32 (H) 08/30/2023    CREATININE 14.98 (H) 08/28/2023     We will do the labs and he is likely a 78-year-old male presents with end-stage renal disease and vein mapping previously completed. He is right-handed and has appropriate left upper extremity cephalic vein for formation of a brachiocephalic fistula. Subjective:      Patient ID: Tito Dasilva is a 25 y.o. male. Patient presents today to discuss HD access. Vein mapping done 11/9/23. R handed, on dialysis. Also review FLORIDALMA 11/14/23. HPI  Mr. Inocencia Pulido is a 78-year-old male who has primary hyperoxaluria type I. He is on a transplant list at [facility] for both liver and kidney. He presents currently undergoing dialysis via right chest catheter. He underwent vein mapping which identified left upper extremity brachiocephalic fistula options. We also discussed lower extremity symptoms that he has been experiencing. He claims that he gets cramping in his calfs bilaterally after only walking 50 yards. He also experiences painful discoloration to his toes as well as his fingers bilaterally.   He is aware that oxalate deposition can occur in his kidneys, liver, cardiac tissue and also extremities and soft tissues. I suspect this is what is causing his current symptoms. I did review his arterial duplex which demonstrated elevated velocities in the superficial femoral arteries bilaterally. I fear that arteriographic evaluation and possible intervention could leave him predisposed to things like dissection and/or stent thrombosis in his future. At this time I recommended he seek evaluation to rheumatology to determine if there is a treatment mechanism to manage the symptoms outside of surgical intervention. Review of Systems   Constitutional: Negative. HENT: Negative. Eyes: Negative. Respiratory: Negative. Cardiovascular: Negative. Gastrointestinal: Negative. Endocrine: Negative. Genitourinary: Negative. Musculoskeletal:         Leg pain  Leg cramping with walking   Skin: Negative. Allergic/Immunologic: Negative. Neurological:  Positive for numbness. Hematological: Negative. Psychiatric/Behavioral: Negative. Objective:      /98 (BP Location: Right arm, Patient Position: Sitting)   Pulse 70   Ht 6' (1.829 m)   Wt 103 kg (226 lb)   BMI 30.65 kg/m²          Physical Exam  Constitutional:       Appearance: Normal appearance. HENT:      Head: Normocephalic and atraumatic. Nose: Nose normal. No rhinorrhea. Eyes:      Extraocular Movements: Extraocular movements intact. Pupils: Pupils are equal, round, and reactive to light. Cardiovascular:      Rate and Rhythm: Normal rate and regular rhythm. Pulses:           Radial pulses are 2+ on the right side and 2+ on the left side. Dorsalis pedis pulses are 2+ on the right side and 2+ on the left side. Comments: No extremity wounds  Pulmonary:      Effort: Pulmonary effort is normal.      Breath sounds: No stridor. Abdominal:      General: There is no distension. Tenderness: There is no abdominal tenderness.    Musculoskeletal: General: No tenderness. Normal range of motion. Cervical back: Normal range of motion and neck supple. Skin:     General: Skin is warm. Capillary Refill: Capillary refill takes less than 2 seconds. Coloration: Skin is not jaundiced. Neurological:      General: No focal deficit present. Mental Status: He is alert and oriented to person, place, and time. Psychiatric:         Mood and Affect: Mood normal.         Behavior: Behavior normal.     Operative Scheduling Information:    Hospital:  [place]    Physician:  Me    Surgery: LUE brachiocephalic fistula    Urgency:  Standard    Level:  Level 4: Outpatients to be scheduled for screening procedures and elective surgery that can be delayed for longer than one month without reasonable expectation of detriment to patient.     Case Length:  Normal    Post-op Bed:  Outpatient    OR Table:  Standard    Equipment Needs:  None    Medication Instructions:  None    Hydration:  No

## 2023-11-20 NOTE — ASSESSMENT & PLAN NOTE
Lab Results   Component Value Date    EGFR 6 09/01/2023    EGFR 3 08/30/2023    EGFR 3 08/28/2023    CREATININE 9.46 (H) 09/01/2023    CREATININE 15.32 (H) 08/30/2023    CREATININE 14.98 (H) 08/28/2023     We will do the labs and he is likely a 17-year-old male presents with end-stage renal disease and vein mapping previously completed. He is right-handed and has appropriate left upper extremity cephalic vein for formation of a brachiocephalic fistula.

## 2023-11-20 NOTE — PROGRESS NOTES
Assessment/Plan:     Chronic Problems:  No problem-specific Assessment & Plan notes found for this encounter. Visit Diagnosis:  Diagnoses and all orders for this visit:    Primary hyperoxaluria type 1 [facility])  Comments:  Referral placed for rheumatology per request, per note. Vascular  Orders:  -     Ambulatory Referral to Rheumatology; Future    Neuropathy  Comments:  Continue current gabapentin, neurology          Subjective:    Patient ID: Nancy Garcia is a 25 y.o. male. F/u vascular   Scheduled for fistula dec 2023 ,   Seen by vascular this morning who rec rheumatology for evaluation in regard to primary hyperoxaluria type I  No color changes in his fingers right , more pronounced when in cooler temps  presents currently undergoing dialysis via right chest catheter. underwent vein mapping which identified left upper extremity brachiocephalic   Neurologist appt needed to be cancelled has been calling for updated appt   Presently receiving         The following portions of the patient's history were reviewed and updated as appropriate: allergies, current medications, past family history, past medical history, past social history, past surgical history and problem list.    Review of Systems   Constitutional:  Negative for appetite change, chills, fever and unexpected weight change. HENT:  Negative for congestion, dental problem, ear pain, hearing loss, postnasal drip, rhinorrhea, sinus pressure, sinus pain, sneezing, sore throat, tinnitus and voice change. Eyes:  Negative for visual disturbance. Respiratory:  Negative for apnea, cough, chest tightness and shortness of breath. Cardiovascular:  Negative for chest pain, palpitations and leg swelling. Gastrointestinal:  Negative for abdominal pain, blood in stool, constipation, diarrhea, nausea and vomiting. Endocrine: Negative for cold intolerance, heat intolerance, polydipsia, polyphagia and polyuria.    Genitourinary:  Negative for decreased urine volume, difficulty urinating, dysuria, frequency and hematuria. Musculoskeletal:  Negative for arthralgias, back pain, gait problem, joint swelling and myalgias. Skin:  Negative for color change, rash and wound. Allergic/Immunologic: Negative for environmental allergies and food allergies. Neurological:  Negative for dizziness, syncope, weakness, light-headedness, numbness and headaches. Hematological:  Negative for adenopathy. Does not bruise/bleed easily. Psychiatric/Behavioral:  Negative for sleep disturbance and suicidal ideas. The patient is not nervous/anxious. /90   Pulse 72   Ht 6' (1.829 m)   Wt 103 kg (226 lb)   SpO2 98%   BMI 30.65 kg/m²   Social History     Socioeconomic History    Marital status: Single     Spouse name: Not on file    Number of children: Not on file    Years of education: Not on file    Highest education level: Not on file   Occupational History     Employer: [employer]   Tobacco Use    Smoking status: Never    Smokeless tobacco: Never   Vaping Use    Vaping Use: Never used   Substance and Sexual Activity    Alcohol use: Yes     Comment: rarely    Drug use: No    Sexual activity: Not Currently     Partners: Female   Other Topics Concern    Not on file   Social History Narrative    Not on file     Social Determinants of Health     Financial Resource Strain: Not on file   Food Insecurity: No Food Insecurity (8/23/2023)    Hunger Vital Sign     Worried About Running Out of Food in the Last Year: Never true     Ran Out of Food in the Last Year: Never true   Transportation Needs: No Transportation Needs (8/23/2023)    PRAPARE - Transportation     Lack of Transportation (Medical): No     Lack of Transportation (Non-Medical):  No   Physical Activity: Not on file   Stress: Not on file   Social Connections: Not on file   Intimate Partner Violence: Not on file   Housing Stability: Low Risk  (8/23/2023)    Housing Stability Vital Sign     Unable to Pay for Housing in the Last Year: No     Number of Places Lived in the Last Year: 1     Unstable Housing in the Last Year: No     Past Medical History:   Diagnosis Date    Anxiety     Chronic kidney disease     GERD (gastroesophageal reflux disease)     Kidney stone     Liver disorder     Nephrolithiasis     Primary hyperoxaluria type 1 ([address])      Family History   Problem Relation Age of Onset    Hypertension Mother     No Known Problems Father     Cancer Maternal Grandmother         Bladder    Diabetes Maternal Grandmother     Alzheimer's disease Maternal Grandfather      Past Surgical History:   Procedure Laterality Date    ANKLE SURGERY Right 2017    ligament repair    CYSTOSCOPY      FL RETROGRADE PYELOGRAM  10/24/2020    FL RETROGRADE PYELOGRAM  02/08/2021    HERNIA REPAIR      IR TUNNELED DIALYSIS CATHETER PLACEMENT  8/24/2023    KIDNEY STONE SURGERY      LITHOTRIPSY      MASS EXCISION Left 02/17/2017    Procedure: BACK/SHOULDER CYST EXCISION ;  Surgeon: Cori Cloud MD;  Location: [facility];  Service:     OR CYSTO/URETERO W/LITHOTRIPSY &INDWELL STENT INSRT Left 10/24/2020    Procedure: CYSTOSCOPY URETEROSCOPY WITH LITHOTRIPSY HOLMIUM LASER, RETROGRADE PYELOGRAM AND INSERTION STENT URETERAL;  Surgeon: Serafin Arango MD;  Location: [facility];  Service: Urology    OR CYSTO/URETERO W/LITHOTRIPSY &INDWELL STENT INSRT Right 02/08/2021    Procedure: CYSTOSCOPY URETEROSCOPY WITH LITHOTRIPSY HOLMIUM LASER, RETROGRADE PYELOGRAM AND INSERTION STENT URETERAL;  Surgeon: Mariama Barrios MD;  Location: [facility];  Service: Urology       Current Outpatient Medications:     gabapentin (Neurontin) 100 mg capsule, Take 1 capsule (100 mg total) by mouth 3 (three) times a day as needed (neuropathy), Disp: 30 capsule, Rfl: 3    ondansetron (ZOFRAN) 4 mg tablet, Take 1 tablet (4 mg total) by mouth every 8 (eight) hours as needed for nausea or vomiting, Disp: 50 tablet, Rfl: 0    traZODone (DESYREL) 50 mg tablet, TAKE 1 TABLET BY MOUTH EVERYDAY AT BEDTIME, Disp: 90 tablet, Rfl: 3    calcitriol (ROCALTROL) 0.25 mcg capsule, Take 1 capsule (0.25 mcg total) by mouth 3 (three) times a week Do not start before September 4, 2023. (Patient not taking: Reported on 11/20/2023), Disp: 12 capsule, Rfl: 0    lidocaine (XYLOCAINE) 2 % topical gel, Apply topically as needed for mild pain (Patient not taking: Reported on 11/20/2023), Disp: 30 mL, Rfl: 0    No Known Allergies       Lab Review   No results displayed because visit has over 200 results.       Appointment on 08/21/2023   Component Date Value    Cholesterol 08/21/2023 197     Triglycerides 08/21/2023 189 (H)     HDL, Direct 08/21/2023 32 (L)     LDL Calculated 08/21/2023 127 (H)     Non-HDL-Chol (CHOL-HDL) 08/21/2023 165     TSH 3RD GENERATON 08/21/2023 1.546     Sodium 08/21/2023 139     Potassium 08/21/2023 3.7     Chloride 08/21/2023 104     CO2 08/21/2023 19 (L)     ANION GAP 08/21/2023 16     BUN 08/21/2023 78 (H)     Creatinine 08/21/2023 14.70 (H)     Glucose, Fasting 08/21/2023 102 (H)     Calcium 08/21/2023 9.6     AST 08/21/2023 16     ALT 08/21/2023 15     Alkaline Phosphatase 08/21/2023 60     Total Protein 08/21/2023 8.2     Albumin 08/21/2023 4.8     Total Bilirubin 08/21/2023 0.41     eGFR 08/21/2023 3     WBC 08/21/2023 8.84     RBC 08/21/2023 3.01 (L)     Hemoglobin 08/21/2023 9.3 (L)     Hematocrit 08/21/2023 26.6 (L)     MCV 08/21/2023 88     MCH 08/21/2023 30.9     MCHC 08/21/2023 35.0     RDW 08/21/2023 12.6     MPV 08/21/2023 10.7     Platelets 96/51/8585 275     nRBC 08/21/2023 0     Neutrophils Relative 08/21/2023 74     Immat GRANS % 08/21/2023 0     Lymphocytes Relative 08/21/2023 14     Monocytes Relative 08/21/2023 8     Eosinophils Relative 08/21/2023 3     Basophils Relative 08/21/2023 1     Neutrophils Absolute 08/21/2023 6.58     Immature Grans Absolute 08/21/2023 0.02     Lymphocytes Absolute 08/21/2023 1.20     Monocytes Absolute 08/21/2023 0.68 Eosinophils Absolute 08/21/2023 0.30     Basophils Absolute 08/21/2023 0.06     Amylase 08/21/2023 72     Lipase 08/21/2023 38     Urine Culture 08/21/2023 No Growth <1000 cfu/mL    Office Visit on 08/21/2023   Component Date Value    Color, UA 08/21/2023 Light Yellow     Clarity, UA 08/21/2023 Clear     Specific Gravity, UA 08/21/2023 1.013     pH, UA 08/21/2023 5.5     Leukocytes, UA 08/21/2023 Moderate (A)     Nitrite, UA 08/21/2023 Negative     Protein, UA 08/21/2023 100 (2+) (A)     Glucose, UA 08/21/2023 Negative     Ketones, UA 08/21/2023 Negative     Urobilinogen, UA 08/21/2023 <2.0     Bilirubin, UA 08/21/2023 Negative     Occult Blood, UA 08/21/2023 Moderate (A)     RBC, UA 08/21/2023 1-2     WBC, UA 08/21/2023 20-30 (A)     Epithelial Cells 08/21/2023 None Seen     Bacteria, UA 08/21/2023 None Seen     POCT SARS-CoV-2 Ag 08/21/2023 Negative     VALID CONTROL 08/21/2023 Valid     SARS-CoV-2 08/21/2023 Negative     INFLUENZA A PCR 08/21/2023 Negative     INFLUENZA B PCR 08/21/2023 Negative         Imaging: VAS lower limb arterial duplex, complete bilateral    Result Date: 11/14/2023  Narrative:  [facility] REPORT CLINICAL: Indications:  Patient presents with numbness, extreme fatigue and coldness in the legs. Patient also reports pain in the feet. Patient states that all symptoms began after he started dialysis. Operative History: 2023-08-24 IR tunneled dialysis cath placement Risk Factors The patient has history of CKD. Clinical Right Pressure:  128/ mm Hg, Left Pressure:  135/ mm Hg.   FINDINGS:  Segment                Right       Left                                          PSV (cm/s)  PSV (cm/s)  Common Femoral Artery          99         123  Prox Profunda                  85          82  Prox SFA                       79          79  Mid SFA                       363         319  Dist SFA                      210         152  Proximal Pop                   63          99  Distal Pop 67          63  Tibioperoneal                  72          71  Dist Post Tibial               63          71  Dist. Ant. Tibial              27          18     CONCLUSION: Impression:  RIGHT LOWER LIMB: There appears to be a reduction in diameter throughout the proximal to mid superficial femoral artery. Elevated velocities were noted. Ankle/Brachial index:  1.0 which is within normal limits. PVR/ PPG tracings are dampened. Flatline at the first digit. Metatarsal pressure of 44 mm Hg. Great toe pressure could not be obtained. LEFT LOWER LIMB: There appears to be a reduction in diameter throughout the proximal to mid superficial femoral artery. Elevated velocities were noted. Ankle/Brachial index:  0.92 which is within normal limits. PVR/ PPG tracings are dampened. Flatline at the first digit. Metatarsal pressure of 78 mm Hg. Great toe pressure could not be obtained. No prior study to compare. SIGNATURE: Electronically Signed by: Rose Priest MD, RPVI on 2023-11-14 03:25:41 PM    VAS vessel mapping for hemodialysis upper    Result Date: 11/9/2023  Narrative:  [facility] REPORT CLINICAL: Indications: Pre-Op evaluation for future dialysis AVF. Patient has chronic kidney disease. Patient is Right handed. Operative History: 2023-08-24 IR tunneled dialysis cath placement Risk Factors The patient has history of CKD. FINDINGS:  Right                   Impression       PSV (cm/s)  Diameter AP (mm)  Subclavian                                      200                    Brachial Artery                                 122               4.1  Axillary                                         70                    Prox. Radial            Diffuse disease          44               0.9  Dist. Radial Artery     Diffuse disease          38               0.7  Bas Upper                                                         6.2  Mid Radial              Diffuse disease          33               1.0  Prox.  Ulnar 74               3.5  Bas Mid Arm                                                       6.4  Basilic Lower Arm                                                 5.8  Dist. Ulnar Artery                               90               1.4  Mid. Ulnar                                       92               1.4  Bas AC                                                            2.0  Basilic Upper Forearm                                             1.1  Bas Mid Forearm                                                   0.9  Basilic Lower Forearm                                             1.4   Vein                                                   5.0  Ceph Upper              Branch                                    2.5  Ceph Mid Arm                                                      3.6  Cephalic Lower Arm                                                3.5  Ceph AC                                                           4.2  Cephalic Upper Forearm                                            3.3  Ceph Mid Forearm                                                  3.1  Cephalic Lower Forearm                                            1.4  Ceph Wrist                                                        1.5   Left                    Impression       PSV (cm/s)  Diameter AP (mm)  Subclavian                                      152                    Brachial Artery                                 133               3.6  Axillary                                         69                    Prox. Radial            Diffuse disease          35               0.8  Dist. Radial Artery     Diffuse disease          19               0.5  Bas Upper                                                         5.0  Mid Radial              Diffuse disease          39               0.6  Prox.  Ulnar                                      95               2.8  Bas Mid Arm 3.8  Basilic Lower Arm                                                 1.8  Dist. Ulnar Artery                              140               1.9  Ulnar                                                             2.7  Mid. Ulnar                                      114               1.7  Bas AC                                                            1.3  Basilic Upper Forearm                                             1.1  Bas Mid Forearm                                                   0.8  Basilic Lower Forearm                                             0.8   Vein                                                   4.6  Ceph Upper                                                        3.8  Ceph Mid Arm                                                      2.5  Cephalic Lower Arm                                                2.4  Ceph AC                                                           3.8  Cephalic Upper Forearm                                            2.3  Ceph Mid Forearm                                                  1.9  Cephalic Lower Forearm  Branch                                    1.6  Ceph Wrist                                                        1.4     CONCLUSION:  Impression RIGHT ARM: The cephalic vein is patent and measures >2mm in diameter in the upper arm only The basilic vein is patent and measures >2mm in diameter in the upper arm only The brachial artery measures 4.1 mm and bifurcates at the proximal forearm. The cephalic and basilic veins communicate with the median cubital vein. The subclavian, axillary and brachial arteries are widely patent. The IJV, subclavian, axillary and brachial veins are patent. LEFT ARM: The cephalic vein is patent and measures >2mm in diameter in the upper arm only The basilic vein is patent and measures >2mm in diameter in the upper arm only The brachial artery measures 3.6 mm and bifurcates at the proximal forearm. The cephalic and basilic veins communicate with the median cubital vein. The subclavian, axillary and brachial arteries are widely patent. The IJV, subclavian, axillary and brachial veins are patent. ENDOAVF SCREENING: The  on the right measures 5.0 mm and connects to the Radial and Ulnar veins. The  on the left measures 4.6 mm and connects to the Radial and Ulnar veins. SIGNATURE: Electronically Signed by: Tanya Funk MD [facility] on 2023-11-09 02:57:04 PM      Objective:     Physical Exam  Constitutional:       General: He is not in acute distress. Appearance: He is not ill-appearing or toxic-appearing. HENT:      Head: Normocephalic and atraumatic. Cardiovascular:      Rate and Rhythm: Normal rate. Pulses: Normal pulses. Pulmonary:      Effort: Pulmonary effort is normal.   Musculoskeletal:         General: Normal range of motion. Skin:     General: Skin is warm and dry. Findings: No lesion or rash. Neurological:      General: No focal deficit present. Mental Status: He is oriented to person, place, and time. There are no Patient Instructions on file for this visit. LAURA Blancas    Portions of the record may have been created with voice recognition software. Occasional wrong word or "sound a like" substitutions may have occurred due to the inherent limitations of voice recognition software. Read the chart carefully and recognize, using context, where substitutions have occurred.

## 2023-11-20 NOTE — TELEPHONE ENCOUNTER
Verified patient's insurance    - Patient has no coverage; Referred to [facility] [phone]  Is patient requesting a call when authorization has been obtained? Patient did not request a call. Surgery Date: 12/14/23  Primary Surgeon: MARCOS // Elmer Tee (NPI: [id number])  Assisting Surgeon: Not Applicable (N/A)  Facility: [facility] (Tax: [id number] / NPI: [id number])  Inpatient / Outpatient: Outpatient  Level: 4    Clearance Received: No clearance ordered. Consent Received: Yes, scanned into Epic on 11/20/23. Medication Hold / Last Dose:  no med hold  IR Notified: Not Applicable (N/A)  Rep. Notified: Not Applicable (N/A)  Equipment Needs: Not Applicable (N/A)  Vas Lab Requested: Not Applicable (N/A)  Patient Contacted: 11/20/23 during patient hours with Dr. Anthony Cannon    Diagnosis: N18.6 , Z99.2  Procedure/ CPT Code(s): (AV) Arteriovenous Fistula // CPT: 02332    For varicose vein related procedures:   Last LEVDR: Not Applicable (N/A)  CEAP Classification: Not Applicable (N/A)  VCSS: Not Applicable (N/A)    Post Operative Date/ Time: 1/8/24 , 830AM Carter Eli) with Elmer Tee (NPI: [id number])     *Please review medication hold(s), PATs, and check H&P with patient. *  PATIENT WAS MAILED SURGERY/SHOWERING/DISCHARGE/COVID INSTRUCTIONS AFTER REVIEWING WITH THEM VIA PHONE CALL.

## 2023-11-24 NOTE — TELEPHONE ENCOUNTER
Authorization requirements reviewed. Please refer to April Leblanc / Derrick Shy number [id number] for case updates.

## 2023-11-28 ENCOUNTER — APPOINTMENT (EMERGENCY)
Dept: DIALYSIS | Facility: HOSPITAL | Age: 28
DRG: 682 | End: 2023-11-28
Payer: COMMERCIAL

## 2023-11-28 ENCOUNTER — HOSPITAL ENCOUNTER (INPATIENT)
Facility: HOSPITAL | Age: 28
LOS: 6 days | Discharge: HOME | DRG: 682 | End: 2023-12-06
Attending: EMERGENCY MEDICINE | Admitting: INTERNAL MEDICINE
Payer: COMMERCIAL

## 2023-11-28 ENCOUNTER — APPOINTMENT (EMERGENCY)
Dept: CT IMAGING | Facility: HOSPITAL | Age: 28
DRG: 682 | End: 2023-11-28
Payer: COMMERCIAL

## 2023-11-28 ENCOUNTER — APPOINTMENT (EMERGENCY)
Dept: VASCULAR ULTRASOUND | Facility: HOSPITAL | Age: 28
DRG: 682 | End: 2023-11-28
Payer: COMMERCIAL

## 2023-11-28 DIAGNOSIS — N18.6 END STAGE RENAL DISEASE ON DIALYSIS (HCC): Primary | ICD-10-CM

## 2023-11-28 DIAGNOSIS — R53.1 WEAKNESS: ICD-10-CM

## 2023-11-28 DIAGNOSIS — Z99.2 END STAGE RENAL DISEASE ON DIALYSIS (HCC): Primary | ICD-10-CM

## 2023-11-28 DIAGNOSIS — R94.39 ABNORMAL NUCLEAR STRESS TEST: ICD-10-CM

## 2023-11-28 DIAGNOSIS — M79.606 LEG PAIN: ICD-10-CM

## 2023-11-28 DIAGNOSIS — R11.2 NAUSEA & VOMITING: ICD-10-CM

## 2023-11-28 DIAGNOSIS — R79.89 ELEVATED TROPONIN: ICD-10-CM

## 2023-11-28 DIAGNOSIS — N17.9 AKI (ACUTE KIDNEY INJURY) (HCC): ICD-10-CM

## 2023-11-28 DIAGNOSIS — G62.9 NEUROPATHY: ICD-10-CM

## 2023-11-28 PROBLEM — I73.9 MICROANGIOPATHY (HCC): Status: ACTIVE | Noted: 2023-11-28

## 2023-11-28 LAB
2HR DELTA HS TROPONIN: -26 NG/L
2HR DELTA HS TROPONIN: -273 NG/L
4HR DELTA HS TROPONIN: -311 NG/L
4HR DELTA HS TROPONIN: 224 NG/L
ALBUMIN SERPL BCP-MCNC: 4.4 G/DL (ref 3.5–5)
ALP SERPL-CCNC: 48 U/L (ref 34–104)
ALT SERPL W P-5'-P-CCNC: 7 U/L (ref 7–52)
ANION GAP SERPL CALCULATED.3IONS-SCNC: 19 MMOL/L
AST SERPL W P-5'-P-CCNC: 8 U/L (ref 13–39)
ATRIAL RATE: 69 BPM
BASE EX.OXY STD BLDV CALC-SCNC: 79 % (ref 60–80)
BASE EXCESS BLDV CALC-SCNC: -5.8 MMOL/L
BASOPHILS # BLD AUTO: 0.07 THOUSANDS/ÂΜL (ref 0–0.1)
BASOPHILS NFR BLD AUTO: 1 % (ref 0–1)
BILIRUB SERPL-MCNC: 0.34 MG/DL (ref 0.2–1)
BUN SERPL-MCNC: 77 MG/DL (ref 5–25)
CALCIUM SERPL-MCNC: 8.7 MG/DL (ref 8.4–10.2)
CARDIAC TROPONIN I PNL SERPL HS: 1134 NG/L
CARDIAC TROPONIN I PNL SERPL HS: 1152 NG/L
CARDIAC TROPONIN I PNL SERPL HS: 1172 NG/L
CARDIAC TROPONIN I PNL SERPL HS: 1445 NG/L
CARDIAC TROPONIN I PNL SERPL HS: 902 NG/L
CARDIAC TROPONIN I PNL SERPL HS: 928 NG/L
CHLORIDE SERPL-SCNC: 97 MMOL/L (ref 96–108)
CO2 SERPL-SCNC: 22 MMOL/L (ref 21–32)
CREAT SERPL-MCNC: 22 MG/DL (ref 0.6–1.3)
EOSINOPHIL # BLD AUTO: 0.47 THOUSAND/ÂΜL (ref 0–0.61)
EOSINOPHIL NFR BLD AUTO: 6 % (ref 0–6)
ERYTHROCYTE [DISTWIDTH] IN BLOOD BY AUTOMATED COUNT: 14.4 % (ref 11.6–15.1)
GFR SERPL CREATININE-BSD FRML MDRD: 2 ML/MIN/1.73SQ M
GLUCOSE SERPL-MCNC: 91 MG/DL (ref 65–140)
HCO3 BLDV-SCNC: 19.9 MMOL/L (ref 24–30)
HCT VFR BLD AUTO: 28.2 % (ref 36.5–49.3)
HGB BLD-MCNC: 9.2 G/DL (ref 12–17)
IMM GRANULOCYTES # BLD AUTO: 0.01 THOUSAND/UL (ref 0–0.2)
IMM GRANULOCYTES NFR BLD AUTO: 0 % (ref 0–2)
LYMPHOCYTES # BLD AUTO: 1.28 THOUSANDS/ÂΜL (ref 0.6–4.47)
LYMPHOCYTES NFR BLD AUTO: 17 % (ref 14–44)
MAGNESIUM SERPL-MCNC: 3.4 MG/DL (ref 1.9–2.7)
MCH RBC QN AUTO: 31.6 PG (ref 26.8–34.3)
MCHC RBC AUTO-ENTMCNC: 32.6 G/DL (ref 31.4–37.4)
MCV RBC AUTO: 97 FL (ref 82–98)
MONOCYTES # BLD AUTO: 0.62 THOUSAND/ÂΜL (ref 0.17–1.22)
MONOCYTES NFR BLD AUTO: 8 % (ref 4–12)
NEUTROPHILS # BLD AUTO: 4.91 THOUSANDS/ÂΜL (ref 1.85–7.62)
NEUTS SEG NFR BLD AUTO: 68 % (ref 43–75)
NRBC BLD AUTO-RTO: 0 /100 WBCS
O2 CT BLDV-SCNC: 11.6 ML/DL
P AXIS: 36 DEGREES
PCO2 BLDV: 39.8 MM HG (ref 42–50)
PH BLDV: 7.32 [PH] (ref 7.3–7.4)
PHOSPHATE SERPL-MCNC: 11.3 MG/DL (ref 2.7–4.5)
PLATELET # BLD AUTO: 245 THOUSANDS/UL (ref 149–390)
PMV BLD AUTO: 10.9 FL (ref 8.9–12.7)
PO2 BLDV: 53.5 MM HG (ref 35–45)
POTASSIUM SERPL-SCNC: 5.3 MMOL/L (ref 3.5–5.3)
PR INTERVAL: 190 MS
PROT SERPL-MCNC: 7.3 G/DL (ref 6.4–8.4)
QRS AXIS: -8 DEGREES
QRSD INTERVAL: 100 MS
QT INTERVAL: 406 MS
QTC INTERVAL: 435 MS
RBC # BLD AUTO: 2.91 MILLION/UL (ref 3.88–5.62)
SODIUM SERPL-SCNC: 138 MMOL/L (ref 135–147)
T WAVE AXIS: 21 DEGREES
TSH SERPL DL<=0.05 MIU/L-ACNC: 4.22 UIU/ML (ref 0.45–4.5)
VENTRICULAR RATE: 69 BPM
WBC # BLD AUTO: 7.36 THOUSAND/UL (ref 4.31–10.16)

## 2023-11-28 PROCEDURE — NC001 PR NO CHARGE: Performed by: INTERNAL MEDICINE

## 2023-11-28 PROCEDURE — 82805 BLOOD GASES W/O2 SATURATION: CPT | Performed by: EMERGENCY MEDICINE

## 2023-11-28 PROCEDURE — 93005 ELECTROCARDIOGRAM TRACING: CPT

## 2023-11-28 PROCEDURE — 93925 LOWER EXTREMITY STUDY: CPT | Performed by: SURGERY

## 2023-11-28 PROCEDURE — 99284 EMERGENCY DEPT VISIT MOD MDM: CPT

## 2023-11-28 PROCEDURE — G0257 UNSCHED DIALYSIS ESRD PT HOS: HCPCS

## 2023-11-28 PROCEDURE — 36415 COLL VENOUS BLD VENIPUNCTURE: CPT | Performed by: EMERGENCY MEDICINE

## 2023-11-28 PROCEDURE — 80053 COMPREHEN METABOLIC PANEL: CPT | Performed by: EMERGENCY MEDICINE

## 2023-11-28 PROCEDURE — 96374 THER/PROPH/DIAG INJ IV PUSH: CPT

## 2023-11-28 PROCEDURE — 99223 1ST HOSP IP/OBS HIGH 75: CPT | Performed by: FAMILY MEDICINE

## 2023-11-28 PROCEDURE — G1004 CDSM NDSC: HCPCS

## 2023-11-28 PROCEDURE — 99245 OFF/OP CONSLTJ NEW/EST HI 55: CPT | Performed by: INTERNAL MEDICINE

## 2023-11-28 PROCEDURE — 74174 CTA ABD&PLVS W/CONTRAST: CPT

## 2023-11-28 PROCEDURE — 99215 OFFICE O/P EST HI 40 MIN: CPT

## 2023-11-28 PROCEDURE — 84100 ASSAY OF PHOSPHORUS: CPT | Performed by: EMERGENCY MEDICINE

## 2023-11-28 PROCEDURE — 84484 ASSAY OF TROPONIN QUANT: CPT | Performed by: EMERGENCY MEDICINE

## 2023-11-28 PROCEDURE — 96376 TX/PRO/DX INJ SAME DRUG ADON: CPT

## 2023-11-28 PROCEDURE — 93923 UPR/LXTR ART STDY 3+ LVLS: CPT

## 2023-11-28 PROCEDURE — 93922 UPR/L XTREMITY ART 2 LEVELS: CPT | Performed by: SURGERY

## 2023-11-28 PROCEDURE — 83735 ASSAY OF MAGNESIUM: CPT | Performed by: EMERGENCY MEDICINE

## 2023-11-28 PROCEDURE — 84443 ASSAY THYROID STIM HORMONE: CPT | Performed by: EMERGENCY MEDICINE

## 2023-11-28 PROCEDURE — 93925 LOWER EXTREMITY STUDY: CPT

## 2023-11-28 PROCEDURE — 99285 EMERGENCY DEPT VISIT HI MDM: CPT | Performed by: EMERGENCY MEDICINE

## 2023-11-28 PROCEDURE — 71275 CT ANGIOGRAPHY CHEST: CPT

## 2023-11-28 PROCEDURE — 85025 COMPLETE CBC W/AUTO DIFF WBC: CPT | Performed by: EMERGENCY MEDICINE

## 2023-11-28 PROCEDURE — 84484 ASSAY OF TROPONIN QUANT: CPT | Performed by: FAMILY MEDICINE

## 2023-11-28 RX ORDER — ONDANSETRON 2 MG/ML
4 INJECTION INTRAMUSCULAR; INTRAVENOUS EVERY 4 HOURS PRN
Status: DISCONTINUED | OUTPATIENT
Start: 2023-11-28 | End: 2023-12-03

## 2023-11-28 RX ORDER — OXYCODONE HYDROCHLORIDE 5 MG/1
5 TABLET ORAL EVERY 6 HOURS PRN
Status: DISCONTINUED | OUTPATIENT
Start: 2023-11-28 | End: 2023-12-03

## 2023-11-28 RX ORDER — MORPHINE SULFATE 4 MG/ML
4 INJECTION, SOLUTION INTRAMUSCULAR; INTRAVENOUS ONCE
Status: COMPLETED | OUTPATIENT
Start: 2023-11-28 | End: 2023-11-28

## 2023-11-28 RX ORDER — HYDROMORPHONE HCL/PF 1 MG/ML
0.5 SYRINGE (ML) INJECTION ONCE
Status: COMPLETED | OUTPATIENT
Start: 2023-11-28 | End: 2023-11-28

## 2023-11-28 RX ORDER — ACETAMINOPHEN 325 MG/1
975 TABLET ORAL EVERY 8 HOURS
Status: DISCONTINUED | OUTPATIENT
Start: 2023-11-28 | End: 2023-12-06 | Stop reason: HOSPADM

## 2023-11-28 RX ORDER — TRAZODONE HYDROCHLORIDE 50 MG/1
50 TABLET ORAL
Status: DISCONTINUED | OUTPATIENT
Start: 2023-11-28 | End: 2023-12-06 | Stop reason: HOSPADM

## 2023-11-28 RX ORDER — HYDROMORPHONE HCL/PF 1 MG/ML
0.5 SYRINGE (ML) INJECTION EVERY 4 HOURS PRN
Status: DISCONTINUED | OUTPATIENT
Start: 2023-11-28 | End: 2023-12-02

## 2023-11-28 RX ORDER — GABAPENTIN 100 MG/1
100 CAPSULE ORAL 3 TIMES DAILY PRN
Status: DISCONTINUED | OUTPATIENT
Start: 2023-11-28 | End: 2023-12-03

## 2023-11-28 RX ORDER — CALCIUM ACETATE 667 MG/1
2001 CAPSULE ORAL
Status: DISCONTINUED | OUTPATIENT
Start: 2023-11-28 | End: 2023-12-06 | Stop reason: HOSPADM

## 2023-11-28 RX ADMIN — ACETAMINOPHEN 975 MG: 325 TABLET, FILM COATED ORAL at 18:43

## 2023-11-28 RX ADMIN — Medication 7.5 MG: at 22:16

## 2023-11-28 RX ADMIN — ONDANSETRON 4 MG: 2 INJECTION INTRAMUSCULAR; INTRAVENOUS at 22:21

## 2023-11-28 RX ADMIN — HYDROMORPHONE HYDROCHLORIDE 0.5 MG: 1 INJECTION, SOLUTION INTRAMUSCULAR; INTRAVENOUS; SUBCUTANEOUS at 14:42

## 2023-11-28 RX ADMIN — MORPHINE SULFATE 4 MG: 4 INJECTION INTRAVENOUS at 13:21

## 2023-11-28 RX ADMIN — TRAZODONE HYDROCHLORIDE 50 MG: 50 TABLET ORAL at 22:16

## 2023-11-28 RX ADMIN — HYDROMORPHONE HYDROCHLORIDE 0.5 MG: 1 INJECTION, SOLUTION INTRAMUSCULAR; INTRAVENOUS; SUBCUTANEOUS at 22:31

## 2023-11-28 RX ADMIN — IOHEXOL 100 ML: 350 INJECTION, SOLUTION INTRAVENOUS at 10:52

## 2023-11-28 RX ADMIN — MORPHINE SULFATE 2 MG: 2 INJECTION, SOLUTION INTRAMUSCULAR; INTRAVENOUS at 08:48

## 2023-11-28 RX ADMIN — SODIUM ZIRCONIUM CYCLOSILICATE 10 G: 10 POWDER, FOR SUSPENSION ORAL at 13:21

## 2023-11-28 NOTE — ASSESSMENT & PLAN NOTE
Lab Results   Component Value Date    EGFR 2 11/28/2023    EGFR 6 09/01/2023    EGFR 3 08/30/2023    CREATININE 22.00 (H) 11/28/2023    CREATININE 9.46 (H) 09/01/2023    CREATININE 15.32 (H) 08/30/2023   Nephrology consulted for HD  Reviewed notes  Associated hyperphosphatemia  For HD

## 2023-11-28 NOTE — ASSESSMENT & PLAN NOTE
No associated chest pain.    EKG does not show any concerning ST T wave changes  Likely non MI troponin elevation, cont to trend until peak  Monitor on tele  Could be associated with ESRD

## 2023-11-28 NOTE — H&P
[address]  H&P  Name: Kayla Christine 25 y.o. male I MRN: [id number]  Unit/Bed#: ED 24 I Date of Admission: 11/28/2023   Date of Service: 11/28/2023 I Hospital Day: 0      Assessment/Plan   * Elevated troponin  Assessment & Plan  No associated chest pain. EKG does not show any concerning ST T wave changes  Likely non MI troponin elevation, cont to trend until peak  Monitor on tele  Could be associated with ESRD    Microangiopathy ([address])  Assessment & Plan  Seen by vascular surgery, no surgical intervention  Cont pain control  MICHELLE is abnormal on the right leg with severe range MICHELLE 0.62  CTA chest abd pelvis reviewed: no acute aortic pathology or stenosis noted    End stage renal disease on dialysis [facility])  Assessment & Plan  Lab Results   Component Value Date    EGFR 2 11/28/2023    EGFR 6 09/01/2023    EGFR 3 08/30/2023    CREATININE 22.00 (H) 11/28/2023    CREATININE 9.46 (H) 09/01/2023    CREATININE 15.32 (H) 08/30/2023   Nephrology consulted for HD  Reviewed notes  Associated hyperphosphatemia  For HD    Neuropathy  Assessment & Plan  Cont gapapentin    Insomnia  Assessment & Plan  Cont home meds    Anxiety, generalized  Assessment & Plan  Stable currently         VTE Pharmacologic Prophylaxis: VTE Score: 1 Low Risk (Score 0-2) - Encourage Ambulation. Code Status: Level 1 - Full Code   Discussion with family:  no family at bedside, will call later on. Anticipated Length of Stay: Patient will be admitted on an observation basis with an anticipated length of stay of less than 2 midnights secondary to monitoring troponin and telemetry. Total Time Spent on Date of Encounter in care of patient: 65 mins.  This time was spent on one or more of the following: performing physical exam; counseling and coordination of care; obtaining or reviewing history; documenting in the medical record; reviewing/ordering tests, medications or procedures; communicating with other healthcare professionals and discussing with patient's family/caregivers. Chief Complaint: severe pain in the left and right toes    History of Present Illness:  Sabino Marte is a 25 y.o. male with a PMH of microangiopathy, ESRD on HD who presents with severe pain in bilateral toes with skin mottling, unable to sleep because of the pain, pain is 10/10, nonradiating, worsens with any kind of pressure and is also worse without touch or pressure. No chest pain shortness of breath cough fever nausea vomiting or abdominal pain. He had initially had vascular surgery see the patient and they did not recommend any inpatient interventions. Patient underwent CTA dissection protocol chest abdomen pelvis which did not show any stenosis. Patient was also seen by nephrology. Troponins were checked for reasons unknown and was found to be elevated and the decision to observe the patient in the hospital was made. Patient does not endorse any chest pain or any other cardiac symptoms    Review of Systems:  Review of Systems   Constitutional:  Negative for chills and fever. HENT:  Negative for ear pain and sore throat. Eyes:  Negative for pain and visual disturbance. Respiratory:  Negative for cough and shortness of breath. Cardiovascular:  Negative for chest pain and palpitations. Gastrointestinal:  Negative for abdominal pain and vomiting. Genitourinary:  Negative for dysuria and hematuria. Musculoskeletal:  Positive for arthralgias and gait problem. Negative for back pain. Skin:  Positive for color change. Negative for rash. Neurological:  Negative for seizures and syncope. Psychiatric/Behavioral:  Negative for agitation, behavioral problems, confusion and decreased concentration. All other systems reviewed and are negative.       Past Medical and Surgical History:   Past Medical History:   Diagnosis Date    Anxiety     Chronic kidney disease     GERD (gastroesophageal reflux disease)     Kidney stone     Liver disorder Nephrolithiasis     Primary hyperoxaluria type 1 [facility])        Past Surgical History:   Procedure Laterality Date    ANKLE SURGERY Right 2017    ligament repair    CYSTOSCOPY      FL RETROGRADE PYELOGRAM  10/24/2020    FL RETROGRADE PYELOGRAM  02/08/2021    HERNIA REPAIR      IR TUNNELED DIALYSIS CATHETER PLACEMENT  8/24/2023    KIDNEY STONE SURGERY      LITHOTRIPSY      MASS EXCISION Left 02/17/2017    Procedure: BACK/SHOULDER CYST EXCISION ;  Surgeon: Betina Drake MD;  [facility];  Service:     MN CYSTO/URETERO W/LITHOTRIPSY &INDWELL STENT INSRT Left 10/24/2020    Procedure: CYSTOSCOPY URETEROSCOPY WITH LITHOTRIPSY HOLMIUM LASER, RETROGRADE PYELOGRAM AND INSERTION STENT URETERAL;  Surgeon: Kourtney Hoskins MD;  [facility];  Service: Urology    MN CYSTO/URETERO W/LITHOTRIPSY &INDWELL STENT INSRT Right 02/08/2021    Procedure: CYSTOSCOPY URETEROSCOPY WITH LITHOTRIPSY HOLMIUM LASER, RETROGRADE PYELOGRAM AND INSERTION STENT URETERAL;  Surgeon: Sharlene Fontanez MD;  [facility];  Service: Urology       Meds/Allergies:  Prior to Admission medications    Medication Sig Start Date End Date Taking? Authorizing Provider   calcitriol (ROCALTROL) 0.25 mcg capsule Take 1 capsule (0.25 mcg total) by mouth 3 (three) times a week Do not start before September 4, 2023.   Patient not taking: Reported on 11/20/2023 9/4/23   Adrian Muller MD   gabapentin (Neurontin) 100 mg capsule Take 1 capsule (100 mg total) by mouth 3 (three) times a day as needed (neuropathy) 10/3/23   Ric Casey MD   lidocaine (XYLOCAINE) 2 % topical gel Apply topically as needed for mild pain  Patient not taking: Reported on 11/20/2023 10/11/23   Ferny Knight PA-C   ondansetron (ZOFRAN) 4 mg tablet Take 1 tablet (4 mg total) by mouth every 8 (eight) hours as needed for nausea or vomiting 9/11/23   Alfreda Powers DO   traZODone (DESYREL) 50 mg tablet TAKE 1 TABLET BY MOUTH EVERYDAY AT BEDTIME 1/24/23   Artie Wheeler DO [place]     I have reviewed home medications with patient personally.     Allergies: No Known Allergies    Social History:  Marital Status: Single     Substance Use History:   Social History     Substance and Sexual Activity   Alcohol Use Yes    Comment: rarely     Social History     Tobacco Use   Smoking Status Never   Smokeless Tobacco Never     Social History     Substance and Sexual Activity   Drug Use No       Family History:  Family History   Problem Relation Age of Onset    Hypertension Mother     No Known Problems Father     Cancer Maternal Grandmother         Bladder    Diabetes Maternal Grandmother     Alzheimer's disease Maternal Grandfather        Physical Exam:     Vitals:   Blood Pressure: 169/81 (11/28/23 1642)  Pulse: 75 (11/28/23 1642)  Temperature: 99.2 °F (37.3 °C) (11/28/23 1642)  Temp Source: Oral (11/28/23 1642)  Respirations: 18 (11/28/23 1642)  SpO2: 93 % (11/28/23 1315)    Physical Exam General- Awake, alert and oriented x 3, looks comfortable  HEENT- Normocephalic, atraumatic, oral mucosa- moist  Neck- Supple, No carotid bruit, no JVD  CVS- Normal S1/ S2, Regular rate and rhythm, No murmur, No edema  Respiratory system- B/L clear breath sounds, no wheezing  Abdomen- Soft, Non distended, no tenderness, Bowel sound- present 4 quads  Genitourinary- No suprapubic tenderness, No CVA tenderness  Skin- mottling around the great toes, pulses present DP b/l, cold toes, sensations diminished b/l below mid foot area  Musculoskeletal- No gross deformity  Psych- No acute psychosis  CNS- No new acute focal neurologic deficit noted      Additional Data:     Lab Results:  Results from last 7 days   Lab Units 11/28/23  0848   WBC Thousand/uL 7.36   HEMOGLOBIN g/dL 9.2*   HEMATOCRIT % 28.2*   PLATELETS Thousands/uL 245   NEUTROS PCT % 68   LYMPHS PCT % 17   MONOS PCT % 8   EOS PCT % 6     Results from last 7 days   Lab Units 11/28/23  0848   SODIUM mmol/L 138   POTASSIUM mmol/L 5.3   CHLORIDE mmol/L 97 CO2 mmol/L 22   BUN mg/dL 77*   CREATININE mg/dL 22.00*   ANION GAP mmol/L 19   CALCIUM mg/dL 8.7   ALBUMIN g/dL 4.4   TOTAL BILIRUBIN mg/dL 0.34   ALK PHOS U/L 48   ALT U/L 7   AST U/L 8*   GLUCOSE RANDOM mg/dL 91                       Lines/Drains:  Invasive Devices       Peripheral Intravenous Line  Duration             Peripheral IV 11/28/23 Distal;Right;Upper;Ventral (anterior) Arm <1 day              Hemodialysis Catheter  Duration             HD Permanent Double Catheter 96 days              Drain  Duration             Ureteral Drain/Stent Left ureter 6 Fr. 1130 days    Ureteral Drain/Stent Right ureter 6 Fr. 1023 days                        Imaging: Reviewed radiology reports from this admission including: abdominal/pelvic CT  VAS lower limb arterial duplex, complete bilateral   Final Result by Desire Leonard MD (11/28 2749)      CTA dissection protocol chest/abdomen/pelvis   Final Result by Jeana Wiley MD (11/28 5046)      No acute aortic pathology or significant arterial stenosis. Workstation performed: [id number]             EKG and Other Studies Reviewed on Admission:   EKG:  NSR, no acute ST T wave elevation or depression. ** Please Note: This note has been constructed using a voice recognition system.  **

## 2023-11-28 NOTE — CONSULTS
Consult Note - Vascular Surgery   [facility]: [phone]    Assessment:  Roseann Hare is a 24 yo male, non-smoker, with ESRD on HD via catheter (new start 8/25/23), neuropathy, presented to the ED today with worsening bilateral, constant toe pain "nerve like" and difficulty ambulating. Patient was noted to have discoloration and bilateral toes cool to touch with no palpable pulses or doppler signals. Vascular was consulted for evaluation. Imaging-   LEAD 11/28/23: no significant arterial occlusive disease noted. Bilateral proximal to mid SFA with reduced diameter and elevated velocities. R MICHELLE 0.62/-/- (prior 1.0/44/-); L MICHELLE 1.09/-/- (prior 0.92/78/-). Labs-  Cr 22.00/ GFR 2  Hgb 9.2  Phosphorus 11.3  Calcium 8.7  2h troponin I 2hr     Plan:  -Severe bilateral toe pain with purple mottling noted at toe level not extending proximally. This is likely secondary to progressing neuropathy and small vessel disease. -LEAD demonstrates no significant arterial occlusive disease. Bilateral SFAs are small in diameter and further intervention to this area with arteriogram could result in arterial dissection or stent thrombosis, therefore no acute vascular intervention indicated at this time.   -Patient may have underlying oxalate deposition in surrounding tissues, will defer to nephrology for further investigation/management. -Recommend admission for pain control and need for dialysis. Patient also noted to have elevated troponin. -D/w ED attending. -D/w Dr. Cecilio Cornelius.  ______________________________________________________________________    Consulting Service: Emergency Medicine     Chief Complaint: "Severe toe pain"    HPI: Galilea Womack is a 25 y.o. male non-smoker, with ESRD on HD via catheter (new start 8/25/23), neuropathy, presented to the ED today with worsening bilateral, constant foot "nerve" pain and difficulty ambulating.  Patient was noted to have discoloration and bilateral toes cool to touch with no palpable pulses or doppler signals. He reports his symptoms initially started a few weeks after starting dialysis in August. He states his symptoms first started in his hands as a "pins and needles" sensation and progressed to both feet. He states over the past several days his toe pain has worsened and he has not been able to ambulate or sleep. He states both feet feel like they have been "buried in snow for hours" and is like a "cold burning" sensation. He has tried tylenol and OTC lidocaine. He is on low dose gabapentin due to his kidney function with no relief. He reports he was given morphine in the ED around 0800 and does not think it did anything for him. Friday was his last dialysis session, he missed yesterday. He reports whole body tremors that started yesterday. He states its off and on without any specific triggers but is aggressive. He has tried putting heavy socks on his feet and "anything to get them warm" but nothing helps. He has been having trouble ambulating. He is not on any blood thinner. Pain scale is a 9/10. He has not been producing urine.      Review of Systems:  General: positive for  - chills and fatigue  Cardiovascular: no chest pain or dyspnea on exertion  Respiratory: no cough, shortness of breath, or wheezing  Gastrointestinal: no abdominal pain, change in bowel habits, or black or bloody stools  Genitourinary ROS: reports minimal urination  Musculoskeletal ROS: bilateral foot pain  Neurological ROS: B toe numbness  Hematological and Lymphatic ROS: negative  Dermatological ROS: negative  Psychological ROS: negative  Ophthalmic ROS: negative  ENT ROS: negative    Past Medical History:  Past Medical History:   Diagnosis Date    Anxiety     Chronic kidney disease     GERD (gastroesophageal reflux disease)     Kidney stone     Liver disorder     Nephrolithiasis     Primary hyperoxaluria type 1 ([address])        Past Surgical History:  Past Surgical History: Procedure Laterality Date    ANKLE SURGERY Right 2017    ligament repair    CYSTOSCOPY      FL RETROGRADE PYELOGRAM  10/24/2020    FL RETROGRADE PYELOGRAM  02/08/2021    HERNIA REPAIR      IR TUNNELED DIALYSIS CATHETER PLACEMENT  8/24/2023    KIDNEY STONE SURGERY      LITHOTRIPSY      MASS EXCISION Left 02/17/2017    Procedure: BACK/SHOULDER CYST EXCISION ;  Surgeon: Ronda Pérez MD;  [facility];  Service:     AL CYSTO/URETERO W/LITHOTRIPSY &INDWELL STENT INSRT Left 10/24/2020    Procedure: CYSTOSCOPY URETEROSCOPY WITH LITHOTRIPSY HOLMIUM LASER, RETROGRADE PYELOGRAM AND INSERTION STENT URETERAL;  Surgeon: Leigh Vela MD;  [facility];  Service: Urology    AL CYSTO/URETERO W/LITHOTRIPSY &INDWELL STENT INSRT Right 02/08/2021    Procedure: CYSTOSCOPY URETEROSCOPY WITH LITHOTRIPSY HOLMIUM LASER, RETROGRADE PYELOGRAM AND INSERTION STENT URETERAL;  Surgeon: Jeremy Nunez MD;  [facility];  Service: Urology       Social History:  Social History     Substance and Sexual Activity   Alcohol Use Yes    Comment: rarely     Social History     Substance and Sexual Activity   Drug Use No     Social History     Tobacco Use   Smoking Status Never   Smokeless Tobacco Never       Family History:  Family History   Problem Relation Age of Onset    Hypertension Mother     No Known Problems Father     Cancer Maternal Grandmother         Bladder    Diabetes Maternal Grandmother     Alzheimer's disease Maternal Grandfather        Allergies:  No Known Allergies    Medications:  Current Facility-Administered Medications   Medication Dose Route Frequency    calcium acetate (PHOSLO) capsule 2,001 mg  2,001 mg Oral TID With Meals    Sodium Zirconium Cyclosilicate (Lokelma) 10 g  10 g Oral Once       Vitals:  Vitals:    11/28/23 0732   BP: 160/79   Pulse: 75   Resp: 18   Temp: 98 °F (36.7 °C)   SpO2: 95%       I/Os:  No intake/output data recorded. No intake/output data recorded.     Lab Results and Cultures:   CBC with diff:   Lab Results   Component Value Date    WBC 7.36 11/28/2023    HGB 9.2 (L) 11/28/2023    HCT 28.2 (L) 11/28/2023    MCV 97 11/28/2023     11/28/2023    RBC 2.91 (L) 11/28/2023    MCH 31.6 11/28/2023    MCHC 32.6 11/28/2023    RDW 14.4 11/28/2023    MPV 10.9 11/28/2023    NRBC 0 11/28/2023   ,   BMP/CMP:  Lab Results   Component Value Date    K 5.3 11/28/2023    K 4.2 08/09/2022    CL 97 11/28/2023     08/09/2022    CO2 22 11/28/2023    CO2 25 08/09/2022    BUN 77 (H) 11/28/2023    BUN 19 08/09/2022    CREATININE 22.00 (H) 11/28/2023    CALCIUM 8.7 11/28/2023    AST 8 (L) 11/28/2023    AST 15 08/09/2022    ALT 7 11/28/2023    ALT 16 08/09/2022    ALKPHOS 48 11/28/2023    EGFR 2 11/28/2023   ,   Lipid Panel: No results found for: "CHOL",   Coags:   Lab Results   Component Value Date    INR 1.00 08/24/2023   ,     Blood Culture: No results found for: "[employer]",   Urinalysis:   Lab Results   Component Value Date    COLORU Colorless 08/21/2023    CLARITYU Clear 08/21/2023    SPECGRAV 1.008 08/21/2023    PHUR 5.5 08/21/2023    LEUKOCYTESUR Small (A) 08/21/2023    NITRITE Negative 08/21/2023    GLUCOSEU Negative 08/21/2023    KETONESU Negative 08/21/2023    BILIRUBINUR Negative 08/21/2023    BLOODU Small (A) 08/21/2023   ,   Urine Culture:   Lab Results   Component Value Date    URINECX No Growth <1000 cfu/mL 08/21/2023   ,   Wound Culure: No results found for: "WOUNDCULT"    Imaging:  CTA dissection protocol chest/abdomen/pelvis   Final Result by Gaston Patel MD (11/28 1223)      No acute aortic pathology or significant arterial stenosis. Workstation performed: [id number]         VAS lower limb arterial duplex, complete bilateral    (Results Pending)         Physical Exam:    General appearance:  AAOx3. Ill appearing.    Head: Normocephalic, without obvious abnormality, atraumatic  Lungs: clear to auscultation bilaterally  Heart: regular rate and rhythm, S1, S2 normal, no murmur, click, rub or gallop  Extremities:  BLE warm from thigh to midfoot with cool to touch toes with mottling bilaterally. Motor and sensory diminished at toe level. Patient is able to flex at the ankle and knee. No ischemic ulcerations noted. Neurologic: Grossly normal    Wound/Incision:    Right    Right    Left    Left     Pulse exam:  Femoral: Right: 2+ Left: 2+  DP: Right: doppler signal Left: doppler signal  PT: Right: doppler signal Left: doppler signal    *Pulse exam limited due to patients body tremors.      Radha Tran PA-C  11/28/2023

## 2023-11-28 NOTE — CONSULTS
NEPHROLOGY CONSULTATION NOTE    Patient: Mendez Musa               Sex: male          DOA: 11/28/2023  8:02 AM   YOB: 1995        Age:  25 y.o.        LOS:  LOS: 0 days         REASON FOR THE REFERRAL / CONSULTATION: ESRD on HD    DATE OF CONSULTATION / SERVICE: 11/28/2023    ADMISSION DIAGNOSIS: <principal problem not specified>     CHIEF COMPLAINT     Bilateral foot pain    HPI     Mendez Musa is a 25 y.o. male  with a past medical history for ESRD on HD, GERD, primary hyperoxaluria type I, and presented to [id number] [place] [address] emergency department with worsening bilateral foot pain. A renal consultation is requested today for assistance in the management of ESRD on HD. Presented with bilateral foot pain that has been ongoing for several months and has acutely worsened over the past several days. Leg currently undergoing workup and evaluation by the emergency department provider. He undergoes regular outpatient dialysis on a Monday, Wednesday, Friday schedule at the [facility]. Patient's last hemodialysis treatment was on Friday, 11/24. He missed his dialysis treatment on Monday. According to the outpatient dialysis records, his current target weight is set at 102 kg, he denies shortness of breath. Does report overall urine output is low. Reviewed past 24 hour events.     PAST MEDICAL HISTORY     Past Medical History:   Diagnosis Date    Anxiety     Chronic kidney disease     GERD (gastroesophageal reflux disease)     Kidney stone     Liver disorder     Nephrolithiasis     Primary hyperoxaluria type 1 ([address])        PAST SURGICAL HISTORY     Past Surgical History:   Procedure Laterality Date    ANKLE SURGERY Right 2017    ligament repair    CYSTOSCOPY      FL RETROGRADE PYELOGRAM  10/24/2020    FL RETROGRADE PYELOGRAM  02/08/2021    HERNIA REPAIR      IR TUNNELED DIALYSIS CATHETER PLACEMENT  8/24/2023    KIDNEY STONE SURGERY      LITHOTRIPSY      MASS EXCISION Left 02/17/2017    Procedure: BACK/SHOULDER CYST EXCISION ;  Surgeon: Hildy Barthel, MD;  Location: [facility];  Service:     UT CYSTO/URETERO W/LITHOTRIPSY &INDWELL STENT INSRT Left 10/24/2020    Procedure: CYSTOSCOPY URETEROSCOPY WITH LITHOTRIPSY HOLMIUM LASER, RETROGRADE PYELOGRAM AND INSERTION STENT URETERAL;  Surgeon: Marge Hurtado MD;  [facility];  Service: Urology    UT CYSTO/URETERO W/LITHOTRIPSY &INDWELL STENT INSRT Right 02/08/2021    Procedure: CYSTOSCOPY URETEROSCOPY WITH LITHOTRIPSY HOLMIUM LASER, RETROGRADE PYELOGRAM AND INSERTION STENT URETERAL;  Surgeon: Corwin Tipton MD;  Location[facility];  Service: Urology       ALLERGIES     No Known Allergies    SOCIAL HISTORY     Social History     Substance and Sexual Activity   Alcohol Use Yes    Comment: rarely     Social History     Substance and Sexual Activity   Drug Use No     Social History     Tobacco Use   Smoking Status Never   Smokeless Tobacco Never       FAMILY HISTORY     Family History   Problem Relation Age of Onset    Hypertension Mother     No Known Problems Father     Cancer Maternal Grandmother         Bladder    Diabetes Maternal Grandmother     Alzheimer's disease Maternal Grandfather        CURRENT MEDICATIONS     No current facility-administered medications for this encounter. Current Outpatient Medications:     calcitriol (ROCALTROL) 0.25 mcg capsule, Take 1 capsule (0.25 mcg total) by mouth 3 (three) times a week Do not start before September 4, 2023.  (Patient not taking: Reported on 11/20/2023), Disp: 12 capsule, Rfl: 0    gabapentin (Neurontin) 100 mg capsule, Take 1 capsule (100 mg total) by mouth 3 (three) times a day as needed (neuropathy), Disp: 30 capsule, Rfl: 3    lidocaine (XYLOCAINE) 2 % topical gel, Apply topically as needed for mild pain (Patient not taking: Reported on 11/20/2023), Disp: 30 mL, Rfl: 0    ondansetron (ZOFRAN) 4 mg tablet, Take 1 tablet (4 mg total) by mouth every 8 (eight) hours as needed for nausea or vomiting, Disp: 50 tablet, Rfl: 0    traZODone (DESYREL) 50 mg tablet, TAKE 1 TABLET BY MOUTH EVERYDAY AT BEDTIME, Disp: 90 tablet, Rfl: 3    REVIEW OF SYSTEMS   Review of Systems   Constitutional:  Positive for activity change. Negative for chills, fatigue and fever. HENT:  Negative for hearing loss, nosebleeds and trouble swallowing. Respiratory:  Negative for cough and shortness of breath. Cardiovascular:  Negative for chest pain and leg swelling. Gastrointestinal:  Negative for nausea and vomiting. Genitourinary:  Positive for decreased urine volume. Musculoskeletal:  Negative for back pain. Skin:  Positive for color change (b/l foot) and pallor (b/l foot). Neurological:  Negative for dizziness, syncope, weakness and light-headedness. Psychiatric/Behavioral:  Negative for sleep disturbance. The patient is not nervous/anxious. OBJECTIVE     Current Weight:    Vitals:    11/28/23 0732   BP: 160/79   Pulse: 75   Resp: 18   Temp: 98 °F (36.7 °C)   SpO2: 95%     There is no height or weight on file to calculate BMI. No intake or output data in the 24 hours ending 11/28/23 1115    PHYSICAL EXAMINATION     Physical Exam  Vitals reviewed. Constitutional:       General: He is not in acute distress. HENT:      Head: Normocephalic. Mouth/Throat:      Lips: Pink. Mouth: Mucous membranes are moist.   Eyes:      General: Lids are normal. No scleral icterus. Cardiovascular:      Rate and Rhythm: Normal rate and regular rhythm. Heart sounds: S1 normal and S2 normal. No murmur heard. Pulmonary:      Effort: Pulmonary effort is normal. No accessory muscle usage or respiratory distress. Breath sounds: Normal breath sounds. Abdominal:      General: There is no distension. Tenderness: There is no abdominal tenderness. Musculoskeletal:      Cervical back: Normal range of motion and neck supple. No tenderness.       Comments: Bilateral feet cool to touch Skin:     General: Skin is warm. Coloration: Skin is not cyanotic or jaundiced. Neurological:      General: No focal deficit present. Mental Status: He is alert and oriented to person, place, and time. Psychiatric:         Attention and Perception: Attention normal.         Speech: Speech normal.         Behavior: Behavior is cooperative. LAB RESULTS        Results from last 7 days   Lab Units 11/28/23  0848   WBC Thousand/uL 7.36   HEMOGLOBIN g/dL 9.2*   HEMATOCRIT % 28.2*   PLATELETS Thousands/uL 245   POTASSIUM mmol/L 5.3   CHLORIDE mmol/L 97   CO2 mmol/L 22   BUN mg/dL 77*   CREATININE mg/dL 22.00*   EGFR ml/min/1.73sq m 2   CALCIUM mg/dL 8.7   MAGNESIUM mg/dL 3.4*   PHOSPHORUS mg/dL 11.3*       Recent Labs     11/28/23  0848   WBC 7.36     Recent Labs     11/28/23  0848   HGB 9.2*       Recent Labs     11/28/23  0848   HCT 28.2*     Recent Labs     11/28/23  0848        Recent Labs     11/28/23  0848   SODIUM 138     Recent Labs     11/28/23  0848   K 5.3     Recent Labs     11/28/23  0848   CL 97     Recent Labs     11/28/23  0848   CO2 22     Recent Labs     11/28/23  0848   BUN 77*     Recent Labs     11/28/23  0848   CREATININE 22.00*     Recent Labs     11/28/23  0848   EGFR 2     Recent Labs     11/28/23  0848   CALCIUM 8.7     Recent Labs     11/28/23  0848   MG 3.4*     Recent Labs     11/28/23  0848   PHOS 11.3*     Invalid input(s): "ALBUMIN"  No results for input(s): "PROT" in the last 72 hours. No results for input(s): "GLUCOSE" in the last 72 hours. RADIOLOGY RESULTS     Results for orders placed during the hospital encounter of 08/21/23    XR chest portable    Narrative  CHEST    INDICATION:   ckd. COMPARISON:  None    EXAM PERFORMED/VIEWS:  XR CHEST PORTABLE      FINDINGS:    Right IJ dialysis catheter in place    Cardiomediastinal silhouette appears unremarkable. The lungs are clear. No pneumothorax or pleural effusion.     Osseous structures appear within normal limits for patient age. Impression  Right dialysis catheter present. No acute cardiopulmonary disease. PLAN / RECOMMENDATIONS      70-year-old male with a past medical history for ESRD on HD, GERD, primary hyperoxaluria type I, and [id number] with worsening bilateral foot pain. ESRD on HD:  Undergoes outpatient dialysis on a Monday, Wednesday, Friday schedule at the [facility]. Underwent dialysis on 11/24 and patient left below set target weight of 1 and 1.7 kg. He did miss his dialysis treatment on Monday. Patient clinically appears euvolemic on examination today, no evidence of hyperkalemia on review of most recent lab values. No urgent indication for hemodialysis today and will plan for next scheduled treatment tomorrow in hospital if he is to remain hospitalized. As current potassium is 5.3, will provide a dose of Lokelma 10 g for 1 dose today. Hypertension:  Admitted with elevated blood pressure, 160/79, may be discomfort or pain mediated. No previously diagnosed history of hypertension and will continue to monitor. Clinically appears euvolemic on examination and near target weight of 102 kg. Anemia in ESRD:  Most recent hemoglobin is 9.2, receives Mircera as an outpatient. Secondary hyperparathyroidism:  Most recent outpatient labs on 11/13 revealed a calcium 8.4, and parathyroid hormone 364. Will continue calcitriol 0.25 mcg 3 times weekly with dialysis treatments. Thank you for the consultation to participate in patient's care. I have discussed this plan with my attending physician. [facility]    11/28/2023      Portions of the record may have been created with voice recognition software. Occasional wrong word or "sound a like" substitutions may have occurred due to the inherent limitations of voice recognition software.  Read the chart carefully and recognize, using context, where substitutions have occurred.

## 2023-11-28 NOTE — ED PROVIDER NOTES
History  Chief Complaint   Patient presents with   • Foot Pain     B/l feet pain, "feels like nerve pain" hasn't slept due to it, no inj, also missed dialysis yester      Patient is a 42-year-old male past medical history of end-stage renal disease secondary to kidney stones, anxiety, GERD presenting for foot pain. Patient notes bilateral foot pain which he describes as nerve pain which he states has been first occurring in his hands and started several months ago however worsened acutely over the past several days to the point where he has difficulty walking and sleeping. Has been taking gabapentin with no relief. States he began having tremors in his arms and legs intermittently yesterday, only one body part of time and has not found anything that seems to exacerbate them. Notes baseline and unchanged nausea and vomiting. States missed dialysis yesterday but had full session on Friday. Denies any fevers, rashes, vision changes, chest pain, shortness of breath, leg swelling, dizziness. States that the pain radiates from his toes of his foot. Prior to Admission Medications   Prescriptions Last Dose Informant Patient Reported? Taking?   calcitriol (ROCALTROL) 0.25 mcg capsule  Self No No   Sig: Take 1 capsule (0.25 mcg total) by mouth 3 (three) times a week Do not start before September 4, 2023.    Patient not taking: Reported on 11/20/2023   gabapentin (Neurontin) 100 mg capsule  Self No No   Sig: Take 1 capsule (100 mg total) by mouth 3 (three) times a day as needed (neuropathy)   lidocaine (XYLOCAINE) 2 % topical gel  Self No No   Sig: Apply topically as needed for mild pain   Patient not taking: Reported on 11/20/2023   ondansetron (ZOFRAN) 4 mg tablet  Self No No   Sig: Take 1 tablet (4 mg total) by mouth every 8 (eight) hours as needed for nausea or vomiting   traZODone (DESYREL) 50 mg tablet  Self No No   Sig: TAKE 1 TABLET BY MOUTH EVERYDAY AT BEDTIME      Facility-Administered Medications: None Past Medical History:   Diagnosis Date   • Anxiety    • Chronic kidney disease    • GERD (gastroesophageal reflux disease)    • Kidney stone    • Liver disorder    • Nephrolithiasis    • Primary hyperoxaluria type 1 ([address])        Past Surgical History:   Procedure Laterality Date   • ANKLE SURGERY Right 2017    ligament repair   • CYSTOSCOPY     • FL RETROGRADE PYELOGRAM  10/24/2020   • FL RETROGRADE PYELOGRAM  02/08/2021   • HERNIA REPAIR     • IR TUNNELED DIALYSIS CATHETER PLACEMENT  8/24/2023   • KIDNEY STONE SURGERY     • LITHOTRIPSY     • MASS EXCISION Left 02/17/2017    Procedure: BACK/SHOULDER CYST EXCISION ;  Surgeon: Esperanza Maria MD;  Location[facility];  Service:    • DC CYSTO/URETERO W/LITHOTRIPSY &INDWELL STENT INSRT Left 10/24/2020    Procedure: CYSTOSCOPY URETEROSCOPY WITH LITHOTRIPSY HOLMIUM LASER, RETROGRADE PYELOGRAM AND INSERTION STENT URETERAL;  Surgeon: Jose Alex MD;  [facility];  Service: Urology   • DC CYSTO/URETERO W/LITHOTRIPSY &INDWELL STENT INSRT Right 02/08/2021    Procedure: CYSTOSCOPY URETEROSCOPY WITH LITHOTRIPSY HOLMIUM LASER, RETROGRADE PYELOGRAM AND INSERTION STENT URETERAL;  Surgeon: Oscar Arreguin MD;  [facility];  Service: Urology       Family History   Problem Relation Age of Onset   • Hypertension Mother    • No Known Problems Father    • Cancer Maternal Grandmother         Bladder   • Diabetes Maternal Grandmother    • Alzheimer's disease Maternal Grandfather      I have reviewed and agree with the history as documented.     E-Cigarette/Vaping   • E-Cigarette Use Never User      E-Cigarette/Vaping Substances   • Nicotine No    • THC No    • CBD No    • Flavoring No    • Other No    • Unknown No      Social History     Tobacco Use   • Smoking status: Never   • Smokeless tobacco: Never   Vaping Use   • Vaping Use: Never used   Substance Use Topics   • Alcohol use: Yes     Comment: rarely   • Drug use: No       Review of Systems   All other systems reviewed and are negative. Physical Exam  Physical Exam  Vitals reviewed. Constitutional:       General: He is not in acute distress. Appearance: Normal appearance. He is not ill-appearing. HENT:      Mouth/Throat:      Mouth: Mucous membranes are moist.   Eyes:      Conjunctiva/sclera: Conjunctivae normal.   Cardiovascular:      Rate and Rhythm: Normal rate and regular rhythm. Pulses: Normal pulses. Heart sounds: Normal heart sounds. Pulmonary:      Effort: Pulmonary effort is normal.      Breath sounds: Normal breath sounds. Abdominal:      General: Abdomen is flat. Palpations: Abdomen is soft. Tenderness: There is no abdominal tenderness. Musculoskeletal:         General: No swelling or tenderness. Normal range of motion. Cervical back: Neck supple. Skin:     General: Skin is warm and dry. Neurological:      General: No focal deficit present. Mental Status: He is alert. Sensory: No sensory deficit. Motor: No weakness.       Coordination: Coordination normal.      Gait: Gait abnormal.      Comments: Ambulatory at bedside but appears unsteady on his feet   Psychiatric:         Mood and Affect: Mood normal.       Vital Signs  ED Triage Vitals [11/28/23 0732]   Temperature Pulse Respirations Blood Pressure SpO2   98 °F (36.7 °C) 75 18 160/79 95 %      Temp src Heart Rate Source Patient Position - Orthostatic VS BP Location FiO2 (%)   -- -- -- -- --      Pain Score       --           Vitals:    11/28/23 0732   BP: 160/79   Pulse: 75         Visual Acuity      ED Medications  Medications   morphine injection 2 mg (has no administration in time range)       Diagnostic Studies  Results Reviewed       Procedure Component Value Units Date/Time    Magnesium [[id number]]     Lab Status: No result Specimen: Blood     Phosphorus [[id number]]     Lab Status: No result Specimen: Blood     TSH, 3rd generation with Free T4 reflex [[id number]]     Lab Status: No result Specimen: Blood     HS Troponin 0hr (reflex protocol) [[id number]]     Lab Status: No result Specimen: Blood     Blood gas, venous [[id number]]     Lab Status: No result Specimen: Blood     Comprehensive metabolic panel [[id number]]     Lab Status: No result Specimen: Blood     CBC and differential [[id number]]     Lab Status: No result Specimen: Blood                    No orders to display              Procedures  ECG 12 Lead Documentation Only    Date/Time: 11/28/2023 9:12 AM    Performed by: Jobie Closs, DO  Authorized by: Jobie Closs, DO    Patient location:  ED  Previous ECG:     Previous ECG:  Compared to current    Comparison ECG info:  Possible small change in QRS duration, now with left axis    Similarity:  Changes noted  Interpretation:     Interpretation: normal    Rate:     ECG rate assessment: normal    Rhythm:     Rhythm: sinus rhythm    Ectopy:     Ectopy: none    QRS:     QRS axis:  Left    QRS intervals:  Normal  Conduction:     Conduction: normal    ST segments:     ST segments:  Normal  T waves:     T waves: normal             ED Course  ED Course as of 11/28/23 1513   Tue Nov 28, 2023   0959 On initial physical exam I did feel that I palpated pulses on patient's bilateral feet, patient was tremulous at the time, however after discussion with nephrology patient has had ongoing issues with lower extremity vasculature and on repeat exam with Doppler not able to appreciate a pulse in either foot. Will obtain vascular ultrasound, discussed with vascular surgery however on review of last vascular surgery note it does not appear that patient has had decreased sensation skin changes to the toes in the past.   1230 Have communicated with vascular regarding imaging who will evaluate the patient, CTA unremarkable, pending vascular evaluation will admit for elevated troponin which does seem to be downtrending.    1423 Patient currently at dialysis, vascular states nothing to do from their perspective, 4-hour delta troponin now 1100, will admit. SBIRT 22yo+      Flowsheet Row Most Recent Value   Initial Alcohol Screen: US AUDIT-C     1. How often do you have a drink containing alcohol? 0 Filed at: 11/28/2023 0732   2. How many drinks containing alcohol do you have on a typical day you are drinking? 0 Filed at: 11/28/2023 0732   3a. Male UNDER 65: How often do you have five or more drinks on one occasion? 0 Filed at: 11/28/2023 0732   3b. FEMALE Any Age, or MALE 65+: How often do you have 4 or more drinks on one occassion? 0 Filed at: 11/28/2023 0732   Audit-C Score 0 Filed at: 11/28/2023 5780   RADHA: How many times in the past year have you. .. Used an illegal drug or used a prescription medication for non-medical reasons? Never Filed at: 11/28/2023 0732                      Medical Decision Making  Patient is a 17-year-old male past medical history of GERD, end-stage renal disease on hemodialysis, anxiety, kidney stones presenting for tremors, nerve pain. Patient is well-appearing at bedside with stable vitals and in no acute distress. He has no gross amount is on neurologic exam with the exception of generalized weakness in the upper and lower extremities and appears unsteady when standing but is able to support his weight and ambulate. Has no swelling or tenderness, erythema or edema to the hands or feet and no other significant physical exam findings. Suspect worsening neuropathy however given new tremors will obtain labs to assess for electrolyte abnormalities, anemia, MECHELLE, EKG to assess for arrhythmia or sequelae of electrolyte abnormalities, give pain control continue to monitor    Amount and/or Complexity of Data Reviewed  Labs: ordered. Risk  Prescription drug management.            Disposition  Final diagnoses:   None     ED Disposition       None          Follow-up Information    None         Patient's Medications   Discharge Prescriptions    No medications on file       No discharge procedures on file.     PDMP Review         Value Time User    PDMP Reviewed  Yes 10/10/2022 11:54 AM Nupur Baron DO            ED Provider  Electronically Signed by             Yaw Castañeda DO  11/28/23 2377

## 2023-11-28 NOTE — ED NOTES
Patient returned from [address] [place] [address], [address] [place] [address]  11/28/23 1396

## 2023-11-28 NOTE — PLAN OF CARE
TX plan reviewed with Dr Chiara Snider and he is agreeable to the following: Goal is to UF 2 L on a 2 K+ bath for a morning serum K+ of 5.3, 4 HR TX. Time on [place] setting was left at 3 HRS and was being adjusted when TMP alarms started and venous chamber was clotting. Pt off of TX 1 HR early. Dr Chiara Snider contacted, do not restring tonight and add a heparin bolus for tomorrows TX. Problem: METABOLIC, FLUID AND ELECTROLYTES - ADULT  Goal: Electrolytes maintained within normal limits  Description: INTERVENTIONS:  - Monitor labs and assess patient for signs and symptoms of electrolyte imbalances  - Administer electrolyte replacement as ordered  - Monitor response to electrolyte replacements, including repeat lab results as appropriate  - Instruct patient on fluid and nutrition as appropriate  Outcome: Progressing  Goal: Fluid balance maintained  Description: INTERVENTIONS:  - Monitor labs   - Monitor I/O and WT  - Instruct patient on fluid and nutrition as appropriate  - Assess for signs & symptoms of volume excess or deficit  Outcome: Progressing     Post-Dialysis RN Treatment Note    Blood Pressure:  Pre 181/100 mm/Hg  Post 169/81 mmHg   EDW  102 kg    Weight:  Pre None Reported kg   Post None Reported kg   Mode of weight measurement: N/A, stretcher scale not working and pt unable to stand   Volume Removed  2000 ml net    Treatment duration 180 minutes    NS given  No    Treatment shortened?  Yes, describe: d/t clotting of the system   Medications given during Rx dilaudid 0.5 mg   Estimated Kt/V  None Reported   Access type: Permacath/TDC   Access Issues: No    Report called to primary nurse   Yes Daina Lua RN

## 2023-11-28 NOTE — ASSESSMENT & PLAN NOTE
Seen by vascular surgery, no surgical intervention  Cont pain control  MICHELLE is abnormal on the right leg with severe range MICHELLE 0.62  CTA chest abd pelvis reviewed: no acute aortic pathology or stenosis noted

## 2023-11-29 ENCOUNTER — APPOINTMENT (OUTPATIENT)
Dept: NON INVASIVE DIAGNOSTICS | Facility: HOSPITAL | Age: 28
DRG: 682 | End: 2023-11-29
Payer: COMMERCIAL

## 2023-11-29 ENCOUNTER — APPOINTMENT (OUTPATIENT)
Dept: DIALYSIS | Facility: HOSPITAL | Age: 28
DRG: 682 | End: 2023-11-29
Payer: COMMERCIAL

## 2023-11-29 PROBLEM — R11.10 INTRACTABLE VOMITING: Status: ACTIVE | Noted: 2023-11-29

## 2023-11-29 LAB
2HR DELTA HS TROPONIN: -82 NG/L
4HR DELTA HS TROPONIN: -157 NG/L
ANION GAP SERPL CALCULATED.3IONS-SCNC: 16 MMOL/L
AORTIC ROOT: 2.9 CM
APICAL FOUR CHAMBER EJECTION FRACTION: 61 %
ASCENDING AORTA: 3 CM
ATRIAL RATE: 97 BPM
BUN SERPL-MCNC: 48 MG/DL (ref 5–25)
CALCIUM SERPL-MCNC: 9.4 MG/DL (ref 8.4–10.2)
CARDIAC TROPONIN I PNL SERPL HS: 1026 NG/L
CARDIAC TROPONIN I PNL SERPL HS: 1108 NG/L
CARDIAC TROPONIN I PNL SERPL HS: 951 NG/L
CHLORIDE SERPL-SCNC: 96 MMOL/L (ref 96–108)
CO2 SERPL-SCNC: 25 MMOL/L (ref 21–32)
CREAT SERPL-MCNC: 16.25 MG/DL (ref 0.6–1.3)
E WAVE DECELERATION TIME: 183 MS
E/A RATIO: 1.01
ERYTHROCYTE [DISTWIDTH] IN BLOOD BY AUTOMATED COUNT: 14.5 % (ref 11.6–15.1)
FRACTIONAL SHORTENING: 38 (ref 28–44)
GFR SERPL CREATININE-BSD FRML MDRD: 3 ML/MIN/1.73SQ M
GLUCOSE SERPL-MCNC: 86 MG/DL (ref 65–140)
HCT VFR BLD AUTO: 29.2 % (ref 36.5–49.3)
HGB BLD-MCNC: 9.6 G/DL (ref 12–17)
INTERVENTRICULAR SEPTUM IN DIASTOLE (PARASTERNAL SHORT AXIS VIEW): 1 CM
INTERVENTRICULAR SEPTUM: 1 CM (ref 0.6–1.1)
LAAS-AP2: 23.5 CM2
LAAS-AP4: 20 CM2
LEFT ATRIUM AREA SYSTOLE SINGLE PLANE A4C: 19 CM2
LEFT ATRIUM SIZE: 3.9 CM
LEFT ATRIUM VOLUME (MOD BIPLANE): 64 ML
LEFT ATRIUM VOLUME INDEX (MOD BIPLANE): 28.4 ML/M2
LEFT INTERNAL DIMENSION IN SYSTOLE: 3.3 CM (ref 2.1–4)
LEFT VENTRICULAR INTERNAL DIMENSION IN DIASTOLE: 5.3 CM (ref 3.5–6)
LEFT VENTRICULAR POSTERIOR WALL IN END DIASTOLE: 1 CM
LEFT VENTRICULAR STROKE VOLUME: 90 ML
LVSV (TEICH): 90 ML
MAGNESIUM SERPL-MCNC: 2.7 MG/DL (ref 1.9–2.7)
MCH RBC QN AUTO: 31.7 PG (ref 26.8–34.3)
MCHC RBC AUTO-ENTMCNC: 32.9 G/DL (ref 31.4–37.4)
MCV RBC AUTO: 96 FL (ref 82–98)
MV E'TISSUE VEL-SEP: 9 CM/S
MV PEAK A VEL: 0.7 M/S
MV PEAK E VEL: 71 CM/S
MV STENOSIS PRESSURE HALF TIME: 53 MS
MV VALVE AREA P 1/2 METHOD: 4.15
PHOSPHATE SERPL-MCNC: 10.2 MG/DL (ref 2.7–4.5)
PLATELET # BLD AUTO: 233 THOUSANDS/UL (ref 149–390)
PMV BLD AUTO: 10.1 FL (ref 8.9–12.7)
POTASSIUM SERPL-SCNC: 4.3 MMOL/L (ref 3.5–5.3)
QRS AXIS: -12 DEGREES
QRSD INTERVAL: 82 MS
QT INTERVAL: 366 MS
QTC INTERVAL: 450 MS
RBC # BLD AUTO: 3.03 MILLION/UL (ref 3.88–5.62)
RIGHT ATRIUM AREA SYSTOLE A4C: 18.2 CM2
RIGHT VENTRICLE ID DIMENSION: 3.2 CM
SL CV LEFT ATRIUM LENGTH A2C: 5.7 CM
SL CV LV EF: 61
SL CV PED ECHO LEFT VENTRICLE DIASTOLIC VOLUME (MOD BIPLANE) 2D: 134 ML
SL CV PED ECHO LEFT VENTRICLE SYSTOLIC VOLUME (MOD BIPLANE) 2D: 44 ML
SODIUM SERPL-SCNC: 137 MMOL/L (ref 135–147)
T WAVE AXIS: 8 DEGREES
TRICUSPID ANNULAR PLANE SYSTOLIC EXCURSION: 3.9 CM
VENTRICULAR RATE: 91 BPM
WBC # BLD AUTO: 6.5 THOUSAND/UL (ref 4.31–10.16)

## 2023-11-29 PROCEDURE — 87081 CULTURE SCREEN ONLY: CPT | Performed by: INTERNAL MEDICINE

## 2023-11-29 PROCEDURE — G0257 UNSCHED DIALYSIS ESRD PT HOS: HCPCS

## 2023-11-29 PROCEDURE — 84484 ASSAY OF TROPONIN QUANT: CPT | Performed by: INTERNAL MEDICINE

## 2023-11-29 PROCEDURE — 83735 ASSAY OF MAGNESIUM: CPT | Performed by: FAMILY MEDICINE

## 2023-11-29 PROCEDURE — 99232 SBSQ HOSP IP/OBS MODERATE 35: CPT | Performed by: INTERNAL MEDICINE

## 2023-11-29 PROCEDURE — NC001 PR NO CHARGE

## 2023-11-29 PROCEDURE — 93306 TTE W/DOPPLER COMPLETE: CPT | Performed by: INTERNAL MEDICINE

## 2023-11-29 PROCEDURE — 99214 OFFICE O/P EST MOD 30 MIN: CPT | Performed by: INTERNAL MEDICINE

## 2023-11-29 PROCEDURE — 93306 TTE W/DOPPLER COMPLETE: CPT

## 2023-11-29 PROCEDURE — 80048 BASIC METABOLIC PNL TOTAL CA: CPT | Performed by: FAMILY MEDICINE

## 2023-11-29 PROCEDURE — 99232 SBSQ HOSP IP/OBS MODERATE 35: CPT | Performed by: NURSE PRACTITIONER

## 2023-11-29 PROCEDURE — 85027 COMPLETE CBC AUTOMATED: CPT | Performed by: FAMILY MEDICINE

## 2023-11-29 PROCEDURE — 84100 ASSAY OF PHOSPHORUS: CPT | Performed by: INTERNAL MEDICINE

## 2023-11-29 PROCEDURE — 93005 ELECTROCARDIOGRAM TRACING: CPT

## 2023-11-29 RX ORDER — HEPARIN SODIUM 1000 [USP'U]/ML
2000 INJECTION, SOLUTION INTRAVENOUS; SUBCUTANEOUS ONCE
Status: COMPLETED | OUTPATIENT
Start: 2023-11-29 | End: 2023-11-29

## 2023-11-29 RX ORDER — HEPARIN SODIUM 1000 [USP'U]/ML
2000 INJECTION, SOLUTION INTRAVENOUS; SUBCUTANEOUS ONCE
Status: DISCONTINUED | OUTPATIENT
Start: 2023-11-29 | End: 2023-11-29

## 2023-11-29 RX ADMIN — HYDROMORPHONE HYDROCHLORIDE 0.5 MG: 1 INJECTION, SOLUTION INTRAMUSCULAR; INTRAVENOUS; SUBCUTANEOUS at 08:42

## 2023-11-29 RX ADMIN — TRAZODONE HYDROCHLORIDE 50 MG: 50 TABLET ORAL at 21:05

## 2023-11-29 RX ADMIN — EPOETIN ALFA 2000 UNITS: 2000 SOLUTION INTRAVENOUS; SUBCUTANEOUS at 16:46

## 2023-11-29 RX ADMIN — ACETAMINOPHEN 975 MG: 325 TABLET, FILM COATED ORAL at 02:37

## 2023-11-29 RX ADMIN — CALCIUM ACETATE 2001 MG: 667 CAPSULE ORAL at 08:00

## 2023-11-29 RX ADMIN — EPOETIN ALFA 3000 UNITS: 3000 SOLUTION INTRAVENOUS; SUBCUTANEOUS at 16:45

## 2023-11-29 RX ADMIN — ACETAMINOPHEN 975 MG: 325 TABLET, FILM COATED ORAL at 17:26

## 2023-11-29 RX ADMIN — ACETAMINOPHEN 975 MG: 325 TABLET, FILM COATED ORAL at 10:26

## 2023-11-29 RX ADMIN — HYDROMORPHONE HYDROCHLORIDE 0.5 MG: 1 INJECTION, SOLUTION INTRAMUSCULAR; INTRAVENOUS; SUBCUTANEOUS at 17:20

## 2023-11-29 RX ADMIN — OXYCODONE HYDROCHLORIDE 5 MG: 5 TABLET ORAL at 06:21

## 2023-11-29 RX ADMIN — Medication 7.5 MG: at 21:05

## 2023-11-29 RX ADMIN — ONDANSETRON 4 MG: 2 INJECTION INTRAMUSCULAR; INTRAVENOUS at 17:20

## 2023-11-29 RX ADMIN — CALCIUM ACETATE 2001 MG: 667 CAPSULE ORAL at 17:27

## 2023-11-29 RX ADMIN — HEPARIN SODIUM 2000 UNITS: 1000 INJECTION INTRAVENOUS; SUBCUTANEOUS at 13:07

## 2023-11-29 RX ADMIN — ONDANSETRON 4 MG: 2 INJECTION INTRAMUSCULAR; INTRAVENOUS at 08:33

## 2023-11-29 NOTE — PLAN OF CARE
Post-Dialysis RN Treatment Note    Blood Pressure:  Pre 160/99 mm/Hg  Post 155/88 mmHg   EDW  102 kg    Weight:  Pre 103 kg   Post 102.2 kg   Mode of weight measurement: Bed Scale   Volume Removed  800 ml    Treatment duration 200 minutes    NS given  N/A    Treatment shortened? Yes, describe: Clotted venous chamber, Pt declined to continue, MD aware   Medications given during Rx Epogen 5000 U   Estimated Kt/V  Not Applicable   Access type: Permacath/TDC   Access Issues: Yes, describe: After running quite well at 400/ min arterial port unable to be aspirated. AP planning to activase prior to next treatment. Report called to primary nurse   Yes [place] RN        Goal of treatment is the removal of 1.0 kg fluid over this treatment session. Clearance of renal toxins and wastes. Balancing of electrolyte levels.        Problem: METABOLIC, FLUID AND ELECTROLYTES - ADULT  Goal: Electrolytes maintained within normal limits  Description: INTERVENTIONS:  - Monitor labs and assess patient for signs and symptoms of electrolyte imbalances  - Administer electrolyte replacement as ordered  - Monitor response to electrolyte replacements, including repeat lab results as appropriate  - Instruct patient on fluid and nutrition as appropriate  Outcome: Progressing  Goal: Fluid balance maintained  Description: INTERVENTIONS:  - Monitor labs   - Monitor I/O and WT  - Instruct patient on fluid and nutrition as appropriate  - Assess for signs & symptoms of volume excess or deficit  Outcome: Progressing

## 2023-11-29 NOTE — PLAN OF CARE
Problem: METABOLIC, FLUID AND ELECTROLYTES - ADULT  Goal: Electrolytes maintained within normal limits  Description: INTERVENTIONS:  - Monitor labs and assess patient for signs and symptoms of electrolyte imbalances  - Administer electrolyte replacement as ordered  - Monitor response to electrolyte replacements, including repeat lab results as appropriate  - Instruct patient on fluid and nutrition as appropriate  Outcome: Progressing     Problem: METABOLIC, FLUID AND ELECTROLYTES - ADULT  Goal: Fluid balance maintained  Description: INTERVENTIONS:  - Monitor labs   - Monitor I/O and WT  - Instruct patient on fluid and nutrition as appropriate  - Assess for signs & symptoms of volume excess or deficit  Outcome: Progressing     Problem: INFECTION - ADULT  Goal: Absence or prevention of progression during hospitalization  Description: INTERVENTIONS:  - Assess and monitor for signs and symptoms of infection  - Monitor lab/diagnostic results  - Monitor all insertion sites, i.e. indwelling lines, tubes, and drains  - Baudette appropriate cooling/warming therapies per order  - Administer medications as ordered  - Instruct and encourage patient and family to use good hand hygiene technique  - Identify and instruct in appropriate isolation precautions for identified infection/condition  Outcome: Progressing     Problem: PAIN - ADULT  Goal: Verbalizes/displays adequate comfort level or baseline comfort level  Description: Interventions:  - Encourage patient to monitor pain and request assistance  - Assess pain using appropriate pain scale  - Administer analgesics based on type and severity of pain and evaluate response  - Implement non-pharmacological measures as appropriate and evaluate response  - Consider cultural and social influences on pain and pain management  - Notify physician/advanced practitioner if interventions unsuccessful or patient reports new pain  Outcome: Progressing     Problem: SAFETY ADULT  Goal: Patient will remain free of falls  Description: INTERVENTIONS:  - Educate patient/family on patient safety including physical limitations  - Instruct patient to call for assistance with activity   - Consult OT/PT to assist with strengthening/mobility   - Keep Call bell within reach  - Keep bed low and locked with side rails adjusted as appropriate  - Keep care items and personal belongings within reach  - Initiate and maintain comfort rounds  - Make Fall Risk Sign visible to staff  - Apply yellow socks and bracelet for high fall risk patients  - Consider moving patient to room near nurses station  Outcome: Progressing

## 2023-11-29 NOTE — ASSESSMENT & PLAN NOTE
Patient presented to the ED with complaints of severe pain in bilateral toes with mottling of skin and difficulty sleeping due to pain. On arrival to the ER, patient noted to have elevated troponin levels, peaking at 1445. Now trending down, most recently, 951  EKG without concerns of ST/T wave changes. Patient is without chest pain, shortness of breath. Exact etiology of elevation in trop unclear. Check EKG this morning.   Telemetry

## 2023-11-29 NOTE — PROGRESS NOTES
NEPHROLOGY PROGRESS NOTE    Patient: Blaine Wayne               Sex: male          DOA: 11/28/2023  8:02 AM   YOB: 1995        Age:  25 y.o.        LOS:  LOS: 0 days   11/29/2023    REASON FOR THE CONSULTATION:      ESRD on hemodialysis    SUBJECTIVE     Patient is seen and examined next to the bedside. Notices improvement in pain at the level of feet. Pain level is 6 out of 10. Noted vascular duplex done which did not reveal any acute stenosis. Patient also underwent CTA dissection protocol of chest abdomen pelvis which did not reveal any acute abnormality. Patient underwent 3 hours hemodialysis yesterday consistent with clotted and hence hours hemodialysis were reduced to 3 hours    CURRENT MEDICATIONS       Current Facility-Administered Medications:     acetaminophen (TYLENOL) tablet 975 mg, 975 mg, Oral, Q8H, Radha Evans MD, 975 mg at 11/29/23 0237    calcium acetate (PHOSLO) capsule 2,001 mg, 2,001 mg, Oral, TID With Meals, Urszula Langley MD, 2,001 mg at 11/29/23 0800    gabapentin (NEURONTIN) capsule 100 mg, 100 mg, Oral, TID PRN, Radha Evans MD    heparin (porcine) injection 2,000 Units, 2,000 Units, Intravenous, Once, Urszula Langley MD    HYDROmorphone (DILAUDID) injection 0.5 mg, 0.5 mg, Intravenous, Q4H PRN, Radha Evans MD, 0.5 mg at 11/29/23 0842    ondansetron (ZOFRAN) injection 4 mg, 4 mg, Intravenous, Q4H PRN, Radha Evans MD, 4 mg at 11/29/23 6154    oxyCODONE (ROXICODONE) IR tablet 5 mg, 5 mg, Oral, Q6H PRN, 5 mg at 11/29/23 3640 **OR** oxyCODONE (ROXICODONE) split tablet 7.5 mg, 7.5 mg, Oral, Q4H PRN, Radha Evans MD, 7.5 mg at 11/28/23 2216    traZODone (DESYREL) tablet 50 mg, 50 mg, Oral, HS, Radha Evans MD, 50 mg at 11/28/23 2216    REVIEW OF SYSTEMS     Review of Systems   Constitutional: Negative. HENT: Negative. Eyes: Negative. Respiratory: Negative. Cardiovascular: Negative. Gastrointestinal: Negative. Endocrine: Negative. Genitourinary: Negative. Musculoskeletal: Negative. Skin: Negative. Allergic/Immunologic: Negative. Neurological: Negative. Hematological: Negative. All other systems reviewed and are negative. OBJECTIVE     Current Weight: Weight - Scale: 104 kg (228 lb 2.8 oz)  Vitals:    11/29/23 0652   BP: 140/79   Pulse: 81   Resp: 18   Temp: 98.3 °F (36.8 °C)   SpO2: 92%     Body mass index is 30.95 kg/m². Intake/Output Summary (Last 24 hours) at 11/29/2023 0958  Last data filed at 11/28/2023 1642  Gross per 24 hour   Intake 500 ml   Output 2504 ml   Net -2004 ml       PHYSICAL EXAMINATION     Physical Exam  HENT:      Head: Normocephalic and atraumatic. Eyes:      Pupils: Pupils are equal, round, and reactive to light. Neck:      Vascular: No JVD. Cardiovascular:      Rate and Rhythm: Normal rate and regular rhythm. Heart sounds: Normal heart sounds. No murmur heard. No friction rub. Pulmonary:      Effort: Pulmonary effort is normal.      Breath sounds: Normal breath sounds. Abdominal:      General: Bowel sounds are normal. There is no distension. Palpations: Abdomen is soft. Tenderness: There is no abdominal tenderness. There is no rebound. Musculoskeletal:         General: No tenderness. Cervical back: Neck supple. Skin:     General: Skin is dry. Findings: No rash. Comments: Cool feet. Neurological:      Mental Status: He is alert and oriented to person, place, and time.            LAB RESULTS     Results from last 7 days   Lab Units 11/29/23  0811 11/29/23  0505 11/29/23  0438 11/28/23  0848   WBC Thousand/uL  --   --  6.50 7.36   HEMOGLOBIN g/dL  --   --  9.6* 9.2*   HEMATOCRIT %  --   --  29.2* 28.2*   PLATELETS Thousands/uL  --   --  233 245   POTASSIUM mmol/L  --  4.3  --  5.3   CHLORIDE mmol/L  --  96  --  97   CO2 mmol/L  --  25  --  22   BUN mg/dL  --  48*  --  77*   CREATININE mg/dL  --  16.25*  --  22.00*   EGFR ml/min/1.73sq m  --  3  --  2   CALCIUM mg/dL  -- 9.4  --  8.7   MAGNESIUM mg/dL  --  2.7  --  3.4*   PHOSPHORUS mg/dL 10.2*  --   --  11.3*           RADIOLOGY RESULTS    CTA dissection protocol    IMPRESSION:     No acute aortic pathology or significant arterial stenosis. ASSESSMENT/PLAN     25years old male with past medical history of type 1 hyperoxaluria, nephrolithiasis, progressed to ESRD on hemodialysis, anemia who presents to our facility with acute onset of lower extremity pain with numbness. 1.  ESRD on hemodialysis: Undergoes hemodialysis on Mondays, Wednesdays and Friday at the [facility] dialysis unit. Underwent a session of dialysis for 3 hours yesterday which was cut short due to filter clotting. Will plan a 4-hour hemodialysis session for improved renal clearances. Will order 2000 units heparin bolus to prevent filter clotting. Will attempt to put patient on 4 days a week dialysis in order to reduce oxalate and phosphorus. 2.  Hyperphosphatemia: Noted serum phosphorus was 11 and elevated. Likely due to missed hemodialysis session along with dietary indiscretion and lack of phosphorus binders  Started patient on PhosLo 3 tablets 3 times daily with meals. Will plan on 4-hour hemodialysis today for improved phosphorus removal.    3.  Elevated troponins: Etiology is unclear. Patient does not have chest pain. 4.  Lower extremity numbness/pain: Acute arterial thrombosis is ruled out. However given underlying type I hyperoxaluria and development of ESKD, suspect deposition of calcium oxalate and tissues and blood vessels. Noted improvement in pain and numbness subsequent to hemodialysis yesterday. Plan at least 4 days/week hemodialysis as outpatient in an effort to control oxalate levels. 5.  Anemia: Hemoglobin is 9.6 and below target. Place patient on Epogen.           Zuleima Mabry MD  Nephrology  11/29/2023

## 2023-11-29 NOTE — UTILIZATION REVIEW
Initial Clinical Review      OBS order 11/28 1441 converted to IP on 11/30 1618 for continued cardiac workup     Admission: Date/Time/Statement:   Admission Orders (From admission, onward)       Ordered        11/30/23 1618  Inpatient Admission  Once            11/28/23 1441  Place in Observation  Once                          Orders Placed This Encounter   Procedures    Place in Observation     Standing Status:   Standing     Number of Occurrences:   1     Order Specific Question:   Level of Care     Answer:   Med Surg [16]    Inpatient Admission     Standing Status:   Standing     Number of Occurrences:   1     Order Specific Question:   Level of Care     Answer:   Med Surg [16]     Order Specific Question:   Estimated length of stay     Answer:   More than 2 Midnights     Order Specific Question:   Certification     Answer:   I certify that inpatient services are medically necessary for this patient for a duration of greater than two midnights. See H&P and MD Progress Notes for additional information about the patient's course of treatment. ED Arrival Information       Expected   -    Arrival   11/28/2023 07:22    Acuity   Urgent              Means of arrival   Walk-In    Escorted by   Family Member    Service   Hospitalist    Admission type   Emergency              Arrival complaint   FEET PAIN             Chief Complaint   Patient presents with    Foot Pain     B/l feet pain, "feels like nerve pain" hasn't slept due to it, no inj, also missed dialysis yester        Initial Presentation: 25 y.o. male PMH of microangiopathy, ESRD on HD who presents with severe pain in bilateral toes with skin mottling, unable to sleep because of the pain, pain is 10/10, nonradiating, worsens with any kind of pressure and is also worse without touch or pressure. Found to have elevated trop . EKG w/ no ST changes . Admitted OBS status for elevated trop , ESRD on HD . Plan to trend trop , tele , denies CP . Pain control . Nephrology consulted for HD .    11/28 Vascular Consult   evere bilateral toe pain with purple mottling noted at toe level not extending proximally. This is likely secondary to progressing neuropathy and small vessel disease. -LEAD demonstrates no significant arterial occlusive disease. Bilateral SFAs are small in diameter and further intervention to this area with arteriogram could result in arterial dissection or stent thrombosis, therefore no acute vascular intervention indicated at this time. 11/28 Nephrology Consult   ESRD on HD: Undergoes HD on MWF at [facility] dialysis unit  Patient missed a session of HD yesterday  No urgency for dialysis today  Plan HD in AM    11/29 IM Note   C/o margarita foot pain . Feeling weak . Plan for EKG in am , tele . 11/29 Nephrology Note   ESRD on hemodialysis: Undergoes hemodialysis on Mondays, Wednesdays and Friday at the [facility] dialysis unit. Underwent a session of dialysis for 3 hours yesterday which was cut short due to filter clotting. Will plan a 4-hour hemodialysis session for improved renal clearances. 11/30 Nephrology Note   Underwent dialysis yest . Tolerating phos . Elevated trop , stress test .     11/30 IM Note   Plan for stress test today . Tele . 11/30 Cardiology Note   Abnormal stress test . Plan for cardiac cath tomorrow . Start asa, statin , BB .    ED Triage Vitals   Temperature Pulse Respirations Blood Pressure SpO2   11/28/23 0732 11/28/23 0732 11/28/23 0732 11/28/23 0732 11/28/23 0732   98 °F (36.7 °C) 75 18 160/79 95 %      Temp Source Heart Rate Source Patient Position - Orthostatic VS BP Location FiO2 (%)   11/28/23 1340 11/28/23 1315 11/28/23 1315 11/28/23 1315 --   Oral Monitor Sitting Right arm       Pain Score       11/28/23 0848       10 - Worst Possible Pain          Wt Readings from Last 1 Encounters:   11/29/23 103 kg (228 lb)     Additional Vital Signs:   Date/Time Temp Pulse Resp BP MAP (mmHg) SpO2 O2 Device Patient Position - Orthostatic VS   11/29/23 1500 -- 69 16 148/87 -- -- -- Lying   11/29/23 1430 -- 80 16 142/88 -- -- -- --   11/29/23 1415 -- 69 16 156/86 -- -- -- --   11/29/23 1400 -- 68 16 140/82 -- -- -- --   11/29/23 1330 -- 79 16 148/85 84 -- -- --   11/29/23 1300 -- 71 16 165/93 -- -- -- Lying   11/29/23 1250 98.5 °F (36.9 °C) 82 16 160/99 -- -- -- Lying   11/29/23 1238 -- 94 -- 140/79 -- -- -- --   11/29/23 10:59:06 98.3 °F (36.8 °C) 82 18 139/82 101 93 % -- --   11/29/23 06:52:35 98.3 °F (36.8 °C) 81 18 140/79 99 92 % -- --   11/29/23 02:45:37 97.9 °F (36.6 °C) 70 14 150/89 109 90 % -- --   11/28/23 23:44:59 98.6 °F (37 °C) 68 14 132/76 95 87 % Abnormal  -- --   11/28/23 21:59:10 98.4 °F (36.9 °C) 74 -- 150/95 113 93 % -- --   11/28/23 2030 -- 75 18 144/97 115 95 % -- --   11/28/23 1843 -- -- 18 -- -- -- -- Lying   11/28/23 1642 99.2 °F (37.3 °C) 75 18 169/81 104 -- -- Lying   11/28/23 1630 -- 69 18 143/68 100 -- -- --   11/28/23 1600 -- 65 18 145/58 91 -- -- --   11/28/23 1530 -- 65 18 151/60 94 -- -- --   11/28/23 1500 -- 69 18 150/79 88 -- -- --   11/28/23 1430 -- 72 18 151/72 93 -- -- --   11/28/23 1400 -- 79 20 169/88 97 -- -- --   11/28/23 1345 -- 80 20 193/72 Abnormal  115 -- -- --   11/28/23 1342 -- 86 20 186/101 Abnormal  111 -- -- --   11/28/23 1340 99 °F (37.2 °C) 86 20 181/100 Abnormal  157 -- -- Lying   11/28/23 1315 -- 90 19 156/86 114 93 % None (Room air) Sittin     Pertinent Labs/Diagnostic Test Results:   VAS lower limb arterial duplex, complete bilateral   RIGHT LOWER LIMB:  There appears to be a reduction in diameter throughout the proximal to mid  superficial femoral artery. Elevated velocities were noted. Ankle/Brachial index:  0.62 which is within severe range. Prior 1.0  PVR/ PPG tracings are dampened. Flatline at the first digit. Metatarsal pressure not audible. Prior 44 mmhg  Great toe pressure not audible.      LEFT LOWER LIMB:  There appears to be a reduction in diameter throughout the proximal to mid  superficial femoral artery. Elevated velocities were noted. Ankle/Brachial index:  1.09 which is within normal limits. Prior 0.92  PVR/ PPG tracings are dampened. Flatline at the first digit. Metatarsal pressure not audible. Prior 78 mmHg  Great toe pressure not audible. Compared to previous study on 11/14/2023, there is a decrease in the right MICHELLE  and metatarsal pressure bilaterally. CTA dissection protocol chest/abdomen/pelvis   Final Result by Dhara Villegas MD (11/28 1223)      No acute aortic pathology or significant arterial stenosis. Workstation performed: [id number]         11/28 Echo  Left Ventricle: Left ventricular cavity size is normal. Wall thickness is normal. The left ventricular ejection fraction is 61%.  Systolic function is normal. Wall motion is normal. Diastolic function is normal   11/28 EKG Normal sinus rhythm       Results from last 7 days   Lab Units 11/30/23  0551 11/29/23  0438 11/28/23  0848   WBC Thousand/uL 7.09 6.50 7.36   HEMOGLOBIN g/dL 9.5* 9.6* 9.2*   HEMATOCRIT % 28.0* 29.2* 28.2*   PLATELETS Thousands/uL 208 233 245   NEUTROS ABS Thousands/µL  --   --  4.91         Results from last 7 days   Lab Units 11/30/23  0551 11/29/23  0811 11/29/23  0505 11/28/23  0848   SODIUM mmol/L 136  --  137 138   POTASSIUM mmol/L 4.4  --  4.3 5.3   CHLORIDE mmol/L 96  --  96 97   CO2 mmol/L 27  --  25 22   ANION GAP mmol/L 13  --  16 19   BUN mg/dL 39*  --  48* 77*   CREATININE mg/dL 13.00*  --  16.25* 22.00*   EGFR ml/min/1.73sq m 4  --  3 2   CALCIUM mg/dL 9.7  --  9.4 8.7   MAGNESIUM mg/dL  --   --  2.7 3.4*   PHOSPHORUS mg/dL  --  10.2*  --  11.3*     Results from last 7 days   Lab Units 11/28/23  0848   AST U/L 8*   ALT U/L 7   ALK PHOS U/L 48   TOTAL PROTEIN g/dL 7.3   ALBUMIN g/dL 4.4   TOTAL BILIRUBIN mg/dL 0.34     Results from last 7 days   Lab Units 11/30/23  0551 11/29/23  0505 11/28/23  0848   GLUCOSE RANDOM mg/dL 89 86 91 Results from last 7 days   Lab Units 11/28/23  0848   PH MADALYN  7.316   PCO2 MADALYN mm Hg 39.8*   PO2 MADALYN mm Hg 53.5*   HCO3 MADALYN mmol/L 19.9*   BASE EXC MADALYN mmol/L -5.8   O2 CONTENT MADALYN ml/dL 11.6   O2 HGB, VENOUS % 79.0     Results from last 7 days   Lab Units 11/29/23  0505 11/29/23  0243 11/29/23  0029 11/28/23  2203 11/28/23  1936 11/28/23  1755 11/28/23  1304 11/28/23  1124 11/28/23  0848   HS TNI 0HR ng/L  --   --  1,108*  --   --  1,445*  --   --  928*   HS TNI 2HR ng/L  --  1,026*  --   --  1,172*  --   --  902*  --    HSTNI D2 ng/L  --  -82  --   --  -273  --   --  -26  --    HS TNI 4HR ng/L 951*  --   --  1,134*  --   --  1,152*  --   --    HSTNI D4 ng/L -157  --   --  -311  --   --  224*  --   --        Results from last 7 days   Lab Units 11/28/23  0848   TSH 3RD GENERATON uIU/mL 4.225       ED Treatment:   Medication Administration from 11/28/2023 0722 to 11/28/2023 2145         Date/Time Order Dose Route Action     11/28/2023 0848 EST morphine injection 2 mg 2 mg Intravenous Given     11/28/2023 1321 EST Sodium Zirconium Cyclosilicate (Lokelma) 10 g 10 g Oral Given     11/28/2023 1321 EST morphine injection 4 mg 4 mg Intravenous Given     11/28/2023 1442 EST HYDROmorphone (DILAUDID) injection 0.5 mg 0.5 mg Intravenous Given     11/28/2023 1843 EST acetaminophen (TYLENOL) tablet 975 mg 975 mg Oral Given          Past Medical History:   Diagnosis Date    Anxiety     Chronic kidney disease     GERD (gastroesophageal reflux disease)     Kidney stone     Liver disorder     Nephrolithiasis     Primary hyperoxaluria type 1 ([address])      Present on Admission:   Elevated troponin   Anxiety, generalized   Insomnia   Neuropathy   Microangiopathy (HCC)      Admitting Diagnosis: Leg pain [M79.606]  Elevated troponin [R79.89]  End stage renal disease on dialysis ([address]) [N18.6, Z99.2]  Pain in both feet [M79.671, M79.672]  Age/Sex: 25 y.o. male  Admission Orders:  Scheduled Medications:  acetaminophen, 975 mg, Oral, Q8H  [START ON 12/1/2023] aspirin, 81 mg, Oral, Daily  atorvastatin, 40 mg, Oral, Daily With Dinner  calcium acetate, 2,001 mg, Oral, TID With Meals  [START ON 12/1/2023] epoetin coleen, 2,000 Units, Subcutaneous, Once   And  [START ON 12/1/2023] epoetin coleen, 3,000 Units, Subcutaneous, Once  [START ON 12/1/2023] metoprolol succinate, 25 mg, Oral, Daily  traZODone, 50 mg, Oral, HS      Continuous IV Infusions:     PRN Meds:  gabapentin, 100 mg, Oral, TID PRN  HYDROmorphone, 0.5 mg, Intravenous, Q4H PRN  ondansetron, 4 mg, Intravenous, Q4H PRN  oxyCODONE, 5 mg, Oral, Q6H PRN   Or  oxyCODONE, 7.5 mg, Oral, Q4H PRN    PT OT eval   Tele   Up and OOB   Cont pulse ox       IP CONSULT TO NEPHROLOGY  IP CONSULT TO VASCULAR SURGERY  IP CONSULT TO NEPHROLOGY  IP CONSULT TO CARDIOLOGY    Network Utilization Review Department  ATTENTION: Please call with any questions or concerns to [phone] and carefully listen to the prompts so that you are directed to the right person. All voicemails are confidential.   For Discharge needs, contact Care Management DC Support Team at [phone] opt. [phone]  Send all requests for admission clinical reviews, approved or denied determinations and any other requests to dedicated fax number below belonging to the campus where the patient is receiving treatment.  List of dedicated fax numbers for the Facilities:  [place] DENIALS (Administrative/Medical Necessity) [phone]   DISCHARGE SUPPORT TEAM (NETWORK) [phone] [address] [facility] ([facility]/NICU/Pediatrics) [phone]   [address] [place] [address] [address] [facility]  [facility] [phone]

## 2023-11-29 NOTE — PROGRESS NOTES
[address]  Progress Note  Name: Rubia Santos  MRN: [id number]  Unit/Bed#: -01 I Date of Admission: 11/28/2023   Date of Service: 11/29/2023 I Hospital Day: 0    Assessment/Plan   * Elevated troponin  Assessment & Plan  Patient presented to the ED with complaints of severe pain in bilateral toes with mottling of skin and difficulty sleeping due to pain. On arrival to the ER, patient noted to have elevated troponin levels, peaking at 1445. Now trending down, most recently, 951  EKG without concerns of ST/T wave changes. Patient is without chest pain, shortness of breath. Exact etiology of elevation in trop unclear. Check EKG this morning. Telemetry    Intractable vomiting  Assessment & Plan  Reports new symptom today with intractable vomiting with food. Swallowing fluids/pills without difficulty but reports inability to take in food at this time  Denies nausea  Denies dysphagia symptoms. Microangiopathy ([address])  Assessment & Plan  Seen by vascular surgery, no surgical intervention  Cont pain control  MICHELLE is abnormal on the right leg with severe range MICHELEL 0.62  CTA chest abd pelvis reviewed: no acute aortic pathology or stenosis noted    Neuropathy  Assessment & Plan  History of Neuropathy  Presenting with severe bilateral foot pain, mottling. Bilateral lower extremity arterial duplex was completed, will discuss findings with Vascular    End stage renal disease on dialysis [facility])  Assessment & Plan  Lab Results   Component Value Date    EGFR 3 11/29/2023    EGFR 2 11/28/2023    EGFR 6 09/01/2023    CREATININE 16.25 (H) 11/29/2023    CREATININE 22.00 (H) 11/28/2023    CREATININE 9.46 (H) 09/01/2023       Nephrology consulted for HD  Reviewed notes  Associated hyperphosphatemia  For HD    Insomnia  Assessment & Plan  Continue with Trazodone    Anxiety, generalized  Assessment & Plan  History of; takes Trazodone to sleep at night; no other psychiatric meds.   Stable currently           VTE Pharmacologic Prophylaxis: VTE Score: 1 Low Risk (Score 0-2) - Encourage Ambulation. Mobility:   Basic Mobility Inpatient Raw Score: 12  -HL Goal: 4: Move to chair/commode  -HL Achieved: 1: Laying in bed  [facility] Goal achieved. Continue to encourage appropriate mobility. Patient Centered Rounds: I performed bedside rounds with nursing staff today. Discussions with Specialists or Other Care Team Provider: Case Management    Education and Discussions with Family / Patient: Patient declined call to . Total Time Spent on Date of Encounter in care of patient: 38 mins. This time was spent on one or more of the following: performing physical exam; counseling and coordination of care; obtaining or reviewing history; documenting in the medical record; reviewing/ordering tests, medications or procedures; communicating with other healthcare professionals and discussing with patient's family/caregivers. Current Length of Stay: 0 day(s)  Current Patient Status: Observation   Certification Statement: The patient will continue to require additional inpatient hospital stay due to ongoing treatment in setting of elevated troponin levels, need for cardiology evaluation  Discharge Plan: Anticipate discharge in 48 hrs to home. Code Status: Level 1 - Full Code    Subjective:   Patient resting in bed, sleeping but easy to arouse to my voice. Reports bilateral foot pain has improved, going from an overall 8 to a 6. Reports he feels weak and has been weak for a few weeks. Notes some vomiting when he eats but is able to drink liquids and swallow his pills. Discussed with patient that he we need to obtain cardiology consult in setting of elevated troponin levels and will need further testing. He is agreeable to this plan.     Objective:     Vitals:   Temp (24hrs), Av.6 °F (37 °C), Min:97.9 °F (36.6 °C), Max:99.2 °F (37.3 °C)    Temp:  [97.9 °F (36.6 °C)-99.2 °F (37.3 °C)] 98.3 °F (36.8 °C)  HR:  [65-90] 81  Resp:  [14-20] 18  BP: (132-193)/() 140/79  SpO2:  [87 %-95 %] 92 %  Body mass index is 30.95 kg/m². Input and Output Summary (last 24 hours): Intake/Output Summary (Last 24 hours) at 11/29/2023 1055  Last data filed at 11/28/2023 1642  Gross per 24 hour   Intake 500 ml   Output 2504 ml   Net -2004 ml       Physical Exam:   Physical Exam  Vitals and nursing note reviewed. Constitutional:       General: He is not in acute distress. Appearance: He is obese. He is ill-appearing. Cardiovascular:      Rate and Rhythm: Normal rate. Pulses: Normal pulses. Heart sounds: Normal heart sounds. Pulmonary:      Effort: Pulmonary effort is normal.      Breath sounds: Normal breath sounds. Abdominal:      General: Bowel sounds are normal.      Palpations: Abdomen is soft. Musculoskeletal:         General: No tenderness. Normal range of motion. Skin:     General: Skin is warm and dry. Comments: Noted to have mottling to bilateral feet -- toes, cool to touch   Neurological:      Mental Status: He is alert and oriented to person, place, and time. Sensory: Sensory deficit (unable to feel bilateral foot - toes) present.    Psychiatric:         Mood and Affect: Mood normal.          Additional Data:     Labs:  Results from last 7 days   Lab Units 11/29/23  0438 11/28/23  0848   WBC Thousand/uL 6.50 7.36   HEMOGLOBIN g/dL 9.6* 9.2*   HEMATOCRIT % 29.2* 28.2*   PLATELETS Thousands/uL 233 245   NEUTROS PCT %  --  68   LYMPHS PCT %  --  17   MONOS PCT %  --  8   EOS PCT %  --  6     Results from last 7 days   Lab Units 11/29/23  0505 11/28/23  0848   SODIUM mmol/L 137 138   POTASSIUM mmol/L 4.3 5.3   CHLORIDE mmol/L 96 97   CO2 mmol/L 25 22   BUN mg/dL 48* 77*   CREATININE mg/dL 16.25* 22.00*   ANION GAP mmol/L 16 19   CALCIUM mg/dL 9.4 8.7   ALBUMIN g/dL  --  4.4   TOTAL BILIRUBIN mg/dL  --  0.34   ALK PHOS U/L  --  48   ALT U/L  --  7   AST U/L  --  8* GLUCOSE RANDOM mg/dL 86 91                       Lines/Drains:  Invasive Devices       Peripheral Intravenous Line  Duration             Peripheral IV 11/28/23 Distal;Right;Upper;Ventral (anterior) Arm 1 day              Hemodialysis Catheter  Duration             HD Permanent Double Catheter 96 days              Drain  Duration             Ureteral Drain/Stent Left ureter 6 Fr. 1131 days    Ureteral Drain/Stent Right ureter 6 Fr. 1023 days                      Telemetry:  Telemetry Orders (From admission, onward)               24 Hour Telemetry Monitoring  Continuous x 24 Hours (Telem)        Question:  Reason for 24 Hour Telemetry  Answer:  PCI/EP study (including pacer and ICD implementation), Cardiac surgery, MI, abnormal cardiac cath, and chest pain- rule out MI                     Telemetry Reviewed: Normal Sinus Rhythm  Indication for Continued Telemetry Use: Acute MI/Unstable Angina/Rule out ACS             Imaging: No pertinent imaging reviewed. Recent Cultures (last 7 days):         Last 24 Hours Medication List:   Current Facility-Administered Medications   Medication Dose Route Frequency Provider Last Rate    acetaminophen  975 mg Oral Q8H Ramsey Kramer MD      calcium acetate  2,001 mg Oral TID With Meals Aravind Gill MD      epoetin coleen  2,000 Units Subcutaneous Once Aravind Gill MD      And    epoetin coleen  3,000 Units Subcutaneous Once Aravind Gill MD      gabapentin  100 mg Oral TID PRN Ramsey Kramer MD      heparin (porcine)  2,000 Units Intravenous Once Aravind Gill MD      HYDROmorphone  0.5 mg Intravenous Q4H PRN Ramsey Kramer MD      ondansetron  4 mg Intravenous Q4H PRN Ramsey Kramer MD      oxyCODONE  5 mg Oral Q6H PRN Ramsey Kramer MD      Or    oxyCODONE  7.5 mg Oral Q4H PRN Ramsey Kramer MD      traZODone  50 mg Oral HS Ramsey Kramer MD          Today, Patient Was Seen By: LAURA Nichole    **Please Note: This note may have been constructed using a voice recognition system. **

## 2023-11-29 NOTE — PROGRESS NOTES
NEPHROLOGY PROGRESS NOTE    Patient: Mary La               Sex: male          DOA: 11/28/2023  8:02 AM   YOB: 1995        Age:  25 y.o.        LOS:  LOS: 0 days   11/29/2023    REASON FOR THE CONSULTATION:  ESRD on HD    SUBJECTIVE     Patient reports improvement in foot pain. States pain was previously 9/10 but now is 6/10. He states the pain medications are allowing him to sleep better which he attributes to his status improvement. He states he is still unable to walk due to the pain. He endorses two episodes of emesis yesterday. He also reports new onset tremors since 2 days ago. He states the episodes come on spontaneously and are characterized by "aggressive" shaking of his arm or leg. The episodes last for a few seconds. Of note, patient has prescription for gabapentin 100 mg TID but reports only using it sparingly. Otherwise, he denies any fevers, chills, abdominal pain, chest pain, or shortness of breath. Reviewed past 24 hour events.     CURRENT MEDICATIONS       Current Facility-Administered Medications:     acetaminophen (TYLENOL) tablet 975 mg, 975 mg, Oral, Q8H, Eleanor Snellen, MD, 975 mg at 11/29/23 0237    calcium acetate (PHOSLO) capsule 2,001 mg, 2,001 mg, Oral, TID With Meals, Williams Jain MD, 2,001 mg at 11/29/23 0800    gabapentin (NEURONTIN) capsule 100 mg, 100 mg, Oral, TID PRN, Eleanor Snellen, MD    heparin (porcine) injection 2,000 Units, 2,000 Units, Intravenous, Once, Williams Jain MD    HYDROmorphone (DILAUDID) injection 0.5 mg, 0.5 mg, Intravenous, Q4H PRN, Eleanor Snellen, MD, 0.5 mg at 11/29/23 0842    ondansetron (ZOFRAN) injection 4 mg, 4 mg, Intravenous, Q4H PRN, Eleanor Snellen, MD, 4 mg at 11/29/23 0710    oxyCODONE (ROXICODONE) IR tablet 5 mg, 5 mg, Oral, Q6H PRN, 5 mg at 11/29/23 8363 **OR** oxyCODONE (ROXICODONE) split tablet 7.5 mg, 7.5 mg, Oral, Q4H PRN, Eleanor Snellen, MD, 7.5 mg at 11/28/23 2216    traZODone (DESYREL) tablet 50 mg, 50 mg, Oral, HS, Eleanor Snellen, MD, 50 mg at 11/28/23 6275    REVIEW OF SYSTEMS     Review of Systems   Constitutional:  Negative for chills, diaphoresis and fever. Respiratory:  Negative for shortness of breath. Cardiovascular:  Negative for chest pain. Gastrointestinal:  Positive for vomiting. Negative for abdominal pain. Neurological:  Positive for tremors and numbness (feet bilaterally). OBJECTIVE     Current Weight: Weight - Scale: 104 kg (228 lb 2.8 oz)  Vitals:    11/29/23 0652   BP: 140/79   Pulse: 81   Resp: 18   Temp: 98.3 °F (36.8 °C)   SpO2: 92%     Body mass index is 30.95 kg/m². Intake/Output Summary (Last 24 hours) at 11/29/2023 1009  Last data filed at 11/28/2023 1642  Gross per 24 hour   Intake 500 ml   Output 2504 ml   Net -2004 ml       PHYSICAL EXAMINATION     Physical Exam  Vitals reviewed. Constitutional:       General: He is not in acute distress. HENT:      Head: Normocephalic and atraumatic. Eyes:      General: No scleral icterus. Conjunctiva/sclera: Conjunctivae normal.   Cardiovascular:      Rate and Rhythm: Normal rate and regular rhythm. Pulses:           Dorsalis pedis pulses are 1+ on the right side and 1+ on the left side. Heart sounds: Normal heart sounds. No murmur heard. No friction rub. No gallop. Pulmonary:      Effort: Pulmonary effort is normal. No respiratory distress. Breath sounds: Normal breath sounds. No wheezing. Skin:     Coloration: Skin is cyanotic (bluish discoloration to all toes bilaterally and cool to touch). Neurological:      General: No focal deficit present. Mental Status: He is alert and oriented to person, place, and time. Sensory: Sensory deficit (toes to midfoot with diminished sensation bilaterally) present. Motor: Weakness (3/5 strength in L foot, 1/5 strength in R foot) present.       Comments: Proprioception intact bilaterally   Psychiatric:         Mood and Affect: Mood normal.         Behavior: Behavior normal. LAB RESULTS     Results from last 7 days   Lab Units 11/29/23  0811 11/29/23  0505 11/29/23  0438 11/28/23  0848   WBC Thousand/uL  --   --  6.50 7.36   HEMOGLOBIN g/dL  --   --  9.6* 9.2*   HEMATOCRIT %  --   --  29.2* 28.2*   PLATELETS Thousands/uL  --   --  233 245   SODIUM mmol/L  --  137  --  138   POTASSIUM mmol/L  --  4.3  --  5.3   CHLORIDE mmol/L  --  96  --  97   CO2 mmol/L  --  25  --  22   BUN mg/dL  --  48*  --  77*   CREATININE mg/dL  --  16.25*  --  22.00*   EGFR ml/min/1.73sq m  --  3  --  2   CALCIUM mg/dL  --  9.4  --  8.7   MAGNESIUM mg/dL  --  2.7  --  3.4*   PHOSPHORUS mg/dL 10.2*  --   --  11.3*       RADIOLOGY RESULTS      CTA C/A/P 11/28/2023:     IMPRESSION:     No acute aortic pathology or significant arterial stenosis. VAS lower limb arterial 11/28/2023:  Impression:  RIGHT LOWER LIMB:  There appears to be a reduction in diameter throughout the proximal to mid  superficial femoral artery. Elevated velocities were noted. Ankle/Brachial index:  0.62 which is within severe range. Prior 1.0  PVR/ PPG tracings are dampened. Flatline at the first digit. Metatarsal pressure not audible. Prior 44 mmhg  Great toe pressure not audible. LEFT LOWER LIMB:  There appears to be a reduction in diameter throughout the proximal to mid  superficial femoral artery. Elevated velocities were noted. Ankle/Brachial index:  1.09 which is within normal limits. Prior 0.92  PVR/ PPG tracings are dampened. Flatline at the first digit. Metatarsal pressure not audible. Prior 78 mmHg  Great toe pressure not audible. Compared to previous study on 11/14/2023, there is a decrease in the right MICHELLE  and metatarsal pressure bilaterally. ASSESSMENT/PLAN     25year old male with PMHx of anemia and ESRD on HD secondary to type 1 primary hyperoxaluria who presented to the ED with complaints of lower extremity pain and numbness in the feet bilaterally.      ESRD on HD:   undergoes HD on M,W,F at [facility]  Underwent 3 hour session of HD yesterday with 2.5 L removed  Plan to repeat HD today to reduce oxalate levels and improve foot pain. Goal for 4 hour session  Hyperphosphatemia:  Phosphate on presentation was elevated to 11.3 likely due to missed dialysis session  Calcium acetate ordered yesterday but not given as patient did not eat lunch or dinner yesterday   Repeat phosphate today still elevated at 10.2  Plan to repeat HD today as well as continue calcium acetate TID with meals   Elevated Troponins:  Etiology unclear  Patient denies chest pain. EKG and telemetry show no ST segment changes   Lower extremity numbness and pain:  Acute arterial thrombosis has been r/o. Patient seen by vascular surgery who did not recommend vascular intervention   Suspect may be due to oxalate deposition in vasculature   Patient endorses improvement in pain today   Plan to repeat HD today with goal of 4 hour session to remove more oxalate  Plan to increase outpatient HD sessions to 4x weekly until patient is able to establish home dialysis in order to reduce oxalate levels  Anemia:  Most recent Hgb 9.5  Receives   Plan to start [address] [place] [address]  Nephrology  11/29/2023      Portions of the record may have been created with voice recognition software. Occasional wrong word or "sound a like" substitutions may have occurred due to the inherent limitations of voice recognition software. Read the chart carefully and recognize, using context, where substitutions have occurred.

## 2023-11-29 NOTE — ASSESSMENT & PLAN NOTE
Lab Results   Component Value Date    EGFR 3 11/29/2023    EGFR 2 11/28/2023    EGFR 6 09/01/2023    CREATININE 16.25 (H) 11/29/2023    CREATININE 22.00 (H) 11/28/2023    CREATININE 9.46 (H) 09/01/2023       Nephrology consulted for HD  Reviewed notes  Associated hyperphosphatemia  For HD

## 2023-11-29 NOTE — CASE MANAGEMENT
Case Management Assessment & Discharge Planning Note    Patient name Audrie Landau  Location /-77 MRN [id number]  : 1995 Date 2023       Current Admission Date: 2023  Current Admission Diagnosis:Elevated troponin   Patient Active Problem List    Diagnosis Date Noted    Elevated troponin 2023    Microangiopathy ([address]) 2023    End stage renal disease on dialysis ([address]) 10/03/2023    Neuropathy 10/03/2023    Gastroesophageal reflux disease without esophagitis 2023    Anemia 2023    Chronic kidney disease-mineral and bone disorder 2023    BMI 30.0-30.9,adult 2022    Hydronephrosis with urinary obstruction due to ureteral calculus 2021    Primary hyperoxaluria type 1 ([address]) 2021    Insomnia 10/27/2020    Ureteric stone ----> hydroureteronephrosis moderate 10/23/2020    Sebaceous cyst 2017    Anxiety, generalized 2015      LOS (days): 0  Geometric Mean LOS (GMLOS) (days):   Days to GMLOS:     OBJECTIVE:              Current admission status: Observation       Preferred Pharmacy:   [facility] [id number] [facility],[address], [address] [place] [address]  Phone: [phone] Fax: [phone]    Primary Care Provider: Rodolfo Roman DO    Primary Insurance: [address]  Secondary Insurance:     ASSESSMENT:  [address] [facility], [address] [facility] A Representative - Mother   Primary Phone: [phone] (Mobile)  Home Phone: [phone]                 Advance Directives  Does patient have a [address]?: No  Was patient offered paperwork?: Yes  Does patient currently have a Health Care decision maker?: Yes, please see Health Care Proxy section  Does patient have Advance Directives?: No  Was patient offered paperwork?: Yes (declined)         Readmission Root Cause  30 Day Readmission: No    Patient Information  Admitted from[de-identified] Home  Mental Status: Alert  During Assessment patient was accompanied by: Not accompanied during assessment  Assessment information provided by[de-identified] Patient  Primary Caregiver: Self  Support Systems: Family members  [place] of Residence: [address] [place] [address] do you live in?: [facility] entry access options. Select all that apply.: Stairs  Number of steps to enter home.: 1  Type of Current Residence: [facility]  Living Arrangements: Lives w/ Parent(s)  Is patient a ?: No    Activities of Daily Living Prior to Admission  Functional Status: Independent  Completes ADLs independently?: Yes  Ambulates independently?: Yes  Does patient use assisted devices?: No  Does patient currently own DME?: No  Does patient have a history of Outpatient Therapy (PT/OT)?: Yes  Does the patient have a history of Short-Term Rehab?: No  Does patient have a history of HHC?: No  Does patient currently have [address]?: No         Patient Information Continued  Income Source: Employed  Does patient have prescription coverage?: Yes  Does patient receive dialysis treatments?: Yes (Sarah Foster, MWF, 6am, drives self. Patient stated he is working on getting dialysis at home.)  Does patient have a history of substance abuse?: No  Does patient have a history of Mental Health Diagnosis?: Yes (depression)  Is patient receiving treatment for mental health?: No. Patient declined treatment information.   Has patient received inpatient treatment related to mental health in the last 2 years?: No    PHQ 2/9 Screening   Reviewed PHQ 2/9 Depression Screening Score?: No    Means of Transportation  Means of Transport to Appts[de-identified] [address]: Low Risk  (11/29/2023)    Housing Stability Vital Sign     Unable to Pay for Housing in the Last Year: No     Number of Places Lived in the Last Year: 2     Unstable Housing in the Last Year: No   Food Insecurity: No Food Insecurity (11/29/2023)    Hunger Vital Sign     Worried About Running Out of Food in the Last Year: Never true     [address] in the Last Year: Never true   Transportation Needs: No Transportation Needs (11/29/2023)    PRAPARE - Transportation     Lack of Transportation (Medical): No     Lack of Transportation (Non-Medical): No   Utilities: Not At Risk (11/29/2023)    [facility] Utilities     Threatened with loss of utilities: No       DISCHARGE DETAILS:    Discharge planning discussed with[de-identified] Patient at bedside  Freedom of Choice: Yes  Comments - Freedom of Choice: CM discussed freedom of choice as it pertains to discharge planning. Patient is denied having any needs. CM contacted family/caregiver?: No- see comments  Were Treatment Team discharge recommendations reviewed with patient/caregiver?: Yes  Did patient/caregiver verbalize understanding of patient care needs?: Yes  Were patient/caregiver advised of the risks associated with not following Treatment Team discharge recommendations?: Yes         [address]         Is the patient interested in [address] at discharge?: No    DME Referral Provided  Referral made for DME?: No (Patient stated he would consider a walker close to discharge if he needs it. He was concerned about his age and using a walker.  CM informed him that cm will ask for SLIM to order PT for patient.)    Other Referral/Resources/Interventions Provided:  Interventions: None Indicated    Would you like to participate in our [address] service program?  : No - Declined    Treatment Team Recommendation: Home  Discharge Destination Plan[de-identified] Home  Transport at Discharge : Family

## 2023-11-29 NOTE — ASSESSMENT & PLAN NOTE
Reports new symptom today with intractable vomiting with food. Swallowing fluids/pills without difficulty but reports inability to take in food at this time  Denies nausea  Denies dysphagia symptoms.

## 2023-11-29 NOTE — ASSESSMENT & PLAN NOTE
History of Neuropathy  Presenting with severe bilateral foot pain, mottling.   Bilateral lower extremity arterial duplex was completed, will discuss findings with Vascular

## 2023-11-30 ENCOUNTER — APPOINTMENT (OUTPATIENT)
Dept: NON INVASIVE DIAGNOSTICS | Facility: HOSPITAL | Age: 28
DRG: 682 | End: 2023-11-30
Payer: COMMERCIAL

## 2023-11-30 ENCOUNTER — APPOINTMENT (OUTPATIENT)
Dept: NUCLEAR MEDICINE | Facility: HOSPITAL | Age: 28
DRG: 682 | End: 2023-11-30
Payer: COMMERCIAL

## 2023-11-30 LAB
ANION GAP SERPL CALCULATED.3IONS-SCNC: 13 MMOL/L
BUN SERPL-MCNC: 39 MG/DL (ref 5–25)
CALCIUM SERPL-MCNC: 9.7 MG/DL (ref 8.4–10.2)
CHLORIDE SERPL-SCNC: 96 MMOL/L (ref 96–108)
CO2 SERPL-SCNC: 27 MMOL/L (ref 21–32)
CREAT SERPL-MCNC: 13 MG/DL (ref 0.6–1.3)
ERYTHROCYTE [DISTWIDTH] IN BLOOD BY AUTOMATED COUNT: 14.5 % (ref 11.6–15.1)
GFR SERPL CREATININE-BSD FRML MDRD: 4 ML/MIN/1.73SQ M
GLUCOSE P FAST SERPL-MCNC: 89 MG/DL (ref 65–99)
GLUCOSE SERPL-MCNC: 89 MG/DL (ref 65–140)
HCT VFR BLD AUTO: 28 % (ref 36.5–49.3)
HGB BLD-MCNC: 9.5 G/DL (ref 12–17)
MAX HR PERCENT: 85 %
MAX HR: 133 BPM
MCH RBC QN AUTO: 32.1 PG (ref 26.8–34.3)
MCHC RBC AUTO-ENTMCNC: 33.9 G/DL (ref 31.4–37.4)
MCV RBC AUTO: 95 FL (ref 82–98)
MRSA NOSE QL CULT: NORMAL
PLATELET # BLD AUTO: 208 THOUSANDS/UL (ref 149–390)
PMV BLD AUTO: 10.2 FL (ref 8.9–12.7)
POTASSIUM SERPL-SCNC: 4.4 MMOL/L (ref 3.5–5.3)
RATE PRESSURE PRODUCT: NORMAL
RBC # BLD AUTO: 2.96 MILLION/UL (ref 3.88–5.62)
SL CV REST NUCLEAR ISOTOPE DOSE: 10.8 MCI
SL CV STRESS NUCLEAR ISOTOPE DOSE: 33 MCI
SL CV STRESS RECOVERY BP: NORMAL MMHG
SL CV STRESS RECOVERY HR: 109 BPM
SL CV STRESS RECOVERY O2 SAT: 98 %
SODIUM SERPL-SCNC: 136 MMOL/L (ref 135–147)
STRESS ANGINA INDEX: 0
STRESS BASELINE BP: NORMAL MMHG
STRESS BASELINE BP: NORMAL MMHG
STRESS BASELINE HR: 67 BPM
STRESS BASELINE HR: 72 BPM
STRESS O2 SAT REST: 98 %
STRESS PEAK HR: 141 BPM
STRESS POST ESTIMATED WORKLOAD: 1 METS
STRESS POST O2 SAT PEAK: 95 %
STRESS POST PEAK BP: 146 MMHG
STRESS POST PEAK HR: 141 BPM
STRESS POST PEAK SYSTOLIC BP: 170 MMHG
WBC # BLD AUTO: 7.09 THOUSAND/UL (ref 4.31–10.16)

## 2023-11-30 PROCEDURE — 97163 PT EVAL HIGH COMPLEX 45 MIN: CPT

## 2023-11-30 PROCEDURE — 99232 SBSQ HOSP IP/OBS MODERATE 35: CPT | Performed by: INTERNAL MEDICINE

## 2023-11-30 PROCEDURE — NC001 PR NO CHARGE: Performed by: INTERNAL MEDICINE

## 2023-11-30 PROCEDURE — 93016 CV STRESS TEST SUPVJ ONLY: CPT | Performed by: INTERNAL MEDICINE

## 2023-11-30 PROCEDURE — G1004 CDSM NDSC: HCPCS

## 2023-11-30 PROCEDURE — 97166 OT EVAL MOD COMPLEX 45 MIN: CPT

## 2023-11-30 PROCEDURE — A9502 TC99M TETROFOSMIN: HCPCS

## 2023-11-30 PROCEDURE — 85027 COMPLETE CBC AUTOMATED: CPT | Performed by: NURSE PRACTITIONER

## 2023-11-30 PROCEDURE — 93018 CV STRESS TEST I&R ONLY: CPT | Performed by: INTERNAL MEDICINE

## 2023-11-30 PROCEDURE — 78452 HT MUSCLE IMAGE SPECT MULT: CPT | Performed by: INTERNAL MEDICINE

## 2023-11-30 PROCEDURE — 99223 1ST HOSP IP/OBS HIGH 75: CPT | Performed by: INTERNAL MEDICINE

## 2023-11-30 PROCEDURE — 93017 CV STRESS TEST TRACING ONLY: CPT

## 2023-11-30 PROCEDURE — 78452 HT MUSCLE IMAGE SPECT MULT: CPT

## 2023-11-30 PROCEDURE — 99232 SBSQ HOSP IP/OBS MODERATE 35: CPT | Performed by: NURSE PRACTITIONER

## 2023-11-30 PROCEDURE — 80048 BASIC METABOLIC PNL TOTAL CA: CPT | Performed by: NURSE PRACTITIONER

## 2023-11-30 RX ORDER — AMINOPHYLLINE 25 MG/ML
50 INJECTION, SOLUTION INTRAVENOUS ONCE
Status: COMPLETED | OUTPATIENT
Start: 2023-11-30 | End: 2023-11-30

## 2023-11-30 RX ORDER — REGADENOSON 0.08 MG/ML
0.4 INJECTION, SOLUTION INTRAVENOUS ONCE
Status: COMPLETED | OUTPATIENT
Start: 2023-11-30 | End: 2023-11-30

## 2023-11-30 RX ORDER — METOPROLOL SUCCINATE 25 MG/1
25 TABLET, EXTENDED RELEASE ORAL DAILY
Status: DISCONTINUED | OUTPATIENT
Start: 2023-12-01 | End: 2023-12-01

## 2023-11-30 RX ORDER — ATORVASTATIN CALCIUM 40 MG/1
40 TABLET, FILM COATED ORAL
Status: DISCONTINUED | OUTPATIENT
Start: 2023-11-30 | End: 2023-12-01

## 2023-11-30 RX ADMIN — Medication 7.5 MG: at 08:01

## 2023-11-30 RX ADMIN — CALCIUM ACETATE 2001 MG: 667 CAPSULE ORAL at 11:33

## 2023-11-30 RX ADMIN — CALCIUM ACETATE 2001 MG: 667 CAPSULE ORAL at 08:00

## 2023-11-30 RX ADMIN — REGADENOSON 0.4 MG: 0.08 INJECTION, SOLUTION INTRAVENOUS at 12:16

## 2023-11-30 RX ADMIN — ONDANSETRON 4 MG: 2 INJECTION INTRAMUSCULAR; INTRAVENOUS at 08:03

## 2023-11-30 RX ADMIN — Medication 7.5 MG: at 19:48

## 2023-11-30 RX ADMIN — TRAZODONE HYDROCHLORIDE 50 MG: 50 TABLET ORAL at 21:38

## 2023-11-30 RX ADMIN — HYDROMORPHONE HYDROCHLORIDE 0.5 MG: 1 INJECTION, SOLUTION INTRAMUSCULAR; INTRAVENOUS; SUBCUTANEOUS at 16:21

## 2023-11-30 RX ADMIN — ONDANSETRON 4 MG: 2 INJECTION INTRAMUSCULAR; INTRAVENOUS at 17:15

## 2023-11-30 RX ADMIN — HYDROMORPHONE HYDROCHLORIDE 0.5 MG: 1 INJECTION, SOLUTION INTRAMUSCULAR; INTRAVENOUS; SUBCUTANEOUS at 00:07

## 2023-11-30 RX ADMIN — AMINOPHYLLINE 50 MG: 25 INJECTION, SOLUTION INTRAVENOUS at 12:19

## 2023-11-30 RX ADMIN — HYDROMORPHONE HYDROCHLORIDE 0.5 MG: 1 INJECTION, SOLUTION INTRAMUSCULAR; INTRAVENOUS; SUBCUTANEOUS at 22:27

## 2023-11-30 RX ADMIN — ACETAMINOPHEN 975 MG: 325 TABLET, FILM COATED ORAL at 02:53

## 2023-11-30 RX ADMIN — HYDROMORPHONE HYDROCHLORIDE 0.5 MG: 1 INJECTION, SOLUTION INTRAMUSCULAR; INTRAVENOUS; SUBCUTANEOUS at 10:42

## 2023-11-30 RX ADMIN — ACETAMINOPHEN 975 MG: 325 TABLET, FILM COATED ORAL at 17:11

## 2023-11-30 RX ADMIN — Medication 7.5 MG: at 15:02

## 2023-11-30 RX ADMIN — ACETAMINOPHEN 975 MG: 325 TABLET, FILM COATED ORAL at 10:23

## 2023-11-30 RX ADMIN — ATORVASTATIN CALCIUM 40 MG: 40 TABLET, FILM COATED ORAL at 17:10

## 2023-11-30 NOTE — OCCUPATIONAL THERAPY NOTE
Occupational Therapy Evaluation     Patient Name: Kayla Christine  BFKKH'N Date: 11/30/2023  Problem List  Principal Problem:    Elevated troponin  Active Problems:    Anxiety, generalized    Insomnia    End stage renal disease on dialysis ([address])    Neuropathy    Microangiopathy (HCC)    Intractable vomiting    Past Medical History  Past Medical History:   Diagnosis Date    Anxiety     Chronic kidney disease     GERD (gastroesophageal reflux disease)     Kidney stone     Liver disorder     Nephrolithiasis     Primary hyperoxaluria type 1 ([address])      Past Surgical History  Past Surgical History:   Procedure Laterality Date    ANKLE SURGERY Right 2017    ligament repair    CYSTOSCOPY      FL RETROGRADE PYELOGRAM  10/24/2020    FL RETROGRADE PYELOGRAM  02/08/2021    HERNIA REPAIR      IR TUNNELED DIALYSIS CATHETER PLACEMENT  8/24/2023    KIDNEY STONE SURGERY      LITHOTRIPSY      MASS EXCISION Left 02/17/2017    Procedure: BACK/SHOULDER CYST EXCISION ;  Surgeon: Rito Charlton MD;  [facility]: [facility];  Service:     HI CYSTO/URETERO W/LITHOTRIPSY &INDWELL STENT INSRT Left 10/24/2020    Procedure: CYSTOSCOPY URETEROSCOPY WITH LITHOTRIPSY HOLMIUM LASER, RETROGRADE PYELOGRAM AND INSERTION STENT URETERAL;  Surgeon: Sammy Velasco MD;  [facility];  Service: Urology    HI CYSTO/URETERO W/LITHOTRIPSY &INDWELL STENT INSRT Right 02/08/2021    Procedure: CYSTOSCOPY URETEROSCOPY WITH LITHOTRIPSY HOLMIUM LASER, RETROGRADE PYELOGRAM AND INSERTION STENT URETERAL;  Surgeon: Lizett Kang MD;  [facility];  Service: Urology         11/30/23 1049   OT Last Visit   OT Visit Date 11/30/23   Note Type   Note type Evaluation   Pain Assessment   Pain Assessment Tool 0-10   Pain Score 9   Pain Location/Orientation Orientation: Bilateral;Location: Foot   Hospital Pain Intervention(s) Ambulation/increased activity;Repositioned; Emotional support   Restrictions/Precautions   Other Precautions Bed Alarm; Chair Alarm;Telemetry;Multiple lines; Fall Risk;Pain   Home Living   Type of [address] One level;Performs ADLs on one level; Able to live on main level with bedroom/bathroom;Stairs to enter with rails  (1 LOPEZ)   Bathroom Shower/Tub Tub/shower unit   Bathroom Toilet Standard   Bathroom Accessibility Accessible   Additional Comments pt independent in all areas of occupation w/ no AD   Prior Function   Level of [place] Independent with ADLs; Independent with functional mobility; Independent with IADLS   Lives With Family  (parents)   Receives Help From Family   IADLs Independent with driving; Independent with meal prep; Independent with medication management   Falls in the last 6 months 1 to 4  (4 falls monday)   Vocational Full time employment   Comments pt reports increased pain in b/l feet limiting him and reports weakness in b/l UEs and LEs for last weeks   Lifestyle   Autonomy per pt independent w/ ADLs, independent w/ functional transfers and mobility, independent w/ IADLs, driving, working   Reciprocal Relationships parents   Service to Others works in sales   Intrinsic Gratification watch tv   Subjective   Subjective "I have this pain that limits me"   ADL   Where [address] [facility] 5  Supervision/Setup   Grooming Assistance 4  Minimal Assistance   [address] 4  Minimal Assistance    [address] [facility] 3  Moderate Assistance   [address] [facility] 4  [address] 3  Moderate [address]  3  Moderate [facility] 3  Moderate Assistance   Additional Comments reports dropping items when eating and performing self-care   Bed Mobility   Supine to Sit 4  Minimal assistance   Additional items Assist x 1; Increased time required;Verbal cues;LE management;HOB elevated; Bedrails   Transfers   Sit to Stand 3  Moderate assistance   Additional items Assist x 2; Increased time required;Verbal cues; Bedrails;Armrests Stand to Sit 3  Moderate assistance   Additional items Assist x 2; Increased time required;Verbal cues;Armrests   Additional Comments cues for hand placement and positioning   Functional Mobility   Functional Mobility 3  Moderate assistance   Additional Comments assist x2 w/ increased time to complete and cues for sequencing and maneuvering RW   Additional items Rolling walker   Balance   Static Sitting Good   Dynamic Sitting Fair +   Static Standing Poor   Dynamic Standing Poor -   Ambulatory Poor -   Activity Tolerance   Activity Tolerance Patient limited by fatigue;Patient limited by pain;Treatment limited secondary to medical complications (Comment)   Medical Staff Made Aware PT Sary La: Pt seen for co-session with skilled Physical therapist 2* clinically unstable presentation, medical complexity, new precautions, performance deficits/functional limitations, limited activity tolerance and present impairments which are a regression from patient patient's baseline and impacting overall occupational performance   Nurse Made Aware appropriate to see per RN   RUE Assessment   RUE Assessment WFL  (4-/5 proximal, 3+/5 distal,  3/5)   LUE Assessment   LUE Assessment WFL  (4-/5 proximal, 3+/5 distal,  3/5)   Hand Function   Gross Motor Coordination Functional   Fine Motor Coordination Functional  (increased time)   Hand Function Comments difficulty making a full fist   Sensation   Additional Comments reports numbness in b/l hands and feet, however able to detect light touch   Proprioception   Proprioception No apparent deficits   Vision-Basic Assessment   Current Vision No visual deficits   Vision - Complex Assessment   Ocular Range of Motion Intact   Perception   Inattention/Neglect Appears intact   Cognition   Overall Cognitive Status WFL   Arousal/Participation Alert; Cooperative;Responsive   Attention Within functional limits   Orientation Level Oriented X4   Memory Within functional limits   Following Commands Follows all commands and directions without difficulty   Comments motivated to get better   Assessment   Limitation Decreased ADL status; Decreased Safe judgement during ADL;Decreased UE strength;Decreased cognition;Decreased endurance;Decreased self-care trans;Decreased high-level ADLs   Prognosis Good   Assessment Pt is a 25 y.o. male seen for OT evaluation s/p admit to [facility] on 11/28/2023 w/ Elevated troponin, sever pain inn b/l feet in toes  Comorbidities affecting pt's functional performance at time of assessment include: intractable vomiting, microangiopathy, neuropathy, ESRD on dialysis, insomnia, anxiety. Personal factors affecting pt at time of IE include:difficulty performing ADLS, difficulty performing IADLS , and decreased initiation and engagement . Prior to admission, pt was living w/ parents and reports increased assistance w/ ADLs and mobility due to pain in b/l feet, however typically independent w/ no AD and works in [employer]. Sofy Andres Upon evaluation: Pt requires setup self-feeding, MIN assist UB ADLs, MOD assist LB ADLs, MIN assist supine>sit bed mobility, MOD assist x2 sit<>stand w/ VCs for hand placement and positioning, MOD assist x2 functional mobility w/ RW and cues for sequencing 2* the following deficits impacting occupational performance: increased pain in b/l feet, decreased  strength, decreased UE strength, numbness in b/lfeet at toes and forefoot and reports numbness in b/l hands, fall risk, decreased standing tolerance, impaired postural control, impaired functional reach, decreased coordination. Pt to benefit from continued skilled OT tx while in the hospital to address deficits as defined above and maximize level of functional independence w ADL's and functional mobility. Occupational Performance areas to address include: grooming, bathing/shower, toilet hygiene, dressing, health maintenance, functional mobility, community mobility, clothing management, cleaning, and meal prep.  From OT standpoint, recommendation at time of d/c would be level I maximum resources. The patient's raw score on the AM-PAC Daily Activity Inpatient Short Form is 18. A raw score of less than 19 suggests the patient may benefit from discharge to post-acute rehabilitation services. Please refer to the recommendation of the Occupational Therapist for safe discharge planning. Goals   Patient Goals "get better, have less pain"   LTG Time Frame 10-14   Long Term Goal please see below goals   Plan   Treatment Interventions ADL retraining;Functional transfer training;UE strengthening/ROM; Endurance training;Cognitive reorientation;Equipment evaluation/education;Patient/family training; Compensatory technique education; Activityengagement; Energy conservation   Goal Expiration Date 12/14/23   OT Frequency 3-5x/wk   Discharge Recommendation   Rehab Resource Intensity Level, OT I (Maximum Resource Intensity)   AM-PAC Daily Activity Inpatient   Lower Body Dressing 2   Bathing 3   Toileting 3   Upper Body Dressing 3   Grooming 3   Eating 4   Daily Activity Raw Score 18   Daily Activity Standardized Score (Calc for Raw Score >=11) 38.66   AM-PAC Applied Cognition Inpatient   Following a Speech/Presentation 4   Understanding Ordinary Conversation 4   Taking Medications 4   Remembering Where Things Are Placed or Put Away 4   Remembering List of 4-5 Errands 4   Taking Care of Complicated Tasks 4   Applied Cognition Raw Score 24   Applied Cognition Standardized Score 62.21   Modified Jd Scale   Modified Jd Scale 4   End of Consult   Education Provided Yes   Patient Position at End of Consult Bedside chair; All needs within reach;Bed/Chair alarm activated   Nurse Communication Nurse aware of consult     Occupational Therapy Goals to be met in 10-14 days:  1) Pt will improve activity tolerance to G for 30 min txment sessions to enhance ADLs  2) Pt will complete ADLs/self care w/ mod I   3) Pt will complete toileting w/ mod I w/ G hygiene/thoroughness using DME PRN  4) Pt will improve functional transfers on/off all surfaces using DME PRN w/ G balance/safety including toileting w/ mod I  5) Pt will improve fx'l mobility during I/ADl/leisure tasks using DME PRN w/ g balance/safety w/ mod I  6) Pt will demonstrate G carryover of pt/caregiver education and training as appropriate w/ mod I  w/ G tolerance  7) Pt will engage in depression screen/leisure interest checklist w/ G participation to monitor s/s depression and ID 3 positive coping strategies to A w/ emotional regulation and management  8) Pt will demonstrate 100% carryover of E.C. techniques w/ mod I t/o fx'l I/ADL/leisure tasks w/o cues s/p skilled education  9) Pt will demonstrate improved bed mobility to MOD I to enhance ADLs  10) Pt will engage in activity configuration activity w/ G participation and mod I to increase time management skills and improve participation in a structured routine to improve overall quality of life  11) Pt will demonstrate improved standing tolerance to 3-5 minutes during functional tasks w/ no LOB to enhance ADL performance  12) Pt will demonstrate improved b/l UE strength by 1 MMT grade to enhance ADLS and functional transfers and demonstrate HEP at MOD I    Documentation completed by: Anselmo Amaro MS, OTR/L

## 2023-11-30 NOTE — PROGRESS NOTES
NEPHROLOGY PROGRESS NOTE    Patient: Nancy Garcia               Sex: male          DOA: 11/28/2023  8:02 AM   YOB: 1995        Age:  25 y.o.        LOS:  LOS: 0 days   11/30/2023    REASON FOR THE CONSULTATION:      ESRD on hemodialysis    SUBJECTIVE     Patient is seen and examined next to the bedside. No significant improvement in numbness and pain in lower extremities. The bluish tinge along toes have improved as well. Denies any chest pain. Underwent a session of dialysis yesterday which lasted 3 hours 40 minutes. CURRENT MEDICATIONS       Current Facility-Administered Medications:     acetaminophen (TYLENOL) tablet 975 mg, 975 mg, Oral, Q8H, Joanie Lugo MD, 975 mg at 11/30/23 0253    calcium acetate (PHOSLO) capsule 2,001 mg, 2,001 mg, Oral, TID With Meals, Kristin Costa MD, 2,001 mg at 11/30/23 0800    gabapentin (NEURONTIN) capsule 100 mg, 100 mg, Oral, TID PRN, Joanie Lugo MD    HYDROmorphone (DILAUDID) injection 0.5 mg, 0.5 mg, Intravenous, Q4H PRN, Joanie Lugo MD, 0.5 mg at 11/30/23 0007    ondansetron (ZOFRAN) injection 4 mg, 4 mg, Intravenous, Q4H PRN, Joanie Lugo MD, 4 mg at 11/30/23 2249    oxyCODONE (ROXICODONE) IR tablet 5 mg, 5 mg, Oral, Q6H PRN, 5 mg at 11/29/23 5444 **OR** oxyCODONE (ROXICODONE) split tablet 7.5 mg, 7.5 mg, Oral, Q4H PRN, Joanie Lugo MD, 7.5 mg at 11/30/23 0801    traZODone (DESYREL) tablet 50 mg, 50 mg, Oral, HS, Joanie Lugo MD, 50 mg at 11/29/23 2105    REVIEW OF SYSTEMS     Review of Systems   Constitutional: Negative. HENT: Negative. Eyes: Negative. Respiratory: Negative. Cardiovascular: Negative. Gastrointestinal: Negative. Endocrine: Negative. Genitourinary: Negative. Musculoskeletal: Negative. Skin: Negative. Allergic/Immunologic: Negative. Neurological: Negative. Hematological: Negative. All other systems reviewed and are negative.       OBJECTIVE     Current Weight: Weight - Scale: 103 kg (228 lb)  Vitals: 11/30/23 0756   BP: 140/82   Pulse: 87   Resp: 16   Temp: 98.7 °F (37.1 °C)   SpO2: 95%     Body mass index is 30.92 kg/m². Intake/Output Summary (Last 24 hours) at 11/30/2023 1015  Last data filed at 11/29/2023 1630  Gross per 24 hour   Intake 950 ml   Output 1670 ml   Net -720 ml         PHYSICAL EXAMINATION     Physical Exam  HENT:      Head: Normocephalic and atraumatic. Eyes:      Pupils: Pupils are equal, round, and reactive to light. Neck:      Vascular: No JVD. Cardiovascular:      Rate and Rhythm: Normal rate and regular rhythm. Heart sounds: Normal heart sounds. No murmur heard. No friction rub. Pulmonary:      Effort: Pulmonary effort is normal.      Breath sounds: Normal breath sounds. Abdominal:      General: Bowel sounds are normal. There is no distension. Palpations: Abdomen is soft. Tenderness: There is no abdominal tenderness. There is no rebound. Musculoskeletal:         General: No tenderness. Cervical back: Neck supple. Skin:     General: Skin is dry. Findings: No rash. Neurological:      Mental Status: He is alert and oriented to person, place, and time.            LAB RESULTS     Results from last 7 days   Lab Units 11/30/23  0551 11/29/23  0811 11/29/23  0505 11/29/23  0438 11/28/23  0848   WBC Thousand/uL 7.09  --   --  6.50 7.36   HEMOGLOBIN g/dL 9.5*  --   --  9.6* 9.2*   HEMATOCRIT % 28.0*  --   --  29.2* 28.2*   PLATELETS Thousands/uL 208  --   --  233 245   POTASSIUM mmol/L 4.4  --  4.3  --  5.3   CHLORIDE mmol/L 96  --  96  --  97   CO2 mmol/L 27  --  25  --  22   BUN mg/dL 39*  --  48*  --  77*   CREATININE mg/dL 13.00*  --  16.25*  --  22.00*   EGFR ml/min/1.73sq m 4  --  3  --  2   CALCIUM mg/dL 9.7  --  9.4  --  8.7   MAGNESIUM mg/dL  --   --  2.7  --  3.4*   PHOSPHORUS mg/dL  --  10.2*  --   --  11.3*             RADIOLOGY RESULTS    CTA dissection protocol    IMPRESSION:     No acute aortic pathology or significant arterial stenosis. ASSESSMENT/PLAN     25years old male with past medical history of type 1 hyperoxaluria, nephrolithiasis, progressed to ESRD on hemodialysis, anemia who presents to our facility with acute onset of lower extremity pain with numbness. 1.  ESRD on hemodialysis: Undergoes hemodialysis on Mondays, Wednesdays and Friday at the [facility] dialysis unit. Patient undergone back-to-back sessions of hemodialysis to reduce burden of oxalate and phosphorus. No plans for hemodialysis today and will schedule hemodialysis session in a.m. 2.  Hyperphosphatemia: Noted serum phosphorus was 11 and elevated. Likely due to missed hemodialysis session along with dietary indiscretion and lack of phosphorus binders  Able to tolerate PhosLo 3 tablets 3 times daily with meals. 3.  Elevated troponins: Etiology is unclear however in the setting of normal echocardiogram likely appears to be oxalate deposition in coronary vessel. Patient scheduled for stress test.    4.  Lower extremity numbness/pain: Acute arterial thrombosis is ruled out. However given underlying type I hyperoxaluria and development of ESKD, suspect deposition of oxalate in soft tissues and blood vessels. Noted improvement in pain and numbness subsequent to hemodialysis     5. Anemia: Hemoglobin is 9.5 deciliter and below target. Place patient on Epogen.           Nata Lo MD  Nephrology  11/30/2023

## 2023-11-30 NOTE — ASSESSMENT & PLAN NOTE
Reports new symptom today with intractable vomiting with food.   Swallowing fluids/pills without difficulty but reports inability to take in food at this time  Denies nausea  Denies dysphagia symptoms

## 2023-11-30 NOTE — PHYSICAL THERAPY NOTE
Physical Therapy Evaluation     Patient's Name: Blaine Wayne    Admitting Diagnosis  Leg pain [M79.606]  Elevated troponin [R79.89]  End stage renal disease on dialysis ([address]) [N18.6, Z99.2]  Pain in both feet [M79.671, M79.672]    Problem List  Patient Active Problem List   Diagnosis    Sebaceous cyst    Anxiety, generalized    Ureteric stone ----> hydroureteronephrosis moderate    Insomnia    Hydronephrosis with urinary obstruction due to ureteral calculus    Primary hyperoxaluria type 1 (HCC)    BMI 30.0-30.9,adult    Chronic kidney disease-mineral and bone disorder    Anemia    Gastroesophageal reflux disease without esophagitis    End stage renal disease on dialysis (HCC)    Neuropathy    Elevated troponin    Microangiopathy (HCC)    Intractable vomiting       Past Medical History  Past Medical History:   Diagnosis Date    Anxiety     Chronic kidney disease     GERD (gastroesophageal reflux disease)     Kidney stone     Liver disorder     Nephrolithiasis     Primary hyperoxaluria type 1 ([address])        Past Surgical History  Past Surgical History:   Procedure Laterality Date    ANKLE SURGERY Right 2017    ligament repair    CYSTOSCOPY      FL RETROGRADE PYELOGRAM  10/24/2020    FL RETROGRADE PYELOGRAM  02/08/2021    HERNIA REPAIR      IR TUNNELED DIALYSIS CATHETER PLACEMENT  8/24/2023    KIDNEY STONE SURGERY      LITHOTRIPSY      MASS EXCISION Left 02/17/2017    Procedure: BACK/SHOULDER CYST EXCISION ;  Surgeon: Dickson Maradiaga MD;  Location: [facility];  Service:     MN CYSTO/URETERO W/LITHOTRIPSY &INDWELL STENT INSRT Left 10/24/2020    Procedure: CYSTOSCOPY URETEROSCOPY WITH LITHOTRIPSY HOLMIUM LASER, RETROGRADE PYELOGRAM AND INSERTION STENT URETERAL;  Surgeon: Keegan Silvestre MD;  Location: [facility];  Service: Urology    MN CYSTO/URETERO W/LITHOTRIPSY &INDWELL STENT INSRT Right 02/08/2021    Procedure: CYSTOSCOPY URETEROSCOPY WITH LITHOTRIPSY HOLMIUM LASER, RETROGRADE PYELOGRAM AND INSERTION STENT URETERAL; Surgeon: Radha Doran MD;  Location: [facility];  Service: Urology          11/30/23 1035   PT Last Visit   PT Visit Date 11/30/23   Note Type   Note type Evaluation   Pain Assessment   Pain Assessment Tool 0-10   Pain Score 10 - Worst Possible Pain   Pain Location/Orientation   (forefoot)   Restrictions/Precautions   Other Precautions Chair Alarm; Bed Alarm;Telemetry;Multiple lines; Fall Risk;Pain   Home Living   Type of [address] One level;Performs ADLs on one level; Able to live on main level with bedroom/bathroom;Stairs to enter with rails   Bathroom Shower/Tub Tub/shower unit   Bathroom Toilet Standard   Bathroom Accessibility Accessible   Prior Function   Level of [place] Independent with ADLs; Independent with functional mobility; Independent with IADLS   Lives With Family  (parents)   Receives Help From Family   IADLs Independent with driving; Independent with meal prep; Independent with medication management   Falls in the last 6 months 1 to 4  (4 falls monday)   Vocational Full time employment   Cognition   Overall Cognitive Status WFL   Attention Within functional limits   Orientation Level Oriented X4   Memory Within functional limits   Following Commands Follows all commands and directions without difficulty   RUE Assessment   RUE Assessment WFL  (4-/5 proximal, 3+/5 distal,  3/5)   LUE Assessment   LUE Assessment WFL  (4-/5 proximal, 3+/5 distal,  3/5)   RLE Assessment   RLE Assessment X   Strength RLE   R Hip Flexion 4-/5   R Knee Extension 4-/5   R Ankle Dorsiflexion 2+/5   LLE Assessment   LLE Assessment X   Strength LLE   L Hip Flexion 4-/5   L Knee Extension 4-/5   L Ankle Dorsiflexion 2+/5   Vision-Basic Assessment   Current Vision No visual deficits   Light Touch   RLE Light Touch Impaired   RLE Light Touch Comments forefoot   LLE Light Touch Impaired   LLE Light Touch Comments forefoot   Bed Mobility   Supine to Sit 4  Minimal assistance   Additional items Assist x 1;Increased time required;Verbal cues;LE management;HOB elevated   Transfers   Sit to Stand 3  Moderate assistance   Additional items Assist x 2; Increased time required;Verbal cues   Stand to Sit 3  Moderate assistance   Additional items Assist x 2; Increased time required;Verbal cues   Additional Comments Utilized RW this date with cues for hand placement with transition. Pt. with notable increased pain to 10+/10 with walking per pt report. Ambulated approximately 4' required chair to be brought to him due to pain level. Ambulation/Elevation   Gait pattern Decreased foot clearance; Antalgic; Inconsistent tika; Short stride; Excessively slow;Decreased heel strike;Decreased toe off   Gait Assistance 3  Moderate assist   Additional items Assist x 2;Verbal cues; Tactile cues   Assistive Device Rolling walker   Distance 4'   Balance   Static Sitting Good   Dynamic Sitting Fair +   Static Standing Poor   Dynamic Standing Poor -   Ambulatory Poor -   Activity Tolerance   Activity Tolerance Patient limited by fatigue;Patient limited by pain;Treatment limited secondary to medical complications (Comment)   Medical Staff Made Aware OT Melissa : Pt seen for co-session with skilled OT 2* clinically unstable presentation, medical complexity, new precautions, performance deficits/functional limitations, limited activity tolerance and present impairments which are a regression from patient patient's baseline and impacting overall physical/functional performance   Assessment   Prognosis Good   Problem List Decreased strength;Decreased endurance; Impaired balance;Decreased mobility;Pain; Impaired sensation;Decreased range of motion   Assessment Pt is 25 y.o. male seen for PT evaluation s/p admit to [address] [facility] on 11/28/2023 w/ Elevated troponin. PT consulted to assess pt's functional mobility and d/c needs. Order placed for PT eval and tx, w/  activity  order.  Comorbidities affecting pt's physical performance at time of assessment include: Anxiety, Insomnia, ESRD, neuropathy, Microangiopathy, intractable vomitting. PTA, pt was independent w/ all functional mobility w/ no AD . Personal factors affecting pt at time of IE include: lives in one story house, ambulating w/ assistive device, stairs to enter home, and inability to ambulate household distances. Please find objective findings from PT assessment regarding body systems outlined above with impairments and limitations including weakness, decreased ROM, impaired balance, gait deviations, pain, decreased functional mobility tolerance, and fall risk. The following objective measures performed on IE also reveal limitations: AM-PAC 6-Clicks: 55/10. Pt's clinical presentation is currently unstable/unpredictable seen in pt's presentation. Pt to benefit from continued PT tx to address deficits as defined above and maximize level of functional independent mobility and consistency. From PT/mobility standpoint, recommendation at time of d/c would be Maximum Resource Intensity pending progress in order to facilitate return to PLOF. Barriers to Discharge Inaccessible home environment; Other (Comment)  (decline in functional baseline)   Goals   Patient Goals to get better, have less pain   STG Expiration Date 12/14/23   Short Term Goal #1 1. Pt will complete bed mobility with Mod I to increase functional mobility. 2. Pt will complete sit to stand transfers with Mod I to increase functional mobility. 3. Pt will ambulate 150 ft with RW with Mod I without LOB 4. Pt will increase B/L LE strength by 1 grade to facilitate improved functional mobility with decreased risk of falls. 5. Pt will increase standing balance to fair in order to decrease risk of falls. PT Treatment Day 0   Plan   Treatment/Interventions LE strengthening/ROM; Functional transfer training; Therapeutic exercise; Endurance training;Bed mobility;Gait training;Equipment eval/education;Patient/family training;Spoke to nursing;OT PT Frequency 3-5x/wk   Discharge Recommendation   Rehab Resource Intensity Level, PT I (Maximum Resource Intensity)   Equipment Recommended Wheelchair   AM-PAC Basic Mobility Inpatient   Turning in Flat Bed Without Bedrails 3   Lying on Back to Sitting on Edge of Flat Bed Without Bedrails 3   Moving Bed to Chair 2   Standing Up From Chair Using Arms 2   Walk in Room 1   Climb 3-5 Stairs With Railing 1   Basic Mobility Inpatient Raw Score 12   Basic Mobility Standardized Score 32.23   Highest Level Of Mobility   JH-HLM Goal 4: Move to chair/commode   JH-HLM Achieved 4: Move to chair/commode         Cole Shaw, PT

## 2023-11-30 NOTE — ASSESSMENT & PLAN NOTE
Patient presented to the ED with complaints of severe pain in bilateral toes with mottling of skin and difficulty sleeping due to pain. On arrival to the ER, patient noted to have elevated troponin levels, peaking at 1445. Now trending down, most recently, 951  EKG without concerns of ST/T wave changes. Patient is without chest pain, shortness of breath. Exact etiology of elevation in trop unclear. Echo (11/29/23): Left Ventricle: Left ventricular cavity size is normal. Wall thickness is normal. The left ventricular ejection fraction is 61%.  Systolic function is normal. Wall motion is normal. Diastolic function is normal.   Telemetry  Plan for stress test today

## 2023-11-30 NOTE — PROGRESS NOTES
[address]  Progress Note  Name: Nighat De Paz  MRN: [id number]  Unit/Bed#: -01 I Date of Admission: 11/28/2023   Date of Service: 11/30/2023 I Hospital Day: 0    Assessment/Plan   * Elevated troponin  Assessment & Plan  Patient presented to the ED with complaints of severe pain in bilateral toes with mottling of skin and difficulty sleeping due to pain. On arrival to the ER, patient noted to have elevated troponin levels, peaking at 1445. Now trending down, most recently, 951  EKG without concerns of ST/T wave changes. Patient is without chest pain, shortness of breath. Exact etiology of elevation in trop unclear. Echo (11/29/23): Left Ventricle: Left ventricular cavity size is normal. Wall thickness is normal. The left ventricular ejection fraction is 61%. Systolic function is normal. Wall motion is normal. Diastolic function is normal.   Telemetry  Plan for stress test today    Intractable vomiting  Assessment & Plan  Reports new symptom today with intractable vomiting with food. Swallowing fluids/pills without difficulty but reports inability to take in food at this time  Denies nausea  Denies dysphagia symptoms    Microangiopathy ([address])  Assessment & Plan  Seen by vascular surgery, no surgical intervention  Cont pain control  MICHELLE is abnormal on the right leg with severe range MICHELLE 0.62  CTA chest abd pelvis reviewed: no acute aortic pathology or stenosis noted    Neuropathy  Assessment & Plan  History of Neuropathy  Presenting with severe bilateral foot pain, mottling.   Bilateral lower extremity arterial duplex was completed    End stage renal disease on dialysis [facility])  Assessment & Plan  Lab Results   Component Value Date    EGFR 4 11/30/2023    EGFR 3 11/29/2023    EGFR 2 11/28/2023    CREATININE 13.00 (H) 11/30/2023    CREATININE 16.25 (H) 11/29/2023    CREATININE 22.00 (H) 11/28/2023       Nephrology consulted for HD  Associated hyperphosphatemia  Phoslo initiated  Patient did receive two HD sessions in a row. Insomnia  Assessment & Plan  Continue with Trazodone    Anxiety, generalized  Assessment & Plan  History of; takes Trazodone to sleep at night; no other psychiatric meds. Stable currently           VTE Pharmacologic Prophylaxis: VTE Score: 1 Low Risk (Score 0-2) - Encourage Ambulation. Mobility:   Basic Mobility Inpatient Raw Score: 14  -[facility] Goal: 4: Move to chair/commode  -[facility] Achieved: 1: Laying in bed  [facility] Goal NOT achieved. Continue with multidisciplinary rounding and encourage appropriate mobility to improve upon [facility] goals. Patient Centered Rounds: I performed bedside rounds with nursing staff today. Discussions with Specialists or Other Care Team Provider: Case Management, PT/OT    Education and Discussions with Family / Patient: Patient declined call to . Total Time Spent on Date of Encounter in care of patient: 38 mins. This time was spent on one or more of the following: performing physical exam; counseling and coordination of care; obtaining or reviewing history; documenting in the medical record; reviewing/ordering tests, medications or procedures; communicating with other healthcare professionals and discussing with patient's family/caregivers. Current Length of Stay: 0 day(s)  Current Patient Status: Observation   Certification Statement: The patient will continue to require additional inpatient hospital stay due to ongoing treatment in setting of need for stress test today in setting of elevated troponin levels. Discharge Plan: Anticipate discharge later today or tomorrow to discharge location to be determined pending rehab evaluations. Code Status: Level 1 - Full Code    Subjective:   Patient resting in bed, being seen by PT/OT. Denies complaints of chest pain, shortness of breath, fever, or chills.       Reports his toe pain was feeling better until he got OOB and put his weight on his feet this morning. Objective:     Vitals:   Temp (24hrs), Av.7 °F (37.1 °C), Min:98.2 °F (36.8 °C), Max:99.5 °F (37.5 °C)    Temp:  [98.2 °F (36.8 °C)-99.5 °F (37.5 °C)] 98.7 °F (37.1 °C)  HR:  [67-94] 87  Resp:  [14-18] 16  BP: (136-165)/() 140/82  SpO2:  [92 %-95 %] 95 %  Body mass index is 30.92 kg/m². Input and Output Summary (last 24 hours): Intake/Output Summary (Last 24 hours) at 2023 1039  Last data filed at 2023 1630  Gross per 24 hour   Intake 950 ml   Output 1670 ml   Net -720 ml       Physical Exam:   Physical Exam  Vitals and nursing note reviewed. Constitutional:       General: He is not in acute distress. Appearance: He is obese. Cardiovascular:      Rate and Rhythm: Normal rate. Pulses: Normal pulses. Pulmonary:      Effort: Pulmonary effort is normal.      Breath sounds: No stridor. Abdominal:      Palpations: Abdomen is soft. Musculoskeletal:         General: No tenderness. Normal range of motion. Right lower leg: No edema. Left lower leg: No edema. Skin:     General: Skin is warm and dry. Capillary Refill: Capillary refill takes less than 2 seconds. Comments: Bilateral feet, toes with better color, pain has improved until pressure was placed on them. Neurological:      Mental Status: He is alert and oriented to person, place, and time. Psychiatric:         Mood and Affect: Mood normal.          Additional Data:     Labs:  Results from last 7 days   Lab Units 23  0551 23  0438 23  0848   WBC Thousand/uL 7.09   < > 7.36   HEMOGLOBIN g/dL 9.5*   < > 9.2*   HEMATOCRIT % 28.0*   < > 28.2*   PLATELETS Thousands/uL 208   < > 245   NEUTROS PCT %  --   --  68   LYMPHS PCT %  --   --  17   MONOS PCT %  --   --  8   EOS PCT %  --   --  6    < > = values in this interval not displayed.      Results from last 7 days   Lab Units 23  0551 23  0505 23  0848   SODIUM mmol/L 136   < > 138   POTASSIUM mmol/L 4.4 < > 5.3   CHLORIDE mmol/L 96   < > 97   CO2 mmol/L 27   < > 22   BUN mg/dL 39*   < > 77*   CREATININE mg/dL 13.00*   < > 22.00*   ANION GAP mmol/L 13   < > 19   CALCIUM mg/dL 9.7   < > 8.7   ALBUMIN g/dL  --   --  4.4   TOTAL BILIRUBIN mg/dL  --   --  0.34   ALK PHOS U/L  --   --  48   ALT U/L  --   --  7   AST U/L  --   --  8*   GLUCOSE RANDOM mg/dL 89   < > 91    < > = values in this interval not displayed. Lines/Drains:  Invasive Devices       Peripheral Intravenous Line  Duration             Peripheral IV 23 Distal;Right;Upper;Ventral (anterior) Arm 2 days              Hemodialysis Catheter  Duration             HD Permanent Double Catheter 97 days              Drain  Duration             Ureteral Drain/Stent Left ureter 6 Fr. 1132 days    Ureteral Drain/Stent Right ureter 6 Fr. 1024 days                      Telemetry:  Telemetry Orders (From admission, onward)               24 Hour Telemetry Monitoring  Continuous x 24 Hours (Telem)           Question:  Reason for 24 Hour Telemetry  Answer:  PCI/EP study (including pacer and ICD implementation), Cardiac surgery, MI, abnormal cardiac cath, and chest pain- rule out MI                     Telemetry Reviewed: Normal Sinus Rhythm  Indication for Continued Telemetry Use: Acute MI/Unstable Angina/Rule out ACS             Imaging: No pertinent imaging reviewed.     Recent Cultures (last 7 days):         Last 24 Hours Medication List:   Current Facility-Administered Medications   Medication Dose Route Frequency Provider Last Rate    acetaminophen  975 mg Oral Q8H Gómez Keith MD      calcium acetate  2,001 mg Oral TID With Meals eVda Christian MD      gabapentin  100 mg Oral TID PRMARIA Keith MD      HYDROmorphone  0.5 mg Intravenous Q4H PRMARIA Keith MD      ondansetron  4 mg Intravenous Q4H PRN Gómez Keith MD      oxyCODONE  5 mg Oral Q6H PRN Gómez Keith MD      Or    oxyCODONE  7.5 mg Oral Q4H PRMARIA Keith MD      traZODone  50 mg Oral HS Jd Goldberg MD          Today, Patient Was Seen By: LAURA Trejo    **Please Note: This note may have been constructed using a voice recognition system. **

## 2023-11-30 NOTE — CONSULTS
Consultation - Cardiology   Tate Pope 25 y.o. male MRN: [id number]  Unit/Bed#: -01 Encounter: [id number]  11/30/23  11:58 AM    Assessment/ Plan:  Elevated troponins  Denies chest pain, endorses SHAH  Troponins with peak of 1445  EKG without acute ischemic abnormalities  Echo overall unremarkable with EF 61%  Possibly secondary to ESRD, however cannot rule out ischemic etiology. Plan for pharmacological MPI stress test today. 2.  Hyperlipidemia  3. ESRD on HD  4. Anemia    Update: Pharmacological MPI shows medium sized reversible perfusion defect in the apical inferior, apical, mid and apical anterior segments. Discussed the indications, alternatives, risks and benefit of cardiac catheterization and possible PCI. The procedure risks, benefits, and complications (including but not limited to bleeding, infection, arrhythmia, nephrotoxicity, vessel injury, myocardial infarction, CVA, and death) were reviewed. Patient is alert and oriented x3  and wishes to proceed. All questions answered. Case discussed with nephrology and primary service. Plan for cardiac catheterization tomorrow. History of Present Illness   Physician Requesting Consult: Jovanni Almanzar MD    Reason for Consult / Principal Problem: Elevated troponin    HPI: Tate Pope is a 25y.o. year old male with PMHx of ESRD on dialysis, microangiopathy, who presents with B/L foot pain. Vascular surgery suspects progressing neuropathy and small vessel disease. Nephrology suspects deposition of oxalate in soft tissues and blood vessels contributing. Patient found to have elevated troponins with peak of 1445. EKG is without acute ischemic abnormalities. Cardiology consulted for further evaluation and management. Patient denies chest pain however does note endorse of dyspnea on exertion. Denies previous history of cardiac disease. Denies immediate family history of CAD. Denies tobacco use.   Endorses compliance to home medications. Denies palpitations, LE edema, orthopnea, lightheadedness/dizziness, or syncope. Inpatient consult to Cardiology  Consult performed by: Alpa Arellano PA-C  Consult ordered by: LAURA Lemus        EKG: Normal sinus rhythm; voltage criteria for left ventricular hypertrophy; junctional ST depression, probably normal      Review of Systems   Constitutional:  Negative for chills and fever. HENT:  Negative for ear pain and sore throat. Eyes:  Negative for pain and visual disturbance. Respiratory:  Negative for cough. Shortness of breath: SHAH. Cardiovascular:  Negative for chest pain, palpitations and leg swelling. Gastrointestinal:  Negative for abdominal pain and vomiting. Genitourinary:  Negative for dysuria and hematuria. Musculoskeletal:  Negative for arthralgias and back pain. Skin:  Negative for color change and rash. Neurological:  Negative for seizures and syncope. All other systems reviewed and are negative.       Historical Information   Past Medical History:   Diagnosis Date    Anxiety     Chronic kidney disease     GERD (gastroesophageal reflux disease)     Kidney stone     Liver disorder     Nephrolithiasis     Primary hyperoxaluria type 1 ([address])      Past Surgical History:   Procedure Laterality Date    ANKLE SURGERY Right 2017    ligament repair    CYSTOSCOPY      FL RETROGRADE PYELOGRAM  10/24/2020    FL RETROGRADE PYELOGRAM  02/08/2021    HERNIA REPAIR      IR TUNNELED DIALYSIS CATHETER PLACEMENT  8/24/2023    KIDNEY STONE SURGERY      LITHOTRIPSY      MASS EXCISION Left 02/17/2017    Procedure: BACK/SHOULDER CYST EXCISION ;  Surgeon: Alejandro Limon MD;  Location: [facility];  Service:     AL CYSTO/URETERO W/LITHOTRIPSY &INDWELL STENT INSRT Left 10/24/2020    Procedure: CYSTOSCOPY URETEROSCOPY WITH LITHOTRIPSY HOLMIUM LASER, RETROGRADE PYELOGRAM AND INSERTION STENT URETERAL;  Surgeon: Chhaya Corey MD;  Location: [facility];  Service: Urology IN CYSTO/URETERO W/LITHOTRIPSY &INDWELL STENT INSRT Right 02/08/2021    Procedure: CYSTOSCOPY URETEROSCOPY WITH LITHOTRIPSY HOLMIUM LASER, RETROGRADE PYELOGRAM AND INSERTION STENT URETERAL;  Surgeon: Nathaniel Hunter MD;  Location: [facility];  Service: Urology     Social History     Substance and Sexual Activity   Alcohol Use Yes    Comment: rarely     Social History     Substance and Sexual Activity   Drug Use No     Social History     Tobacco Use   Smoking Status Never   Smokeless Tobacco Never       Family History:   Family History   Problem Relation Age of Onset    Hypertension Mother     No Known Problems Father     Cancer Maternal Grandmother         Bladder    Diabetes Maternal Grandmother     Alzheimer's disease Maternal Grandfather        Meds/Allergies   all current active meds have been reviewed  No Known Allergies    Objective   Vitals: Blood pressure 140/82, pulse 81, temperature 98.3 °F (36.8 °C), resp. rate 18, height 6' (1.829 m), weight 103 kg (228 lb), SpO2 93 %. , Body mass index is 30.92 kg/m².,   Orthostatic Blood Pressures      Flowsheet Row Most Recent Value   Blood Pressure 140/82 filed at 11/30/2023 1118   Patient Position - Orthostatic VS Lying filed at 11/29/2023 1600            Systolic (62ACW), NVW:940 , Min:136 , ULE:101     Diastolic (47GED), GILMAR:87, Min:76, Max:113        Intake/Output Summary (Last 24 hours) at 11/30/2023 1158  Last data filed at 11/29/2023 1630  Gross per 24 hour   Intake 950 ml   Output 1670 ml   Net -720 ml       Invasive Devices       Peripheral Intravenous Line  Duration             Peripheral IV 11/28/23 Distal;Right;Upper;Ventral (anterior) Arm 2 days              Hemodialysis Catheter  Duration             HD Permanent Double Catheter 97 days              Drain  Duration             Ureteral Drain/Stent Left ureter 6 Fr. 1132 days    Ureteral Drain/Stent Right ureter 6 Fr. 1024 days                        Physical Exam:  GEN: Alert and oriented x 3, in no acute distress. Well appearing and well nourished. HEENT: Sclera anicteric, conjunctivae pink, mucous membranes moist. Oropharynx clear. NECK: Supple, no carotid bruits, no significant JVD. Trachea midline, no thyromegaly. HEART: Regular rhythm, normal S1 and S2, no murmurs, clicks, gallops or rubs. PMI nondisplaced, no thrills. LUNGS: Clear to auscultation bilaterally; no wheezes, rales, or rhonchi. No increased work of breathing or signs of respiratory distress. ABDOMEN: Soft, nontender, nondistended, normoactive bowel sounds. EXTREMITIES: Skin warm and well perfused, no clubbing, cyanosis, or edema. NEURO: No focal findings. Normal speech. Mood and affect normal.   SKIN: Normal without suspicious lesions on exposed skin.       Lab Results:     Troponins:   Results from last 7 days   Lab Units 11/29/23  0505 11/29/23  0243 11/29/23  0029 11/28/23  2203 11/28/23  1936 11/28/23  1755 11/28/23  1304 11/28/23  1124 11/28/23  0848   HS TNI 0HR ng/L  --   --  1,108*  --   --  1,445*  --   --  928*   HS TNI 2HR ng/L  --  1,026*  --   --  1,172*  --   --  902*  --    HS TNI 4HR ng/L 951*  --   --  1,134*  --   --  1,152*  --   --    HSTNI D4 ng/L -157  --   --  -311  --   --  224*  --   --        CBC with diff:   Results from last 7 days   Lab Units 11/30/23  0551 11/29/23  0438 11/28/23  0848   WBC Thousand/uL 7.09 6.50 7.36   HEMOGLOBIN g/dL 9.5* 9.6* 9.2*   HEMATOCRIT % 28.0* 29.2* 28.2*   MCV fL 95 96 97   PLATELETS Thousands/uL 208 233 245   RBC Million/uL 2.96* 3.03* 2.91*   MCH pg 32.1 31.7 31.6   MCHC g/dL 33.9 32.9 32.6   RDW % 14.5 14.5 14.4   MPV fL 10.2 10.1 10.9   NRBC AUTO /100 WBCs  --   --  0         CMP:   Results from last 7 days   Lab Units 11/30/23  0551 11/29/23  0505 11/28/23  0848   POTASSIUM mmol/L 4.4 4.3 5.3   CHLORIDE mmol/L 96 96 97   CO2 mmol/L 27 25 22   BUN mg/dL 39* 48* 77*   CREATININE mg/dL 13.00* 16.25* 22.00*   CALCIUM mg/dL 9.7 9.4 8.7   AST U/L  --   --  8*   ALT U/L  --   --  7   ALK PHOS U/L  --   --  48   EGFR ml/min/1.73sq m 4 3 2

## 2023-11-30 NOTE — CONSULTS
NEPHROLOGY PROGRESS NOTE    Patient: Mendez Musa               Sex: male          DOA: 11/28/2023  8:02 AM   YOB: 1995        Age:  25 y.o.        LOS:  LOS: 0 days    11/30/2023    REASON FOR THE CONSULTATION:  ESRD on HD    SUBJECTIVE     Patient underwent a session of HD yesterday, which lasted about 3 hours and 40 minutes. He had 1.7 L removed. He tolerated the session well. Patient states pain is about the same as yesterday, about a 6/10. He does note that he feels the pain remains at a 6 for longer, which is allowing him to get more rest. He has not been OOB or walking due to pain. He endorses continued episodes of nausea and vomiting yesterday. He states these episodes tend to occur when his creatine is elevated. He took 2 doses of the calcium acetate yesterday but skipped the lunch time dose as he was nauseous and not hungry. He denies any chest pain or SOB. Reviewed past 24 hour events. CURRENT MEDICATIONS       Current Facility-Administered Medications:     acetaminophen (TYLENOL) tablet 975 mg, 975 mg, Oral, Q8H, Ramsey Kramer MD, 975 mg at 11/30/23 0253    calcium acetate (PHOSLO) capsule 2,001 mg, 2,001 mg, Oral, TID With Meals, Aravind Gill MD, 2,001 mg at 11/30/23 0800    gabapentin (NEURONTIN) capsule 100 mg, 100 mg, Oral, TID PRN, Ramsey Kramer MD    HYDROmorphone (DILAUDID) injection 0.5 mg, 0.5 mg, Intravenous, Q4H PRN, Ramsey Kramer MD, 0.5 mg at 11/30/23 0007    ondansetron (ZOFRAN) injection 4 mg, 4 mg, Intravenous, Q4H PRN, Ramsey Kramer MD, 4 mg at 11/30/23 0376    oxyCODONE (ROXICODONE) IR tablet 5 mg, 5 mg, Oral, Q6H PRN, 5 mg at 11/29/23 8806 **OR** oxyCODONE (ROXICODONE) split tablet 7.5 mg, 7.5 mg, Oral, Q4H PRN, Ramsey Kramer MD, 7.5 mg at 11/30/23 0801    traZODone (DESYREL) tablet 50 mg, 50 mg, Oral, HS, Ramsey Kramer MD, 50 mg at 11/29/23 9683    REVIEW OF SYSTEMS     Review of Systems   Constitutional:  Negative for chills, diaphoresis and fever. Respiratory:  Negative for shortness of breath. Cardiovascular:  Negative for chest pain. Gastrointestinal:  Positive for vomiting. Negative for abdominal pain. Neurological:  Positive for tremors and numbness (feet bilaterally). OBJECTIVE     Current Weight: Weight - Scale: 103 kg (228 lb)  Vitals:    11/30/23 0756   BP: 140/82   Pulse: 87   Resp: 16   Temp: 98.7 °F (37.1 °C)   SpO2: 95%     Body mass index is 30.92 kg/m². Intake/Output Summary (Last 24 hours) at 11/30/2023 1023  Last data filed at 11/29/2023 1630  Gross per 24 hour   Intake 950 ml   Output 1670 ml   Net -720 ml       PHYSICAL EXAMINATION     Physical Exam  Vitals reviewed. Constitutional:       General: He is not in acute distress. HENT:      Head: Normocephalic and atraumatic. Eyes:      General: No scleral icterus. Conjunctiva/sclera: Conjunctivae normal.   Cardiovascular:      Rate and Rhythm: Normal rate and regular rhythm. Heart sounds: Normal heart sounds. No murmur heard. No friction rub. No gallop. Pulmonary:      Effort: Pulmonary effort is normal. No respiratory distress. Breath sounds: Normal breath sounds. No wheezing. Skin:     Coloration: Skin is not cyanotic (previously bluish discoloration to all toes has improved, toes now with pink appearance; no longer cool to touch). Neurological:      General: No focal deficit present. Mental Status: He is alert and oriented to person, place, and time. Sensory: No sensory deficit (toes with decreased sensation to light touch but improved compared to yesterday's exam).    Psychiatric:         Mood and Affect: Mood normal.         Behavior: Behavior normal.           LAB RESULTS     Results from last 7 days   Lab Units 11/30/23  0551 11/29/23  0811 11/29/23  0505 11/29/23  0438 11/28/23  0848   WBC Thousand/uL 7.09  --   --  6.50 7.36   HEMOGLOBIN g/dL 9.5*  --   --  9.6* 9.2*   HEMATOCRIT % 28.0*  --   --  29.2* 28.2*   PLATELETS Thousands/uL 208  --   --  233 245   SODIUM mmol/L 136  --  137  --  138   POTASSIUM mmol/L 4.4  --  4.3  --  5.3   CHLORIDE mmol/L 96  --  96  --  97   CO2 mmol/L 27  --  25  --  22   BUN mg/dL 39*  --  48*  --  77*   CREATININE mg/dL 13.00*  --  16.25*  --  22.00*   EGFR ml/min/1.73sq m 4  --  3  --  2   CALCIUM mg/dL 9.7  --  9.4  --  8.7   MAGNESIUM mg/dL  --   --  2.7  --  3.4*   PHOSPHORUS mg/dL  --  10.2*  --   --  11.3*       RADIOLOGY RESULTS      CTA C/A/P 11/28/2023:     IMPRESSION:     No acute aortic pathology or significant arterial stenosis. VAS lower limb arterial 11/28/2023:  Impression:  RIGHT LOWER LIMB:  There appears to be a reduction in diameter throughout the proximal to mid  superficial femoral artery. Elevated velocities were noted. Ankle/Brachial index:  0.62 which is within severe range. Prior 1.0  PVR/ PPG tracings are dampened. Flatline at the first digit. Metatarsal pressure not audible. Prior 44 mmhg  Great toe pressure not audible. LEFT LOWER LIMB:  There appears to be a reduction in diameter throughout the proximal to mid  superficial femoral artery. Elevated velocities were noted. Ankle/Brachial index:  1.09 which is within normal limits. Prior 0.92  PVR/ PPG tracings are dampened. Flatline at the first digit. Metatarsal pressure not audible. Prior 78 mmHg  Great toe pressure not audible. Compared to previous study on 11/14/2023, there is a decrease in the right MICHELLE  and metatarsal pressure bilaterally. Echocardiogram 11/30/23:    Left Ventricle: Left ventricular cavity size is normal. Wall thickness is normal. The left ventricular ejection fraction is 61%. Systolic function is normal. Wall motion is normal. Diastolic function is normal.     ASSESSMENT/PLAN     25year old male with PMHx of anemia and ESRD on HD secondary to type 1 primary hyperoxaluria who presented to the ED with complaints of lower extremity pain and numbness in the feet bilaterally. ESRD on HD:   undergoes HD on M,W,F at [facility] 3 hour session of HD on Tuesday 11/28 with 2.5 L removed  Underwent 3 hour and 40 minute HD session yesterday with 1.7 L removed   Plan:  Next HD session planned for tomorrow  Orders placed to increase outpatient HD sessions to 4 times weekly until home dialysis can be established   Hyperphosphatemia:  Phosphate on presentation was elevated to 11.3 likely due to missed dialysis session  Repeat phosphate elevated at 10.2  Calcium acetate ordered TID. Patient tolerating but does not take when feeling nauseous and skipping meal  Plan:  Continue calcium acetate TID with meals   Next HD session tomorrow   Elevated Troponins:  Etiology unclear  Patient denies chest pain. EKG and telemetry show no ST segment changes   Plan:  Stress test per cardiology   Lower extremity numbness and pain:  Acute arterial thrombosis has been r/o. Patient seen by vascular surgery who did not recommend vascular intervention   Suspect may be due to oxalate deposition in vasculature   Patient endorses improvement in pain   Feet appear well-perfused with pink appearance and no longer cold to touch  Plan:  Increase outpatient HD sessions to 4x weekly until patient is able to establish home dialysis in order to reduce oxalate levels  Anemia:  Most recent Hgb 9.5  Epogen given with HD      [facility]  Nephrology  11/30/2023      Portions of the record may have been created with voice recognition software. Occasional wrong word or "sound a like" substitutions may have occurred due to the inherent limitations of voice recognition software. Read the chart carefully and recognize, using context, where substitutions have occurred.

## 2023-11-30 NOTE — PLAN OF CARE
Problem: PAIN - ADULT  Goal: Verbalizes/displays adequate comfort level or baseline comfort level  Description: Interventions:  - Encourage patient to monitor pain and request assistance  - Assess pain using appropriate pain scale  - Administer analgesics based on type and severity of pain and evaluate response  - Implement non-pharmacological measures as appropriate and evaluate response  - Consider cultural and social influences on pain and pain management  - Notify physician/advanced practitioner if interventions unsuccessful or patient reports new pain  Outcome: Progressing     Problem: INFECTION - ADULT  Goal: Absence or prevention of progression during hospitalization  Description: INTERVENTIONS:  - Assess and monitor for signs and symptoms of infection  - Monitor lab/diagnostic results  - Monitor all insertion sites, i.e. indwelling lines, tubes, and drains  - [place] appropriate cooling/warming therapies per order  - Administer medications as ordered  - Instruct and encourage patient and family to use good hand hygiene technique  - Identify and instruct in appropriate isolation precautions for identified infection/condition  Outcome: Progressing

## 2023-11-30 NOTE — PLAN OF CARE
Problem: PHYSICAL THERAPY ADULT  Goal: Performs mobility at highest level of function for planned discharge setting. See evaluation for individualized goals. Description: Treatment/Interventions: LE strengthening/ROM, Functional transfer training, Therapeutic exercise, Endurance training, Bed mobility, Gait training, Equipment eval/education, Patient/family training, Spoke to nursing, OT  Equipment Recommended: Wheelchair       See flowsheet documentation for full assessment, interventions and recommendations. Outcome: Progressing  Note: Prognosis: Good  Problem List: Decreased strength, Decreased endurance, Impaired balance, Decreased mobility, Pain, Impaired sensation, Decreased range of motion  Assessment: Pt is 25 y.o. male seen for PT evaluation s/p admit to [id number] on 11/28/2023 w/ Elevated troponin. PT consulted to assess pt's functional mobility and d/c needs. Order placed for PT eval and tx, w/  activity  order. Comorbidities affecting pt's physical performance at time of assessment include: Anxiety, Insomnia, ESRD, neuropathy, Microangiopathy, intractable vomitting. PTA, pt was independent w/ all functional mobility w/ no AD . Personal factors affecting pt at time of IE include: lives in one story house, ambulating w/ assistive device, stairs to enter home, and inability to ambulate household distances. Please find objective findings from PT assessment regarding body systems outlined above with impairments and limitations including weakness, decreased ROM, impaired balance, gait deviations, pain, decreased functional mobility tolerance, and fall risk. The following objective measures performed on IE also reveal limitations: AM-PAC 6-Clicks: 19/26. Pt's clinical presentation is currently unstable/unpredictable seen in pt's presentation. Pt to benefit from continued PT tx to address deficits as defined above and maximize level of functional independent mobility and consistency.  From PT/mobility standpoint, recommendation at time of d/c would be Maximum Resource Intensity pending progress in order to facilitate return to PLOF. Barriers to Discharge: Inaccessible home environment, Other (Comment) (decline in functional baseline)     Rehab Resource Intensity Level, PT: I (Maximum Resource Intensity)    See flowsheet documentation for full assessment.

## 2023-11-30 NOTE — ASSESSMENT & PLAN NOTE
History of Neuropathy  Presenting with severe bilateral foot pain, mottling.   Bilateral lower extremity arterial duplex was completed

## 2023-11-30 NOTE — ASSESSMENT & PLAN NOTE
Lab Results   Component Value Date    EGFR 4 11/30/2023    EGFR 3 11/29/2023    EGFR 2 11/28/2023    CREATININE 13.00 (H) 11/30/2023    CREATININE 16.25 (H) 11/29/2023    CREATININE 22.00 (H) 11/28/2023       Nephrology consulted for HD  Associated hyperphosphatemia  Phoslo initiated  Patient did receive two HD sessions in a row.

## 2023-12-01 ENCOUNTER — APPOINTMENT (INPATIENT)
Dept: DIALYSIS | Facility: HOSPITAL | Age: 28
DRG: 682 | End: 2023-12-01
Attending: INTERNAL MEDICINE
Payer: COMMERCIAL

## 2023-12-01 LAB
ANION GAP SERPL CALCULATED.3IONS-SCNC: 15 MMOL/L
BUN SERPL-MCNC: 57 MG/DL (ref 5–25)
CALCIUM SERPL-MCNC: 9.4 MG/DL (ref 8.4–10.2)
CHLORIDE SERPL-SCNC: 96 MMOL/L (ref 96–108)
CO2 SERPL-SCNC: 25 MMOL/L (ref 21–32)
CREAT SERPL-MCNC: 16.97 MG/DL (ref 0.6–1.3)
ERYTHROCYTE [DISTWIDTH] IN BLOOD BY AUTOMATED COUNT: 14.4 % (ref 11.6–15.1)
GFR SERPL CREATININE-BSD FRML MDRD: 3 ML/MIN/1.73SQ M
GLUCOSE SERPL-MCNC: 93 MG/DL (ref 65–140)
HCT VFR BLD AUTO: 27.7 % (ref 36.5–49.3)
HGB BLD-MCNC: 9.2 G/DL (ref 12–17)
MAGNESIUM SERPL-MCNC: 3 MG/DL (ref 1.9–2.7)
MCH RBC QN AUTO: 32.1 PG (ref 26.8–34.3)
MCHC RBC AUTO-ENTMCNC: 33.2 G/DL (ref 31.4–37.4)
MCV RBC AUTO: 97 FL (ref 82–98)
PLATELET # BLD AUTO: 186 THOUSANDS/UL (ref 149–390)
PMV BLD AUTO: 10.7 FL (ref 8.9–12.7)
POTASSIUM SERPL-SCNC: 4.4 MMOL/L (ref 3.5–5.3)
RBC # BLD AUTO: 2.87 MILLION/UL (ref 3.88–5.62)
SODIUM SERPL-SCNC: 136 MMOL/L (ref 135–147)
WBC # BLD AUTO: 6.97 THOUSAND/UL (ref 4.31–10.16)

## 2023-12-01 PROCEDURE — 99152 MOD SED SAME PHYS/QHP 5/>YRS: CPT | Performed by: INTERNAL MEDICINE

## 2023-12-01 PROCEDURE — NC001 PR NO CHARGE: Performed by: INTERNAL MEDICINE

## 2023-12-01 PROCEDURE — 99232 SBSQ HOSP IP/OBS MODERATE 35: CPT | Performed by: NURSE PRACTITIONER

## 2023-12-01 PROCEDURE — 93458 L HRT ARTERY/VENTRICLE ANGIO: CPT | Performed by: INTERNAL MEDICINE

## 2023-12-01 PROCEDURE — 99153 MOD SED SAME PHYS/QHP EA: CPT | Performed by: INTERNAL MEDICINE

## 2023-12-01 PROCEDURE — B211YZZ FLUOROSCOPY OF MULTIPLE CORONARY ARTERIES USING OTHER CONTRAST: ICD-10-PCS | Performed by: INTERNAL MEDICINE

## 2023-12-01 PROCEDURE — 85027 COMPLETE CBC AUTOMATED: CPT | Performed by: NURSE PRACTITIONER

## 2023-12-01 PROCEDURE — C1894 INTRO/SHEATH, NON-LASER: HCPCS | Performed by: INTERNAL MEDICINE

## 2023-12-01 PROCEDURE — 99232 SBSQ HOSP IP/OBS MODERATE 35: CPT | Performed by: INTERNAL MEDICINE

## 2023-12-01 PROCEDURE — 80048 BASIC METABOLIC PNL TOTAL CA: CPT | Performed by: NURSE PRACTITIONER

## 2023-12-01 PROCEDURE — 83735 ASSAY OF MAGNESIUM: CPT | Performed by: NURSE PRACTITIONER

## 2023-12-01 PROCEDURE — C1769 GUIDE WIRE: HCPCS | Performed by: INTERNAL MEDICINE

## 2023-12-01 PROCEDURE — 4A023N7 MEASUREMENT OF CARDIAC SAMPLING AND PRESSURE, LEFT HEART, PERCUTANEOUS APPROACH: ICD-10-PCS | Performed by: INTERNAL MEDICINE

## 2023-12-01 RX ORDER — HEPARIN SODIUM 1000 [USP'U]/ML
2000 INJECTION, SOLUTION INTRAVENOUS; SUBCUTANEOUS ONCE
Status: COMPLETED | OUTPATIENT
Start: 2023-12-02 | End: 2023-12-02

## 2023-12-01 RX ORDER — IODIXANOL 320 MG/ML
INJECTION, SOLUTION INTRAVASCULAR CODE/TRAUMA/SEDATION MEDICATION
Status: DISCONTINUED | OUTPATIENT
Start: 2023-12-01 | End: 2023-12-01 | Stop reason: HOSPADM

## 2023-12-01 RX ORDER — MIDAZOLAM HYDROCHLORIDE 2 MG/2ML
INJECTION, SOLUTION INTRAMUSCULAR; INTRAVENOUS CODE/TRAUMA/SEDATION MEDICATION
Status: DISCONTINUED | OUTPATIENT
Start: 2023-12-01 | End: 2023-12-01 | Stop reason: HOSPADM

## 2023-12-01 RX ORDER — FENTANYL CITRATE 50 UG/ML
INJECTION, SOLUTION INTRAMUSCULAR; INTRAVENOUS CODE/TRAUMA/SEDATION MEDICATION
Status: DISCONTINUED | OUTPATIENT
Start: 2023-12-01 | End: 2023-12-01 | Stop reason: HOSPADM

## 2023-12-01 RX ORDER — LIDOCAINE WITH 8.4% SOD BICARB 0.9%(10ML)
SYRINGE (ML) INJECTION CODE/TRAUMA/SEDATION MEDICATION
Status: DISCONTINUED | OUTPATIENT
Start: 2023-12-01 | End: 2023-12-01 | Stop reason: HOSPADM

## 2023-12-01 RX ORDER — HEPARIN SODIUM 1000 [USP'U]/ML
500 INJECTION, SOLUTION INTRAVENOUS; SUBCUTANEOUS ONCE
Status: COMPLETED | OUTPATIENT
Start: 2023-12-01 | End: 2023-12-01

## 2023-12-01 RX ORDER — HEPARIN SODIUM 1000 [USP'U]/ML
2000 INJECTION, SOLUTION INTRAVENOUS; SUBCUTANEOUS ONCE
Status: COMPLETED | OUTPATIENT
Start: 2023-12-01 | End: 2023-12-01

## 2023-12-01 RX ADMIN — OXYCODONE HYDROCHLORIDE 5 MG: 5 TABLET ORAL at 10:28

## 2023-12-01 RX ADMIN — Medication 7.5 MG: at 14:40

## 2023-12-01 RX ADMIN — HYDROMORPHONE HYDROCHLORIDE 0.5 MG: 1 INJECTION, SOLUTION INTRAMUSCULAR; INTRAVENOUS; SUBCUTANEOUS at 12:28

## 2023-12-01 RX ADMIN — HYDROMORPHONE HYDROCHLORIDE 0.5 MG: 1 INJECTION, SOLUTION INTRAMUSCULAR; INTRAVENOUS; SUBCUTANEOUS at 16:54

## 2023-12-01 RX ADMIN — HEPARIN SODIUM 2000 UNITS: 1000 INJECTION INTRAVENOUS; SUBCUTANEOUS at 14:44

## 2023-12-01 RX ADMIN — Medication 7.5 MG: at 21:46

## 2023-12-01 RX ADMIN — EPOETIN ALFA 3000 UNITS: 3000 SOLUTION INTRAVENOUS; SUBCUTANEOUS at 16:54

## 2023-12-01 RX ADMIN — CALCIUM ACETATE 2001 MG: 667 CAPSULE ORAL at 17:37

## 2023-12-01 RX ADMIN — ACETAMINOPHEN 975 MG: 325 TABLET, FILM COATED ORAL at 01:50

## 2023-12-01 RX ADMIN — ALTEPLASE 2 MG: 2.2 INJECTION, POWDER, LYOPHILIZED, FOR SOLUTION INTRAVENOUS at 12:43

## 2023-12-01 RX ADMIN — ONDANSETRON 4 MG: 2 INJECTION INTRAMUSCULAR; INTRAVENOUS at 17:39

## 2023-12-01 RX ADMIN — EPOETIN ALFA 2000 UNITS: 2000 SOLUTION INTRAVENOUS; SUBCUTANEOUS at 16:55

## 2023-12-01 RX ADMIN — ASPIRIN 81 MG: 81 TABLET, COATED ORAL at 09:20

## 2023-12-01 RX ADMIN — ALTEPLASE 2 MG: 2.2 INJECTION, POWDER, LYOPHILIZED, FOR SOLUTION INTRAVENOUS at 12:42

## 2023-12-01 RX ADMIN — HYDROMORPHONE HYDROCHLORIDE 0.5 MG: 1 INJECTION, SOLUTION INTRAMUSCULAR; INTRAVENOUS; SUBCUTANEOUS at 23:49

## 2023-12-01 RX ADMIN — TRAZODONE HYDROCHLORIDE 50 MG: 50 TABLET ORAL at 21:47

## 2023-12-01 RX ADMIN — ACETAMINOPHEN 975 MG: 325 TABLET, FILM COATED ORAL at 17:38

## 2023-12-01 RX ADMIN — Medication 7.5 MG: at 01:50

## 2023-12-01 RX ADMIN — ACETAMINOPHEN 975 MG: 325 TABLET, FILM COATED ORAL at 10:28

## 2023-12-01 RX ADMIN — HEPARIN SODIUM 500 UNITS: 1000 INJECTION INTRAVENOUS; SUBCUTANEOUS at 15:13

## 2023-12-01 NOTE — ASSESSMENT & PLAN NOTE
History of Neuropathy, complicated with high oxalate levels. Presenting with severe bilateral foot pain, mottling.   Bilateral lower extremity arterial duplex was completed  Pain improves with HD.

## 2023-12-01 NOTE — PROGRESS NOTES
Cardiology Progress Note - Helen Crespo 25 y.o. male MRN: [id number]    Unit/Bed#: -01 Encounter: [id number]      Assessment/Plan:  Elevated troponins  Denies chest pain, endorses SHAH/fatigue  Troponins with peak of 1445, EKG without acute ischemic abnormalities  TTE unremarkable with EF 61%, pharmacological MPI stress test revealed reversible apical inferior, apical, mid and apical anterior perfusion defect. Plan for cardiac catheterization today. Discussed the indications, alternatives, risks and benefit of cardiac catheterization and possible PCI. The procedure risks, benefits, and complications (including but not limited to bleeding, infection, arrhythmia, nephrotoxicity, vessel injury, myocardial infarction, CVA, and death) were reviewed. Patient is alert and oriented x3  and wishes to proceed. All questions answered. Continue ASA, atorvastatin, Toprol-XL    2. Hyperlipidemia  Continue atorvastatin    3. ESRD on HD  4. Anemia      Update: Cardiac catheterization revealed no significant obstructive epicardial CAD. Elevated troponins likely secondary to ESRD. We will discontinue ASA, atorvastatin, and Toprol-XL. Patient is stable from a cardiac standpoint so we will sign off at this time. Please reach out as needed. Subjective:   Patient seen and examined. No significant events overnight. Patient reports he did not sleep very well last night due to lower extremity pain. Denies any new complaints at this time otherwise. All questions were answered    Objective:     Vitals: Blood pressure 134/84, pulse 77, temperature 99.1 °F (37.3 °C), resp. rate 18, height 6' (1.829 m), weight 103 kg (228 lb), SpO2 95 %. , Body mass index is 30.92 kg/m².,   Orthostatic Blood Pressures      Flowsheet Row Most Recent Value   Blood Pressure 134/84 filed at 12/01/2023 0757   Patient Position - Orthostatic VS Lying filed at 11/29/2023 1600              Intake/Output Summary (Last 24 hours) at 12/1/2023 0900  Last data filed at 12/1/2023 0601  Gross per 24 hour   Intake 360 ml   Output 0 ml   Net 360 ml         Physical Exam:  GEN: Alert and oriented x 3, in no acute distress. Well appearing and well nourished. HEENT: Sclera anicteric, conjunctivae pink, mucous membranes moist. Oropharynx clear. NECK: Supple, no carotid bruits, no significant JVD. Trachea midline, no thyromegaly. HEART: Regular rhythm, normal S1 and S2, no murmurs, clicks, gallops or rubs. PMI nondisplaced, no thrills. LUNGS: Clear to auscultation bilaterally; no wheezes, rales, or rhonchi. No increased work of breathing or signs of respiratory distress. ABDOMEN: Soft, nontender, nondistended, normoactive bowel sounds. EXTREMITIES: Skin warm and well perfused, no clubbing, cyanosis, or edema. NEURO: No focal findings. Normal speech. Mood and affect normal.   SKIN: Normal without suspicious lesions on exposed skin.         Medications:      Current Facility-Administered Medications:     acetaminophen (TYLENOL) tablet 975 mg, 975 mg, Oral, Q8H, Ivis Beaver MD, 975 mg at 12/01/23 0150    aspirin (ECOTRIN LOW STRENGTH) EC tablet 81 mg, 81 mg, Oral, Daily, Dash Burerich Pavon PA-C    atorvastatin (LIPITOR) tablet 40 mg, 40 mg, Oral, Daily With Theldmywaves ISAAC Pavon, 40 mg at 11/30/23 1710    calcium acetate (PHOSLO) capsule 2,001 mg, 2,001 mg, Oral, TID With Meals, Eleonora Merrill MD, 2,001 mg at 11/30/23 1133    epoetin coleen (EPOGEN,PROCRIT) injection 2,000 Units, 2,000 Units, Subcutaneous, Once **AND** epoetin coleen (EPOGEN,PROCRIT) injection 3,000 Units, 3,000 Units, Subcutaneous, Once, LAURA Ladd    gabapentin (NEURONTIN) capsule 100 mg, 100 mg, Oral, TID PRN, Ivis Beaver MD    HYDROmorphone (DILAUDID) injection 0.5 mg, 0.5 mg, Intravenous, Q4H PRN, Ivis Beaver MD, 0.5 mg at 11/30/23 2226    metoprolol succinate (TOPROL-XL) 24 hr tablet 25 mg, 25 mg, Oral, Daily, Dash Pavon PA-C    ondansetron [facility]) injection 4 mg, 4 mg, Intravenous, Q4H PRN, Vadim Gilliam MD, 4 mg at 11/30/23 1715    oxyCODONE (ROXICODONE) IR tablet 5 mg, 5 mg, Oral, Q6H PRN, 5 mg at 11/29/23 9116 **OR** oxyCODONE (ROXICODONE) split tablet 7.5 mg, 7.5 mg, Oral, Q4H PRN, Vadim Gilliam MD, 7.5 mg at 12/01/23 0150    traZODone (DESYREL) tablet 50 mg, 50 mg, Oral, HS, Vadim Gilliam MD, 50 mg at 11/30/23 2138     Labs & Results:     Results from last 7 days   Lab Units 11/29/23  0505 11/29/23  0243 11/29/23  0029 11/28/23  2203 11/28/23  1936 11/28/23  1755 11/28/23  1304 11/28/23  1124 11/28/23  0848   HS TNI 0HR ng/L  --   --  1,108*  --   --  1,445*  --   --  928*   HS TNI 2HR ng/L  --  1,026*  --   --  1,172*  --   --  902*  --    HSTNI D2 ng/L  --  -82  --   --  -273  --   --  -26  --    HS TNI 4HR ng/L 951*  --   --  1,134*  --   --  1,152*  --   --    HSTNI D4 ng/L -157  --   --  -311  --   --  224*  --   --      Results from last 7 days   Lab Units 12/01/23  0536 11/30/23  0551 11/29/23  0438   WBC Thousand/uL 6.97 7.09 6.50   HEMOGLOBIN g/dL 9.2* 9.5* 9.6*   HEMATOCRIT % 27.7* 28.0* 29.2*   PLATELETS Thousands/uL 186 208 233         Results from last 7 days   Lab Units 12/01/23  0536 11/30/23  0551 11/29/23  0505 11/28/23  0848   POTASSIUM mmol/L 4.4 4.4 4.3 5.3   CHLORIDE mmol/L 96 96 96 97   CO2 mmol/L 25 27 25 22   BUN mg/dL 57* 39* 48* 77*   CREATININE mg/dL 16.97* 13.00* 16.25* 22.00*   CALCIUM mg/dL 9.4 9.7 9.4 8.7   ALK PHOS U/L  --   --   --  48   ALT U/L  --   --   --  7   AST U/L  --   --   --  8*         Results from last 7 days   Lab Units 12/01/23  0536 11/29/23  0505 11/28/23  0848   MAGNESIUM mg/dL 3.0* 2.7 3.4*       Vitals: Blood pressure 134/84, pulse 77, temperature 99.1 °F (37.3 °C), resp. rate 18, height 6' (1.829 m), weight 103 kg (228 lb), SpO2 95 %. , Body mass index is 30.92 kg/m².,   Orthostatic Blood Pressures      Flowsheet Row Most Recent Value   Blood Pressure 134/84 filed at 12/01/2023 0757   Patient Position - Orthostatic VS Lying filed at 11/29/2023 1600            Systolic (31CGI), WRQ:219 , Min:131 , BHV:756     Diastolic (22VIQ), VICTORIANO:47, Min:74, Max:86        Intake/Output Summary (Last 24 hours) at 12/1/2023 0900  Last data filed at 12/1/2023 0601  Gross per 24 hour   Intake 360 ml   Output 0 ml   Net 360 ml       Invasive Devices       Peripheral Intravenous Line  Duration             Peripheral IV 11/28/23 Distal;Right;Upper;Ventral (anterior) Arm 3 days              Hemodialysis Catheter  Duration             HD Permanent Double Catheter 98 days              Drain  Duration             Ureteral Drain/Stent Left ureter 6 Fr. 1132 days    Ureteral Drain/Stent Right ureter 6 Fr. 1025 days                      Telemetry:  Telemetry Orders (From admission, onward)               24 Hour Telemetry Monitoring  Continuous x 24 Hours (Telem)        Expiring   Question:  Reason for 24 Hour Telemetry  Answer:  PCI/EP study (including pacer and ICD implementation), Cardiac surgery, MI, abnormal cardiac cath, and chest pain- rule out MI                       BP Readings from Last 3 Encounters:   12/01/23 134/84   11/20/23 142/90   11/20/23 142/98      Wt Readings from Last 3 Encounters:   11/29/23 103 kg (228 lb)   11/20/23 103 kg (226 lb)   11/20/23 103 kg (226 lb)

## 2023-12-01 NOTE — ASSESSMENT & PLAN NOTE
Lab Results   Component Value Date    EGFR 3 12/01/2023    EGFR 4 11/30/2023    EGFR 3 11/29/2023    CREATININE 16.97 (H) 12/01/2023    CREATININE 13.00 (H) 11/30/2023    CREATININE 16.25 (H) 11/29/2023       Nephrology consulted for HD  Associated hyperphosphatemia  Phoslo initiated  Plan for HD today, tomorrow yet  Patient to eventually be set up with home dialysis to be completed 5 days a week in setting of high oxalate levels. Due to have AV Fistula placed on 12/14  High risk to be discharged with catheter to do dialysis at home, so until fistula placed, will continue with HD at [facility].

## 2023-12-01 NOTE — PROGRESS NOTES
NEPHROLOGY PROGRESS NOTE    Patient: Jefe Epps               Sex: male          DOA: 11/28/2023  8:02 AM   YOB: 1995        Age:  25 y.o.        LOS:  LOS: 1 day    12/1/2023    REASON FOR THE CONSULTATION:  ESRD on HD    SUBJECTIVE     Patient worked with PT yesterday and states he has been experiencing more pain since then. The pain is not as well-controlled on pain meds as it was previously. He had difficulty sleeping secondary to pain. He did endorse one episode of emesis yesterday after administratio of medication during the cardiac stress test. Otherwise, he reports improvement in nausea and has been able to eat more. He continues to deny any chest pain or shortness of breath. Reviewed past 24 hour events.     CURRENT MEDICATIONS       Current Facility-Administered Medications:     acetaminophen (TYLENOL) tablet 975 mg, 975 mg, Oral, Q8H, Kaylen Pavon PA-C, 975 mg at 12/01/23 1028    alteplase (CATHFLO) injection 2 mg, 2 mg, Intracatheter, Once, [facility], BARBARANP    alteplase (CATHFLO) injection 2 mg, 2 mg, Intracatheter, Once, [place], CRNP    aspirin (ECOTRIN LOW STRENGTH) EC tablet 81 mg, 81 mg, Oral, Daily, RICCO Akbar-ALLYN, 81 mg at 12/01/23 0920    atorvastatin (LIPITOR) tablet 40 mg, 40 mg, Oral, Daily With Charlotte Blind ISAAC Pavon, 40 mg at 11/30/23 1710    calcium acetate (PHOSLO) capsule 2,001 mg, 2,001 mg, Oral, TID With Meals, Gi Martinez PA-C, 2,001 mg at 11/30/23 1133    epoetin coleen (EPOGEN,PROCRIT) injection 2,000 Units, 2,000 Units, Subcutaneous, Once **AND** epoetin coleen (EPOGEN,PROCRIT) injection 3,000 Units, 3,000 Units, Subcutaneous, Once, Gi Martinez PA-C    gabapentin (NEURONTIN) capsule 100 mg, 100 mg, Oral, TID PRN, Gi Martinez PA-C    HYDROmorphone (DILAUDID) injection 0.5 mg, 0.5 mg, Intravenous, Q4H PRN, Gi Martinez PA-C, 0.5 mg at 11/30/23 2222    metoprolol succinate (TOPROL-XL) 24 hr tablet 25 mg, 25 mg, Oral, Daily, Elaina Cancer RICCO Pavon-ALLYN    ondansetron [facility]) injection 4 mg, 4 mg, Intravenous, Q4H PRN, Christen Soledad, PA-C, 4 mg at 11/30/23 1715    oxyCODONE (ROXICODONE) IR tablet 5 mg, 5 mg, Oral, Q6H PRN, 5 mg at 12/01/23 1028 **OR** oxyCODONE (ROXICODONE) split tablet 7.5 mg, 7.5 mg, Oral, Q4H PRN, Christen Soledad, PA-C, 7.5 mg at 12/01/23 0150    traZODone (DESYREL) tablet 50 mg, 50 mg, Oral, HS, Christen Soledad, PA-C, 50 mg at 11/30/23 2138    REVIEW OF SYSTEMS     Review of Systems   Constitutional:  Negative for chills, diaphoresis and fever. HENT: Negative. Eyes: Negative. Respiratory:  Negative for shortness of breath. Cardiovascular:  Negative for chest pain. Gastrointestinal:  Positive for vomiting. Negative for abdominal pain. Endocrine: Negative. Genitourinary: Negative. Skin:  Positive for color change. Neurological:  Positive for tremors and numbness (feet bilaterally). OBJECTIVE     Current Weight: Weight - Scale: 103 kg (228 lb)  Vitals:    12/01/23 1111   BP: 141/84   Pulse: 73   Resp:    Temp: 98.1 °F (36.7 °C)   SpO2: 93%     Body mass index is 30.92 kg/m². Intake/Output Summary (Last 24 hours) at 12/1/2023 1143  Last data filed at 12/1/2023 0601  Gross per 24 hour   Intake 360 ml   Output 0 ml   Net 360 ml         PHYSICAL EXAMINATION     Physical Exam  Vitals reviewed. Constitutional:       General: He is not in acute distress. HENT:      Head: Normocephalic and atraumatic. Eyes:      General: No scleral icterus. Conjunctiva/sclera: Conjunctivae normal.   Cardiovascular:      Rate and Rhythm: Normal rate and regular rhythm. Heart sounds: Normal heart sounds. No murmur heard. No friction rub. No gallop. Pulmonary:      Effort: Pulmonary effort is normal. No respiratory distress. Breath sounds: Normal breath sounds. No wheezing.    Skin:     Coloration: Skin is cyanotic (L foot with blue discoloration, most prominent on great toe with associated coolness and decreased sensation). Neurological:      General: No focal deficit present. Mental Status: He is alert and oriented to person, place, and time. Sensory: Sensory deficit (Left foot with tenderness to light touch; right foot with improving sensation to light touch) present. Psychiatric:         Mood and Affect: Mood normal.         Behavior: Behavior normal.           LAB RESULTS     Results from last 7 days   Lab Units 12/01/23  0536 11/30/23  0551 11/29/23  0811 11/29/23  0505 11/29/23  0438 11/28/23  0848   WBC Thousand/uL 6.97 7.09  --   --  6.50 7.36   HEMOGLOBIN g/dL 9.2* 9.5*  --   --  9.6* 9.2*   HEMATOCRIT % 27.7* 28.0*  --   --  29.2* 28.2*   PLATELETS Thousands/uL 186 208  --   --  233 245   SODIUM mmol/L 136 136  --  137  --  138   POTASSIUM mmol/L 4.4 4.4  --  4.3  --  5.3   CHLORIDE mmol/L 96 96  --  96  --  97   CO2 mmol/L 25 27  --  25  --  22   BUN mg/dL 57* 39*  --  48*  --  77*   CREATININE mg/dL 16.97* 13.00*  --  16.25*  --  22.00*   EGFR ml/min/1.73sq m 3 4  --  3  --  2   CALCIUM mg/dL 9.4 9.7  --  9.4  --  8.7   MAGNESIUM mg/dL 3.0*  --   --  2.7  --  3.4*   PHOSPHORUS mg/dL  --   --  10.2*  --   --  11.3*         RADIOLOGY RESULTS      CTA C/A/P 11/28/2023:     IMPRESSION:     No acute aortic pathology or significant arterial stenosis. VAS lower limb arterial 11/28/2023:  Impression:  RIGHT LOWER LIMB:  There appears to be a reduction in diameter throughout the proximal to mid  superficial femoral artery. Elevated velocities were noted. Ankle/Brachial index:  0.62 which is within severe range. Prior 1.0  PVR/ PPG tracings are dampened. Flatline at the first digit. Metatarsal pressure not audible. Prior 44 mmhg  Great toe pressure not audible. LEFT LOWER LIMB:  There appears to be a reduction in diameter throughout the proximal to mid  superficial femoral artery.  Elevated velocities were noted.  Ankle/Brachial index:  1.09 which is within normal limits. Prior 0.92  PVR/ PPG tracings are dampened. Flatline at the first digit. Metatarsal pressure not audible. Prior 78 mmHg  Great toe pressure not audible. Compared to previous study on 11/14/2023, there is a decrease in the right MICHELLE  and metatarsal pressure bilaterally. Echocardiogram 11/30/23:    Left Ventricle: Left ventricular cavity size is normal. Wall thickness is normal. The left ventricular ejection fraction is 61%. Systolic function is normal. Wall motion is normal. Diastolic function is normal.     Cardiac Catheterization 12/1/23:  No significant obstructive epicardial coronary artery disease. ASSESSMENT/PLAN     25year old male with PMHx of anemia and ESRD on HD secondary to type 1 primary hyperoxaluria who presented to the ED with complaints of lower extremity pain and numbness in the feet bilaterally. ESRD on HD:   undergoes HD on M,W,F at [facility] 3 hour session of HD on Tuesday 11/28 with 2.5 L removed  Underwent 3 hour and 40 minute HD session Wednesday 11/29 with 1.7 L removed   Plan:  HD session today and tomorrow to pull off as much oxalate as possible   Orders placed to increase outpatient HD sessions to 4 times weekly until home dialysis 5 days a week can be established   Hyperphosphatemia:  Phosphate on presentation was elevated to 11.3 likely due to missed dialysis session  Repeat phosphate elevated at 10.2  Calcium acetate ordered TID. Patient tolerating but does not take when feeling nauseous and skipping meal  Plan:  Continue calcium acetate TID with meals   Elevated Troponins:  Etiology unclear  Patient denies chest pain. EKG and telemetry show no ST segment changes   Stress test yesterday concerning for possible perfusion deficits.  Patient started on ASA, statin, and beta blocker per cardiology and scheduled for cardiac cath  Cardiac cath today showed no significant CAD  Lower extremity numbness and pain:  Acute arterial thrombosis has been r/o. Patient seen by vascular surgery who did not recommend vascular intervention   Suspect may be due to oxalate deposition in vasculature   Patient endorses improvement in pain as well as appearance and perfusion after back-to-back HD sessions  However, feet more tender today after not having HD yesterday. Physical exam showed blue discoloration and cool to touch of L > R foot, most notably of the L great toe   Plan:  HD session today and tomorrow   Increase outpatient HD sessions to 4x weekly until patient is able to establish home dialysis in order to reduce oxalate levels  Anemia:  Most recent Hgb 9.5  Epogen given with HD      [address] [facility]  12/1/2023      Portions of the record may have been created with voice recognition software. Occasional wrong word or "sound a like" substitutions may have occurred due to the inherent limitations of voice recognition software. Read the chart carefully and recognize, using context, where substitutions have occurred.

## 2023-12-01 NOTE — CASE MANAGEMENT
Case Management Discharge Planning Note    Patient name Blaine Wayne  Location /-58 MRN [id number]  : 1995 Date 2023       Current Admission Date: 2023  Current Admission Diagnosis:Elevated troponin   Patient Active Problem List    Diagnosis Date Noted    Intractable vomiting 2023    Elevated troponin 2023    Microangiopathy ([address]) 2023    End stage renal disease on dialysis ([address]) 10/03/2023    Neuropathy 10/03/2023    Gastroesophageal reflux disease without esophagitis 2023    Anemia 2023    Chronic kidney disease-mineral and bone disorder 2023    BMI 30.0-30.9,adult 2022    Hydronephrosis with urinary obstruction due to ureteral calculus 2021    Primary hyperoxaluria type 1 ([address]) 2021    Insomnia 10/27/2020    Ureteric stone ----> hydroureteronephrosis moderate 10/23/2020    Sebaceous cyst 2017    Anxiety, generalized 2015      LOS (days): 1  Geometric Mean LOS (GMLOS) (days):   Days to GMLOS:     OBJECTIVE:  Risk of Unplanned Readmission Score: 18.5         Current admission status: Inpatient   Preferred Pharmacy:   [facility], [place], operated [facility] [id number], [address] [place] [address]  Phone: [phone] Fax: [phone]    Primary Care Provider: Chela Shukla DO    Primary Insurance: [address] [place] [address]  Secondary Insurance:     DISCHARGE DETAILS:    Discharge planning discussed with[de-identified] Patient at bedside  Freedom of Choice: Yes  Comments - Freedom of Choice: CM discussed freedom of choice as it pertains to discharge planning. Patient is agreeable to cm sending referrals for rehab for acute and snf. Patient would prefer ACUTE REHAB. Patient is aware that cm can cancel referral if patient changes his mind.      Were Treatment Team discharge recommendations reviewed with patient/caregiver?: Yes  Did patient/caregiver verbalize understanding of patient care needs?: Yes  Were patient/caregiver advised of the risks associated with not following Treatment Team discharge recommendations?: Yes         Requested [address] [facility]         Is the patient interested in [facility] at discharge?: No    DME Referral Provided  Referral made for DME?: No    Other Referral/Resources/Interventions Provided:  Interventions: Acute Rehab, SNF  Referral Comments: CM sent referral for SNF and acute.  Patient prefers acute rehab    Would you like to participate in our [address] service program?  : No - Declined    Treatment Team Recommendation: Acute Rehab  Discharge Destination Plan[de-identified] Acute Rehab  Transport at Discharge : Paulina Martinez

## 2023-12-01 NOTE — PROGRESS NOTES
[address]  Progress Note  Name: Kosta Camargo  MRN: [id number]  Unit/Bed#: -01 I Date of Admission: 11/28/2023   Date of Service: 12/1/2023 I Hospital Day: 1    Assessment/Plan   * Elevated troponin  Assessment & Plan  Patient presented to the ED with complaints of severe pain in bilateral toes with mottling of skin and difficulty sleeping due to pain. On arrival to the ER, patient noted to have elevated troponin levels, peaking at 1445. Now trending down, most recently, 951  EKG without concerns of ST/T wave changes. Patient is without chest pain, shortness of breath. Echo (11/29/23): Left Ventricle: Left ventricular cavity size is normal. Wall thickness is normal. The left ventricular ejection fraction is 61%. Systolic function is normal. Wall motion is normal. Diastolic function is normal.   Telemetry  Cardiology consult, appreciate input  Stress test completed on 11/30, was noted to be abnormal with reversible defect. Plan for cardiac cath today. Neuropathy  Assessment & Plan  History of Neuropathy, complicated with high oxalate levels. Presenting with severe bilateral foot pain, mottling. Bilateral lower extremity arterial duplex was completed  Pain improves with HD. End stage renal disease on dialysis [facility])  Assessment & Plan  Lab Results   Component Value Date    EGFR 3 12/01/2023    EGFR 4 11/30/2023    EGFR 3 11/29/2023    CREATININE 16.97 (H) 12/01/2023    CREATININE 13.00 (H) 11/30/2023    CREATININE 16.25 (H) 11/29/2023       Nephrology consulted for HD  Associated hyperphosphatemia  Phoslo initiated  Plan for HD today, tomorrow yet  Patient to eventually be set up with home dialysis to be completed 5 days a week in setting of high oxalate levels. Due to have AV Fistula placed on 12/14  High risk to be discharged with catheter to do dialysis at home, so until fistula placed, will continue with HD at [facility].     Insomnia  Assessment & Plan  Continue with Trazodone    Anxiety, generalized  Assessment & Plan  History of; takes Trazodone to sleep at night; no other psychiatric meds. Stable currently           VTE Pharmacologic Prophylaxis: VTE Score: 1 Low Risk (Score 0-2) - Encourage Ambulation. Mobility:   Basic Mobility Inpatient Raw Score: 12  -[facility] Goal: 4: Move to chair/commode  -HL Achieved: 1: Laying in bed  [facility] Goal NOT achieved. Continue with multidisciplinary rounding and encourage appropriate mobility to improve upon [facility] goals. Patient Centered Rounds: I performed bedside rounds with nursing staff today. Discussions with Specialists or Other Care Team Provider: Case Management, Nephrology    Education and Discussions with Family / Patient: Patient declined call to . Total Time Spent on Date of Encounter in care of patient: 38 mins. This time was spent on one or more of the following: performing physical exam; counseling and coordination of care; obtaining or reviewing history; documenting in the medical record; reviewing/ordering tests, medications or procedures; communicating with other healthcare professionals and discussing with patient's family/caregivers. Current Length of Stay: 1 day(s)  Current Patient Status: Inpatient   Certification Statement: The patient will continue to require additional inpatient hospital stay due to ongoing treatment in setting of need for HD session today and tomorrow s/p cardiac cath. Discharge Plan: Anticipate discharge in 24-48 hrs to home with home services. Vs STR    Code Status: Level 1 - Full Code    Subjective:   Patient resting in bed, just returned from cardiac cath. Denies complaints of chest pain, shortness of breath, fever, or chills. Discussed his rehab recommendation from PT for STR/Acute Rehab.     Objective:     Vitals:   Temp (24hrs), Av.4 °F (36.9 °C), Min:98 °F (36.7 °C), Max:99.1 °F (37.3 °C)    Temp:  [98 °F (36.7 °C)-99.1 °F (37.3 °C)] 98.2 °F (36.8 °C)  HR:  [69-91] 76  Resp:  [18] 18  BP: (131-147)/(74-86) 143/86  SpO2:  [2 %-98 %] 96 %  Body mass index is 30.92 kg/m². Input and Output Summary (last 24 hours): Intake/Output Summary (Last 24 hours) at 12/1/2023 1043  Last data filed at 12/1/2023 0601  Gross per 24 hour   Intake 360 ml   Output 0 ml   Net 360 ml       Physical Exam:   Physical Exam  Vitals and nursing note reviewed. Constitutional:       General: He is not in acute distress. Appearance: He is obese. He is ill-appearing. Cardiovascular:      Rate and Rhythm: Normal rate. Pulses: Normal pulses. Heart sounds: Normal heart sounds. Pulmonary:      Effort: Pulmonary effort is normal.      Breath sounds: Normal breath sounds. Abdominal:      General: Bowel sounds are normal.      Palpations: Abdomen is soft. Musculoskeletal:         General: Tenderness (bilateral feet) present. Normal range of motion. Skin:     General: Skin is warm and dry. Neurological:      Mental Status: He is alert and oriented to person, place, and time. Psychiatric:         Mood and Affect: Mood normal.          Additional Data:     Labs:  Results from last 7 days   Lab Units 12/01/23  0536 11/29/23  0438 11/28/23  0848   WBC Thousand/uL 6.97   < > 7.36   HEMOGLOBIN g/dL 9.2*   < > 9.2*   HEMATOCRIT % 27.7*   < > 28.2*   PLATELETS Thousands/uL 186   < > 245   NEUTROS PCT %  --   --  68   LYMPHS PCT %  --   --  17   MONOS PCT %  --   --  8   EOS PCT %  --   --  6    < > = values in this interval not displayed.      Results from last 7 days   Lab Units 12/01/23  0536 11/29/23  0505 11/28/23  0848   SODIUM mmol/L 136   < > 138   POTASSIUM mmol/L 4.4   < > 5.3   CHLORIDE mmol/L 96   < > 97   CO2 mmol/L 25   < > 22   BUN mg/dL 57*   < > 77*   CREATININE mg/dL 16.97*   < > 22.00*   ANION GAP mmol/L 15   < > 19   CALCIUM mg/dL 9.4   < > 8.7   ALBUMIN g/dL  --   --  4.4   TOTAL BILIRUBIN mg/dL  --   --  0.34   ALK PHOS U/L  --   --  48   ALT U/L  -- --  7   AST U/L  --   --  8*   GLUCOSE RANDOM mg/dL 93   < > 91    < > = values in this interval not displayed. Lines/Drains:  Invasive Devices       Peripheral Intravenous Line  Duration             Peripheral IV 11/28/23 Distal;Right;Upper;Ventral (anterior) Arm 3 days              Hemodialysis Catheter  Duration             HD Permanent Double Catheter 98 days              Drain  Duration             Ureteral Drain/Stent Left ureter 6 Fr. 1133 days    Ureteral Drain/Stent Right ureter 6 Fr. 1025 days                      Telemetry:  Telemetry Orders (From admission, onward)               24 Hour Telemetry Monitoring  Continuous x 24 Hours (Telem)        Question:  Reason for 24 Hour Telemetry  Answer:  PCI/EP study (including pacer and ICD implementation), Cardiac surgery, MI, abnormal cardiac cath, and chest pain- rule out MI                     Telemetry Reviewed: Normal Sinus Rhythm  Indication for Continued Telemetry Use: No indication for continued use. Will discontinue. Cardiac cath completed, no obstructing CAD. Imaging: No pertinent imaging reviewed.     Recent Cultures (last 7 days):         Last 24 Hours Medication List:   Current Facility-Administered Medications   Medication Dose Route Frequency Provider Last Rate    acetaminophen  975 mg Oral Q8H Wang Abebe PA-C      alteplase  2 mg Intracatheter Once [place], CRNP      alteplase  2 mg Intracatheter Once [place], CRNP      aspirin  81 mg Oral Daily Wang Abebe PA-C      atorvastatin  40 mg Oral Daily With [employer]      calcium acetate  2,001 mg Oral TID With Meals Wang Abebe PA-C      epoetin coleen  2,000 Units Subcutaneous Once Wang Abebe PA-C      And    epoetin coleen  3,000 Units Subcutaneous Once Wang Abebe PA-C      gabapentin  100 mg Oral TID PRN Wang Abebe PA-C      HYDROmorphone  0.5 mg Intravenous Q4H PRN Halima Sers, PA-C      metoprolol succinate  25 mg Oral Daily Halima Sers, PA-C      ondansetron  4 mg Intravenous Q4H PRN Halima Sers, PA-C      oxyCODONE  5 mg Oral Q6H PRN Halima Sers, PA-C      Or    oxyCODONE  7.5 mg Oral Q4H PRN Halima Sers, PA-C      traZODone  50 mg Oral HS Halima Sers, PA-C          Today, Patient Was Seen By: LAURA Kan    **Please Note: This note may have been constructed using a voice recognition system. **

## 2023-12-01 NOTE — PROGRESS NOTES
NEPHROLOGY PROGRESS NOTE    Patient: Elton Mcguire               Sex: male          DOA: 11/28/2023  8:02 AM   YOB: 1995        Age:  25 y.o.        LOS:  LOS: 1 day       HPI     Patient with ESRD and primary oxaluria admitted with chest pain and elevated troponin    SUBJECTIVE     S/p cardiac cath which has been normal    Patient has a primary oxaluria which is causing problem with chest pain and neuropathy    Patient is feeling well at this point    Has seen neurologist as well as vascular surgeon    CURRENT MEDICATIONS       Current Facility-Administered Medications:     acetaminophen (TYLENOL) tablet 975 mg, 975 mg, Oral, Q8H, Arben Guevara PA-C, 975 mg at 12/01/23 1028    alteplase (CATHFLO) injection 2 mg, 2 mg, Intracatheter, Once, LAURA Ladd    alteplase (CATHFLO) injection 2 mg, 2 mg, Intracatheter, Once, [place], LAURA    aspirin (ECOTRIN LOW STRENGTH) EC tablet 81 mg, 81 mg, Oral, Daily, Maggie RICCO Jane-ALLYN, 81 mg at 12/01/23 0920    atorvastatin (LIPITOR) tablet 40 mg, 40 mg, Oral, Daily With RICCO Sanchez-ALLYN, 40 mg at 11/30/23 1710    calcium acetate (PHOSLO) capsule 2,001 mg, 2,001 mg, Oral, TID With Meals, RICCO Vick-ALLYN, 2,001 mg at 11/30/23 1133    epoetin coleen (EPOGEN,PROCRIT) injection 2,000 Units, 2,000 Units, Subcutaneous, Once **AND** epoetin coleen (EPOGEN,PROCRIT) injection 3,000 Units, 3,000 Units, Subcutaneous, Once, Arben Guevraa PA-C    gabapentin (NEURONTIN) capsule 100 mg, 100 mg, Oral, TID PRN, Arben Guevara PA-C    HYDROmorphone (DILAUDID) injection 0.5 mg, 0.5 mg, Intravenous, Q4H PRN, Arben Guevara PA-C, 0.5 mg at 11/30/23 2227    metoprolol succinate (TOPROL-XL) 24 hr tablet 25 mg, 25 mg, Oral, Daily, Maggieyves [place], PA-C    ondansetron [facility] injection 4 mg, 4 mg, Intravenous, Q4H PRN, Maggie Pavon PA-C, 4 mg at 11/30/23 9175    oxyCODONE (ROXICODONE) IR tablet 5 mg, 5 mg, Oral, Q6H PRN, 5 mg at 12/01/23 1028 **OR** oxyCODONE (ROXICODONE) split tablet 7.5 mg, 7.5 mg, Oral, Q4H PRN, Dorita Judge PA-C, 7.5 mg at 12/01/23 0150    traZODone (DESYREL) tablet 50 mg, 50 mg, Oral, HS, Dorita Judge PA-C, 50 mg at 11/30/23 2138    OBJECTIVE     Current Weight: Weight - Scale: 103 kg (228 lb)  Vitals:    12/01/23 1111   BP: 141/84   Pulse: 73   Resp:    Temp: 98.1 °F (36.7 °C)   SpO2: 93%       Intake/Output Summary (Last 24 hours) at 12/1/2023 1141  Last data filed at 12/1/2023 0601  Gross per 24 hour   Intake 360 ml   Output 0 ml   Net 360 ml       PHYSICAL EXAMINATION     Physical Exam  Constitutional:       General: He is not in acute distress. Appearance: He is well-developed. HENT:      Head: Normocephalic. Mouth/Throat:      Mouth: Mucous membranes are moist.   Eyes:      General: No scleral icterus. Conjunctiva/sclera: Conjunctivae normal.   Neck:      Vascular: No JVD. Cardiovascular:      Rate and Rhythm: Normal rate. Heart sounds: Normal heart sounds. Pulmonary:      Effort: Pulmonary effort is normal.      Breath sounds: No wheezing. Abdominal:      Palpations: Abdomen is soft. Tenderness: There is no abdominal tenderness. Musculoskeletal:         General: Normal range of motion. Cervical back: Neck supple. Skin:     General: Skin is warm. Findings: No rash. Neurological:      Mental Status: He is alert and oriented to person, place, and time.    Psychiatric:         Behavior: Behavior normal.          LAB RESULTS     Results from last 7 days   Lab Units 12/01/23  0536 11/30/23  0551 11/29/23  0811 11/29/23  0505 11/29/23  0438 11/28/23  0848   WBC Thousand/uL 6.97 7.09  --   --  6.50 7.36   HEMOGLOBIN g/dL 9.2* 9.5*  --   --  9.6* 9.2*   HEMATOCRIT % 27.7* 28.0*  --   --  29.2* 28.2*   PLATELETS Thousands/uL 186 208  --   --  233 245   POTASSIUM mmol/L 4.4 4.4  --  4.3  --  5.3   CHLORIDE mmol/L 96 96  --  96  -- 97   CO2 mmol/L 25 27  --  25  --  22   BUN mg/dL 57* 39*  --  48*  --  77*   CREATININE mg/dL 16.97* 13.00*  --  16.25*  --  22.00*   EGFR ml/min/1.73sq m 3 4  --  3  --  2   CALCIUM mg/dL 9.4 9.7  --  9.4  --  8.7   MAGNESIUM mg/dL 3.0*  --   --  2.7  --  3.4*   PHOSPHORUS mg/dL  --   --  10.2*  --   --  11.3*       RADIOLOGY RESULTS      Results for orders placed during the hospital encounter of 08/21/23    XR chest portable    Narrative  CHEST    INDICATION:   ckd. COMPARISON:  None    EXAM PERFORMED/VIEWS:  XR CHEST PORTABLE      FINDINGS:    Right IJ dialysis catheter in place    Cardiomediastinal silhouette appears unremarkable. The lungs are clear. No pneumothorax or pleural effusion. Osseous structures appear within normal limits for patient age. Impression  Right dialysis catheter present. No acute cardiopulmonary disease. Workstation performed: [id number]    No results found for this or any previous visit. No results found for this or any previous visit. No results found for this or any previous visit. No results found for this or any previous visit. No results found for this or any previous visit. PLAN / RECOMMENDATIONS      ESRD: We will get dialyzed today. Will dialyze him tomorrow also in hospital    Has discussed with outpatient unit about getting more dialysis. Also discussed with home dialysis to get him there to get more dialysis also    2. Primary oxaluria: Likely will need dialysis daily. Had discussed with outpatient unit. Fabien Shrestha MD  Nephrology  12/1/2023        Portions of the record may have been created with voice recognition software. Occasional wrong word or "sound a like" substitutions may have occurred due to the inherent limitations of voice recognition software. Read the chart carefully and recognize, using context, where substitutions have occurred.

## 2023-12-01 NOTE — QUICK NOTE
Patient seen and examined  Endorses fatigue. Noted to have elevated troponin. Seen by cardiology and scheduled for cardiac cath      I have discussed in detail with patient regarding the indications, alternatives, risks and benefit of cardiac catheterization and possible PCI. The procedure risks, benefits, and complications (including but not limited to bleeding, infection, arrhythmia, nephrotoxicity, vessel injury, amputation, myocardial infarction, CVA, and death) were reviewed. In particular, risk and consequences of vascular injury was discussed in detail. Patient is alert and oriented x3 and wishes to proceed.  All questions answered

## 2023-12-01 NOTE — PLAN OF CARE
Post-Dialysis RN Treatment Note    Blood Pressure:  Pre 172/91 mm/Hg  Post  mmHg   EDW  102 kg    Weight:  Pre 100.6 kg   Post 99.1 kg   Mode of weight measurement: Bed Scale   Volume Removed  1500 ml    Treatment duration 4 hrs    NS given  400 ml    Treatment shortened? No   Medications given during Rx Yes, describe: Epogen 5000 U   Estimated Kt/V  Not Applicable   Access type: Permacath/TDC   Access Issues:  Multiple arterial alarms, and reduced BFR   Report called to primary nurse   Yes  Rhea Cortes RN            Goal for today is the removal of 1-2 kg fluid in this treatment session. Clearance of renal toxins and wastes.       Problem: METABOLIC, FLUID AND ELECTROLYTES - ADULT  Goal: Electrolytes maintained within normal limits  Description: INTERVENTIONS:  - Monitor labs and assess patient for signs and symptoms of electrolyte imbalances  - Administer electrolyte replacement as ordered  - Monitor response to electrolyte replacements, including repeat lab results as appropriate  - Instruct patient on fluid and nutrition as appropriate  Outcome: Progressing  Goal: Fluid balance maintained  Description: INTERVENTIONS:  - Monitor labs   - Monitor I/O and WT  - Instruct patient on fluid and nutrition as appropriate  - Assess for signs & symptoms of volume excess or deficit  Outcome: Progressing

## 2023-12-01 NOTE — ARC ADMISSION
Referral received for consideration of patient for Inpatient Acute Rehab. We will review patient's case and continue to follow patient's functional progress until a determination can be made.

## 2023-12-01 NOTE — UTILIZATION REVIEW
Continued Stay Review    Date: 12/1/23                           Current Patient Class: IP  Current Level of Care: MS    HPI:28 y.o. male initially admitted on 11/30/23 - DX:  Elevated troponin  / Neuropathy  / End stage renal disease on dialysis      Date: 12/1/23   Day 2 - INPATIENT  Stress test completed on 11/30, was noted to be abnormal with reversible defect. Plan for cardiac cath today. Patient resting in bed, just returned from cardiac cath (Results - normal). rehab recommendation from PT for STR/Acute Rehab.    Plan: Plan for HD today, tomorrow; CM consulted for rehab placement; pain control; monitor labs    Vital Signs:   Date/Time Temp Pulse Resp BP MAP (mmHg) SpO2 Calculated FIO2 (%) - Nasal Cannula Nasal Cannula O2 Flow Rate (L/min) O2 Device Patient Position - Orthostatic VS   12/01/23 13:25:15 98.4 °F (36.9 °C) 82 -- 148/95 113 94 % -- -- -- --   12/01/23 11:53:47 98 °F (36.7 °C) 84 -- 150/96 114 95 % -- -- -- --   12/01/23 11:11:22 98.1 °F (36.7 °C) 73 -- 141/84 103 93 % -- -- -- --   12/01/23 1028 98.2 °F (36.8 °C) 76 18 143/86 105 96 % -- -- None (Room air) Lying   12/01/23 10:26:10 98.2 °F (36.8 °C) 69 -- 143/86 105 94 % -- -- -- --   12/01/23 09:20:29 -- -- -- -- -- 2 % Abnormal  28 2 L/min Nasal cannula --   12/01/23 07:57:17 99.1 °F (37.3 °C) 77 -- 134/84 101 95 % -- -- -- --   12/01/23 0300 98.1 °F (36.7 °C) -- -- 134/81 99 -- -- -- -- --   11/30/23 21:40:55 98.6 °F (37 °C) 91 -- 131/74 93 94 % -- -- -- --   11/30/23 2000 -- -- -- -- -- -- -- -- None (Room air) --   11/30/23 15:06:38 98 °F (36.7 °C) 89 18 147/86 106 96 % -- -- -- --       Pertinent Labs/Diagnostic Results:     Results from last 7 days   Lab Units 12/01/23  0536 11/30/23  0551 11/29/23  0438 11/28/23  0848   WBC Thousand/uL 6.97 7.09 6.50 7.36   HEMOGLOBIN g/dL 9.2* 9.5* 9.6* 9.2*   HEMATOCRIT % 27.7* 28.0* 29.2* 28.2*   PLATELETS Thousands/uL 186 208 233 245   NEUTROS ABS Thousands/µL  --   --   --  4.91       Results from last 7 days   Lab Units 12/01/23  0536 11/30/23  0551 11/29/23  0811 11/29/23  0505 11/28/23  0848   SODIUM mmol/L 136 136  --  137 138   POTASSIUM mmol/L 4.4 4.4  --  4.3 5.3   CHLORIDE mmol/L 96 96  --  96 97   CO2 mmol/L 25 27  --  25 22   ANION GAP mmol/L 15 13  --  16 19   BUN mg/dL 57* 39*  --  48* 77*   CREATININE mg/dL 16.97* 13.00*  --  16.25* 22.00*   EGFR ml/min/1.73sq m 3 4  --  3 2   CALCIUM mg/dL 9.4 9.7  --  9.4 8.7   MAGNESIUM mg/dL 3.0*  --   --  2.7 3.4*   PHOSPHORUS mg/dL  --   --  10.2*  --  11.3*     Results from last 7 days   Lab Units 11/28/23  0848   AST U/L 8*   ALT U/L 7   ALK PHOS U/L 48   TOTAL PROTEIN g/dL 7.3   ALBUMIN g/dL 4.4   TOTAL BILIRUBIN mg/dL 0.34       Results from last 7 days   Lab Units 12/01/23  0536 11/30/23  0551 11/29/23  0505 11/28/23  0848   GLUCOSE RANDOM mg/dL 93 89 86 91       Results from last 7 days   Lab Units 11/28/23  0848   PH MADALYN  7.316   PCO2 MADALYN mm Hg 39.8*   PO2 MADALYN mm Hg 53.5*   HCO3 MADALYN mmol/L 19.9*   BASE EXC MADALYN mmol/L -5.8   O2 CONTENT MADALYN ml/dL 11.6   O2 HGB, VENOUS % 79.0       Results from last 7 days   Lab Units 11/29/23  0505 11/29/23  0243 11/29/23  0029 11/28/23  2203 11/28/23  1936 11/28/23  1755 11/28/23  1304 11/28/23  1124 11/28/23  0848   HS TNI 0HR ng/L  --   --  1,108*  --   --  1,445*  --   --  928*   HS TNI 2HR ng/L  --  1,026*  --   --  1,172*  --   --  902*  --    HSTNI D2 ng/L  --  -82  --   --  -273  --   --  -26  --    HS TNI 4HR ng/L 951*  --   --  1,134*  --   --  1,152*  --   --    HSTNI D4 ng/L -157  --   --  -311  --   --  224*  --   --        Results from last 7 days   Lab Units 11/28/23  0848   TSH 3RD GENERATON uIU/mL 4.225       Medications:   Scheduled Medications:  acetaminophen, 975 mg, Oral, Q8H  calcium acetate, 2,001 mg, Oral, TID With Meals  epoetin coleen, 2,000 Units, Subcutaneous, Once   And  epoetin coleen, 3,000 Units, Subcutaneous, Once  heparin (porcine), 2,000 Units, Intravenous, Once  [START ON 12/2/2023] heparin (porcine), 2,000 Units, Intravenous, Once  heparin (porcine), 500 Units, Intravenous, Once  traZODone, 50 mg, Oral, HS      Continuous IV Infusions: None     PRN Meds:  gabapentin, 100 mg, Oral, TID PRN  HYDROmorphone, 0.5 mg, Intravenous, Q4H PRN  (11/30 rec'd x4)  (12/1 rec'd x1 so far today)   ondansetron, 4 mg, Intravenous, Q4H PRN  (11/30 rec'd x2    oxyCODONE, 5 mg, Oral, Q6H PRN  (12/1 rec'd x1 so far today)    Or  oxyCODONE, 7.5 mg, Oral, Q4H PRN  (11/30 rec'd x3)  (12/1 rec'd x2 so far today)         Discharge Plan: D    Network Utilization Review Department  ATTENTION: Please call with any questions or concerns to [phone] and carefully listen to the prompts so that you are directed to the right person. All voicemails are confidential.   For Discharge needs, contact Care Management DC Support Team at [phone] opt. 2  Send all requests for admission clinical reviews, approved or denied determinations and any other requests to dedicated fax number below belonging to the campus where the patient is receiving treatment.  List of dedicated fax numbers for the Facilities:  [place] DENIALS (Administrative/Medical Necessity) [phone]   DISCHARGE SUPPORT TEAM (NETWORK) [address] [facility] (Maternity/NICU/Pediatrics) [phone]   [address] [facility] [phone]   [address] [facility] [address] [facility] [address] [place] [address] [address] [address] [facility] [address] [facility] [address] [phone]

## 2023-12-01 NOTE — ASSESSMENT & PLAN NOTE
Patient presented to the ED with complaints of severe pain in bilateral toes with mottling of skin and difficulty sleeping due to pain. On arrival to the ER, patient noted to have elevated troponin levels, peaking at 1445. Now trending down, most recently, 951  EKG without concerns of ST/T wave changes. Patient is without chest pain, shortness of breath. Echo (11/29/23): Left Ventricle: Left ventricular cavity size is normal. Wall thickness is normal. The left ventricular ejection fraction is 61%. Systolic function is normal. Wall motion is normal. Diastolic function is normal.   Telemetry  Cardiology consult, appreciate input  Stress test completed on 11/30, was noted to be abnormal with reversible defect. Plan for cardiac cath today.

## 2023-12-02 ENCOUNTER — APPOINTMENT (INPATIENT)
Dept: DIALYSIS | Facility: HOSPITAL | Age: 28
DRG: 682 | End: 2023-12-02
Payer: COMMERCIAL

## 2023-12-02 LAB
ANION GAP SERPL CALCULATED.3IONS-SCNC: 13 MMOL/L
BUN SERPL-MCNC: 41 MG/DL (ref 5–25)
CALCIUM SERPL-MCNC: 9.9 MG/DL (ref 8.4–10.2)
CHLORIDE SERPL-SCNC: 96 MMOL/L (ref 96–108)
CO2 SERPL-SCNC: 27 MMOL/L (ref 21–32)
CREAT SERPL-MCNC: 12.38 MG/DL (ref 0.6–1.3)
ERYTHROCYTE [DISTWIDTH] IN BLOOD BY AUTOMATED COUNT: 14.6 % (ref 11.6–15.1)
GFR SERPL CREATININE-BSD FRML MDRD: 4 ML/MIN/1.73SQ M
GLUCOSE SERPL-MCNC: 96 MG/DL (ref 65–140)
HCT VFR BLD AUTO: 29.2 % (ref 36.5–49.3)
HGB BLD-MCNC: 10 G/DL (ref 12–17)
MAGNESIUM SERPL-MCNC: 2.6 MG/DL (ref 1.9–2.7)
MCH RBC QN AUTO: 31.7 PG (ref 26.8–34.3)
MCHC RBC AUTO-ENTMCNC: 34.2 G/DL (ref 31.4–37.4)
MCV RBC AUTO: 93 FL (ref 82–98)
PLATELET # BLD AUTO: 211 THOUSANDS/UL (ref 149–390)
PMV BLD AUTO: 10 FL (ref 8.9–12.7)
POTASSIUM SERPL-SCNC: 4.7 MMOL/L (ref 3.5–5.3)
RBC # BLD AUTO: 3.15 MILLION/UL (ref 3.88–5.62)
SODIUM SERPL-SCNC: 136 MMOL/L (ref 135–147)
WBC # BLD AUTO: 7.62 THOUSAND/UL (ref 4.31–10.16)

## 2023-12-02 PROCEDURE — 80048 BASIC METABOLIC PNL TOTAL CA: CPT | Performed by: NURSE PRACTITIONER

## 2023-12-02 PROCEDURE — 83735 ASSAY OF MAGNESIUM: CPT | Performed by: NURSE PRACTITIONER

## 2023-12-02 PROCEDURE — 85027 COMPLETE CBC AUTOMATED: CPT | Performed by: NURSE PRACTITIONER

## 2023-12-02 PROCEDURE — 99232 SBSQ HOSP IP/OBS MODERATE 35: CPT | Performed by: NURSE PRACTITIONER

## 2023-12-02 PROCEDURE — 90935 HEMODIALYSIS ONE EVALUATION: CPT

## 2023-12-02 RX ORDER — HEPARIN SODIUM 1000 [USP'U]/ML
500 INJECTION, SOLUTION INTRAVENOUS; SUBCUTANEOUS
Status: COMPLETED | OUTPATIENT
Start: 2023-12-02 | End: 2023-12-02

## 2023-12-02 RX ORDER — HEPARIN SODIUM 1000 [USP'U]/ML
500 INJECTION, SOLUTION INTRAVENOUS; SUBCUTANEOUS
Status: DISCONTINUED | OUTPATIENT
Start: 2023-12-02 | End: 2023-12-02

## 2023-12-02 RX ORDER — CALCITRIOL 0.25 UG/1
0.25 CAPSULE, LIQUID FILLED ORAL 3 TIMES WEEKLY
Status: DISCONTINUED | OUTPATIENT
Start: 2023-12-04 | End: 2023-12-06 | Stop reason: HOSPADM

## 2023-12-02 RX ADMIN — Medication 7.5 MG: at 16:20

## 2023-12-02 RX ADMIN — HEPARIN SODIUM 500 UNITS: 1000 INJECTION INTRAVENOUS; SUBCUTANEOUS at 10:32

## 2023-12-02 RX ADMIN — Medication 7.5 MG: at 20:49

## 2023-12-02 RX ADMIN — ACETAMINOPHEN 975 MG: 325 TABLET, FILM COATED ORAL at 17:53

## 2023-12-02 RX ADMIN — ACETAMINOPHEN 975 MG: 325 TABLET, FILM COATED ORAL at 02:18

## 2023-12-02 RX ADMIN — CALCIUM ACETATE 2001 MG: 667 CAPSULE ORAL at 17:03

## 2023-12-02 RX ADMIN — ONDANSETRON 4 MG: 2 INJECTION INTRAMUSCULAR; INTRAVENOUS at 13:23

## 2023-12-02 RX ADMIN — HEPARIN SODIUM 2000 UNITS: 1000 INJECTION INTRAVENOUS; SUBCUTANEOUS at 09:02

## 2023-12-02 RX ADMIN — HYDROMORPHONE HYDROCHLORIDE 0.5 MG: 1 INJECTION, SOLUTION INTRAMUSCULAR; INTRAVENOUS; SUBCUTANEOUS at 04:11

## 2023-12-02 RX ADMIN — ONDANSETRON 4 MG: 2 INJECTION INTRAMUSCULAR; INTRAVENOUS at 17:03

## 2023-12-02 RX ADMIN — TRAZODONE HYDROCHLORIDE 50 MG: 50 TABLET ORAL at 22:00

## 2023-12-02 RX ADMIN — CALCIUM ACETATE 2001 MG: 667 CAPSULE ORAL at 13:23

## 2023-12-02 RX ADMIN — Medication 7.5 MG: at 08:16

## 2023-12-02 RX ADMIN — ACETAMINOPHEN 975 MG: 325 TABLET, FILM COATED ORAL at 13:02

## 2023-12-02 RX ADMIN — HYDROMORPHONE HYDROCHLORIDE 0.5 MG: 1 INJECTION, SOLUTION INTRAMUSCULAR; INTRAVENOUS; SUBCUTANEOUS at 11:29

## 2023-12-02 RX ADMIN — HEPARIN SODIUM 500 UNITS: 1000 INJECTION INTRAVENOUS; SUBCUTANEOUS at 09:30

## 2023-12-02 NOTE — PROGRESS NOTES
HEMODIALYSIS ROUNDING NOTE    Patient: Marilia Silva               Sex: male          DOA: 11/28/2023  8:02 AM   YOB: 1995        Age:  25 y.o.        LOS:  LOS: 2 days   12/2/2023    REASON FOR THE CONSULTATION:  Further management of ESRD    HPI     This is a 25 y.o. male admitted for Elevated troponin     SUBJECTIVE     - Patient was seen during hemodialysis today. Patient denies nausea / vomiting / headache / dizziness / SOB / chest pain during hemodialysis. - Reports ongoing bilateral LE pain despite current pain management regimen.   - Reviewed last 24 hrs events     CURRENT MEDICATIONS       Current Facility-Administered Medications:     acetaminophen (TYLENOL) tablet 975 mg, 975 mg, Oral, Q8H, Joaquín Pavon, PA-C, 975 mg at 12/02/23 8935    calcium acetate (PHOSLO) capsule 2,001 mg, 2,001 mg, Oral, TID With Meals, Real Merfredy, PA-C, 2,001 mg at 12/01/23 1737    gabapentin (NEURONTIN) capsule 100 mg, 100 mg, Oral, TID PRN, Real Merles, PA-C    HYDROmorphone (DILAUDID) injection 0.5 mg, 0.5 mg, Intravenous, Q4H PRN, Real Merles, PA-C, 0.5 mg at 12/02/23 1129    ondansetron (ZOFRAN) injection 4 mg, 4 mg, Intravenous, Q4H PRN, Real Merles, PA-C, 4 mg at 12/01/23 1739    oxyCODONE (ROXICODONE) IR tablet 5 mg, 5 mg, Oral, Q6H PRN, 5 mg at 12/01/23 1028 **OR** oxyCODONE (ROXICODONE) split tablet 7.5 mg, 7.5 mg, Oral, Q4H PRN, Real Merles, PA-C, 7.5 mg at 12/02/23 0816    traZODone (DESYREL) tablet 50 mg, 50 mg, Oral, HS, Real Merles, PA-C, 50 mg at 12/01/23 2147    OBJECTIVE     Current Weight: Weight - Scale: 103 kg (228 lb)  Vitals:    12/02/23 1130   BP: 147/70   Pulse: 79   Resp: 17   Temp:    SpO2:      Body mass index is 30.92 kg/m².     Intake/Output Summary (Last 24 hours) at 12/2/2023 1213  Last data filed at 12/1/2023 1840  Gross per 24 hour   Intake 1220 ml   Output 2500 ml   Net -1280 ml       PHYSICAL EXAMINATION     Physical Exam  Vitals reviewed. Constitutional:       General: He is not in acute distress. HENT:      Head: Normocephalic. Mouth/Throat:      Lips: Pink. Mouth: Mucous membranes are moist.   Eyes:      General: Lids are normal. No scleral icterus. Cardiovascular:      Rate and Rhythm: Normal rate and regular rhythm. Heart sounds: S1 normal and S2 normal. No murmur heard. Pulmonary:      Effort: Pulmonary effort is normal. No accessory muscle usage or respiratory distress. Breath sounds: Normal breath sounds. Abdominal:      General: There is no distension. Tenderness: There is no abdominal tenderness. Musculoskeletal:      Cervical back: Normal range of motion and neck supple. No tenderness. Feet:      Comments: Cool to touch  Skin:     General: Skin is warm. Coloration: Skin is not cyanotic or jaundiced. Neurological:      General: No focal deficit present. Mental Status: He is alert and oriented to person, place, and time. Psychiatric:         Attention and Perception: Attention normal.         Speech: Speech normal.         Behavior: Behavior is cooperative.          LAB RESULTS     Results from last 7 days   Lab Units 12/02/23  0545 12/01/23  0536 11/30/23  0551 11/29/23  0811 11/29/23  0505 11/29/23  0438 11/28/23  0848   WBC Thousand/uL 7.62 6.97 7.09  --   --  6.50 7.36   HEMOGLOBIN g/dL 10.0* 9.2* 9.5*  --   --  9.6* 9.2*   HEMATOCRIT % 29.2* 27.7* 28.0*  --   --  29.2* 28.2*   PLATELETS Thousands/uL 211 186 208  --   --  233 245   POTASSIUM mmol/L 4.7 4.4 4.4  --  4.3  --  5.3   CHLORIDE mmol/L 96 96 96  --  96  --  97   CO2 mmol/L 27 25 27  --  25  --  22   BUN mg/dL 41* 57* 39*  --  48*  --  77*   CREATININE mg/dL 12.38* 16.97* 13.00*  --  16.25*  --  22.00*   EGFR ml/min/1.73sq m 4 3 4  --  3  --  2   CALCIUM mg/dL 9.9 9.4 9.7  --  9.4  --  8.7   MAGNESIUM mg/dL 2.6 3.0*  --   --  2.7  --  3.4*   PHOSPHORUS mg/dL  --   --   --  10.2*  --   --  11.3*       RADIOLOGY RESULTS Results for orders placed during the hospital encounter of 08/21/23    XR chest portable    Narrative  CHEST    INDICATION:   ckd. COMPARISON:  None    EXAM PERFORMED/VIEWS:  XR CHEST PORTABLE      FINDINGS:    Right IJ dialysis catheter in place    Cardiomediastinal silhouette appears unremarkable. The lungs are clear. No pneumothorax or pleural effusion. Osseous structures appear within normal limits for patient age. Impression  Right dialysis catheter present. No acute cardiopulmonary disease. Workstation performed: [id number]    No results found for this or any previous visit. PLAN / RECOMMENDATIONS     75-year-old male with a past medical history for ESRD on HD, GERD, primary hyperoxaluria type I, and presented to [id number] [facility] emergency department with worsening bilateral foot pain. End Stage Renal Disease:  Undergoes regular outpatient dialysis on a MWF schedule at [facility]. The patient was seen and examined on 12/2/2023 at 0915 a.m. during hemodialysis treatment for ESRD. Currently receiving 4 HD treatments a week for management of hyperoxaluria, plan for transition to home unit. - I have reviewed the patient's vital signs, I&O, lab results, recent events, and agreed with today's dialysis order. 2. Access:  TCC, access issues and clotting during treatment. Received cathflo prior to treatment yesterday. Provided with heparin bolus 2000 units prior to treatment, will provide additional 500 units Q1H as needed along with saline flushes. I spoke with patient's home HD unit, no clotting or access issues during treatment. If ongoing access issue, will need IR referral for evaluation. 3. Hypertension and Volume Status:  Blood pressure readings acceptable over the past 24 hours. 4: Anemia in ESRD:  Most recent hemoglobin 10.0, received Epogen with HD treatment yesterday.      5. Secondary Hyperparathyroidism:  Outpatient [facility] labs on 11/13/2023 - phosphorous 6.9, calcium 8.4, . Continue calcitriol on HD days. 6. Hyperphosphatemia:  Admitted with phosphorous level 11.3 and elevated. Initiated on calcium acetate 3 tablets TID with meals. Anyi Chu, [address] [place] [address]  Nephrology  12/2/2023      Portions of the record may have been created with voice recognition software. Occasional wrong word or "sound a like" substitutions may have occurred due to the inherent limitations of voice recognition software. Read the chart carefully and recognize, using context, where substitutions have occurred.

## 2023-12-02 NOTE — ASSESSMENT & PLAN NOTE
Lab Results   Component Value Date    EGFR 4 12/02/2023    EGFR 3 12/01/2023    EGFR 4 11/30/2023    CREATININE 12.38 (H) 12/02/2023    CREATININE 16.97 (H) 12/01/2023    CREATININE 13.00 (H) 11/30/2023       Nephrology consulted for HD  Associated hyperphosphatemia  Phoslo initiated  Patient to receive HD today. Patient to eventually be set up with home dialysis to be completed 5 days a week in setting of high oxalate levels. Due to have AV Fistula placed on 12/14  High risk to be discharged with catheter to do dialysis at home, so until fistula placed, will continue with HD at [facility].

## 2023-12-02 NOTE — PROGRESS NOTES
[address]  Progress Note  Name: Rubia Santos  MRN: [id number]  Unit/Bed#: -01 I Date of Admission: 11/28/2023   Date of Service: 12/2/2023 I Hospital Day: 2    Assessment/Plan   * Elevated troponin  Assessment & Plan  Patient presented to the ED with complaints of severe pain in bilateral toes with mottling of skin and difficulty sleeping due to pain. On arrival to the ER, patient noted to have elevated troponin levels, peaking at 1445. Now trending down, most recently, 951  EKG without concerns of ST/T wave changes. Patient is without chest pain, shortness of breath. Echo (11/29/23): Left Ventricle: Left ventricular cavity size is normal. Wall thickness is normal. The left ventricular ejection fraction is 61%. Systolic function is normal. Wall motion is normal. Diastolic function is normal.   Cardiology consult, appreciate input  Stress test completed on 11/30, was noted to be abnormal with reversible defect. Cardiac cath completed, noted to have normal coronaries. Elevated troponin levels likely related to ESRD. Neuropathy  Assessment & Plan  History of Neuropathy, complicated with high oxalate levels. Presenting with severe bilateral foot pain, mottling. Bilateral lower extremity arterial duplex was completed  Pain improves with HD. See plan under hyperoxaluria    End stage renal disease on dialysis [facility])  Assessment & Plan  Lab Results   Component Value Date    EGFR 4 12/02/2023    EGFR 3 12/01/2023    EGFR 4 11/30/2023    CREATININE 12.38 (H) 12/02/2023    CREATININE 16.97 (H) 12/01/2023    CREATININE 13.00 (H) 11/30/2023       Nephrology consulted for HD  Associated hyperphosphatemia  Phoslo initiated  Patient to receive HD today. Patient to eventually be set up with home dialysis to be completed 5 days a week in setting of high oxalate levels.   Due to have AV Fistula placed on 12/14  High risk to be discharged with catheter to do dialysis at home, so until fistula placed, will continue with HD at [facility]. Primary hyperoxaluria type 1 ([address])  Assessment & Plan  History of  Currently managed with HD which does improve his symptoms of feet/toe pain. Plan will be to increase outpatient sessions until patient can be started on home HD. Nephrology following    Insomnia  Assessment & Plan  Continue with Trazodone    Anxiety, generalized  Assessment & Plan  History of; takes Trazodone to sleep at night; no other psychiatric meds. Stable currently             VTE Pharmacologic Prophylaxis: VTE Score: 1 Low Risk (Score 0-2) - Encourage Ambulation. Mobility:   Basic Mobility Inpatient Raw Score: 12  -[facility] Goal: 4: Move to chair/commode  -[facility] Achieved: 1: Laying in bed  [facility] Goal NOT achieved. Continue with multidisciplinary rounding and encourage appropriate mobility to improve upon [facility] goals. Patient Centered Rounds: I performed bedside rounds with nursing staff today. Discussions with Specialists or Other Care Team Provider:     Education and Discussions with Family / Patient: Patient declined call to . Total Time Spent on Date of Encounter in care of patient: 25 mins. This time was spent on one or more of the following: performing physical exam; counseling and coordination of care; obtaining or reviewing history; documenting in the medical record; reviewing/ordering tests, medications or procedures; communicating with other healthcare professionals and discussing with patient's family/caregivers. Current Length of Stay: 2 day(s)  Current Patient Status: Inpatient   Certification Statement: The patient will continue to require additional inpatient hospital stay due to ongoing treatment pending possible acceptance to [facility]  Discharge Plan: Anticipate discharge in 24-48 hrs to rehab facility. Code Status: Level 1 - Full Code    Subjective:   Patient seen in HD. Feels okay today. No current complaints, no overnight issues.     Objective: Vitals:   Temp (24hrs), Av.3 °F (36.8 °C), Min:97.9 °F (36.6 °C), Max:98.7 °F (37.1 °C)    Temp:  [97.9 °F (36.6 °C)-98.7 °F (37.1 °C)] 97.9 °F (36.6 °C)  HR:  [66-96] 79  Resp:  [16-18] 16  BP: (114-172)/(70-98) 149/86  SpO2:  [93 %-96 %] 93 %  Body mass index is 30.92 kg/m². Input and Output Summary (last 24 hours): Intake/Output Summary (Last 24 hours) at 2023 1357  Last data filed at 2023 1200  Gross per 24 hour   Intake 1520 ml   Output 2500 ml   Net -980 ml       Physical Exam:   Physical Exam  Vitals and nursing note reviewed. Constitutional:       General: He is not in acute distress. Appearance: He is ill-appearing. Cardiovascular:      Rate and Rhythm: Normal rate. Pulses: Normal pulses. Pulmonary:      Effort: Pulmonary effort is normal.   Abdominal:      Palpations: Abdomen is soft. Musculoskeletal:         General: Tenderness (bilateral feet) present. Normal range of motion. Skin:     General: Skin is warm and dry. Comments: Bilateral feet, toes, blue-rehan in color, cool to touch. Neurological:      Mental Status: He is alert and oriented to person, place, and time. Psychiatric:         Mood and Affect: Mood normal.          Additional Data:     Labs:  Results from last 7 days   Lab Units 23  0545 23  0438 23  0848   WBC Thousand/uL 7.62   < > 7.36   HEMOGLOBIN g/dL 10.0*   < > 9.2*   HEMATOCRIT % 29.2*   < > 28.2*   PLATELETS Thousands/uL 211   < > 245   NEUTROS PCT %  --   --  68   LYMPHS PCT %  --   --  17   MONOS PCT %  --   --  8   EOS PCT %  --   --  6    < > = values in this interval not displayed.      Results from last 7 days   Lab Units 23  0545 23  0505 23  0848   SODIUM mmol/L 136   < > 138   POTASSIUM mmol/L 4.7   < > 5.3   CHLORIDE mmol/L 96   < > 97   CO2 mmol/L 27   < > 22   BUN mg/dL 41*   < > 77*   CREATININE mg/dL 12.38*   < > 22.00*   ANION GAP mmol/L 13   < > 19   CALCIUM mg/dL 9.9   < > 8.7 ALBUMIN g/dL  --   --  4.4   TOTAL BILIRUBIN mg/dL  --   --  0.34   ALK PHOS U/L  --   --  48   ALT U/L  --   --  7   AST U/L  --   --  8*   GLUCOSE RANDOM mg/dL 96   < > 91    < > = values in this interval not displayed. Lines/Drains:  Invasive Devices       Peripheral Intravenous Line  Duration             Peripheral IV 12/02/23 Right;Ventral (anterior) Forearm <1 day              Hemodialysis Catheter  Duration             HD Permanent Double Catheter 99 days              Drain  Duration             Ureteral Drain/Stent Left ureter 6 Fr. 1134 days    Ureteral Drain/Stent Right ureter 6 Fr. 1027 days                          Imaging: No pertinent imaging reviewed. Recent Cultures (last 7 days):         Last 24 Hours Medication List:   Current Facility-Administered Medications   Medication Dose Route Frequency Provider Last Rate    acetaminophen  975 mg Oral 7050 [address] ISAAC Amin      [START ON 12/4/2023] calcitriol  0.25 mcg Oral Once per day on Mon Wed Fri [place] LAURA Beavers      calcium acetate  2,001 mg Oral TID With Meals Lawerelolita Law PA-C      gabapentin  100 mg Oral TID PRN Lawerence Grad, PA-C      ondansetron  4 mg Intravenous Q4H PRN Lawerence Grad, PA-C      oxyCODONE  5 mg Oral Q6H PRN Lawerence RICCO Law-C      Or    oxyCODONE  7.5 mg Oral Q4H PRN Lawerence Grad, PA-C      traZODone  50 mg Oral HS Lawerence Thanh, PA-C          Today, Patient Was Seen By: LAURA Mariee    **Please Note: This note may have been constructed using a voice recognition system. **

## 2023-12-02 NOTE — ARC ADMISSION
Reviewed patient with [facility] physician. Patient will need to have pain controlled off of IV pain meds and will need to show increased tolerance with therapies before determination can be made. CM has been updated in [facility].

## 2023-12-02 NOTE — ASSESSMENT & PLAN NOTE
Patient presented to the ED with complaints of severe pain in bilateral toes with mottling of skin and difficulty sleeping due to pain. On arrival to the ER, patient noted to have elevated troponin levels, peaking at 1445. Now trending down, most recently, 951  EKG without concerns of ST/T wave changes. Patient is without chest pain, shortness of breath. Echo (11/29/23): Left Ventricle: Left ventricular cavity size is normal. Wall thickness is normal. The left ventricular ejection fraction is 61%. Systolic function is normal. Wall motion is normal. Diastolic function is normal.   Cardiology consult, appreciate input  Stress test completed on 11/30, was noted to be abnormal with reversible defect. Cardiac cath completed, noted to have normal coronaries. Elevated troponin levels likely related to ESRD.

## 2023-12-02 NOTE — PLAN OF CARE
Post-Dialysis RN Treatment Note    Blood Pressure:  Pre 147/95 mm/Hg  Post 158/86 mmHg   EDW  102 kg    Weight:  Pre 101 kg   Post 102.4 kg   Mode of weight measurement: Bed Scale   Volume Removed  0 ml    Treatment duration 210 minutes    NS given  1000ml    Treatment shortened? Yes, describe: Tx shortened D/T Pt clotting both chambers in curcuit/all blood returned   Medications given during Rx     Heparin, Dilaudid   Estimated Kt/V  Not Applicable   Access type: Permacath/TDC   Access Issues: Yes, describe: BFR lowered to 300 D/T arterial alarms. Lines reversed with no improvement/lines reversed back. Catheter arterial alarms throughout Tx. Several catheter flushes, Saline bolus's and doses of Heparin were administered.   Keshawn Valle aware     Report called to primary nurse   Yes, Orquidea Caraballo RN    300ml UF total for Tx

## 2023-12-02 NOTE — CASE MANAGEMENT
Case Management Discharge Planning Note    Patient name Jefe Coop  Location /-24 MRN [id number]  : 1995 Date 2023       Current Admission Date: 2023  Current Admission Diagnosis:Elevated troponin   Patient Active Problem List    Diagnosis Date Noted    Intractable vomiting 2023    Elevated troponin 2023    Microangiopathy ([address]) 2023    End stage renal disease on dialysis ([address]) 10/03/2023    Neuropathy 10/03/2023    Gastroesophageal reflux disease without esophagitis 2023    Anemia 2023    Chronic kidney disease-mineral and bone disorder 2023    BMI 30.0-30.9,adult 2022    Hydronephrosis with urinary obstruction due to ureteral calculus 2021    Primary hyperoxaluria type 1 ([address]) 2021    Insomnia 10/27/2020    Ureteric stone ----> hydroureteronephrosis moderate 10/23/2020    Sebaceous cyst 2017    Anxiety, generalized 2015      LOS (days): 2  Geometric Mean LOS (GMLOS) (days): 5.40  Days to GMLOS:3.7     OBJECTIVE:  Risk of Unplanned Readmission Score: 19.56         Current admission status: Inpatient   Preferred Pharmacy:   [facility] [place], [facility] [address], [facility]  [address] [place] [address]  Phone: [phone] Fax: [phone]    Primary Care Provider: Ruddy Perry DO    Primary Insurance: [address]  Secondary Insurance:     DISCHARGE DETAILS:                                          Other Referral/Resources/Interventions Provided:  Referral Comments: Per AP Aleena Solis, pt medically cleared for rehab placement. No acute bed offer at this time.   Aleena Solis made aware via TT.

## 2023-12-02 NOTE — ASSESSMENT & PLAN NOTE
History of Neuropathy, complicated with high oxalate levels. Presenting with severe bilateral foot pain, mottling. Bilateral lower extremity arterial duplex was completed  Pain improves with HD.   See plan under hyperoxaluria

## 2023-12-02 NOTE — ASSESSMENT & PLAN NOTE
History of  Currently managed with HD which does improve his symptoms of feet/toe pain. Plan will be to increase outpatient sessions until patient can be started on home HD.   Nephrology following

## 2023-12-03 PROBLEM — R11.10 VOMITING: Status: ACTIVE | Noted: 2023-12-03

## 2023-12-03 PROCEDURE — 99232 SBSQ HOSP IP/OBS MODERATE 35: CPT | Performed by: NURSE PRACTITIONER

## 2023-12-03 PROCEDURE — 97530 THERAPEUTIC ACTIVITIES: CPT

## 2023-12-03 PROCEDURE — 99232 SBSQ HOSP IP/OBS MODERATE 35: CPT

## 2023-12-03 RX ORDER — HYDROMORPHONE HYDROCHLORIDE 2 MG/1
1 TABLET ORAL ONCE
Status: COMPLETED | OUTPATIENT
Start: 2023-12-03 | End: 2023-12-03

## 2023-12-03 RX ORDER — PROCHLORPERAZINE 25 MG
25 SUPPOSITORY, RECTAL RECTAL EVERY 12 HOURS PRN
Status: DISCONTINUED | OUTPATIENT
Start: 2023-12-03 | End: 2023-12-03

## 2023-12-03 RX ORDER — HEPARIN SODIUM 1000 [USP'U]/ML
2000 INJECTION, SOLUTION INTRAVENOUS; SUBCUTANEOUS
Status: COMPLETED | OUTPATIENT
Start: 2023-12-04 | End: 2023-12-04

## 2023-12-03 RX ORDER — ONDANSETRON 2 MG/ML
4 INJECTION INTRAMUSCULAR; INTRAVENOUS EVERY 4 HOURS PRN
Status: DISCONTINUED | OUTPATIENT
Start: 2023-12-03 | End: 2023-12-06 | Stop reason: HOSPADM

## 2023-12-03 RX ORDER — OXYCODONE HYDROCHLORIDE 5 MG/1
5 TABLET ORAL EVERY 4 HOURS PRN
Status: DISCONTINUED | OUTPATIENT
Start: 2023-12-03 | End: 2023-12-03

## 2023-12-03 RX ORDER — HYDROMORPHONE HYDROCHLORIDE 2 MG/1
1 TABLET ORAL EVERY 4 HOURS PRN
Status: DISCONTINUED | OUTPATIENT
Start: 2023-12-03 | End: 2023-12-03

## 2023-12-03 RX ORDER — PROCHLORPERAZINE 25 MG
25 SUPPOSITORY, RECTAL RECTAL EVERY 12 HOURS PRN
Status: DISCONTINUED | OUTPATIENT
Start: 2023-12-03 | End: 2023-12-04

## 2023-12-03 RX ORDER — GABAPENTIN 100 MG/1
100 CAPSULE ORAL 3 TIMES DAILY
Status: DISCONTINUED | OUTPATIENT
Start: 2023-12-03 | End: 2023-12-06 | Stop reason: HOSPADM

## 2023-12-03 RX ORDER — HYDROMORPHONE HYDROCHLORIDE 2 MG/1
2 TABLET ORAL EVERY 4 HOURS PRN
Status: DISCONTINUED | OUTPATIENT
Start: 2023-12-03 | End: 2023-12-03

## 2023-12-03 RX ORDER — OXYCODONE HYDROCHLORIDE 5 MG/1
5 TABLET ORAL EVERY 6 HOURS PRN
Status: DISCONTINUED | OUTPATIENT
Start: 2023-12-03 | End: 2023-12-06 | Stop reason: HOSPADM

## 2023-12-03 RX ADMIN — Medication 7.5 MG: at 23:52

## 2023-12-03 RX ADMIN — Medication 2 G: at 21:26

## 2023-12-03 RX ADMIN — ACETAMINOPHEN 975 MG: 325 TABLET, FILM COATED ORAL at 09:15

## 2023-12-03 RX ADMIN — TRAZODONE HYDROCHLORIDE 50 MG: 50 TABLET ORAL at 21:26

## 2023-12-03 RX ADMIN — CALCIUM ACETATE 2001 MG: 667 CAPSULE ORAL at 16:49

## 2023-12-03 RX ADMIN — Medication 7.5 MG: at 05:11

## 2023-12-03 RX ADMIN — ACETAMINOPHEN 975 MG: 325 TABLET, FILM COATED ORAL at 01:10

## 2023-12-03 RX ADMIN — Medication 2 G: at 17:48

## 2023-12-03 RX ADMIN — ONDANSETRON 4 MG: 2 INJECTION INTRAMUSCULAR; INTRAVENOUS at 09:09

## 2023-12-03 RX ADMIN — ACETAMINOPHEN 975 MG: 325 TABLET, FILM COATED ORAL at 17:48

## 2023-12-03 RX ADMIN — Medication 7.5 MG: at 01:09

## 2023-12-03 RX ADMIN — GABAPENTIN 100 MG: 100 CAPSULE ORAL at 21:26

## 2023-12-03 RX ADMIN — PROCHLORPERAZINE 25 MG: 25 SUPPOSITORY RECTAL at 17:59

## 2023-12-03 RX ADMIN — CALCIUM ACETATE 2001 MG: 667 CAPSULE ORAL at 08:43

## 2023-12-03 RX ADMIN — GABAPENTIN 100 MG: 100 CAPSULE ORAL at 16:49

## 2023-12-03 RX ADMIN — ONDANSETRON 4 MG: 2 INJECTION INTRAMUSCULAR; INTRAVENOUS at 13:56

## 2023-12-03 RX ADMIN — Medication 7.5 MG: at 13:56

## 2023-12-03 RX ADMIN — HYDROMORPHONE HYDROCHLORIDE 1 MG: 2 TABLET ORAL at 06:16

## 2023-12-03 NOTE — CASE MANAGEMENT
Case Management Discharge Planning Note    Patient name Morgan Rothman  Location /-01 MRN [id number]  : 1995 Date 12/3/2023       Current Admission Date: 2023  Current Admission Diagnosis:Elevated troponin   Patient Active Problem List    Diagnosis Date Noted    Intractable vomiting 2023    Elevated troponin 2023    Microangiopathy ([address]) 2023    End stage renal disease on dialysis ([address]) 10/03/2023    Neuropathy 10/03/2023    Gastroesophageal reflux disease without esophagitis 2023    Anemia 2023    Chronic kidney disease-mineral and bone disorder 2023    BMI 30.0-30.9,adult 2022    Hydronephrosis with urinary obstruction due to ureteral calculus 2021    Primary hyperoxaluria type 1 ([address]) 2021    Insomnia 10/27/2020    Ureteric stone ----> hydroureteronephrosis moderate 10/23/2020    Sebaceous cyst 2017    Anxiety, generalized 2015      LOS (days): 3  Geometric Mean LOS (GMLOS) (days): 5.40  Days to GMLOS:2.7     OBJECTIVE:  Risk of Unplanned Readmission Score: 17.62         Current admission status: Inpatient   Preferred Pharmacy:   [facility] [address], [address] [place] [address]  Phone: [phone] Fax: [phone]    Primary Care Provider: Alfreda Powers DO    Primary Insurance: [address] [place] [address]  Secondary Insurance:     DISCHARGE DETAILS:                                          Other Referral/Resources/Interventions Provided:  Interventions: Acute Rehab  Referral Comments: Referrals placed to [facility], but still waiting for an acceptance.   CM asked PT to see pt for update

## 2023-12-03 NOTE — PLAN OF CARE
Problem: METABOLIC, FLUID AND ELECTROLYTES - ADULT  Goal: Electrolytes maintained within normal limits  Description: INTERVENTIONS:  - Monitor labs and assess patient for signs and symptoms of electrolyte imbalances  - Administer electrolyte replacement as ordered  - Monitor response to electrolyte replacements, including repeat lab results as appropriate  - Instruct patient on fluid and nutrition as appropriate  Outcome: Progressing  Goal: Fluid balance maintained  Description: INTERVENTIONS:  - Monitor labs   - Monitor I/O and WT  - Instruct patient on fluid and nutrition as appropriate  - Assess for signs & symptoms of volume excess or deficit  Outcome: Progressing     Problem: PAIN - ADULT  Goal: Verbalizes/displays adequate comfort level or baseline comfort level  Description: Interventions:  - Encourage patient to monitor pain and request assistance  - Assess pain using appropriate pain scale  - Administer analgesics based on type and severity of pain and evaluate response  - Implement non-pharmacological measures as appropriate and evaluate response  - Consider cultural and social influences on pain and pain management  - Notify physician/advanced practitioner if interventions unsuccessful or patient reports new pain  Outcome: Progressing     Problem: SAFETY ADULT  Goal: Patient will remain free of falls  Description: INTERVENTIONS:  - Educate patient/family on patient safety including physical limitations  - Instruct patient to call for assistance with activity   - Consult OT/PT to assist with strengthening/mobility   - Keep Call bell within reach  - Keep bed low and locked with side rails adjusted as appropriate  - Keep care items and personal belongings within reach  - Initiate and maintain comfort rounds  - Make Fall Risk Sign visible to staff  - Apply yellow socks and bracelet for high fall risk patients  - Consider moving patient to room near nurses station  Outcome: Progressing  Goal: Maintain or return to baseline ADL function  Description: INTERVENTIONS:  -  Assess patient's ability to carry out ADLs; assess patient's baseline for ADL function and identify physical deficits which impact ability to perform ADLs (bathing, care of mouth/teeth, toileting, grooming, dressing, etc.)  - Assess/evaluate cause of self-care deficits   - Assess range of motion  - Assess patient's mobility; develop plan if impaired  - Assess patient's need for assistive devices and provide as appropriate  - Encourage maximum independence but intervene and supervise when necessary  - Involve family in performance of ADLs  - Assess for home care needs following discharge   - Consider OT consult to assist with ADL evaluation and planning for discharge  - Provide patient education as appropriate  Outcome: Progressing  Goal: Maintains/Returns to pre admission functional level  Description: INTERVENTIONS:  - Perform AM-PAC 6 Click Basic Mobility/ Daily Activity assessment daily.  - Set and communicate daily mobility goal to care team and patient/family/caregiver.

## 2023-12-03 NOTE — PROGRESS NOTES
[facility]  [facility]  Name: Nicole Reagan  MRN: [id number]  Unit/Bed#: -01 I Date of Admission: 11/28/2023   Date of Service: 12/3/2023 I Hospital Day: 3    Assessment/Plan   * Elevated troponin  Assessment & Plan  Patient presented to the ED with complaints of severe pain in bilateral toes with mottling of skin and difficulty sleeping due to pain. On arrival to the ER, patient noted to have elevated troponin levels, peaking at 1445. Now trending down, most recently, 951  EKG without concerns of ST/T wave changes. Patient is without chest pain, shortness of breath. Echo (11/29/23): Left Ventricle: Left ventricular cavity size is normal. Wall thickness is normal. The left ventricular ejection fraction is 61%. Systolic function is normal. Wall motion is normal. Diastolic function is normal.   Cardiology consult, appreciate input  Stress test completed on 11/30, was noted to be abnormal with reversible defect. Cardiac cath completed, noted to have normal coronaries. Elevated troponin levels likely related to ESRD. Vomiting  Assessment & Plan  Patient with vomiting episodes when he is eating, without nausea. Reports that this has been ongoing for months, since starting on dialysis. No complaints of abdominal pain, tenderness. No complaints of dysphagia. Neuropathy  Assessment & Plan  History of Neuropathy, complicated with high oxalate levels. Presenting with severe bilateral foot pain, mottling. Bilateral lower extremity arterial duplex was completed  Pain improves with HD.   Will continue with Oxycodone for pain as needed  See plan under hyperoxaluria    End stage renal disease on dialysis [facility])  Assessment & Plan  Lab Results   Component Value Date    EGFR 4 12/02/2023    EGFR 3 12/01/2023    EGFR 4 11/30/2023    CREATININE 12.38 (H) 12/02/2023    CREATININE 16.97 (H) 12/01/2023    CREATININE 13.00 (H) 11/30/2023       Nephrology consulted for HD  Associated hyperphosphatemia  Phoslo initiated  Last HD session noted to be on 12/2, plan for 12/4 session. Patient to eventually be set up with home dialysis to be completed 5 days a week in setting of high oxalate levels. Due to have AV Fistula placed on 12/14  High risk to be discharged with catheter to do dialysis at home, so until fistula placed, will continue with HD at [facility]. Primary hyperoxaluria type 1 ([address])  Assessment & Plan  History of  Currently managed with HD which does improve his symptoms of feet/toe pain. Plan will be to increase outpatient sessions until patient can be started on home HD. Nephrology following  Unfortunately, patient does not currently have insurance and will likely be denied to try Oxlumo at this time    Insomnia  Assessment & Plan  Continue with Trazodone    Anxiety, generalized  Assessment & Plan  History of; takes Trazodone to sleep at night; no other psychiatric meds. Stable currently           VTE Pharmacologic Prophylaxis: VTE Score: 1 Low Risk (Score 0-2) - Encourage Ambulation. Mobility:   Basic Mobility Inpatient Raw Score: 12  -[facility] Goal: 4: Move to chair/commode  -[facility] Achieved: 1: Laying in bed  [facility] Goal NOT achieved. Continue with multidisciplinary rounding and encourage appropriate mobility to improve upon [facility] goals. Patient Centered Rounds: I performed bedside rounds with nursing staff today. Discussions with Specialists or Other Care Team Provider: Case Management, Nephrology    Education and Discussions with Family / Patient: Patient declined call to . Total Time Spent on Date of Encounter in care of patient: 38 mins.  This time was spent on one or more of the following: performing physical exam; counseling and coordination of care; obtaining or reviewing history; documenting in the medical record; reviewing/ordering tests, medications or procedures; communicating with other healthcare professionals and discussing with patient's family/caregivers. Current Length of Stay: 3 day(s)  Current Patient Status: Inpatient   Certification Statement: The patient will continue to require additional inpatient hospital stay due to ongoing treatment in setting of ARC placement, ongoing pain management. Discharge Plan: Anticipate discharge in 24-48 hrs to rehab facility. Code Status: Level 1 - Full Code    Subjective:   Patient reports he is concerned as he is still having so much pain in his feet that it is making it near impossible to walk. Reports the oxycodone is not helping the pain, at all but reports the IV Dilaudid was helping. Did receive a 1 x dose of PO Dilaudid this morning. Objective:     Vitals:   Temp (24hrs), Av.5 °F (36.9 °C), Min:98.2 °F (36.8 °C), Max:98.7 °F (37.1 °C)    Temp:  [98.2 °F (36.8 °C)-98.7 °F (37.1 °C)] 98.2 °F (36.8 °C)  HR:  [75-96] 91  Resp:  [16-18] 16  BP: (136-169)/(70-95) 138/84  SpO2:  [91 %-95 %] 95 %  Body mass index is 30.92 kg/m². Input and Output Summary (last 24 hours): Intake/Output Summary (Last 24 hours) at 12/3/2023 1057  Last data filed at 12/3/2023 0616  Gross per 24 hour   Intake 700 ml   Output 200 ml   Net 500 ml       Physical Exam:   Physical Exam  Vitals and nursing note reviewed. Constitutional:       General: He is not in acute distress. Appearance: He is obese. He is ill-appearing. Cardiovascular:      Rate and Rhythm: Normal rate. Pulses: Normal pulses. Pulmonary:      Effort: Pulmonary effort is normal.   Abdominal:      Palpations: Abdomen is soft. Musculoskeletal:         General: Tenderness present. Injury: bilateral feet, toes. Normal range of motion. Right lower leg: No edema. Left lower leg: No edema. Skin:     General: Skin is warm and dry. Neurological:      Mental Status: He is alert and oriented to person, place, and time.    Psychiatric:         Mood and Affect: Mood normal.          Additional Data:     Labs:  Results from last 7 days   Lab Units 12/02/23  0545 11/29/23  0438 11/28/23  0848   WBC Thousand/uL 7.62   < > 7.36   HEMOGLOBIN g/dL 10.0*   < > 9.2*   HEMATOCRIT % 29.2*   < > 28.2*   PLATELETS Thousands/uL 211   < > 245   NEUTROS PCT %  --   --  68   LYMPHS PCT %  --   --  17   MONOS PCT %  --   --  8   EOS PCT %  --   --  6    < > = values in this interval not displayed. Results from last 7 days   Lab Units 12/02/23  0545 11/29/23  0505 11/28/23  0848   SODIUM mmol/L 136   < > 138   POTASSIUM mmol/L 4.7   < > 5.3   CHLORIDE mmol/L 96   < > 97   CO2 mmol/L 27   < > 22   BUN mg/dL 41*   < > 77*   CREATININE mg/dL 12.38*   < > 22.00*   ANION GAP mmol/L 13   < > 19   CALCIUM mg/dL 9.9   < > 8.7   ALBUMIN g/dL  --   --  4.4   TOTAL BILIRUBIN mg/dL  --   --  0.34   ALK PHOS U/L  --   --  48   ALT U/L  --   --  7   AST U/L  --   --  8*   GLUCOSE RANDOM mg/dL 96   < > 91    < > = values in this interval not displayed. Lines/Drains:  Invasive Devices       Peripheral Intravenous Line  Duration             Peripheral IV 12/02/23 Right;Ventral (anterior) Forearm <1 day              Hemodialysis Catheter  Duration             HD Permanent Double Catheter 100 days              Drain  Duration             Ureteral Drain/Stent Left ureter 6 Fr. 1135 days    Ureteral Drain/Stent Right ureter 6 Fr. 1027 days                    Imaging: No pertinent imaging reviewed.     Recent Cultures (last 7 days):         Last 24 Hours Medication List:   Current Facility-Administered Medications   Medication Dose Route Frequency Provider Last Rate    acetaminophen  975 mg Oral 500 [facility] ISAAC Tineo      [START ON 12/4/2023] calcitriol  0.25 mcg Oral Once per day on Mon Wed Fri [facility] LAURA Irving      calcium acetate  2,001 mg Oral TID With Meals Elise Arriaga PA-C      gabapentin  100 mg Oral TID PRN Elise Arriaga PA-C      ondansetron  4 mg Intravenous Q4H PRN Elise Arriaga PA-C      oxyCODONE  5 mg Oral Q4H PRN LAURA Arce      Or    oxyCODONE  7.5 mg Oral Q4H PRN LAURA Arce      traZODone  50 mg Oral HS Wan Parada PA-C          Today, Patient Was Seen By: LAURA Arce    **Please Note: This note may have been constructed using a voice recognition system. **

## 2023-12-03 NOTE — ASSESSMENT & PLAN NOTE
Patient with vomiting episodes when he is eating, without nausea. Reports that this has been ongoing for months, since starting on dialysis. No complaints of abdominal pain, tenderness. No complaints of dysphagia.

## 2023-12-03 NOTE — ASSESSMENT & PLAN NOTE
History of Neuropathy, complicated with high oxalate levels. Presenting with severe bilateral foot pain, mottling. Bilateral lower extremity arterial duplex was completed  Pain improves with HD.   Will continue with Oxycodone for pain as needed  See plan under hyperoxaluria

## 2023-12-03 NOTE — OCCUPATIONAL THERAPY NOTE
Occupational Therapy Progress Note     Patient Name: Jw Leigh  RDPKH'R Date: 12/3/2023  Problem List  Principal Problem:    Elevated troponin  Active Problems:    Anxiety, generalized    Insomnia    Primary hyperoxaluria type 1 ([address])    End stage renal disease on dialysis [facility])    Neuropathy    Vomiting          12/03/23 1306   OT Last Visit   OT Visit Date 12/03/23   Note Type   Note Type Treatment for insurance authorization   Pain Assessment   Pain Assessment Tool 0-10   Pain Score 8   Pain Location/Orientation Orientation: Bilateral;Location: Foot   Hospital Pain Intervention(s) Emotional support; Ambulation/increased activity;Repositioned   Restrictions/Precautions   Other Precautions Chair Alarm; Bed Alarm;Multiple lines;Telemetry; Fall Risk;Pain   ADL   Where Assessed Chair   Grooming Assistance 5  Supervision/Setup   Grooming Deficit Setup;Verbal cueing;Supervision/safety; Increased time to complete   UB Bathing Assistance 5  Supervision/Setup   LB Bathing Assistance 3  Moderate Assistance   LB Bathing Comments simulated   UB Dressing Assistance 5  Supervision/Setup   UB Dressing Deficit Setup;Verbal cueing;Supervision/safety; Increased time to complete   LB Dressing Assistance 3  Moderate Assistance   LB Dressing Deficit Setup;Steadying; Requires assistive device for steadying;Verbal cueing;Supervision/safety; Increased time to complete; Don/doff L sock; Don/doff R sock   LB Dressing Comments able to reach and don/doff socks, educated on weight shifting and hip hiking to pull up pants and underwear due to impaired standing balance   Bed Mobility   Supine to Sit 5  Supervision   Additional items Assist x 1; Increased time required;Verbal cues; Bedrails;HOB elevated   Transfers   Sit to Stand 3  Moderate assistance   Additional items Assist x 1; Increased time required;Verbal cues; Bedrails   Stand to Sit 3  Moderate assistance   Additional items Assist x 1; Increased time required;Verbal cues;Armrests   Stand pivot 3  Moderate assistance   Additional items Assist x 1; Increased time required;Verbal cues;Armrests   Additional Comments cues for hand placement and positioning; initially w/ elevated bed height and max assist unable to achieve upright x2 trials; able to complete sit<>stand w/ MOD assist w/ reg height od bed and cues for positioning; reports pain increased to 8.5 w/ transfers   Functional Mobility   Functional Mobility 3  Moderate assistance   Additional Comments assistx1 w/ increased time to complete, improvement in control since intial eval   Additional items Rolling walker   Therapeutic Excerise-Strength   UE Strength Yes   Right Upper Extremity- Strength   RUE Strength Comment chair pushups [id number] tolerated well; improvement in   strength to 4-/5 and overall UE strength to 3+/5   Left Upper Extremity-Strength   LUE Strength Comment chair pushups [id number] tolerated well; improvement in  strength to 4-/5 and overall UE strength to 3+/5   Subjective   Subjective "I had more pain since I last walked"   Cognition   Overall Cognitive Status WFL   Arousal/Participation Responsive; Cooperative; Alert   Attention Within functional limits   Orientation Level Oriented X4   Memory Within functional limits   Following Commands Follows all commands and directions without difficulty   Comments motivated and wants to regain independence goal is also to have pain in b/l feet reduced   Additional Activities   Additional Activities   (education on positioning and safety)   Additional Activities Comments pt receptive   Activity Tolerance   Activity Tolerance Patient limited by pain; Patient limited by fatigue;Treatment limited secondary to medical complications (Comment)   Medical Staff Made Aware appropriate to see per RN, [place]   Assessment   Assessment Pt seen for skilled OT session focused on ADLs, functional transfers and bed mobility. Pt willing to participate however continues to have b/l foot pain, RN aware.  Pt w/ supervision supine>sit bed mobility w/ increased time to complete. Pt w/ cues for hand placement and positioning during transfers ; initially w/ elevated bed height and max assist unable to achieve upright x2 trials; able to complete sit<>stand w/ MOD assist w/ reg height of bed and cues for positioning; reports pain increased to 8.5 w/ transfers. Pt w/ MOD assist x1 w/ functional mobility w/ RW and improved in corodination and control this session to recliner. Pt completed UE chair positions while in chair to increase strength, pt reports completing UE and LE exercises during the day. Pt w/ improvement in overall  strength and UE strength to 3+/5 since initial eval. Pt w/ setup UB ADLs and mod assist LB ADLs w/ education on weight shifting and hip hiking techniques in chair. Pt continues to be limited due to increased pain, impaired balance, impaired activity tolerance, decreased strength and endurance, increased fatigue 6/10 CONRAD scale of exertion for transfers, decreased standing tolerance all causing a decline in aDLs, functional transfers and mobility. Recommend level I maximum resources when medically stable. The patient's raw score on the AM-PAC Daily Activity Inpatient Short Form is 16. A raw score of less than 19 suggests the patient may benefit from discharge to post-acute rehabilitation services. Please refer to the recommendation of the Occupational Therapist for safe discharge planning. Plan   Treatment Interventions ADL retraining;Functional transfer training; Endurance training;UE strengthening/ROM; Cognitive reorientation;Patient/family training;Equipment evaluation/education; Compensatory technique education; Energy conservation; Activityengagement   Goal Expiration Date 12/14/23   OT Treatment Day 1   OT Frequency 3-5x/wk   Discharge Recommendation   Rehab Resource Intensity Level, OT I (Maximum Resource Intensity)   AM-PAC Daily Activity Inpatient   Lower Body Dressing 2   Bathing 2   Toileting 3   Upper Body Dressing 3   Grooming 3   Eating 3   Daily Activity Raw Score 16   Daily Activity Standardized Score (Calc for Raw Score >=11) 35.96   AM-PAC Applied Cognition Inpatient   Following a Speech/Presentation 4   Understanding Ordinary Conversation 4   Taking Medications 4   Remembering Where Things Are Placed or Put Away 4   Remembering List of 4-5 Errands 4   Taking Care of Complicated Tasks 4   Applied Cognition Raw Score 24   Applied Cognition Standardized Score 62.21   Modified Jd Scale   Modified Jd Scale 4   End of Consult   Education Provided Yes   Patient Position at End of Consult Bedside chair;Bed/Chair alarm activated; All needs within reach   Nurse Communication Nurse aware of consult  (aware of transfer status and pt requesting medication via tigertext)     Documentation completed by: Westley Riedel, MS, OTR/L

## 2023-12-03 NOTE — PROGRESS NOTES
NEPHROLOGY PROGRESS NOTE    Patient: Malini Le               Sex: male          DOA: 11/28/2023  8:02 AM   YOB: 1995        Age:  25 y.o.        LOS:  LOS: 3 days   12/3/2023    REASON FOR THE CONSULTATION:  ESRD on HD    SUBJECTIVE     Patient was seen and examined at the bedside today. He reports ongoing bilateral foot pain, minimally relieved after HD yesterday and with current pain regimen. Reports unable to walk well due to the pain. Reviewed past 24 hour events. CURRENT MEDICATIONS       Current Facility-Administered Medications:     acetaminophen (TYLENOL) tablet 975 mg, 975 mg, Oral, Q8H, Palmer Pavon PA-C, 975 mg at 12/03/23 0915    [START ON 12/4/2023] calcitriol (ROCALTROL) capsule 0.25 mcg, 0.25 mcg, Oral, Once per day on Mon Wed Fri, [place]    calcium acetate (PHOSLO) capsule 2,001 mg, 2,001 mg, Oral, TID With Meals, Navarro Borges PA-C, 2,001 mg at 12/03/23 0843    gabapentin (NEURONTIN) capsule 100 mg, 100 mg, Oral, TID PRN, Navarro Borges PA-C    ondansetron [facility]) injection 4 mg, 4 mg, Intravenous, Q4H PRN, Navarro Borges PA-C, 4 mg at 12/03/23 7941    oxyCODONE (ROXICODONE) IR tablet 5 mg, 5 mg, Oral, Q4H PRN **OR** oxyCODONE (ROXICODONE) split tablet 7.5 mg, 7.5 mg, Oral, Q4H PRN, LAURA Trejo    traZODone (DESYREL) tablet 50 mg, 50 mg, Oral, HS, Palmer Pavon PA-C, 50 mg at 12/02/23 2200    REVIEW OF SYSTEMS     Review of Systems   Constitutional:  Positive for activity change. Negative for chills, fatigue and fever. HENT:  Negative for trouble swallowing. Respiratory:  Negative for shortness of breath. Cardiovascular:  Negative for leg swelling. Gastrointestinal:  Positive for nausea and vomiting. Genitourinary:  Positive for decreased urine volume. Musculoskeletal:  Negative for back pain. B/L foot pain   Skin:  Negative for pallor.    Neurological:  Negative for dizziness, syncope, weakness and light-headedness. Psychiatric/Behavioral:  Negative for sleep disturbance. The patient is not nervous/anxious. OBJECTIVE     Current Weight: Weight - Scale: 103 kg (228 lb)  Vitals:    12/03/23 0657   BP: 138/84   Pulse: 91   Resp: 16   Temp: 98.2 °F (36.8 °C)   SpO2: 95%     Body mass index is 30.92 kg/m². Intake/Output Summary (Last 24 hours) at 12/3/2023 1203  Last data filed at 12/3/2023 0616  Gross per 24 hour   Intake 400 ml   Output 200 ml   Net 200 ml       PHYSICAL EXAMINATION     Physical Exam  Vitals reviewed. Constitutional:       General: He is not in acute distress. HENT:      Head: Normocephalic. Mouth/Throat:      Lips: Pink. Mouth: Mucous membranes are moist.   Eyes:      General: Lids are normal. No scleral icterus. Cardiovascular:      Rate and Rhythm: Normal rate and regular rhythm. Heart sounds: S1 normal and S2 normal. No murmur heard. Pulmonary:      Effort: Pulmonary effort is normal. No accessory muscle usage or respiratory distress. Breath sounds: Normal breath sounds. Abdominal:      General: There is no distension. Tenderness: There is no abdominal tenderness. Musculoskeletal:      Cervical back: Normal range of motion and neck supple. No tenderness. Skin:     General: Skin is warm. Coloration: Skin is not cyanotic or jaundiced. Neurological:      General: No focal deficit present. Mental Status: He is alert and oriented to person, place, and time. Psychiatric:         Attention and Perception: Attention normal.         Speech: Speech normal.         Behavior: Behavior is cooperative.            LAB RESULTS     Results from last 7 days   Lab Units 12/02/23  0545 12/01/23  0536 11/30/23  0551 11/29/23  0811 11/29/23  0505 11/29/23  0438 11/28/23  0848   WBC Thousand/uL 7.62 6.97 7.09  --   --  6.50 7.36   HEMOGLOBIN g/dL 10.0* 9.2* 9.5*  --   --  9.6* 9.2*   HEMATOCRIT % 29.2* 27.7* 28.0*  --   --  29.2* 28.2* PLATELETS Thousands/uL 211 186 208  --   --  233 245   SODIUM mmol/L 136 136 136  --  137  --  138   POTASSIUM mmol/L 4.7 4.4 4.4  --  4.3  --  5.3   CHLORIDE mmol/L 96 96 96  --  96  --  97   CO2 mmol/L 27 25 27  --  25  --  22   BUN mg/dL 41* 57* 39*  --  48*  --  77*   CREATININE mg/dL 12.38* 16.97* 13.00*  --  16.25*  --  22.00*   EGFR ml/min/1.73sq m 4 3 4  --  3  --  2   CALCIUM mg/dL 9.9 9.4 9.7  --  9.4  --  8.7   MAGNESIUM mg/dL 2.6 3.0*  --   --  2.7  --  3.4*   PHOSPHORUS mg/dL  --   --   --  10.2*  --   --  11.3*       RADIOLOGY RESULTS      Results for orders placed during the hospital encounter of 08/21/23    XR chest portable    Narrative  CHEST    INDICATION:   ckd. COMPARISON:  None    EXAM PERFORMED/VIEWS:  XR CHEST PORTABLE      FINDINGS:    Right IJ dialysis catheter in place    Cardiomediastinal silhouette appears unremarkable. The lungs are clear. No pneumothorax or pleural effusion. Osseous structures appear within normal limits for patient age. Impression  Right dialysis catheter present. No acute cardiopulmonary disease. Workstation performed: [id number]    No results found for this or any previous visit. ASSESSMENT/PLAN     68-year-old male with a past medical history for ESRD on HD, GERD, primary hyperoxaluria type I, and presented to [address] [facility] emergency department with worsening bilateral foot pain. ESRD on HD:  Undergoes outpatient dialysis on a Monday, Wednesday, Friday schedule at the [facility]. Underwent additional dialysis yesterday for management of primary hyperoxaluria. Dialysis treatment shortened yesterday due to clotting of HD filter, see below. Will plan to hold dialysis today and plan for next scheduled treatment tomorrow. Access:  TCC, access issues and clotting during dialysis on Friday and was given cathflo with limited improvement.  Received heparin dosing during dialysis yesterday with ongoing access and clotting issues. If noted access issues with Monday treatment, may require IR consult for evaluation. Hypertension:  Blood pressure readings acceptable over the past 24 hours. Anemia in ESRD:  Most recent hemoglobin is 9.2, receives Mircera as an outpatient. Secondary hyperparathyroidism:  Most recent outpatient labs on 11/13 revealed a calcium 8.4, and parathyroid hormone 364. Will continue calcitriol 0.25 mcg 3 times weekly with dialysis treatments. Hyperphosphatemia:  Admitted with elevated phosphorous of 11.3. Initiated on oral calcium acetate 3 tablets TID with meals. Primary Hyperoxaluria:  Presented with worsening b/l foot pain with known neuropathy is the setting of hyperoxaluria. Currently managed with hemodialysis, received an extra treatment yesterday. Patient will require more frequent dialysis treatments as an outpatient for management. Above plan was discussed with the SLIM provider, in agreement for next dialysis treatment in hospital tomorrow. Patient is being worked up for rehabilitation placement. Polo Ladd, [address] [place] [address]  Nephrology  12/3/2023      Portions of the record may have been created with voice recognition software. Occasional wrong word or "sound a like" substitutions may have occurred due to the inherent limitations of voice recognition software. Read the chart carefully and recognize, using context, where substitutions have occurred.

## 2023-12-03 NOTE — ASSESSMENT & PLAN NOTE
History of  Currently managed with HD which does improve his symptoms of feet/toe pain. Plan will be to increase outpatient sessions until patient can be started on home HD.   Nephrology following  Unfortunately, patient does not currently have insurance and will likely be denied to try Oxlumo at this time

## 2023-12-03 NOTE — PROGRESS NOTES
Pt requesting additional pain medication, states prn oxy is giving him little to no relief, pt was awake all night, pain is 9/10 now. Rn offered gabapentin, pt states it doesn't work, oncall provider notified, one time dose of dilaudid ordered, and given.

## 2023-12-03 NOTE — PLAN OF CARE
Problem: OCCUPATIONAL THERAPY ADULT  Goal: Performs self-care activities at highest level of function for planned discharge setting. See evaluation for individualized goals. Description: Treatment Interventions: ADL retraining, Functional transfer training, UE strengthening/ROM, Endurance training, Cognitive reorientation, Equipment evaluation/education, Patient/family training, Compensatory technique education, Activityengagement, Energy conservation          See flowsheet documentation for full assessment, interventions and recommendations. Outcome: Progressing  Note: Limitation: Decreased ADL status, Decreased Safe judgement during ADL, Decreased UE strength, Decreased cognition, Decreased endurance, Decreased self-care trans, Decreased high-level ADLs  Prognosis: Good  Assessment: Pt seen for skilled OT session focused on ADLs, functional transfers and bed mobility. Pt willing to participate however continues to have b/l foot pain, RN aware. Pt w/ supervision supine>sit bed mobility w/ increased time to complete. Pt w/ cues for hand placement and positioning during transfers ; initially w/ elevated bed height and max assist unable to achieve upright x2 trials; able to complete sit<>stand w/ MOD assist w/ reg height of bed and cues for positioning; reports pain increased to 8.5 w/ transfers. Pt w/ MOD assist x1 w/ functional mobility w/ RW and improved in corodination and control this session to recliner. Pt completed UE chair positions while in chair to increase strength, pt reports completing UE and LE exercises during the day. Pt w/ improvement in overall  strength and UE strength to 3+/5 since initial eval. Pt w/ setup UB ADLs and mod assist LB ADLs w/ education on weight shifting and hip hiking techniques in chair.  Pt continues to be limited due to increased pain, impaired balance, impaired activity tolerance, decreased strength and endurance, increased fatigue 6/10 CONRAD scale of exertion for transfers, decreased standing tolerance all causing a decline in aDLs, functional transfers and mobility. Recommend level I maximum resources when medically stable. The patient's raw score on the -PAC Daily Activity Inpatient Short Form is 16. A raw score of less than 19 suggests the patient may benefit from discharge to post-acute rehabilitation services. Please refer to the recommendation of the Occupational Therapist for safe discharge planning.      Rehab Resource Intensity Level, OT: I (Maximum Resource Intensity)

## 2023-12-03 NOTE — ASSESSMENT & PLAN NOTE
Lab Results   Component Value Date    EGFR 4 12/02/2023    EGFR 3 12/01/2023    EGFR 4 11/30/2023    CREATININE 12.38 (H) 12/02/2023    CREATININE 16.97 (H) 12/01/2023    CREATININE 13.00 (H) 11/30/2023       Nephrology consulted for HD  Associated hyperphosphatemia  Phoslo initiated  Last HD session noted to be on 12/2, plan for 12/4 session. Patient to eventually be set up with home dialysis to be completed 5 days a week in setting of high oxalate levels. Due to have AV Fistula placed on 12/14  High risk to be discharged with catheter to do dialysis at home, so until fistula placed, will continue with HD at [facility].

## 2023-12-04 ENCOUNTER — APPOINTMENT (OUTPATIENT)
Dept: NON INVASIVE DIAGNOSTICS | Facility: HOSPITAL | Age: 28
DRG: 682 | End: 2023-12-04
Payer: COMMERCIAL

## 2023-12-04 ENCOUNTER — TELEPHONE (OUTPATIENT)
Dept: FAMILY MEDICINE CLINIC | Facility: CLINIC | Age: 28
End: 2023-12-04

## 2023-12-04 ENCOUNTER — APPOINTMENT (INPATIENT)
Dept: DIALYSIS | Facility: HOSPITAL | Age: 28
DRG: 682 | End: 2023-12-04
Attending: INTERNAL MEDICINE
Payer: COMMERCIAL

## 2023-12-04 ENCOUNTER — ANESTHESIA EVENT (OUTPATIENT)
Dept: PERIOP | Facility: HOSPITAL | Age: 28
End: 2023-12-04
Payer: COMMERCIAL

## 2023-12-04 LAB
ANION GAP SERPL CALCULATED.3IONS-SCNC: 15 MMOL/L
ATRIAL RATE: 73 BPM
ATRIAL RATE: 81 BPM
BUN SERPL-MCNC: 55 MG/DL (ref 5–25)
CALCIUM SERPL-MCNC: 9.4 MG/DL (ref 8.4–10.2)
CHLORIDE SERPL-SCNC: 95 MMOL/L (ref 96–108)
CO2 SERPL-SCNC: 27 MMOL/L (ref 21–32)
CREAT SERPL-MCNC: 14.93 MG/DL (ref 0.6–1.3)
GFR SERPL CREATININE-BSD FRML MDRD: 3 ML/MIN/1.73SQ M
GLUCOSE SERPL-MCNC: 85 MG/DL (ref 65–140)
P AXIS: 28 DEGREES
P AXIS: 31 DEGREES
POTASSIUM SERPL-SCNC: 4.3 MMOL/L (ref 3.5–5.3)
PR INTERVAL: 166 MS
PR INTERVAL: 170 MS
QRS AXIS: -10 DEGREES
QRS AXIS: -9 DEGREES
QRSD INTERVAL: 86 MS
QRSD INTERVAL: 88 MS
QT INTERVAL: 394 MS
QT INTERVAL: 428 MS
QTC INTERVAL: 457 MS
QTC INTERVAL: 471 MS
SODIUM SERPL-SCNC: 137 MMOL/L (ref 135–147)
T WAVE AXIS: -3 DEGREES
T WAVE AXIS: 0 DEGREES
VENTRICULAR RATE: 73 BPM
VENTRICULAR RATE: 81 BPM

## 2023-12-04 PROCEDURE — 93005 ELECTROCARDIOGRAM TRACING: CPT

## 2023-12-04 PROCEDURE — 97530 THERAPEUTIC ACTIVITIES: CPT

## 2023-12-04 PROCEDURE — NC001 PR NO CHARGE: Performed by: NURSE PRACTITIONER

## 2023-12-04 PROCEDURE — 90935 HEMODIALYSIS ONE EVALUATION: CPT | Performed by: INTERNAL MEDICINE

## 2023-12-04 PROCEDURE — 80048 BASIC METABOLIC PNL TOTAL CA: CPT

## 2023-12-04 PROCEDURE — 97110 THERAPEUTIC EXERCISES: CPT

## 2023-12-04 PROCEDURE — 97116 GAIT TRAINING THERAPY: CPT

## 2023-12-04 PROCEDURE — 36598 INJ W/FLUOR EVAL CV DEVICE: CPT

## 2023-12-04 PROCEDURE — 5A1D70Z PERFORMANCE OF URINARY FILTRATION, INTERMITTENT, LESS THAN 6 HOURS PER DAY: ICD-10-PCS | Performed by: INTERNAL MEDICINE

## 2023-12-04 PROCEDURE — 36598 INJ W/FLUOR EVAL CV DEVICE: CPT | Performed by: RADIOLOGY

## 2023-12-04 PROCEDURE — 99232 SBSQ HOSP IP/OBS MODERATE 35: CPT | Performed by: PHYSICIAN ASSISTANT

## 2023-12-04 RX ORDER — METOCLOPRAMIDE HYDROCHLORIDE 5 MG/ML
5 INJECTION INTRAMUSCULAR; INTRAVENOUS ONCE
Status: COMPLETED | OUTPATIENT
Start: 2023-12-04 | End: 2023-12-04

## 2023-12-04 RX ORDER — METOCLOPRAMIDE 10 MG/1
5 TABLET ORAL
Status: DISCONTINUED | OUTPATIENT
Start: 2023-12-04 | End: 2023-12-06 | Stop reason: HOSPADM

## 2023-12-04 RX ADMIN — GABAPENTIN 100 MG: 100 CAPSULE ORAL at 16:57

## 2023-12-04 RX ADMIN — Medication 2 G: at 08:02

## 2023-12-04 RX ADMIN — Medication 2 G: at 17:54

## 2023-12-04 RX ADMIN — CALCIUM ACETATE 2001 MG: 667 CAPSULE ORAL at 13:47

## 2023-12-04 RX ADMIN — Medication 2 G: at 21:12

## 2023-12-04 RX ADMIN — CALCIUM ACETATE 2001 MG: 667 CAPSULE ORAL at 08:01

## 2023-12-04 RX ADMIN — GABAPENTIN 100 MG: 100 CAPSULE ORAL at 08:01

## 2023-12-04 RX ADMIN — ACETAMINOPHEN 975 MG: 325 TABLET, FILM COATED ORAL at 17:54

## 2023-12-04 RX ADMIN — CALCITRIOL CAPSULES 0.25 MCG 0.25 MCG: 0.25 CAPSULE ORAL at 08:01

## 2023-12-04 RX ADMIN — METOCLOPRAMIDE 5 MG: 5 INJECTION, SOLUTION INTRAMUSCULAR; INTRAVENOUS at 13:50

## 2023-12-04 RX ADMIN — ACETAMINOPHEN 975 MG: 325 TABLET, FILM COATED ORAL at 10:04

## 2023-12-04 RX ADMIN — Medication 7.5 MG: at 20:42

## 2023-12-04 RX ADMIN — Medication 7.5 MG: at 13:48

## 2023-12-04 RX ADMIN — CALCIUM ACETATE 2001 MG: 667 CAPSULE ORAL at 16:56

## 2023-12-04 RX ADMIN — GABAPENTIN 100 MG: 100 CAPSULE ORAL at 21:11

## 2023-12-04 RX ADMIN — IOHEXOL 16 ML: 350 INJECTION, SOLUTION INTRAVENOUS at 15:27

## 2023-12-04 RX ADMIN — METOCLOPRAMIDE HYDROCHLORIDE 5 MG: 10 TABLET ORAL at 16:57

## 2023-12-04 RX ADMIN — ACETAMINOPHEN 975 MG: 325 TABLET, FILM COATED ORAL at 02:21

## 2023-12-04 RX ADMIN — TRAZODONE HYDROCHLORIDE 50 MG: 50 TABLET ORAL at 21:11

## 2023-12-04 RX ADMIN — Medication 2 G: at 13:48

## 2023-12-04 RX ADMIN — ONDANSETRON 4 MG: 2 INJECTION INTRAMUSCULAR; INTRAVENOUS at 07:59

## 2023-12-04 RX ADMIN — HEPARIN SODIUM 2000 UNITS: 1000 INJECTION INTRAVENOUS; SUBCUTANEOUS at 08:58

## 2023-12-04 NOTE — HEMODIALYSIS
Post-Dialysis RN Treatment Note    Blood Pressure:  Pre 165/92 mm/Hg  Post 143/84 mmHg   EDW  102 kg    Weight:  Pre 100.4 kg   Post 99 kg   Mode of weight measurement: Bed Scale   Volume Removed  1700 ml    Treatment duration 210 minutes    NS given  No    Treatment shortened?  Yes, describe: Catheter issues, high arterial pressures constantly during treatment   Medications given during Rx Tylenol 975 mg   Estimated Kt/V  None Reported   Access type: Permacath/TDC   Access Issues: Yes, describe: Arterial pressures elevated, FPC trough tx pt lines reversed     Report called to primary nurse   Yes Darrian Jaffe RN

## 2023-12-04 NOTE — CONSULTS
e-Consult (IPC)  - Interventional Radiology  Tate Pope 25 y.o. male MRN: [id number]  Unit/Bed#: -01 Encounter: [id number]          Interventional Radiology has been consulted to evaluate Tate Pope    We were consulted by Nephrology concerning this patient with dialysis catheter which is not functioning. Inpatient Consult to IR  Consult performed by: LAURA Dudley  Consult ordered by: Fran Figueredo MD        12/04/23    Assessment/Recommendation:     25year old male with a history of CKD stage IV with a tunneled dialysis catheter placed 8/24. Pt was in dialyses and catheter noted to be ,malfunctioning with high venous pressure and frequent low blood flow. Nephrology requesting a catheter check. - Plan for catheter check today    5-10 minutes, >50% of the total time devoted to medical consultative verbal/EMR discussion between providers. Written report will be generated in the EMR. Thank you for allowing Interventional Radiology to participate in the care of Tate Pope. Please don't hesitate to call or TigerText us with any questions.      LAURA Dudley

## 2023-12-04 NOTE — TELEPHONE ENCOUNTER
Spoke with patient- he is currently hospitalized. He had to cancel today's appointment. Just an FYI for Dr. Deanne Dominguez. Thank you!

## 2023-12-04 NOTE — QUICK NOTE
Patient requests pain regimen be switched to oxy in conjunction with his scheduled tylenol as he reports better pain control with this rather than currently ordered dilaudid. Orders placed for PRN oxy as previously administered this hospitalization.

## 2023-12-04 NOTE — PLAN OF CARE
Problem: PHYSICAL THERAPY ADULT  Goal: Performs mobility at highest level of function for planned discharge setting. See evaluation for individualized goals. Description: Treatment/Interventions: Functional transfer training, LE strengthening/ROM, Elevations, Therapeutic exercise, Endurance training, Patient/family training, Bed mobility, Gait training, Spoke to nursing, OT  Equipment Recommended: Wheelchair       See flowsheet documentation for full assessment, interventions and recommendations. 12/4/2023 1036 by Rustam Driscoll PT  Note: Prognosis: Good  Problem List: Decreased strength, Decreased endurance, Impaired balance, Decreased mobility, Impaired sensation, Pain  Assessment: Chart reviewed. Patient was received supine in bed in NAD and agreeable to PT session. Today's PT treatment session consisted of therapeutic activity for facilitation of transitional movements and safe performance of correct technique for bed mobility and sit to stand transfers, therapeutic exercise to increase lower extremity muscle strength, and gait training to promote safe and functional ambulation on level surfaces. In comparison to the previous session the patient has made progress as evident by requiring decreased assistance with all functional mobility. Pt was able to ambulate an increased distance with use of RW. Pt ambulated two gait trials with a seated rest break between each trial. Pt's ambulation distance was limited due to reports of fatigue. When ambulating pt exhibits increased reliance on upper extremity support, decreased stride length, and decreased tika. Pt tolerated all therapeutic exercise well, but reported fatigue. Overall, patient tolerated today's session well and continues to be making progress towards achieving his STG's. Pt's STG's were updated this session. Patient's prognosis for achieving their STG's is good as evident by pt's motivation.  PT intervention continues to be appropriate as the patient continues to be limited by pain, decreased lower extremity strength, impaired balance, decreased endurance, gait deviations, and decreased functional mobility. Continue to recommend level 1, maximum resource intensity. PT to continue to see patient in order to address the deficits listed above and provide interventions consistent with the POC in order to achieve STG's and optimize the patient's independence with functional mobility. Barriers to Discharge: Inaccessible home environment, Other (Comment) (decline in functional mobility)     Rehab Resource Intensity Level, PT: I (Maximum Resource Intensity)    See flowsheet documentation for full assessment.

## 2023-12-04 NOTE — CASE MANAGEMENT
Case Management Discharge Planning Note    Patient name Ferny Benites  Location /-14 MRN [id number]  : 1995 Date 2023       Current Admission Date: 2023  Current Admission Diagnosis:Elevated troponin   Patient Active Problem List    Diagnosis Date Noted    Vomiting 2023    Intractable vomiting 2023    Elevated troponin 2023    Microangiopathy ([address]) 2023    End stage renal disease on dialysis ([address]) 10/03/2023    Neuropathy 10/03/2023    Gastroesophageal reflux disease without esophagitis 2023    Anemia 2023    Chronic kidney disease-mineral and bone disorder 2023    BMI 30.0-30.9,adult 2022    Hydronephrosis with urinary obstruction due to ureteral calculus 2021    Primary hyperoxaluria type 1 ([address]) 2021    Insomnia 10/27/2020    Ureteric stone ----> hydroureteronephrosis moderate 10/23/2020    Sebaceous cyst 2017    Anxiety, generalized 2015      LOS (days): 4  Geometric Mean LOS (GMLOS) (days): 5.40  Days to GMLOS:1.7     OBJECTIVE:  Risk of Unplanned Readmission Score: 20.31         Current admission status: Inpatient   Preferred Pharmacy:   [facility] [id number], [address] [place] [address]  Phone: [phone] Fax: [phone]    Primary Care Provider: Elda Valente DO    Primary Insurance: [address]  Secondary Insurance:     DISCHARGE DETAILS:    CM informed by Vivianne Fothergill provider that patient needs dialysis 5 times a week. CM contacted [facility] and asked if they can take patient who needs dialysis .

## 2023-12-04 NOTE — BRIEF OP NOTE (RAD/CATH)
INTERVENTIONAL RADIOLOGY PROCEDURE NOTE    Date: 12/4/2023    Procedure: Tunneled dialysis catheter check  Procedure Summary       Date:  Room / Location:     Anesthesia Start:  Anesthesia Stop:     Procedure:  Diagnosis:     Scheduled Providers:  Responsible Provider:     Anesthesia Type: Not recorded ASA Status: Not recorded            Preoperative diagnosis:   1. End stage renal disease on dialysis (HCC)    2. Leg pain    3. Elevated troponin    4. Abnormal nuclear stress test         Postoperative diagnosis: Same. Surgeon: Leonora Mercado MD     Assistant: None. No qualified resident was available. Blood loss: None    Specimens: None     Findings:   Right IJV tunneled dialysis catheter was in stable position. Both arterial and venous ports were able to be aspirated and flushed without resistance. Catheter kept in place. Complications: None immediate.     Anesthesia: none

## 2023-12-04 NOTE — OCCUPATIONAL THERAPY NOTE
Occupational Therapy Treatment Note        Patient Name: Blaine Wayne  SYRCB'P Date: 12/4/2023 12/04/23 0800   OT Last Visit   OT Visit Date 12/04/23   Note Type   Note Type Treatment for insurance authorization   Pain Assessment   Pain Assessment Tool 0-10   Pain Score 3   Pain Location/Orientation Orientation: Right;Location: Foot  (toes)   Pain Onset/Description Onset: Ongoing   Patient's Stated Pain Goal No pain   Hospital Pain Intervention(s) Repositioned; Ambulation/increased activity   Multiple Pain Sites Yes   Pain 2   Pain Score 2 6   Pain Location/Orientation 2 Orientation: Left; Location: Foot  (toes)   Hospital Pain Intervention(s) 2 Repositioned; Ambulation/increased activity   Restrictions/Precautions   Other Precautions Chair Alarm; Bed Alarm; Fall Risk;Pain;Limb alert   ADL   Where Assessed Standing at sink   Eating Assistance 6  Modified independent   Grooming Assistance 5  Supervision/Setup   Grooming Deficit Setup;Verbal cueing   UB Bathing Assistance 5  Supervision/Setup   UB Bathing Deficit Setup; Increased time to complete   LB Bathing Assistance 3  Moderate Assistance   LB Bathing Deficit Setup;Supervision/safety; Increased time to complete   UB Dressing Assistance 5  Supervision/Setup   UB Dressing Deficit Supervision/safety; Increased time to complete   LB Dressing Assistance 3  Moderate Assistance   LB Dressing Deficit Don/doff R sock; Don/doff L sock   Toileting Assistance  3  Moderate Assistance   Toileting Deficit Setup; Increased time to complete   Functional Standing Tolerance   Time ~1 minute   Activity sink level ADLs   Bed Mobility   Supine to Sit 5  Supervision   Additional items HOB elevated; Increased time required   Sit to Supine 5  Supervision   Additional items Assist x 1;HOB elevated; Bedrails; Increased time required;Verbal cues   Transfers   Sit to Stand 4  Minimal assistance   Additional items Assist x 2; Increased time required;HOB elevated   Stand to Sit 4 Minimal assistance   Additional items Assist x 1; Increased time required;Verbal cues   Functional Mobility   Functional Mobility 4  Minimal assistance   Additional Comments assist of 1 with RW   Additional items Rolling walker   Cognition   Overall Cognitive Status WFL   Arousal/Participation Responsive; Cooperative; Alert   Attention Within functional limits   Orientation Level Oriented X4   Memory Within functional limits   Following Commands Follows all commands and directions without difficulty   Activity Tolerance   Activity Tolerance Patient limited by pain; Patient limited by fatigue   Assessment   Assessment Patient participated in Skilled OT session this date with interventions consisting of  therapeutic activities to: increase activity tolerance, increase standing tolerance time with unilateral UE support to complete sink level ADLs, increase cardiovascular endurance , increase dynamic sit/ stand balance during functional activity , and increase postural control . Patient agreeable to OT treatment session, upon arrival patient was found supine in bed, alert, responsive , and in no apparent distress. In comparison to previous session, patient with improvements in sitting tolerance at edge of bed, and standing tolerance time at sink level. Patient required min assist of 1 for ambulation to/ from bathroom, contact guard/ min assist for standing by sink. Patient requires set up assist for UB ADLs, lower body ADLs at mod assist.  Patient requiring verbal cues for safety, verbal cues for correct technique, and one step directives. Patient continues to be functioning below baseline level, occupational performance remains limited secondary to factors listed above and increased risk for falls and injury.    From OT standpoint, recommendation at time of d/c would be Level 1 max assist.   Patient to benefit from continued Occupational Therapy treatment while in the hospital to address deficits as defined above and maximize level of functional independence with ADLs and functional mobility. Plan   Treatment Interventions ADL retraining;Functional transfer training;UE strengthening/ROM; Endurance training;Patient/family training;Equipment evaluation/education; Compensatory technique education;Continued evaluation   Goal Expiration Date 12/14/23   OT Frequency 3-5x/wk   Discharge Recommendation   Rehab Resource Intensity Level, OT I (Maximum Resource Intensity)   AM-PAC Daily Activity Inpatient   Lower Body Dressing 2   Bathing 2   Toileting 2   Upper Body Dressing 3   Grooming 3   Eating 4   Daily Activity Raw Score 16   Daily Activity Standardized Score (Calc for Raw Score >=11) 35.96   AM-PAC Applied Cognition Inpatient   Following a Speech/Presentation 4   Understanding Ordinary Conversation 4   Taking Medications 4   Remembering Where Things Are Placed or Put Away 4   Remembering List of 4-5 Errands 4   Taking Care of Complicated Tasks 4   Applied Cognition Raw Score 24   Applied Cognition Standardized Score 62.21

## 2023-12-04 NOTE — CASE MANAGEMENT
Case Management Discharge Planning Note    Patient name Nancy Garcia  Location /-69 MRN [id number]  : 1995 Date 2023       Current Admission Date: 2023  Current Admission Diagnosis:Elevated troponin   Patient Active Problem List    Diagnosis Date Noted    Vomiting 2023    Intractable vomiting 2023    Elevated troponin 2023    Microangiopathy ([address]) 2023    End stage renal disease on dialysis ([address]) 10/03/2023    Neuropathy 10/03/2023    Gastroesophageal reflux disease without esophagitis 2023    Anemia 2023    Chronic kidney disease-mineral and bone disorder 2023    BMI 30.0-30.9,adult 2022    Hydronephrosis with urinary obstruction due to ureteral calculus 2021    Primary hyperoxaluria type 1 ([address]) 2021    Insomnia 10/27/2020    Ureteric stone ----> hydroureteronephrosis moderate 10/23/2020    Sebaceous cyst 2017    Anxiety, generalized 2015      LOS (days): 4  Geometric Mean LOS (GMLOS) (days): 5.40  Days to GMLOS:1.4     OBJECTIVE:  Risk of Unplanned Readmission Score: 17.92         Current admission status: Inpatient   Preferred Pharmacy:   [facility] [address], [address]  [address] [place] [address]  Phone: [phone] Fax: [phone]    Primary Care Provider: Alexandria Briceno DO    Primary Insurance: [address]  Secondary Insurance:     DISCHARGE DETAILS:     CM informed by [facility] that patient was pre-approved for [facility]. CM unable to speak with patient due to patient not being in room. CM will speak with patient at a later time.

## 2023-12-04 NOTE — PLAN OF CARE
Patient presents for a 4 hour HD session on a 3K2.5Ca bath for a serum potassium of 4.3 mmol/L drawn on 12/4/23 with a net UF goal of 1-2L as tolerated.     Problem: METABOLIC, FLUID AND ELECTROLYTES - ADULT  Goal: Electrolytes maintained within normal limits  Description: INTERVENTIONS:  - Monitor labs and assess patient for signs and symptoms of electrolyte imbalances  - Administer electrolyte replacement as ordered  - Monitor response to electrolyte replacements, including repeat lab results as appropriate  - Instruct patient on fluid and nutrition as appropriate  Outcome: Progressing  Goal: Fluid balance maintained  Description: INTERVENTIONS:  - Monitor labs   - Monitor I/O and WT  - Instruct patient on fluid and nutrition as appropriate  - Assess for signs & symptoms of volume excess or deficit  Outcome: Progressing

## 2023-12-04 NOTE — PLAN OF CARE
Problem: METABOLIC, FLUID AND ELECTROLYTES - ADULT  Goal: Electrolytes maintained within normal limits  Description: INTERVENTIONS:  - Monitor labs and assess patient for signs and symptoms of electrolyte imbalances  - Administer electrolyte replacement as ordered  - Monitor response to electrolyte replacements, including repeat lab results as appropriate  - Instruct patient on fluid and nutrition as appropriate  Outcome: Progressing  Goal: Fluid balance maintained  Description: INTERVENTIONS:  - Monitor labs   - Monitor I/O and WT  - Instruct patient on fluid and nutrition as appropriate  - Assess for signs & symptoms of volume excess or deficit  Outcome: Progressing     Problem: PAIN - ADULT  Goal: Verbalizes/displays adequate comfort level or baseline comfort level  Description: Interventions:  - Encourage patient to monitor pain and request assistance  - Assess pain using appropriate pain scale  - Administer analgesics based on type and severity of pain and evaluate response  - Implement non-pharmacological measures as appropriate and evaluate response  - Consider cultural and social influences on pain and pain management  - Notify physician/advanced practitioner if interventions unsuccessful or patient reports new pain  Outcome: Progressing     Problem: INFECTION - ADULT  Goal: Absence or prevention of progression during hospitalization  Description: INTERVENTIONS:  - Assess and monitor for signs and symptoms of infection  - Monitor lab/diagnostic results  - Monitor all insertion sites, i.e. indwelling lines, tubes, and drains  - [place] appropriate cooling/warming therapies per order  - Administer medications as ordered  - Instruct and encourage patient and family to use good hand hygiene technique  - Identify and instruct in appropriate isolation precautions for identified infection/condition  Outcome: Progressing  Goal: Absence of fever/infection during neutropenic period  Description: INTERVENTIONS:  - Monitor WBC    Outcome: Progressing     Problem: SAFETY ADULT  Goal: Patient will remain free of falls  Description: INTERVENTIONS:  - Educate patient/family on patient safety including physical limitations  - Instruct patient to call for assistance with activity   - Consult OT/PT to assist with strengthening/mobility   - Keep Call bell within reach  - Keep bed low and locked with side rails adjusted as appropriate  - Keep care items and personal belongings within reach  - Initiate and maintain comfort rounds  - Make Fall Risk Sign visible to staff  - Apply yellow socks and bracelet for high fall risk patients  - Consider moving patient to room near nurses station  Outcome: Progressing  Goal: Maintain or return to baseline ADL function  Description: INTERVENTIONS:  -  Assess patient's ability to carry out ADLs; assess patient's baseline for ADL function and identify physical deficits which impact ability to perform ADLs (bathing, care of mouth/teeth, toileting, grooming, dressing, etc.)  - Assess/evaluate cause of self-care deficits   - Assess range of motion  - Assess patient's mobility; develop plan if impaired  - Assess patient's need for assistive devices and provide as appropriate  - Encourage maximum independence but intervene and supervise when necessary  - Involve family in performance of ADLs  - Assess for home care needs following discharge   - Consider OT consult to assist with ADL evaluation and planning for discharge  - Provide patient education as appropriate  Outcome: Progressing  Goal: Maintains/Returns to pre admission functional level  Description: INTERVENTIONS:  - Perform AM-PAC 6 Click Basic Mobility/ Daily Activity assessment daily.  - Set and communicate daily mobility goal to care team and patient/family/caregiver. - Collaborate with rehabilitation services on mobility goals if consulted  - Perform Range of Motion 3 times a day. - Reposition patient every 2 hours.   - Dangle patient 3 times a day  - Stand patient 3 times a day  - Ambulate patient 3 times a day  - Out of bed to chair 3 times a day   - Out of bed for meals 3 times a day  - Out of bed for toileting  - Record patient progress and toleration of activity level   Outcome: Progressing     Problem: DISCHARGE PLANNING  Goal: Discharge to home or other facility with appropriate resources  Description: INTERVENTIONS:  - Identify barriers to discharge w/patient and caregiver  - Arrange for needed discharge resources and transportation as appropriate  - Identify discharge learning needs (meds, wound care, etc.)  - Arrange for interpretive services to assist at discharge as needed  - Refer to Case Management Department for coordinating discharge planning if the patient needs post-hospital services based on physician/advanced practitioner order or complex needs related to functional status, cognitive ability, or social support system  Outcome: Progressing     Problem: Knowledge Deficit  Goal: Patient/family/caregiver demonstrates understanding of disease process, treatment plan, medications, and discharge instructions  Description: Complete learning assessment and assess knowledge base.   Interventions:  - Provide teaching at level of understanding  - Provide teaching via preferred learning methods  Outcome: Progressing     Problem: Prexisting or High Potential for Compromised Skin Integrity  Goal: Skin integrity is maintained or improved  Description: INTERVENTIONS:  - Identify patients at risk for skin breakdown  - Assess and monitor skin integrity  - Assess and monitor nutrition and hydration status  - Monitor labs   - Assess for incontinence   - Turn and reposition patient  - Assist with mobility/ambulation  - Relieve pressure over bony prominences  - Avoid friction and shearing  - Provide appropriate hygiene as needed including keeping skin clean and dry  - Evaluate need for skin moisturizer/barrier cream  - Collaborate with interdisciplinary team - Patient/family teaching  - Consider wound care consult   Outcome: Progressing

## 2023-12-04 NOTE — PROGRESS NOTES
HEMODIALYSIS ROUNDING NOTE    Patient: Helen Crespo               Sex: male          DOA: 11/28/2023  8:02 AM   YOB: 1995        Age:  25 y.o.        LOS:  LOS: 4 days             SUBJECTIVE     - Patient was seen during hemodialysis today.    - Reviewed last 24 hrs events    Tolerating well denies any acute complaint    Leg pain is much better according to the patient    CURRENT MEDICATIONS       Current Facility-Administered Medications:     acetaminophen (TYLENOL) tablet 975 mg, 975 mg, Oral, Q8H, Anitha Pavon PA-C, 975 mg at 12/04/23 1004    calcitriol (ROCALTROL) capsule 0.25 mcg, 0.25 mcg, Oral, Once per day on Mon Wed Fri, [place], 0.25 mcg at 12/04/23 0801    calcium acetate (PHOSLO) capsule 2,001 mg, 2,001 mg, Oral, TID With Meals, Kristin Fenton PA-C, 2,001 mg at 12/04/23 1347    Diclofenac Sodium (VOLTAREN) 1 % topical gel 2 g, 2 g, Topical, 4x Daily, LAURA Lemus, 2 g at 12/04/23 1348    gabapentin (NEURONTIN) capsule 100 mg, 100 mg, Oral, TID, LAURA Lemus, 100 mg at 12/04/23 0801    metoclopramide (REGLAN) tablet 5 mg, 5 mg, Oral, TID AC, Cristiane Hernandez PA-C    ondansetron (ZOFRAN) injection 4 mg, 4 mg, Intravenous, Q4H PRN, LAURA Lemus, 4 mg at 12/04/23 0759    oxyCODONE (ROXICODONE) IR tablet 5 mg, 5 mg, Oral, Q6H PRN **OR** oxyCODONE (ROXICODONE) split tablet 7.5 mg, 7.5 mg, Oral, Q6H PRN, Demar Manuel PA-C, 7.5 mg at 12/04/23 1348    traZODone (DESYREL) tablet 50 mg, 50 mg, Oral, HS, Anitha Pavon PA-C, 50 mg at 12/03/23 2126    OBJECTIVE     Current Weight: Weight - Scale: 103 kg (228 lb)  Vitals:    12/04/23 1240   BP: 143/84   Pulse: 84   Resp: 16   Temp:    SpO2:        Intake/Output Summary (Last 24 hours) at 12/4/2023 1439  Last data filed at 12/4/2023 1240  Gross per 24 hour   Intake 1040 ml   Output 2220 ml   Net -1180 ml       PHYSICAL EXAMINATION     Physical Exam  Constitutional: General: He is not in acute distress. Appearance: He is well-developed. HENT:      Head: Normocephalic. Mouth/Throat:      Mouth: Mucous membranes are moist.   Eyes:      General: No scleral icterus. Conjunctiva/sclera: Conjunctivae normal.   Neck:      Vascular: No JVD. Cardiovascular:      Rate and Rhythm: Normal rate. Heart sounds: Normal heart sounds. Pulmonary:      Effort: Pulmonary effort is normal.      Breath sounds: No wheezing. Abdominal:      Palpations: Abdomen is soft. Tenderness: There is no abdominal tenderness. Musculoskeletal:         General: Normal range of motion. Cervical back: Neck supple. Skin:     General: Skin is warm. Findings: No rash. Neurological:      Mental Status: He is alert and oriented to person, place, and time. Psychiatric:         Behavior: Behavior normal.           LAB RESULTS     Results from last 7 days   Lab Units 12/04/23  0520 12/02/23  0545 12/01/23  0536 11/30/23  0551 11/29/23  0811 11/29/23  0505 11/29/23  0438 11/28/23  0848   WBC Thousand/uL  --  7.62 6.97 7.09  --   --  6.50 7.36   HEMOGLOBIN g/dL  --  10.0* 9.2* 9.5*  --   --  9.6* 9.2*   HEMATOCRIT %  --  29.2* 27.7* 28.0*  --   --  29.2* 28.2*   PLATELETS Thousands/uL  --  211 186 208  --   --  233 245   POTASSIUM mmol/L 4.3 4.7 4.4 4.4  --  4.3  --  5.3   CHLORIDE mmol/L 95* 96 96 96  --  96  --  97   CO2 mmol/L 27 27 25 27  --  25  --  22   BUN mg/dL 55* 41* 57* 39*  --  48*  --  77*   CREATININE mg/dL 14.93* 12.38* 16.97* 13.00*  --  16.25*  --  22.00*   EGFR ml/min/1.73sq m 3 4 3 4  --  3  --  2   CALCIUM mg/dL 9.4 9.9 9.4 9.7  --  9.4  --  8.7   MAGNESIUM mg/dL  --  2.6 3.0*  --   --  2.7  --  3.4*   PHOSPHORUS mg/dL  --   --   --   --  10.2*  --   --  11.3*       RADIOLOGY RESULTS     Results for orders placed during the hospital encounter of 08/21/23    XR chest portable    Narrative  CHEST    INDICATION:   ckd.     COMPARISON:  None    EXAM PERFORMED/VIEWS:  XR CHEST PORTABLE      FINDINGS:    Right IJ dialysis catheter in place    Cardiomediastinal silhouette appears unremarkable. The lungs are clear. No pneumothorax or pleural effusion. Osseous structures appear within normal limits for patient age. Impression  Right dialysis catheter present. No acute cardiopulmonary disease. Workstation performed: [id number]    No results found for this or any previous visit. No results found for this or any previous visit. No results found for this or any previous visit. No results found for this or any previous visit. No results found for this or any previous visit. PLAN / RECOMMENDATIONS     1. End Stage Renal Disease:       I saw and examined patient during hemodialysis treatment. The patient was receiving hemodialysis for treatment of end stage renal disease. I have also reviewed vital signs, intake and output, lab results and recent events, and agreed with today's dialysis order. Access: No issue. Does have a problem with the catheter. Will get IR for evaluation    2. Anemia: Helen Aquino MD  Nephrology  12/4/2023        Portions of the record may have been created with voice recognition software. Occasional wrong word or "sound a like" substitutions may have occurred due to the inherent limitations of voice recognition software. Read the chart carefully and recognize, using context, where substitutions have occurred.

## 2023-12-04 NOTE — ASSESSMENT & PLAN NOTE
History of, leading to renal failure   Currently managed with HD which does improve his symptoms of feet/toe pain. Plan will be to increase outpatient sessions until patient can be started on home HD.   Nephrology following  Unfortunately, patient does not currently have insurance and will likely be denied to try Oxlumo at this time

## 2023-12-04 NOTE — PROGRESS NOTES
[address]  Progress Note  Name: Kelli You  MRN: [id number]  Unit/Bed#: -01 I Date of Admission: 11/28/2023   Date of Service: 12/4/2023 I Hospital Day: 4    Assessment/Plan   * Elevated troponin  Assessment & Plan  Patient presented to the ED with complaints of severe pain in bilateral toes with mottling of skin and difficulty sleeping due to pain. On arrival to the ER, patient noted to have elevated troponin levels, peaking at 1445. Now trending down, most recently, 951  EKG without concerns of ST/T wave changes. Patient is without chest pain, shortness of breath. Echo (11/29/23): Left Ventricle: Left ventricular cavity size is normal. Wall thickness is normal. The left ventricular ejection fraction is 61%. Systolic function is normal. Wall motion is normal. Diastolic function is normal.   Cardiology consult, appreciate input  Stress test completed on 11/30, was noted to be abnormal with reversible defect. Cardiac cath completed, noted to have normal coronaries. Elevated troponin levels likely related to ESRD. End stage renal disease on dialysis [facility])  Assessment & Plan  Nephrology consulted for HD management   Associated hyperphosphatemia: Phoslo initiated  Patient to eventually be set up with home dialysis to be completed 5 days a week in setting of high oxalate levels. Due to have AV Fistula placed on 12/14  High risk to be discharged with catheter to do dialysis at home, so until fistula placed, will [facility] with HD at [facility]. Primary hyperoxaluria type 1 ([address])  Assessment & Plan  History of, leading to renal failure   Currently managed with HD which does improve his symptoms of feet/toe pain. Plan will be to increase outpatient sessions until patient can be started on home HD.   Nephrology following  Unfortunately, patient does not currently have insurance and will likely be denied to try Oxlumo at this time    Vomiting  Assessment & Plan  Patient with vomiting episodes when he is eating, without nausea. Reports that this has been ongoing for months, since starting on dialysis. No complaints of abdominal pain, tenderness. No complaints of dysphagia. Neuropathy  Assessment & Plan  History of Neuropathy, complicated with high oxalate levels. Presenting with severe bilateral foot pain, mottling. Bilateral lower extremity arterial duplex was completed  Pain improves with HD. Will continue with Oxycodone for pain as needed  See plan under hyperoxaluria    Insomnia  Assessment & Plan  Continue with Trazodone    Anxiety, generalized  Assessment & Plan  History of; takes Trazodone to sleep at night; no other psychiatric meds. Stable currently           VTE Pharmacologic Prophylaxis: VTE Score: 1 Low Risk (Score 0-2) - Encourage Ambulation. Mobility:   Basic Mobility Inpatient Raw Score: 12  JH-HLM Goal: 4: Move to chair/commode  JH-HLM Achieved: 5: Stand (1 or more minutes)  HLM Goal achieved. Continue to encourage appropriate mobility. Patient Centered Rounds: I performed bedside rounds with nursing staff today. On HD   Discussions with Specialists or Other Care Team Provider: Nephro, CM     Education and Discussions with Family / Patient: Patient declined call to . Total Time Spent on Date of Encounter in care of patient: 34 mins. This time was spent on one or more of the following: performing physical exam; counseling and coordination of care; obtaining or reviewing history; documenting in the medical record; reviewing/ordering tests, medications or procedures; communicating with other healthcare professionals and discussing with patient's family/caregivers.     Current Length of Stay: 4 day(s)  Current Patient Status: Inpatient   Certification Statement: The patient will continue to require additional inpatient hospital stay due to inpatient hemodialysis needs, possible placement to acute rehab  further treatment options for ongoing nausea  Discharge Plan: Anticipate discharge in 24-48 hrs to TBD home vs rehab     Code Status: Level 1 - Full Code    Subjective:   Patient seen during hemodialysis, pain wise he is doing a lot better. His main concern today is that he continues to have episodes of emesis. Seems to come and go in intensity, but now he is not even able to eat without having an episode of nonbloody, nonbilious emesis. Does not seem to be brought on by any pain, not usually predictable. We discussed a trial of Reglan, possibility of an upper GI series. Patient is agreeable. Objective:     Vitals:   Temp (24hrs), Av.6 °F (37 °C), Min:98.3 °F (36.8 °C), Max:99.1 °F (37.3 °C)    Temp:  [98.3 °F (36.8 °C)-99.1 °F (37.3 °C)] 98.3 °F (36.8 °C)  HR:  [71-89] 89  Resp:  [18] 18  BP: (133-136)/(84-87) 136/85  SpO2:  [93 %-96 %] 93 %  Body mass index is 30.92 kg/m². Input and Output Summary (last 24 hours): Intake/Output Summary (Last 24 hours) at 2023 0805  Last data filed at 2023 0201  Gross per 24 hour   Intake 640 ml   Output --   Net 640 ml       Physical Exam:   Physical Exam  Vitals and nursing note reviewed. Constitutional:       General: He is not in acute distress. Appearance: Normal appearance. He is not ill-appearing or toxic-appearing. Cardiovascular:      Rate and Rhythm: Normal rate and regular rhythm. Heart sounds: Normal heart sounds. No murmur heard. Pulmonary:      Effort: Pulmonary effort is normal. No respiratory distress. Breath sounds: Normal breath sounds. Abdominal:      General: Bowel sounds are normal. There is no distension. Palpations: Abdomen is soft. Neurological:      Mental Status: He is alert and oriented to person, place, and time.    Psychiatric:         Mood and Affect: Mood normal.         Behavior: Behavior normal.          Additional Data:     Labs:  Results from last 7 days   Lab Units 23  0545 23  0438 23  0848   WBC Thousand/uL 7.62   < > 7.36   HEMOGLOBIN g/dL 10.0*   < > 9.2*   HEMATOCRIT % 29.2*   < > 28.2*   PLATELETS Thousands/uL 211   < > 245   NEUTROS PCT %  --   --  68   LYMPHS PCT %  --   --  17   MONOS PCT %  --   --  8   EOS PCT %  --   --  6    < > = values in this interval not displayed. Results from last 7 days   Lab Units 12/04/23  0520 11/29/23  0505 11/28/23  0848   SODIUM mmol/L 137   < > 138   POTASSIUM mmol/L 4.3   < > 5.3   CHLORIDE mmol/L 95*   < > 97   CO2 mmol/L 27   < > 22   BUN mg/dL 55*   < > 77*   CREATININE mg/dL 14.93*   < > 22.00*   ANION GAP mmol/L 15   < > 19   CALCIUM mg/dL 9.4   < > 8.7   ALBUMIN g/dL  --   --  4.4   TOTAL BILIRUBIN mg/dL  --   --  0.34   ALK PHOS U/L  --   --  48   ALT U/L  --   --  7   AST U/L  --   --  8*   GLUCOSE RANDOM mg/dL 85   < > 91    < > = values in this interval not displayed. Lines/Drains:  Invasive Devices       Peripheral Intravenous Line  Duration             Peripheral IV 12/02/23 Right;Ventral (anterior) Forearm 1 day              Hemodialysis Catheter  Duration             HD Permanent Double Catheter 101 days              Drain  Duration             Ureteral Drain/Stent Left ureter 6 Fr. 1135 days    Ureteral Drain/Stent Right ureter 6 Fr. 1028 days                          Imaging: No pertinent imaging reviewed.     Recent Cultures (last 7 days):         Last 24 Hours Medication List:   Current Facility-Administered Medications   Medication Dose Route Frequency Provider Last Rate    acetaminophen  975 mg Oral [address] ISAAC Amin      calcitriol  0.25 mcg Oral Once per day on Mon Wed Fri [facility] LAURA Irving      calcium acetate  2,001 mg Oral TID With Meals Troy Victoria PA-C      Diclofenac Sodium  2 g Topical 4x Daily Meredeth Sandifer, CRNP      gabapentin  100 mg Oral TID Meredeth Sandifer, CRNP      heparin (porcine)  2,000 Units Intravenous Before Dialysis LAURA Healy ondansetron  4 mg Intravenous Q4H PRN LAURA Koenig      oxyCODONE  5 mg Oral Q6H PRN Carlie Spears PA-C      Or    oxyCODONE  7.5 mg Oral Q6H PRN Naya Cordova PA-C      prochlorperazine  25 mg Rectal Q12H PRN LAURA Koenig      traZODone  50 mg Oral HS Andre Waite PA-C          Today, Patient Was Seen By: Helio Maldonado PA-C    **Please Note: This note may have been constructed using a voice recognition system. **

## 2023-12-04 NOTE — ASSESSMENT & PLAN NOTE
Nephrology consulted for HD management   Associated hyperphosphatemia: Phoslo initiated  Patient to eventually be set up with home dialysis to be completed 5 days a week in setting of high oxalate levels. Due to have AV Fistula placed on 12/14  High risk to be discharged with catheter to do dialysis at home, so until fistula placed, will continue with HD at [facility].

## 2023-12-04 NOTE — PHYSICAL THERAPY NOTE
Physical Therapy Treatment Note    Patient Name: Teresa Coy    Diagnosis: Leg pain [M79.606]  Elevated troponin [R79.89]  End stage renal disease on dialysis [facility]) [N18.6, Z99.2]  Pain in both feet [M79.671, M79.672]     12/04/23 0824   PT Last Visit   PT Visit Date 12/04/23   Note Type   Note Type Treatment for insurance authorization   Pain Assessment   Pain Assessment Tool 0-10   Pain Score 3   Pain Location/Orientation Orientation: Right;Location: Leg;Location: Foot   Pain Onset/Description Onset: Ongoing   Hospital Pain Intervention(s) Repositioned; Ambulation/increased activity   Multiple Pain Sites Yes   Pain 2   Pain Score 2 5   Pain Location/Orientation 2 Orientation: Left; Location: Leg;Location: Foot   Pain Onset/Description 2 Onset: Ongoing   Hospital Pain Intervention(s) 2 Repositioned; Ambulation/increased activity   Restrictions/Precautions   Weight Bearing Precautions Per Order No   Other Precautions Chair Alarm; Bed Alarm; Fall Risk;Pain;Limb alert   General   Chart Reviewed Yes   Response to Previous Treatment Patient with no complaints from previous session. Family/Caregiver Present No   Cognition   Overall Cognitive Status WFL   Arousal/Participation Alert; Responsive; Cooperative   Attention Within functional limits   Orientation Level Oriented X4   Memory Within functional limits   Following Commands Follows all commands and directions without difficulty   Comments Pt agreeable to PT. Subjective   Subjective "My legs feel better today."   Bed Mobility   Supine to Sit 5  Supervision   Additional items Assist x 1;HOB elevated; Bedrails; Increased time required;Verbal cues   Sit to Supine 5  Supervision   Additional items Assist x 1;HOB elevated; Bedrails; Increased time required;Verbal cues   Transfers   Sit to Stand 4  Minimal assistance   Additional items Assist x 1; Increased time required;Verbal cues   Stand to Sit 4  Minimal assistance   Additional items Assist x 1; Increased time required;Verbal cues   Ambulation/Elevation   Gait pattern Decreased toe off;Decreased heel strike;Decreased hip extension; Excessively slow; Short stride; Shuffling  (increased reliance on UE support)   Gait Assistance 4  Minimal assist   Additional items Assist x 1;Verbal cues   Assistive Device Rolling walker   Distance 20 feet x 1 trial; seated rest break; 15 feet x 1 trial   Balance   Static Sitting Good   Dynamic Sitting Fair +   Static Standing Fair -   Dynamic Standing Poor +   Ambulatory Poor   Endurance Deficit   Endurance Deficit Yes   Endurance Deficit Description decreased activity tolerance   Activity Tolerance   Activity Tolerance Patient limited by fatigue   Nurse Made Aware RN [facility]   Exercises   Heelslides Supine;5 reps;AROM; Bilateral   Hip Flexion Sitting;5 reps;AROM; Bilateral   Hip Abduction Supine;5 reps;AROM; Bilateral   Knee AROM Long Arc Quad Sitting;5 reps;AROM; Bilateral   Ankle Pumps Sitting;5 reps;AROM; Bilateral   Assessment   Prognosis Good   Problem List Decreased strength;Decreased endurance; Impaired balance;Decreased mobility; Impaired sensation;Pain   Assessment Chart reviewed. Patient was received supine in bed in NAD and agreeable to PT session. Today's PT treatment session consisted of therapeutic activity for facilitation of transitional movements and safe performance of correct technique for bed mobility and sit to stand transfers, therapeutic exercise to increase lower extremity muscle strength, and gait training to promote safe and functional ambulation on level surfaces. In comparison to the previous session the patient has made progress as evident by requiring decreased assistance with all functional mobility. Pt was able to ambulate an increased distance with use of RW. Pt ambulated two gait trials with a seated rest break between each trial. Pt's ambulation distance was limited due to reports of fatigue.  When ambulating pt exhibits increased reliance on upper extremity support, decreased stride length, and decreased tika. Pt tolerated all therapeutic exercise well, but reported fatigue. Overall, patient tolerated today's session well and continues to be making progress towards achieving his STG's. Pt's STG's were updated this session. Patient's prognosis for achieving their STG's is good as evident by pt's motivation. PT intervention continues to be appropriate as the patient continues to be limited by pain, decreased lower extremity strength, impaired balance, decreased endurance, gait deviations, and decreased functional mobility. Continue to recommend level 1, maximum resource intensity. PT to continue to see patient in order to address the deficits listed above and provide interventions consistent with the POC in order to achieve STG's and optimize the patient's independence with functional mobility. Barriers to Discharge Inaccessible home environment; Other (Comment)  (decline in functional mobility)   Goals   STG Expiration Date 12/14/23   Short Term Goal #1 In 10 days: Increase bilateral LE strength 1/2 grade to facilitate independent mobility, Perform all bed mobility tasks modified independent to decrease caregiver burden, Perform all transfers modified independent to improve independence, Ambulate > 150 ft. with least restrictive assistive device modified independent w/o LOB and w/ normalized gait pattern 100% of the time, Navigate 5 stairs modified independent with unilateral handrail to facilitate return to previous living environment, and Increase all balance 1/2 grade to decrease risk for falls   PT Treatment Day 1   Plan   Treatment/Interventions Functional transfer training;LE strengthening/ROM; Elevations; Therapeutic exercise; Endurance training;Patient/family training;Bed mobility;Gait training;Spoke to nursing;OT   Progress Progressing toward goals   PT Frequency 3-5x/wk   Discharge Recommendation   Rehab Resource Intensity Level, PT I (Maximum Resource Intensity) AM-PAC Basic Mobility Inpatient   Turning in Flat Bed Without Bedrails 3   Lying on Back to Sitting on Edge of Flat Bed Without Bedrails 3   Moving Bed to Chair 3   Standing Up From Chair Using Arms 3   Walk in Room 3   Climb 3-5 Stairs With Railing 2   Basic Mobility Inpatient Raw Score 17   Basic Mobility Standardized Score 39.67   Highest Level Of Mobility   JH-HLM Goal 5: Stand one or more mins   JH-HLM Achieved 7: Walk 25 feet or more   Education   Education Provided Mobility training;Home exercise program;Assistive device   Patient Demonstrates acceptance/verbal understanding;Reinforcement needed   End of Consult   Patient Position at End of Consult Supine;Bed/Chair alarm activated; All needs within reach     Sarai Bailey, PT, DPT    Time of PT treatment session: [id number]  24 minutes

## 2023-12-04 NOTE — PLAN OF CARE
Problem: OCCUPATIONAL THERAPY ADULT  Goal: Performs self-care activities at highest level of function for planned discharge setting. See evaluation for individualized goals. Description: Treatment Interventions: ADL retraining, Functional transfer training, UE strengthening/ROM, Endurance training, Cognitive reorientation, Equipment evaluation/education, Patient/family training, Compensatory technique education, Activityengagement, Energy conservation          See flowsheet documentation for full assessment, interventions and recommendations. Note: Limitation: Decreased ADL status, Decreased Safe judgement during ADL, Decreased UE strength, Decreased cognition, Decreased endurance, Decreased self-care trans, Decreased high-level ADLs  Prognosis: Good  Assessment: Patient participated in Skilled OT session this date with interventions consisting of  therapeutic activities to: increase activity tolerance, increase standing tolerance time with unilateral UE support to complete sink level ADLs, increase cardiovascular endurance , increase dynamic sit/ stand balance during functional activity , and increase postural control . Patient agreeable to OT treatment session, upon arrival patient was found supine in bed, alert, responsive , and in no apparent distress. In comparison to previous session, patient with improvements in sitting tolerance at edge of bed, and standing tolerance time at sink level. Patient required min assist of 1 for ambulation to/ from bathroom, contact guard/ min assist for standing by sink. Patient requires set up assist for UB ADLs, lower body ADLs at mod assist.  Patient requiring verbal cues for safety, verbal cues for correct technique, and one step directives. Patient continues to be functioning below baseline level, occupational performance remains limited secondary to factors listed above and increased risk for falls and injury.    From OT standpoint, recommendation at time of d/c would be Level 1 max assist.   Patient to benefit from continued Occupational Therapy treatment while in the hospital to address deficits as defined above and maximize level of functional independence with ADLs and functional mobility.      Rehab Resource Intensity Level, OT: I (Maximum Resource Intensity)

## 2023-12-05 ENCOUNTER — APPOINTMENT (INPATIENT)
Dept: DIALYSIS | Facility: HOSPITAL | Age: 28
DRG: 682 | End: 2023-12-05
Payer: COMMERCIAL

## 2023-12-05 PROCEDURE — 99232 SBSQ HOSP IP/OBS MODERATE 35: CPT | Performed by: INTERNAL MEDICINE

## 2023-12-05 PROCEDURE — 99232 SBSQ HOSP IP/OBS MODERATE 35: CPT | Performed by: PHYSICIAN ASSISTANT

## 2023-12-05 PROCEDURE — 97535 SELF CARE MNGMENT TRAINING: CPT

## 2023-12-05 PROCEDURE — 97110 THERAPEUTIC EXERCISES: CPT

## 2023-12-05 RX ADMIN — GABAPENTIN 100 MG: 100 CAPSULE ORAL at 07:51

## 2023-12-05 RX ADMIN — Medication 2 G: at 11:54

## 2023-12-05 RX ADMIN — METOCLOPRAMIDE HYDROCHLORIDE 5 MG: 10 TABLET ORAL at 17:55

## 2023-12-05 RX ADMIN — METOCLOPRAMIDE HYDROCHLORIDE 5 MG: 10 TABLET ORAL at 11:53

## 2023-12-05 RX ADMIN — GABAPENTIN 100 MG: 100 CAPSULE ORAL at 17:55

## 2023-12-05 RX ADMIN — OXYCODONE HYDROCHLORIDE 5 MG: 5 TABLET ORAL at 20:06

## 2023-12-05 RX ADMIN — Medication 2 G: at 17:55

## 2023-12-05 RX ADMIN — CALCIUM ACETATE 2001 MG: 667 CAPSULE ORAL at 07:50

## 2023-12-05 RX ADMIN — GABAPENTIN 100 MG: 100 CAPSULE ORAL at 21:59

## 2023-12-05 RX ADMIN — TRAZODONE HYDROCHLORIDE 50 MG: 50 TABLET ORAL at 21:59

## 2023-12-05 RX ADMIN — ACETAMINOPHEN 975 MG: 325 TABLET, FILM COATED ORAL at 17:54

## 2023-12-05 RX ADMIN — Medication 2 G: at 09:29

## 2023-12-05 RX ADMIN — CALCIUM ACETATE 2001 MG: 667 CAPSULE ORAL at 11:53

## 2023-12-05 RX ADMIN — METOCLOPRAMIDE HYDROCHLORIDE 5 MG: 10 TABLET ORAL at 05:54

## 2023-12-05 RX ADMIN — Medication 2 G: at 21:59

## 2023-12-05 RX ADMIN — ACETAMINOPHEN 975 MG: 325 TABLET, FILM COATED ORAL at 09:29

## 2023-12-05 RX ADMIN — CALCIUM ACETATE 2001 MG: 667 CAPSULE ORAL at 17:55

## 2023-12-05 NOTE — PROGRESS NOTES
[address]  Progress Note  Name: Nighat De Paz  MRN: [id number]  Unit/Bed#: -01 I Date of Admission: 11/28/2023   Date of Service: 12/5/2023 I Hospital Day: 5    Assessment/Plan   * Elevated troponin  Assessment & Plan  Patient presented to the ED with complaints of severe pain in bilateral toes with mottling of skin and difficulty sleeping due to pain. On arrival to the ER, patient noted to have elevated troponin levels, peaking at 1445. Trending down  EKG without concerns of ST/T wave changes. Patient is without chest pain or shortness of breath. Echo (11/29/23): Left Ventricle: Left ventricular cavity size is normal. Wall thickness is normal. The left ventricular ejection fraction is 61%. Systolic function is normal. Wall motion is normal. Diastolic function is normal.   Cardiology consulted,  Stress test (11/30): + reversible defect. Cardiac cath (12/1): normal coronaries  Elevated troponin levels likely related to ESRD. End stage renal disease on dialysis [facility])  Assessment & Plan  Nephrology consulted for HD management   Continue HD plan for 5 days per week  Continue PhosLo, calcitriol  Patient to eventually be set up with home dialysis to be completed 5 days a week in setting of high oxalate levels. Due to have AV Fistula placed on 12/14  High risk to be discharged with catheter to do dialysis at home, so until fistula placed, will [facility] with HD at [facility]. Primary hyperoxaluria type 1 ([address])  Assessment & Plan  History of, leading to renal failure   Currently managed with HD which does improve his symptoms of feet/toe pain. Plan will be to increase outpatient sessions until patient can be started on home HD. Nephrology following    Vomiting  Assessment & Plan  Patient with vomiting episodes when he is eating, without nausea. Reports that this has been ongoing for months, since starting on dialysis. No complaints of abdominal pain, tenderness.   No complaints of dysphagia. Trial Reglan with meals,  Monitor QTc  Spoke to GI, EGD and gastric emptying study outpatient    Neuropathy  Assessment & Plan  History of Neuropathy, complicated with high oxalate levels. Presenting with severe bilateral foot pain, mottling. Bilateral lower extremity arterial duplex was completed  Pain improves with HD. Will continue with Oxycodone for pain as needed  See plan under hyperoxaluria    Insomnia  Assessment & Plan  Continue with Trazodone    Anxiety, generalized  Assessment & Plan  History of; takes Trazodone to sleep at night; no other psychiatric meds. Stable currently           VTE Pharmacologic Prophylaxis: VTE Score: 1 Low Risk (Score 0-2) - Encourage Ambulation. Mobility:   Basic Mobility Inpatient Raw Score: 17  JH-HLM Goal: 5: Stand one or more mins  JH-HLM Achieved: 6: Walk 10 steps or more  HLM Goal achieved. Continue to encourage appropriate mobility. Patient Centered Rounds: I performed bedside rounds with nursing staff today. Discussions with Specialists or Other Care Team Provider: nephro, CM     Education and Discussions with Family / Patient: Patient declined call to . Total Time Spent on Date of Encounter in care of patient: 38 mins. This time was spent on one or more of the following: performing physical exam; counseling and coordination of care; obtaining or reviewing history; documenting in the medical record; reviewing/ordering tests, medications or procedures; communicating with other healthcare professionals and discussing with patient's family/caregivers. Current Length of Stay: 5 day(s)  Current Patient Status: Inpatient   Certification Statement: The patient will continue to require additional inpatient hospital stay due to pending tolerance of meals   Discharge Plan: Anticipate discharge later today or tomorrow to home.     Code Status: Level 1 - Full Code    Subjective:   Patient reports continued improvement in pain; was more ambulatory yesterday and this morning - does have decreased sensation, but not the severe pain anymore. He is willing to consider home rather rehab if he continues to do this well. Objective:     Vitals:   Temp (24hrs), Av.7 °F (37.1 °C), Min:98.4 °F (36.9 °C), Max:99.2 °F (37.3 °C)    Temp:  [98.4 °F (36.9 °C)-99.2 °F (37.3 °C)] 98.4 °F (36.9 °C)  HR:  [67-93] 77  Resp:  [16-18] 16  BP: (135-173)/(84-98) 141/88  SpO2:  [94 %-95 %] 95 %  Body mass index is 30.92 kg/m². Input and Output Summary (last 24 hours): Intake/Output Summary (Last 24 hours) at 2023 0827  Last data filed at 2023 1240  Gross per 24 hour   Intake 500 ml   Output 2220 ml   Net -1720 ml       Physical Exam:   Physical Exam  Vitals and nursing note reviewed. Constitutional:       General: He is awake. He is not in acute distress. Appearance: Normal appearance. He is well-developed, well-groomed and overweight. He is not ill-appearing or toxic-appearing. HENT:      Head: Normocephalic and atraumatic. Cardiovascular:      Rate and Rhythm: Normal rate. Pulmonary:      Effort: Pulmonary effort is normal. No respiratory distress. Skin:     Coloration: Skin is not jaundiced or pale. Neurological:      Mental Status: He is alert and oriented to person, place, and time. Psychiatric:         Mood and Affect: Mood normal.         Behavior: Behavior normal. Behavior is cooperative. Additional Data:     Labs:  Results from last 7 days   Lab Units 23  0545 23  0438 23  0848   WBC Thousand/uL 7.62   < > 7.36   HEMOGLOBIN g/dL 10.0*   < > 9.2*   HEMATOCRIT % 29.2*   < > 28.2*   PLATELETS Thousands/uL 211   < > 245   NEUTROS PCT %  --   --  68   LYMPHS PCT %  --   --  17   MONOS PCT %  --   --  8   EOS PCT %  --   --  6    < > = values in this interval not displayed.      Results from last 7 days   Lab Units 23  0520 23  0505 23  0848   SODIUM mmol/L 137   < > 138 POTASSIUM mmol/L 4.3   < > 5.3   CHLORIDE mmol/L 95*   < > 97   CO2 mmol/L 27   < > 22   BUN mg/dL 55*   < > 77*   CREATININE mg/dL 14.93*   < > 22.00*   ANION GAP mmol/L 15   < > 19   CALCIUM mg/dL 9.4   < > 8.7   ALBUMIN g/dL  --   --  4.4   TOTAL BILIRUBIN mg/dL  --   --  0.34   ALK PHOS U/L  --   --  48   ALT U/L  --   --  7   AST U/L  --   --  8*   GLUCOSE RANDOM mg/dL 85   < > 91    < > = values in this interval not displayed. Lines/Drains:  Invasive Devices       Peripheral Intravenous Line  Duration             Peripheral IV 12/02/23 Right;Ventral (anterior) Forearm 2 days              Hemodialysis Catheter  Duration             HD Permanent Double Catheter 102 days              Drain  Duration             Ureteral Drain/Stent Left ureter 6 Fr. 1136 days    Ureteral Drain/Stent Right ureter 6 Fr. 1029 days                    Last 24 Hours Medication List:   Current Facility-Administered Medications   Medication Dose Route Frequency Provider Last Rate    acetaminophen  975 mg Oral Q8H [facility] HomeISA[facility]      calcitriol  0.25 mcg Oral Once per day on Mon Wed Fri [facility], LAURA      calcium acetate  2,001 mg Oral TID With Meals [facility]      Diclofenac Sodium  2 g Topical 4x Daily LAURA Koenig      gabapentin  100 mg Oral TID LAURA Koenig      metoclopramide  5 mg Oral TID DIAMANTE Hernandez PA-C      ondansetron  4 mg Intravenous Q4H PRN LAURA Koenig      oxyCODONE  5 mg Oral Q6H PRN Carlie Spears PA-C      Or    oxyCODONE  7.5 mg Oral Q6H PRN Carlie Spears PA-C      traZODone  50 mg Oral HS [facility]          Today, Patient Was Seen By: Helio Maldonado PA-C    **Please Note: This note may have been constructed using a voice recognition system. **

## 2023-12-05 NOTE — CASE MANAGEMENT
Case Management Discharge Planning Note    Patient name Nate Jhaveri  Location /-88 MRN [id number]  : 1995 Date 2023       Current Admission Date: 2023  Current Admission Diagnosis:Elevated troponin   Patient Active Problem List    Diagnosis Date Noted    Vomiting 2023    Intractable vomiting 2023    Elevated troponin 2023    Microangiopathy ([address]) 2023    End stage renal disease on dialysis ([address]) 10/03/2023    Neuropathy 10/03/2023    Gastroesophageal reflux disease without esophagitis 2023    Anemia 2023    Chronic kidney disease-mineral and bone disorder 2023    BMI 30.0-30.9,adult 2022    Hydronephrosis with urinary obstruction due to ureteral calculus 2021    Primary hyperoxaluria type 1 ([address]) 2021    Insomnia 10/27/2020    Ureteric stone ----> hydroureteronephrosis moderate 10/23/2020    Sebaceous cyst 2017    Anxiety, generalized 2015      LOS (days): 5  Geometric Mean LOS (GMLOS) (days): 5.40  Days to GMLOS:0.3     OBJECTIVE:  Risk of Unplanned Readmission Score: 15.26         Current admission status: Inpatient   Preferred Pharmacy:   [facility] [id number], [address] [address]  Phone: [phone] Fax: [phone]    Primary Care Provider: Dena Frankel, DO    Primary Insurance: [address]  Secondary Insurance:     DISCHARGE DETAILS:     Patient stated for CM to submit for auth for [facility], cm sent message to arc asking for NPI information. CM will call Home dialysis site to ask about when they can start training. patient.

## 2023-12-05 NOTE — PLAN OF CARE
Problem: OCCUPATIONAL THERAPY ADULT  Goal: Performs self-care activities at highest level of function for planned discharge setting. See evaluation for individualized goals. Description: Treatment Interventions: ADL retraining, Functional transfer training, UE strengthening/ROM, Endurance training, Cognitive reorientation, Equipment evaluation/education, Patient/family training, Compensatory technique education, Activityengagement, Energy conservation          See flowsheet documentation for full assessment, interventions and recommendations. Outcome: Progressing  Note: Limitation: Decreased ADL status, Decreased Safe judgement during ADL, Decreased UE strength, Decreased cognition, Decreased endurance, Decreased self-care trans, Decreased high-level ADLs  Prognosis: Good  Assessment: Patient participated in Skilled OT session this date with interventions consisting of ADL re training with the use of correct body mechnaics, therapeutic exercise to: increase functional use of BUEs, increase BUE muscle strength ,  therapeutic activities to: increase activity tolerance, and increase dynamic sit/ stand balance during functional activity  . Patient agreeable to OT treatment session, upon arrival patient was found supine in bed and in no apparent distress. Patient completed bed mobility with supervision and functional transfers with min assist. Pt ambulated to/from bathroom with min assist of 1 utilizing RW. Pt completed toilet transfer with min assist. While seated at EOB, pt donned socks and pants with min assist. While seated in recliner, pt completed 1 set x 15 reps of BUE exercises to increase strength and endurance. In comparison to previous session, patient with improvements in functional transfers and balance. Patient requiring frequent rest periods and ocassional safety reminders.  Patient continues to be functioning below baseline level, occupational performance remains limited secondary to factors listed above and increased risk for falls and injury. From OT standpoint, recommendation at time of d/c would be Level II (moderate resource intensity). Patient to benefit from continued Occupational Therapy treatment while in the hospital to address deficits as defined above and maximize level of functional independence with ADLs and functional mobility.      Rehab Resource Intensity Level, OT: II (Moderate Resource Intensity)     Carlton NAVARRO, OTR/L no

## 2023-12-05 NOTE — PROGRESS NOTES
NEPHROLOGY PROGRESS NOTE    Patient: Neha Pierce               Sex: male          DOA: 11/28/2023  8:02 AM   YOB: 1995        Age:  25 y.o.        LOS:  LOS: 5 days       HPI     Patient with ESRD    SUBJECTIVE     Patient is a hyperoxaluria with related neuropathy and vascular problems    Does get better with daily dialysis    Denies any acute complaint today    CURRENT MEDICATIONS       Current Facility-Administered Medications:     acetaminophen (TYLENOL) tablet 975 mg, 975 mg, Oral, Q8H, Troy Victoria PA-C, 975 mg at 12/05/23 7308    calcitriol (ROCALTROL) capsule 0.25 mcg, 0.25 mcg, Oral, Once per day on Mon Wed Fri, [place], 0.25 mcg at 12/04/23 0801    calcium acetate (PHOSLO) capsule 2,001 mg, 2,001 mg, Oral, TID With Meals, Troy Victoria PA-C, 2,001 mg at 12/05/23 1153    Diclofenac Sodium (VOLTAREN) 1 % topical gel 2 g, 2 g, Topical, 4x Daily, Meredeth Sandifer, CRNP, 2 g at 12/05/23 1154    gabapentin (NEURONTIN) capsule 100 mg, 100 mg, Oral, TID, Meredeth Sandifer, CRNP, 100 mg at 12/05/23 0751    metoclopramide (REGLAN) tablet 5 mg, 5 mg, Oral, TID AC, Cristiane Hernandez PA-C, 5 mg at 12/05/23 1153    ondansetron (ZOFRAN) injection 4 mg, 4 mg, Intravenous, Q4H PRN, Meredeth Sandifer, CRNP, 4 mg at 12/04/23 0759    oxyCODONE (ROXICODONE) IR tablet 5 mg, 5 mg, Oral, Q6H PRN **OR** oxyCODONE (ROXICODONE) split tablet 7.5 mg, 7.5 mg, Oral, Q6H PRN, Tanya Valero PA-C, 7.5 mg at 12/04/23 2042    traZODone (DESYREL) tablet 50 mg, 50 mg, Oral, HS, Troy Victoria PA-C, 50 mg at 12/04/23 2111    OBJECTIVE     Current Weight: Weight - Scale: 103 kg (228 lb)  Vitals:    12/05/23 0749   BP: 141/88   Pulse: 77   Resp: 16   Temp: 98.4 °F (36.9 °C)   SpO2: 95%       Intake/Output Summary (Last 24 hours) at 12/5/2023 1210  Last data filed at 12/5/2023 0929  Gross per 24 hour   Intake 540 ml   Output 2220 ml   Net -1680 ml       PHYSICAL EXAMINATION Physical Exam  Constitutional:       General: He is not in acute distress. Appearance: He is well-developed. HENT:      Head: Normocephalic. Mouth/Throat:      Mouth: Mucous membranes are moist.   Eyes:      General: No scleral icterus. Conjunctiva/sclera: Conjunctivae normal.   Neck:      Vascular: No JVD. Cardiovascular:      Rate and Rhythm: Normal rate. Heart sounds: Normal heart sounds. Pulmonary:      Effort: Pulmonary effort is normal.      Breath sounds: No wheezing. Abdominal:      Palpations: Abdomen is soft. Tenderness: There is no abdominal tenderness. Musculoskeletal:         General: Normal range of motion. Cervical back: Neck supple. Skin:     General: Skin is warm. Findings: No rash. Neurological:      Mental Status: He is alert and oriented to person, place, and time. Psychiatric:         Behavior: Behavior normal.          LAB RESULTS     Results from last 7 days   Lab Units 12/04/23  0520 12/02/23  0545 12/01/23  0536 11/30/23  0551 11/29/23  0811 11/29/23  0505 11/29/23  0438   WBC Thousand/uL  --  7.62 6.97 7.09  --   --  6.50   HEMOGLOBIN g/dL  --  10.0* 9.2* 9.5*  --   --  9.6*   HEMATOCRIT %  --  29.2* 27.7* 28.0*  --   --  29.2*   PLATELETS Thousands/uL  --  211 186 208  --   --  233   POTASSIUM mmol/L 4.3 4.7 4.4 4.4  --  4.3  --    CHLORIDE mmol/L 95* 96 96 96  --  96  --    CO2 mmol/L 27 27 25 27  --  25  --    BUN mg/dL 55* 41* 57* 39*  --  48*  --    CREATININE mg/dL 14.93* 12.38* 16.97* 13.00*  --  16.25*  --    EGFR ml/min/1.73sq m 3 4 3 4  --  3  --    CALCIUM mg/dL 9.4 9.9 9.4 9.7  --  9.4  --    MAGNESIUM mg/dL  --  2.6 3.0*  --   --  2.7  --    PHOSPHORUS mg/dL  --   --   --   --  10.2*  --   --        RADIOLOGY RESULTS      Results for orders placed during the hospital encounter of 08/21/23    XR chest portable    Narrative  CHEST    INDICATION:   ckd.     COMPARISON:  None    EXAM PERFORMED/VIEWS:  XR CHEST PORTABLE      FINDINGS:    Right IJ dialysis catheter in place    Cardiomediastinal silhouette appears unremarkable. The lungs are clear. No pneumothorax or pleural effusion. Osseous structures appear within normal limits for patient age. Impression  Right dialysis catheter present. No acute cardiopulmonary disease. Workstation performed: [id number]    No results found for this or any previous visit. No results found for this or any previous visit. No results found for this or any previous visit. No results found for this or any previous visit. No results found for this or any previous visit. PLAN / RECOMMENDATIONS      ESRD: Will be getting dialyzed today as part of the daily dialysis treatment. I also discussed with home dialysis unit and they will be taking the patient when patient get discharged either from the hospital or rehab unit. Discussed with case management    Primary oxaluria: Will need liver transplant as part of the treatment    Hypertension: Very well-controlled    Will follow-up    Ravi Smith MD  Nephrology  12/5/2023        Portions of the record may have been created with voice recognition software. Occasional wrong word or "sound a like" substitutions may have occurred due to the inherent limitations of voice recognition software. Read the chart carefully and recognize, using context, where substitutions have occurred.

## 2023-12-05 NOTE — PLAN OF CARE
Patient presents for a 2 hour HD session on a 2K2.5Ca bath with a net UF goal to run even. Post-Dialysis RN Treatment Note    Blood Pressure:  Pre 154/94 mm/Hg  Post 169/91 mmHg   EDW  102 kg    Weight:  Pre 97.5 kg   Post 97.5 kg   Mode of weight measurement: Bed Scale   Volume Removed  0 ml    Treatment duration 120 minutes    NS given  No    Treatment shortened?  No   Medications given during Rx None Reported   Estimated Kt/V  Not Applicable   Access type: Permacath/TDC   Access Issues: No    Report called to primary nurse   Yes, Maritza Arora RN    Problem: METABOLIC, FLUID AND ELECTROLYTES - ADULT  Goal: Electrolytes maintained within normal limits  Description: INTERVENTIONS:  - Monitor labs and assess patient for signs and symptoms of electrolyte imbalances  - Administer electrolyte replacement as ordered  - Monitor response to electrolyte replacements, including repeat lab results as appropriate  - Instruct patient on fluid and nutrition as appropriate  Outcome: Progressing  Goal: Fluid balance maintained  Description: INTERVENTIONS:  - Monitor labs   - Monitor I/O and WT  - Instruct patient on fluid and nutrition as appropriate  - Assess for signs & symptoms of volume excess or deficit  Outcome: Progressing

## 2023-12-05 NOTE — ASSESSMENT & PLAN NOTE
Patient with vomiting episodes when he is eating, without nausea. Reports that this has been ongoing for months, since starting on dialysis. No complaints of abdominal pain, tenderness. No complaints of dysphagia. Trial Reglan with meals,  Monitor QTc  Consider upper GI series vs gastric emptying study?

## 2023-12-05 NOTE — ASSESSMENT & PLAN NOTE
History of, leading to renal failure   Currently managed with HD which does improve his symptoms of feet/toe pain. Plan will be to increase outpatient sessions until patient can be started on home HD.   Nephrology following

## 2023-12-05 NOTE — ASSESSMENT & PLAN NOTE
Nephrology consulted for HD management   Continue HD plan for 5 days per week  Continue PhosLo, calcitriol  Patient to eventually be set up with home dialysis to be completed 5 days a week in setting of high oxalate levels. Due to have AV Fistula placed on 12/14  High risk to be discharged with catheter to do dialysis at home, so until fistula placed, will continue with HD at [facility].

## 2023-12-05 NOTE — PLAN OF CARE
Problem: METABOLIC, FLUID AND ELECTROLYTES - ADULT  Goal: Electrolytes maintained within normal limits  Description: INTERVENTIONS:  - Monitor labs and assess patient for signs and symptoms of electrolyte imbalances  - Administer electrolyte replacement as ordered  - Monitor response to electrolyte replacements, including repeat lab results as appropriate  - Instruct patient on fluid and nutrition as appropriate  Outcome: Progressing  Goal: Fluid balance maintained  Description: INTERVENTIONS:  - Monitor labs   - Monitor I/O and WT  - Instruct patient on fluid and nutrition as appropriate  - Assess for signs & symptoms of volume excess or deficit  Outcome: Progressing     Problem: PAIN - ADULT  Goal: Verbalizes/displays adequate comfort level or baseline comfort level  Description: Interventions:  - Encourage patient to monitor pain and request assistance  - Assess pain using appropriate pain scale  - Administer analgesics based on type and severity of pain and evaluate response  - Implement non-pharmacological measures as appropriate and evaluate response  - Consider cultural and social influences on pain and pain management  - Notify physician/advanced practitioner if interventions unsuccessful or patient reports new pain  Outcome: Progressing     Problem: INFECTION - ADULT  Goal: Absence or prevention of progression during hospitalization  Description: INTERVENTIONS:  - Assess and monitor for signs and symptoms of infection  - Monitor lab/diagnostic results  - Monitor all insertion sites, i.e. indwelling lines, tubes, and drains  - [place] appropriate cooling/warming therapies per order  - Administer medications as ordered  - Instruct and encourage patient and family to use good hand hygiene technique  - Identify and instruct in appropriate isolation precautions for identified infection/condition  Outcome: Progressing  Goal: Absence of fever/infection during neutropenic period  Description: INTERVENTIONS:  - Monitor WBC    Outcome: Progressing     Problem: SAFETY ADULT  Goal: Patient will remain free of falls  Description: INTERVENTIONS:  - Educate patient/family on patient safety including physical limitations  - Instruct patient to call for assistance with activity   - Consult OT/PT to assist with strengthening/mobility   - Keep Call bell within reach  - Keep bed low and locked with side rails adjusted as appropriate  - Keep care items and personal belongings within reach  - Initiate and maintain comfort rounds  - Make Fall Risk Sign visible to staff  - Apply yellow socks and bracelet for high fall risk patients  - Consider moving patient to room near nurses station  Outcome: Progressing  Goal: Maintain or return to baseline ADL function  Description: INTERVENTIONS:  -  Assess patient's ability to carry out ADLs; assess patient's baseline for ADL function and identify physical deficits which impact ability to perform ADLs (bathing, care of mouth/teeth, toileting, grooming, dressing, etc.)  - Assess/evaluate cause of self-care deficits   - Assess range of motion  - Assess patient's mobility; develop plan if impaired  - Assess patient's need for assistive devices and provide as appropriate  - Encourage maximum independence but intervene and supervise when necessary  - Involve family in performance of ADLs  - Assess for home care needs following discharge   - Consider OT consult to assist with ADL evaluation and planning for discharge  - Provide patient education as appropriate  Outcome: Progressing  Goal: Maintains/Returns to pre admission functional level  Description: INTERVENTIONS:  - Perform AM-PAC 6 Click Basic Mobility/ Daily Activity assessment daily.  - Set and communicate daily mobility goal to care team and patient/family/caregiver.    - Collaborate with rehabilitation services on mobility goals if consulted  - Out of bed for toileting  - Record patient progress and toleration of activity level   Outcome: Progressing     Problem: DISCHARGE PLANNING  Goal: Discharge to home or other facility with appropriate resources  Description: INTERVENTIONS:  - Identify barriers to discharge w/patient and caregiver  - Arrange for needed discharge resources and transportation as appropriate  - Identify discharge learning needs (meds, wound care, etc.)  - Arrange for interpretive services to assist at discharge as needed  - Refer to Case Management Department for coordinating discharge planning if the patient needs post-hospital services based on physician/advanced practitioner order or complex needs related to functional status, cognitive ability, or social support system  Outcome: Progressing     Problem: Knowledge Deficit  Goal: Patient/family/caregiver demonstrates understanding of disease process, treatment plan, medications, and discharge instructions  Description: Complete learning assessment and assess knowledge base.   Interventions:  - Provide teaching at level of understanding  - Provide teaching via preferred learning methods  Outcome: Progressing     Problem: Prexisting or High Potential for Compromised Skin Integrity  Goal: Skin integrity is maintained or improved  Description: INTERVENTIONS:  - Identify patients at risk for skin breakdown  - Assess and monitor skin integrity  - Assess and monitor nutrition and hydration status  - Monitor labs   - Assess for incontinence   - Turn and reposition patient  - Assist with mobility/ambulation  - Relieve pressure over bony prominences  - Avoid friction and shearing  - Provide appropriate hygiene as needed including keeping skin clean and dry  - Evaluate need for skin moisturizer/barrier cream  - Collaborate with interdisciplinary team   - Patient/family teaching  - Consider wound care consult   Outcome: Progressing

## 2023-12-05 NOTE — OCCUPATIONAL THERAPY NOTE
Occupational Therapy Treatment Note     Patient Name: Nancy Garcia  XKPUP'S Date: 12/5/2023  Problem List  Principal Problem:    Elevated troponin  Active Problems:    Anxiety, generalized    Insomnia    Primary hyperoxaluria type 1 ([address])    End stage renal disease on dialysis [facility])    Neuropathy    Vomiting       12/05/23 1158   OT Last Visit   OT Visit Date 12/05/23   Note Type   Note Type Treatment   Pain Assessment   Pain Assessment Tool 0-10   Pain Score 4   Pain Location/Orientation Orientation: Bilateral;Location: Foot  (L > R)   Pain Onset/Description Onset: Ongoing;Frequency: Constant/Continuous   Hospital Pain Intervention(s) Ambulation/increased activity;Repositioned;Medication (See MAR)   Restrictions/Precautions   Weight Bearing Precautions Per Order No   Other Precautions Chair Alarm; Bed Alarm; Fall Risk;Pain   Lifestyle   Autonomy At baseline, patient is independent with ADLs/IADLs, ambulatory with no AD, and lives with parents   Reciprocal Relationships Parents   Service to Others Works [email or web address]   [employer] and [employer]   ADL   Eating Assistance 6  Modified independent   Eating Deficit Increased time to complete   Eating Comments Pt reports eating slowly d/t nausea   LB Dressing Assistance 4  Minimal Assistance   LB Dressing Deficit Setup;Steadying; Requires assistive device for steadying; Increased time to complete   LB Dressing Comments Pt donned socks and pants while sitting at EOB. Bed Mobility   Supine to Sit 5  Supervision   Additional items Assist x 1;HOB elevated; Increased time required   Sit to Supine   (DNT: pt seated OOB in recliner at end of session)   Additional Comments Pt denied lightheaded/dizziness with transitional movements   Transfers   Sit to Stand 4  Minimal assistance   Additional items Assist x 1;Bedrails; Increased time required;Verbal cues   Stand to Sit 4  Minimal assistance   Additional items Assist x 1; Armrests; Increased time required;Verbal cues Functional Mobility   Functional Mobility 4  Minimal assistance   Additional Comments Pt ambulated in room to recliner with no overt SOB. Pt grossly unsteady and utilizes RW for stability. Additional items Rolling walker   Toilet Transfers   Toilet Transfer From [employer] Transfer Type To and from   Toilet Transfer to Standard toilet   Toilet Transfer Technique Ambulating   Toilet Transfers Minimal assistance   Therapeutic Excerise-Strength   UE Strength Yes  (to increase strength and endurance)   Right Upper Extremity- Strength   R Shoulder Flexion; Extension  (Protraction/Retraction)   R Position Seated;Against gravity   R Weight/Reps/Sets 1 set x 15 reps each   Left Upper Extremity-Strength   L Shoulder Flexion; Extension  (Protraction/Retraction)   L Position Seated   L Weights/Reps/Sets 1 set x 15 reps each   Cognition   Overall Cognitive Status WFL   Arousal/Participation Alert; Responsive; Cooperative   Attention Within functional limits   Orientation Level Oriented X4   Memory Within functional limits   Following Commands Follows all commands and directions without difficulty   Comments Pt agreeable to OT session. Activity Tolerance   Activity Tolerance Patient limited by pain   Assessment   Assessment Patient participated in Skilled OT session this date with interventions consisting of ADL re training with the use of correct body mechnaics, therapeutic exercise to: increase functional use of BUEs, increase BUE muscle strength ,  therapeutic activities to: increase activity tolerance, and increase dynamic sit/ stand balance during functional activity  . Patient agreeable to OT treatment session, upon arrival patient was found supine in bed and in no apparent distress. Patient completed bed mobility with supervision and functional transfers with min assist. Pt ambulated to/from bathroom with min assist of 1 utilizing RW.  Pt completed toilet transfer with min assist. While seated at EOB, pt donned socks and pants with min assist. While seated in recliner, pt completed 1 set x 15 reps of BUE exercises to increase strength and endurance. In comparison to previous session, patient with improvements in functional transfers and balance. Patient requiring frequent rest periods and ocassional safety reminders. Patient continues to be functioning below baseline level, occupational performance remains limited secondary to factors listed above and increased risk for falls and injury. From OT standpoint, recommendation at time of d/c would be Level II (moderate resource intensity). Patient to benefit from continued Occupational Therapy treatment while in the hospital to address deficits as defined above and maximize level of functional independence with ADLs and functional mobility. Plan   Treatment Interventions ADL retraining;Functional transfer training; Endurance training;Patient/family training;UE strengthening/ROM   Goal Expiration Date 12/14/23   OT Treatment Day 2   OT Frequency 3-5x/wk   Discharge Recommendation   Rehab Resource Intensity Level, OT II (Moderate Resource Intensity)   AM-PAC Daily Activity Inpatient   Lower Body Dressing 3   Bathing 3   Toileting 3   Upper Body Dressing 3   Grooming 3   Eating 4   Daily Activity Raw Score 19   Daily Activity Standardized Score (Calc for Raw Score >=11) 40.22   AM-PAC Applied Cognition Inpatient   Following a Speech/Presentation 4   Understanding Ordinary Conversation 4   Taking Medications 4   Remembering Where Things Are Placed or Put Away 4   Remembering List of 4-5 Errands 4   Taking Care of Complicated Tasks 4   Applied Cognition Raw Score 24   Applied Cognition Standardized Score 62.21   End of Consult   Patient Position at End of Consult Bedside chair; All needs within reach;Bed/Chair alarm activated   Nurse Communication Nurse aware of consult     Katharine Ape OTD, OTR/L

## 2023-12-05 NOTE — ASSESSMENT & PLAN NOTE
Patient presented to the ED with complaints of severe pain in bilateral toes with mottling of skin and difficulty sleeping due to pain. On arrival to the ER, patient noted to have elevated troponin levels, peaking at 1445. Trending down  EKG without concerns of ST/T wave changes. Patient is without chest pain or shortness of breath. Echo (11/29/23): Left Ventricle: Left ventricular cavity size is normal. Wall thickness is normal. The left ventricular ejection fraction is 61%. Systolic function is normal. Wall motion is normal. Diastolic function is normal.   Cardiology consulted,  Stress test (11/30): + reversible defect. Cardiac cath (12/1): normal coronaries  Elevated troponin levels likely related to ESRD.

## 2023-12-06 ENCOUNTER — APPOINTMENT (INPATIENT)
Dept: DIALYSIS | Facility: HOSPITAL | Age: 28
DRG: 682 | End: 2023-12-06
Payer: COMMERCIAL

## 2023-12-06 VITALS
DIASTOLIC BLOOD PRESSURE: 83 MMHG | SYSTOLIC BLOOD PRESSURE: 140 MMHG | TEMPERATURE: 98.6 F | BODY MASS INDEX: 30.88 KG/M2 | WEIGHT: 228 LBS | HEART RATE: 74 BPM | RESPIRATION RATE: 16 BRPM | HEIGHT: 72 IN | OXYGEN SATURATION: 93 %

## 2023-12-06 LAB
HBV CORE AB SER QL: NORMAL
HBV CORE IGM SER QL: NORMAL
HBV SURFACE AB SER-ACNC: 5.17 MIU/ML
HBV SURFACE AG SER QL: NORMAL
HCV AB SER QL: NORMAL

## 2023-12-06 PROCEDURE — 86704 HEP B CORE ANTIBODY TOTAL: CPT | Performed by: INTERNAL MEDICINE

## 2023-12-06 PROCEDURE — 90935 HEMODIALYSIS ONE EVALUATION: CPT | Performed by: INTERNAL MEDICINE

## 2023-12-06 PROCEDURE — 99239 HOSP IP/OBS DSCHRG MGMT >30: CPT | Performed by: NURSE PRACTITIONER

## 2023-12-06 PROCEDURE — 97110 THERAPEUTIC EXERCISES: CPT

## 2023-12-06 PROCEDURE — 86803 HEPATITIS C AB TEST: CPT | Performed by: INTERNAL MEDICINE

## 2023-12-06 PROCEDURE — 87340 HEPATITIS B SURFACE AG IA: CPT | Performed by: INTERNAL MEDICINE

## 2023-12-06 PROCEDURE — 97530 THERAPEUTIC ACTIVITIES: CPT

## 2023-12-06 PROCEDURE — 86705 HEP B CORE ANTIBODY IGM: CPT | Performed by: INTERNAL MEDICINE

## 2023-12-06 PROCEDURE — 86706 HEP B SURFACE ANTIBODY: CPT | Performed by: INTERNAL MEDICINE

## 2023-12-06 RX ORDER — HEPARIN SODIUM 1000 [USP'U]/ML
2000 INJECTION, SOLUTION INTRAVENOUS; SUBCUTANEOUS ONCE
Status: COMPLETED | OUTPATIENT
Start: 2023-12-06 | End: 2023-12-06

## 2023-12-06 RX ORDER — OXYCODONE HYDROCHLORIDE 5 MG/1
5 TABLET ORAL EVERY 6 HOURS PRN
Qty: 10 TABLET | Refills: 0 | Status: SHIPPED | OUTPATIENT
Start: 2023-12-06 | End: 2023-12-16

## 2023-12-06 RX ORDER — METOCLOPRAMIDE 5 MG/1
5 TABLET ORAL 3 TIMES DAILY PRN
Qty: 15 TABLET | Refills: 0 | Status: SHIPPED | OUTPATIENT
Start: 2023-12-06

## 2023-12-06 RX ORDER — CALCIUM ACETATE 667 MG/1
2001 CAPSULE ORAL
Qty: 270 CAPSULE | Refills: 0 | Status: SHIPPED | OUTPATIENT
Start: 2023-12-06 | End: 2024-01-05

## 2023-12-06 RX ORDER — CALCITRIOL 0.25 UG/1
0.25 CAPSULE, LIQUID FILLED ORAL 3 TIMES WEEKLY
Qty: 12 CAPSULE | Refills: 0 | Status: SHIPPED | OUTPATIENT
Start: 2023-12-06

## 2023-12-06 RX ADMIN — CALCIUM ACETATE 2001 MG: 667 CAPSULE ORAL at 12:57

## 2023-12-06 RX ADMIN — METOCLOPRAMIDE HYDROCHLORIDE 5 MG: 10 TABLET ORAL at 12:58

## 2023-12-06 RX ADMIN — HEPARIN SODIUM 2000 UNITS: 1000 INJECTION INTRAVENOUS; SUBCUTANEOUS at 08:45

## 2023-12-06 RX ADMIN — Medication 2 G: at 13:10

## 2023-12-06 RX ADMIN — METOCLOPRAMIDE HYDROCHLORIDE 5 MG: 10 TABLET ORAL at 09:07

## 2023-12-06 RX ADMIN — ACETAMINOPHEN 975 MG: 325 TABLET, FILM COATED ORAL at 09:08

## 2023-12-06 RX ADMIN — CALCITRIOL CAPSULES 0.25 MCG 0.25 MCG: 0.25 CAPSULE ORAL at 09:08

## 2023-12-06 RX ADMIN — Medication 7.5 MG: at 03:47

## 2023-12-06 RX ADMIN — GABAPENTIN 100 MG: 100 CAPSULE ORAL at 09:07

## 2023-12-06 RX ADMIN — CALCIUM ACETATE 2001 MG: 667 CAPSULE ORAL at 09:07

## 2023-12-06 RX ADMIN — ACETAMINOPHEN 975 MG: 325 TABLET, FILM COATED ORAL at 02:23

## 2023-12-06 NOTE — PROGRESS NOTES
NEPHROLOGY PROGRESS NOTE    Patient: Mendez Musa               Sex: male          DOA: 11/28/2023  8:02 AM   YOB: 1995        Age:  25 y.o.        LOS:  LOS: 6 days       Patient admitted with volume overload status and leg pain    SUBJECTIVE     Patient seen on hemodialysis    Tolerating quite well    Denies any acute complaint    Leg pain is much better    CURRENT MEDICATIONS       Current Facility-Administered Medications:     acetaminophen (TYLENOL) tablet 975 mg, 975 mg, Oral, Q8H, Tobias Lizarraga PA-C, 975 mg at 12/06/23 2661    calcitriol (ROCALTROL) capsule 0.25 mcg, 0.25 mcg, Oral, Once per day on Mon Wed Fri, [place], 0.25 mcg at 12/06/23 3287    calcium acetate (PHOSLO) capsule 2,001 mg, 2,001 mg, Oral, TID With Meals, Tobias Lizarraga PA-C, 2,001 mg at 12/06/23 1257    Diclofenac Sodium (VOLTAREN) 1 % topical gel 2 g, 2 g, Topical, 4x Daily, LAURA Nichole, 2 g at 12/06/23 1310    gabapentin (NEURONTIN) capsule 100 mg, 100 mg, Oral, TID, LAURA Nichole, 100 mg at 12/06/23 2401    metoclopramide (REGLAN) tablet 5 mg, 5 mg, Oral, TID AC, Cristiane Hernandez PA-C, 5 mg at 12/06/23 1258    ondansetron (ZOFRAN) injection 4 mg, 4 mg, Intravenous, Q4H PRN, LAURA Nichole, 4 mg at 12/04/23 0759    oxyCODONE (ROXICODONE) IR tablet 5 mg, 5 mg, Oral, Q6H PRN, 5 mg at 12/05/23 2006 **OR** oxyCODONE (ROXICODONE) split tablet 7.5 mg, 7.5 mg, Oral, Q6H PRN, Rachael Camacho PA-C, 7.5 mg at 12/06/23 0347    traZODone (DESYREL) tablet 50 mg, 50 mg, Oral, HS, Tobias Lizarraga PA-C, 50 mg at 12/05/23 9483    Current Outpatient Medications:     calcitriol (ROCALTROL) 0.25 mcg capsule, Take 1 capsule (0.25 mcg total) by mouth 3 (three) times a week, Disp: 12 capsule, Rfl: 0    calcium acetate (PHOSLO) capsule, Take 3 capsules (2,001 mg total) by mouth 3 (three) times a day with meals, Disp: 270 capsule, Rfl: 0    Diclofenac Sodium (VOLTAREN) 1 %, Apply 2 g topically 4 (four) times a day, Disp: 100 g, Rfl: 0    metoclopramide (REGLAN) 5 mg tablet, Take 1 tablet (5 mg total) by mouth 3 (three) times a day as needed (nausea), Disp: 15 tablet, Rfl: 0    oxyCODONE (ROXICODONE) 5 immediate release tablet, Take 1 tablet (5 mg total) by mouth every 6 (six) hours as needed for moderate pain for up to 10 days Max Daily Amount: 20 mg, Disp: 10 tablet, Rfl: 0    gabapentin (Neurontin) 100 mg capsule, Take 1 capsule (100 mg total) by mouth 3 (three) times a day as needed (neuropathy), Disp: 30 capsule, Rfl: 3    ondansetron (ZOFRAN) 4 mg tablet, Take 1 tablet (4 mg total) by mouth every 8 (eight) hours as needed for nausea or vomiting, Disp: 50 tablet, Rfl: 0    traZODone (DESYREL) 50 mg tablet, TAKE 1 TABLET BY MOUTH EVERYDAY AT BEDTIME, Disp: 90 tablet, Rfl: 3    OBJECTIVE     Current Weight: Weight - Scale: 103 kg (228 lb)  Vitals:    12/06/23 1056   BP: 140/83   Pulse: 74   Resp: 16   Temp: 98.6 °F (37 °C)   SpO2:        Intake/Output Summary (Last 24 hours) at 12/6/2023 1513  Last data filed at 12/6/2023 1000  Gross per 24 hour   Intake 1040 ml   Output 500 ml   Net 540 ml       PHYSICAL EXAMINATION     Physical Exam  Constitutional:       General: He is not in acute distress. Appearance: He is well-developed. HENT:      Head: Normocephalic. Mouth/Throat:      Mouth: Mucous membranes are moist.   Eyes:      General: No scleral icterus. Conjunctiva/sclera: Conjunctivae normal.   Neck:      Vascular: No JVD. Cardiovascular:      Rate and Rhythm: Normal rate. Heart sounds: Normal heart sounds. Pulmonary:      Effort: Pulmonary effort is normal.      Breath sounds: No wheezing. Abdominal:      Palpations: Abdomen is soft. Tenderness: There is no abdominal tenderness. Musculoskeletal:         General: Normal range of motion. Cervical back: Neck supple. Skin:     General: Skin is warm. Findings: No rash.    Neurological: Mental Status: He is alert and oriented to person, place, and time. Psychiatric:         Behavior: Behavior normal.          LAB RESULTS     Results from last 7 days   Lab Units 12/04/23  0520 12/02/23  0545 12/01/23  0536 11/30/23  0551   WBC Thousand/uL  --  7.62 6.97 7.09   HEMOGLOBIN g/dL  --  10.0* 9.2* 9.5*   HEMATOCRIT %  --  29.2* 27.7* 28.0*   PLATELETS Thousands/uL  --  211 186 208   POTASSIUM mmol/L 4.3 4.7 4.4 4.4   CHLORIDE mmol/L 95* 96 96 96   CO2 mmol/L 27 27 25 27   BUN mg/dL 55* 41* 57* 39*   CREATININE mg/dL 14.93* 12.38* 16.97* 13.00*   EGFR ml/min/1.73sq m 3 4 3 4   CALCIUM mg/dL 9.4 9.9 9.4 9.7   MAGNESIUM mg/dL  --  2.6 3.0*  --        RADIOLOGY RESULTS      Results for orders placed during the hospital encounter of 08/21/23    XR chest portable    Narrative  CHEST    INDICATION:   ckd. COMPARISON:  None    EXAM PERFORMED/VIEWS:  XR CHEST PORTABLE      FINDINGS:    Right IJ dialysis catheter in place    Cardiomediastinal silhouette appears unremarkable. The lungs are clear. No pneumothorax or pleural effusion. Osseous structures appear within normal limits for patient age. Impression  Right dialysis catheter present. No acute cardiopulmonary disease. Workstation performed: [id number]    No results found for this or any previous visit. No results found for this or any previous visit. No results found for this or any previous visit. No results found for this or any previous visit. No results found for this or any previous visit. PLAN / RECOMMENDATIONS      ESRD: Getting dialyzed and tolerating well    2. Primary oxaluria: Seems to be stable    3. Disposition: Patient is getting discharged will start home hemodialysis    Kehinde Rios MD  Nephrology  12/6/2023        Portions of the record may have been created with voice recognition software.  Occasional wrong word or "sound a like" substitutions may have occurred due to the inherent limitations of voice recognition software. Read the chart carefully and recognize, using context, where substitutions have occurred.

## 2023-12-06 NOTE — ASSESSMENT & PLAN NOTE
Nephrology consulted for HD management   Continue HD plan for 5 days per week  Continue PhosLo, calcitriol  Patient pending set up with home dialysis 5 days a week in setting of high oxalate levels. Due to have AV Fistula placed on 12/14  High risk to be discharged with catheter to do dialysis at home, so until fistula placed, will continue with HD at [facility]. Possibility of sending patient home with catheter even with high risk?

## 2023-12-06 NOTE — ASSESSMENT & PLAN NOTE
Patient presented to the ED with complaints of severe pain in bilateral toes with mottling of skin and difficulty sleeping due to pain. On arrival to the ER, patient noted to have elevated troponin levels, peaking at 1445. Trending down  EKG remains without concerns of ST/T wave changes. Patient is without chest pain or shortness of breath. Echo (11/29/23): Left Ventricle: Left ventricular cavity size is normal. Wall thickness is normal. The left ventricular ejection fraction is 61%. Systolic function is normal. Wall motion is normal. Diastolic function is normal.   Cardiology consulted,  Stress test (11/30): + reversible defect. Cardiac cath (12/1): normal coronaries  Elevated troponin levels likely related to ESRD.

## 2023-12-06 NOTE — DISCHARGE SUMMARY
[address]  Discharge- [facility] 1995, 25 y.o. male MRN: [id number]  Unit/Bed#: -Michael Encounter: [id number]  Primary Care Provider: Vishal Daniel DO   Date and time admitted to hospital: 11/28/2023  8:02 AM    * Elevated troponin  Assessment & Plan  Patient presented to the ED with complaints of severe pain in bilateral toes with mottling of skin and difficulty sleeping due to pain. On arrival to the ER, patient noted to have elevated troponin levels, peaking at 1445. Trending down  EKG remains without concerns of ST/T wave changes. Patient is without chest pain or shortness of breath. Echo (11/29/23): Left Ventricle: Left ventricular cavity size is normal. Wall thickness is normal. The left ventricular ejection fraction is 61%. Systolic function is normal. Wall motion is normal. Diastolic function is normal.   Cardiology consulted,  Stress test (11/30): + reversible defect. Cardiac cath (12/1): normal coronaries  Elevated troponin levels likely related to ESRD. End stage renal disease on dialysis [facility])  Assessment & Plan  Nephrology consulted for HD management   Continue HD plan for 5 days per week  Continue PhosLo, calcitriol  Patient pending set up with home dialysis 5 days a week in setting of high oxalate levels. Due to have AV Fistula placed on 12/14  High risk to be discharged with catheter to do dialysis at home, so until fistula placed, will continue with HD at [facility]. Possibility of sending patient home with catheter even with high risk? Vomiting  Assessment & Plan  Patient with vomiting episodes when he is eating, without nausea. Reports that this has been ongoing for months, since starting on dialysis. No complaints of abdominal pain, tenderness. No complaints of dysphagia. Trial Reglan with meals,  Monitor QTc  Consider upper GI series vs gastric emptying study?     Neuropathy  Assessment & Plan  History of Neuropathy, complicated with high oxalate levels. Presenting with severe bilateral foot pain, mottling. Bilateral lower extremity arterial duplex was completed  Pain improves with HD. Will continue with Oxycodone for pain as needed  See plan under hyperoxaluria    Primary hyperoxaluria type 1 ([address])  Assessment & Plan  History of, leading to renal failure   Currently managed with HD which does improve his symptoms of feet/toe pain. Plan will be to increase outpatient sessions until patient can be started on home HD. Nephrology following  Home dialysis will be taking the patient post discharge from hospital or rehab 12/6    Insomnia  Assessment & Plan  Continue use of Trazodone    Anxiety, generalized  Assessment & Plan  History of; takes Trazodone to sleep at night; no other psychiatric meds. Stable currently      Medical Problems         Resolved Problems  Date Reviewed: 12/5/2023   None         Discharging Physician / Practitioner: Cindy Marion  PCP: Hawk Bobo DO  Admission Date:   Admission Orders (From admission, onward)          Ordered         11/30/23 1618   Inpatient Admission  Once             11/28/23 1441   Place in Observation  Once                               Discharge Date: 12/06/23     Consultations During Hospital Stay:  Nephrology  Cardiology  Interventional radiology   Vascular      Procedures Performed:   Hemodialysis   Cardiac cath: normal coronary arteries      Significant Findings / Test Results:   ESRD on dialysis   Elevated troponin      Incidental Findings:   NA      Test Results Pending at Discharge (will require follow up):   NA     Outpatient Tests Requested:  NA     Complications:  NA     Reason for Admission: Severe pain in bilateral toes with skin mottling (elevated troponin levels,; hemodialysis patient)     Hospital Course:   Sabino Marte is a 25 y.o. male patient who originally presented to the hospital on 11/28/2023 due to severe pain in bilateral toes with skin mottling.  He was noted to have elevated troponin levels upon admission, requiring a workup. Assumed that the elevated troponin were ESRD related. This patient was also an end stage renal disease patient on dialysis so nephrology was consulted and hemodialysis was performed 3 times a week during his stay. Patient was being treated with gabapentin for his neuropathy as well as oxycodone as needed and continued these medication during hi stay. On his second day, patient reported vomiting. Patient was placed on Epogen due to anemia on 11/30. Stress test performed 11/30 with abnormal reversible defect and EF 61%. Patient underwent a cardiac cath 12/01 indicating normal coronaries. Throughout his stay, experienced hypophosphatemia and was initiated phoslo. Patient was talked to regarding home dialysis and will be set up to be discharged with home dialysis. Please see above list of diagnoses and related plan for additional information. Condition at Discharge: Stable     Discharge Day Visit / Exam:   Subjective:  Patient states with each dialysis treatment he feels much stronger and prefers to go home and is happy that home dialysis training is being consider for him. Vitals: Blood Pressure: 140/83 (12/06/23 1056)  Pulse: 74 (12/06/23 1056)  Temperature: 98.6 °F (37 °C) (12/06/23 1056)  Temp Source: Oral (12/06/23 1056)  Respirations: 16 (12/06/23 1056)  Height: 6' (182.9 cm) (11/29/23 1238)  Weight - Scale: 103 kg (228 lb) (11/29/23 1238)  SpO2: 93 % (12/05/23 2123)  Exam:   Physical Exam  Constitutional:       Appearance: He is well-developed. HENT:      Head: Normocephalic and atraumatic. Eyes:      Conjunctiva/sclera: Conjunctivae normal.   Cardiovascular:      Rate and Rhythm: Normal rate and regular rhythm. Heart sounds: Normal heart sounds. Pulmonary:      Effort: Pulmonary effort is normal.      Breath sounds: Normal breath sounds. Abdominal:      General: Bowel sounds are normal.      Palpations: Abdomen is soft. Musculoskeletal:         General: Normal range of motion. Cervical back: Normal range of motion. Skin:     General: Skin is warm and dry. Neurological:      Mental Status: He is alert and oriented to person, place, and time. Psychiatric:         Behavior: Behavior normal.           Discussion with Family: Patient declined call to . Discharge instructions/Information to patient and family:   See after visit summary for information provided to patient and family. Provisions for Follow-Up Care:  See after visit summary for information related to follow-up care and any pertinent home health orders. Mobility at time of Discharge:   Basic Mobility Inpatient Raw Score: 11  JH-HLM Goal: 4: Move to chair/commode  JH-HLM Achieved: 6: Walk 10 steps or more  HLM Goal achieved. Continue to encourage appropriate mobility. Disposition:   Home     Planned Readmission: If worsening of symptoms, fever      Discharge Statement:  I spent 45 minutes discharging the patient. This time was spent on the day of discharge. I had direct contact with the patient on the day of discharge. Greater than 50% of the total time was spent examining patient, answering all patient questions, arranging and discussing plan of care with patient as well as directly providing post-discharge instructions. Additional time then spent on discharge activities. Discharge Medications:  See after visit summary for reconciled discharge medications provided to patient and/or family.        **Please Note: This note may have been constructed using a voice recognition system**

## 2023-12-06 NOTE — PLAN OF CARE
Post-Dialysis RN Treatment Note    Blood Pressure:  Pre 154/72 mm/Hg  Post 141/82 mmHg   EDW  kg kg    Weight:  Pre 96.5kg kg   Post 96.5 kg   Mode of weight measurement: Bed Scale   Volume Removed  0 ml    Treatment duration 180 minutes    NS given  No    Treatment shortened?  No   Medications given during Rx Heparin   Estimated Kt/V  102kg   Access type: Permacath/TDC   Access Issues: No    Report called to primary nurse   Yes     Pt tolerated Tx well

## 2023-12-06 NOTE — ASSESSMENT & PLAN NOTE
History of, leading to renal failure   Currently managed with HD which does improve his symptoms of feet/toe pain. Plan will be to increase outpatient sessions until patient can be started on home HD.   Nephrology following  Home dialysis will be taking the patient post discharge from hospital or rehab 12/6

## 2023-12-06 NOTE — PLAN OF CARE
Problem: PHYSICAL THERAPY ADULT  Goal: Performs mobility at highest level of function for planned discharge setting. See evaluation for individualized goals. Description: Treatment/Interventions: Functional transfer training, LE strengthening/ROM, Elevations, Therapeutic exercise, Endurance training, Patient/family training, Bed mobility, Gait training, Spoke to nursing, OT  Equipment Recommended: Wheelchair       See flowsheet documentation for full assessment, interventions and recommendations. Outcome: Progressing  Note: Prognosis: Good  Problem List: Decreased strength, Decreased endurance, Decreased mobility, Impaired balance, Pain, Impaired sensation, Decreased range of motion  Assessment: Pt seen for PT treatment session this date with interventions consisting of gait training w/ emphasis on improving pt's ability to ambulate level surfaces x 120', 30', 10'  with CGA provided by therapist with RW, Therapeutic exercise consisting of: AROM 5 reps B LE in standing with B/L  UE support position, and therapeutic activity consisting of training: sit<>stand transfers and Stair navigation x 2 steps with B/L UE support on R handrail. Educated on RW use for curb navigation to get into home . Pt agreeable to PT treatment session upon arrival, pt found supine in bed w/ HOB elevated, in no apparent distress. In comparison to previous session, pt with improvements in activity tolerance, mobility, stair navigation . Post session: pt returned BTB, all needs in reach, and RN notified of session findings/recommendations. Continue to recommend Minimum Resource Intensity L3 at time of d/c in order to maximize pt's functional independence and safety w/ mobility. Pt continues to be functioning below baseline level, and remains limited 2* factors listed above and including strength, balance, mobility deficits, reduced activity tolerance.  PT will continue to see pt during current hospitalization in order to address the deficits listed above and provide interventions consistent w/ POC in effort to achieve STGs. Barriers to Discharge: Inaccessible home environment (decline in functional baseline)     Rehab Resource Intensity Level, PT: III (Minimum Resource Intensity)    See flowsheet documentation for full assessment.

## 2023-12-06 NOTE — CASE MANAGEMENT
Case Management Discharge Planning Note    Patient name Patrizia Goldman  Location /-56 MRN [id number]  : 1995 Date 2023       Current Admission Date: 2023  Current Admission Diagnosis:Elevated troponin   Patient Active Problem List    Diagnosis Date Noted    Vomiting 2023    Intractable vomiting 2023    Elevated troponin 2023    Microangiopathy ([address]) 2023    End stage renal disease on dialysis ([address]) 10/03/2023    Neuropathy 10/03/2023    Gastroesophageal reflux disease without esophagitis 2023    Anemia 2023    Chronic kidney disease-mineral and bone disorder 2023    BMI 30.0-30.9,adult 2022    Hydronephrosis with urinary obstruction due to ureteral calculus 2021    Primary hyperoxaluria type 1 ([address]) 2021    Insomnia 10/27/2020    Ureteric stone ----> hydroureteronephrosis moderate 10/23/2020    Sebaceous cyst 2017    Anxiety, generalized 2015      LOS (days): 6  Geometric Mean LOS (GMLOS) (days): 5.40  Days to GMLOS:-0.5     OBJECTIVE:  Risk of Unplanned Readmission Score: 16.68         Current admission status: Inpatient   Preferred Pharmacy:   [facility] [id number], [address] [facility]  [address] [place] [address]  Phone: [phone] Fax: [phone]    Primary Care Provider: Maxwell Landon DO    Primary Insurance: [facility]  Secondary Insurance:     DISCHARGE DETAILS:     CM spoke with Carlos Perez from [employer] who informed CM that patient can start training tomorrow. Patient is agreeable as he does not want rehab.     Carlos Perez: [phone]

## 2023-12-06 NOTE — PHYSICAL THERAPY NOTE
Physical Therapy Treatment    Patient's Name: Kyle Salcedo    Admitting Diagnosis  Leg pain [M79.606]  Elevated troponin [R79.89]  End stage renal disease on dialysis ([address]) [N18.6, Z99.2]  Pain in both feet [M79.671, M79.672]    Problem List  Patient Active Problem List   Diagnosis    Sebaceous cyst    Anxiety, generalized    Ureteric stone ----> hydroureteronephrosis moderate    Insomnia    Hydronephrosis with urinary obstruction due to ureteral calculus    Primary hyperoxaluria type 1 (HCC)    BMI 30.0-30.9,adult    Chronic kidney disease-mineral and bone disorder    Anemia    Gastroesophageal reflux disease without esophagitis    End stage renal disease on dialysis (HCC)    Neuropathy    Elevated troponin    Microangiopathy (HCC)    Intractable vomiting    Vomiting       Past Medical History  Past Medical History:   Diagnosis Date    Anxiety     Chronic kidney disease     GERD (gastroesophageal reflux disease)     Kidney stone     Liver disorder     Nephrolithiasis     Primary hyperoxaluria type 1 ([address])        Past Surgical History  Past Surgical History:   Procedure Laterality Date    ANKLE SURGERY Right 2017    ligament repair    CARDIAC CATHETERIZATION Left 12/1/2023    Procedure: Cardiac catheterization;  Surgeon: Eve Lim MD;  Location: [address] [place] [address] Ave CATH LAB; Service: Cardiology    CARDIAC CATHETERIZATION N/A 12/1/2023    Procedure: Cardiac Coronary Angiogram;  Surgeon: Eve Lim MD;  Location: [address] CATH LAB; Service: Cardiology    CARDIAC CATHETERIZATION Left 12/1/2023    Procedure: Cardiac Left Heart Cath;  Surgeon: Eve Lim MD;  Location: [address] CATH LAB;   Service: Cardiology    CYSTOSCOPY      FL RETROGRADE PYELOGRAM  10/24/2020    FL RETROGRADE PYELOGRAM  02/08/2021    HERNIA REPAIR      IR TUNNELED DIALYSIS CATHETER CHECK/CHANGE/REPOSITION/ANGIOPLASTY  12/4/2023    IR TUNNELED DIALYSIS CATHETER PLACEMENT  8/24/2023    KIDNEY STONE SURGERY      LITHOTRIPSY      MASS EXCISION Left 02/17/2017    Procedure: BACK/SHOULDER CYST EXCISION ;  Surgeon: Andrea Lomas MD;  [facility];  Service:     MI CYSTO/URETERO W/LITHOTRIPSY &INDWELL STENT INSRT Left 10/24/2020    Procedure: CYSTOSCOPY URETEROSCOPY WITH LITHOTRIPSY HOLMIUM LASER, RETROGRADE PYELOGRAM AND INSERTION STENT URETERAL;  Surgeon: Susan Tran MD;  [facility];  Service: Urology    MI CYSTO/URETERO W/LITHOTRIPSY &INDWELL STENT INSRT Right 02/08/2021    Procedure: CYSTOSCOPY URETEROSCOPY WITH LITHOTRIPSY HOLMIUM LASER, RETROGRADE PYELOGRAM AND INSERTION STENT URETERAL;  Surgeon: Judy Orosco MD;  Location: [facility];  Service: Urology        12/06/23 1158   PT Last Visit   PT Visit Date 12/06/23   Note Type   Note Type Treatment   Pain Assessment   Pain Assessment Tool 0-10   Pain Score 5   Pain Location/Orientation Orientation: Left; Location: Foot   Restrictions/Precautions   Weight Bearing Precautions Per Order No   Other Precautions Chair Alarm; Bed Alarm;Telemetry;Multiple lines;Pain; Fall Risk   General   Chart Reviewed Yes   Response to Previous Treatment Patient with no complaints from previous session. Family/Caregiver Present No   Cognition   Overall Cognitive Status WFL   Arousal/Participation Alert; Responsive   Attention Within functional limits   Orientation Level Oriented X4   Memory Within functional limits   Following Commands Follows all commands and directions without difficulty   Subjective   Subjective I am feeling much better than the other day. Bed Mobility   Supine to Sit 5  Supervision   Additional items Assist x 1   Sit to Supine 5  Supervision   Additional items Assist x 1;HOB elevated; Bedrails; Increased time required;Verbal cues   Additional Comments Pt denied lightheaded/dizziness with transitional movements   Transfers   Sit to Stand   (cgax1)   Additional items Assist x 1;Bedrails; Increased time required;Verbal cues   Stand to Sit 4  Minimal assistance  (cga) Additional items Assist x 1; Increased time required;Verbal cues   Additional Comments Utilized RW this date with cues for hand placement with transitions. Pt. notes baseline pain in toes but does report it is not worse when walking. Ambulation/Elevation   Gait pattern Decreased foot clearance; Short stride;Decreased heel strike;Decreased toe off  (decreased speed)   Gait Assistance   (cgax1)   Additional items Assist x 1;Verbal cues   Assistive Device Rolling walker   Distance 120', 30', 10'   Stair Management Assistance   (cgax1)   Additional items Assist x 1;Verbal cues; Tactile cues   Stair Management Technique One rail R  (both hands on rail)   Number of Stairs 2  (forward ascent, retro descent)   Balance   Static Sitting Good   Dynamic Sitting Fair +   Static Standing Fair +   Dynamic Standing Fair -   Ambulatory Fair -   Endurance Deficit   Endurance Deficit Yes   Endurance Deficit Description decreased activity tolerance   Activity Tolerance   Activity Tolerance Patient limited by fatigue   Nurse Made Aware RN ok to see   Exercises   Hamstring Sets Standing;5 reps;Bilateral   Hip Flexion Standing;5 reps;Bilateral   Hip Abduction Standing;5 reps;Bilateral   Hip Extension Standing;5 reps;Bilateral   Squat Standing;5 reps;Bilateral  (standing)   Balance training  Heel raise x 5 seated B/L (unable to complete standing)   Assessment   Prognosis Good   Problem List Decreased strength;Decreased endurance;Decreased mobility; Impaired balance;Pain; Impaired sensation;Decreased range of motion   Assessment Pt seen for PT treatment session this date with interventions consisting of gait training w/ emphasis on improving pt's ability to ambulate level surfaces x 120', 30', 10'  with CGA provided by therapist with RW, Therapeutic exercise consisting of: AROM 5 reps B LE in standing with B/L  UE support position, and therapeutic activity consisting of training: sit<>stand transfers and Stair navigation x 2 steps with B/L UE support on R handrail. Educated on RW use for curb navigation to get into home . Pt agreeable to PT treatment session upon arrival, pt found supine in bed w/ HOB elevated, in no apparent distress. In comparison to previous session, pt with improvements in activity tolerance, mobility, stair navigation . Post session: pt returned BTB, all needs in reach, and RN notified of session findings/recommendations. Continue to recommend Minimum Resource Intensity L3 at time of d/c in order to maximize pt's functional independence and safety w/ mobility. Pt continues to be functioning below baseline level, and remains limited 2* factors listed above and including strength, balance, mobility deficits, reduced activity tolerance. PT will continue to see pt during current hospitalization in order to address the deficits listed above and provide interventions consistent w/ POC in effort to achieve STGs. Barriers to Discharge Inaccessible home environment  (decline in functional baseline)   Goals   Patient Goals to get better, have less pain   STG Expiration Date 12/14/23   Short Term Goal #1 1. Pt will complete bed mobility with Mod I to increase functional mobility. 2. Pt will complete sit to stand transfers with Mod I to increase functional mobility. 3. Pt will ambulate 150 ft with RW with Mod I without LOB 4. Pt will increase B/L LE strength by 1 grade to facilitate improved functional mobility with decreased risk of falls. 5. Pt will increase standing balance to fair in order to decrease risk of falls. PT Treatment Day 2   Plan   Treatment/Interventions Functional transfer training;LE strengthening/ROM; Therapeutic exercise; Endurance training;Equipment eval/education;Patient/family training;Elevations;Gait training   Progress Progressing toward goals   PT Frequency 3-5x/wk   Discharge Recommendation   Rehab Resource Intensity Level, PT III (Minimum Resource Intensity)   Equipment Recommended Walker  (pt owns)   AM-PAC Basic Mobility Inpatient   Turning in Flat Bed Without Bedrails 4   Lying on Back to Sitting on Edge of Flat Bed Without Bedrails 4   Moving Bed to Chair 3   Standing Up From Chair Using Arms 3   Walk in Room 3   Climb 3-5 Stairs With Railing 3   Basic Mobility Inpatient Raw Score 20   Basic Mobility Standardized Score 43.99   Highest Level Of Mobility   JH-HLM Goal 6: Walk 10 steps or more   JH-HLM Achieved 7: Walk 25 feet or more           Juan Syed, PT

## 2023-12-07 ENCOUNTER — TRANSITIONAL CARE MANAGEMENT (OUTPATIENT)
Dept: FAMILY MEDICINE CLINIC | Facility: CLINIC | Age: 28
End: 2023-12-07

## 2023-12-07 DIAGNOSIS — Z71.89 COMPLEX CARE COORDINATION: Primary | ICD-10-CM

## 2023-12-12 NOTE — PRE-PROCEDURE INSTRUCTIONS
Pre-Surgery Instructions:   Medication Instructions    acetaminophen (TYLENOL) 325 mg suppository Take day of surgery. calcitriol (ROCALTROL) 0.25 mcg capsule Take as directed    calcium acetate (PHOSLO) capsule Take as directed    Diclofenac Sodium (VOLTAREN) 1 % Hold day of surgery. gabapentin (Neurontin) 100 mg capsule Take day of surgery. metoclopramide (REGLAN) 5 mg tablet Take day of surgery. oxyCODONE (ROXICODONE) 5 immediate release tablet Uses PRN- DO NOT take day of surgery    traZODone (DESYREL) 50 mg tablet Take day of surgery. Medication instructions for day surgery reviewed. Please use only a sip of water to take your instructed medications. Avoid all over the counter vitamins, supplements and NSAIDS for one week prior to surgery per anesthesia guidelines. Tylenol is ok to take as needed. You will receive a call one business day prior to surgery with an arrival time and hospital directions. If your surgery is scheduled on a Monday, the hospital will be calling you on the Friday prior to your surgery. If you have not heard from anyone by 8pm, please call the hospital supervisor through the hospital  at [phone]. Roxanna Candelaria [phone]). Do not eat or drink anything after midnight the night before your surgery, including candy, mints, lifesavers, or chewing gum. Do not drink alcohol 24hrs before your surgery. Try not to smoke at least 24hrs before your surgery. Follow the pre surgery showering instructions as listed in the [facility] Surgical Experience Booklet” or otherwise provided by your surgeon's office. Do not use a blade to shave the surgical area 1 week before surgery. It is okay to use a clean electric clippers up to 24 hours before surgery. Do not apply any lotions, creams, including makeup, cologne, deodorant, or perfumes after showering on the day of your surgery. Do not use dry shampoo, hair spray, hair gel, or any type of hair products.      No contact lenses, eye make-up, or artificial eyelashes. Remove nail polish, including gel polish, and any artificial, gel, or acrylic nails if possible. Remove all jewelry including rings and body piercing jewelry. Wear causal clothing that is easy to take on and off. Consider your type of surgery. Keep any valuables, jewelry, piercings at home. Please bring any specially ordered equipment (sling, braces) if indicated. Arrange for a responsible person to drive you to and from the hospital on the day of your surgery. Visitor Guidelines discussed. Call the surgeon's office with any new illnesses, exposures, or additional questions prior to surgery. Please reference your [facility] Surgical Experience Booklet” for additional information to prepare for your upcoming surgery.

## 2023-12-13 ENCOUNTER — EVALUATION (OUTPATIENT)
Dept: PHYSICAL THERAPY | Facility: CLINIC | Age: 28
End: 2023-12-13
Payer: COMMERCIAL

## 2023-12-13 ENCOUNTER — OFFICE VISIT (OUTPATIENT)
Dept: FAMILY MEDICINE CLINIC | Facility: CLINIC | Age: 28
End: 2023-12-13
Payer: COMMERCIAL

## 2023-12-13 VITALS
HEIGHT: 72 IN | DIASTOLIC BLOOD PRESSURE: 90 MMHG | HEART RATE: 92 BPM | OXYGEN SATURATION: 98 % | BODY MASS INDEX: 30.61 KG/M2 | TEMPERATURE: 97.8 F | WEIGHT: 226 LBS | SYSTOLIC BLOOD PRESSURE: 120 MMHG

## 2023-12-13 DIAGNOSIS — M79.672 FOOT PAIN, BILATERAL: Primary | ICD-10-CM

## 2023-12-13 DIAGNOSIS — N18.6 END STAGE RENAL DISEASE ON DIALYSIS (HCC): ICD-10-CM

## 2023-12-13 DIAGNOSIS — F41.1 ANXIETY, GENERALIZED: ICD-10-CM

## 2023-12-13 DIAGNOSIS — R53.1 WEAKNESS: ICD-10-CM

## 2023-12-13 DIAGNOSIS — M79.671 FOOT PAIN, BILATERAL: Primary | ICD-10-CM

## 2023-12-13 DIAGNOSIS — Z99.2 END STAGE RENAL DISEASE ON DIALYSIS (HCC): ICD-10-CM

## 2023-12-13 DIAGNOSIS — E55.9 VITAMIN D DEFICIENCY: ICD-10-CM

## 2023-12-13 DIAGNOSIS — R11.14 BILIOUS VOMITING WITH NAUSEA: ICD-10-CM

## 2023-12-13 DIAGNOSIS — G62.9 NEUROPATHY: ICD-10-CM

## 2023-12-13 DIAGNOSIS — E72.53: Primary | ICD-10-CM

## 2023-12-13 DIAGNOSIS — M79.606 LEG PAIN: ICD-10-CM

## 2023-12-13 PROBLEM — R79.89 ELEVATED TROPONIN: Status: RESOLVED | Noted: 2023-11-28 | Resolved: 2023-12-13

## 2023-12-13 PROBLEM — R11.10 VOMITING: Status: RESOLVED | Noted: 2023-12-03 | Resolved: 2023-12-13

## 2023-12-13 PROBLEM — R11.10 INTRACTABLE VOMITING: Status: RESOLVED | Noted: 2023-11-29 | Resolved: 2023-12-13

## 2023-12-13 PROCEDURE — 97162 PT EVAL MOD COMPLEX 30 MIN: CPT | Performed by: PHYSICAL THERAPIST

## 2023-12-13 PROCEDURE — 97110 THERAPEUTIC EXERCISES: CPT | Performed by: PHYSICAL THERAPIST

## 2023-12-13 PROCEDURE — 97112 NEUROMUSCULAR REEDUCATION: CPT | Performed by: PHYSICAL THERAPIST

## 2023-12-13 PROCEDURE — 99495 TRANSJ CARE MGMT MOD F2F 14D: CPT | Performed by: FAMILY MEDICINE

## 2023-12-13 RX ORDER — GABAPENTIN 300 MG/1
300 CAPSULE ORAL 2 TIMES DAILY
Qty: 60 CAPSULE | Refills: 3 | Status: SHIPPED | OUTPATIENT
Start: 2023-12-13

## 2023-12-13 RX ORDER — MULTIVIT-MIN/IRON/FOLIC ACID/K 18-600-40
1 CAPSULE ORAL DAILY
Start: 2023-12-13

## 2023-12-13 RX ORDER — ONDANSETRON 4 MG/1
4 TABLET, FILM COATED ORAL EVERY 8 HOURS PRN
Qty: 50 TABLET | Refills: 0 | Status: SHIPPED | OUTPATIENT
Start: 2023-12-13

## 2023-12-13 NOTE — PROGRESS NOTES
Assessment & Plan     1. Primary hyperoxaluria type 1 ([address])  Assessment & Plan:  Improved, continue his dialysis daily under the direction of his nephrologist.      2. End stage renal disease on dialysis [facility])  Assessment & Plan:  Lab Results   Component Value Date    EGFR 3 12/04/2023    EGFR 4 12/02/2023    EGFR 3 12/01/2023    CREATININE 14.93 (H) 12/04/2023    CREATININE 12.38 (H) 12/02/2023    CREATININE 16.97 (H) 12/01/2023   Doing dialysis daily, is scheduled for AV fistula tomorrow. 3. Anxiety, generalized  Assessment & Plan:  Continue his trazodone daily      4. Neuropathy  Assessment & Plan:  Improved, will increase his gabapentin to 300 mg twice daily. Orders:  -     gabapentin (Neurontin) 300 mg capsule; Take 1 capsule (300 mg total) by mouth 2 (two) times a day    5. Bilious vomiting with nausea  Comments:  Have CT, worried about pylonephritis due to kidney stones, will do non con due to kidney functions, tx symptoms instructions given. BRATY diet. Orders:  -     ondansetron (ZOFRAN) 4 mg tablet; Take 1 tablet (4 mg total) by mouth every 8 (eight) hours as needed for nausea or vomiting    6. Vitamin D deficiency  -     Vitamin D, Cholecalciferol, 50 MCG (2000 UT) CAPS; Take 1 capsule by mouth in the morning         Subjective     Transitional Care Management Review:   Blaine Wayne is a 25 y.o. male here for TCM follow up. During the TCM phone call patient stated:  TCM Call     Date and time call was made  12/7/2023 10:19 AM    Hospital care reviewed  Records reviewed    Patient was hospitialized at  [id number] [facility]    Date of Admission  11/28/23    Date of discharge  12/06/23    Diagnosis  Elevated troponin    Disposition  Home    Were the patients medications reviewed and updated  Yes    Current Symptoms  None      TCM Call     Post hospital issues  None    Should patient be enrolled in anticoag monitoring? No    Scheduled for follow up?   Yes    Did you obtain your prescribed medications  Yes    Do you need help managing your prescriptions or medications  No    Is transportation to your appointment needed  No    I have advised the patient to call PCP with any new or worsening symptoms  Saroj Garcia/MA    Living Arrangements  Alone; parents    Support System  Family    The type of support provided  Emotional    Do you have social support  Yes, as much as I need    Are you recieving any outpatient services  No    Are you recieving home care services  No    Are you using any community resources  No    Current waiver services  No    Have you fallen in the last 12 months  No    Interperter language line needed  No    Counseling  Patient    Counseling topics  Activities of daily living; patient and family education    Comments  [facility] scheduled for 12/13/2023 at 1:20        Patient comes in today for transitional care management, he was admitted to [address] [place] [address] on November 28 and discharged on December 6. He was diagnosed with hyper oxaluria, end-stage renal disease. He has been receiving dialysis daily his symptoms have improved which included leg pain. It was noted while he was in the hospital that he had an elevated troponin and an abnormal stress test.  His cardiac catheterization was normal.  He is set to have an AV fistula tomorrow. He has been receiving dialysis daily. He states that overall he is feeling better. He was started on gabapentin 100 mg 3 times daily and is questioning if he can increase this. Review of Systems   Constitutional:  Negative for chills, fatigue and fever. HENT:  Negative for congestion, ear pain, hearing loss, postnasal drip, rhinorrhea and sore throat. Eyes:  Negative for pain and visual disturbance. Respiratory:  Negative for chest tightness, shortness of breath and wheezing. Cardiovascular:  Positive for leg swelling. Negative for chest pain.    Gastrointestinal:  Negative for abdominal distention, abdominal pain, constipation, diarrhea and vomiting. Endocrine: Negative for cold intolerance and heat intolerance. Genitourinary:  Negative for difficulty urinating, frequency and urgency. Musculoskeletal:  Negative for arthralgias and gait problem. Skin:  Negative for color change. Neurological:  Negative for dizziness, tremors, syncope, numbness and headaches. Hematological:  Negative for adenopathy. Psychiatric/Behavioral:  Negative for agitation, confusion and sleep disturbance. The patient is not nervous/anxious. Objective     /90 (BP Location: Left arm, Patient Position: Sitting, Cuff Size: Standard)   Pulse 92   Temp 97.8 °F (36.6 °C) (Tympanic)   Ht 6' (1.829 m)   Wt 103 kg (226 lb)   SpO2 98%   BMI 30.65 kg/m²      Physical Exam  Constitutional:       Appearance: He is well-developed. HENT:      Head: Normocephalic. Right Ear: External ear normal.      Left Ear: External ear normal.      Nose: Nose normal.   Eyes:      Extraocular Movements: Extraocular movements intact. Conjunctiva/sclera: Conjunctivae normal.      Pupils: Pupils are equal, round, and reactive to light. Neck:      Thyroid: No thyromegaly. Cardiovascular:      Rate and Rhythm: Normal rate and regular rhythm. Heart sounds: Normal heart sounds. Pulmonary:      Effort: Pulmonary effort is normal.      Breath sounds: Normal breath sounds. Abdominal:      General: Bowel sounds are normal.      Palpations: Abdomen is soft. Musculoskeletal:         General: Normal range of motion. Cervical back: Normal range of motion. Right lower leg: Edema present. Left lower leg: Edema present. Skin:     General: Skin is warm and dry. Neurological:      Mental Status: He is alert and oriented to person, place, and time.    Psychiatric:         Mood and Affect: Mood normal.         Behavior: Behavior normal.       Medications have been reviewed by provider in current encounter    Sarath Higgins DO

## 2023-12-13 NOTE — ASSESSMENT & PLAN NOTE
Lab Results   Component Value Date    EGFR 3 12/04/2023    EGFR 4 12/02/2023    EGFR 3 12/01/2023    CREATININE 14.93 (H) 12/04/2023    CREATININE 12.38 (H) 12/02/2023    CREATININE 16.97 (H) 12/01/2023   Doing dialysis daily, is scheduled for AV fistula tomorrow.

## 2023-12-13 NOTE — PROGRESS NOTES
PT Evaluation     Today's date: 2023  Patient name: Koby Mario  : 1995  MRN: [id number]  Referring provider: Teena Cantrell  Dx:   Encounter Diagnosis     ICD-10-CM    1. Leg pain  M79.606 Ambulatory referral to Physical Therapy      2. Neuropathy  G62.9 Ambulatory referral to Physical Therapy      3. Weakness  R53.1 Ambulatory referral to Physical Therapy                     Assessment  Assessment details: Patient is a 24 y/o male with chief complaints of bilateral foot pain, swelling, and numbness. This began secondary to his kidney issues. It's been improving with more dialysis. He now presents with a walker for ambulation. Patient presents with decreased functional mobility due to increased pain, decreased hip and ankle strength, decreased ankle ROM associated with bilateral foot/ankle pain/swelling. Patient will benefit from skilled physical therapy to address impairment and improve functional mobility. PT needed to allow for return to maximal function and improve quality of life. Impairments: abnormal or restricted ROM, activity intolerance, impaired physical strength, lacks appropriate home exercise program and pain with function  Understanding of Dx/Px/POC: good   Prognosis: good    Goals  STG within 4 weeks:   1. Patient to be independent in HEP. 2. Reduce pain by 50% to improve quality of life. 3. Improve TUG to less than 8 seconds. 4. Improve hip strength to 4+ in all planes. LTG within 8 weeks:   1. Patient to be independent in ADLs/IADLs without difficulty. 2. Patient to be able to ambulate community distances without pain. 3. Patient to be able to ambulate reciprocal stairs without pain.      Plan  Patient would benefit from: skilled physical therapy and PT eval  Planned modality interventions: cryotherapy, hydrotherapy and unattended electrical stimulation  Planned therapy interventions: therapeutic training, therapeutic exercise, therapeutic activities, stretching, strengthening, postural training, patient education, neuromuscular re-education, manual therapy, joint mobilization, IADL retraining, activity modification, ADL retraining, ADL training, body mechanics training, flexibility, functional ROM exercises, gait training, graded activity, graded exercise, graded motor and home exercise program  Frequency: 1x week  Duration in weeks: 8  Plan of Care beginning date: 2023  Plan of Care expiration date: 2024  Treatment plan discussed with: patient    Subjective Evaluation    History of Present Illness  Mechanism of injury: Patient is a 26 y/o male 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 He's been on dialysis since August and now is on daily dialysis. He notes his feet really started bothering him at the end of September. As he has more dialysis, he has less pain. He also presents with swelling. Bilateral numbness into bilateral feet. He is referred for evaluation and treatment. Patient Goals  Patient goal: to be able to walk without a walker again and have it not hurt  Pain  At best pain ratin  At worst pain ratin  Quality: numbness, burning. Exacerbated by: walking, standing. Progression: improved    Exercise history: standing hip abd/flex/ext, hamstring curls, seated heel raises, mini squats    Treatments  Treatments tried: Acute care PT. Objective     Strength/Myotome Testing     Left Hip   Planes of Motion   Flexion: 4-    Right Hip   Planes of Motion   Flexion: 4-    Left Knee   Flexion: 5  Extension: 5    Right Knee   Flexion: 5  Extension: 5    Left Ankle/Foot   Dorsiflexion: 4+  Great toe extension: 4-    Right Ankle/Foot   Dorsiflexion: 4+  Great toe extension: 4-    Additional Strength Details  Bilateral ankle and foot swelling   Hip flexion weak bilaterally              Precautions: h/o dialysis. [email or web address]  Access Code: [email or web address]    POC expires Unit limit Auth Expiration date PT/OT/ST + Visit Limit?   2/7/24   30                            Visit/Unit Tracking  AUTH Status:  Date 12/13              Required Used 1               Remaining  29                      Manuals 12/13                                                                Neuro Re-Ed             Standing marching on foam  X20             SLB on floor/foam  2x20"                                                                              Ther Ex             Pt Edu KS            Standing hip abd Yel x10             Standing hip ext Yel x10             Ankle TB DF/PF  Red x20 ea                                                                              Ther Activity                                       Gait Training             Timed Up and Go (with walker)  16.9 sec; 15.3 sec             Timed Up and Go (with SPC)  11.9 sec             Modalities

## 2023-12-14 ENCOUNTER — HOSPITAL ENCOUNTER (OUTPATIENT)
Facility: HOSPITAL | Age: 28
Setting detail: OUTPATIENT SURGERY
Discharge: HOME | End: 2023-12-14
Attending: SURGERY | Admitting: SURGERY
Payer: COMMERCIAL

## 2023-12-14 ENCOUNTER — ANESTHESIA (OUTPATIENT)
Dept: PERIOP | Facility: HOSPITAL | Age: 28
End: 2023-12-14
Payer: COMMERCIAL

## 2023-12-14 VITALS
DIASTOLIC BLOOD PRESSURE: 87 MMHG | SYSTOLIC BLOOD PRESSURE: 109 MMHG | WEIGHT: 220 LBS | BODY MASS INDEX: 29.8 KG/M2 | HEART RATE: 61 BPM | HEIGHT: 72 IN | RESPIRATION RATE: 18 BRPM | OXYGEN SATURATION: 97 % | TEMPERATURE: 97.1 F

## 2023-12-14 PROCEDURE — 36821 AV FUSION DIRECT ANY SITE: CPT | Performed by: SURGERY

## 2023-12-14 RX ORDER — PROPOFOL 10 MG/ML
INJECTION, EMULSION INTRAVENOUS AS NEEDED
Status: DISCONTINUED | OUTPATIENT
Start: 2023-12-14 | End: 2023-12-14

## 2023-12-14 RX ORDER — PROPOFOL 10 MG/ML
INJECTION, EMULSION INTRAVENOUS CONTINUOUS PRN
Status: DISCONTINUED | OUTPATIENT
Start: 2023-12-14 | End: 2023-12-14

## 2023-12-14 RX ORDER — ACETAMINOPHEN 325 MG/1
975 TABLET ORAL EVERY 6 HOURS SCHEDULED
Status: DISCONTINUED | OUTPATIENT
Start: 2023-12-14 | End: 2023-12-14 | Stop reason: HOSPADM

## 2023-12-14 RX ORDER — ONDANSETRON 2 MG/ML
INJECTION INTRAMUSCULAR; INTRAVENOUS AS NEEDED
Status: DISCONTINUED | OUTPATIENT
Start: 2023-12-14 | End: 2023-12-14

## 2023-12-14 RX ORDER — FENTANYL CITRATE 50 UG/ML
INJECTION, SOLUTION INTRAMUSCULAR; INTRAVENOUS AS NEEDED
Status: DISCONTINUED | OUTPATIENT
Start: 2023-12-14 | End: 2023-12-14

## 2023-12-14 RX ORDER — CHLORHEXIDINE GLUCONATE ORAL RINSE 1.2 MG/ML
15 SOLUTION DENTAL ONCE
Status: COMPLETED | OUTPATIENT
Start: 2023-12-14 | End: 2023-12-14

## 2023-12-14 RX ORDER — FENTANYL CITRATE/PF 50 MCG/ML
25 SYRINGE (ML) INJECTION
Status: DISCONTINUED | OUTPATIENT
Start: 2023-12-14 | End: 2023-12-14 | Stop reason: HOSPADM

## 2023-12-14 RX ORDER — OXYCODONE HYDROCHLORIDE 5 MG/1
5 TABLET ORAL EVERY 4 HOURS PRN
Status: DISCONTINUED | OUTPATIENT
Start: 2023-12-14 | End: 2023-12-14 | Stop reason: HOSPADM

## 2023-12-14 RX ORDER — CEFAZOLIN SODIUM 1 G/3ML
INJECTION, POWDER, FOR SOLUTION INTRAMUSCULAR; INTRAVENOUS AS NEEDED
Status: DISCONTINUED | OUTPATIENT
Start: 2023-12-14 | End: 2023-12-14

## 2023-12-14 RX ORDER — HYDROMORPHONE HCL/PF 1 MG/ML
0.5 SYRINGE (ML) INJECTION
Status: DISCONTINUED | OUTPATIENT
Start: 2023-12-14 | End: 2023-12-14 | Stop reason: HOSPADM

## 2023-12-14 RX ORDER — SODIUM CHLORIDE 9 MG/ML
20 INJECTION, SOLUTION INTRAVENOUS CONTINUOUS
Status: DISCONTINUED | OUTPATIENT
Start: 2023-12-14 | End: 2023-12-14 | Stop reason: HOSPADM

## 2023-12-14 RX ORDER — KETAMINE HCL IN NACL, ISO-OSM 100MG/10ML
SYRINGE (ML) INJECTION AS NEEDED
Status: DISCONTINUED | OUTPATIENT
Start: 2023-12-14 | End: 2023-12-14

## 2023-12-14 RX ORDER — HEPARIN SODIUM 1000 [USP'U]/ML
INJECTION, SOLUTION INTRAVENOUS; SUBCUTANEOUS AS NEEDED
Status: DISCONTINUED | OUTPATIENT
Start: 2023-12-14 | End: 2023-12-14

## 2023-12-14 RX ORDER — MAGNESIUM HYDROXIDE 1200 MG/15ML
LIQUID ORAL AS NEEDED
Status: DISCONTINUED | OUTPATIENT
Start: 2023-12-14 | End: 2023-12-14 | Stop reason: HOSPADM

## 2023-12-14 RX ORDER — MIDAZOLAM HYDROCHLORIDE 2 MG/2ML
INJECTION, SOLUTION INTRAMUSCULAR; INTRAVENOUS AS NEEDED
Status: DISCONTINUED | OUTPATIENT
Start: 2023-12-14 | End: 2023-12-14

## 2023-12-14 RX ORDER — SODIUM CHLORIDE 9 MG/ML
INJECTION, SOLUTION INTRAVENOUS CONTINUOUS PRN
Status: DISCONTINUED | OUTPATIENT
Start: 2023-12-14 | End: 2023-12-14

## 2023-12-14 RX ADMIN — CEFAZOLIN SODIUM 2000 MG: 1 INJECTION, POWDER, FOR SOLUTION INTRAMUSCULAR; INTRAVENOUS at 12:41

## 2023-12-14 RX ADMIN — Medication 20 MG: at 12:27

## 2023-12-14 RX ADMIN — FENTANYL CITRATE 50 MCG: 50 INJECTION, SOLUTION INTRAMUSCULAR; INTRAVENOUS at 12:26

## 2023-12-14 RX ADMIN — ONDANSETRON 4 MG: 2 INJECTION INTRAMUSCULAR; INTRAVENOUS at 12:33

## 2023-12-14 RX ADMIN — PROPOFOL 50 MG: 10 INJECTION, EMULSION INTRAVENOUS at 12:26

## 2023-12-14 RX ADMIN — Medication 10 MG: at 12:43

## 2023-12-14 RX ADMIN — HEPARIN SODIUM 5000 UNITS: 1000 INJECTION, SOLUTION INTRAVENOUS; SUBCUTANEOUS at 13:17

## 2023-12-14 RX ADMIN — SODIUM CHLORIDE 20 ML/HR: 0.9 INJECTION, SOLUTION INTRAVENOUS at 10:56

## 2023-12-14 RX ADMIN — FENTANYL CITRATE 50 MCG: 50 INJECTION, SOLUTION INTRAMUSCULAR; INTRAVENOUS at 11:48

## 2023-12-14 RX ADMIN — MIDAZOLAM HYDROCHLORIDE 2 MG: 2 INJECTION, SOLUTION INTRAMUSCULAR; INTRAVENOUS at 11:48

## 2023-12-14 RX ADMIN — PROPOFOL 20 MG: 10 INJECTION, EMULSION INTRAVENOUS at 13:00

## 2023-12-14 RX ADMIN — Medication 10 MG: at 12:58

## 2023-12-14 RX ADMIN — CHLORHEXIDINE GLUCONATE 15 ML: 1.2 SOLUTION ORAL at 10:41

## 2023-12-14 RX ADMIN — PROPOFOL 130 MCG/KG/MIN: 10 INJECTION, EMULSION INTRAVENOUS at 12:26

## 2023-12-14 RX ADMIN — SODIUM CHLORIDE: 9 INJECTION, SOLUTION INTRAVENOUS at 12:19

## 2023-12-14 RX ADMIN — Medication 10 MG: at 12:50

## 2023-12-14 NOTE — ANESTHESIA POSTPROCEDURE EVALUATION
Post-Op Assessment Note    CV Status:  Stable    Pain management: adequate       Mental Status:  Alert and awake   Hydration Status:  Euvolemic   PONV Controlled:  Controlled   Airway Patency:  Patent     Post Op Vitals Reviewed: Yes      Staff: Anesthesiologist, CRNA               BP   93/52   Temp      Pulse  67   Resp   16   SpO2   99

## 2023-12-14 NOTE — DISCHARGE INSTR - AVS FIRST PAGE
DISCHARGE INSTRUCTIONS  DIALYSIS FISTULA SURGERY    ACTIVITY:  Limit use of the operated arm to what is necessary for the first day after surgery. On the second day after surgery, you may start to increase use of your arm as tolerated. Avoid heavy lifting (no more than 15 lbs) for the first one week. You should start to exercise your hand on the side of the fistula by squeezing a stress ball or a rolled-up sock. This increases blood flow in your fistula and arm so your fistula will function better. Feel for a thrill every day. The thrill is the vibration or pulse you feel over the fistula that means the blood is flowing through it. If you cannot feel a thrill, call our office ([phone]). DIET:   Resume your normal diet. Good nutrition is important for healing of your incision. DRESSING:   You may have surgical glue at your surgical site. There are stitches present under the skin which will absorb on their own. The glue is used to cover the incision, assist in closure, and prevent contamination. This adhesive will darken and peel away on its own within one to two weeks. Do not pick at it. If you have a dressing over your surgical site, remove this on the second day after surgery. INCISION:   If you do not have a dialysis catheter in place, you may shower and get your incision wet. Wash incision daily with soap and water, but do not rub or scrub the incision; rinse thoroughly and pat dry. You may have stitches or staples to close your incision and it is okay for these to get wet. Do not bathe in a tub or swim for the first 4 week following surgery or if you have any open wounds. It is normal to have mild swelling or discoloration around the incision. If increasing redness or pain develops, call our office immediately. Numbness in the region of the incision may occur following the surgery. This normally improves over six to twelve months.   If you have numbness or pain in your hand, please call our office immediately. DO NOT put any powders, creams, ointments, or lotions on your incision. ARM SWELLING:    Most patients have some noticeable arm swelling after surgery. This usually disappears within a few weeks. If swelling is present, elevate the arm whenever possible. RESTRICTIONS:   Do NOT have blood draws, IV's, or blood pressures performed on the operated arm. FISTULA USE:    Your fistula will not be used until it has fully matured - approximately 6 to 12 weeks. If you are using a catheter for dialysis, this will not be removed until after your fistula has matured and is being used for dialysis without any issues. FOLLOW UP STUDIES:  A Doppler ultrasound will be performed about 5-6 weeks after surgery. Your surgeon will arrange this at your first postoperative visit. FOLLOW UP APPOINTMENTS:  Making and keeping follow up appointments and ultrasound tests are important to your recovery. If you have difficulty making it to or keeping your follow up appointments, call the office. If you have increased pain, fever >101.5, increased drainage, redness or a bad smell at your surgery site, new coldness/numbness of your arm or leg, please call us immediately and GO directly to the ER. PLEASE CALL THE OFFICE IF YOU HAVE ANY QUESTIONS  [phone]  [phone]  990 [facility] [address], [place]), [address] [facility]  3000 [facility], [address] [place] [address]  [address]  [address], [facility], 630 [facility]  533 [address] [facility], 161 [facility], [address]  1001 [facility],Sixth Floor, 1st Floor, [place], [address]  [address], [address] [facility] [address], [address], [place], [address]  [address], 4800 [facility] [place] ([place]), 1200 [facility]  1501 [facility], 319 [facility], 161 [facility] [address]  [address], [place]  [address]

## 2023-12-14 NOTE — OP NOTE
OPERATIVE REPORT  PATIENT NAME: Mary La    :  1995  MRN: [id number]  Pt Location: BE OR ROOM 06    SURGERY DATE: 2023    Surgeons and Role:     * Frank Garcia MD - Primary     * Fco Ho DO - Fellow    Preop Diagnosis:  End stage renal disease on dialysis ([address]) [N18.6, Z99.2]    Post-Op Diagnosis Codes:     * End stage renal disease on dialysis ([address]) [N18.6, Z99.2]    Procedure(s):  Left - CREATION FISTULA ARTERIOVENOUS (AV)    Specimen(s):  * No specimens in log *    Estimated Blood Loss:   Minimal    Drains:  Ureteral Drain/Stent Left ureter 6 Fr. (Active)   Number of days: 1146       Ureteral Drain/Stent Right ureter 6 Fr. (Active)   Number of days: 4479       Anesthesia Type:   Choice    Operative Indications:  End stage renal disease on dialysis ([address]) [N18.6, Z99.2]    26 y/o male with PMH ESRD c TDC, Primary Hyperoxaluria Type 1 presenting for an elective left upper extremity AV fistula creation for hemodialysis access. Discussed risk and benefits of intervention. Operative Findings:    Left Brachiocephalic AVF    LUE AVF palpable thrill at case conclusion     Complications:   None    Procedure and Technique:    Procedure and Technique:    The patient was brought to the operating room and placed on the operating table in the supine position. The patient was identified by armband identification in a verbal communication. A preoperative regional block was administered by the anesthesiology team.  The patient was kept under moderate sedation. The patient was prepped and draped in usual sterile fashion and a formal timeout was called. Preoperative antibiotics were administered. Preoperatively a ultrasound mapping was performed of the left upper extremity. The forearm cephalic vein was of adequate caliber for fistula however the radial artery was small and heavily calcified similar to the preoperative vein mapping.   The cephalic vein above the antecubital fossa was of adequate caliber. The brachial artery was of adequate caliber. A 15 blade scalpel was made used to make a transverse incision overlying the brachial artery proximal to the antecubital crease. Dissection was carried through the subcutaneous tissue to the confluence of veins that sharp dissection and electrocautery. The veins were encircled with vessel loops and attention was turned to the brachial artery dissection. The fascia overlying the brachial artery was incised and the artery was then mobilized proximally and distally to allow placement of Silastic loop. The elected outflow vein was then cannulated with the vein distention kit and gently distended with heparin papaverine solution. It was carefully mobilized to allow placement of the anastomosis. Ligation of branches was performed with 4-0 silk ties and hemoclips to allow cephalic to brachial artery anastomosis. The cephalic vein was trimmed to the appropriate shape and length and the brachial artery was then occluded with spring jaw bulldog clamps. The brachial artery was controlled proximally distally by pulling the Silastic loops top. 11 blade scalpel was used make an arteriotomy. A pot scissors used to extend the arteriotomy. The cephalic vein was then brought over and a end to side anastomosis created in a running manner with 6-0 Prolene. However after completion of the anastomosis it was evident that there was a twist causing a significant kink in the anastomosis. There was a intermittent thrill in the fistula. The patient was mobilized additionally. However ultimately the decision was made to redo the brachiocephalic fistula. The anastomosis was taken down with a 11 blade scalpel. A end to side anastomosis was then recreated with a 6-0 Prolene in a similar manner. Prior to completion the artery was allowed to backbleed and forward bleed appropriately. The vein was adequately flushed.     At completion of the brachiocephalic fistula there was a palpable thrill in the forearm at the cephalic vein. There was a biphasic Doppler signal in the ulnar artery that was not significantly augmented with compression of the fistula. Hemostasis was secured and when this was satisfactory the subcutaneous tissue was closed with interrupted Monocryl sutures and the skin closed with a running 4-0 Monocryl subcuticular closure with a histoacryl bandage. Dr. Jesús Arana was present for the entire procedure. All sharps and counts were correct x2. Patient Disposition:  PACU         SIGNATURE: Denice Martinez DO  DATE: December 14, 2023  TIME: 2:32 PM        Vascular Quality Initiative - Hemodialysis Access Placement    Pre-admission Information   Functional status: Fully active; able to carry on all predisease activities without restriction. ESRD: ESRD: Hemodialysis dependent. Current Access Type.: Tunneled Catheter    Historical Information      Previous Access: none    Access Type/Location: ACCESS TYPE: Surgical AVF  Access Location: Upper arm Cephalic. Procedure Information      Status: Outpatient     Side:left      Anesthesia: Regional      Access Type: Access Type: Surgical AVF Inflow Artery is:  Brachial, Upper Arm  Intraoperative Artery taget diameter is: 6mm. Outflow Vein is: Cephalic, Upper Arm. Intraoperative Vein target diameter is: 6mm  Anastomosis configuration is: End to Side.   Cocomitant Procedure performed-: None    Completion Fistulogram: no     Preop ARTERIAL evaluation and/or treatment: duplex    Preop VENOUS evaluation and/or treatment: ultrasound mapping    *Obtain Target Diameters from study          Post op Information     Discharge Status: Home    Post op Complications: None

## 2023-12-14 NOTE — ANESTHESIA PREPROCEDURE EVALUATION
Procedure:  CREATION FISTULA ARTERIOVENOUS (AV) (Left: Arm Upper)    Relevant Problems   CARDIO   (+) Microangiopathy (HCC)      GI/HEPATIC   (+) Gastroesophageal reflux disease without esophagitis      /RENAL   (+) Chronic kidney disease-mineral and bone disorder   (+) End stage renal disease on dialysis ([address])   (+) Hydronephrosis with urinary obstruction due to ureteral calculus   (+) Primary hyperoxaluria type 1 (HCC)      HEMATOLOGY   (+) Anemia      NEURO/PSYCH   (+) Anxiety, generalized      Genitourinary   (+) Ureteric stone ----> hydroureteronephrosis moderate      Patient is a hyperoxaluria with related neuropathy and vascular problems     Lab Results   Component Value Date    WBC 7.62 12/02/2023    HGB 10.0 (L) 12/02/2023    HCT 29.2 (L) 12/02/2023    MCV 93 12/02/2023     12/02/2023     Lab Results   Component Value Date    K 4.3 12/04/2023    CO2 27 12/04/2023    CL 95 (L) 12/04/2023    BUN 55 (H) 12/04/2023    CREATININE 14.93 (H) 12/04/2023     Lab Results   Component Value Date    INR 1.00 08/24/2023    PROTIME 13.8 08/24/2023     No results found for: "PTT"    Physical Exam    Airway    Mallampati score: II  TM Distance: >3 FB  Neck ROM: full     Dental       Cardiovascular      Pulmonary      Other Findings        Anesthesia Plan  ASA Score- 3     Anesthesia Type- regional with ASA Monitors. Additional Monitors:     Airway Plan:     Comment: Discussed supraclavicular nerve block as primary anesthetic with light to moderate sedation. Plan Factors-Exercise tolerance (METS): >4 METS. Chart reviewed. Existing labs reviewed. Patient summary reviewed. Induction- intravenous. Postoperative Plan-     Informed Consent- Anesthetic plan and risks discussed with patient. I personally reviewed this patient with the CRNA. Discussed and agreed on the Anesthesia Plan with the CRNA. Serena Sanz

## 2023-12-14 NOTE — PERIOPERATIVE NURSING NOTE
Left  arm ,AV fistula site, bruit and thrill present. Right chest hemodialysis catheter dressing dry and intact.

## 2023-12-14 NOTE — INTERVAL H&P NOTE
H&P reviewed. After examining the patient I find no changes in the patients condition since the H&P had been written.     Vitals:    12/14/23 1031   BP: 137/92   Pulse: 80   Resp: 14   Temp: 98.2 °F (36.8 °C)   SpO2: 98%

## 2023-12-18 ENCOUNTER — TELEPHONE (OUTPATIENT)
Dept: VASCULAR SURGERY | Facility: CLINIC | Age: 28
End: 2023-12-18

## 2023-12-18 NOTE — TELEPHONE ENCOUNTER
Vascular Nurse Navigator Post Op Call    Procedure: Left - CREATION FISTULA ARTERIOVENOUS (AV)     Date of Procedure: 12/14/23    Surgeon:   * Lencho Worthington MD - Primary     * Vance Sloan DO - Fellow      Painful tingling or numbness in your fingers?: No    Paleness/Coolness in hands/fingers?: No    Redness, swelling or pus from your wound?: No    Bleeding?: No    Thrill present?: Yes    Fever/chills?: No    Uncontrolled Pain?: No      Reviewed discharge instructions and incision care with patient.      Dialysis Days and Location:  MWF at [facility] - doing home dialysis training at [place] Monday through Friday    NEXT SCHEDULED OFFICE VISIT:  1/8/24 at 2:30 pm with Dr. Worthington at [facility]    Transportation Confirmed?: Yes      Any Questions or Concerns?    Patient stated that he is doing good since procedure.  Reviewed incision care with him - wash daily with soap and water.  All questions answered.  No concerns expressed at this time.

## 2024-01-05 NOTE — PROGRESS NOTES
Assessment/Plan:    End stage renal disease on dialysis (HCC)  Lab Results   Component Value Date    EGFR 3 12/04/2023    EGFR 4 12/02/2023    EGFR 3 12/01/2023    CREATININE 14.93 (H) 12/04/2023    CREATININE 12.38 (H) 12/02/2023    CREATININE 16.97 (H) 12/01/2023   LUE brachiocephalic AVF with thrill and well healing incision.  Will perform interval duplex to assess for maturity and advise from that point.  Please call with questions.        Subjective:      Patient ID: Milton Padilla is a 28 y.o. male.    Patient is s/p LUE AV fistula creation on 12/14/23 and presents today for post-op visit.     HPI  Milton is nearly approximately 1 month out from left upper extremity brachiocephalic AV fistula formation.  There is a palpable thrill.  The wound is healing well with no signs of erythema nor drainage.  He has no concerns for ischemia of his hand.  We will assess a duplex of his left upper extremity in 2 to 3 weeks for maturity.  At that point he may undergo evaluation for dialysis access attempts.  He is currently requiring daily dialysis for buildup of oxalate and extremity symptoms.  He is on a transplant list for liver and kidney transplant.  At this time he has no further questions.    Review of Systems   Constitutional: Negative.    HENT: Negative.     Eyes: Negative.    Respiratory: Negative.     Cardiovascular: Negative.    Gastrointestinal: Negative.    Endocrine: Negative.    Genitourinary: Negative.    Musculoskeletal:         Calf cramping with walking   Skin: Negative.    Allergic/Immunologic: Negative.    Neurological:  Positive for numbness.   Hematological: Negative.    Psychiatric/Behavioral: Negative.           Objective:      /82 (BP Location: Right arm, Patient Position: Sitting)   Pulse 66   Ht 6' (1.829 m)   Wt 97 kg (213 lb 13.5 oz)   BMI 29.00 kg/m²          Physical Exam  Constitutional:       Appearance: Normal appearance.   HENT:      Head: Normocephalic and atraumatic.       Nose: Nose normal. No rhinorrhea.   Eyes:      Extraocular Movements: Extraocular movements intact.      Pupils: Pupils are equal, round, and reactive to light.   Cardiovascular:      Rate and Rhythm: Normal rate and regular rhythm.      Pulses:           Radial pulses are 2+ on the left side.      Comments: Thrill over left upper arm cephalic vein.  Well healing incision.  Pulmonary:      Effort: Pulmonary effort is normal.      Breath sounds: No stridor.   Abdominal:      General: There is no distension.      Tenderness: There is no abdominal tenderness.   Musculoskeletal:         General: No tenderness. Normal range of motion.      Cervical back: Normal range of motion and neck supple.   Skin:     General: Skin is warm.      Capillary Refill: Capillary refill takes less than 2 seconds.      Coloration: Skin is not jaundiced.   Neurological:      General: No focal deficit present.      Mental Status: He is alert and oriented to person, place, and time.   Psychiatric:         Mood and Affect: Mood normal.         Behavior: Behavior normal.

## 2024-01-08 ENCOUNTER — OFFICE VISIT (OUTPATIENT)
Dept: VASCULAR SURGERY | Facility: CLINIC | Age: 29
End: 2024-01-08

## 2024-01-08 VITALS
HEIGHT: 72 IN | WEIGHT: 213.85 LBS | HEART RATE: 66 BPM | BODY MASS INDEX: 28.96 KG/M2 | SYSTOLIC BLOOD PRESSURE: 126 MMHG | DIASTOLIC BLOOD PRESSURE: 82 MMHG

## 2024-01-08 DIAGNOSIS — N18.6 END STAGE RENAL DISEASE ON DIALYSIS (HCC): Primary | ICD-10-CM

## 2024-01-08 DIAGNOSIS — Z99.2 END STAGE RENAL DISEASE ON DIALYSIS (HCC): Primary | ICD-10-CM

## 2024-01-08 PROCEDURE — 99024 POSTOP FOLLOW-UP VISIT: CPT | Performed by: SURGERY

## 2024-01-08 RX ORDER — OXYCODONE HYDROCHLORIDE 5 MG/1
1 TABLET ORAL DAILY PRN
COMMUNITY

## 2024-01-08 NOTE — LETTER
January 8, 2024     Alessandro Soler DO  [address]  [facility] [address]    Patient: Milton Padilla   YOB: 1995   Date of Visit: 1/8/2024       Dear Dr. Soler:    Thank you for referring Milton Padilla to me for evaluation. Below are my notes for this consultation.    If you have questions, please do not hesitate to call me. I look forward to following your patient along with you.         Sincerely,        Lencho Worthington MD        CC: Milton Padilla    Lencho Worthington MD  1/8/2024  5:33 PM  Sign when Signing Visit  Assessment/Plan:    End stage renal disease on dialysis (HCC)  Lab Results   Component Value Date    EGFR 3 12/04/2023    EGFR 4 12/02/2023    EGFR 3 12/01/2023    CREATININE 14.93 (H) 12/04/2023    CREATININE 12.38 (H) 12/02/2023    CREATININE 16.97 (H) 12/01/2023   LUE brachiocephalic AVF with thrill and well healing incision.  Will perform interval duplex to assess for maturity and advise from that point.  Please call with questions.        Subjective:      Patient ID: Milton Padilla is a 28 y.o. male.    Patient is s/p LUE AV fistula creation on 12/14/23 and presents today for post-op visit.     AMOR Rose is nearly approximately 1 month out from left upper extremity brachiocephalic AV fistula formation.  There is a palpable thrill.  The wound is healing well with no signs of erythema nor drainage.  He has no concerns for ischemia of his hand.  We will assess a duplex of his left upper extremity in 2 to 3 weeks for maturity.  At that point he may undergo evaluation for dialysis access attempts.  He is currently requiring daily dialysis for buildup of oxalate and extremity symptoms.  He is on a transplant list for liver and kidney transplant.  At this time he has no further questions.    Review of Systems   Constitutional: Negative.    HENT: Negative.     Eyes: Negative.    Respiratory: Negative.     Cardiovascular: Negative.     Gastrointestinal: Negative.    Endocrine: Negative.    Genitourinary: Negative.    Musculoskeletal:         Calf cramping with walking   Skin: Negative.    Allergic/Immunologic: Negative.    Neurological:  Positive for numbness.   Hematological: Negative.    Psychiatric/Behavioral: Negative.           Objective:      /82 (BP Location: Right arm, Patient Position: Sitting)   Pulse 66   Ht 6' (1.829 m)   Wt 97 kg (213 lb 13.5 oz)   BMI 29.00 kg/m²          Physical Exam  Constitutional:       Appearance: Normal appearance.   HENT:      Head: Normocephalic and atraumatic.      Nose: Nose normal. No rhinorrhea.   Eyes:      Extraocular Movements: Extraocular movements intact.      Pupils: Pupils are equal, round, and reactive to light.   Cardiovascular:      Rate and Rhythm: Normal rate and regular rhythm.      Pulses:           Radial pulses are 2+ on the left side.      Comments: Thrill over left upper arm cephalic vein.  Well healing incision.  Pulmonary:      Effort: Pulmonary effort is normal.      Breath sounds: No stridor.   Abdominal:      General: There is no distension.      Tenderness: There is no abdominal tenderness.   Musculoskeletal:         General: No tenderness. Normal range of motion.      Cervical back: Normal range of motion and neck supple.   Skin:     General: Skin is warm.      Capillary Refill: Capillary refill takes less than 2 seconds.      Coloration: Skin is not jaundiced.   Neurological:      General: No focal deficit present.      Mental Status: He is alert and oriented to person, place, and time.   Psychiatric:         Mood and Affect: Mood normal.         Behavior: Behavior normal.

## 2024-01-08 NOTE — ASSESSMENT & PLAN NOTE
Lab Results   Component Value Date    EGFR 3 12/04/2023    EGFR 4 12/02/2023    EGFR 3 12/01/2023    CREATININE 14.93 (H) 12/04/2023    CREATININE 12.38 (H) 12/02/2023    CREATININE 16.97 (H) 12/01/2023   LUE brachiocephalic AVF with thrill and well healing incision.  Will perform interval duplex to assess for maturity and advise from that point.  Please call with questions.

## 2024-01-08 NOTE — PATIENT INSTRUCTIONS
1. End stage renal disease on dialysis (HCC)  Assessment & Plan:  Lab Results   Component Value Date    EGFR 3 12/04/2023    EGFR 4 12/02/2023    EGFR 3 12/01/2023    CREATININE 14.93 (H) 12/04/2023    CREATININE 12.38 (H) 12/02/2023    CREATININE 16.97 (H) 12/01/2023   LUE brachiocephalic AVF with thrill and well healing incision.  Will perform interval duplex to assess for maturity and advise from that point.  Please call with questions.      Orders:  -     VAS hemodialysis access duplex left upper limb avf; Future; Expected date: 01/22/2024

## 2024-01-23 ENCOUNTER — TELEPHONE (OUTPATIENT)
Dept: VASCULAR SURGERY | Facility: CLINIC | Age: 29
End: 2024-01-23

## 2024-01-23 ENCOUNTER — HOSPITAL ENCOUNTER (OUTPATIENT)
Dept: VASCULAR ULTRASOUND | Facility: HOSPITAL | Age: 29
Discharge: HOME | End: 2024-01-23
Attending: SURGERY
Payer: COMMERCIAL

## 2024-01-23 DIAGNOSIS — N18.6 END STAGE RENAL DISEASE ON DIALYSIS (HCC): ICD-10-CM

## 2024-01-23 DIAGNOSIS — Z99.2 END STAGE RENAL DISEASE ON DIALYSIS (HCC): ICD-10-CM

## 2024-01-23 PROCEDURE — 93990 DOPPLER FLOW TESTING: CPT | Performed by: SURGERY

## 2024-01-23 PROCEDURE — 93990 DOPPLER FLOW TESTING: CPT

## 2024-01-23 NOTE — TELEPHONE ENCOUNTER
----- Message from Lencho Worthington MD sent at 1/23/2024  5:37 PM EST -----  Hi everyone,    Would one of you be able to reach out to Milton Padilla in the coming days to let him know that his fistula has successfully matured.  We can attempt to use this fistula for access for dialysis at this time.  If he has any questions, or if any of you have questions regarding this plan please feel free to reach out.    Thanks for your help,  Timothy

## 2024-01-23 NOTE — TELEPHONE ENCOUNTER
S/w pt and notified of same. He will let dialysis center know but requests we notify them as well.  He goes to [facility] training [place] in [place] ph# [phone]. Pt advised me they are gone for today but he believes they open at 0730. Advised we will call them tomorrow.

## 2024-01-24 NOTE — ANESTHESIA PROCEDURE NOTES
Peripheral Block    Patient location during procedure: holding area  Start time: 12/14/2023 11:43 AM  Reason for block: at surgeon's request and post-op pain management  Staffing  Performed by: Jw Gruber MD  Authorized by: Jw Gruber MD    Preanesthetic Checklist  Completed: patient identified, IV checked, site marked, risks and benefits discussed, surgical consent, monitors and equipment checked, pre-op evaluation and timeout performed  Peripheral Block  Patient position: sitting  Prep: ChloraPrep  Patient monitoring: frequent blood pressure checks, continuous pulse oximetry and heart rate  Block type: Supraclavicular  Laterality: left  Injection technique: single-shot  Procedures: ultrasound guided, Ultrasound guidance required for the procedure to increase accuracy and safety of medication placement and decrease risk of complications.  Ultrasound permanent image saved  Needle  Needle type: Stimuplex   Needle gauge: 20 G  Needle length: 4 in  Needle localization: anatomical landmarks and ultrasound guidance  Assessment  Injection assessment: incremental injection, frequent aspiration, injected with ease, negative aspiration, negative for heart rate change, no paresthesia on injection, no symptoms of intraneural/intravenous injection and needle tip visualized at all times  Paresthesia pain: none  Post-procedure:  site cleaned  patient tolerated the procedure well with no immediate complications  Additional Notes  With ropivacaine 0.5% 25 mL perineural

## 2024-01-24 NOTE — TELEPHONE ENCOUNTER
Called [facility] and spoke with Diana. Advised of below. She voiced understanding. Will also fax the note from Dr. Worthington to [phone].

## 2024-01-25 DIAGNOSIS — E83.39 HYPERPHOSPHATEMIA: Primary | ICD-10-CM

## 2024-01-25 RX ORDER — CALCIUM ACETATE 667 MG/1
2001 CAPSULE ORAL
Qty: 270 CAPSULE | Refills: 6 | Status: SHIPPED | OUTPATIENT
Start: 2024-01-25

## 2024-01-29 ENCOUNTER — TELEPHONE (OUTPATIENT)
Dept: NEPHROLOGY | Facility: CLINIC | Age: 29
End: 2024-01-29

## 2024-01-29 NOTE — TELEPHONE ENCOUNTER
CAMILO for patient requesting to call the office to schedule a pre transplant eval with Dr. Allan. Please complete the Transplant intake form if patient calls.

## 2024-01-31 ENCOUNTER — HOSPITAL ENCOUNTER (EMERGENCY)
Facility: HOSPITAL | Age: 29
Discharge: HOME | End: 2024-01-31
Attending: EMERGENCY MEDICINE
Payer: COMMERCIAL

## 2024-01-31 ENCOUNTER — APPOINTMENT (EMERGENCY)
Dept: DIALYSIS | Facility: HOSPITAL | Age: 29
End: 2024-01-31
Payer: COMMERCIAL

## 2024-01-31 ENCOUNTER — APPOINTMENT (OUTPATIENT)
Dept: LAB | Facility: HOSPITAL | Age: 29
End: 2024-01-31
Payer: COMMERCIAL

## 2024-01-31 VITALS
RESPIRATION RATE: 18 BRPM | OXYGEN SATURATION: 99 % | HEART RATE: 81 BPM | DIASTOLIC BLOOD PRESSURE: 82 MMHG | SYSTOLIC BLOOD PRESSURE: 147 MMHG | WEIGHT: 221.12 LBS | BODY MASS INDEX: 29.99 KG/M2 | TEMPERATURE: 98 F

## 2024-01-31 DIAGNOSIS — E72.53: ICD-10-CM

## 2024-01-31 DIAGNOSIS — E87.5 HYPERKALEMIA: Primary | ICD-10-CM

## 2024-01-31 DIAGNOSIS — Z01.818 PRE-TRANSPLANT EVALUATION FOR LIVER TRANSPLANT: ICD-10-CM

## 2024-01-31 LAB
ABO GROUP BLD: NORMAL
ALBUMIN SERPL BCP-MCNC: 4.7 G/DL (ref 3.5–5)
ALP SERPL-CCNC: 46 U/L (ref 34–104)
ALT SERPL W P-5'-P-CCNC: 17 U/L (ref 7–52)
ANION GAP SERPL CALCULATED.3IONS-SCNC: 11 MMOL/L
ANION GAP SERPL CALCULATED.3IONS-SCNC: 14 MMOL/L
AST SERPL W P-5'-P-CCNC: 11 U/L (ref 13–39)
ATRIAL RATE: 70 BPM
ATRIAL RATE: 75 BPM
BASOPHILS # BLD AUTO: 0.04 THOUSANDS/ÂΜL (ref 0–0.1)
BASOPHILS # BLD AUTO: 0.04 THOUSANDS/ÂΜL (ref 0–0.1)
BASOPHILS NFR BLD AUTO: 1 % (ref 0–1)
BASOPHILS NFR BLD AUTO: 1 % (ref 0–1)
BILIRUB DIRECT SERPL-MCNC: 0.07 MG/DL (ref 0–0.2)
BILIRUB SERPL-MCNC: 0.5 MG/DL (ref 0.2–1)
BUN SERPL-MCNC: 44 MG/DL (ref 5–25)
BUN SERPL-MCNC: 46 MG/DL (ref 5–25)
CALCIUM SERPL-MCNC: 9.7 MG/DL (ref 8.4–10.2)
CALCIUM SERPL-MCNC: 9.8 MG/DL (ref 8.4–10.2)
CHLORIDE SERPL-SCNC: 92 MMOL/L (ref 96–108)
CHLORIDE SERPL-SCNC: 92 MMOL/L (ref 96–108)
CO2 SERPL-SCNC: 30 MMOL/L (ref 21–32)
CO2 SERPL-SCNC: 31 MMOL/L (ref 21–32)
CREAT SERPL-MCNC: 11.65 MG/DL (ref 0.6–1.3)
CREAT SERPL-MCNC: 12.05 MG/DL (ref 0.6–1.3)
EOSINOPHIL # BLD AUTO: 0.24 THOUSAND/ÂΜL (ref 0–0.61)
EOSINOPHIL # BLD AUTO: 0.26 THOUSAND/ÂΜL (ref 0–0.61)
EOSINOPHIL NFR BLD AUTO: 5 % (ref 0–6)
EOSINOPHIL NFR BLD AUTO: 6 % (ref 0–6)
ERYTHROCYTE [DISTWIDTH] IN BLOOD BY AUTOMATED COUNT: 17.4 % (ref 11.6–15.1)
ERYTHROCYTE [DISTWIDTH] IN BLOOD BY AUTOMATED COUNT: 17.5 % (ref 11.6–15.1)
GFR SERPL CREATININE-BSD FRML MDRD: 4 ML/MIN/1.73SQ M
GFR SERPL CREATININE-BSD FRML MDRD: 5 ML/MIN/1.73SQ M
GLUCOSE P FAST SERPL-MCNC: 86 MG/DL (ref 65–99)
GLUCOSE SERPL-MCNC: 104 MG/DL (ref 65–140)
GLUCOSE SERPL-MCNC: 113 MG/DL (ref 65–140)
HCT VFR BLD AUTO: 33.4 % (ref 36.5–49.3)
HCT VFR BLD AUTO: 34.3 % (ref 36.5–49.3)
HGB BLD-MCNC: 10.9 G/DL (ref 12–17)
HGB BLD-MCNC: 11.4 G/DL (ref 12–17)
IMM GRANULOCYTES # BLD AUTO: 0.01 THOUSAND/UL (ref 0–0.2)
IMM GRANULOCYTES # BLD AUTO: 0.01 THOUSAND/UL (ref 0–0.2)
IMM GRANULOCYTES NFR BLD AUTO: 0 % (ref 0–2)
IMM GRANULOCYTES NFR BLD AUTO: 0 % (ref 0–2)
INR PPP: 0.96 (ref 0.84–1.19)
LYMPHOCYTES # BLD AUTO: 0.97 THOUSANDS/ÂΜL (ref 0.6–4.47)
LYMPHOCYTES # BLD AUTO: 1.17 THOUSANDS/ÂΜL (ref 0.6–4.47)
LYMPHOCYTES NFR BLD AUTO: 21 % (ref 14–44)
LYMPHOCYTES NFR BLD AUTO: 24 % (ref 14–44)
MCH RBC QN AUTO: 31.2 PG (ref 26.8–34.3)
MCH RBC QN AUTO: 31.8 PG (ref 26.8–34.3)
MCHC RBC AUTO-ENTMCNC: 32.6 G/DL (ref 31.4–37.4)
MCHC RBC AUTO-ENTMCNC: 33.2 G/DL (ref 31.4–37.4)
MCV RBC AUTO: 96 FL (ref 82–98)
MCV RBC AUTO: 96 FL (ref 82–98)
MONOCYTES # BLD AUTO: 0.4 THOUSAND/ÂΜL (ref 0.17–1.22)
MONOCYTES # BLD AUTO: 0.41 THOUSAND/ÂΜL (ref 0.17–1.22)
MONOCYTES NFR BLD AUTO: 8 % (ref 4–12)
MONOCYTES NFR BLD AUTO: 9 % (ref 4–12)
NEUTROPHILS # BLD AUTO: 3.05 THOUSANDS/ÂΜL (ref 1.85–7.62)
NEUTROPHILS # BLD AUTO: 3.08 THOUSANDS/ÂΜL (ref 1.85–7.62)
NEUTS SEG NFR BLD AUTO: 62 % (ref 43–75)
NEUTS SEG NFR BLD AUTO: 63 % (ref 43–75)
NRBC BLD AUTO-RTO: 0 /100 WBCS
NRBC BLD AUTO-RTO: 0 /100 WBCS
P AXIS: 41 DEGREES
P AXIS: 44 DEGREES
PLATELET # BLD AUTO: 185 THOUSANDS/UL (ref 149–390)
PLATELET # BLD AUTO: 190 THOUSANDS/UL (ref 149–390)
PMV BLD AUTO: 10.6 FL (ref 8.9–12.7)
PMV BLD AUTO: 10.8 FL (ref 8.9–12.7)
POTASSIUM SERPL-SCNC: 5.6 MMOL/L (ref 3.5–5.3)
POTASSIUM SERPL-SCNC: 6.7 MMOL/L (ref 3.5–5.3)
PR INTERVAL: 168 MS
PR INTERVAL: 172 MS
PROT SERPL-MCNC: 7.7 G/DL (ref 6.4–8.4)
PROTHROMBIN TIME: 13.3 SECONDS (ref 11.6–14.5)
QRS AXIS: -1 DEGREES
QRS AXIS: -3 DEGREES
QRSD INTERVAL: 92 MS
QRSD INTERVAL: 92 MS
QT INTERVAL: 388 MS
QT INTERVAL: 396 MS
QTC INTERVAL: 427 MS
QTC INTERVAL: 433 MS
RBC # BLD AUTO: 3.49 MILLION/UL (ref 3.88–5.62)
RBC # BLD AUTO: 3.58 MILLION/UL (ref 3.88–5.62)
RH BLD: POSITIVE
SODIUM SERPL-SCNC: 134 MMOL/L (ref 135–147)
SODIUM SERPL-SCNC: 136 MMOL/L (ref 135–147)
T WAVE AXIS: 1 DEGREES
T WAVE AXIS: 2 DEGREES
VENTRICULAR RATE: 70 BPM
VENTRICULAR RATE: 75 BPM
WBC # BLD AUTO: 4.73 THOUSAND/UL (ref 4.31–10.16)
WBC # BLD AUTO: 4.95 THOUSAND/UL (ref 4.31–10.16)

## 2024-01-31 PROCEDURE — 82948 REAGENT STRIP/BLOOD GLUCOSE: CPT

## 2024-01-31 PROCEDURE — 99284 EMERGENCY DEPT VISIT MOD MDM: CPT | Performed by: INTERNAL MEDICINE

## 2024-01-31 PROCEDURE — 85025 COMPLETE CBC W/AUTO DIFF WBC: CPT

## 2024-01-31 PROCEDURE — 85025 COMPLETE CBC W/AUTO DIFF WBC: CPT | Performed by: EMERGENCY MEDICINE

## 2024-01-31 PROCEDURE — 99285 EMERGENCY DEPT VISIT HI MDM: CPT | Performed by: EMERGENCY MEDICINE

## 2024-01-31 PROCEDURE — 36415 COLL VENOUS BLD VENIPUNCTURE: CPT

## 2024-01-31 PROCEDURE — 94640 AIRWAY INHALATION TREATMENT: CPT

## 2024-01-31 PROCEDURE — 86901 BLOOD TYPING SEROLOGIC RH(D): CPT

## 2024-01-31 PROCEDURE — 99284 EMERGENCY DEPT VISIT MOD MDM: CPT

## 2024-01-31 PROCEDURE — G0257 UNSCHED DIALYSIS ESRD PT HOS: HCPCS

## 2024-01-31 PROCEDURE — 80076 HEPATIC FUNCTION PANEL: CPT

## 2024-01-31 PROCEDURE — 93005 ELECTROCARDIOGRAM TRACING: CPT

## 2024-01-31 PROCEDURE — 86900 BLOOD TYPING SEROLOGIC ABO: CPT

## 2024-01-31 PROCEDURE — 80048 BASIC METABOLIC PNL TOTAL CA: CPT | Performed by: EMERGENCY MEDICINE

## 2024-01-31 PROCEDURE — 85610 PROTHROMBIN TIME: CPT

## 2024-01-31 PROCEDURE — 80048 BASIC METABOLIC PNL TOTAL CA: CPT

## 2024-01-31 RX ADMIN — ALBUTEROL SULFATE 10 MG: 2.5 SOLUTION RESPIRATORY (INHALATION) at 13:03

## 2024-01-31 NOTE — PLAN OF CARE
Post-Dialysis RN Treatment Note    Blood Pressure:  Pre 148/87 mm/Hg  Post 162/81 mmHg   EDW  TBD kg    Weight:  Pre 100 kg   Post 99.1 kg   Mode of weight measurement: Bed Scale   Volume Removed  911 ml    Treatment duration 120 minutes    NS given  No    Treatment shortened? No   Medications given during Rx None Reported   Estimated Kt/V  Not Applicable   Access type: Permacath/TDC   Access Issues:  NA   Report called to primary nurse   Yes   Theresa RN        Goal of treatment is the balancing of electrolytes.    Problem: METABOLIC, FLUID AND ELECTROLYTES - ADULT  Goal: Electrolytes maintained within normal limits  Description: INTERVENTIONS:  - Monitor labs and assess patient for signs and symptoms of electrolyte imbalances  - Administer electrolyte replacement as ordered  - Monitor response to electrolyte replacements, including repeat lab results as appropriate  - Instruct patient on fluid and nutrition as appropriate  Outcome: Progressing  Goal: Fluid balance maintained  Description: INTERVENTIONS:  - Monitor labs   - Monitor I/O and WT  - Instruct patient on fluid and nutrition as appropriate  - Assess for signs & symptoms of volume excess or deficit  Outcome: Progressing

## 2024-01-31 NOTE — CONSULTS
NEPHROLOGY CONSULTATION NOTE    Patient: Milton Padilla               Sex: male          DOA: 1/31/2024 12:50 PM   YOB: 1995        Age:  28 y.o.        LOS:  LOS: 0 days     REFERRING PHYSICIAN: Dr. Robertson     REASON FOR THE REFERRAL / CONSULTATION: ESRD on hemodialysis/hyperkalemia    DATE OF CONSULTATION / SERVICE: 1/31/2024    ADMISSION DIAGNOSIS: <principal problem not specified>     CHIEF COMPLAINT     Elevated potassium    HPI     This is a 28 years old male with past medical history of ESRD on hemodialysis, type I primary hyperoxaluria, nephrolithiasis who presents to our facility with abnormal lab.  Patient underwent lab work as ordered by transplant center and noted to have a potassium of 6.7 and elevated.  Patient has been undergoing training for home hemodialysis.  His last session was yesterday.  He admits to be starting to access his AV fistula.  At present time he denies any chest pain, shortness of breath.     Currently patient denies nausea, vomiting, headache, dizziness, abdominal pain, constipation or rash.    PAST MEDICAL HISTORY     Past Medical History:   Diagnosis Date    Anxiety     Chronic kidney disease     GERD (gastroesophageal reflux disease)     Kidney stone     Liver disorder     Nephrolithiasis     Primary hyperoxaluria type 1 (HCC)        PAST SURGICAL HISTORY     Past Surgical History:   Procedure Laterality Date    ANKLE SURGERY Right 2017    ligament repair    CARDIAC CATHETERIZATION Left 12/1/2023    Procedure: Cardiac catheterization;  Surgeon: Teresa Mireles MD;  [facility];  Service: Cardiology    CARDIAC CATHETERIZATION N/A 12/1/2023    Procedure: Cardiac Coronary Angiogram;  Surgeon: Teresa Mireles MD;  [facility];  Service: Cardiology    CARDIAC CATHETERIZATION Left 12/1/2023    Procedure: Cardiac Left Heart Cath;  Surgeon: Teresa Mireles MD;  [facility];  Service: Cardiology    CYSTOSCOPY       FL RETROGRADE PYELOGRAM  10/24/2020    FL RETROGRADE PYELOGRAM  02/08/2021    HERNIA REPAIR      IR TUNNELED DIALYSIS CATHETER CHECK/CHANGE/REPOSITION/ANGIOPLASTY  12/4/2023    IR TUNNELED DIALYSIS CATHETER PLACEMENT  8/24/2023    KIDNEY STONE SURGERY      LITHOTRIPSY      MASS EXCISION Left 02/17/2017    Procedure: BACK/SHOULDER CYST EXCISION ;  Surgeon: John Muhammad MD;  [facility];  Service:     NC ARTERIOVENOUS ANASTOMOSIS OPEN DIRECT Left 12/14/2023    Procedure: CREATION FISTULA ARTERIOVENOUS (AV);  Surgeon: Lencho Worthington MD;  [facility];  Service: Vascular    NC CYSTO/URETERO W/LITHOTRIPSY &INDWELL STENT INSRT Left 10/24/2020    Procedure: CYSTOSCOPY URETEROSCOPY WITH LITHOTRIPSY HOLMIUM LASER, RETROGRADE PYELOGRAM AND INSERTION STENT URETERAL;  Surgeon: Carlos Le MD;  [facility];  Service: Urology    NC CYSTO/URETERO W/LITHOTRIPSY &INDWELL STENT INSRT Right 02/08/2021    Procedure: CYSTOSCOPY URETEROSCOPY WITH LITHOTRIPSY HOLMIUM LASER, RETROGRADE PYELOGRAM AND INSERTION STENT URETERAL;  Surgeon: Brian Merchant MD;  [facility];  Service: Urology       ALLERGIES     No Known Allergies    SOCIAL HISTORY     Social History     Substance and Sexual Activity   Alcohol Use Yes    Comment: rarely     Social History     Substance and Sexual Activity   Drug Use No     Social History     Tobacco Use   Smoking Status Never   Smokeless Tobacco Never       FAMILY HISTORY     Family History   Problem Relation Age of Onset    Hypertension Mother     No Known Problems Father     Cancer Maternal Grandmother         Bladder    Diabetes Maternal Grandmother     Alzheimer's disease Maternal Grandfather        CURRENT MEDICATIONS     No current facility-administered medications for this encounter.    Current Outpatient Medications:     calcitriol (ROCALTROL) 0.25 mcg capsule, Take 1 capsule (0.25 mcg total) by mouth 3 (three) times a week, Disp: 12 capsule,  Rfl: 0    calcium acetate (PHOSLO) capsule, Take 3 capsules (2,001 mg total) by mouth 3 (three) times a day with meals, Disp: 270 capsule, Rfl: 6    Diclofenac Sodium (VOLTAREN) 1 %, Apply 2 g topically 4 (four) times a day, Disp: 100 g, Rfl: 0    gabapentin (Neurontin) 300 mg capsule, Take 1 capsule (300 mg total) by mouth 2 (two) times a day (Patient taking differently: Take 100 mg by mouth 3 (three) times a day), Disp: 60 capsule, Rfl: 3    ondansetron (ZOFRAN) 4 mg tablet, Take 1 tablet (4 mg total) by mouth every 8 (eight) hours as needed for nausea or vomiting, Disp: 50 tablet, Rfl: 0    oxyCODONE (ROXICODONE) 5 immediate release tablet, Take 1 tablet by mouth daily as needed (Patient not taking: Reported on 1/8/2024), Disp: , Rfl:     traZODone (DESYREL) 50 mg tablet, TAKE 1 TABLET BY MOUTH EVERYDAY AT BEDTIME, Disp: 90 tablet, Rfl: 3    Vitamin D, Cholecalciferol, 50 MCG (2000 UT) CAPS, Take 1 capsule by mouth in the morning, Disp: , Rfl:     REVIEW OF SYSTEMS     Review of Systems   Constitutional: Negative.    HENT: Negative.     Eyes: Negative.    Respiratory: Negative.     Cardiovascular: Negative.    Gastrointestinal: Negative.    Endocrine: Negative.    Genitourinary: Negative.    Musculoskeletal: Negative.    Skin: Negative.    Allergic/Immunologic: Negative.    Neurological: Negative.    Hematological: Negative.    All other systems reviewed and are negative.        OBJECTIVE     Current Weight: Weight - Scale: 100 kg (221 lb 1.9 oz)  Vitals:    01/31/24 1400   BP: 143/80   Pulse: 85   Resp: 22   Temp:    SpO2: 96%     Body mass index is 29.99 kg/m².  No intake or output data in the 24 hours ending 01/31/24 1428    PHYSICAL EXAMINATION     Physical Exam  HENT:      Head: Normocephalic and atraumatic.   Eyes:      Pupils: Pupils are equal, round, and reactive to light.   Neck:      Vascular: No JVD.   Cardiovascular:      Rate and Rhythm: Normal rate and regular rhythm.      Heart sounds: Normal heart  sounds. No murmur heard.     No friction rub.   Pulmonary:      Effort: Pulmonary effort is normal.      Breath sounds: Normal breath sounds.   Abdominal:      General: Bowel sounds are normal. There is no distension.      Palpations: Abdomen is soft.      Tenderness: There is no abdominal tenderness. There is no rebound.   Musculoskeletal:         General: No tenderness.      Cervical back: Neck supple.   Skin:     General: Skin is dry.      Findings: No rash.   Neurological:      Mental Status: He is alert and oriented to person, place, and time.           LAB RESULTS        Results from last 7 days   Lab Units 01/31/24  1259 01/31/24  1001   WBC Thousand/uL 4.95 4.73   HEMOGLOBIN g/dL 10.9* 11.4*   HEMATOCRIT % 33.4* 34.3*   PLATELETS Thousands/uL 185 190   POTASSIUM mmol/L 5.6* 6.7*   CHLORIDE mmol/L 92* 92*   CO2 mmol/L 30 31   BUN mg/dL 46* 44*   CREATININE mg/dL 12.05* 11.65*   EGFR ml/min/1.73sq m 4 5   CALCIUM mg/dL 9.7 9.8         RADIOLOGY RESULTS   Pending    PLAN / RECOMMENDATIONS      28 years old male with history of ESRD on hemodialysis secondary to type I hyperoxaluria primary, hypertension who presents to our facility with abnormal potassium value.    1.  ESRD on hemodialysis: Undergoing home hemodialysis training 4 to 5 days weekly with last session occurred yesterday.  Given elevated potassium, plan for a 2-hour session of hemodialysis today.  Patient will resume home no dialysis sessions using Nx stage dialysis machine with 40 L volume from tomorrow.    #2 hyperkalemia: Presented with potassium of 6.7 nonhemolyzed.  After treatment with albuterol, repeat potassium is 5.6 and improved.    3.  Anemia due to ESRD: Hemoglobin is 10.9 g/dL and acceptable.    4.  Access: Patient has a tunneled cuffed catheter as well as AV fistula.  Using AV fistula with small needles as outpatient and likely resulting in lack of renal clearances and developing hyperkalemia.    5.  Hypertension: Blood pressure within  acceptable range at 143/80.    Thank you for the consultation to participate in patient's care. I have personally discussed my plan with the referring physician.     Aleena Londono MD    1/31/2024

## 2024-01-31 NOTE — ED PROVIDER NOTES
History  Chief Complaint   Patient presents with    Abnormal Lab     Patient reports he was called for increased potassium from blood work taken today. Patient reports he feels at his baseline, but was told to come to ED. Patient is on dialysis and has been compliant.      HPI patient is a 28-year-old male history of a liver transplant and renal failure.  Patient is currently on dialysis.  Patient was told his blood work was abnormal apparently had a high potassium at home at 6.7.  Patient was referred to the emergency department.  Patient is asymptomatic.  I spoke with nephrology they report the patient does home dialysis with very small needles and they believe he is no has not had a good exchange.  Patient may require dialysis here.  Past medical history of liver disorder, liver transplant, chronic kidney disease  Family history noncontributory  Social history, non-smoker no history of drug abuse    Prior to Admission Medications   Prescriptions Last Dose Informant Patient Reported? Taking?   Diclofenac Sodium (VOLTAREN) 1 %  Self No No   Sig: Apply 2 g topically 4 (four) times a day   Vitamin D, Cholecalciferol, 50 MCG (2000 UT) CAPS  Self No No   Sig: Take 1 capsule by mouth in the morning   calcitriol (ROCALTROL) 0.25 mcg capsule  Self No No   Sig: Take 1 capsule (0.25 mcg total) by mouth 3 (three) times a week   calcium acetate (PHOSLO) capsule   No No   Sig: Take 3 capsules (2,001 mg total) by mouth 3 (three) times a day with meals   gabapentin (Neurontin) 300 mg capsule  Self No No   Sig: Take 1 capsule (300 mg total) by mouth 2 (two) times a day   Patient taking differently: Take 100 mg by mouth 3 (three) times a day   ondansetron (ZOFRAN) 4 mg tablet  Self No No   Sig: Take 1 tablet (4 mg total) by mouth every 8 (eight) hours as needed for nausea or vomiting   oxyCODONE (ROXICODONE) 5 immediate release tablet  Self Yes No   Sig: Take 1 tablet by mouth daily as needed   Patient not taking: Reported on  1/8/2024   traZODone (DESYREL) 50 mg tablet  Self No No   Sig: TAKE 1 TABLET BY MOUTH EVERYDAY AT BEDTIME      Facility-Administered Medications: None       Past Medical History:   Diagnosis Date    Anxiety     Chronic kidney disease     GERD (gastroesophageal reflux disease)     Kidney stone     Liver disorder     Nephrolithiasis     Primary hyperoxaluria type 1 (HCC)        Past Surgical History:   Procedure Laterality Date    ANKLE SURGERY Right 2017    ligament repair    CARDIAC CATHETERIZATION Left 12/1/2023    Procedure: Cardiac catheterization;  Surgeon: Teresa Mireles MD;  [facility];  Service: Cardiology    CARDIAC CATHETERIZATION N/A 12/1/2023    Procedure: Cardiac Coronary Angiogram;  Surgeon: Teresa Mireles MD;  [facility];  Service: Cardiology    CARDIAC CATHETERIZATION Left 12/1/2023    Procedure: Cardiac Left Heart Cath;  Surgeon: Teresa Mireles MD;  [facility];  Service: Cardiology    CYSTOSCOPY      FL RETROGRADE PYELOGRAM  10/24/2020    FL RETROGRADE PYELOGRAM  02/08/2021    HERNIA REPAIR      IR TUNNELED DIALYSIS CATHETER CHECK/CHANGE/REPOSITION/ANGIOPLASTY  12/4/2023    IR TUNNELED DIALYSIS CATHETER PLACEMENT  8/24/2023    KIDNEY STONE SURGERY      LITHOTRIPSY      MASS EXCISION Left 02/17/2017    Procedure: BACK/SHOULDER CYST EXCISION ;  Surgeon: John Muhammad MD;  [facility];  Service:     IL ARTERIOVENOUS ANASTOMOSIS OPEN DIRECT Left 12/14/2023    Procedure: CREATION FISTULA ARTERIOVENOUS (AV);  Surgeon: Lencho Worthington MD;  [facility];  Service: Vascular    IL CYSTO/URETERO W/LITHOTRIPSY &INDWELL STENT INSRT Left 10/24/2020    Procedure: CYSTOSCOPY URETEROSCOPY WITH LITHOTRIPSY HOLMIUM LASER, RETROGRADE PYELOGRAM AND INSERTION STENT URETERAL;  Surgeon: Carlos Le MD;  [facility];  Service: Urology    IL CYSTO/URETERO W/LITHOTRIPSY &INDWELL STENT INSRT Right 02/08/2021    Procedure:  CYSTOSCOPY URETEROSCOPY WITH LITHOTRIPSY HOLMIUM LASER, RETROGRADE PYELOGRAM AND INSERTION STENT URETERAL;  Surgeon: Brian Merchant MD;  [facility];  Service: Urology       Family History   Problem Relation Age of Onset    Hypertension Mother     No Known Problems Father     Cancer Maternal Grandmother         Bladder    Diabetes Maternal Grandmother     Alzheimer's disease Maternal Grandfather      I have reviewed and agree with the history as documented.    E-Cigarette/Vaping    E-Cigarette Use Never User      E-Cigarette/Vaping Substances    Nicotine No     THC No     CBD No     Flavoring No     Other No     Unknown No      Social History     Tobacco Use    Smoking status: Never    Smokeless tobacco: Never   Vaping Use    Vaping status: Never Used   Substance Use Topics    Alcohol use: Yes     Comment: rarely    Drug use: No       Review of Systems   Constitutional:  Negative for diaphoresis, fatigue and fever.   HENT:  Negative for congestion, ear pain, nosebleeds and sore throat.    Eyes:  Negative for photophobia, pain, discharge and visual disturbance.   Respiratory:  Negative for cough, choking, chest tightness, shortness of breath and wheezing.    Cardiovascular:  Negative for chest pain and palpitations.   Gastrointestinal:  Negative for abdominal distention, abdominal pain, diarrhea and vomiting.   Genitourinary:  Negative for dysuria, flank pain and frequency.   Musculoskeletal:  Negative for back pain, gait problem and joint swelling.   Skin:  Negative for color change and rash.   Neurological:  Negative for dizziness, syncope and headaches.   Psychiatric/Behavioral:  Negative for behavioral problems and confusion. The patient is not nervous/anxious.    All other systems reviewed and are negative.      Physical Exam  Physical Exam  Vitals and nursing note reviewed.   Constitutional:       Appearance: He is well-developed.   HENT:      Head: Normocephalic.      Right Ear: External ear  normal.      Left Ear: External ear normal.      Nose: Nose normal.      Mouth/Throat:      Mouth: Mucous membranes are moist.      Pharynx: Oropharynx is clear.   Eyes:      General: Lids are normal.      Extraocular Movements: Extraocular movements intact.      Pupils: Pupils are equal, round, and reactive to light.   Cardiovascular:      Rate and Rhythm: Normal rate and regular rhythm.      Pulses: Normal pulses.      Heart sounds: Normal heart sounds.   Pulmonary:      Effort: Pulmonary effort is normal. No respiratory distress.      Breath sounds: Normal breath sounds.   Abdominal:      General: Abdomen is flat. Bowel sounds are normal.      Tenderness: There is no abdominal tenderness.   Musculoskeletal:         General: No deformity. Normal range of motion.      Cervical back: Normal range of motion and neck supple.   Skin:     General: Skin is warm and dry.   Neurological:      Mental Status: He is alert and oriented to person, place, and time.   Psychiatric:         Mood and Affect: Mood normal.         Vital Signs  ED Triage Vitals [01/31/24 1209]   Temperature Pulse Respirations Blood Pressure SpO2   97.8 °F (36.6 °C) 85 20 105/74 98 %      Temp src Heart Rate Source Patient Position - Orthostatic VS BP Location FiO2 (%)   -- Monitor Sitting Left arm --      Pain Score       --           Vitals:    01/31/24 1209 01/31/24 1330 01/31/24 1400 01/31/24 1430   BP: 105/74 141/80 143/80 136/87   Pulse: 85 83 85 81   Patient Position - Orthostatic VS: Sitting Lying Lying Lying         Visual Acuity      ED Medications  Medications   albuterol inhalation solution 10 mg (10 mg Nebulization Given 1/31/24 1303)       Diagnostic Studies  Results Reviewed       Procedure Component Value Units Date/Time    Basic metabolic panel [[id number]]  (Abnormal) Collected: 01/31/24 1259    Lab Status: Final result Specimen: Blood from Arm, Right Updated: 01/31/24 1330     Sodium 136 mmol/L      Potassium 5.6 mmol/L      Chloride  92 mmol/L      CO2 30 mmol/L      ANION GAP 14 mmol/L      BUN 46 mg/dL      Creatinine 12.05 mg/dL      Glucose 104 mg/dL      Calcium 9.7 mg/dL      eGFR 4 ml/min/1.73sq m     Narrative:      National Kid[employer] guidelines for Chronic Kidney Disease (CKD):     Stage 1 with normal or high GFR (GFR > 90 mL/min/1.73 square meters)    Stage 2 Mild CKD (GFR = 60-89 mL/min/1.73 square meters)    Stage 3A Moderate CKD (GFR = 45-59 mL/min/1.73 square meters)    Stage 3B Moderate CKD (GFR = 30-44 mL/min/1.73 square meters)    Stage 4 Severe CKD (GFR = 15-29 mL/min/1.73 square meters)    Stage 5 End Stage CKD (GFR <15 mL/min/1.73 square meters)  Note: GFR calculation is accurate only with a steady state creatinine    CBC and differential [[id number]]  (Abnormal) Collected: 01/31/24 1259    Lab Status: Final result Specimen: Blood from Arm, Right Updated: 01/31/24 1313     WBC 4.95 Thousand/uL      RBC 3.49 Million/uL      Hemoglobin 10.9 g/dL      Hematocrit 33.4 %      MCV 96 fL      MCH 31.2 pg      MCHC 32.6 g/dL      RDW 17.5 %      MPV 10.6 fL      Platelets 185 Thousands/uL      nRBC 0 /100 WBCs      Neutrophils Relative 62 %      Immat GRANS % 0 %      Lymphocytes Relative 24 %      Monocytes Relative 8 %      Eosinophils Relative 5 %      Basophils Relative 1 %      Neutrophils Absolute 3.08 Thousands/µL      Immature Grans Absolute 0.01 Thousand/uL      Lymphocytes Absolute 1.17 Thousands/µL      Monocytes Absolute 0.41 Thousand/µL      Eosinophils Absolute 0.24 Thousand/µL      Basophils Absolute 0.04 Thousands/µL     Fingerstick Glucose (POCT) [[id number]]  (Normal) Collected: 01/31/24 1302    Lab Status: Final result Updated: 01/31/24 1305     POC Glucose 113 mg/dl                    No orders to display              Procedures  ECG 12 Lead Documentation Only    Date/Time: 1/31/2024 2:55 PM    Performed by: Dayron Robertson MD  Authorized by: Dayron Robertson MD    Indications / Diagnosis:  Metabolic  issue hyperkalemia  ECG reviewed by me, the ED Provider: yes    Patient location:  ED  Previous ECG:     Previous ECG:  Compared to current    Comparison ECG info:  December 4, 2023    Similarity:  Changes noted  Interpretation:     Interpretation: non-specific    Rate:     ECG rate:  70    ECG rate assessment: normal    Rhythm:     Rhythm: sinus rhythm    Comments:      Normal sinus rhythm,  peaked T waves consistent with hyperkalemia no ST elevations           ED Course           Outpatient potassium was 6.7, repeat potassium here was 6.5.  Patient BUN of 46 his creatinine was 12.    I spoke with nephrology they report the patient does home dialysis with a very small catheter and concern he has been getting poor exchanges so the patient will have dialysis here in the hospital.    I saw the patient postdialysis he reports still feeling well.  Patient was safe for discharge according to nephrology.                                  Medical Decision Making  Amount and/or Complexity of Data Reviewed  Labs: ordered.    Risk  Prescription drug management.      Medical decision making 28-year-old male status post liver transplant currently on dialysis at home.  Apparently uses hemodialysis with a small catheter.  Patient was sent into emergency department because he had high potassium.  Patient potassium was 6.7 at home.  He was 5.6.  Patient was treated with albuterol.  He remained asymptomatic.  Patient was seen by nephrology and they did do dialysis here in the hospital with improvement in his potassium.  Discussed outpatient treatment and follow-up       Disposition  Final diagnoses:   Hyperkalemia     Time reflects when diagnosis was documented in both MDM as applicable and the Disposition within this note       Time User Action Codes Description Comment    1/31/2024  2:48 PM Dayron Robertson Add [E87.6] Hypokalemia     1/31/2024  2:48 PM Dayron Robertson Remove [E87.6] Hypokalemia     1/31/2024  2:48 PM Dayron Robertson Add  [E87.5] Hyperkalemia           ED Disposition       None          Follow-up Information    None         Patient's Medications   Discharge Prescriptions    No medications on file       No discharge procedures on file.    PDMP Review         Value Time User    PDMP Reviewed  Yes 10/10/2022 11:54 AM Alessandro Soler DO            ED Provider  Electronically Signed by             Dayron Robertson MD  01/31/24 0405

## 2024-02-05 DIAGNOSIS — Z99.2 ESRD (END STAGE RENAL DISEASE) ON DIALYSIS (HCC): Primary | ICD-10-CM

## 2024-02-05 DIAGNOSIS — N18.6 ESRD (END STAGE RENAL DISEASE) ON DIALYSIS (HCC): Primary | ICD-10-CM

## 2024-02-20 ENCOUNTER — PATIENT OUTREACH (OUTPATIENT)
Dept: CASE MANAGEMENT | Facility: HOSPITAL | Age: 29
End: 2024-02-20

## 2024-02-20 NOTE — PROGRESS NOTES
Referral for care management received. Chart review completed. PMH significant for ESRD. On transplant list following with [facility].  Pt recently in ED 1/31 for hyperkalemia however discharged. Recently underwent fistula creation for dialysis.    Call placed to Milton and introduced self as CM. Milton states he is doing well. He is working with home dialysis and nurses are training him. Reports he has all medications. Denies any transportation hardships. He is able to drive and takes himself to all appointments.     Explained OPCM program including SW if needed. He denies need at this time. Encouraged pt to call if any needs arise and informed him a CM is always available. He is appreciative of the call.     Referral closed at this time.

## 2024-02-26 ENCOUNTER — HOSPITAL ENCOUNTER (EMERGENCY)
Facility: HOSPITAL | Age: 29
Discharge: HOME | End: 2024-02-26
Attending: EMERGENCY MEDICINE
Payer: COMMERCIAL

## 2024-02-26 ENCOUNTER — APPOINTMENT (EMERGENCY)
Dept: CT IMAGING | Facility: HOSPITAL | Age: 29
End: 2024-02-26
Payer: COMMERCIAL

## 2024-02-26 ENCOUNTER — APPOINTMENT (EMERGENCY)
Dept: RADIOLOGY | Facility: HOSPITAL | Age: 29
End: 2024-02-26
Payer: COMMERCIAL

## 2024-02-26 VITALS
OXYGEN SATURATION: 99 % | TEMPERATURE: 97.9 F | SYSTOLIC BLOOD PRESSURE: 150 MMHG | HEART RATE: 79 BPM | DIASTOLIC BLOOD PRESSURE: 96 MMHG | RESPIRATION RATE: 16 BRPM

## 2024-02-26 DIAGNOSIS — R39.15 URGENCY OF URINATION: ICD-10-CM

## 2024-02-26 DIAGNOSIS — R10.9 ABDOMINAL PAIN: Primary | ICD-10-CM

## 2024-02-26 LAB
ALBUMIN SERPL BCP-MCNC: 4.8 G/DL (ref 3.5–5)
ALP SERPL-CCNC: 42 U/L (ref 34–104)
ALT SERPL W P-5'-P-CCNC: 17 U/L (ref 7–52)
ANION GAP SERPL CALCULATED.3IONS-SCNC: 11 MMOL/L
AST SERPL W P-5'-P-CCNC: 13 U/L (ref 13–39)
BASOPHILS # BLD AUTO: 0.08 THOUSANDS/ÂΜL (ref 0–0.1)
BASOPHILS NFR BLD AUTO: 1 % (ref 0–1)
BILIRUB SERPL-MCNC: 0.54 MG/DL (ref 0.2–1)
BUN SERPL-MCNC: 32 MG/DL (ref 5–25)
CALCIUM SERPL-MCNC: 10.2 MG/DL (ref 8.4–10.2)
CHLORIDE SERPL-SCNC: 93 MMOL/L (ref 96–108)
CO2 SERPL-SCNC: 32 MMOL/L (ref 21–32)
CREAT SERPL-MCNC: 7.96 MG/DL (ref 0.6–1.3)
EOSINOPHIL # BLD AUTO: 0.31 THOUSAND/ÂΜL (ref 0–0.61)
EOSINOPHIL NFR BLD AUTO: 4 % (ref 0–6)
ERYTHROCYTE [DISTWIDTH] IN BLOOD BY AUTOMATED COUNT: 16.8 % (ref 11.6–15.1)
GFR SERPL CREATININE-BSD FRML MDRD: 8 ML/MIN/1.73SQ M
GLUCOSE SERPL-MCNC: 119 MG/DL (ref 65–140)
HCT VFR BLD AUTO: 32.5 % (ref 36.5–49.3)
HGB BLD-MCNC: 10.8 G/DL (ref 12–17)
IMM GRANULOCYTES # BLD AUTO: 0.01 THOUSAND/UL (ref 0–0.2)
IMM GRANULOCYTES NFR BLD AUTO: 0 % (ref 0–2)
LYMPHOCYTES # BLD AUTO: 1.29 THOUSANDS/ÂΜL (ref 0.6–4.47)
LYMPHOCYTES NFR BLD AUTO: 16 % (ref 14–44)
MCH RBC QN AUTO: 30.6 PG (ref 26.8–34.3)
MCHC RBC AUTO-ENTMCNC: 33.2 G/DL (ref 31.4–37.4)
MCV RBC AUTO: 92 FL (ref 82–98)
MONOCYTES # BLD AUTO: 0.63 THOUSAND/ÂΜL (ref 0.17–1.22)
MONOCYTES NFR BLD AUTO: 8 % (ref 4–12)
NEUTROPHILS # BLD AUTO: 5.58 THOUSANDS/ÂΜL (ref 1.85–7.62)
NEUTS SEG NFR BLD AUTO: 71 % (ref 43–75)
NRBC BLD AUTO-RTO: 0 /100 WBCS
PLATELET # BLD AUTO: 164 THOUSANDS/UL (ref 149–390)
PMV BLD AUTO: 10.5 FL (ref 8.9–12.7)
POTASSIUM SERPL-SCNC: 3.5 MMOL/L (ref 3.5–5.3)
PROT SERPL-MCNC: 7.3 G/DL (ref 6.4–8.4)
RBC # BLD AUTO: 3.53 MILLION/UL (ref 3.88–5.62)
SODIUM SERPL-SCNC: 136 MMOL/L (ref 135–147)
WBC # BLD AUTO: 7.9 THOUSAND/UL (ref 4.31–10.16)

## 2024-02-26 PROCEDURE — 74176 CT ABD & PELVIS W/O CONTRAST: CPT

## 2024-02-26 PROCEDURE — 36415 COLL VENOUS BLD VENIPUNCTURE: CPT | Performed by: EMERGENCY MEDICINE

## 2024-02-26 PROCEDURE — 73590 X-RAY EXAM OF LOWER LEG: CPT

## 2024-02-26 PROCEDURE — 99284 EMERGENCY DEPT VISIT MOD MDM: CPT

## 2024-02-26 PROCEDURE — 85025 COMPLETE CBC W/AUTO DIFF WBC: CPT | Performed by: EMERGENCY MEDICINE

## 2024-02-26 PROCEDURE — 99284 EMERGENCY DEPT VISIT MOD MDM: CPT | Performed by: EMERGENCY MEDICINE

## 2024-02-26 PROCEDURE — 80053 COMPREHEN METABOLIC PANEL: CPT | Performed by: EMERGENCY MEDICINE

## 2024-02-26 RX ORDER — CEPHALEXIN 250 MG/1
250 CAPSULE ORAL EVERY 12 HOURS SCHEDULED
Qty: 10 CAPSULE | Refills: 0 | Status: SHIPPED | OUTPATIENT
Start: 2024-02-26 | End: 2024-03-02

## 2024-02-26 RX ORDER — CEPHALEXIN 250 MG/1
500 CAPSULE ORAL ONCE
Status: DISCONTINUED | OUTPATIENT
Start: 2024-02-26 | End: 2024-02-26

## 2024-02-26 RX ORDER — CEPHALEXIN 250 MG/1
250 CAPSULE ORAL ONCE
Status: COMPLETED | OUTPATIENT
Start: 2024-02-26 | End: 2024-02-26

## 2024-02-26 RX ADMIN — CEPHALEXIN 250 MG: 250 CAPSULE ORAL at 20:12

## 2024-03-03 NOTE — ED PROVIDER NOTES
History  Chief Complaint   Patient presents with    Abdominal Pain     Patient c/o lower abdominal pain that started approx 1 week ago.  Patient c/o nausea. No vomiting. Patient reports frequent urges to urinate but he no longer produces urine since September.     28-year-old male presents emergency department for evaluation abdominal pain.  Patient has lower abdominal pain started approximately 1 week ago, patient complains of nausea, has had no vomiting.  He has urgency but has history of end-stage renal disease and does produce urine regularly.  He receives dialysis regularly and has been compliant with his outpatient HD sessions has not missed any of them.        Prior to Admission Medications   Prescriptions Last Dose Informant Patient Reported? Taking?   Diclofenac Sodium (VOLTAREN) 1 %  Self No No   Sig: Apply 2 g topically 4 (four) times a day   Vitamin D, Cholecalciferol, 50 MCG (2000 UT) CAPS  Self No No   Sig: Take 1 capsule by mouth in the morning   calcitriol (ROCALTROL) 0.25 mcg capsule  Self No No   Sig: Take 1 capsule (0.25 mcg total) by mouth 3 (three) times a week   calcium acetate (PHOSLO) capsule   No No   Sig: Take 3 capsules (2,001 mg total) by mouth 3 (three) times a day with meals   gabapentin (Neurontin) 300 mg capsule  Self No No   Sig: Take 1 capsule (300 mg total) by mouth 2 (two) times a day   Patient taking differently: Take 100 mg by mouth 3 (three) times a day   ondansetron (ZOFRAN) 4 mg tablet  Self No No   Sig: Take 1 tablet (4 mg total) by mouth every 8 (eight) hours as needed for nausea or vomiting   oxyCODONE (ROXICODONE) 5 immediate release tablet  Self Yes No   Sig: Take 1 tablet by mouth daily as needed   Patient not taking: Reported on 1/8/2024   traZODone (DESYREL) 50 mg tablet  Self No No   Sig: TAKE 1 TABLET BY MOUTH EVERYDAY AT BEDTIME      Facility-Administered Medications: None       Past Medical History:   Diagnosis Date    Anxiety     Chronic kidney disease     GERD  (gastroesophageal reflux disease)     Kidney stone     Liver disorder     Nephrolithiasis     Primary hyperoxaluria type 1 (HCC)        Past Surgical History:   Procedure Laterality Date    ANKLE SURGERY Right 2017    ligament repair    CARDIAC CATHETERIZATION Left 12/1/2023    Procedure: Cardiac catheterization;  Surgeon: Teresa Mireles MD;  [facility];  Service: Cardiology    CARDIAC CATHETERIZATION N/A 12/1/2023    Procedure: Cardiac Coronary Angiogram;  Surgeon: Teresa Mireles MD;  [facility];  Service: Cardiology    CARDIAC CATHETERIZATION Left 12/1/2023    Procedure: Cardiac Left Heart Cath;  Surgeon: Teresa Mireles MD;  [facility];  Service: Cardiology    CYSTOSCOPY      FL RETROGRADE PYELOGRAM  10/24/2020    FL RETROGRADE PYELOGRAM  02/08/2021    HERNIA REPAIR      IR TUNNELED DIALYSIS CATHETER CHECK/CHANGE/REPOSITION/ANGIOPLASTY  12/4/2023    IR TUNNELED DIALYSIS CATHETER PLACEMENT  8/24/2023    KIDNEY STONE SURGERY      LITHOTRIPSY      MASS EXCISION Left 02/17/2017    Procedure: BACK/SHOULDER CYST EXCISION ;  Surgeon: John Muhammad MD;  [facility];  Service:     SC ARTERIOVENOUS ANASTOMOSIS OPEN DIRECT Left 12/14/2023    Procedure: CREATION FISTULA ARTERIOVENOUS (AV);  Surgeon: Lencho Worthington MD;  [facility];  Service: Vascular    SC CYSTO/URETERO W/LITHOTRIPSY &INDWELL STENT INSRT Left 10/24/2020    Procedure: CYSTOSCOPY URETEROSCOPY WITH LITHOTRIPSY HOLMIUM LASER, RETROGRADE PYELOGRAM AND INSERTION STENT URETERAL;  Surgeon: Carlos Le MD;  [facility];  Service: Urology    SC CYSTO/URETERO W/LITHOTRIPSY &INDWELL STENT INSRT Right 02/08/2021    Procedure: CYSTOSCOPY URETEROSCOPY WITH LITHOTRIPSY HOLMIUM LASER, RETROGRADE PYELOGRAM AND INSERTION STENT URETERAL;  Surgeon: Brian Merchant MD;  [facility];  Service: Urology       Family History   Problem Relation Age of Onset     Hypertension Mother     No Known Problems Father     Cancer Maternal Grandmother         Bladder    Diabetes Maternal Grandmother     Alzheimer's disease Maternal Grandfather      I have reviewed and agree with the history as documented.    E-Cigarette/Vaping    E-Cigarette Use Never User      E-Cigarette/Vaping Substances    Nicotine No     THC No     CBD No     Flavoring No     Other No     Unknown No      Social History     Tobacco Use    Smoking status: Never    Smokeless tobacco: Never   Vaping Use    Vaping status: Never Used   Substance Use Topics    Alcohol use: Yes     Comment: rarely    Drug use: No       Review of Systems   Constitutional:  Negative for appetite change, chills, fatigue and fever.   HENT:  Negative for sneezing and sore throat.    Eyes:  Negative for visual disturbance.   Respiratory:  Negative for cough, choking, chest tightness, shortness of breath and wheezing.    Cardiovascular:  Negative for chest pain and palpitations.   Gastrointestinal:  Positive for abdominal pain and nausea. Negative for constipation, diarrhea and vomiting.   Genitourinary:  Negative for difficulty urinating and dysuria.   Neurological:  Negative for dizziness, weakness, light-headedness, numbness and headaches.   All other systems reviewed and are negative.      Physical Exam  Physical Exam  Vitals and nursing note reviewed.   Constitutional:       General: He is not in acute distress.     Appearance: He is well-developed. He is not diaphoretic.   HENT:      Head: Normocephalic and atraumatic.   Eyes:      Pupils: Pupils are equal, round, and reactive to light.   Neck:      Vascular: No JVD.      Trachea: No tracheal deviation.   Cardiovascular:      Rate and Rhythm: Normal rate and regular rhythm.      Heart sounds: Normal heart sounds. No murmur heard.     No friction rub. No gallop.   Pulmonary:      Effort: Pulmonary effort is normal. No respiratory distress.      Breath sounds: Normal breath sounds. No  wheezing or rales.   Abdominal:      General: Bowel sounds are normal. There is no distension.      Palpations: Abdomen is soft.      Tenderness: There is no abdominal tenderness. There is no guarding or rebound.   Skin:     General: Skin is warm and dry.      Coloration: Skin is not pale.   Neurological:      Mental Status: He is alert and oriented to person, place, and time.      Cranial Nerves: No cranial nerve deficit.      Motor: No abnormal muscle tone.   Psychiatric:         Behavior: Behavior normal.         Vital Signs  ED Triage Vitals [02/26/24 1603]   Temperature Pulse Respirations Blood Pressure SpO2   97.9 °F (36.6 °C) 87 18 162/84 99 %      Temp Source Heart Rate Source Patient Position - Orthostatic VS BP Location FiO2 (%)   Temporal Monitor Sitting Right arm --      Pain Score       --           Vitals:    02/26/24 1603 02/26/24 1803 02/26/24 2014   BP: 162/84 132/77 150/96   Pulse: 87 71 79   Patient Position - Orthostatic VS: Sitting Sitting Sitting         Visual Acuity      ED Medications  Medications   cephalexin (KEFLEX) capsule 250 mg (250 mg Oral Given 2/26/24 2012)       Diagnostic Studies  Results Reviewed       Procedure Component Value Units Date/Time    Comprehensive metabolic panel [[id number]]  (Abnormal) Collected: 02/26/24 1650    Lab Status: Final result Specimen: Blood from Arm, Right Updated: 02/26/24 1720     Sodium 136 mmol/L      Potassium 3.5 mmol/L      Chloride 93 mmol/L      CO2 32 mmol/L      ANION GAP 11 mmol/L      BUN 32 mg/dL      Creatinine 7.96 mg/dL      Glucose 119 mg/dL      Calcium 10.2 mg/dL      AST 13 U/L      ALT 17 U/L      Alkaline Phosphatase 42 U/L      Total Protein 7.3 g/dL      Albumin 4.8 g/dL      Total Bilirubin 0.54 mg/dL      eGFR 8 ml/min/1.73sq m     Narrative:      National Kidney Disease Foundation guidelines for Chronic Kidney Disease (CKD):     Stage 1 with normal or high GFR (GFR > 90 mL/min/1.73 square meters)    Stage 2 Mild CKD (GFR =  60-89 mL/min/1.73 square meters)    Stage 3A Moderate CKD (GFR = 45-59 mL/min/1.73 square meters)    Stage 3B Moderate CKD (GFR = 30-44 mL/min/1.73 square meters)    Stage 4 Severe CKD (GFR = 15-29 mL/min/1.73 square meters)    Stage 5 End Stage CKD (GFR <15 mL/min/1.73 square meters)  Note: GFR calculation is accurate only with a steady state creatinine    CBC and differential [[id number]]  (Abnormal) Collected: 02/26/24 1650    Lab Status: Final result Specimen: Blood from Arm, Right Updated: 02/26/24 1658     WBC 7.90 Thousand/uL      RBC 3.53 Million/uL      Hemoglobin 10.8 g/dL      Hematocrit 32.5 %      MCV 92 fL      MCH 30.6 pg      MCHC 33.2 g/dL      RDW 16.8 %      MPV 10.5 fL      Platelets 164 Thousands/uL      nRBC 0 /100 WBCs      Neutrophils Relative 71 %      Immat GRANS % 0 %      Lymphocytes Relative 16 %      Monocytes Relative 8 %      Eosinophils Relative 4 %      Basophils Relative 1 %      Neutrophils Absolute 5.58 Thousands/µL      Immature Grans Absolute 0.01 Thousand/uL      Lymphocytes Absolute 1.29 Thousands/µL      Monocytes Absolute 0.63 Thousand/µL      Eosinophils Absolute 0.31 Thousand/µL      Basophils Absolute 0.08 Thousands/µL                    CT abdomen pelvis wo contrast   Final Result by Seth Gonzalez MD (02/26 1931)      No acute inflammatory process identified in the abdomen or pelvis.      Workstation performed: [id number]         XR tibia fibula 2 views LEFT   Final Result by Stanley Floyd MD (02/26 2152)      No acute osseous abnormality.            Workstation performed: [id number]                    Procedures  Procedures         ED Course                               SBIRT 20yo+      Flowsheet Row Most Recent Value   Initial Alcohol Screen: US AUDIT-C     1. How often do you have a drink containing alcohol? 0 Filed at: 02/26/2024 1604   2. How many drinks containing alcohol do you have on a typical day you are drinking?  0 Filed at: 02/26/2024 1604   3a.  Male UNDER 65: How often do you have five or more drinks on one occasion? 0 Filed at: 02/26/2024 1604   3b. FEMALE Any Age, or MALE 65+: How often do you have 4 or more drinks on one occassion? 0 Filed at: 02/26/2024 1604   Audit-C Score 0 Filed at: 02/26/2024 1604   RADHA: How many times in the past year have you...    Used an illegal drug or used a prescription medication for non-medical reasons? Never Filed at: 02/26/2024 1604                      Medical Decision Making  28-year-old male with abdominal pain, will check labs, CT, although he admittedly produce a urine sample procedure today due to oliguria, will treat clinically for UTI if workup otherwise unremarkable.    Amount and/or Complexity of Data Reviewed  Labs: ordered.  Radiology: ordered.    Risk  Prescription drug management.             Disposition  Final diagnoses:   Abdominal pain   Urgency of urination     Time reflects when diagnosis was documented in both MDM as applicable and the Disposition within this note       Time User Action Codes Description Comment    2/26/2024  8:03 PM Davion Partida Add [R10.9] Abdominal pain     2/26/2024  8:03 PM Davion Partida Add [R39.15] Urgency of urination           ED Disposition       ED Disposition   Discharge    Condition   Stable    Date/Time   Mon Feb 26, 2024 2003    Comment   Milton Padilla discharge to home/self care.                   Follow-up Information       Follow up With Specialties Details Why Contact Info Additional Information    [facility]   [address]  [address]  [facility] [address]  [phone] [facility], [facility], [place], [place], [address]   [phone]            Discharge Medication List as of 2/26/2024  8:05 PM        START taking these medications    Details   cephalexin (KEFLEX) 250 mg capsule Take 1 capsule (250 mg total) by mouth every 12 (twelve) hours for 5 days, Starting  Mon 2/26/2024, Until Sat 3/2/2024, Normal           CONTINUE these medications which have NOT CHANGED    Details   calcitriol (ROCALTROL) 0.25 mcg capsule Take 1 capsule (0.25 mcg total) by mouth 3 (three) times a week, Starting Wed 12/6/2023, Normal      calcium acetate (PHOSLO) capsule Take 3 capsules (2,001 mg total) by mouth 3 (three) times a day with meals, Starting u 1/25/2024, Normal      Diclofenac Sodium (VOLTAREN) 1 % Apply 2 g topically 4 (four) times a day, Starting Wed 12/6/2023, Normal      gabapentin (Neurontin) 300 mg capsule Take 1 capsule (300 mg total) by mouth 2 (two) times a day, Starting Wed 12/13/2023, Normal      ondansetron (ZOFRAN) 4 mg tablet Take 1 tablet (4 mg total) by mouth every 8 (eight) hours as needed for nausea or vomiting, Starting Wed 12/13/2023, Normal      oxyCODONE (ROXICODONE) 5 immediate release tablet Take 1 tablet by mouth daily as needed, Historical Med      traZODone (DESYREL) 50 mg tablet TAKE 1 TABLET BY MOUTH EVERYDAY AT BEDTIME, Normal      Vitamin D, Cholecalciferol, 50 MCG (2000 UT) CAPS Take 1 capsule by mouth in the morning, Starting Wed 12/13/2023, No Print             No discharge procedures on file.    PDMP Review         Value Time User    PDMP Reviewed  Yes 10/10/2022 11:54 AM Alessandro Soler DO            ED Provider  Electronically Signed by             Davion Partida MD  03/03/24 0018

## 2024-03-04 ENCOUNTER — APPOINTMENT (OUTPATIENT)
Dept: LAB | Facility: HOSPITAL | Age: 29
End: 2024-03-04
Payer: COMMERCIAL

## 2024-03-04 DIAGNOSIS — E72.53: ICD-10-CM

## 2024-03-04 DIAGNOSIS — Z01.818 PRE-TRANSPLANT EVALUATION FOR LIVER TRANSPLANT: ICD-10-CM

## 2024-03-04 LAB
ALBUMIN SERPL BCP-MCNC: 4.5 G/DL (ref 3.5–5)
ALP SERPL-CCNC: 40 U/L (ref 34–104)
ALT SERPL W P-5'-P-CCNC: 19 U/L (ref 7–52)
ANION GAP SERPL CALCULATED.3IONS-SCNC: 13 MMOL/L
AST SERPL W P-5'-P-CCNC: 16 U/L (ref 13–39)
BASOPHILS # BLD AUTO: 0.07 THOUSANDS/ÂΜL (ref 0–0.1)
BASOPHILS NFR BLD AUTO: 2 % (ref 0–1)
BILIRUB DIRECT SERPL-MCNC: 0.06 MG/DL (ref 0–0.2)
BILIRUB SERPL-MCNC: 0.52 MG/DL (ref 0.2–1)
BUN SERPL-MCNC: 61 MG/DL (ref 5–25)
CALCIUM SERPL-MCNC: 9.6 MG/DL (ref 8.4–10.2)
CHLORIDE SERPL-SCNC: 92 MMOL/L (ref 96–108)
CO2 SERPL-SCNC: 29 MMOL/L (ref 21–32)
CREAT SERPL-MCNC: 11.85 MG/DL (ref 0.6–1.3)
EOSINOPHIL # BLD AUTO: 0.39 THOUSAND/ÂΜL (ref 0–0.61)
EOSINOPHIL NFR BLD AUTO: 11 % (ref 0–6)
ERYTHROCYTE [DISTWIDTH] IN BLOOD BY AUTOMATED COUNT: 15.4 % (ref 11.6–15.1)
GFR SERPL CREATININE-BSD FRML MDRD: 5 ML/MIN/1.73SQ M
GLUCOSE P FAST SERPL-MCNC: 89 MG/DL (ref 65–99)
HCT VFR BLD AUTO: 34 % (ref 36.5–49.3)
HGB BLD-MCNC: 11.5 G/DL (ref 12–17)
IMM GRANULOCYTES # BLD AUTO: 0.01 THOUSAND/UL (ref 0–0.2)
IMM GRANULOCYTES NFR BLD AUTO: 0 % (ref 0–2)
INR PPP: 0.91 (ref 0.84–1.19)
LYMPHOCYTES # BLD AUTO: 1.08 THOUSANDS/ÂΜL (ref 0.6–4.47)
LYMPHOCYTES NFR BLD AUTO: 30 % (ref 14–44)
MCH RBC QN AUTO: 31 PG (ref 26.8–34.3)
MCHC RBC AUTO-ENTMCNC: 33.8 G/DL (ref 31.4–37.4)
MCV RBC AUTO: 92 FL (ref 82–98)
MONOCYTES # BLD AUTO: 0.34 THOUSAND/ÂΜL (ref 0.17–1.22)
MONOCYTES NFR BLD AUTO: 9 % (ref 4–12)
NEUTROPHILS # BLD AUTO: 1.76 THOUSANDS/ÂΜL (ref 1.85–7.62)
NEUTS SEG NFR BLD AUTO: 48 % (ref 43–75)
NRBC BLD AUTO-RTO: 0 /100 WBCS
PLATELET # BLD AUTO: 191 THOUSANDS/UL (ref 149–390)
PMV BLD AUTO: 11 FL (ref 8.9–12.7)
POTASSIUM SERPL-SCNC: 5.2 MMOL/L (ref 3.5–5.3)
PROT SERPL-MCNC: 7.1 G/DL (ref 6.4–8.4)
PROTHROMBIN TIME: 12.8 SECONDS (ref 11.6–14.5)
RBC # BLD AUTO: 3.71 MILLION/UL (ref 3.88–5.62)
SODIUM SERPL-SCNC: 134 MMOL/L (ref 135–147)
WBC # BLD AUTO: 3.65 THOUSAND/UL (ref 4.31–10.16)

## 2024-03-04 PROCEDURE — 85610 PROTHROMBIN TIME: CPT

## 2024-03-04 PROCEDURE — 80048 BASIC METABOLIC PNL TOTAL CA: CPT

## 2024-03-04 PROCEDURE — 85025 COMPLETE CBC W/AUTO DIFF WBC: CPT

## 2024-03-04 PROCEDURE — 36415 COLL VENOUS BLD VENIPUNCTURE: CPT

## 2024-03-04 PROCEDURE — 80076 HEPATIC FUNCTION PANEL: CPT

## 2024-03-10 DIAGNOSIS — F51.01 PRIMARY INSOMNIA: ICD-10-CM

## 2024-03-11 RX ORDER — TRAZODONE HYDROCHLORIDE 50 MG/1
TABLET ORAL
Qty: 90 TABLET | Refills: 3 | Status: SHIPPED | OUTPATIENT
Start: 2024-03-11

## 2024-03-13 ENCOUNTER — HOSPITAL ENCOUNTER (OUTPATIENT)
Dept: NON INVASIVE DIAGNOSTICS | Facility: HOSPITAL | Age: 29
Discharge: HOME | End: 2024-03-13
Attending: INTERNAL MEDICINE
Payer: COMMERCIAL

## 2024-03-13 DIAGNOSIS — N18.9 CHRONIC KIDNEY DISEASE, UNSPECIFIED CKD STAGE: ICD-10-CM

## 2024-03-13 PROCEDURE — 36589 REMOVAL TUNNELED CV CATH: CPT

## 2024-03-13 PROCEDURE — 36589 REMOVAL TUNNELED CV CATH: CPT | Performed by: RADIOLOGY

## 2024-03-13 RX ADMIN — Medication 12 ML: at 08:58

## 2024-03-13 NOTE — H&P
Interventional Radiology  History and Physical 3/13/2024     Milton Padilla   1995   [id number]    Assessment/Plan:  28 year old male with CKD has functioning AVF and no longer requires tunneled dialysis catheter and presents for removal.    Problem List Items Addressed This Visit    None  Visit Diagnoses       Chronic kidney disease, unspecified CKD stage        Relevant Orders    IR tunneled dialysis catheter removal               Subjective:     Patient ID: Milton Padilla is a 28 y.o. male.    History of Present Illness  Patient with CKD has functioning AVF and no longer requires tunneled dialysis catheter and presents for removal.    Review of Systems      Past Medical History:   Diagnosis Date    Anxiety     Chronic kidney disease     GERD (gastroesophageal reflux disease)     Kidney stone     Liver disorder     Nephrolithiasis     Primary hyperoxaluria type 1 (HCC)         Past Surgical History:   Procedure Laterality Date    ANKLE SURGERY Right 2017    ligament repair    CARDIAC CATHETERIZATION Left 12/1/2023    Procedure: Cardiac catheterization;  Surgeon: Teresa Mireles MD;  [facility];  Service: Cardiology    CARDIAC CATHETERIZATION N/A 12/1/2023    Procedure: Cardiac Coronary Angiogram;  Surgeon: Teresa Mireles MD;  [facility];  Service: Cardiology    CARDIAC CATHETERIZATION Left 12/1/2023    Procedure: Cardiac Left Heart Cath;  Surgeon: Teresa Mireles MD;  [facility];  Service: Cardiology    CYSTOSCOPY      FL RETROGRADE PYELOGRAM  10/24/2020    FL RETROGRADE PYELOGRAM  02/08/2021    HERNIA REPAIR      IR TUNNELED DIALYSIS CATHETER CHECK/CHANGE/REPOSITION/ANGIOPLASTY  12/4/2023    IR TUNNELED DIALYSIS CATHETER PLACEMENT  8/24/2023    KIDNEY STONE SURGERY      LITHOTRIPSY      MASS EXCISION Left 02/17/2017    Procedure: BACK/SHOULDER CYST EXCISION ;  Surgeon: John Muhammad MD;  [facility];  Service:     TX ARTERIOVENOUS  ANASTOMOSIS OPEN DIRECT Left 12/14/2023    Procedure: CREATION FISTULA ARTERIOVENOUS (AV);  Surgeon: Lencho Worthington MD;  [facility];  Service: Vascular    AR CYSTO/URETERO W/LITHOTRIPSY &INDWELL STENT INSRT Left 10/24/2020    Procedure: CYSTOSCOPY URETEROSCOPY WITH LITHOTRIPSY HOLMIUM LASER, RETROGRADE PYELOGRAM AND INSERTION STENT URETERAL;  Surgeon: Carlos Le MD;  [facility];  Service: Urology    AR CYSTO/URETERO W/LITHOTRIPSY &INDWELL STENT INSRT Right 02/08/2021    Procedure: CYSTOSCOPY URETEROSCOPY WITH LITHOTRIPSY HOLMIUM LASER, RETROGRADE PYELOGRAM AND INSERTION STENT URETERAL;  Surgeon: Brian Merchant MD;  [facility];  Service: Urology        Social History     Tobacco Use   Smoking Status Never   Smokeless Tobacco Never        Social History     Substance and Sexual Activity   Alcohol Use Yes    Comment: rarely        Social History     Substance and Sexual Activity   Drug Use No        No Known Allergies    Current Outpatient Medications   Medication Sig Dispense Refill    calcitriol (ROCALTROL) 0.25 mcg capsule Take 1 capsule (0.25 mcg total) by mouth 3 (three) times a week 12 capsule 0    calcium acetate (PHOSLO) capsule Take 3 capsules (2,001 mg total) by mouth 3 (three) times a day with meals 270 capsule 6    Diclofenac Sodium (VOLTAREN) 1 % Apply 2 g topically 4 (four) times a day 100 g 0    gabapentin (Neurontin) 300 mg capsule Take 1 capsule (300 mg total) by mouth 2 (two) times a day (Patient taking differently: Take 100 mg by mouth 3 (three) times a day) 60 capsule 3    ondansetron (ZOFRAN) 4 mg tablet Take 1 tablet (4 mg total) by mouth every 8 (eight) hours as needed for nausea or vomiting 50 tablet 0    oxyCODONE (ROXICODONE) 5 immediate release tablet Take 1 tablet by mouth daily as needed (Patient not taking: Reported on 1/8/2024)      traZODone (DESYREL) 50 mg tablet TAKE 1 TABLET BY MOUTH EVERYDAY AT BEDTIME 90 tablet 3    Vitamin D,  "Cholecalciferol, 50 MCG (2000 UT) CAPS Take 1 capsule by mouth in the morning       No current facility-administered medications for this encounter.          Objective:    There were no vitals filed for this visit.     Physical Exam  Constitutional:       Appearance: Normal appearance.   Skin:     Comments: Tunneled right IJV dialysis catheter present.           No results found for: \"BNP\"   Lab Results   Component Value Date    WBC 3.65 (L) 03/04/2024    HGB 11.5 (L) 03/04/2024    HCT 34.0 (L) 03/04/2024    MCV 92 03/04/2024     03/04/2024     Lab Results   Component Value Date    INR 0.91 03/04/2024    INR 0.96 01/31/2024    INR 0.9 12/18/2023    PROTIME 12.8 03/04/2024    PROTIME 13.3 01/31/2024    PROTIME 13.8 08/24/2023     No results found for: \"PTT\"      I have personally reviewed pertinent imaging and laboratory results.     Code Status: Prior  Advance Directive and Living Will:      Power of :    POLST:      This text is generated with voice recognition software. There may be translation, syntax,  or grammatical errors. If you have any questions, please contact the dictating provider.   "

## 2024-03-13 NOTE — BRIEF OP NOTE (RAD/CATH)
INTERVENTIONAL RADIOLOGY PROCEDURE NOTE    Date: 3/13/2024    Procedure:   Procedure Summary       Date: 03/13/24 Room / Location: [facility]    Anesthesia Start:  Anesthesia Stop:     Procedure: IR TUNNELED DIALYSIS CATHETER REMOVAL Diagnosis:       Chronic kidney disease, unspecified CKD stage      (Mature AVG/AVF)    Scheduled Providers:  Responsible Provider:     Anesthesia Type: Not recorded ASA Status: Not recorded            Preoperative diagnosis:   1. Chronic kidney disease, unspecified CKD stage         Postoperative diagnosis: Same.    Surgeon: Ziyad Daniels MD     Assistant: None. No qualified resident was available.    Blood loss: None    Specimens: None     Findings: Successful right IJV tunneled dialysis catheter removal.    Complications: None immediate.    Anesthesia: local

## 2024-03-15 ENCOUNTER — APPOINTMENT (OUTPATIENT)
Dept: LAB | Facility: CLINIC | Age: 29
End: 2024-03-15
Payer: COMMERCIAL

## 2024-03-15 ENCOUNTER — HOSPITAL ENCOUNTER (OUTPATIENT)
Dept: RADIOLOGY | Facility: HOSPITAL | Age: 29
Discharge: HOME | End: 2024-03-15
Payer: COMMERCIAL

## 2024-03-15 ENCOUNTER — OFFICE VISIT (OUTPATIENT)
Dept: RHEUMATOLOGY | Facility: CLINIC | Age: 29
End: 2024-03-15
Payer: COMMERCIAL

## 2024-03-15 VITALS
TEMPERATURE: 98.8 F | DIASTOLIC BLOOD PRESSURE: 90 MMHG | BODY MASS INDEX: 29.77 KG/M2 | WEIGHT: 219.8 LBS | HEIGHT: 72 IN | HEART RATE: 86 BPM | SYSTOLIC BLOOD PRESSURE: 140 MMHG | OXYGEN SATURATION: 99 %

## 2024-03-15 DIAGNOSIS — M79.641 BILATERAL HAND PAIN: ICD-10-CM

## 2024-03-15 DIAGNOSIS — M79.671 BILATERAL FOOT PAIN: ICD-10-CM

## 2024-03-15 DIAGNOSIS — M25.40 JOINT SWELLING: ICD-10-CM

## 2024-03-15 DIAGNOSIS — M25.50 POLYARTHRALGIA: Primary | ICD-10-CM

## 2024-03-15 DIAGNOSIS — N18.6 END STAGE RENAL DISEASE ON DIALYSIS (HCC): ICD-10-CM

## 2024-03-15 DIAGNOSIS — L81.9 MOTTLED SKIN: ICD-10-CM

## 2024-03-15 DIAGNOSIS — M79.642 BILATERAL HAND PAIN: ICD-10-CM

## 2024-03-15 DIAGNOSIS — Z99.2 END STAGE RENAL DISEASE ON DIALYSIS (HCC): ICD-10-CM

## 2024-03-15 DIAGNOSIS — E72.53: ICD-10-CM

## 2024-03-15 DIAGNOSIS — M79.672 BILATERAL FOOT PAIN: ICD-10-CM

## 2024-03-15 LAB
C3 SERPL-MCNC: 101 MG/DL (ref 87–200)
C4 SERPL-MCNC: 38 MG/DL (ref 19–52)
CRP SERPL QL: 3.5 MG/L
ERYTHROCYTE [SEDIMENTATION RATE] IN BLOOD: 10 MM/HOUR (ref 0–14)
URATE SERPL-MCNC: 3.9 MG/DL (ref 3.5–8.5)

## 2024-03-15 PROCEDURE — 86037 ANCA TITER EACH ANTIBODY: CPT

## 2024-03-15 PROCEDURE — 99204 OFFICE O/P NEW MOD 45 MIN: CPT | Performed by: PHYSICIAN ASSISTANT

## 2024-03-15 PROCEDURE — 86430 RHEUMATOID FACTOR TEST QUAL: CPT | Performed by: PHYSICIAN ASSISTANT

## 2024-03-15 PROCEDURE — 73130 X-RAY EXAM OF HAND: CPT

## 2024-03-15 PROCEDURE — 73630 X-RAY EXAM OF FOOT: CPT

## 2024-03-15 PROCEDURE — 83520 IMMUNOASSAY QUANT NOS NONAB: CPT

## 2024-03-15 PROCEDURE — 86160 COMPLEMENT ANTIGEN: CPT | Performed by: PHYSICIAN ASSISTANT

## 2024-03-15 PROCEDURE — 86235 NUCLEAR ANTIGEN ANTIBODY: CPT | Performed by: PHYSICIAN ASSISTANT

## 2024-03-15 PROCEDURE — 86200 CCP ANTIBODY: CPT | Performed by: PHYSICIAN ASSISTANT

## 2024-03-15 PROCEDURE — 84550 ASSAY OF BLOOD/URIC ACID: CPT | Performed by: PHYSICIAN ASSISTANT

## 2024-03-15 PROCEDURE — 86140 C-REACTIVE PROTEIN: CPT | Performed by: PHYSICIAN ASSISTANT

## 2024-03-15 PROCEDURE — 85652 RBC SED RATE AUTOMATED: CPT | Performed by: PHYSICIAN ASSISTANT

## 2024-03-15 PROCEDURE — 36415 COLL VENOUS BLD VENIPUNCTURE: CPT

## 2024-03-15 RX ORDER — GABAPENTIN 100 MG/1
100 CAPSULE ORAL 3 TIMES DAILY
COMMUNITY
Start: 2024-01-08

## 2024-03-15 NOTE — PROGRESS NOTES
Assessment and Plan:  Mr. Padilla is a 28-year-old male with past medical history significant for ESRD on HD secondary to primary primary hyperoxaluria type I, LUE AVF, anemia, GERD, and neuropathy who presents for rheumatology evaluation of ESRD on dialysis and primary hyperoxaluria type I.    Milton has a history of primary hyperoxaluria type I, with which she was diagnosed around age 9.  He started on renal replacement therapy last fall and is doing HHD 5 days a week.  He is on the dual transplant list for liver/kidney at [facility].  Previous to starting dialysis, he was experiencing recurrent pain, swelling, and stiffness in the feet associated with color change which has greatly improved since starting dialysis.  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      He is still experiencing stiffness and swelling of multiple joints (PIPs, knees, toes) and is here for evaluation to determine underlying cause.  Of note, he has a history of gout in the form of podagra that occurred at age 12 and has not recurred.  Differential diagnosis includes oxalate arthropathy is the most likely etiology versus chronic gouty arthritis versus another type of inflammatory arthritis. Per chart review, labs from [facility] in December 2023 showed TESSA negativity.    I have ordered pertinent serologies for further evaluation and he will also obtain x-rays of the bilateral hands and feet.  For now, he may continue to utilize topical Voltaren gel to painful joints so long as no contraindication from his nephrologist.  Follow-up in 3 to 4 weeks to review results and determine next steps.    Plan:  Diagnoses and all orders for this visit:    Polyarthralgia  -     Sedimentation rate, automated  -     C-reactive protein  -     Cyclic citrul peptide antibody, IgG  -     RF Screen w/ Reflex to Titer  -     Sjogren's Antibodies  -     Cancel: ANCA Screen With MPO and PR3 With Reflex To ANCA Titer  -     Uric acid  -     C3  "complement  -     C4 complement  -     XR hand 3+ vw right  -     XR hand 3+ vw left  -     XR foot 3+ vw left  -     XR foot 3+ vw right    End stage renal disease on dialysis (HCC)  -     Ambulatory Referral to Rheumatology    Primary hyperoxaluria type 1 (HCC)  Comments:  Referral placed for rheumatology per request, per note.  Vascular  Orders:  -     Ambulatory Referral to Rheumatology  -     Uric acid    Bilateral hand pain  -     XR hand 3+ vw right  -     XR hand 3+ vw left    Bilateral foot pain  -     XR foot 3+ vw left  -     XR foot 3+ vw right    Mottled skin  -     Cancel: ANCA Screen With MPO and PR3 With Reflex To ANCA Titer    Joint swelling  -     Sedimentation rate, automated  -     C-reactive protein  -     Cyclic citrul peptide antibody, IgG  -     RF Screen w/ Reflex to Titer  -     Sjogren's Antibodies  -     Cancel: ANCA Screen With MPO and PR3 With Reflex To ANCA Titer  -     Uric acid  -     C3 complement  -     C4 complement  -     XR hand 3+ vw right  -     XR hand 3+ vw left  -     XR foot 3+ vw left  -     XR foot 3+ vw right    Other orders  -     gabapentin (NEURONTIN) 100 mg capsule; 100 mg 3 (three) times a day  -     heparin 5000 units in sodium chloride 0.9% 1000 mL irrigation; Inject 5,000 Units into a catheter in a vein once With Dialysis        I have personally reviewed prior notes, recent laboratory results, and pertinent films in PACS.     Activities as tolerated.   Exercise: try to maintain a low impact exercise regimen as much as possible. Walk for 30 minutes a day for at least 3 days a week.   Continue other medications as prescribed by PCP and other specialists.       RTC in 3 to 4 weeks      Follow-up plan: 3 to 4 weeks        Chief Complaint  No chief complaint on file.  \"Hello\"    HPI  Milton Padilla is a 28 y.o.  male who presents as a Rheumatology consult referred by Lencho Worthington* for evaluation of ESRD on dialysis and primary hyperoxaluria type " NATALY Rose reports that he has a history of primary hyperoxaluria type I, with which she was diagnosed around age 9.  He started on renal replacement therapy last fall and is doing HHD 5 days a week.  He is on the dual transplant list for liver/kidney at [facility].  Previous to starting dialysis, he was experiencing recurrent pain, swelling, and stiffness in the feet associated with color change which has greatly improved since starting dialysis.  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      He is still experiencing stiffness, pain, and swelling of multiple joints (PIPs-- with swelling of the 2nd and 3rd PIP of left hand, knees, toes).  There have been times where his knees have given out in the last 2 or 3 weeks and he has fallen.  Morning stiffness lasting more than 1 hour and becomes stiff after sitting for more than 20 minutes.  He has tried topical Voltaren gel on the feet which did help a lot prior to starting dialysis.  He recalls a history of gout in right great toe that occurred at age 12 and has not recurred.  Maybe some back pain once in a while, but nothing major to speak of.    +anuric, skin mottling, purple color change of the fingertips and toes, history of right ankle reconstruction    Denies fevers, unintentional weight loss, hair loss, dry eyes, dry mouth, inflammatory eye disease, persistent/recurrent skin rash, psoriasis, photosensitivity, mouth/nose ulcers, swollen glands, pleuritic chest pain, abdominal pain, vomiting, diarrhea, blood in stools, inflammatory bowel disease, blood clot    Review of Systems  Review of Systems  Constitutional: Negative for weight change, fevers, chills, night sweats, fatigue.  ENT/Mouth: Negative for hearing changes, ear pain, nasal congestion, sinus pain, hoarseness, sore throat, rhinorrhea, swallowing difficulty.   Eyes: Negative for pain, redness, discharge, vision changes.   Cardiovascular: Negative for chest pain, SOB,  palpitations.   Respiratory: Negative for cough, sputum, wheezing, dyspnea.   Gastrointestinal: Negative for nausea, vomiting, diarrhea, constipation, pain, heartburn.  Genitourinary: +anuria.   Musculoskeletal: As per HPI.  Skin: +color changes.   Neuro: Negative for weakness, numbness, tingling, loss of consciousness.   Psych: Negative for anxiety, depression.   Heme/Lymph: Negative for easy bruising, bleeding, lymphadenopathy.      Allergies  No Known Allergies    Home Medications    Current Outpatient Medications:     calcitriol (ROCALTROL) 0.25 mcg capsule, Take 1 capsule (0.25 mcg total) by mouth 3 (three) times a week, Disp: 12 capsule, Rfl: 0    calcium acetate (PHOSLO) capsule, Take 3 capsules (2,001 mg total) by mouth 3 (three) times a day with meals, Disp: 270 capsule, Rfl: 6    gabapentin (NEURONTIN) 100 mg capsule, 100 mg 3 (three) times a day, Disp: , Rfl:     heparin 5000 units in sodium chloride 0.9% 1000 mL irrigation, Inject 5,000 Units into a catheter in a vein once With Dialysis, Disp: , Rfl:     ondansetron (ZOFRAN) 4 mg tablet, Take 1 tablet (4 mg total) by mouth every 8 (eight) hours as needed for nausea or vomiting, Disp: 50 tablet, Rfl: 0    traZODone (DESYREL) 50 mg tablet, TAKE 1 TABLET BY MOUTH EVERYDAY AT BEDTIME, Disp: 90 tablet, Rfl: 3    Vitamin D, Cholecalciferol, 50 MCG (2000 UT) CAPS, Take 1 capsule by mouth in the morning, Disp: , Rfl:     Diclofenac Sodium (VOLTAREN) 1 %, Apply 2 g topically 4 (four) times a day (Patient not taking: Reported on 3/15/2024), Disp: 100 g, Rfl: 0    oxyCODONE (ROXICODONE) 5 immediate release tablet, Take 1 tablet by mouth daily as needed (Patient not taking: Reported on 1/8/2024), Disp: , Rfl:     Past Medical History  Past Medical History:   Diagnosis Date    Anxiety     Chronic kidney disease     GERD (gastroesophageal reflux disease)     Kidney stone     Liver disorder     Nephrolithiasis     Primary hyperoxaluria type 1 (HCC)        Past Surgical  History   Past Surgical History:   Procedure Laterality Date    ANKLE SURGERY Right 2017    ligament repair    CARDIAC CATHETERIZATION Left 12/1/2023    Procedure: Cardiac catheterization;  Surgeon: Teresa Mireles MD;  Location: [facility];  Service: Cardiology    CARDIAC CATHETERIZATION N/A 12/1/2023    Procedure: Cardiac Coronary Angiogram;  Surgeon: Teresa Mireles MD;  [facility]: [facility];  Service: Cardiology    CARDIAC CATHETERIZATION Left 12/1/2023    Procedure: Cardiac Left Heart Cath;  Surgeon: Teresa Mireles MD;  [facility]: [facility];  Service: Cardiology    CYSTOSCOPY      FL RETROGRADE PYELOGRAM  10/24/2020    FL RETROGRADE PYELOGRAM  02/08/2021    HERNIA REPAIR      IR TUNNELED DIALYSIS CATHETER CHECK/CHANGE/REPOSITION/ANGIOPLASTY  12/4/2023    IR TUNNELED DIALYSIS CATHETER PLACEMENT  8/24/2023    IR TUNNELED DIALYSIS CATHETER REMOVAL  3/13/2024    KIDNEY STONE SURGERY      LITHOTRIPSY      MASS EXCISION Left 02/17/2017    Procedure: BACK/SHOULDER CYST EXCISION ;  Surgeon: John Muhammad MD;  Location: [facility];  Service:     WY ARTERIOVENOUS ANASTOMOSIS OPEN DIRECT Left 12/14/2023    Procedure: CREATION FISTULA ARTERIOVENOUS (AV);  Surgeon: Lencho Worthington MD;  Location:  MAIN OR;  Service: Vascular    WY CYSTO/URETERO W/LITHOTRIPSY &INDWELL STENT INSRT Left 10/24/2020    Procedure: CYSTOSCOPY URETEROSCOPY WITH LITHOTRIPSY HOLMIUM LASER, RETROGRADE PYELOGRAM AND INSERTION STENT URETERAL;  Surgeon: Carlos Le MD;  Location: [facility];  Service: Urology    WY CYSTO/URETERO W/LITHOTRIPSY &INDWELL STENT INSRT Right 02/08/2021    Procedure: CYSTOSCOPY URETEROSCOPY WITH LITHOTRIPSY HOLMIUM LASER, RETROGRADE PYELOGRAM AND INSERTION STENT URETERAL;  Surgeon: Brian Merchant MD;  Location: [facility];  Service: Urology       Family History    Family History   Problem Relation Age of Onset    Hypertension Mother     No Known Problems Father     Cancer  Maternal Grandmother         Bladder    Diabetes Maternal Grandmother     Alzheimer's disease Maternal Grandfather      No known family history of autoimmune or inflammatory diseases.    Social History    Social History     Substance and Sexual Activity   Alcohol Use Yes    Comment: rarely     Social History     Substance and Sexual Activity   Drug Use No     Social History     Tobacco Use   Smoking Status Never   Smokeless Tobacco Never       Objective:  Vitals:    03/15/24 1113   BP: 140/90   Pulse: 86   Temp: 98.8 °F (37.1 °C)   SpO2: 99%   Weight: 99.7 kg (219 lb 12.8 oz)   Height: 6' (1.829 m)       Physical Exam  General: Well appearing, well nourished, in no acute distress. Oriented x 3, normal mood and affect.  Ambulating without difficulty.  Skin: + Mottled skin noted around the knees.  Hair: Normal texture and distribution.  Nails: Normal color, no deformities.  HEENT:  Head: Normocephalic, atraumatic.  Eyes: Conjunctiva clear, sclera non-icteric, EOM intact.  Nose: No external lesions, mucosa non-inflamed.  Mouth: Mucous membranes moist, no mucosal lesions.  Neck: Supple  Musculoskeletal:   + Tenderness of left second and third PIP with mild chronic deformity  + Mild pain with extension of the left elbow   + Well-healed scar of the right lateral malleolus  No swelling of joints bilaterally to include: shoulder, elbow, wrist, MCP I-V, knee, ankle  Neurologic: Alert and oriented. No focal neurological deficits appreciated.   Psychiatric: Normal mood and affect.       Reviewed labs and imaging.    Imaging:   IR tunneled dialysis catheter removal    Result Date: 3/13/2024  Narrative: PROCEDURE: Tunneled dialysis catheter removal Procedural Personnel Attending physician(s): Dr. Daniels Pre-procedure diagnosis: ESRD Post-procedure diagnosis: Same Indication: Patient has functioning AV fistula. Tunneled dialysis catheter is not needed. PROCEDURE SUMMARY: - Tunneled dialysis catheter removal PROCEDURE DETAILS:  Pre-procedure Consent: Existing informed consent was utilized and time-out was performed prior to the procedure. Preparation: The site was prepared and draped using maximal sterile barrier technique including cutaneous antisepsis. Anesthesia/sedation Level of anesthesia/sedation: Local lidocaine Catheter removal Local anesthesia was administered. The catheter was removed with a combination of traction and blunt dissection. Closure Hemostasis was achieved with manual compression. Sterile dressing applied. Additional Details Specimens removed: Tunneled dialysis catheter. Estimated blood loss (mL):    1 Complications: No immediate complications.     Impression: Removal of right IJV tunneled dialysis catheter. Plan: Please re-consult interventional radiology if new catheter placement is desired. Workstation performed: [id number]     [facility] PVR & SEG PRESSURES SINGLE LEVEL ([id number])    Result Date: 3/1/2024  Narrative: Lower MICHELLE Pulse volume recordings and ankle-brachial index were measured. The right resting MICHELLE is normal. The right pulse wave volume recording is normal. The left resting MICHELLE is normal. The left pulse volume recording waveform is normal.    Impression: RIGHT The pulse volume recordings at the ankle are relatively normal. The right ankle-brachial index (MICHELLE) measures 1.19, indicating no evidence of significant arterial occlusive disease of the right lower extremity. (0.90-1.29). LEFT The pulse volume recordings at the ankle are relatively normal. The left ankle-brachial index (MICHELLE) measures 1.22, indicating no evidence of significant arterial occlusive disease of the left lower extremity. (0.90-1.29).     [facility] US LOWER EXTREMITY ARTERIES BILATERAL ([id number])    Result Date: 3/1/2024  Narrative: Right Lower Arterial Color flow duplex ultrasonography was performed on the right lower extremity arteries. Multiphasic flow noted throughout the right lower extremity. The right external iliac artery demonstrates no  significant stenosis. The right common femoral artery demonstrates no significant stenosis. The right profunda femoris artery demonstrates no significant stenosis. The right proximal superficial femoral artery demonstrates no significant stenosis. The right middle superficial femoral artery demonstrates no significant stenosis. The right distal superficial femoral artery demonstrates no significant stenosis. The right proximal popliteal artery demonstrates no significant stenosis. The right middle popliteal artery demonstrates no significant stenosis. The right distal popliteal artery demonstrates no significant stenosis. The right anterior tibial artery demonstrates no significant stenosis. The right posterior tibial artery demonstrates no significant stenosis. The right peroneal artery demonstrates no significant stenosis. The right dorsalis pedis artery demonstrates no significant stenosis. Left Lower Arterial Color flow duplex ultrasonography was performed on the left lower extremity arteries. Multiphasic flow noted throughout the left lower extremity.The left external iliac artery demonstrates no significant stenosis. The left common femoral artery demonstrates no significant stenosis. The left profunda femoris artery demonstrates no significant stenosis. The left proximal superficial femoral artery demonstrates no significant stenosis. The left middle superficial femoral artery demonstrates no significant stenosis. The left distal superficial femoral artery demonstrates no significant stenosis. The left proximal popliteal artery demonstrates no significant stenosis. The left popliteal artery demonstrates no significant stenosis. The left distal popliteal artery demonstrates no significant stenosis. The left anterior tibial artery demonstrates no significant stenosis. The left posterior tibial artery demonstrates no significant stenosis. The left peroneal artery demonstrates no significant stenosis. The left  dorsalis pedis artery demonstrates no significant stenosis.    Impression: Duplex ultrasonography performed on the bilateral lower extremity arteries. The bilateral lower extremity arteries are patent with no evidence of significant stenosis visualized.    XR tibia fibula 2 views LEFT    Result Date: 2/26/2024  Narrative: XR TIBIA FIBULA 2 VW LEFT INDICATION: left lower leg pain. COMPARISON: None FINDINGS: No acute fracture or dislocation. No significant degenerative changes. No lytic or blastic osseous lesion. Unremarkable soft tissues.     Impression: No acute osseous abnormality. Workstation performed: [id number]     CT abdomen pelvis wo contrast    Result Date: 2/26/2024  Narrative: CT ABDOMEN AND PELVIS WITHOUT IV CONTRAST INDICATION: lower abd pain, on HD, Hx stones. COMPARISON: CT scan from 11/28/2023. TECHNIQUE: CT examination of the abdomen and pelvis was performed without intravenous contrast. Multiplanar 2D reformatted images were created from the source data. This examination, like all CT scans performed in the [facility], was performed utilizing techniques to minimize radiation dose exposure, including the use of iterative reconstruction and automated exposure control. Radiation dose length product (DLP) for this visit: 721 mGy-cm Enteric Contrast: Not administered. FINDINGS: ABDOMEN LOWER CHEST: No clinically significant abnormality in the visualized lower chest. LIVER/BILIARY TREE: Unremarkable. GALLBLADDER: No calcified gallstones. No pericholecystic inflammatory change. SPLEEN: Unremarkable. PANCREAS: Unremarkable. ADRENAL GLANDS: Unremarkable. KIDNEYS/URETERS: Stable appearance of the kidneys with diffuse calcinosis, nonobstructing calculi and left upper pole cyst no hydronephrosis. STOMACH AND BOWEL: Unremarkable. APPENDIX: Normal. ABDOMINOPELVIC CAVITY: No ascites. No pneumoperitoneum. No lymphadenopathy. VESSELS: Unremarkable for patient's age. PELVIS REPRODUCTIVE ORGANS:  Unremarkable for patient's age. URINARY BLADDER: Unremarkable. ABDOMINAL WALL/INGUINAL REGIONS: Unremarkable. BONES: No acute fracture or suspicious osseous lesion.     Impression: No acute inflammatory process identified in the abdomen or pelvis. Workstation performed: [id number]        Labs:   Office Visit on 03/15/2024   Component Date Value Ref Range Status    Sed Rate 03/15/2024 10  0 - 14 mm/hour Final    CRP 03/15/2024 3.5 (H)  <3.0 mg/L Final    Uric Acid 03/15/2024 3.9  3.5 - 8.5 mg/dL Final    Specimen collection should occur prior to Metamizole administration due to the potential for falsely depressed results.   Appointment on 03/04/2024   Component Date Value Ref Range Status    Total Bilirubin 03/04/2024 0.52  0.20 - 1.00 mg/dL Final    Use of this assay is not recommended for patients undergoing treatment with eltrombopag due to the potential for falsely elevated results.  N-acetyl-p-benzoquinone imine (metabolite of Acetaminophen) will generate erroneously low results in samples for patients that have taken an overdose of Acetaminophen.    Bilirubin, Direct 03/04/2024 0.06  0.00 - 0.20 mg/dL Final    Alkaline Phosphatase 03/04/2024 40  34 - 104 U/L Final    AST 03/04/2024 16  13 - 39 U/L Final    ALT 03/04/2024 19  7 - 52 U/L Final    Specimen collection should occur prior to Sulfasalazine administration due to the potential for falsely depressed results.     Total Protein 03/04/2024 7.1  6.4 - 8.4 g/dL Final    Albumin 03/04/2024 4.5  3.5 - 5.0 g/dL Final    Protime 03/04/2024 12.8  11.6 - 14.5 seconds Final    INR 03/04/2024 0.91  0.84 - 1.19 Final    Sodium 03/04/2024 134 (L)  135 - 147 mmol/L Final    Potassium 03/04/2024 5.2  3.5 - 5.3 mmol/L Final    Chloride 03/04/2024 92 (L)  96 - 108 mmol/L Final    CO2 03/04/2024 29  21 - 32 mmol/L Final    ANION GAP 03/04/2024 13  mmol/L Final    BUN 03/04/2024 61 (H)  5 - 25 mg/dL Final    Creatinine 03/04/2024 11.85 (H)  0.60 - 1.30 mg/dL Final     Standardized to IDMS reference method    Glucose, Fasting 03/04/2024 89  65 - 99 mg/dL Final    Calcium 03/04/2024 9.6  8.4 - 10.2 mg/dL Final    eGFR 03/04/2024 5  ml/min/1.73sq m Final    WBC 03/04/2024 3.65 (L)  4.31 - 10.16 Thousand/uL Final    RBC 03/04/2024 3.71 (L)  3.88 - 5.62 Million/uL Final    Hemoglobin 03/04/2024 11.5 (L)  12.0 - 17.0 g/dL Final    Hematocrit 03/04/2024 34.0 (L)  36.5 - 49.3 % Final    MCV 03/04/2024 92  82 - 98 fL Final    MCH 03/04/2024 31.0  26.8 - 34.3 pg Final    MCHC 03/04/2024 33.8  31.4 - 37.4 g/dL Final    RDW 03/04/2024 15.4 (H)  11.6 - 15.1 % Final    MPV 03/04/2024 11.0  8.9 - 12.7 fL Final    Platelets 03/04/2024 191  149 - 390 Thousands/uL Final    nRBC 03/04/2024 0  /100 WBCs Final    Neutrophils Relative 03/04/2024 48  43 - 75 % Final    Immature Grans % 03/04/2024 0  0 - 2 % Final    Lymphocytes Relative 03/04/2024 30  14 - 44 % Final    Monocytes Relative 03/04/2024 9  4 - 12 % Final    Eosinophils Relative 03/04/2024 11 (H)  0 - 6 % Final    Basophils Relative 03/04/2024 2 (H)  0 - 1 % Final    Neutrophils Absolute 03/04/2024 1.76 (L)  1.85 - 7.62 Thousands/µL Final    Absolute Immature Grans 03/04/2024 0.01  0.00 - 0.20 Thousand/uL Final    Absolute Lymphocytes 03/04/2024 1.08  0.60 - 4.47 Thousands/µL Final    Absolute Monocytes 03/04/2024 0.34  0.17 - 1.22 Thousand/µL Final    Eosinophils Absolute 03/04/2024 0.39  0.00 - 0.61 Thousand/µL Final    Basophils Absolute 03/04/2024 0.07  0.00 - 0.10 Thousands/µL Final   Admission on 02/26/2024, Discharged on 02/26/2024   Component Date Value Ref Range Status    WBC 02/26/2024 7.90  4.31 - 10.16 Thousand/uL Final    RBC 02/26/2024 3.53 (L)  3.88 - 5.62 Million/uL Final    Hemoglobin 02/26/2024 10.8 (L)  12.0 - 17.0 g/dL Final    Hematocrit 02/26/2024 32.5 (L)  36.5 - 49.3 % Final    MCV 02/26/2024 92  82 - 98 fL Final    MCH 02/26/2024 30.6  26.8 - 34.3 pg Final    MCHC 02/26/2024 33.2  31.4 - 37.4 g/dL Final    RDW  02/26/2024 16.8 (H)  11.6 - 15.1 % Final    MPV 02/26/2024 10.5  8.9 - 12.7 fL Final    Platelets 02/26/2024 164  149 - 390 Thousands/uL Final    nRBC 02/26/2024 0  /100 WBCs Final    Neutrophils Relative 02/26/2024 71  43 - 75 % Final    Immature Grans % 02/26/2024 0  0 - 2 % Final    Lymphocytes Relative 02/26/2024 16  14 - 44 % Final    Monocytes Relative 02/26/2024 8  4 - 12 % Final    Eosinophils Relative 02/26/2024 4  0 - 6 % Final    Basophils Relative 02/26/2024 1  0 - 1 % Final    Neutrophils Absolute 02/26/2024 5.58  1.85 - 7.62 Thousands/µL Final    Absolute Immature Grans 02/26/2024 0.01  0.00 - 0.20 Thousand/uL Final    Absolute Lymphocytes 02/26/2024 1.29  0.60 - 4.47 Thousands/µL Final    Absolute Monocytes 02/26/2024 0.63  0.17 - 1.22 Thousand/µL Final    Eosinophils Absolute 02/26/2024 0.31  0.00 - 0.61 Thousand/µL Final    Basophils Absolute 02/26/2024 0.08  0.00 - 0.10 Thousands/µL Final    Sodium 02/26/2024 136  135 - 147 mmol/L Final    Potassium 02/26/2024 3.5  3.5 - 5.3 mmol/L Final    Chloride 02/26/2024 93 (L)  96 - 108 mmol/L Final    CO2 02/26/2024 32  21 - 32 mmol/L Final    ANION GAP 02/26/2024 11  mmol/L Final    BUN 02/26/2024 32 (H)  5 - 25 mg/dL Final    Creatinine 02/26/2024 7.96 (H)  0.60 - 1.30 mg/dL Final    Standardized to IDMS reference method    Glucose 02/26/2024 119  65 - 140 mg/dL Final    If the patient is fasting, the ADA then defines impaired fasting glucose as > 100 mg/dL and diabetes as > or equal to 123 mg/dL.    Calcium 02/26/2024 10.2  8.4 - 10.2 mg/dL Final    AST 02/26/2024 13  13 - 39 U/L Final    ALT 02/26/2024 17  7 - 52 U/L Final    Specimen collection should occur prior to Sulfasalazine administration due to the potential for falsely depressed results.     Alkaline Phosphatase 02/26/2024 42  34 - 104 U/L Final    Total Protein 02/26/2024 7.3  6.4 - 8.4 g/dL Final    Albumin 02/26/2024 4.8  3.5 - 5.0 g/dL Final    Total Bilirubin 02/26/2024 0.54  0.20 - 1.00  mg/dL Final    Use of this assay is not recommended for patients undergoing treatment with eltrombopag due to the potential for falsely elevated results.  N-acetyl-p-benzoquinone imine (metabolite of Acetaminophen) will generate erroneously low results in samples for patients that have taken an overdose of Acetaminophen.    eGFR 02/26/2024 8  ml/min/1.73sq m Final   Admission on 01/31/2024, Discharged on 01/31/2024   Component Date Value Ref Range Status    Ventricular Rate 01/31/2024 70  BPM Final    Atrial Rate 01/31/2024 70  BPM Final    IL Interval 01/31/2024 172  ms Final    QRSD Interval 01/31/2024 92  ms Final    QT Interval 01/31/2024 396  ms Final    QTC Interval 01/31/2024 427  ms Final    P Axis 01/31/2024 44  degrees Final    QRS Axis 01/31/2024 -3  degrees Final    T Wave [place] 01/31/2024 2  degrees Final    WBC 01/31/2024 4.95  4.31 - 10.16 Thousand/uL Final    RBC 01/31/2024 3.49 (L)  3.88 - 5.62 Million/uL Final    Hemoglobin 01/31/2024 10.9 (L)  12.0 - 17.0 g/dL Final    Hematocrit 01/31/2024 33.4 (L)  36.5 - 49.3 % Final    MCV 01/31/2024 96  82 - 98 fL Final    MCH 01/31/2024 31.2  26.8 - 34.3 pg Final    MCHC 01/31/2024 32.6  31.4 - 37.4 g/dL Final    RDW 01/31/2024 17.5 (H)  11.6 - 15.1 % Final    MPV 01/31/2024 10.6  8.9 - 12.7 fL Final    Platelets 01/31/2024 185  149 - 390 Thousands/uL Final    nRBC 01/31/2024 0  /100 WBCs Final    Neutrophils Relative 01/31/2024 62  43 - 75 % Final    Immature Grans % 01/31/2024 0  0 - 2 % Final    Lymphocytes Relative 01/31/2024 24  14 - 44 % Final    Monocytes Relative 01/31/2024 8  4 - 12 % Final    Eosinophils Relative 01/31/2024 5  0 - 6 % Final    Basophils Relative 01/31/2024 1  0 - 1 % Final    Neutrophils Absolute 01/31/2024 3.08  1.85 - 7.62 Thousands/µL Final    Absolute Immature Grans 01/31/2024 0.01  0.00 - 0.20 Thousand/uL Final    Absolute Lymphocytes 01/31/2024 1.17  0.60 - 4.47 Thousands/µL Final    Absolute Monocytes 01/31/2024 0.41  0.17 -  1.22 Thousand/µL Final    Eosinophils Absolute 01/31/2024 0.24  0.00 - 0.61 Thousand/µL Final    Basophils Absolute 01/31/2024 0.04  0.00 - 0.10 Thousands/µL Final    Sodium 01/31/2024 136  135 - 147 mmol/L Final    Potassium 01/31/2024 5.6 (H)  3.5 - 5.3 mmol/L Final    Chloride 01/31/2024 92 (L)  96 - 108 mmol/L Final    CO2 01/31/2024 30  21 - 32 mmol/L Final    ANION GAP 01/31/2024 14  mmol/L Final    BUN 01/31/2024 46 (H)  5 - 25 mg/dL Final    Creatinine 01/31/2024 12.05 (H)  0.60 - 1.30 mg/dL Final    Standardized to IDMS reference method    Glucose 01/31/2024 104  65 - 140 mg/dL Final    If the patient is fasting, the ADA then defines impaired fasting glucose as > 100 mg/dL and diabetes as > or equal to 123 mg/dL.    Calcium 01/31/2024 9.7  8.4 - 10.2 mg/dL Final    eGFR 01/31/2024 4  ml/min/1.73sq m Final    POC Glucose 01/31/2024 113  65 - 140 mg/dl Final    Ventricular Rate 01/31/2024 75  BPM Final    Atrial Rate 01/31/2024 75  BPM Final    VA Interval 01/31/2024 168  ms Final    QRSD Interval 01/31/2024 92  ms Final    QT Interval 01/31/2024 388  ms Final    QTC Interval 01/31/2024 433  ms Final    P Axis 01/31/2024 41  degrees Final    QRS Axis 01/31/2024 -1  degrees Final    T Wave [place] 01/31/2024 1  degrees Final   Appointment on 01/31/2024   Component Date Value Ref Range Status    Protime 01/31/2024 13.3  11.6 - 14.5 seconds Final    INR 01/31/2024 0.96  0.84 - 1.19 Final    Sodium 01/31/2024 134 (L)  135 - 147 mmol/L Final    Potassium 01/31/2024 6.7 (HH)  3.5 - 5.3 mmol/L Final    Chloride 01/31/2024 92 (L)  96 - 108 mmol/L Final    CO2 01/31/2024 31  21 - 32 mmol/L Final    ANION GAP 01/31/2024 11  mmol/L Final    BUN 01/31/2024 44 (H)  5 - 25 mg/dL Final    Creatinine 01/31/2024 11.65 (H)  0.60 - 1.30 mg/dL Final    Standardized to IDMS reference method    Glucose, Fasting 01/31/2024 86  65 - 99 mg/dL Final    Calcium 01/31/2024 9.8  8.4 - 10.2 mg/dL Final    eGFR 01/31/2024 5  ml/min/1.73sq m  Final    WBC 01/31/2024 4.73  4.31 - 10.16 Thousand/uL Final    RBC 01/31/2024 3.58 (L)  3.88 - 5.62 Million/uL Final    Hemoglobin 01/31/2024 11.4 (L)  12.0 - 17.0 g/dL Final    Hematocrit 01/31/2024 34.3 (L)  36.5 - 49.3 % Final    MCV 01/31/2024 96  82 - 98 fL Final    MCH 01/31/2024 31.8  26.8 - 34.3 pg Final    MCHC 01/31/2024 33.2  31.4 - 37.4 g/dL Final    RDW 01/31/2024 17.4 (H)  11.6 - 15.1 % Final    MPV 01/31/2024 10.8  8.9 - 12.7 fL Final    Platelets 01/31/2024 190  149 - 390 Thousands/uL Final    nRBC 01/31/2024 0  /100 WBCs Final    Neutrophils Relative 01/31/2024 63  43 - 75 % Final    Immature Grans % 01/31/2024 0  0 - 2 % Final    Lymphocytes Relative 01/31/2024 21  14 - 44 % Final    Monocytes Relative 01/31/2024 9  4 - 12 % Final    Eosinophils Relative 01/31/2024 6  0 - 6 % Final    Basophils Relative 01/31/2024 1  0 - 1 % Final    Neutrophils Absolute 01/31/2024 3.05  1.85 - 7.62 Thousands/µL Final    Absolute Immature Grans 01/31/2024 0.01  0.00 - 0.20 Thousand/uL Final    Absolute Lymphocytes 01/31/2024 0.97  0.60 - 4.47 Thousands/µL Final    Absolute Monocytes 01/31/2024 0.40  0.17 - 1.22 Thousand/µL Final    Eosinophils Absolute 01/31/2024 0.26  0.00 - 0.61 Thousand/µL Final    Basophils Absolute 01/31/2024 0.04  0.00 - 0.10 Thousands/µL Final    Total Bilirubin 01/31/2024 0.50  0.20 - 1.00 mg/dL Final    Use of this assay is not recommended for patients undergoing treatment with eltrombopag due to the potential for falsely elevated results.  N-acetyl-p-benzoquinone imine (metabolite of Acetaminophen) will generate erroneously low results in samples for patients that have taken an overdose of Acetaminophen.    Bilirubin, Direct 01/31/2024 0.07  0.00 - 0.20 mg/dL Final    Alkaline Phosphatase 01/31/2024 46  34 - 104 U/L Final    AST 01/31/2024 11 (L)  13 - 39 U/L Final    ALT 01/31/2024 17  7 - 52 U/L Final    Specimen collection should occur prior to Sulfasalazine administration due to the  potential for falsely depressed results.     Total Protein 01/31/2024 7.7  6.4 - 8.4 g/dL Final    Albumin 01/31/2024 4.7  3.5 - 5.0 g/dL Final    ABO Grouping 01/31/2024 A   Final    Rh Factor 01/31/2024 Positive   Final   No results displayed because visit has over 200 results.            Elver Ocampo PA-C  Rheumatology

## 2024-03-16 LAB
ENA SS-A AB SER-ACNC: <0.2 AI (ref 0–0.9)
ENA SS-B AB SER-ACNC: <0.2 AI (ref 0–0.9)
RHEUMATOID FACT SER QL LA: NEGATIVE

## 2024-03-19 LAB
C-ANCA TITR SER IF: NORMAL TITER
CCP AB SER IA-ACNC: 1.5
MYELOPEROXIDASE AB SER IA-ACNC: <0.2 UNITS (ref 0–0.9)
P-ANCA ATYPICAL TITR SER IF: NORMAL TITER
P-ANCA TITR SER IF: NORMAL TITER
PROTEINASE3 AB SER IA-ACNC: <0.2 UNITS (ref 0–0.9)

## 2024-03-20 DIAGNOSIS — M25.50 POLYARTHRALGIA: ICD-10-CM

## 2024-03-20 DIAGNOSIS — E72.53: ICD-10-CM

## 2024-03-20 DIAGNOSIS — M11.9 CRYSTALLINE ARTHRITIS: Primary | ICD-10-CM

## 2024-03-20 DIAGNOSIS — M65.80 CRYSTALLINE ARTHRITIS: Primary | ICD-10-CM

## 2024-03-20 RX ORDER — COLCHICINE 0.6 MG/1
0.3 TABLET ORAL DAILY
Qty: 15 TABLET | Refills: 3 | Status: SHIPPED | OUTPATIENT
Start: 2024-03-20 | End: 2024-04-19

## 2024-04-01 ENCOUNTER — APPOINTMENT (OUTPATIENT)
Dept: LAB | Facility: HOSPITAL | Age: 29
End: 2024-04-01
Payer: COMMERCIAL

## 2024-04-01 DIAGNOSIS — E72.53: ICD-10-CM

## 2024-04-01 DIAGNOSIS — Z01.818 PRE-TRANSPLANT EVALUATION FOR LIVER TRANSPLANT: ICD-10-CM

## 2024-04-01 LAB
ALBUMIN SERPL BCP-MCNC: 4.9 G/DL (ref 3.5–5)
ALP SERPL-CCNC: 43 U/L (ref 34–104)
ALT SERPL W P-5'-P-CCNC: 24 U/L (ref 7–52)
ANION GAP SERPL CALCULATED.3IONS-SCNC: 9 MMOL/L (ref 4–13)
AST SERPL W P-5'-P-CCNC: 15 U/L (ref 13–39)
BASOPHILS # BLD AUTO: 0.07 THOUSANDS/ÂΜL (ref 0–0.1)
BASOPHILS NFR BLD AUTO: 2 % (ref 0–1)
BILIRUB DIRECT SERPL-MCNC: 0.07 MG/DL (ref 0–0.2)
BILIRUB SERPL-MCNC: 0.55 MG/DL (ref 0.2–1)
BUN SERPL-MCNC: 32 MG/DL (ref 5–25)
CALCIUM SERPL-MCNC: 10.2 MG/DL (ref 8.4–10.2)
CHLORIDE SERPL-SCNC: 89 MMOL/L (ref 96–108)
CO2 SERPL-SCNC: 33 MMOL/L (ref 21–32)
CREAT SERPL-MCNC: 7.44 MG/DL (ref 0.6–1.3)
EOSINOPHIL # BLD AUTO: 0.94 THOUSAND/ÂΜL (ref 0–0.61)
EOSINOPHIL NFR BLD AUTO: 20 % (ref 0–6)
ERYTHROCYTE [DISTWIDTH] IN BLOOD BY AUTOMATED COUNT: 14.8 % (ref 11.6–15.1)
GFR SERPL CREATININE-BSD FRML MDRD: 8 ML/MIN/1.73SQ M
GLUCOSE P FAST SERPL-MCNC: 90 MG/DL (ref 65–99)
HCT VFR BLD AUTO: 37.2 % (ref 36.5–49.3)
HGB BLD-MCNC: 12.6 G/DL (ref 12–17)
IMM GRANULOCYTES # BLD AUTO: 0 THOUSAND/UL (ref 0–0.2)
IMM GRANULOCYTES NFR BLD AUTO: 0 % (ref 0–2)
INR PPP: 0.91 (ref 0.84–1.19)
LYMPHOCYTES # BLD AUTO: 1.42 THOUSANDS/ÂΜL (ref 0.6–4.47)
LYMPHOCYTES NFR BLD AUTO: 30 % (ref 14–44)
MCH RBC QN AUTO: 30.9 PG (ref 26.8–34.3)
MCHC RBC AUTO-ENTMCNC: 33.9 G/DL (ref 31.4–37.4)
MCV RBC AUTO: 91 FL (ref 82–98)
MONOCYTES # BLD AUTO: 0.48 THOUSAND/ÂΜL (ref 0.17–1.22)
MONOCYTES NFR BLD AUTO: 10 % (ref 4–12)
NEUTROPHILS # BLD AUTO: 1.83 THOUSANDS/ÂΜL (ref 1.85–7.62)
NEUTS SEG NFR BLD AUTO: 38 % (ref 43–75)
NRBC BLD AUTO-RTO: 0 /100 WBCS
PLATELET # BLD AUTO: 198 THOUSANDS/UL (ref 149–390)
PMV BLD AUTO: 9.9 FL (ref 8.9–12.7)
POTASSIUM SERPL-SCNC: 4.9 MMOL/L (ref 3.5–5.3)
PROT SERPL-MCNC: 7.7 G/DL (ref 6.4–8.4)
PROTHROMBIN TIME: 12.9 SECONDS (ref 11.6–14.5)
RBC # BLD AUTO: 4.08 MILLION/UL (ref 3.88–5.62)
SODIUM SERPL-SCNC: 131 MMOL/L (ref 135–147)
WBC # BLD AUTO: 4.74 THOUSAND/UL (ref 4.31–10.16)

## 2024-04-01 PROCEDURE — 80048 BASIC METABOLIC PNL TOTAL CA: CPT

## 2024-04-01 PROCEDURE — 85025 COMPLETE CBC W/AUTO DIFF WBC: CPT

## 2024-04-01 PROCEDURE — 36415 COLL VENOUS BLD VENIPUNCTURE: CPT

## 2024-04-01 PROCEDURE — 80076 HEPATIC FUNCTION PANEL: CPT

## 2024-04-01 PROCEDURE — 85610 PROTHROMBIN TIME: CPT

## 2024-04-17 DIAGNOSIS — I10 PRIMARY HYPERTENSION: Primary | ICD-10-CM

## 2024-04-17 RX ORDER — CARVEDILOL 6.25 MG/1
6.25 TABLET ORAL 2 TIMES DAILY WITH MEALS
Qty: 180 TABLET | Refills: 3 | Status: SHIPPED | OUTPATIENT
Start: 2024-04-17

## 2024-04-25 ENCOUNTER — OFFICE VISIT (OUTPATIENT)
Dept: RHEUMATOLOGY | Facility: CLINIC | Age: 29
End: 2024-04-25
Payer: COMMERCIAL

## 2024-04-25 VITALS
BODY MASS INDEX: 30.04 KG/M2 | TEMPERATURE: 97.4 F | DIASTOLIC BLOOD PRESSURE: 100 MMHG | WEIGHT: 221.8 LBS | HEIGHT: 72 IN | HEART RATE: 74 BPM | OXYGEN SATURATION: 99 % | SYSTOLIC BLOOD PRESSURE: 150 MMHG

## 2024-04-25 DIAGNOSIS — G89.29 CHRONIC PAIN OF BOTH KNEES: ICD-10-CM

## 2024-04-25 DIAGNOSIS — M79.641 BILATERAL HAND PAIN: ICD-10-CM

## 2024-04-25 DIAGNOSIS — L81.9 MOTTLED SKIN: ICD-10-CM

## 2024-04-25 DIAGNOSIS — N18.6 END STAGE RENAL DISEASE ON DIALYSIS (HCC): ICD-10-CM

## 2024-04-25 DIAGNOSIS — M25.562 CHRONIC PAIN OF BOTH KNEES: ICD-10-CM

## 2024-04-25 DIAGNOSIS — M11.9 CRYSTALLINE ARTHRITIS: ICD-10-CM

## 2024-04-25 DIAGNOSIS — E72.53: Primary | ICD-10-CM

## 2024-04-25 DIAGNOSIS — Z99.2 END STAGE RENAL DISEASE ON DIALYSIS (HCC): ICD-10-CM

## 2024-04-25 DIAGNOSIS — M65.80 CRYSTALLINE ARTHRITIS: ICD-10-CM

## 2024-04-25 DIAGNOSIS — M79.642 BILATERAL HAND PAIN: ICD-10-CM

## 2024-04-25 DIAGNOSIS — M25.561 CHRONIC PAIN OF BOTH KNEES: ICD-10-CM

## 2024-04-25 PROCEDURE — 99213 OFFICE O/P EST LOW 20 MIN: CPT | Performed by: PHYSICIAN ASSISTANT

## 2024-04-25 NOTE — PROGRESS NOTES
Assessment and Plan:   Mr. Padilla is a 28-year-old male who presents for rheumatology follow-up of oxalate arthropathy.    Milton continues to experience inflammatory joint symptoms of the bilateral hands and knees despite colchicine 0.3 mg daily (renally dosed).  We discussed that he may trial topical Voltaren gel at the opposite time of day to see if there is any improvement.  If not, he can discontinue colchicine.  We reviewed that there are currently no other specific medications which have been found to be effective for oxalate arthropathy aside from NSAIDs and steroids. Oral NSAIDs such as ibuprofen and Aleve would not be indicated due to his history of ESRD on HD.  We discussed the risks and the benefits of chronic low-dose steroids as an option, but will hold off for the time being since he is actively on the liver/kidney transplant list and long-term side effects of steroid use include risk of infections, cataract/glaucoma, hypertension, diabetes, gastritis, osteoporosis, and avascular necrosis.  Will continue conservative management as able.    Plan for follow-up in 6 to 8 months.    Plan:  Diagnoses and all orders for this visit:    Primary hyperoxaluria type 1 (HCC)    Crystalline arthritis    End stage renal disease on dialysis (HCC)    Bilateral hand pain    Chronic pain of both knees    Mottled skin        I have personally reviewed prior notes, recent laboratory results, and pertinent films in PACS.     Activities as tolerated.   Exercise: try to maintain a low impact exercise regimen as much as possible. Walk for 30 minutes a day for at least 3 days a week.   Continue other medications as prescribed by PCP and other specialists.       RTC in    Rheumatic Disease Summary:  Oxalate arthropathy  -Initial visit 3/15/2024: Eval for ESRD on dialysis and primary hyperoxaluria type I- dx'd ~ around age 9.  Started on RRT last fall and is doing HHD 5 days a week.  He is on the dual transplant list for  liver/kidney at [facility].  Previous to starting dialysis, he was experiencing recurrent pain, swelling, and stiffness in the feet associated with color change which has greatly improved since starting dialysis.  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  Still experiencing stiffness and swelling of multiple joints (PIPs, knees, toes). Has a history of gout in the form of podagra that occurred at age 12 and has not recurred. Ddx: oxalate arthropathy (most likely etiology) versus chronic gouty arthritis versus another type of IA. Per chart review, labs from [facility] in December 2023 showed TESSA negativity.  Do labs and x-rays  -Labs 3/2024: Neg RF, CCP, SSA, SSB, uric acid, C3, C4, ANCA.   -X-rays hands and feet 3/2024: Normal  -Visit 4/25/2024: Not much benefit from colchicine, colchicine 0.3 mg daily for now, but add topical Voltaren and monitor.  If no improvement, D/C colchicine  Remote history of gout  -Podagra at age 12, no recurrence  Other comorbidities:  ESRD on HD secondary to primary hyperoxaluria type I, LUE AVF, anemia, GERD, and neuropathy         HPI  Mr. Padilla is a 28-year-old male who presents for rheumatology follow-up of oxalate arthropathy.    Does not note much improvement since starting colchicine.  Still experiencing morning stiffness of the hands and knees.  Still notes some mottling of the skin on the hands and knees    The following portions of the patient's history were reviewed and updated as appropriate: allergies, current medications, past family history, past medical history, past social history, past surgical history and problem list.      Review of Systems  Constitutional: Negative for weight change, fevers, chills, night sweats, fatigue.  ENT/Mouth: Negative for hearing changes, ear pain, nasal congestion, sinus pain, hoarseness, sore throat, rhinorrhea, swallowing difficulty.   Eyes: Negative for pain, redness, discharge, vision changes.   Cardiovascular:  Negative for chest pain, SOB, palpitations.   Respiratory: Negative for cough, sputum, wheezing, dyspnea.   Gastrointestinal: Negative for nausea, vomiting, diarrhea, constipation, pain, heartburn.  Genitourinary: Negative for dysuria, urinary frequency, hematuria.   Musculoskeletal: As per HPI.  Skin: +color changes.   Neuro: Negative for weakness, numbness, tingling, loss of consciousness.   Psych: Negative for anxiety, depression.   Heme/Lymph: Negative for easy bruising, bleeding, lymphadenopathy.        Past Medical History:   Diagnosis Date    Anxiety     Chronic kidney disease     GERD (gastroesophageal reflux disease)     Kidney stone     Liver disorder     Nephrolithiasis     Primary hyperoxaluria type 1 (HCC)        Past Surgical History:   Procedure Laterality Date    ANKLE SURGERY Right 2017    ligament repair    CARDIAC CATHETERIZATION Left 12/1/2023    Procedure: Cardiac catheterization;  Surgeon: Teresa Mireles MD;  Location: [facility];  Service: Cardiology    CARDIAC CATHETERIZATION N/A 12/1/2023    Procedure: Cardiac Coronary Angiogram;  Surgeon: Teresa Mireles MD;  [facility]: [facility];  Service: Cardiology    CARDIAC CATHETERIZATION Left 12/1/2023    Procedure: Cardiac Left Heart Cath;  Surgeon: Teresa Mireles MD;  [facility]: [facility];  Service: Cardiology    CYSTOSCOPY      FL RETROGRADE PYELOGRAM  10/24/2020    FL RETROGRADE PYELOGRAM  02/08/2021    HERNIA REPAIR      IR TUNNELED DIALYSIS CATHETER CHECK/CHANGE/REPOSITION/ANGIOPLASTY  12/4/2023    IR TUNNELED DIALYSIS CATHETER PLACEMENT  8/24/2023    IR TUNNELED DIALYSIS CATHETER REMOVAL  3/13/2024    KIDNEY STONE SURGERY      LITHOTRIPSY      MASS EXCISION Left 02/17/2017    Procedure: BACK/SHOULDER CYST EXCISION ;  Surgeon: John Muhammad MD;  Location: [facility];  Service:     NC ARTERIOVENOUS ANASTOMOSIS OPEN DIRECT Left 12/14/2023    Procedure: CREATION FISTULA ARTERIOVENOUS (AV);  Surgeon: Lencho  Osorio Worthington MD;  Location: [facility];  Service: Vascular    IN CYSTO/URETERO W/LITHOTRIPSY &INDWELL STENT INSRT Left 10/24/2020    Procedure: CYSTOSCOPY URETEROSCOPY WITH LITHOTRIPSY HOLMIUM LASER, RETROGRADE PYELOGRAM AND INSERTION STENT URETERAL;  Surgeon: Carlos Le MD;  [facility];  Service: Urology    IN CYSTO/URETERO W/LITHOTRIPSY &INDWELL STENT INSRT Right 02/08/2021    Procedure: CYSTOSCOPY URETEROSCOPY WITH LITHOTRIPSY HOLMIUM LASER, RETROGRADE PYELOGRAM AND INSERTION STENT URETERAL;  Surgeon: Brian Merchant MD;  [facility];  Service: Urology       Social History     Socioeconomic History    Marital status: Single     Spouse name: Not on file    Number of children: Not on file    Years of education: Not on file    Highest education level: Not on file   Occupational History     Employer: [employer]   Tobacco Use    Smoking status: Never    Smokeless tobacco: Never   Vaping Use    Vaping status: Never Used   Substance and Sexual Activity    Alcohol use: Yes     Comment: rarely    Drug use: No    Sexual activity: Not Currently     Partners: Female   Other Topics Concern    Not on file   Social History Narrative    Not on file     Social Determinants of Health     Financial Resource Strain: Not on file   Food Insecurity: No Food Insecurity (11/29/2023)    Hunger Vital Sign     Worried About Running Out of Food in the Last Year: Never true     Ran Out of Food in the Last Year: Never true   Transportation Needs: No Transportation Needs (11/29/2023)    PRAPARE - Transportation     Lack of Transportation (Medical): No     Lack of Transportation (Non-Medical): No   Physical Activity: Not on file   Stress: Not on file   Social Connections: Not on file   Intimate Partner Violence: Not on file   Housing Stability: Low Risk  (11/29/2023)    Housing Stability Vital Sign     Unable to Pay for Housing in the Last Year: No     Number of Places Lived in the Last Year: 2     Unstable  Housing in the Last Year: No       Family History   Problem Relation Age of Onset    Hypertension Mother     No Known Problems Father     Cancer Maternal Grandmother         Bladder    Diabetes Maternal Grandmother     Alzheimer's disease Maternal Grandfather        No Known Allergies      Current Outpatient Medications:     calcitriol (ROCALTROL) 0.25 mcg capsule, Take 1 capsule (0.25 mcg total) by mouth 3 (three) times a week, Disp: 12 capsule, Rfl: 0    calcium acetate (PHOSLO) capsule, Take 3 capsules (2,001 mg total) by mouth 3 (three) times a day with meals, Disp: 270 capsule, Rfl: 6    carvedilol (COREG) 6.25 mg tablet, Take 1 tablet (6.25 mg total) by mouth 2 (two) times a day with meals, Disp: 180 tablet, Rfl: 3    gabapentin (NEURONTIN) 100 mg capsule, 100 mg 3 (three) times a day, Disp: , Rfl:     heparin 5000 units in sodium chloride 0.9% 1000 mL irrigation, Inject 3,000 Units into a catheter in a vein once With Dialysis, Disp: , Rfl:     traZODone (DESYREL) 50 mg tablet, TAKE 1 TABLET BY MOUTH EVERYDAY AT BEDTIME, Disp: 90 tablet, Rfl: 3    Vitamin D, Cholecalciferol, 50 MCG (2000 UT) CAPS, Take 1 capsule by mouth in the morning, Disp: , Rfl:     colchicine (COLCRYS) 0.6 mg tablet, Take 0.5 tablets (0.3 mg total) by mouth daily, Disp: 15 tablet, Rfl: 3    Diclofenac Sodium (VOLTAREN) 1 %, Apply 2 g topically 4 (four) times a day (Patient not taking: Reported on 3/15/2024), Disp: 100 g, Rfl: 0    ondansetron (ZOFRAN) 4 mg tablet, Take 1 tablet (4 mg total) by mouth every 8 (eight) hours as needed for nausea or vomiting, Disp: 50 tablet, Rfl: 0    oxyCODONE (ROXICODONE) 5 immediate release tablet, Take 1 tablet by mouth daily as needed (Patient not taking: Reported on 1/8/2024), Disp: , Rfl:       Objective:    Vitals:    04/25/24 0815   BP: 150/100   Pulse: 74   Temp: (!) 97.4 °F (36.3 °C)   SpO2: 99%   Weight: 101 kg (221 lb 12.8 oz)   Height: 6' (1.829 m)       Physical Exam  General: Well appearing,  well nourished, in no acute distress. Oriented x 3, normal mood and affect.  Ambulating without difficulty.  Skin: Good turgor, no rash, unusual bruising or prominent lesions.  Hair: Normal texture and distribution.  Nails: Normal color, no deformities.  HEENT:  Head: Normocephalic, atraumatic.  Eyes: Conjunctiva clear, sclera non-icteric, EOM intact.  Nose: No external lesions, mucosa non-inflamed.  Mouth: Mucous membranes moist, no mucosal lesions.  Neck: Supple   Musculoskeletal:   No asymmetry or deformities noted of bilateral upper and lower extremities.  Neurologic: Alert and oriented. No focal neurological deficits appreciated.   Psychiatric: Normal mood and affect.       Elver Ocampo PA-C  Rheumatology

## 2024-04-29 ENCOUNTER — APPOINTMENT (OUTPATIENT)
Dept: LAB | Facility: HOSPITAL | Age: 29
End: 2024-04-29
Payer: COMMERCIAL

## 2024-04-29 DIAGNOSIS — E72.53: ICD-10-CM

## 2024-04-29 LAB
ALBUMIN SERPL BCP-MCNC: 4.6 G/DL (ref 3.5–5)
ALP SERPL-CCNC: 43 U/L (ref 34–104)
ALT SERPL W P-5'-P-CCNC: 23 U/L (ref 7–52)
ANION GAP SERPL CALCULATED.3IONS-SCNC: 9 MMOL/L (ref 4–13)
AST SERPL W P-5'-P-CCNC: 17 U/L (ref 13–39)
BILIRUB DIRECT SERPL-MCNC: 0.12 MG/DL (ref 0–0.2)
BILIRUB SERPL-MCNC: 0.57 MG/DL (ref 0.2–1)
BUN SERPL-MCNC: 25 MG/DL (ref 5–25)
CALCIUM SERPL-MCNC: 9.8 MG/DL (ref 8.4–10.2)
CHLORIDE SERPL-SCNC: 92 MMOL/L (ref 96–108)
CO2 SERPL-SCNC: 33 MMOL/L (ref 21–32)
CREAT SERPL-MCNC: 7.2 MG/DL (ref 0.6–1.3)
ERYTHROCYTE [DISTWIDTH] IN BLOOD BY AUTOMATED COUNT: 15.9 % (ref 11.6–15.1)
GFR SERPL CREATININE-BSD FRML MDRD: 9 ML/MIN/1.73SQ M
GLUCOSE P FAST SERPL-MCNC: 94 MG/DL (ref 65–99)
HCT VFR BLD AUTO: 34.5 % (ref 36.5–49.3)
HGB BLD-MCNC: 11.4 G/DL (ref 12–17)
INR PPP: 0.89 (ref 0.84–1.19)
MCH RBC QN AUTO: 30.1 PG (ref 26.8–34.3)
MCHC RBC AUTO-ENTMCNC: 33 G/DL (ref 31.4–37.4)
MCV RBC AUTO: 91 FL (ref 82–98)
PLATELET # BLD AUTO: 197 THOUSANDS/UL (ref 149–390)
PMV BLD AUTO: 10.3 FL (ref 8.9–12.7)
POTASSIUM SERPL-SCNC: 4.7 MMOL/L (ref 3.5–5.3)
PROT SERPL-MCNC: 7.1 G/DL (ref 6.4–8.4)
PROTHROMBIN TIME: 12.7 SECONDS (ref 11.6–14.5)
RBC # BLD AUTO: 3.79 MILLION/UL (ref 3.88–5.62)
SODIUM SERPL-SCNC: 134 MMOL/L (ref 135–147)
WBC # BLD AUTO: 4.28 THOUSAND/UL (ref 4.31–10.16)

## 2024-04-29 PROCEDURE — 36415 COLL VENOUS BLD VENIPUNCTURE: CPT

## 2024-04-29 PROCEDURE — 80076 HEPATIC FUNCTION PANEL: CPT

## 2024-04-29 PROCEDURE — 85027 COMPLETE CBC AUTOMATED: CPT

## 2024-04-29 PROCEDURE — 80048 BASIC METABOLIC PNL TOTAL CA: CPT

## 2024-04-29 PROCEDURE — 85610 PROTHROMBIN TIME: CPT

## 2024-05-10 ENCOUNTER — APPOINTMENT (EMERGENCY)
Dept: CT IMAGING | Facility: HOSPITAL | Age: 29
End: 2024-05-10
Payer: COMMERCIAL

## 2024-05-10 ENCOUNTER — HOSPITAL ENCOUNTER (OUTPATIENT)
Facility: HOSPITAL | Age: 29
Setting detail: OBSERVATION
Discharge: HOME | End: 2024-05-11
Attending: EMERGENCY MEDICINE | Admitting: INTERNAL MEDICINE
Payer: COMMERCIAL

## 2024-05-10 DIAGNOSIS — Z99.2 END STAGE RENAL DISEASE ON DIALYSIS (HCC): ICD-10-CM

## 2024-05-10 DIAGNOSIS — N20.0 NEPHROLITHIASIS: ICD-10-CM

## 2024-05-10 DIAGNOSIS — R11.0 NAUSEA: ICD-10-CM

## 2024-05-10 DIAGNOSIS — R10.9 FLANK PAIN: Primary | ICD-10-CM

## 2024-05-10 DIAGNOSIS — N18.6 END STAGE RENAL DISEASE ON DIALYSIS (HCC): ICD-10-CM

## 2024-05-10 LAB
ALBUMIN SERPL BCP-MCNC: 4.4 G/DL (ref 3.5–5)
ALP SERPL-CCNC: 44 U/L (ref 34–104)
ALT SERPL W P-5'-P-CCNC: 32 U/L (ref 7–52)
ANION GAP SERPL CALCULATED.3IONS-SCNC: 9 MMOL/L (ref 4–13)
AST SERPL W P-5'-P-CCNC: 20 U/L (ref 13–39)
BASOPHILS # BLD AUTO: 0.09 THOUSANDS/ÂΜL (ref 0–0.1)
BASOPHILS NFR BLD AUTO: 2 % (ref 0–1)
BILIRUB SERPL-MCNC: 0.47 MG/DL (ref 0.2–1)
BNP SERPL-MCNC: 419 PG/ML (ref 0–100)
BUN SERPL-MCNC: 16 MG/DL (ref 5–25)
CALCIUM SERPL-MCNC: 9.8 MG/DL (ref 8.4–10.2)
CARDIAC TROPONIN I PNL SERPL HS: 63 NG/L
CHLORIDE SERPL-SCNC: 91 MMOL/L (ref 96–108)
CO2 SERPL-SCNC: 32 MMOL/L (ref 21–32)
CREAT SERPL-MCNC: 5.88 MG/DL (ref 0.6–1.3)
EOSINOPHIL # BLD AUTO: 0.51 THOUSAND/ÂΜL (ref 0–0.61)
EOSINOPHIL NFR BLD AUTO: 10 % (ref 0–6)
ERYTHROCYTE [DISTWIDTH] IN BLOOD BY AUTOMATED COUNT: 15.3 % (ref 11.6–15.1)
GFR SERPL CREATININE-BSD FRML MDRD: 11 ML/MIN/1.73SQ M
GLUCOSE SERPL-MCNC: 90 MG/DL (ref 65–140)
HCT VFR BLD AUTO: 27.9 % (ref 36.5–49.3)
HGB BLD-MCNC: 9.6 G/DL (ref 12–17)
IMM GRANULOCYTES # BLD AUTO: 0.01 THOUSAND/UL (ref 0–0.2)
IMM GRANULOCYTES NFR BLD AUTO: 0 % (ref 0–2)
LIPASE SERPL-CCNC: 17 U/L (ref 11–82)
LYMPHOCYTES # BLD AUTO: 2.11 THOUSANDS/ÂΜL (ref 0.6–4.47)
LYMPHOCYTES NFR BLD AUTO: 39 % (ref 14–44)
MCH RBC QN AUTO: 30.2 PG (ref 26.8–34.3)
MCHC RBC AUTO-ENTMCNC: 34.4 G/DL (ref 31.4–37.4)
MCV RBC AUTO: 88 FL (ref 82–98)
MONOCYTES # BLD AUTO: 0.62 THOUSAND/ÂΜL (ref 0.17–1.22)
MONOCYTES NFR BLD AUTO: 12 % (ref 4–12)
NEUTROPHILS # BLD AUTO: 1.98 THOUSANDS/ÂΜL (ref 1.85–7.62)
NEUTS SEG NFR BLD AUTO: 37 % (ref 43–75)
NRBC BLD AUTO-RTO: 0 /100 WBCS
PLATELET # BLD AUTO: 213 THOUSANDS/UL (ref 149–390)
PMV BLD AUTO: 10.3 FL (ref 8.9–12.7)
POTASSIUM SERPL-SCNC: 3.6 MMOL/L (ref 3.5–5.3)
PROT SERPL-MCNC: 7.1 G/DL (ref 6.4–8.4)
RBC # BLD AUTO: 3.18 MILLION/UL (ref 3.88–5.62)
SODIUM SERPL-SCNC: 132 MMOL/L (ref 135–147)
WBC # BLD AUTO: 5.32 THOUSAND/UL (ref 4.31–10.16)

## 2024-05-10 PROCEDURE — 99285 EMERGENCY DEPT VISIT HI MDM: CPT | Performed by: EMERGENCY MEDICINE

## 2024-05-10 PROCEDURE — 96365 THER/PROPH/DIAG IV INF INIT: CPT

## 2024-05-10 PROCEDURE — 84484 ASSAY OF TROPONIN QUANT: CPT

## 2024-05-10 PROCEDURE — 80053 COMPREHEN METABOLIC PANEL: CPT

## 2024-05-10 PROCEDURE — 83690 ASSAY OF LIPASE: CPT

## 2024-05-10 PROCEDURE — 74176 CT ABD & PELVIS W/O CONTRAST: CPT

## 2024-05-10 PROCEDURE — 99285 EMERGENCY DEPT VISIT HI MDM: CPT

## 2024-05-10 PROCEDURE — 85025 COMPLETE CBC W/AUTO DIFF WBC: CPT

## 2024-05-10 PROCEDURE — 96375 TX/PRO/DX INJ NEW DRUG ADDON: CPT

## 2024-05-10 PROCEDURE — 83880 ASSAY OF NATRIURETIC PEPTIDE: CPT

## 2024-05-10 PROCEDURE — 93005 ELECTROCARDIOGRAM TRACING: CPT

## 2024-05-10 PROCEDURE — 36415 COLL VENOUS BLD VENIPUNCTURE: CPT

## 2024-05-10 RX ORDER — ACETAMINOPHEN 10 MG/ML
1000 INJECTION, SOLUTION INTRAVENOUS ONCE
Status: COMPLETED | OUTPATIENT
Start: 2024-05-10 | End: 2024-05-11

## 2024-05-10 RX ORDER — LIDOCAINE 50 MG/G
1 PATCH TOPICAL ONCE
Status: COMPLETED | OUTPATIENT
Start: 2024-05-10 | End: 2024-05-11

## 2024-05-10 RX ORDER — ONDANSETRON 2 MG/ML
4 INJECTION INTRAMUSCULAR; INTRAVENOUS ONCE
Status: COMPLETED | OUTPATIENT
Start: 2024-05-10 | End: 2024-05-10

## 2024-05-10 RX ADMIN — ACETAMINOPHEN 1000 MG: 10 INJECTION INTRAVENOUS at 23:26

## 2024-05-10 RX ADMIN — SODIUM CHLORIDE 500 ML: 0.9 INJECTION, SOLUTION INTRAVENOUS at 23:17

## 2024-05-10 RX ADMIN — ONDANSETRON 4 MG: 2 INJECTION INTRAMUSCULAR; INTRAVENOUS at 23:25

## 2024-05-10 RX ADMIN — LIDOCAINE 1 PATCH: 50 PATCH TOPICAL at 23:25

## 2024-05-11 VITALS
HEART RATE: 85 BPM | RESPIRATION RATE: 22 BRPM | DIASTOLIC BLOOD PRESSURE: 98 MMHG | BODY MASS INDEX: 29.17 KG/M2 | HEIGHT: 72 IN | TEMPERATURE: 98.5 F | SYSTOLIC BLOOD PRESSURE: 166 MMHG | OXYGEN SATURATION: 97 % | WEIGHT: 215.39 LBS

## 2024-05-11 PROBLEM — R10.9 FLANK PAIN, ACUTE: Status: ACTIVE | Noted: 2024-05-11

## 2024-05-11 LAB
2HR DELTA HS TROPONIN: 12 NG/L
4HR DELTA HS TROPONIN: 8 NG/L
ATRIAL RATE: 71 BPM
CARDIAC TROPONIN I PNL SERPL HS: 71 NG/L
CARDIAC TROPONIN I PNL SERPL HS: 75 NG/L
P AXIS: 47 DEGREES
PR INTERVAL: 180 MS
QRS AXIS: 10 DEGREES
QRSD INTERVAL: 96 MS
QT INTERVAL: 418 MS
QTC INTERVAL: 454 MS
T WAVE AXIS: 8 DEGREES
VENTRICULAR RATE: 71 BPM

## 2024-05-11 PROCEDURE — 99244 OFF/OP CNSLTJ NEW/EST MOD 40: CPT | Performed by: INTERNAL MEDICINE

## 2024-05-11 PROCEDURE — 99223 1ST HOSP IP/OBS HIGH 75: CPT | Performed by: PHYSICIAN ASSISTANT

## 2024-05-11 PROCEDURE — 84484 ASSAY OF TROPONIN QUANT: CPT

## 2024-05-11 PROCEDURE — NC001 PR NO CHARGE: Performed by: FAMILY MEDICINE

## 2024-05-11 PROCEDURE — 36415 COLL VENOUS BLD VENIPUNCTURE: CPT

## 2024-05-11 PROCEDURE — 93010 ELECTROCARDIOGRAM REPORT: CPT | Performed by: INTERNAL MEDICINE

## 2024-05-11 RX ORDER — LIDOCAINE 50 MG/G
1 PATCH TOPICAL DAILY PRN
Status: DISCONTINUED | OUTPATIENT
Start: 2024-05-11 | End: 2024-05-11 | Stop reason: HOSPADM

## 2024-05-11 RX ORDER — CARVEDILOL 6.25 MG/1
6.25 TABLET ORAL 2 TIMES DAILY WITH MEALS
Status: DISCONTINUED | OUTPATIENT
Start: 2024-05-11 | End: 2024-05-11 | Stop reason: HOSPADM

## 2024-05-11 RX ORDER — ACETAMINOPHEN 325 MG/1
975 TABLET ORAL EVERY 8 HOURS PRN
Status: DISCONTINUED | OUTPATIENT
Start: 2024-05-11 | End: 2024-05-11 | Stop reason: HOSPADM

## 2024-05-11 RX ORDER — CALCITRIOL 0.25 UG/1
0.25 CAPSULE, LIQUID FILLED ORAL 3 TIMES WEEKLY
Status: DISCONTINUED | OUTPATIENT
Start: 2024-05-13 | End: 2024-05-11 | Stop reason: HOSPADM

## 2024-05-11 RX ORDER — TAMSULOSIN HYDROCHLORIDE 0.4 MG/1
0.4 CAPSULE ORAL ONCE
Status: COMPLETED | OUTPATIENT
Start: 2024-05-11 | End: 2024-05-11

## 2024-05-11 RX ORDER — OXYCODONE HYDROCHLORIDE 5 MG/1
5 TABLET ORAL EVERY 6 HOURS PRN
Status: DISCONTINUED | OUTPATIENT
Start: 2024-05-11 | End: 2024-05-11 | Stop reason: HOSPADM

## 2024-05-11 RX ORDER — COLCHICINE 0.6 MG/1
0.3 TABLET ORAL DAILY
Status: DISCONTINUED | OUTPATIENT
Start: 2024-05-11 | End: 2024-05-11 | Stop reason: HOSPADM

## 2024-05-11 RX ORDER — GABAPENTIN 100 MG/1
100 CAPSULE ORAL 3 TIMES DAILY
Status: DISCONTINUED | OUTPATIENT
Start: 2024-05-11 | End: 2024-05-11 | Stop reason: HOSPADM

## 2024-05-11 RX ORDER — TRAZODONE HYDROCHLORIDE 50 MG/1
50 TABLET ORAL
Status: DISCONTINUED | OUTPATIENT
Start: 2024-05-11 | End: 2024-05-11 | Stop reason: HOSPADM

## 2024-05-11 RX ORDER — MAGNESIUM HYDROXIDE/ALUMINUM HYDROXICE/SIMETHICONE 120; 1200; 1200 MG/30ML; MG/30ML; MG/30ML
30 SUSPENSION ORAL EVERY 6 HOURS PRN
Status: DISCONTINUED | OUTPATIENT
Start: 2024-05-11 | End: 2024-05-11 | Stop reason: HOSPADM

## 2024-05-11 RX ADMIN — Medication 1000 UNITS: at 08:35

## 2024-05-11 RX ADMIN — TAMSULOSIN HYDROCHLORIDE 0.4 MG: 0.4 CAPSULE ORAL at 05:50

## 2024-05-11 RX ADMIN — CARVEDILOL 6.25 MG: 6.25 TABLET, FILM COATED ORAL at 08:35

## 2024-05-11 RX ADMIN — GABAPENTIN 100 MG: 100 CAPSULE ORAL at 08:35

## 2024-05-11 RX ADMIN — TRIMETHOBENZAMIDE HYDROCHLORIDE 200 MG: 100 INJECTION INTRAMUSCULAR at 05:46

## 2024-05-11 NOTE — PLAN OF CARE
Problem: PAIN - ADULT  Goal: Verbalizes/displays adequate comfort level or baseline comfort level  Description: Interventions:  - Encourage patient to monitor pain and request assistance  - Assess pain using appropriate pain scale  - Administer analgesics based on type and severity of pain and evaluate response  - Implement non-pharmacological measures as appropriate and evaluate response  - Consider cultural and social influences on pain and pain management  - Notify physician/advanced practitioner if interventions unsuccessful or patient reports new pain  Outcome: Progressing     Problem: SAFETY ADULT  Goal: Patient will remain free of falls  Description: INTERVENTIONS:  - Educate patient/family on patient safety including physical limitations  - Instruct patient to call for assistance with activity   - Consult OT/PT to assist with strengthening/mobility   - Keep Call bell within reach  - Keep bed low and locked with side rails adjusted as appropriate  - Keep care items and personal belongings within reach  - Initiate and maintain comfort rounds  - Make Fall Risk Sign visible to staff  - Offer Toileting every  Hours, in advance of need  - Initiate/Maintain alarm  - Obtain necessary fall risk management equipment:   Problem: Nutrition/Hydration-ADULT  Goal: Nutrient/Hydration intake appropriate for improving, restoring or maintaining nutritional needs  Description: Monitor and assess patient's nutrition/hydration status for malnutrition. Collaborate with interdisciplinary team and initiate plan and interventions as ordered.  Monitor patient's weight and dietary intake as ordered or per policy. Utilize nutrition screening tool and intervene as necessary. Determine patient's food preferences and provide high-protein, high-caloric foods as appropriate.     INTERVENTIONS:  - Monitor oral intake, urinary output, labs, and treatment plans  - Assess nutrition and hydration status and recommend course of action  -  Evaluate amount of meals eaten  - Assist patient with eating if necessary   - Allow adequate time for meals  - Recommend/ encourage appropriate diets, oral nutritional supplements, and vitamin/mineral supplements  - Order, calculate, and assess calorie counts as needed  - Recommend, monitor, and adjust tube feedings and TPN/PPN based on assessed needs  - Assess need for intravenous fluids  - Provide specific nutrition/hydration education as appropriate  - Include patient/family/caregiver in decisions related to nutrition  Outcome: Progressing     Problem: Knowledge Deficit  Goal: Patient/family/caregiver demonstrates understanding of disease process, treatment plan, medications, and discharge instructions  Description: Complete learning assessment and assess knowledge base.  Interventions:  - Provide teaching at level of understanding  - Provide teaching via preferred learning methods  Outcome: Progressing     Problem: DISCHARGE PLANNING  Goal: Discharge to home or other facility with appropriate resources  Description: INTERVENTIONS:  - Identify barriers to discharge w/patient and caregiver  - Arrange for needed discharge resources and transportation as appropriate  - Identify discharge learning needs (meds, wound care, etc.)  - Arrange for interpretive services to assist at discharge as needed  - Refer to Case Management Department for coordinating discharge planning if the patient needs post-hospital services based on physician/advanced practitioner order or complex needs related to functional status, cognitive ability, or social support system  Outcome: Progressing     - Apply yellow socks and bracelet for high fall risk patients  - Consider moving patient to room near nurses station  Outcome: Progressing  Goal: Maintain or return to baseline ADL function  Description: INTERVENTIONS:  -  Assess patient's ability to carry out ADLs; assess patient's baseline for ADL function and identify physical deficits which  impact ability to perform ADLs (bathing, care of mouth/teeth, toileting, grooming, dressing, etc.)  - Assess/evaluate cause of self-care deficits   - Assess range of motion  - Assess patient's mobility; develop plan if impaired  - Assess patient's need for assistive devices and provide as appropriate  - Encourage maximum independence but intervene and supervise when necessary  - Involve family in performance of ADLs  - Assess for home care needs following discharge   - Consider OT consult to assist with ADL evaluation and planning for discharge  - Provide patient education as appropriate  Outcome: Progressing  Goal: Maintains/Returns to pre admission functional level  Description: INTERVENTIONS:  - Perform AM-PAC 6 Click Basic Mobility/ Daily Activity assessment daily.  - Set and communicate daily mobility goal to care team and patient/family/caregiver.   - Collaborate with rehabilitation services on mobility goals if consulted  - Perform Range of Motion  times a day.  - Reposition patient every  hours.  - Dangle patient  times a day  - Stand patient  times a day  - Ambulate patient  times a day  - Out of bed to chair  times a day   - Out of bed for meals  times a day  - Out of bed for toileting  - Record patient progress and toleration of activity level   Outcome: Progressing

## 2024-05-11 NOTE — CONSULTS
NEPHROLOGY CONSULTATION NOTE    Patient: Milton Padilla               Sex: male          DOA: 5/10/2024 10:52 PM   YOB: 1995        Age:  28 y.o.        LOS:  LOS: 0 days     REFERRING PHYSICIAN: Dr. Zeferino Hedrick    REASON FOR THE REFERRAL / CONSULTATION: ESRD on hemodialysis    DATE OF CONSULTATION / SERVICE: 5/11/2024    ADMISSION DIAGNOSIS: Elevated troponin     CHIEF COMPLAINT     Elevated blood pressure and flank pain    HPI     This is a 28 years old male with past medical history of ESRD on hemodialysis, hypertension, type I hyperoxaluria, neuropathy who presented to our facility with uncontrolled hypertension along with flank pain.  Patient usually undergoes hemodialysis at home 6 days a week with last session of dialysis occurring yesterday.  Last month patient reported elevated blood pressure and was initiated on carvedilol 6.25 mg p.o. twice daily by me.  Patient reported immediate improvement in blood pressure readings.  Over the past few days however noted rising blood pressure postdialysis.  Last night postdialysis blood pressure was 180/125 along with flank pain and patient reported to the ED.  CT scan of abdomen pelvis only revealed nephrocalcinosis typical of patient with type I hyperoxaluria.  Patient received carvedilol 6.25 mg p.o. twice daily with last dose last night with improvement blood pressure readings noted this morning.     Currently patient denies nausea, vomiting, headache, dizziness, abdominal pain, constipation or rash.    PAST MEDICAL HISTORY     Past Medical History:   Diagnosis Date    Anxiety     Chronic kidney disease     GERD (gastroesophageal reflux disease)     Hypertension     Kidney stone     Liver disorder     Nephrolithiasis     Primary hyperoxaluria type 1 (HCC)        PAST SURGICAL HISTORY     Past Surgical History:   Procedure Laterality Date    ANKLE SURGERY Right 2017    ligament repair    CARDIAC CATHETERIZATION Left 12/1/2023    Procedure: Cardiac  catheterization;  Surgeon: Teresa Mireles MD;  Location: [facility];  Service: Cardiology    CARDIAC CATHETERIZATION N/A 12/1/2023    Procedure: Cardiac Coronary Angiogram;  Surgeon: Teresa Mireles MD;  Location: [facility];  Service: Cardiology    CARDIAC CATHETERIZATION Left 12/1/2023    Procedure: Cardiac Left Heart Cath;  Surgeon: Teresa Mireles MD;  Location: [facility];  Service: Cardiology    CYSTOSCOPY      FL RETROGRADE PYELOGRAM  10/24/2020    FL RETROGRADE PYELOGRAM  02/08/2021    HERNIA REPAIR      IR TUNNELED DIALYSIS CATHETER CHECK/CHANGE/REPOSITION/ANGIOPLASTY  12/4/2023    IR TUNNELED DIALYSIS CATHETER PLACEMENT  8/24/2023    IR TUNNELED DIALYSIS CATHETER REMOVAL  3/13/2024    KIDNEY STONE SURGERY      LITHOTRIPSY      MASS EXCISION Left 02/17/2017    Procedure: BACK/SHOULDER CYST EXCISION ;  Surgeon: John Muhammad MD;  Location: [facility];  Service:     SC ARTERIOVENOUS ANASTOMOSIS OPEN DIRECT Left 12/14/2023    Procedure: CREATION FISTULA ARTERIOVENOUS (AV);  Surgeon: Lencho Worthington MD;  Location:  MAIN OR;  Service: Vascular    SC CYSTO/URETERO W/LITHOTRIPSY &INDWELL STENT INSRT Left 10/24/2020    Procedure: CYSTOSCOPY URETEROSCOPY WITH LITHOTRIPSY HOLMIUM LASER, RETROGRADE PYELOGRAM AND INSERTION STENT URETERAL;  Surgeon: Carlos Le MD;  Location: [facility];  Service: Urology    SC CYSTO/URETERO W/LITHOTRIPSY &INDWELL STENT INSRT Right 02/08/2021    Procedure: CYSTOSCOPY URETEROSCOPY WITH LITHOTRIPSY HOLMIUM LASER, RETROGRADE PYELOGRAM AND INSERTION STENT URETERAL;  Surgeon: Brian Merchant MD;  Location: [facility];  Service: Urology       ALLERGIES     No Known Allergies    SOCIAL HISTORY     Social History     Substance and Sexual Activity   Alcohol Use Yes    Comment: rarely     Social History     Substance and Sexual Activity   Drug Use No     Social History     Tobacco Use   Smoking Status Never   Smokeless Tobacco Never        FAMILY HISTORY     Family History   Problem Relation Age of Onset    Hypertension Mother     No Known Problems Father     Cancer Maternal Grandmother         Bladder    Diabetes Maternal Grandmother     Alzheimer's disease Maternal Grandfather        CURRENT MEDICATIONS       Current Facility-Administered Medications:     acetaminophen (TYLENOL) tablet 975 mg, 975 mg, Oral, Q8H PRN, Tabatha Rodriguez PA-C    aluminum-magnesium hydroxide-simethicone (MAALOX) oral suspension 30 mL, 30 mL, Oral, Q6H PRN, Tabatha Rodriguez PA-C    [START ON 5/13/2024] calcitriol (ROCALTROL) capsule 0.25 mcg, 0.25 mcg, Oral, Once per day on Monday Wednesday Friday, Tabatha Rodriguez PA-C    carvedilol (COREG) tablet 6.25 mg, 6.25 mg, Oral, BID With Meals, Tabatha Rodriguez PA-C, 6.25 mg at 05/11/24 0835    Cholecalciferol (VITAMIN D3) tablet 1,000 Units, 1,000 Units, Oral, Daily, Tabatha Rodriguez PA-C, 1,000 Units at 05/11/24 0835    colchicine (COLCRYS) tablet 0.3 mg, 0.3 mg, Oral, Daily, Tabatha Rodriguez PA-C    gabapentin (NEURONTIN) capsule 100 mg, 100 mg, Oral, TID, Tabatha Rodriguez PA-C, 100 mg at 05/11/24 0835    lidocaine (LIDODERM) 5 % patch 1 patch, 1 patch, Topical, Once, Sahara Quezada PA-C, 1 patch at 05/10/24 2325    lidocaine (LIDODERM) 5 % patch 1 patch, 1 patch, Topical, Daily PRN, Tabatha Rodriguez PA-C    oxyCODONE (ROXICODONE) IR tablet 5 mg, 5 mg, Oral, Q6H PRN, Tabatha Rodriguez PA-C    traZODone (DESYREL) tablet 50 mg, 50 mg, Oral, HS, Tabatha Rodriguez PA-C    trimethobenzamide (TIGAN) IM injection 200 mg, 200 mg, Intramuscular, Q6H PRN, LAURA Frias, 200 mg at 05/11/24 0546    REVIEW OF SYSTEMS     Review of Systems   Constitutional: Negative.    HENT: Negative.     Eyes: Negative.    Respiratory: Negative.     Cardiovascular: Negative.    Gastrointestinal: Negative.    Endocrine: Negative.    Genitourinary: Negative.    Musculoskeletal: Negative.    Skin: Negative.    Allergic/Immunologic: Negative.     Neurological: Negative.    Hematological: Negative.    All other systems reviewed and are negative.        OBJECTIVE     Current Weight: Weight - Scale: 97.7 kg (215 lb 6.2 oz)  Vitals:    05/11/24 0900   BP: 166/98   Pulse: 85   Resp: 22   Temp:    SpO2: 97%     Body mass index is 29.21 kg/m².    Intake/Output Summary (Last 24 hours) at 5/11/2024 0952  Last data filed at 5/11/2024 0501  Gross per 24 hour   Intake 60 ml   Output 0 ml   Net 60 ml       PHYSICAL EXAMINATION     Physical Exam  HENT:      Head: Normocephalic and atraumatic.   Eyes:      Pupils: Pupils are equal, round, and reactive to light.   Neck:      Vascular: No JVD.   Cardiovascular:      Rate and Rhythm: Normal rate and regular rhythm.      Heart sounds: Normal heart sounds. No murmur heard.     No friction rub.   Pulmonary:      Effort: Pulmonary effort is normal.      Breath sounds: Normal breath sounds.   Abdominal:      General: Bowel sounds are normal. There is no distension.      Palpations: Abdomen is soft.      Tenderness: There is no abdominal tenderness. There is no rebound.   Musculoskeletal:         General: No tenderness.      Cervical back: Neck supple.   Skin:     General: Skin is dry.      Findings: No rash.   Neurological:      Mental Status: He is alert and oriented to person, place, and time.           LAB RESULTS        Results from last 7 days   Lab Units 05/10/24  2313   WBC Thousand/uL 5.32   HEMOGLOBIN g/dL 9.6*   HEMATOCRIT % 27.9*   PLATELETS Thousands/uL 213   POTASSIUM mmol/L 3.6   CHLORIDE mmol/L 91*   CO2 mmol/L 32   BUN mg/dL 16   CREATININE mg/dL 5.88*   EGFR ml/min/1.73sq m 11   CALCIUM mg/dL 9.8         RADIOLOGY RESULTS       IMPRESSION:     Renal calcinosis and nephrolithiasis, but no hydronephrosis.     Partially distended bladder. Mild circumferential bladder wall thickening noted, possibly exaggerated by underdistention. Cystitis considered in the appropriate clinical setting.     Colonic diverticulosis  without evidence of acute diverticulitis.     Other findings as above.    PLAN / RECOMMENDATIONS      28 years old male with past medical history of type I hyperoxaluria, ESRD on hemodialysis, hypertension, neuropathy presented to our facility with elevated blood pressure along with flank pain.    1.  ESRD on hemodialysis: Undergoes home hemodialysis 6 days a week with last session of dialysis occurring last night.  Patient will be discharged today and plans to undergo a session of dialysis tonight at home.    2.  Hypertension: Recently initiated on carvedilol 6.25 mg p.o. twice daily with good effect.  Over the past week noted rise in postdialysis blood pressure readings.  Recommended patient to raise the dose of carvedilol to 12.5 mg p.o. twice daily instead.  Current blood pressure is 147 mmHg systolic and acceptable.    3.  Hyponatremia: Sodium of 132 mEq/L and low suggestive of impaired free water excretion    Thank you for the consultation to participate in patient's care. I have personally discussed my plan with the referring physician.     Aleena Londono MD    5/11/2024

## 2024-05-11 NOTE — ASSESSMENT & PLAN NOTE
Hemoglobin currently 9.6, baseline around 11-12, most likely in the setting of chronic disease  No active bleeding noted  Monitor CBC

## 2024-05-11 NOTE — ED PROVIDER NOTES
History  Chief Complaint   Patient presents with    Hypertension     Pt reports recently started on antihypertensive medications however noted BP to be elevated this evening. Pt does do home hemodialysis 6x/week. Pt also reports having urge to urinate however does state he does not produce urine. + hx of kidney stones, states feels similar. + b/l flank pain.     Flank Pain     Patient is a 28-year-old male with a past medical history of primary hyperoxaluria type I, end-stage renal disease on home dialysis, who presents to the emergency room for bilateral flank pain.  Patient reports he has home dialysis x6 times a week, last session tonight.  Reports that he does not make urine and has not since around September.  Reports bilateral flank pain since Monday that has now been more persistent. Flank pain is worse on his left side. Associated urge to urinate, suprapubic pain and nausea.  Additionally, reports that he has started taking antihypertensive medications around x1 month ago, reports blood pressure spiked tonight. ~175 systolic.  Denies any other symptoms at this time.  Takes scheduled Tylenol, last dose around 3 PM.  Additionally, takes Zofran every morning daily.      Hypertension  Associated symptoms: abdominal pain and nausea    Associated symptoms: no chest pain, no confusion, no ear pain, no fever, no hematuria, no palpitations, no shortness of breath and not vomiting    Flank Pain  Associated symptoms: nausea    Associated symptoms: no chest pain, no chills, no cough, no dysuria, no fever, no hematuria, no shortness of breath, no sore throat and no vomiting        Prior to Admission Medications   Prescriptions Last Dose Informant Patient Reported? Taking?   Diclofenac Sodium (VOLTAREN) 1 %  Self No No   Sig: Apply 2 g topically 4 (four) times a day   Patient not taking: Reported on 3/15/2024   Vitamin D, Cholecalciferol, 50 MCG (2000 UT) CAPS 5/10/2024 Self No Yes   Sig: Take 1 capsule by mouth in the  morning   calcitriol (ROCALTROL) 0.25 mcg capsule Past Week Self No Yes   Sig: Take 1 capsule (0.25 mcg total) by mouth 3 (three) times a week   calcium acetate (PHOSLO) capsule 5/10/2024  No Yes   Sig: Take 3 capsules (2,001 mg total) by mouth 3 (three) times a day with meals   carvedilol (COREG) 6.25 mg tablet 5/10/2024  No Yes   Sig: Take 1 tablet (6.25 mg total) by mouth 2 (two) times a day with meals   colchicine (COLCRYS) 0.6 mg tablet   No Yes   Sig: Take 0.5 tablets (0.3 mg total) by mouth daily   gabapentin (NEURONTIN) 100 mg capsule 5/10/2024  Yes Yes   Si mg 3 (three) times a day   heparin 5000 units in sodium chloride 0.9% 1000 mL irrigation   Yes No   Sig: Inject 3,000 Units into a catheter in a vein once With Dialysis   ondansetron (ZOFRAN) 4 mg tablet 5/10/2024 Self No Yes   Sig: Take 1 tablet (4 mg total) by mouth every 8 (eight) hours as needed for nausea or vomiting   oxyCODONE (ROXICODONE) 5 immediate release tablet  Self Yes No   Sig: Take 1 tablet by mouth daily as needed   Patient not taking: Reported on 2024   traZODone (DESYREL) 50 mg tablet 5/10/2024  No Yes   Sig: TAKE 1 TABLET BY MOUTH EVERYDAY AT BEDTIME      Facility-Administered Medications: None       Past Medical History:   Diagnosis Date    Anxiety     Chronic kidney disease     GERD (gastroesophageal reflux disease)     Hypertension     Kidney stone     Liver disorder     Nephrolithiasis     Primary hyperoxaluria type 1 (HCC)        Past Surgical History:   Procedure Laterality Date    ANKLE SURGERY Right 2017    ligament repair    CARDIAC CATHETERIZATION Left 2023    Procedure: Cardiac catheterization;  Surgeon: Teresa Mireles MD;  Location: [facility];  Service: Cardiology    CARDIAC CATHETERIZATION N/A 2023    Procedure: Cardiac Coronary Angiogram;  Surgeon: Teresa Mireles MD;  Location: [facility];  Service: Cardiology    CARDIAC CATHETERIZATION Left 2023    Procedure: Cardiac Left  Heart Cath;  Surgeon: Teresa Mireles MD;  Location: [facility];  Service: Cardiology    CYSTOSCOPY      FL RETROGRADE PYELOGRAM  10/24/2020    FL RETROGRADE PYELOGRAM  02/08/2021    HERNIA REPAIR      IR TUNNELED DIALYSIS CATHETER CHECK/CHANGE/REPOSITION/ANGIOPLASTY  12/4/2023    IR TUNNELED DIALYSIS CATHETER PLACEMENT  8/24/2023    IR TUNNELED DIALYSIS CATHETER REMOVAL  3/13/2024    KIDNEY STONE SURGERY      LITHOTRIPSY      MASS EXCISION Left 02/17/2017    Procedure: BACK/SHOULDER CYST EXCISION ;  Surgeon: John Muhammad MD;  Location: [facility];  Service:     CA ARTERIOVENOUS ANASTOMOSIS OPEN DIRECT Left 12/14/2023    Procedure: CREATION FISTULA ARTERIOVENOUS (AV);  Surgeon: Lencho Worthington MD;  Location: [facility];  Service: Vascular    CA CYSTO/URETERO W/LITHOTRIPSY &INDWELL STENT INSRT Left 10/24/2020    Procedure: CYSTOSCOPY URETEROSCOPY WITH LITHOTRIPSY HOLMIUM LASER, RETROGRADE PYELOGRAM AND INSERTION STENT URETERAL;  Surgeon: Carlos Le MD;  Location: [facility];  Service: Urology    CA CYSTO/URETERO W/LITHOTRIPSY &INDWELL STENT INSRT Right 02/08/2021    Procedure: CYSTOSCOPY URETEROSCOPY WITH LITHOTRIPSY HOLMIUM LASER, RETROGRADE PYELOGRAM AND INSERTION STENT URETERAL;  Surgeon: Brian Merchant MD;  Location: [facility];  Service: Urology       Family History   Problem Relation Age of Onset    Hypertension Mother     No Known Problems Father     Cancer Maternal Grandmother         Bladder    Diabetes Maternal Grandmother     Alzheimer's disease Maternal Grandfather      I have reviewed and agree with the history as documented.    E-Cigarette/Vaping    E-Cigarette Use Never User      E-Cigarette/Vaping Substances    Nicotine No     THC No     CBD No     Flavoring No     Other No     Unknown No      Social History     Tobacco Use    Smoking status: Never    Smokeless tobacco: Never   Vaping Use    Vaping status: Never Used   Substance Use Topics    Alcohol use: Yes      Comment: rarely    Drug use: No       Review of Systems   Constitutional:  Negative for chills and fever.   HENT:  Negative for ear pain and sore throat.    Eyes:  Negative for pain and visual disturbance.   Respiratory:  Negative for cough and shortness of breath.    Cardiovascular:  Negative for chest pain and palpitations.   Gastrointestinal:  Positive for abdominal pain and nausea. Negative for vomiting.   Genitourinary:  Positive for flank pain and urgency. Negative for dysuria and hematuria.   Musculoskeletal:  Negative for arthralgias and back pain.   Skin:  Negative for color change and rash.   Neurological:  Negative for seizures and syncope.   Psychiatric/Behavioral:  Negative for confusion.    All other systems reviewed and are negative.      Physical Exam  Physical Exam  Vitals and nursing note reviewed.   Constitutional:       General: He is not in acute distress.     Appearance: He is well-developed.   HENT:      Head: Normocephalic and atraumatic.   Eyes:      Conjunctiva/sclera: Conjunctivae normal.   Cardiovascular:      Rate and Rhythm: Normal rate and regular rhythm.      Pulses: Normal pulses.      Heart sounds: No murmur heard.  Pulmonary:      Effort: Pulmonary effort is normal. No respiratory distress.      Breath sounds: Normal breath sounds.   Abdominal:      Palpations: Abdomen is soft.      Tenderness: There is no abdominal tenderness. There is left CVA tenderness. There is no right CVA tenderness.   Musculoskeletal:         General: No swelling.      Cervical back: Neck supple.   Skin:     General: Skin is warm and dry.      Capillary Refill: Capillary refill takes less than 2 seconds.   Neurological:      Mental Status: He is alert.   Psychiatric:         Mood and Affect: Mood normal.         Vital Signs  ED Triage Vitals [05/10/24 2254]   Temperature Pulse Respirations Blood Pressure SpO2   97.7 °F (36.5 °C) 73 18 (!) 177/96 99 %      Temp Source Heart Rate Source Patient Position -  Orthostatic VS BP Location FiO2 (%)   Temporal Monitor Sitting Left arm --      Pain Score       No Pain           Vitals:    05/11/24 0242 05/11/24 0430 05/11/24 0500 05/11/24 0600   BP: 155/91 142/82 162/87 141/76   Pulse: 78 76 75 73   Patient Position - Orthostatic VS: Lying Lying           Visual Acuity  Visual Acuity      Flowsheet Row Most Recent Value   L Pupil Size (mm) 3   R Pupil Size (mm) 3   L Pupil Shape Round   R Pupil Shape Round            ED Medications  Medications   lidocaine (LIDODERM) 5 % patch 1 patch (1 patch Topical Medication Applied 5/10/24 2325)   calcitriol (ROCALTROL) capsule 0.25 mcg (has no administration in time range)   carvedilol (COREG) tablet 6.25 mg (has no administration in time range)   colchicine (COLCRYS) tablet 0.3 mg (has no administration in time range)   gabapentin (NEURONTIN) capsule 100 mg (has no administration in time range)   traZODone (DESYREL) tablet 50 mg (has no administration in time range)   Cholecalciferol (VITAMIN D3) tablet 1,000 Units (has no administration in time range)   lidocaine (LIDODERM) 5 % patch 1 patch (has no administration in time range)   acetaminophen (TYLENOL) tablet 975 mg (has no administration in time range)   aluminum-magnesium hydroxide-simethicone (MAALOX) oral suspension 30 mL (has no administration in time range)   oxyCODONE (ROXICODONE) IR tablet 5 mg (has no administration in time range)   trimethobenzamide (TIGAN) IM injection 200 mg (200 mg Intramuscular Given 5/11/24 0546)   sodium chloride 0.9 % bolus 500 mL (0 mL Intravenous Stopped 5/11/24 0008)   ondansetron (ZOFRAN) injection 4 mg (4 mg Intravenous Given 5/10/24 2325)   acetaminophen (Ofirmev) injection 1,000 mg (0 mg Intravenous Stopped 5/11/24 0501)   tamsulosin (FLOMAX) capsule 0.4 mg (0.4 mg Oral Given 5/11/24 0550)       Diagnostic Studies  Results Reviewed       Procedure Component Value Units Date/Time    HS Troponin I 4hr [[id number]]  (Abnormal) Collected: 05/11/24  0341    Lab Status: Final result Specimen: Blood from Arm, Right Updated: 05/11/24 0410     hs TnI 4hr 71 ng/L      Delta 4hr hsTnI 8 ng/L     HS Troponin I 2hr [[id number]]  (Abnormal) Collected: 05/11/24 0118    Lab Status: Final result Specimen: Blood from Arm, Right Updated: 05/11/24 0154     hs TnI 2hr 75 ng/L      Delta 2hr hsTnI 12 ng/L     HS Troponin 0hr (reflex protocol) [[id number]]  (Abnormal) Collected: 05/10/24 2313    Lab Status: Final result Specimen: Blood from Arm, Right Updated: 05/10/24 2342     hs TnI 0hr 63 ng/L     B-Type Natriuretic Peptide(BNP) [[id number]]  (Abnormal) Collected: 05/10/24 2313    Lab Status: Final result Specimen: Blood from Arm, Right Updated: 05/10/24 2340      pg/mL     Comprehensive metabolic panel [[id number]]  (Abnormal) Collected: 05/10/24 2313    Lab Status: Final result Specimen: Blood from Arm, Right Updated: 05/10/24 2333     Sodium 132 mmol/L      Potassium 3.6 mmol/L      Chloride 91 mmol/L      CO2 32 mmol/L      ANION GAP 9 mmol/L      BUN 16 mg/dL      Creatinine 5.88 mg/dL      Glucose 90 mg/dL      Calcium 9.8 mg/dL      AST 20 U/L      ALT 32 U/L      Alkaline Phosphatase 44 U/L      Total Protein 7.1 g/dL      Albumin 4.4 g/dL      Total Bilirubin 0.47 mg/dL      eGFR 11 ml/min/1.73sq m     Narrative:      [employer] guidelines for Chronic Kidney Disease (CKD):     Stage 1 with normal or high GFR (GFR > 90 mL/min/1.73 square meters)    Stage 2 Mild CKD (GFR = 60-89 mL/min/1.73 square meters)    Stage 3A Moderate CKD (GFR = 45-59 mL/min/1.73 square meters)    Stage 3B Moderate CKD (GFR = 30-44 mL/min/1.73 square meters)    Stage 4 Severe CKD (GFR = 15-29 mL/min/1.73 square meters)    Stage 5 End Stage CKD (GFR <15 mL/min/1.73 square meters)  Note: GFR calculation is accurate only with a steady state creatinine    Lipase [[id number]]  (Normal) Collected: 05/10/24 8288    Lab Status: Final result Specimen: Blood from Arm, Right  Updated: 05/10/24 2333     Lipase 17 u/L     CBC and differential [[id number]]  (Abnormal) Collected: 05/10/24 2313    Lab Status: Final result Specimen: Blood from Arm, Right Updated: 05/10/24 2318     WBC 5.32 Thousand/uL      RBC 3.18 Million/uL      Hemoglobin 9.6 g/dL      Hematocrit 27.9 %      MCV 88 fL      MCH 30.2 pg      MCHC 34.4 g/dL      RDW 15.3 %      MPV 10.3 fL      Platelets 213 Thousands/uL      nRBC 0 /100 WBCs      Segmented % 37 %      Immature Grans % 0 %      Lymphocytes % 39 %      Monocytes % 12 %      Eosinophils Relative 10 %      Basophils Relative 2 %      Absolute Neutrophils 1.98 Thousands/µL      Absolute Immature Grans 0.01 Thousand/uL      Absolute Lymphocytes 2.11 Thousands/µL      Absolute Monocytes 0.62 Thousand/µL      Eosinophils Absolute 0.51 Thousand/µL      Basophils Absolute 0.09 Thousands/µL                    CT renal stone study abdomen pelvis without contrast   Final Result by Tyron Arizmendi DO (05/11 0159)      Renal calcinosis and nephrolithiasis, but no hydronephrosis.      Partially distended bladder. Mild circumferential bladder wall thickening noted, possibly exaggerated by underdistention. Cystitis considered in the appropriate clinical setting.      Colonic diverticulosis without evidence of acute diverticulitis.      Other findings as above.            Workstation performed: [id number]                    Procedures  ECG 12 Lead Documentation Only    Date/Time: 5/10/2024 11:44 PM    Performed by: Sahara Quezada PA-C  Authorized by: Sahara Quezada PA-C    Indications / Diagnosis:  Cardiac work-up  ECG reviewed by me, the ED Provider: yes    Patient location:  ED  Interpretation:     Interpretation: normal    Rate:     ECG rate:  71    ECG rate assessment: normal    Rhythm:     Rhythm: sinus rhythm    ST segments:     ST segments:  Normal  T waves:     T waves: normal             ED Course  ED Course as of 05/11/24 0659   Fri May 10, 2024   4419  Hemoglobin(!): 9.6  Decreased from 11 days ago, previously 11.4.   2334 Creatinine(!): 5.88  Improved from 11 days ago   2343 hs TnI 0hr(!): 63   Sat May 11, 2024   0214 CT renal stone study abdomen pelvis without contrast  IMPRESSION:     Renal calcinosis and nephrolithiasis, but no hydronephrosis.     Partially distended bladder. Mild circumferential bladder wall thickening noted, possibly exaggerated by underdistention. Cystitis considered in the appropriate clinical setting.     Colonic diverticulosis without evidence of acute diverticulitis.     Other findings as above.                  HEART Risk Score      Flowsheet Row Most Recent Value   Heart Score Risk Calculator    History 0 Filed at: 05/10/2024 2353   ECG 0 Filed at: 05/10/2024 2353   Age 0 Filed at: 05/10/2024 2353   Risk Factors 1 Filed at: 05/10/2024 2353   Troponin 2 Filed at: 05/10/2024 2353   HEART Score 3 Filed at: 05/10/2024 2353                            KIMBERLEE Risk Score      Flowsheet Row Most Recent Value   Age >= 65 0 Filed at: 05/11/2024 0351   Known CAD (stenosis >= 50%) 0 Filed at: 05/11/2024 0351   Recent (<=24 hrs) Service Angina 0 Filed at: 05/11/2024 0351   ST Deviation >= 0.5 mm 0 Filed at: 05/11/2024 0351   3+ CAD Risk Factors (FHx, HTN, HLP, DM, Smoker) 0 Filed at: 05/11/2024 0351   Aspirin Use Past 7 Days 0 Filed at: 05/11/2024 0351   Elevated Cardiac Markers 1 Filed at: 05/11/2024 0351   KIMBERLEE Risk Score (Calculated) 1 Filed at: 05/11/2024 0351                    Medical Decision Making  28-year-old male hx primary hyperoxaluria type I, ESRD. Patient on home dialysis x6/wk, does not make urine. Presenting for bilateral flank pain worse on his left side, suprapubic pain and nausea. Associated urge to urinate even though he does not make urine. Additionally, reporting hypertension ~175 systolic. Vital signs stable. Afebrile. Creatinine 5.88, which is improved from his baseline. No white count. Hemoglobin 9.6, decreased from  "baseline. 0-hour troponin 63, 2-hour troponin 75. Delta troponin 12. However, NSR on EKG without ischemic changes. CT scan shows, \"IMPRESSION:    Renal calcinosis and nephrolithiasis, but no hydronephrosis.    Partially distended bladder. Mild circumferential bladder wall thickening noted, possibly exaggerated by underdistention. Cystitis considered in the appropriate clinical setting.    Colonic diverticulosis without evidence of acute diverticulitis\". D/w urology who recommend no admission from their service and flomax for symptomatic treatment. Discussed with internal medicine for admission.  Patient to be admitted to their service.  Discussed ED results and plan with patient.  Patient understands and consents to admission at this time.  All questions answered appropriately.    Amount and/or Complexity of Data Reviewed  Labs: ordered. Decision-making details documented in ED Course.  Radiology: ordered. Decision-making details documented in ED Course.    Risk  Prescription drug management.  Decision regarding hospitalization.             Disposition  Final diagnoses:   Flank pain   Nausea   Nephrolithiasis     Time reflects when diagnosis was documented in both MDM as applicable and the Disposition within this note       Time User Action Codes Description Comment    5/11/2024  2:49 AM DickeyAlejandrinaia Add [R10.9] Flank pain     5/11/2024  2:50 AM Dickey, Sahara Add [R11.0] Nausea     5/11/2024  2:50 AM Pilar Sahara Add [N20.0] Nephrolithiasis     5/11/2024  3:40 AM Tabatha Rodriguez [N18.6,  Z99.2] End stage renal disease on dialysis (HCC)           ED Disposition       ED Disposition   Admit    Condition   Stable    Date/Time   Sat May 11, 2024 0251    Comment   Case was discussed with Tabatha Rodriguez PA-C and the patient's admission status was agreed to be Admission Status: observation status to the service of Dr. Rivera .               Follow-up Information    None         Current Discharge Medication List    "     CONTINUE these medications which have NOT CHANGED    Details   calcitriol (ROCALTROL) 0.25 mcg capsule Take 1 capsule (0.25 mcg total) by mouth 3 (three) times a week  Qty: 12 capsule, Refills: 0    Associated Diagnoses: MECHELLE (acute kidney injury) (HCC)      calcium acetate (PHOSLO) capsule Take 3 capsules (2,001 mg total) by mouth 3 (three) times a day with meals  Qty: 270 capsule, Refills: 6    Associated Diagnoses: Hyperphosphatemia      carvedilol (COREG) 6.25 mg tablet Take 1 tablet (6.25 mg total) by mouth 2 (two) times a day with meals  Qty: 180 tablet, Refills: 3    Associated Diagnoses: Primary hypertension      colchicine (COLCRYS) 0.6 mg tablet Take 0.5 tablets (0.3 mg total) by mouth daily  Qty: 15 tablet, Refills: 3    Associated Diagnoses: Crystalline arthritis; Primary hyperoxaluria type 1 (HCC); Polyarthralgia      gabapentin (NEURONTIN) 100 mg capsule 100 mg 3 (three) times a day      ondansetron (ZOFRAN) 4 mg tablet Take 1 tablet (4 mg total) by mouth every 8 (eight) hours as needed for nausea or vomiting  Qty: 50 tablet, Refills: 0    Associated Diagnoses: Bilious vomiting with nausea      traZODone (DESYREL) 50 mg tablet TAKE 1 TABLET BY MOUTH EVERYDAY AT BEDTIME  Qty: 90 tablet, Refills: 3    Associated Diagnoses: Primary insomnia      Vitamin D, Cholecalciferol, 50 MCG (2000 UT) CAPS Take 1 capsule by mouth in the morning    Associated Diagnoses: Vitamin D deficiency      Diclofenac Sodium (VOLTAREN) 1 % Apply 2 g topically 4 (four) times a day  Qty: 100 g, Refills: 0    Associated Diagnoses: Leg pain      heparin 5000 units in sodium chloride 0.9% 1000 mL irrigation Inject 3,000 Units into a catheter in a vein once With Dialysis      oxyCODONE (ROXICODONE) 5 immediate release tablet Take 1 tablet by mouth daily as needed             No discharge procedures on file.    PDMP Review         Value Time User    PDMP Reviewed  Yes 10/10/2022 11:54 AM Alessandro Soler DO            ED  Provider  Electronically Signed by             Sahara Quezada PA-C  05/11/24 0659

## 2024-05-11 NOTE — ASSESSMENT & PLAN NOTE
Patient denies chest pain, KIMBERLEE score 1  Initial troponin elevated at 63, repeat 75, will trend until peak  Rule out secondary to demand with history of end-stage renal disease on dialysis versus less likely ACS  EKG revealing normal sinus rhythm, LVH, no significant change from previous  Telemetry  If troponin continues to trend upward consider cardiology consult

## 2024-05-11 NOTE — DISCHARGE SUMMARY
[facility]  Discharge- Milton Padilla 1995, 28 y.o. male MRN: [id number]  Unit/Bed#: ICU 12 Encounter: [id number]  Primary Care Provider: Alessandro Soler DO   Date and time admitted to hospital: 5/10/2024 10:52 PM    * Elevated troponin  Assessment & Plan  -Patient presented today with complaints of flank pain  -See plan below  -Noted with mildly elevated flat troponin without ischemic EKG change    Patient denies chest pain, KIMBERLEE score 1  Initial troponin elevated at 63, repeat 75  Rule out secondary to demand with history of end-stage renal disease on dialysis versus less likely ACS  EKG revealing normal sinus rhythm, LVH, no significant change from previous  Telemetry      -Low index of suspicion for ACS- hemodynamically styable for discharge to home     End stage renal disease on dialysis (HCC)  Assessment & Plan  Lab Results   Component Value Date    EGFR 11 05/10/2024    EGFR 9 04/29/2024    EGFR 8 04/01/2024    CREATININE 5.88 (H) 05/10/2024    CREATININE 7.20 (H) 04/29/2024    CREATININE 7.44 (H) 04/01/2024   Creatinine currently 5.88, baseline around 7  On PD 6 times a week  Resume home HD    Flank pain, acute  Assessment & Plan  CT abdomen pelvis negative for hydronephrosis or obstructing stone, partially distended bladder with wall thickening related to under distention versus UTI  Patient unable to produce urine, on dialysis so unable to obtain UA  However, since patient afebrile without leukocytosis will hold off on antibiotics    Anemia  Assessment & Plan  Hemoglobin currently 9.6, baseline around 11-12, most likely in the setting of chronic disease  No active bleeding noted  Chronic/Stable    Discharge Summary - [facility]    Patient Information: Milton Padilla 28 y.o. male MRN: [id number]  Unit/Bed#: ICU 12 Encounter: [id number]    Discharging Physician / Practitioner: Zeferino Hedrick DO  PCP: Alessandro Soler DO  Admission Date: 5/10/2024  Discharge Date:  05/11/24    Disposition:     Home    Reason for Admission: Elevated troponin    Discharge Diagnoses:     Principal Problem:    Elevated troponin  Active Problems:    End stage renal disease on dialysis (HCC)    Anemia    Flank pain, acute  Resolved Problems:    * No resolved hospital problems. *        Hospital Course:     Milton Padilla is a 28 y.o. male patient who originally presented to the hospital on 5/10/2024 due to flank pain. Incidentally noted elevated troponin. Observed for ACS rule out. Flat troponin elevation without delta of ischemic EKG change. Patient without chest pain. ACS ruled out.     Condition at Discharge: good     Discharge Day Visit / Exam:     Subjective:  Patient feels at baseline  Vitals: Blood Pressure: 144/76 (05/11/24 0700)  Pulse: 90 (05/11/24 0700)  Temperature: 98.5 °F (36.9 °C) (05/11/24 0700)  Temp Source: Oral (05/11/24 0700)  Respirations: (!) 25 (05/11/24 0700)  Height: 6' (182.9 cm) (05/11/24 0500)  Weight - Scale: 97.7 kg (215 lb 6.2 oz) (05/11/24 0500)  SpO2: 97 % (05/11/24 0700)  Exam:   Physical Exam  Constitutional:       General: He is not in acute distress.     Appearance: Normal appearance. He is not ill-appearing, toxic-appearing or diaphoretic.   HENT:      Head: Normocephalic and atraumatic.      Right Ear: External ear normal.      Left Ear: External ear normal.      Nose: Nose normal.      Mouth/Throat:      Pharynx: Oropharynx is clear.   Eyes:      Conjunctiva/sclera: Conjunctivae normal.   Cardiovascular:      Rate and Rhythm: Normal rate and regular rhythm.      Pulses: Normal pulses.      Heart sounds: No murmur heard.  Pulmonary:      Effort: Pulmonary effort is normal. No respiratory distress.      Breath sounds: No stridor.   Abdominal:      General: Abdomen is flat. Bowel sounds are normal.      Palpations: Abdomen is soft.   Musculoskeletal:         General: No swelling. Normal range of motion.      Cervical back: Normal range of motion.   Skin:      General: Skin is warm.      Capillary Refill: Capillary refill takes less than 2 seconds.      Coloration: Skin is not jaundiced.   Neurological:      General: No focal deficit present.      Mental Status: He is alert. Mental status is at baseline.      Cranial Nerves: No cranial nerve deficit.   Psychiatric:         Mood and Affect: Mood normal.         Thought Content: Thought content normal.         Judgment: Judgment normal.       Discharge instructions/Information to patient and family:   See after visit summary for information provided to patient and family.      Provisions for Follow-Up Care:  See after visit summary for information related to follow-up care and any pertinent home health orders.      Planned Readmission: No     Discharge Statement:  I spent 37 minutes discharging the patient. This time was spent on the day of discharge. I had direct contact with the patient on the day of discharge. Greater than 50% of the total time was spent examining patient, answering all patient questions, arranging and discussing plan of care with patient as well as directly providing post-discharge instructions.  Additional time then spent on discharge activities.    Discharge Medications:  See after visit summary for reconciled discharge medications provided to patient and family.      ** Please Note: This note has been constructed using a voice recognition system **

## 2024-05-11 NOTE — ASSESSMENT & PLAN NOTE
Hemoglobin currently 9.6, baseline around 11-12, most likely in the setting of chronic disease  No active bleeding noted  Chronic/Stable

## 2024-05-11 NOTE — ASSESSMENT & PLAN NOTE
CT abdomen pelvis negative for hydronephrosis or obstructing stone, partially distended bladder with wall thickening related to under distention versus UTI  Patient unable to produce urine, on dialysis so unable to obtain UA  However, since patient afebrile without leukocytosis will hold off on antibiotics  Per ED provider, discussed with on-call urology who recommended initiating Flomax, no need for transfer at this time  As needed pain medication  Should follow-up with urology outpatient

## 2024-05-11 NOTE — DISCHARGE INSTR - AVS FIRST PAGE
Dear Milton Padilla,     It was our pleasure to care for you here at [facility].  It is our hope that we were always able to exceed the expected standards for your care during your stay.  You were hospitalized due to elevated troponin.  You were cared for on the 2nd floor under the service of Zeferino Hedrick DO with the [facility] who covers for your primary care physician (PCP), Alessandro Soler DO, while you were hospitalized.  If you have any questions or concerns related to this hospitalization, you may contact us at .  For follow up as well as medication refills, we recommend that you follow up with your primary care physician.  A registered nurse will reach out to you by phone within a few days after your discharge to answer any additional questions that you may have after going home.  Please review this entire after visit summary as additional general instructions including medication list, appointments, activity, diet, any pertinent wound care, and other additional recommendations from your care team that may be provided for you.      Sincerely,     Zeferino Hedrick DO

## 2024-05-11 NOTE — ASSESSMENT & PLAN NOTE
CT abdomen pelvis negative for hydronephrosis or obstructing stone, partially distended bladder with wall thickening related to under distention versus UTI  Patient unable to produce urine, on dialysis so unable to obtain UA  However, since patient afebrile without leukocytosis will hold off on antibiotics

## 2024-05-11 NOTE — ASSESSMENT & PLAN NOTE
Lab Results   Component Value Date    EGFR 11 05/10/2024    EGFR 9 04/29/2024    EGFR 8 04/01/2024    CREATININE 5.88 (H) 05/10/2024    CREATININE 7.20 (H) 04/29/2024    CREATININE 7.44 (H) 04/01/2024   Creatinine currently 5.88, baseline around 7  On PD 6 times a week  Nephrology consult  Monitor BMP, avoid nephrotoxic agents

## 2024-05-11 NOTE — ASSESSMENT & PLAN NOTE
Lab Results   Component Value Date    EGFR 11 05/10/2024    EGFR 9 04/29/2024    EGFR 8 04/01/2024    CREATININE 5.88 (H) 05/10/2024    CREATININE 7.20 (H) 04/29/2024    CREATININE 7.44 (H) 04/01/2024   Creatinine currently 5.88, baseline around 7  On PD 6 times a week  Resume home HD

## 2024-05-11 NOTE — ASSESSMENT & PLAN NOTE
-Patient presented today with complaints of flank pain  -See plan below  -Noted with mildly elevated flat troponin without ischemic EKG change    Patient denies chest pain, KIMBERLEE score 1  Initial troponin elevated at 63, repeat 75  Rule out secondary to demand with history of end-stage renal disease on dialysis versus less likely ACS  EKG revealing normal sinus rhythm, LVH, no significant change from previous  Telemetry      -Low index of suspicion for ACS- hemodynamically styable for discharge to home

## 2024-05-13 ENCOUNTER — TELEPHONE (OUTPATIENT)
Dept: FAMILY MEDICINE CLINIC | Facility: CLINIC | Age: 29
End: 2024-05-13

## 2024-05-13 ENCOUNTER — TRANSITIONAL CARE MANAGEMENT (OUTPATIENT)
Dept: FAMILY MEDICINE CLINIC | Facility: CLINIC | Age: 29
End: 2024-05-13

## 2024-05-13 NOTE — TELEPHONE ENCOUNTER
Spoke with patient- he does not want to see any other provider besides you being that he has only seen you in 20 years and you are familiar with his situation. He was discharged from [facility] 05/11/2024 and needs to schedule TCM by 05/25/2024. Where may I stick him?    Please advise, thanks Doc! Appreciate it!

## 2024-05-24 ENCOUNTER — OFFICE VISIT (OUTPATIENT)
Dept: FAMILY MEDICINE CLINIC | Facility: CLINIC | Age: 29
End: 2024-05-24
Payer: COMMERCIAL

## 2024-05-24 VITALS
DIASTOLIC BLOOD PRESSURE: 82 MMHG | HEIGHT: 72 IN | BODY MASS INDEX: 29.23 KG/M2 | SYSTOLIC BLOOD PRESSURE: 124 MMHG | HEART RATE: 80 BPM | TEMPERATURE: 98.6 F | WEIGHT: 215.8 LBS | OXYGEN SATURATION: 99 %

## 2024-05-24 DIAGNOSIS — R79.89 ELEVATED TROPONIN: Primary | ICD-10-CM

## 2024-05-24 DIAGNOSIS — Z99.2 END STAGE RENAL DISEASE ON DIALYSIS (HCC): ICD-10-CM

## 2024-05-24 DIAGNOSIS — N18.6 END STAGE RENAL DISEASE ON DIALYSIS (HCC): ICD-10-CM

## 2024-05-24 PROBLEM — R10.9 FLANK PAIN, ACUTE: Status: RESOLVED | Noted: 2024-05-11 | Resolved: 2024-05-24

## 2024-05-24 PROCEDURE — 99495 TRANSJ CARE MGMT MOD F2F 14D: CPT | Performed by: FAMILY MEDICINE

## 2024-05-24 NOTE — PROGRESS NOTES
Transition of Care Visit  Name: Milton Padilla      : 1995      MRN: [id number]  Encounter Provider: Alessandro Soler DO  Encounter Date: 2024   Encounter department: [facility] [address] [place] [address]    Assessment & Plan   1. Elevated troponin  Assessment & Plan:  Due to his end-stage renal disease, no cardiac compromise.  2. End stage renal disease on dialysis (HCC)  Assessment & Plan:  Lab Results   Component Value Date    EGFR 11 05/10/2024    EGFR 9 2024    EGFR 8 2024    CREATININE 5.88 (H) 05/10/2024    CREATININE 7.20 (H) 2024    CREATININE 7.44 (H) 2024   Continue home dialysis, he is on the renal transplant list at the [facility]         History of Present Illness     Transitional Care Management Review:   Milton Padilla is a 28 y.o. male here for TCM follow up.     During the TCM phone call patient stated:  TCM Call       Date and time call was made  2024  9:31 AM    Hospital care reviewed  Records reviewed    Patient was hospitialized at  [facility]    Date of Admission  05/10/24    Date of discharge  24    Diagnosis  elevated troponin    Disposition  Home    Were the patients medications reviewed and updated  Yes    Current Symptoms  None          TCM Call       Post hospital issues  None    Should patient be enrolled in anticoag monitoring?  No    Scheduled for follow up?  Yes    Did you obtain your prescribed medications  Yes    Do you need help managing your prescriptions or medications  No    Is transportation to your appointment needed  No    I have advised the patient to call PCP with any new or worsening symptoms  Kaitlynn Garcia/ MA    Living Arrangements  Family members    Support System  Family    The type of support provided  Emotional    Do you have social support  Yes, as much as I need    Are you recieving any outpatient services  No    Are you recieving home care services  No    Are you using  any community resources  No    Current waiver services  No    Have you fallen in the last 12 months  No    Interperter language line needed  No    Counseling  Patient    Counseling topics  Activities of daily living; patient and family education    Comments  [facility] scheduled for 5/24/2024 at 11:40          Patient comes in today for transitional care management, he was admitted to [facility] on May 10 and discharged on May 11 with discharge diagnosis of moderate hypertension and elevated troponin.  It was felt that his elevated troponin was due to his renal failure.  His carvedilol was increased to 12.5 mg twice daily.  His blood pressure since has been well-controlled.  He does monitor this quite frequently during his home dialysis.      Review of Systems   Constitutional:  Negative for chills, fatigue and fever.   HENT:  Negative for congestion, ear pain, hearing loss, postnasal drip, rhinorrhea and sore throat.    Eyes:  Negative for pain and visual disturbance.   Respiratory:  Negative for chest tightness, shortness of breath and wheezing.    Cardiovascular:  Negative for chest pain and leg swelling.   Gastrointestinal:  Negative for abdominal distention, abdominal pain, constipation, diarrhea and vomiting.   Endocrine: Negative for cold intolerance and heat intolerance.   Genitourinary:  Negative for difficulty urinating, frequency and urgency.   Musculoskeletal:  Negative for arthralgias and gait problem.   Skin:  Negative for color change.   Neurological:  Negative for dizziness, tremors, syncope, numbness and headaches.   Hematological:  Negative for adenopathy.   Psychiatric/Behavioral:  Negative for agitation, confusion and sleep disturbance. The patient is not nervous/anxious.      Objective     /82 (BP Location: Left arm, Patient Position: Sitting, Cuff Size: Standard)   Pulse 80   Temp 98.6 °F (37 °C) (Tympanic)   Ht 6' (1.829 m)   Wt 97.9 kg (215 lb 12.8 oz)   SpO2 99%   BMI  29.27 kg/m²     Physical Exam  Constitutional:       Appearance: He is well-developed.   HENT:      Head: Normocephalic.      Right Ear: External ear normal.      Left Ear: External ear normal.      Nose: Nose normal.   Eyes:      Extraocular Movements: Extraocular movements intact.      Conjunctiva/sclera: Conjunctivae normal.      Pupils: Pupils are equal, round, and reactive to light.   Neck:      Thyroid: No thyromegaly.   Cardiovascular:      Rate and Rhythm: Normal rate and regular rhythm.      Heart sounds: Normal heart sounds.   Pulmonary:      Effort: Pulmonary effort is normal.      Breath sounds: Normal breath sounds.   Abdominal:      General: Bowel sounds are normal.      Palpations: Abdomen is soft.   Musculoskeletal:         General: Normal range of motion.      Cervical back: Normal range of motion.   Skin:     General: Skin is warm and dry.   Neurological:      Mental Status: He is alert and oriented to person, place, and time.   Psychiatric:         Mood and Affect: Mood normal.         Behavior: Behavior normal.       Medications have been reviewed by provider in current encounter    Administrative Statements

## 2024-05-24 NOTE — ASSESSMENT & PLAN NOTE
Lab Results   Component Value Date    EGFR 11 05/10/2024    EGFR 9 04/29/2024    EGFR 8 04/01/2024    CREATININE 5.88 (H) 05/10/2024    CREATININE 7.20 (H) 04/29/2024    CREATININE 7.44 (H) 04/01/2024   Continue home dialysis, he is on the renal transplant list at the [facility]

## 2024-05-29 ENCOUNTER — APPOINTMENT (OUTPATIENT)
Age: 29
End: 2024-05-29
Payer: COMMERCIAL

## 2024-05-29 ENCOUNTER — APPOINTMENT (OUTPATIENT)
Dept: LAB | Facility: HOSPITAL | Age: 29
End: 2024-05-29
Payer: COMMERCIAL

## 2024-05-29 DIAGNOSIS — E72.53: ICD-10-CM

## 2024-05-29 DIAGNOSIS — Z01.818 OTHER SPECIFIED PRE-OPERATIVE EXAMINATION: ICD-10-CM

## 2024-05-29 LAB
AFP-TM SERPL-MCNC: 2.66 NG/ML (ref 0–9)
ALBUMIN SERPL BCP-MCNC: 4.9 G/DL (ref 3.5–5)
ALP SERPL-CCNC: 45 U/L (ref 34–104)
ALT SERPL W P-5'-P-CCNC: 31 U/L (ref 7–52)
ANION GAP SERPL CALCULATED.3IONS-SCNC: 13 MMOL/L (ref 4–13)
AST SERPL W P-5'-P-CCNC: 25 U/L (ref 13–39)
BILIRUB DIRECT SERPL-MCNC: 0.11 MG/DL (ref 0–0.2)
BILIRUB SERPL-MCNC: 0.67 MG/DL (ref 0.2–1)
BUN SERPL-MCNC: 24 MG/DL (ref 5–25)
CALCIUM SERPL-MCNC: 10.1 MG/DL (ref 8.4–10.2)
CHLORIDE SERPL-SCNC: 90 MMOL/L (ref 96–108)
CO2 SERPL-SCNC: 32 MMOL/L (ref 21–32)
CREAT SERPL-MCNC: 6.79 MG/DL (ref 0.6–1.3)
ERYTHROCYTE [DISTWIDTH] IN BLOOD BY AUTOMATED COUNT: 16.5 % (ref 11.6–15.1)
GFR SERPL CREATININE-BSD FRML MDRD: 9 ML/MIN/1.73SQ M
GLUCOSE P FAST SERPL-MCNC: 92 MG/DL (ref 65–99)
HCT VFR BLD AUTO: 33 % (ref 36.5–49.3)
HGB BLD-MCNC: 11.1 G/DL (ref 12–17)
INR PPP: 0.94 (ref 0.84–1.19)
MCH RBC QN AUTO: 31.1 PG (ref 26.8–34.3)
MCHC RBC AUTO-ENTMCNC: 33.6 G/DL (ref 31.4–37.4)
MCV RBC AUTO: 92 FL (ref 82–98)
PLATELET # BLD AUTO: 202 THOUSANDS/UL (ref 149–390)
PMV BLD AUTO: 10.9 FL (ref 8.9–12.7)
POTASSIUM SERPL-SCNC: 5 MMOL/L (ref 3.5–5.3)
PROT SERPL-MCNC: 7.8 G/DL (ref 6.4–8.4)
PROTHROMBIN TIME: 12.5 SECONDS (ref 11.6–14.5)
RBC # BLD AUTO: 3.57 MILLION/UL (ref 3.88–5.62)
SODIUM SERPL-SCNC: 135 MMOL/L (ref 135–147)
WBC # BLD AUTO: 5.24 THOUSAND/UL (ref 4.31–10.16)

## 2024-05-29 PROCEDURE — 80048 BASIC METABOLIC PNL TOTAL CA: CPT

## 2024-05-29 PROCEDURE — 80076 HEPATIC FUNCTION PANEL: CPT

## 2024-05-29 PROCEDURE — 85027 COMPLETE CBC AUTOMATED: CPT

## 2024-05-29 PROCEDURE — 82105 ALPHA-FETOPROTEIN SERUM: CPT

## 2024-05-29 PROCEDURE — 85610 PROTHROMBIN TIME: CPT

## 2024-05-29 PROCEDURE — 36415 COLL VENOUS BLD VENIPUNCTURE: CPT

## 2024-06-20 ENCOUNTER — PREP FOR PROCEDURE (OUTPATIENT)
Dept: INTERVENTIONAL RADIOLOGY/VASCULAR | Facility: CLINIC | Age: 29
End: 2024-06-20

## 2024-06-20 DIAGNOSIS — Z99.2 ESRD (END STAGE RENAL DISEASE) ON DIALYSIS (HCC): Primary | ICD-10-CM

## 2024-06-20 DIAGNOSIS — N18.6 ESRD (END STAGE RENAL DISEASE) (HCC): Primary | ICD-10-CM

## 2024-06-20 DIAGNOSIS — R21 SKIN RASH: ICD-10-CM

## 2024-06-20 DIAGNOSIS — N18.6 ESRD (END STAGE RENAL DISEASE) ON DIALYSIS (HCC): Primary | ICD-10-CM

## 2024-06-24 ENCOUNTER — PREP FOR PROCEDURE (OUTPATIENT)
Dept: INTERVENTIONAL RADIOLOGY/VASCULAR | Facility: CLINIC | Age: 29
End: 2024-06-24

## 2024-06-24 DIAGNOSIS — I73.9 MICROANGIOPATHY (HCC): Primary | ICD-10-CM

## 2024-06-24 RX ORDER — SODIUM CHLORIDE 9 MG/ML
30 INJECTION, SOLUTION INTRAVENOUS CONTINUOUS
Status: CANCELLED | OUTPATIENT
Start: 2024-06-24

## 2024-06-25 ENCOUNTER — HOSPITAL ENCOUNTER (OUTPATIENT)
Dept: INTERVENTIONAL RADIOLOGY/VASCULAR | Facility: HOSPITAL | Age: 29
Discharge: HOME | End: 2024-06-25
Attending: RADIOLOGY | Admitting: RADIOLOGY
Payer: COMMERCIAL

## 2024-06-25 VITALS
HEART RATE: 76 BPM | TEMPERATURE: 98.1 F | BODY MASS INDEX: 28.17 KG/M2 | RESPIRATION RATE: 16 BRPM | DIASTOLIC BLOOD PRESSURE: 64 MMHG | HEIGHT: 72 IN | WEIGHT: 208 LBS | SYSTOLIC BLOOD PRESSURE: 134 MMHG | OXYGEN SATURATION: 100 %

## 2024-06-25 DIAGNOSIS — I73.9 MICROANGIOPATHY (HCC): ICD-10-CM

## 2024-06-25 PROCEDURE — 99152 MOD SED SAME PHYS/QHP 5/>YRS: CPT

## 2024-06-25 PROCEDURE — C1725 CATH, TRANSLUMIN NON-LASER: HCPCS

## 2024-06-25 PROCEDURE — C1887 CATHETER, GUIDING: HCPCS

## 2024-06-25 PROCEDURE — 36902 INTRO CATH DIALYSIS CIRCUIT: CPT | Performed by: RADIOLOGY

## 2024-06-25 PROCEDURE — C1894 INTRO/SHEATH, NON-LASER: HCPCS

## 2024-06-25 PROCEDURE — 99152 MOD SED SAME PHYS/QHP 5/>YRS: CPT | Performed by: RADIOLOGY

## 2024-06-25 PROCEDURE — 36902 INTRO CATH DIALYSIS CIRCUIT: CPT

## 2024-06-25 PROCEDURE — C1769 GUIDE WIRE: HCPCS

## 2024-06-25 PROCEDURE — 99153 MOD SED SAME PHYS/QHP EA: CPT

## 2024-06-25 RX ORDER — CEFAZOLIN SODIUM 2 G/50ML
2000 SOLUTION INTRAVENOUS ONCE
Status: DISCONTINUED | OUTPATIENT
Start: 2024-06-25 | End: 2024-06-29 | Stop reason: HOSPADM

## 2024-06-25 RX ORDER — LIDOCAINE WITH 8.4% SOD BICARB 0.9%(10ML)
SYRINGE (ML) INJECTION AS NEEDED
Status: COMPLETED | OUTPATIENT
Start: 2024-06-25 | End: 2024-06-25

## 2024-06-25 RX ORDER — ONDANSETRON 4 MG/1
4 TABLET, ORALLY DISINTEGRATING ORAL EVERY 6 HOURS PRN
Status: DISCONTINUED | OUTPATIENT
Start: 2024-06-25 | End: 2024-06-29 | Stop reason: HOSPADM

## 2024-06-25 RX ORDER — MIDAZOLAM HYDROCHLORIDE 2 MG/2ML
INJECTION, SOLUTION INTRAMUSCULAR; INTRAVENOUS AS NEEDED
Status: COMPLETED | OUTPATIENT
Start: 2024-06-25 | End: 2024-06-25

## 2024-06-25 RX ORDER — SODIUM CHLORIDE 9 MG/ML
30 INJECTION, SOLUTION INTRAVENOUS CONTINUOUS
Status: DISCONTINUED | OUTPATIENT
Start: 2024-06-25 | End: 2024-06-29 | Stop reason: HOSPADM

## 2024-06-25 RX ORDER — FENTANYL CITRATE 50 UG/ML
INJECTION, SOLUTION INTRAMUSCULAR; INTRAVENOUS AS NEEDED
Status: COMPLETED | OUTPATIENT
Start: 2024-06-25 | End: 2024-06-25

## 2024-06-25 RX ADMIN — FENTANYL CITRATE 25 MCG: 50 INJECTION, SOLUTION INTRAMUSCULAR; INTRAVENOUS at 14:34

## 2024-06-25 RX ADMIN — IOHEXOL 60 ML: 350 INJECTION, SOLUTION INTRAVENOUS at 14:15

## 2024-06-25 RX ADMIN — Medication 4 ML: at 14:47

## 2024-06-25 RX ADMIN — Medication 10 ML: at 14:24

## 2024-06-25 RX ADMIN — MIDAZOLAM 0.5 MG: 1 INJECTION INTRAMUSCULAR; INTRAVENOUS at 14:34

## 2024-06-25 NOTE — DISCHARGE INSTRUCTIONS
[facility]  Interventional Radiology  [phone]               Moderate Sedation   WHAT YOU NEED TO KNOW:   Moderate sedation, or conscious sedation, is medicine used during procedures to help you feel relaxed and calm. You will be awake and able to follow directions without anxiety or pain. You will remember little to none of the procedure. You may feel tired, weak, or unsteady on your feet after you get sedation. You may also have trouble concentrating or short-term memory loss. These symptoms should go away in 24 hours or less.   DISCHARGE INSTRUCTIONS:   Call 911 or have someone else call for any of the following:   You have sudden trouble breathing.     You cannot be woken.  Seek care immediately if:   You have a severe headache or dizziness.     Your heart is beating faster than usual.  Contact your healthcare provider if:   You have a fever.     You have nausea or are vomiting for more than 8 hours after the procedure.      Your skin is itchy, swollen, or you have a rash.     You have questions or concerns about your condition or care.  Self-care:   Have someone stay with you for 24 hours. This person can drive you to errands and help you do things around the house. This person can also watch for problems.      Rest and do quiet activities for 24 hours. Do not exercise, ride a bike, or play sports. Stand up slowly to prevent dizziness and falls. Take short walks around the house with another person. Slowly return to your usual activities the next day.      Do not drive or use dangerous machines or tools for 24 hours. You may injure yourself or others. Examples include a lawnmower, saw, or drill. Do not return to work for 24 hours if you use dangerous machines or tools for work.      Do not make important decisions for 24 hours. For example, do not sign important papers or invest money.      Drink liquids as directed. Liquids help flush the sedation medicine out of your body. Ask  how much liquid to drink each day and which liquids are best for you.      Eat small, frequent meals to prevent nausea and vomiting. Start with clear liquids such as juice or broth. If you do not vomit after clear liquids, you can eat your usual foods.      Do not drink alcohol or take medicines that make you drowsy. This includes medicines that help you sleep and anxiety medicines. Ask your healthcare provider if it is safe for you to take pain medicine.  Follow up with your healthcare provider as directed: Write down your questions so you remember to ask them during your visits.   © 2017 [employer] Information is for End User's use only and may not be sold, redistributed or otherwise used for commercial purposes. All illustrations and images included in CareNotes® are the copyrighted property of [employer], [employer]. or [employer].  The above information is an  only. It is not intended as medical advice for individual conditions or treatments. Talk to your doctor, nurse or pharmacist before following any medical regimen to see if it is safe and effective for you.         Fistulogram     Interventional Radiology Phone Number: [phone]  WHAT YOU NEED TO KNOW:   Your arm or leg may be sore, swollen, and bruised after the procedure. This is normal and should get better in a few days.   DISCHARGE INSTRUCTIONS:   Call 911 for any of the following:   You have any of the following signs of a heart attack:    Squeezing, pressure, or pain in your chest that lasts longer than 5 minutes or returns  Discomfort or pain in your back, neck, jaw, stomach, or arm   Trouble breathing  Nausea or vomiting  Lightheadedness or a sudden cold sweat, especially with chest pain or trouble breathing  You have any of the following signs of a stroke:    Numbness or drooping on one side of your face   Weakness in an arm or leg  Confusion or difficulty speaking  Dizziness, a severe headache, or vision  loss  You feel lightheaded, short of breath, and have chest pain.   You cough up blood.   You have trouble breathing.  You have bleeding that does not stop after 10 minutes of holding firm, direct pressure over the puncture site.  Seek care immediately if:   Blood soaks through your bandage.  Your hand or foot closest to the graft or fistula feels cold, painful, or numb.   Your hand or foot closest to the graft or fistula is pale or blue.   You have trouble moving your arm or leg closest to the graft or fistula.   Your bruise suddenly gets bigger.  Contact your healthcare provider if:   You have a fever or chills.  Your puncture site is red, swollen, or draining pus.  You have nausea or are vomiting.  Your skin is itchy, swollen, or you have a rash.  You cannot feel a thrill over your graft or fistula.   You have questions or concerns about your condition or care.  Medicines:  You may  need any of the following:  Acetaminophen  decreases pain. It is available without a doctor's order. Ask how much to take and how often to take it. Follow directions. Read the labels of all other medicines you are using to see if they also contain acetaminophen, or ask your doctor or pharmacist. Acetaminophen can cause liver damage if not taken correctly. Do not take more than 4 grams (4,000 milligrams) of acetaminophen in one day.   Blood thinners help prevent blood clots. Examples of blood thinners include heparin and warfarin. Clots can cause strokes, heart attacks, and death. The following are general safety guidelines to follow while you are taking a blood thinner:  Watch for bleeding and bruising while you take blood thinners. Watch for bleeding from your gums or nose. Watch for blood in your urine and bowel movements. Use a soft washcloth on your skin, and a soft toothbrush to brush your teeth. This can keep your skin and gums from bleeding. If you shave, use an electric shaver. Do not play contact sports.   Tell your dentist  and other healthcare providers that you take anticoagulants. Wear a bracelet or necklace that says you take this medicine.   Do not start or stop any medicines unless your healthcare provider tells you to. Many medicines cannot be used with blood thinners.   Tell your healthcare provider right away if you forget to take the medicine, or if you take too much.  Warfarin  is a blood thinner that you may need to take. The following are things you should be aware of if you take warfarin.  Foods and medicines can affect the amount of warfarin in your blood. Do not make major changes to your diet while you take warfarin. Warfarin works best when you eat about the same amount of vitamin K every day. Vitamin K is found in green leafy vegetables and certain other foods. Ask for more information about what to eat when you are taking warfarin.  You will need to see your healthcare provider for follow-up visits when you are on warfarin. You will need regular blood tests. These tests are used to decide how much medicine you need.  Take your medicine as directed.  Call your healthcare provider if you think your medicine is not helping or if you have side effects. Tell him if you are allergic to any medicine. Keep a list of the medicines, vitamins, and herbs you take. Include the amounts, and when and why you take them. Bring the list or the pill bottles to follow-up visits. Carry your medicine list with you in case of an emergency.  Care for your wound as directed:  Remove the bandage in 4 to 6 hours or as directed. Wash the area once a day with soap and water. Gently pat the area dry.   Self-care:   Apply firm, steady pressure to the puncture site if it bleeds.  Use a clean gauze or towel to hold pressure for 10 to 15 minutes. Call 911 if you cannot stop the bleeding or the bleeding gets heavier.   Feel for a thrill once a day or as directed.  Place your index and second finger over your fistula or graft as directed. You should  feel a vibration. The vibration means that blood is flowing through your graft or fistula correctly.   Rest your arm or leg as directed.  Do not lift anything heavier than 5 pounds or do strenuous activity for 24 hours.   Prevent damage to your graft or fistula.  Do not wear tight-fitting clothing over your graft or fistula. Do not wear tight jewelry on the arm or leg with the graft or fistula. Tell healthcare providers not to do, IVs, blood draws, and blood pressure readings in the arm with your graft or fistula. Do not allow flu shots or vaccinations in your arm with your graft or fistula.  Follow up with your healthcare provider as directed:  Write down your questions so you remember to ask them during your visits.     © 2016 [employer]. Information is for End User's use only and may not be sold, redistributed or otherwise used for commercial purposes. All illustrations and images included in CareNotes® are the copyrighted property of [employer], [employer]. or [employer].  The above information is an  only. It is not intended as medical advice for individual conditions or treatments. Talk to your doctor, nurse or pharmacist before following any medical regimen to see if it is safe and effective for you.

## 2024-06-25 NOTE — BRIEF OP NOTE (RAD/CATH)
INTERVENTIONAL RADIOLOGY PROCEDURE NOTE    Date: 6/25/2024    Procedure: IR FISTULAGRAM  Procedure Summary       Date: 06/25/24 Room / Location: [facility] Interventional Radiology    Anesthesia Start:  Anesthesia Stop:     Procedure: IR AV FISTULA/GRAFT DECLOT Diagnosis:       Microangiopathy (HCC)      (declot)    Scheduled Providers:  Responsible Provider:     Anesthesia Type: Not recorded ASA Status: Not recorded            Preoperative diagnosis:   1. Microangiopathy (HCC)         Postoperative diagnosis: Same.    Surgeon: Lashae Cr MD     Assistant: None. No qualified resident was available.    Blood loss: 0    Specimens: 0     Findings:     Patent left upper extremity fistula  Small pseudoaneurysm at the more peripheral access site, recommend adjusting access site    Kink creating a moderate stenosis in the upper arm cephalic vein.  This was treated with 8 mm balloon angioplasty.    There was some spasm that responded to nitroglycerin   not surprisingly, the kink returned after balloon angioplasty.    Options include monitoring, surgical revision, covered stent placement    We will discuss with vascular surgery team    Complications: None immediate.    Anesthesia: conscious sedation

## 2024-06-25 NOTE — H&P
Interventional Radiology  History and Physical 6/25/2024     Milton Padilla   1995   [id number]    Assessment/Plan:    Young man with surgically created left brachiocephalic fistula used for approximately 6 months    He is referred for prolonged bleeding but reports that bleeding usually stops within 2-3 minutes.  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  I did reviewed risks benefits and alternatives including risk of damage to the access and need for catheter placement    He is on a transplant list.  I discussed that in the absence of transplant with need for continued dialysis the fistula will inevitably require maintenance such as angioplasty stenting etc.    Informed consent obtained    He is n.p.o., we can offer sedation if needed      Problem List Items Addressed This Visit          Cardiovascular and Mediastinum    Microangiopathy (HCC)    Relevant Orders    IR AV fistula/graft declot          Subjective:     Patient ID: Milton Padilla is a 29 y.o. male.    History of Present Illness  As above    Review of Systems      Past Medical History:   Diagnosis Date    Anxiety     Chronic kidney disease     GERD (gastroesophageal reflux disease)     Hypertension     Kidney stone     Liver disorder     Nephrolithiasis     Primary hyperoxaluria type 1 (HCC)         Past Surgical History:   Procedure Laterality Date    ANKLE SURGERY Right 2017    ligament repair     CARDIAC CATHETERIZATION Left 12/1/2023    Procedure: Cardiac catheterization;  Surgeon: Teresa Mireles MD;  Location: [facility];  Service: Cardiology    CARDIAC CATHETERIZATION N/A 12/1/2023    Procedure: Cardiac Coronary Angiogram;  Surgeon: Teresa Mireles MD;  Location: [facility];  Service: Cardiology    CARDIAC CATHETERIZATION Left 12/1/2023    Procedure: Cardiac Left Heart Cath;  Surgeon: Teresa Mireles MD;  Location: [facility];  Service: Cardiology    CYSTOSCOPY      FL RETROGRADE PYELOGRAM  10/24/2020    FL RETROGRADE PYELOGRAM  02/08/2021    HERNIA REPAIR      IR TUNNELED DIALYSIS CATHETER CHECK/CHANGE/REPOSITION/ANGIOPLASTY  12/4/2023    IR TUNNELED DIALYSIS CATHETER PLACEMENT  8/24/2023    IR TUNNELED DIALYSIS CATHETER REMOVAL  3/13/2024    KIDNEY STONE SURGERY      LITHOTRIPSY      MASS EXCISION Left 02/17/2017    Procedure: BACK/SHOULDER CYST EXCISION ;  Surgeon: John Muhammad MD;  Location: [facility];  Service:     MD ARTERIOVENOUS ANASTOMOSIS OPEN DIRECT Left 12/14/2023    Procedure: CREATION FISTULA ARTERIOVENOUS (AV);  Surgeon: Lencho Worthington MD;  Location:  MAIN OR;  Service: Vascular    MD CYSTO/URETERO W/LITHOTRIPSY &INDWELL STENT INSRT Left 10/24/2020    Procedure: CYSTOSCOPY URETEROSCOPY WITH LITHOTRIPSY HOLMIUM LASER, RETROGRADE PYELOGRAM AND INSERTION STENT URETERAL;  Surgeon: Carlos Le MD;  Location: [facility];  Service: Urology    MD CYSTO/URETERO W/LITHOTRIPSY &INDWELL STENT INSRT Right 02/08/2021    Procedure: CYSTOSCOPY URETEROSCOPY WITH LITHOTRIPSY HOLMIUM LASER, RETROGRADE PYELOGRAM AND INSERTION STENT URETERAL;  Surgeon: Brian Merchant MD;  Location: [facility];  Service: Urology        Social History     Tobacco Use   Smoking Status Never   Smokeless Tobacco Never        Social History     Substance and Sexual Activity   Alcohol Use Yes    Comment: rarely        Social History     Substance and Sexual Activity   Drug  Use No        No Known Allergies    Current Outpatient Medications   Medication Sig Dispense Refill    calcium acetate (PHOSLO) capsule Take 3 capsules (2,001 mg total) by mouth 3 (three) times a day with meals 270 capsule 6    carvedilol (COREG) 6.25 mg tablet Take 1 tablet (6.25 mg total) by mouth 2 (two) times a day with meals (Patient taking differently: Take 12.5 mg by mouth 2 (two) times a day with meals) 180 tablet 3    gabapentin (NEURONTIN) 100 mg capsule 100 mg 3 (three) times a day      heparin 5000 units in sodium chloride 0.9% 1000 mL irrigation Inject 3,000 Units into a catheter in a vein once With Dialysis      ondansetron (ZOFRAN) 4 mg tablet Take 1 tablet (4 mg total) by mouth every 8 (eight) hours as needed for nausea or vomiting 50 tablet 0    traZODone (DESYREL) 50 mg tablet TAKE 1 TABLET BY MOUTH EVERYDAY AT BEDTIME 90 tablet 3    Vitamin D, Cholecalciferol, 50 MCG (2000 UT) CAPS Take 1 capsule by mouth in the morning      calcitriol (ROCALTROL) 0.25 mcg capsule Take 1 capsule (0.25 mcg total) by mouth 3 (three) times a week 12 capsule 0     Current Facility-Administered Medications   Medication Dose Route Frequency Provider Last Rate Last Admin    ceFAZolin (ANCEF) IVPB (premix in dextrose) 2,000 mg 50 mL  2,000 mg Intravenous Once Lashae Cr MD        sodium chloride 0.9 % infusion  30 mL/hr Intravenous Continuous Lashae Cr MD              Objective:    Vitals:    06/25/24 1205 06/25/24 1400   BP: 134/80 136/79   Pulse: 74 71   Resp: 18 18   Temp: 97.6 °F (36.4 °C)    TempSrc: Temporal    SpO2: 100% 99%   Weight: 94.3 kg (208 lb)    Height: 6' (1.829 m)         Physical Exam      Lab Results   Component Value Date     (H) 05/10/2024      Lab Results   Component Value Date    WBC 5.24 05/29/2024    HGB 11.1 (L) 05/29/2024    HCT 33.0 (L) 05/29/2024    MCV 92 05/29/2024     05/29/2024     Lab Results   Component Value Date    INR 0.94 05/29/2024    INR 0.89 04/29/2024    INR  "0.91 04/01/2024    PROTIME 12.5 05/29/2024    PROTIME 12.7 04/29/2024    PROTIME 12.9 04/01/2024     No results found for: \"PTT\"      I have personally reviewed pertinent imaging and laboratory results.     Code Status: Prior  Advance Directive and Living Will:      Power of :    POLST:      This text is generated with voice recognition software. There may be translation, syntax,  or grammatical errors. If you have any questions, please contact the dictating provider.   "

## 2024-06-26 ENCOUNTER — APPOINTMENT (OUTPATIENT)
Age: 29
End: 2024-06-26
Payer: COMMERCIAL

## 2024-06-26 ENCOUNTER — TELEPHONE (OUTPATIENT)
Age: 29
End: 2024-06-26

## 2024-06-26 DIAGNOSIS — E72.53: ICD-10-CM

## 2024-06-26 DIAGNOSIS — Z01.818 OTHER SPECIFIED PRE-OPERATIVE EXAMINATION: ICD-10-CM

## 2024-06-26 LAB
AFP-TM SERPL-MCNC: 3.59 NG/ML (ref 0–9)
ALBUMIN SERPL BCG-MCNC: 4.7 G/DL (ref 3.5–5)
ALP SERPL-CCNC: 41 U/L (ref 34–104)
ALT SERPL W P-5'-P-CCNC: 23 U/L (ref 7–52)
ANION GAP SERPL CALCULATED.3IONS-SCNC: 16 MMOL/L (ref 4–13)
AST SERPL W P-5'-P-CCNC: 17 U/L (ref 13–39)
BASOPHILS # BLD AUTO: 0.06 THOUSANDS/ÂΜL (ref 0–0.1)
BASOPHILS NFR BLD AUTO: 1 % (ref 0–1)
BILIRUB DIRECT SERPL-MCNC: 0.12 MG/DL (ref 0–0.2)
BILIRUB SERPL-MCNC: 0.42 MG/DL (ref 0.2–1)
BUN SERPL-MCNC: 39 MG/DL (ref 5–25)
CALCIUM SERPL-MCNC: 9.9 MG/DL (ref 8.4–10.2)
CHLORIDE SERPL-SCNC: 91 MMOL/L (ref 96–108)
CO2 SERPL-SCNC: 29 MMOL/L (ref 21–32)
CREAT SERPL-MCNC: 9.65 MG/DL (ref 0.6–1.3)
EOSINOPHIL # BLD AUTO: 0.23 THOUSAND/ÂΜL (ref 0–0.61)
EOSINOPHIL NFR BLD AUTO: 4 % (ref 0–6)
ERYTHROCYTE [DISTWIDTH] IN BLOOD BY AUTOMATED COUNT: 16.9 % (ref 11.6–15.1)
GFR SERPL CREATININE-BSD FRML MDRD: 6 ML/MIN/1.73SQ M
GLUCOSE P FAST SERPL-MCNC: 91 MG/DL (ref 65–99)
HCT VFR BLD AUTO: 27.3 % (ref 36.5–49.3)
HGB BLD-MCNC: 9.3 G/DL (ref 12–17)
IMM GRANULOCYTES # BLD AUTO: 0.02 THOUSAND/UL (ref 0–0.2)
IMM GRANULOCYTES NFR BLD AUTO: 0 % (ref 0–2)
INR PPP: 0.96 (ref 0.84–1.19)
LYMPHOCYTES # BLD AUTO: 1.43 THOUSANDS/ÂΜL (ref 0.6–4.47)
LYMPHOCYTES NFR BLD AUTO: 26 % (ref 14–44)
MCH RBC QN AUTO: 32.4 PG (ref 26.8–34.3)
MCHC RBC AUTO-ENTMCNC: 34.1 G/DL (ref 31.4–37.4)
MCV RBC AUTO: 95 FL (ref 82–98)
MONOCYTES # BLD AUTO: 0.43 THOUSAND/ÂΜL (ref 0.17–1.22)
MONOCYTES NFR BLD AUTO: 8 % (ref 4–12)
NEUTROPHILS # BLD AUTO: 3.26 THOUSANDS/ÂΜL (ref 1.85–7.62)
NEUTS SEG NFR BLD AUTO: 61 % (ref 43–75)
NRBC BLD AUTO-RTO: 0 /100 WBCS
PLATELET # BLD AUTO: 240 THOUSANDS/UL (ref 149–390)
PMV BLD AUTO: 10.5 FL (ref 8.9–12.7)
POTASSIUM SERPL-SCNC: 4.7 MMOL/L (ref 3.5–5.3)
PROT SERPL-MCNC: 7.7 G/DL (ref 6.4–8.4)
PROTHROMBIN TIME: 12.7 SECONDS (ref 11.6–14.5)
RBC # BLD AUTO: 2.87 MILLION/UL (ref 3.88–5.62)
SODIUM SERPL-SCNC: 136 MMOL/L (ref 135–147)
WBC # BLD AUTO: 5.43 THOUSAND/UL (ref 4.31–10.16)

## 2024-06-26 PROCEDURE — 82105 ALPHA-FETOPROTEIN SERUM: CPT

## 2024-06-26 PROCEDURE — 85610 PROTHROMBIN TIME: CPT

## 2024-06-26 PROCEDURE — 36415 COLL VENOUS BLD VENIPUNCTURE: CPT

## 2024-06-26 PROCEDURE — 80048 BASIC METABOLIC PNL TOTAL CA: CPT

## 2024-06-26 PROCEDURE — 85025 COMPLETE CBC W/AUTO DIFF WBC: CPT

## 2024-06-26 PROCEDURE — 80076 HEPATIC FUNCTION PANEL: CPT

## 2024-06-26 NOTE — TELEPHONE ENCOUNTER
Please call pt for followup with Dr Worthington when something opens up. [facility] normally [facility] to [facility], but is flexible.

## 2024-06-27 NOTE — TELEPHONE ENCOUNTER
As per Ashley's request - Fact Sheet for pts, parents, and caregivers about EUA of nirmatrelvir & ritonavir (Paxlovid) tablet therapy pack and molnupiravir  meds were emailed to pts e-mail : [email or web address]  Left a detailed vm for pt  Any questions or concerns pt will call  Quality 431: Preventive Care And Screening: Unhealthy Alcohol Use - Screening: Patient not identified as an unhealthy alcohol user when screened for unhealthy alcohol use using a systematic screening method Quality 130: Documentation Of Current Medications In The Medical Record: Current Medications Documented Quality 47: Advance Care Plan: Advance Care Planning discussed and documented; advance care plan or surrogate decision maker documented in the medical record. Detail Level: Detailed Quality 226: Preventive Care And Screening: Tobacco Use: Screening And Cessation Intervention: Patient screened for tobacco use and is an ex/non-smoker

## 2024-07-05 DIAGNOSIS — R11.14 BILIOUS VOMITING WITH NAUSEA: ICD-10-CM

## 2024-07-05 RX ORDER — ONDANSETRON 4 MG/1
4 TABLET, FILM COATED ORAL EVERY 8 HOURS PRN
Qty: 50 TABLET | Refills: 0 | Status: SHIPPED | OUTPATIENT
Start: 2024-07-05

## 2024-07-12 NOTE — PROGRESS NOTES
Assessment/Plan:      Diagnoses and all orders for this visit:    End stage renal disease on dialysis (HCC)        Lab Results   Component Value Date     EGFR 6 06/26/2024     EGFR 9 05/29/2024     EGFR 11 05/10/2024     CREATININE 9.65 (H) 06/26/2024     CREATININE 6.79 (H) 05/29/2024     CREATININE 5.88 (H) 05/10/2024   Patient with hyperoxalurIa type I.  On kidney and liver transplant list.  Currently with daily dialysis via left upper extremity brachiocephalic fistula.  Had 1 week of intermittent dysfunction.  He underwent a fistulogram with Dr. Cr.  I agree there is an anatomic, kink in the native vein.  This likely developed as the fistula matured.  Fortunately, since that episode he has been able to dialyze without issue.  At this time given the risk of access loss with reoperation, we will continue to use the fistula for dialysis as it is.  If he develops prolonged bleeding episodes or issues with dysfunction it is recommended he call.  We do not need to have a second office visit.  I will schedule his operation and consent him on the day of.  He is in agreement with this plan.  Subjective:     Patient ID: Milton Padilla is a 29 y.o. male.    Patient underwent IR AV fistulagram of JOHN on 6/25/24 and presents today for f/u visit.     AMOR Rose is a pleasant 29-year-old male with hyperoxaluria type I.  He requires daily dialysis.  We placed a left upper extremity brachiocephalic fistula which is functioning well.  He did have an episode for approximately 1 week where there was clotting and circuit dysfunction.  There was concern that he may develop an outflow stenosis.  He underwent a fistulogram with Dr. Cr.  This identified a anatomic kink in the cephalic vein more proximal.  This was refractory to balloon angioplasty.  I was prepared to offer him a revision with plication of this area.  Upon my evaluation he suggested that he has not had issues since that time the dialysis been running quite  well.  Due to the risk of dialysis access loss with surgery and the current status of his dialysis sessions, we will not intervene at this time.  I welcomed him to call as soon as he develops issues which should include but not limited to circuit dysfunction, clotting, bleeding from access sites on a consistent basis.  In this setting we will plan for revisional surgery of this anatomic kink in his dialysis access.    Review of Systems   Constitutional: Negative.    HENT: Negative.     Eyes: Negative.    Respiratory: Negative.     Cardiovascular: Negative.    Gastrointestinal: Negative.    Endocrine: Negative.    Genitourinary: Negative.    Musculoskeletal: Negative.    Skin: Negative.    Allergic/Immunologic: Negative.    Neurological: Negative.    Hematological: Negative.    Psychiatric/Behavioral: Negative.           Objective:     Physical Exam  Constitutional:       Appearance: Normal appearance.   HENT:      Head: Normocephalic and atraumatic.      Nose: Nose normal. No rhinorrhea.   Eyes:      Extraocular Movements: Extraocular movements intact.      Pupils: Pupils are equal, round, and reactive to light.   Cardiovascular:      Rate and Rhythm: Normal rate and regular rhythm.      Pulses:           Radial pulses are 2+ on the left side.      Comments: Strong thrill in fistula.  No areas of erosion or aneurysmal degeneration.  Pulmonary:      Effort: Pulmonary effort is normal.      Breath sounds: No stridor.   Abdominal:      General: There is no distension.      Tenderness: There is no abdominal tenderness.   Musculoskeletal:         General: No tenderness. Normal range of motion.      Cervical back: Normal range of motion and neck supple.   Skin:     General: Skin is warm.      Capillary Refill: Capillary refill takes less than 2 seconds.      Coloration: Skin is not jaundiced.   Neurological:      General: No focal deficit present.      Mental Status: He is alert and oriented to person, place, and time.    Psychiatric:         Mood and Affect: Mood normal.         Behavior: Behavior normal.

## 2024-07-15 ENCOUNTER — OFFICE VISIT (OUTPATIENT)
Dept: VASCULAR SURGERY | Facility: CLINIC | Age: 29
End: 2024-07-15
Payer: COMMERCIAL

## 2024-07-15 VITALS
HEART RATE: 76 BPM | HEIGHT: 72 IN | BODY MASS INDEX: 28.99 KG/M2 | WEIGHT: 214 LBS | SYSTOLIC BLOOD PRESSURE: 122 MMHG | DIASTOLIC BLOOD PRESSURE: 78 MMHG

## 2024-07-15 DIAGNOSIS — Z99.2 END STAGE RENAL DISEASE ON DIALYSIS (HCC): Primary | ICD-10-CM

## 2024-07-15 DIAGNOSIS — N18.6 END STAGE RENAL DISEASE ON DIALYSIS (HCC): Primary | ICD-10-CM

## 2024-07-15 PROCEDURE — 99214 OFFICE O/P EST MOD 30 MIN: CPT | Performed by: SURGERY

## 2024-07-15 NOTE — LETTER
July 15, 2024     Alessandro Soler DO  [address]  [facility] [address]    Patient: Milton Padilla   YOB: 1995   Date of Visit: 7/15/2024       Dear Dr. Soler:    Thank you for referring Milton Padilla to me for evaluation. Below are my notes for this consultation.    If you have questions, please do not hesitate to call me. I look forward to following your patient along with you.         Sincerely,        Lencho Worthington MD        CC: Milton Padilla    Lencho Worthington MD  7/15/2024  4:18 PM  Sign when Signing Visit  Assessment/Plan:      Diagnoses and all orders for this visit:    End stage renal disease on dialysis (HCC)        Lab Results   Component Value Date     EGFR 6 06/26/2024     EGFR 9 05/29/2024     EGFR 11 05/10/2024     CREATININE 9.65 (H) 06/26/2024     CREATININE 6.79 (H) 05/29/2024     CREATININE 5.88 (H) 05/10/2024   Patient with hyperoxalurIa type I.  On kidney and liver transplant list.  Currently with daily dialysis via left upper extremity brachiocephalic fistula.  Had 1 week of intermittent dysfunction.  He underwent a fistulogram with Dr. Cr.  I agree there is an anatomic, kink in the native vein.  This likely developed as the fistula matured.  Fortunately, since that episode he has been able to dialyze without issue.  At this time given the risk of access loss with reoperation, we will continue to use the fistula for dialysis as it is.  If he develops prolonged bleeding episodes or issues with dysfunction it is recommended he call.  We do not need to have a second office visit.  I will schedule his operation and consent him on the day of.  He is in agreement with this plan.  Subjective:     Patient ID: Milton Padilla is a 29 y.o. male.    Patient underwent IR AV fistulagram of JOHN on 6/25/24 and presents today for f/u visit.     AMOR Rose is a pleasant 29-year-old male with hyperoxaluria type I.  He requires daily dialysis.   We placed a left upper extremity brachiocephalic fistula which is functioning well.  He did have an episode for approximately 1 week where there was clotting and circuit dysfunction.  There was concern that he may develop an outflow stenosis.  He underwent a fistulogram with Dr. Cr.  This identified a anatomic kink in the cephalic vein more proximal.  This was refractory to balloon angioplasty.  I was prepared to offer him a revision with plication of this area.  Upon my evaluation he suggested that he has not had issues since that time the dialysis been running quite well.  Due to the risk of dialysis access loss with surgery and the current status of his dialysis sessions, we will not intervene at this time.  I welcomed him to call as soon as he develops issues which should include but not limited to circuit dysfunction, clotting, bleeding from access sites on a consistent basis.  In this setting we will plan for revisional surgery of this anatomic kink in his dialysis access.    Review of Systems   Constitutional: Negative.    HENT: Negative.     Eyes: Negative.    Respiratory: Negative.     Cardiovascular: Negative.    Gastrointestinal: Negative.    Endocrine: Negative.    Genitourinary: Negative.    Musculoskeletal: Negative.    Skin: Negative.    Allergic/Immunologic: Negative.    Neurological: Negative.    Hematological: Negative.    Psychiatric/Behavioral: Negative.           Objective:     Physical Exam  Constitutional:       Appearance: Normal appearance.   HENT:      Head: Normocephalic and atraumatic.      Nose: Nose normal. No rhinorrhea.   Eyes:      Extraocular Movements: Extraocular movements intact.      Pupils: Pupils are equal, round, and reactive to light.   Cardiovascular:      Rate and Rhythm: Normal rate and regular rhythm.      Pulses:           Radial pulses are 2+ on the left side.      Comments: Strong thrill in fistula.  No areas of erosion or aneurysmal degeneration.  Pulmonary:       Effort: Pulmonary effort is normal.      Breath sounds: No stridor.   Abdominal:      General: There is no distension.      Tenderness: There is no abdominal tenderness.   Musculoskeletal:         General: No tenderness. Normal range of motion.      Cervical back: Normal range of motion and neck supple.   Skin:     General: Skin is warm.      Capillary Refill: Capillary refill takes less than 2 seconds.      Coloration: Skin is not jaundiced.   Neurological:      General: No focal deficit present.      Mental Status: He is alert and oriented to person, place, and time.   Psychiatric:         Mood and Affect: Mood normal.         Behavior: Behavior normal.

## 2024-07-15 NOTE — ASSESSMENT & PLAN NOTE
Lab Results   Component Value Date    EGFR 6 06/26/2024    EGFR 9 05/29/2024    EGFR 11 05/10/2024    CREATININE 9.65 (H) 06/26/2024    CREATININE 6.79 (H) 05/29/2024    CREATININE 5.88 (H) 05/10/2024   Patient with hyperoxalurIa type I.  On kidney and liver transplant list.  Currently with daily dialysis via left upper extremity brachiocephalic fistula.  Had 1 week of intermittent dysfunction.  He underwent a fistulogram with Dr. Cr.  I agree there is an anatomic, kink in the native vein.  This likely developed as the fistula matured.  Fortunately, since that episode he has been able to dialyze without issue.  At this time given the risk of access loss with reoperation, we will continue to use the fistula for dialysis as it is.  If he develops prolonged bleeding episodes or issues with dysfunction it is recommended he call.  We do not need to have a second office visit.  I will schedule his operation and consent him on the day of.  He is in agreement with this plan.

## 2024-07-15 NOTE — PATIENT INSTRUCTIONS
1. End stage renal disease on dialysis (HCC)  Assessment & Plan:  Lab Results   Component Value Date    EGFR 6 06/26/2024    EGFR 9 05/29/2024    EGFR 11 05/10/2024    CREATININE 9.65 (H) 06/26/2024    CREATININE 6.79 (H) 05/29/2024    CREATININE 5.88 (H) 05/10/2024   Patient with hyperoxalurIa type I.  On kidney and liver transplant list.  Currently with daily dialysis via left upper extremity brachiocephalic fistula.  Had 1 week of intermittent dysfunction.  He underwent a fistulogram with Dr. Cr.  I agree there is an anatomic, kink in the native vein.  This likely developed as the fistula matured.  Fortunately, since that episode he has been able to dialyze without issue.  At this time given the risk of access loss with reoperation, we will continue to use the fistula for dialysis as it is.  If he develops prolonged bleeding episodes or issues with dysfunction it is recommended he call.  We do not need to have a second office visit.  I will schedule his operation and consent him on the day of.  He is in agreement with this plan.

## 2024-07-23 ENCOUNTER — TELEPHONE (OUTPATIENT)
Dept: FAMILY MEDICINE CLINIC | Facility: CLINIC | Age: 29
End: 2024-07-23

## 2024-07-26 ENCOUNTER — APPOINTMENT (OUTPATIENT)
Age: 29
End: 2024-07-26
Payer: COMMERCIAL

## 2024-07-26 DIAGNOSIS — E72.53: ICD-10-CM

## 2024-07-26 DIAGNOSIS — Z01.818 PRE-TRANSPLANT EVALUATION FOR LIVER TRANSPLANT: ICD-10-CM

## 2024-07-26 LAB
ALBUMIN SERPL BCG-MCNC: 4.5 G/DL (ref 3.5–5)
ALP SERPL-CCNC: 42 U/L (ref 34–104)
ALT SERPL W P-5'-P-CCNC: 19 U/L (ref 7–52)
ANION GAP SERPL CALCULATED.3IONS-SCNC: 15 MMOL/L (ref 4–13)
AST SERPL W P-5'-P-CCNC: 17 U/L (ref 13–39)
BILIRUB DIRECT SERPL-MCNC: 0.08 MG/DL (ref 0–0.2)
BILIRUB SERPL-MCNC: 0.47 MG/DL (ref 0.2–1)
BUN SERPL-MCNC: 27 MG/DL (ref 5–25)
CALCIUM SERPL-MCNC: 9.7 MG/DL (ref 8.4–10.2)
CHLORIDE SERPL-SCNC: 92 MMOL/L (ref 96–108)
CO2 SERPL-SCNC: 29 MMOL/L (ref 21–32)
CREAT SERPL-MCNC: 8.67 MG/DL (ref 0.6–1.3)
ERYTHROCYTE [DISTWIDTH] IN BLOOD BY AUTOMATED COUNT: 16.3 % (ref 11.6–15.1)
GFR SERPL CREATININE-BSD FRML MDRD: 7 ML/MIN/1.73SQ M
GLUCOSE P FAST SERPL-MCNC: 84 MG/DL (ref 65–99)
HCT VFR BLD AUTO: 31.8 % (ref 36.5–49.3)
HGB BLD-MCNC: 10.6 G/DL (ref 12–17)
INR PPP: 0.94 (ref 0.84–1.19)
MCH RBC QN AUTO: 33.7 PG (ref 26.8–34.3)
MCHC RBC AUTO-ENTMCNC: 33.3 G/DL (ref 31.4–37.4)
MCV RBC AUTO: 101 FL (ref 82–98)
PLATELET # BLD AUTO: 174 THOUSANDS/UL (ref 149–390)
PMV BLD AUTO: 10.7 FL (ref 8.9–12.7)
POTASSIUM SERPL-SCNC: 4.4 MMOL/L (ref 3.5–5.3)
PROT SERPL-MCNC: 7.1 G/DL (ref 6.4–8.4)
PROTHROMBIN TIME: 12.5 SECONDS (ref 11.6–14.5)
RBC # BLD AUTO: 3.15 MILLION/UL (ref 3.88–5.62)
SODIUM SERPL-SCNC: 136 MMOL/L (ref 135–147)
WBC # BLD AUTO: 5.65 THOUSAND/UL (ref 4.31–10.16)

## 2024-07-26 PROCEDURE — 80076 HEPATIC FUNCTION PANEL: CPT

## 2024-07-26 PROCEDURE — 85027 COMPLETE CBC AUTOMATED: CPT

## 2024-07-26 PROCEDURE — 36415 COLL VENOUS BLD VENIPUNCTURE: CPT

## 2024-07-26 PROCEDURE — 80048 BASIC METABOLIC PNL TOTAL CA: CPT

## 2024-07-26 PROCEDURE — 85610 PROTHROMBIN TIME: CPT

## 2024-08-19 ENCOUNTER — OFFICE VISIT (OUTPATIENT)
Dept: FAMILY MEDICINE CLINIC | Facility: CLINIC | Age: 29
End: 2024-08-19
Payer: COMMERCIAL

## 2024-08-19 VITALS
BODY MASS INDEX: 28.88 KG/M2 | HEIGHT: 72 IN | HEART RATE: 80 BPM | DIASTOLIC BLOOD PRESSURE: 80 MMHG | WEIGHT: 213.2 LBS | RESPIRATION RATE: 14 BRPM | OXYGEN SATURATION: 100 % | TEMPERATURE: 98 F | SYSTOLIC BLOOD PRESSURE: 140 MMHG

## 2024-08-19 DIAGNOSIS — R11.14 BILIOUS VOMITING WITH NAUSEA: ICD-10-CM

## 2024-08-19 DIAGNOSIS — Z00.00 ANNUAL PHYSICAL EXAM: Primary | ICD-10-CM

## 2024-08-19 PROCEDURE — 99395 PREV VISIT EST AGE 18-39: CPT | Performed by: FAMILY MEDICINE

## 2024-08-19 RX ORDER — NICOTINE POLACRILEX 4 MG/1
GUM, CHEWING ORAL
COMMUNITY
Start: 2024-08-07

## 2024-08-19 RX ORDER — TRIAMCINOLONE ACETONIDE 1 MG/G
1 CREAM TOPICAL
COMMUNITY

## 2024-08-19 RX ORDER — ONDANSETRON 4 MG/1
4 TABLET, FILM COATED ORAL EVERY 8 HOURS PRN
Qty: 18 TABLET | Refills: 3 | Status: SHIPPED | OUTPATIENT
Start: 2024-08-19

## 2024-08-19 NOTE — PROGRESS NOTES
Adult Annual Physical  Name: Milton Padilla      : 1995      MRN: [id number]  Encounter Provider: Alessandro Soler DO  Encounter Date: 2024   Encounter department: [facility] [address]    Assessment & Plan  Annual physical exam              Counseling:  Dental Health: discussed importance of regular tooth brushing, flossing, and dental visits.  Exercise: the importance of regular exercise/physical activity was discussed. Recommend exercise 3-5 times per week for at least 30 minutes.          History of Present Illness     Adult Annual Physical:  Patient presents for annual physical.     Diet and Physical Activity:  - Diet/Nutrition: well balanced diet.  - Exercise: moderate cardiovascular exercise.    General Health:  - Sleep: sleeps well.  - Hearing: normal hearing bilateral ears.  - Vision: no vision problems.  - Dental: regular dental visits.     Health:    - Urinary symptoms: none.     Review of Systems   Constitutional:  Negative for chills, fatigue and fever.   HENT:  Negative for congestion, ear pain, hearing loss, postnasal drip, rhinorrhea and sore throat.    Eyes:  Negative for pain and visual disturbance.   Respiratory:  Negative for chest tightness, shortness of breath and wheezing.    Cardiovascular:  Negative for chest pain and leg swelling.   Gastrointestinal:  Negative for abdominal distention, abdominal pain, constipation, diarrhea and vomiting.   Endocrine: Negative for cold intolerance and heat intolerance.   Genitourinary:  Negative for difficulty urinating, frequency and urgency.   Musculoskeletal:  Negative for arthralgias and gait problem.   Skin:  Negative for color change.   Neurological:  Negative for dizziness, tremors, syncope, numbness and headaches.   Hematological:  Negative for adenopathy.   Psychiatric/Behavioral:  Negative for agitation, confusion and sleep disturbance. The patient is not nervous/anxious.          Objective     BP  140/80   Pulse 80   Temp 98 °F (36.7 °C)   Resp 14   Ht 6' (1.829 m)   Wt 96.7 kg (213 lb 3.2 oz)   SpO2 100%   BMI 28.92 kg/m²     Physical Exam  Constitutional:       Appearance: He is well-developed.   HENT:      Head: Normocephalic.      Right Ear: External ear normal.      Left Ear: External ear normal.      Nose: Nose normal.   Eyes:      Extraocular Movements: Extraocular movements intact.      Conjunctiva/sclera: Conjunctivae normal.      Pupils: Pupils are equal, round, and reactive to light.   Neck:      Thyroid: No thyromegaly.   Cardiovascular:      Rate and Rhythm: Normal rate and regular rhythm.      Heart sounds: Normal heart sounds.   Pulmonary:      Effort: Pulmonary effort is normal.      Breath sounds: Normal breath sounds.   Abdominal:      General: Bowel sounds are normal.      Palpations: Abdomen is soft.   Musculoskeletal:         General: Normal range of motion.      Cervical back: Normal range of motion.   Skin:     General: Skin is warm and dry.   Neurological:      Mental Status: He is alert and oriented to person, place, and time.   Psychiatric:         Mood and Affect: Mood normal.         Behavior: Behavior normal.

## 2024-08-19 NOTE — PATIENT INSTRUCTIONS
"Patient Education     Routine physical for adults   The Basics   Written by the doctors and editors at [facility]   What is a physical? -- A physical is a routine visit, or \"check-up,\" with your doctor. You might also hear it called a \"wellness visit\" or \"preventive visit.\"  During each visit, the doctor will:   Ask about your physical and mental health   Ask about your habits, behaviors, and lifestyle   Do an exam   Give you vaccines if needed   Talk to you about any medicines you take   Give advice about your health   Answer your questions  Getting regular check-ups is an important part of taking care of your health. It can help your doctor find and treat any problems you have. But it's also important for preventing health problems.  A routine physical is different from a \"sick visit.\" A sick visit is when you see a doctor because of a health concern or problem. Since physicals are scheduled ahead of time, you can think about what you want to ask the doctor.  How often should I get a physical? -- It depends on your age and health. In general, for people age 21 years and older:   If you are younger than 50 years, you might be able to get a physical every 3 years.   If you are 50 years or older, your doctor might recommend a physical every year.  If you have an ongoing health condition, like diabetes or high blood pressure, your doctor will probably want to see you more often.  What happens during a physical? -- In general, each visit will include:   Physical exam - The doctor or nurse will check your height, weight, heart rate, and blood pressure. They will also look at your eyes and ears. They will ask about how you are feeling and whether you have any symptoms that bother you.   Medicines - It's a good idea to bring a list of all the medicines you take to each doctor visit. Your doctor will talk to you about your medicines and answer any questions. Tell them if you are having any side effects that bother you. You " "should also tell them if you are having trouble paying for any of your medicines.   Habits and behaviors - This includes:   Your diet   Your exercise habits   Whether you smoke, drink alcohol, or use drugs   Whether you are sexually active   Whether you feel safe at home  Your doctor will talk to you about things you can do to improve your health and lower your risk of health problems. They will also offer help and support. For example, if you want to quit smoking, they can give you advice and might prescribe medicines. If you want to improve your diet or get more physical activity, they can help you with this, too.   Lab tests, if needed - The tests you get will depend on your age and situation. For example, your doctor might want to check your:   Cholesterol   Blood sugar   Iron level   Vaccines - The recommended vaccines will depend on your age, health, and what vaccines you already had. Vaccines are very important because they can prevent certain serious or deadly infections.   Discussion of screening - \"Screening\" means checking for diseases or other health problems before they cause symptoms. Your doctor can recommend screening based on your age, risk, and preferences. This might include tests to check for:   Cancer, such as breast, prostate, cervical, ovarian, colorectal, prostate, lung, or skin cancer   Sexually transmitted infections, such as chlamydia and gonorrhea   Mental health conditions like depression and anxiety  Your doctor will talk to you about the different types of screening tests. They can help you decide which screenings to have. They can also explain what the results might mean.   Answering questions - The physical is a good time to ask the doctor or nurse questions about your health. If needed, they can refer you to other doctors or specialists, too.  Adults older than 65 years often need other care, too. As you get older, your doctor will talk to you about:   How to prevent falling at " home   Hearing or vision tests   Memory testing   How to take your medicines safely   Making sure that you have the help and support you need at home  All topics are updated as new evidence becomes available and our peer review process is complete.  This topic retrieved from [employer] on: May 02, 2024.  Topic 228035 Version 1.0  Release: 32.4.3 - C32.122  © 2024 UpToDate, Inc. and/or its affiliates. All rights reserved.  Consumer Information Use and Disclaimer   Disclaimer: This generalized information is a limited summary of diagnosis, treatment, and/or medication information. It is not meant to be comprehensive and should be used as a tool to help the user understand and/or assess potential diagnostic and treatment options. It does NOT include all information about conditions, treatments, medications, side effects, or risks that may apply to a specific patient. It is not intended to be medical advice or a substitute for the medical advice, diagnosis, or treatment of a health care provider based on the health care provider's examination and assessment of a patient's specific and unique circumstances. Patients must speak with a health care provider for complete information about their health, medical questions, and treatment options, including any risks or benefits regarding use of medications. This information does not endorse any treatments or medications as safe, effective, or approved for treating a specific patient. UpToDate, Inc. and its affiliates disclaim any warranty or liability relating to this information or the use thereof.The use of this information is governed by the Terms of Use, available at https://www.[employer]uwer.com/en/know/clinical-effectiveness-terms. 2024© UpToDate, Inc. and its affiliates and/or licensors. All rights reserved.  Copyright   © 2024 UpToDate, Inc. and/or its affiliates. All rights reserved.     operating room

## 2024-08-28 ENCOUNTER — PREP FOR PROCEDURE (OUTPATIENT)
Dept: INTERVENTIONAL RADIOLOGY/VASCULAR | Facility: CLINIC | Age: 29
End: 2024-08-28

## 2024-08-28 DIAGNOSIS — Z99.2 END STAGE RENAL DISEASE ON DIALYSIS (HCC): Primary | ICD-10-CM

## 2024-08-28 DIAGNOSIS — Z99.2 ESRD (END STAGE RENAL DISEASE) ON DIALYSIS (HCC): Primary | ICD-10-CM

## 2024-08-28 DIAGNOSIS — N18.6 ESRD (END STAGE RENAL DISEASE) ON DIALYSIS (HCC): Primary | ICD-10-CM

## 2024-08-28 DIAGNOSIS — N18.6 END STAGE RENAL DISEASE ON DIALYSIS (HCC): Primary | ICD-10-CM

## 2024-09-04 ENCOUNTER — APPOINTMENT (OUTPATIENT)
Age: 29
End: 2024-09-04
Payer: COMMERCIAL

## 2024-09-04 DIAGNOSIS — E72.53: ICD-10-CM

## 2024-09-04 DIAGNOSIS — Z01.818 PRE-TRANSPLANT EVALUATION FOR LIVER TRANSPLANT: ICD-10-CM

## 2024-09-04 LAB
ALBUMIN SERPL BCG-MCNC: 4.5 G/DL (ref 3.5–5)
ALP SERPL-CCNC: 46 U/L (ref 34–104)
ALT SERPL W P-5'-P-CCNC: 21 U/L (ref 7–52)
ANION GAP SERPL CALCULATED.3IONS-SCNC: 16 MMOL/L (ref 4–13)
AST SERPL W P-5'-P-CCNC: 15 U/L (ref 13–39)
BILIRUB DIRECT SERPL-MCNC: 0.07 MG/DL (ref 0–0.2)
BILIRUB SERPL-MCNC: 0.48 MG/DL (ref 0.2–1)
BUN SERPL-MCNC: 42 MG/DL (ref 5–25)
CALCIUM SERPL-MCNC: 10 MG/DL (ref 8.4–10.2)
CHLORIDE SERPL-SCNC: 90 MMOL/L (ref 96–108)
CO2 SERPL-SCNC: 28 MMOL/L (ref 21–32)
CREAT SERPL-MCNC: 9.95 MG/DL (ref 0.6–1.3)
ERYTHROCYTE [DISTWIDTH] IN BLOOD BY AUTOMATED COUNT: 14.6 % (ref 11.6–15.1)
GFR SERPL CREATININE-BSD FRML MDRD: 6 ML/MIN/1.73SQ M
GLUCOSE P FAST SERPL-MCNC: 89 MG/DL (ref 65–99)
HCT VFR BLD AUTO: 34.6 % (ref 36.5–49.3)
HGB BLD-MCNC: 11.7 G/DL (ref 12–17)
INR PPP: 0.94 (ref 0.85–1.19)
MCH RBC QN AUTO: 33 PG (ref 26.8–34.3)
MCHC RBC AUTO-ENTMCNC: 33.8 G/DL (ref 31.4–37.4)
MCV RBC AUTO: 98 FL (ref 82–98)
PLATELET # BLD AUTO: 219 THOUSANDS/UL (ref 149–390)
PMV BLD AUTO: 10.9 FL (ref 8.9–12.7)
POTASSIUM SERPL-SCNC: 5 MMOL/L (ref 3.5–5.3)
PROT SERPL-MCNC: 7.3 G/DL (ref 6.4–8.4)
PROTHROMBIN TIME: 12.9 SECONDS (ref 12.3–15)
RBC # BLD AUTO: 3.55 MILLION/UL (ref 3.88–5.62)
SODIUM SERPL-SCNC: 134 MMOL/L (ref 135–147)
WBC # BLD AUTO: 4.93 THOUSAND/UL (ref 4.31–10.16)

## 2024-09-04 PROCEDURE — 85610 PROTHROMBIN TIME: CPT

## 2024-09-04 PROCEDURE — 80076 HEPATIC FUNCTION PANEL: CPT

## 2024-09-04 PROCEDURE — 36415 COLL VENOUS BLD VENIPUNCTURE: CPT

## 2024-09-04 PROCEDURE — 80048 BASIC METABOLIC PNL TOTAL CA: CPT

## 2024-09-04 PROCEDURE — 85027 COMPLETE CBC AUTOMATED: CPT

## 2024-09-20 ENCOUNTER — APPOINTMENT (OUTPATIENT)
Dept: NON INVASIVE DIAGNOSTICS | Facility: HOSPITAL | Age: 29
End: 2024-09-20
Attending: RADIOLOGY
Payer: COMMERCIAL

## 2024-09-20 ENCOUNTER — HOSPITAL ENCOUNTER (EMERGENCY)
Facility: HOSPITAL | Age: 29
Discharge: HOME | End: 2024-09-20
Payer: COMMERCIAL

## 2024-09-20 VITALS
TEMPERATURE: 99.1 F | OXYGEN SATURATION: 98 % | HEART RATE: 92 BPM | RESPIRATION RATE: 18 BRPM | SYSTOLIC BLOOD PRESSURE: 145 MMHG | WEIGHT: 214.07 LBS | HEIGHT: 72 IN | DIASTOLIC BLOOD PRESSURE: 89 MMHG | BODY MASS INDEX: 28.99 KG/M2

## 2024-09-20 DIAGNOSIS — N18.6 ESRD (END STAGE RENAL DISEASE) ON DIALYSIS (HCC): Primary | ICD-10-CM

## 2024-09-20 DIAGNOSIS — Z00.8 ENCOUNTER FOR MEDICAL ASSESSMENT: ICD-10-CM

## 2024-09-20 DIAGNOSIS — Z99.2 ESRD (END STAGE RENAL DISEASE) ON DIALYSIS (HCC): Primary | ICD-10-CM

## 2024-09-20 LAB
ANION GAP SERPL CALCULATED.3IONS-SCNC: 12 MMOL/L (ref 4–13)
BASOPHILS # BLD AUTO: 0.06 THOUSANDS/ΜL (ref 0–0.1)
BASOPHILS NFR BLD AUTO: 1 % (ref 0–1)
BUN SERPL-MCNC: 30 MG/DL (ref 5–25)
CALCIUM SERPL-MCNC: 9.7 MG/DL (ref 8.4–10.2)
CHLORIDE SERPL-SCNC: 91 MMOL/L (ref 96–108)
CO2 SERPL-SCNC: 30 MMOL/L (ref 21–32)
CREAT SERPL-MCNC: 9.86 MG/DL (ref 0.6–1.3)
EOSINOPHIL # BLD AUTO: 0.13 THOUSAND/ΜL (ref 0–0.61)
EOSINOPHIL NFR BLD AUTO: 2 % (ref 0–6)
ERYTHROCYTE [DISTWIDTH] IN BLOOD BY AUTOMATED COUNT: 14.5 % (ref 11.6–15.1)
GFR SERPL CREATININE-BSD FRML MDRD: 6 ML/MIN/1.73SQ M
GLUCOSE SERPL-MCNC: 91 MG/DL (ref 65–140)
HCT VFR BLD AUTO: 33.1 % (ref 36.5–49.3)
HGB BLD-MCNC: 11.2 G/DL (ref 12–17)
IMM GRANULOCYTES # BLD AUTO: 0.03 THOUSAND/UL (ref 0–0.2)
IMM GRANULOCYTES NFR BLD AUTO: 0 % (ref 0–2)
LYMPHOCYTES # BLD AUTO: 1.79 THOUSANDS/ΜL (ref 0.6–4.47)
LYMPHOCYTES NFR BLD AUTO: 25 % (ref 14–44)
MCH RBC QN AUTO: 32.4 PG (ref 26.8–34.3)
MCHC RBC AUTO-ENTMCNC: 33.8 G/DL (ref 31.4–37.4)
MCV RBC AUTO: 96 FL (ref 82–98)
MONOCYTES # BLD AUTO: 0.62 THOUSAND/ΜL (ref 0.17–1.22)
MONOCYTES NFR BLD AUTO: 9 % (ref 4–12)
NEUTROPHILS # BLD AUTO: 4.41 THOUSANDS/ΜL (ref 1.85–7.62)
NEUTS SEG NFR BLD AUTO: 63 % (ref 43–75)
NRBC BLD AUTO-RTO: 0 /100 WBCS
PLATELET # BLD AUTO: 255 THOUSANDS/UL (ref 149–390)
PMV BLD AUTO: 9.8 FL (ref 8.9–12.7)
POTASSIUM SERPL-SCNC: 4 MMOL/L (ref 3.5–5.3)
RBC # BLD AUTO: 3.46 MILLION/UL (ref 3.88–5.62)
SODIUM SERPL-SCNC: 133 MMOL/L (ref 135–147)
WBC # BLD AUTO: 7.04 THOUSAND/UL (ref 4.31–10.16)

## 2024-09-20 PROCEDURE — 85025 COMPLETE CBC W/AUTO DIFF WBC: CPT

## 2024-09-20 PROCEDURE — 99285 EMERGENCY DEPT VISIT HI MDM: CPT

## 2024-09-20 PROCEDURE — 80048 BASIC METABOLIC PNL TOTAL CA: CPT

## 2024-09-20 PROCEDURE — 93005 ELECTROCARDIOGRAM TRACING: CPT

## 2024-09-20 PROCEDURE — 99284 EMERGENCY DEPT VISIT MOD MDM: CPT

## 2024-09-20 PROCEDURE — 36415 COLL VENOUS BLD VENIPUNCTURE: CPT

## 2024-09-20 NOTE — DISCHARGE INSTRUCTIONS
Follow-up with IR in the next week.  You must return immediately to the emergency department for any new or worsening symptoms.

## 2024-09-20 NOTE — ED PROVIDER NOTES
1. ESRD (end stage renal disease) on dialysis ([facility])    2. Encounter for medical assessment      ED Disposition       ED Disposition   Discharge    Condition   Stable    Date/Time   Fri Sep 20, 2024  4:58 PM    Comment   Milton Padilla discharge to home/self care.                   Assessment & Plan       Medical Decision Making  29-year-old male presenting for fistula problem    Discussed with nephrology who recommended catheter placement.  Discussed with IR who is willing to do catheter placement however patient declined while in the ER, would prefer to go home.  Will check electrolytes, if normal plan on discharge in agreement with nephrology plan to send patient home to continue trying at home dialysis.    Blood work largely consistent with baseline.  ECG without acute changes.  Discussed with patient the ability to stay for dialysis with catheter placement or to go home.  He verbalized would like to go home.  Relayed risks of going home if he is unable to dialyze, patient verbalized understanding.  Reports he will return immediately if he is unable to dialyze.  I believe this is a reasonable plan.  At time of discharge patient was hemodynamic stable, alert, attentive, acting normal.  Patient discharged in good condition.    Amount and/or Complexity of Data Reviewed  Labs: ordered. Decision-making details documented in ED Course.                ED Course as of 09/20/24 1704   Fri Sep 20, 2024   1604 Discussed with nephrology on-call.  Concern of progressive inability to dialyze.  Patient reports half a normal dialysis session last night.  Presenting today as IR was unable to get him in sooner.  Nephrology recommends 1-2 options: return home and continue attempting to dialyze or have dialysis catheter placed and be admitted.  Discussed with both IR and patient in the room.  Patient would elect to go home.  Will check blood work to ensure electrolytes ok     1621 CBC and differential(!)  Largely consistent with  baseline   1639 Basic metabolic panel(!)  Consistent with baseline   1639 ECG interpretation by me.  Normal sinus rhythm at 76.  Normal axis.  Normal intervals.  No acute ischemic ST or T wave changes.  When compared to May 2024 no significant change was found.       Medications - No data to display    History of Present Illness       Patient is a 29-year-old male with a past medical history significant for hyperoxaluria type I presenting to the emergency department for evaluation of fistula problem.  He presents it reporting no significant complaints.  He states he was recommended to present to the ER by his nephrologist/IR.  Patient states that he has been having difficulty with high pressure alarms on his at home dialysis for approximately 5 weeks now.  He was evaluated by vascular surgery in the past who has not opted operative repair of his fistula, they do note an anatomic kink in the cephalic vein running proximally. Pt notes his arterial pressures have escalated from 1 60-2 40 with a max of 350.  He notes he does have an appointment with IR however they are unable to get him until Thursday.  He was able to do a full session 2 nights ago and a half session of dialysis last night.  He told his nephrologist this who recommended he present to the ER for further evaluation. Patient states he is otherwise in his normal state of health and feels baseline.    I did speak with patient's nephrologist on the phone.  He relays patient ultimately has 2 options for disposition.  Provided blood values are within normal limits patient is able to go home and continue trying to dialyze through his fistula.  Additionally, he may be admitted for catheter placement and dialysis over the weekend.          Review of Systems   Constitutional:  Negative for chills and fever.   HENT:  Negative for ear pain and sore throat.    Eyes:  Negative for pain and visual disturbance.   Respiratory:  Negative for cough and shortness of breath.     Cardiovascular:  Negative for chest pain and palpitations.   Gastrointestinal:  Negative for abdominal pain and vomiting.   Genitourinary:  Negative for dysuria and hematuria.   Musculoskeletal:  Negative for arthralgias and back pain.   Skin:  Negative for color change and rash.   Neurological:  Negative for seizures and syncope.   All other systems reviewed and are negative.          Objective     ED Triage Vitals [09/20/24 1447]   Temperature Pulse Blood Pressure Respirations SpO2 Patient Position - Orthostatic VS   99.1 °F (37.3 °C) 92 145/89 18 98 % Sitting      Temp Source Heart Rate Source BP Location FiO2 (%) Pain Score    Oral Monitor Left arm -- --        Physical Exam  Vitals and nursing note reviewed.   Constitutional:       General: He is not in acute distress.     Appearance: Normal appearance. He is not ill-appearing, toxic-appearing or diaphoretic.      Comments: Patient sitting in bed in no acute distress   HENT:      Head: Normocephalic and atraumatic.   Eyes:      General: No scleral icterus.        Right eye: No discharge.         Left eye: No discharge.      Extraocular Movements: Extraocular movements intact.      Conjunctiva/sclera: Conjunctivae normal.   Cardiovascular:      Rate and Rhythm: Normal rate.      Pulses: Normal pulses.      Heart sounds: Normal heart sounds. No murmur heard.     No friction rub. No gallop.   Pulmonary:      Effort: Pulmonary effort is normal. No respiratory distress.      Breath sounds: Normal breath sounds. No stridor. No wheezing, rhonchi or rales.      Comments: No rales, wheezing, rhonchi  Abdominal:      General: Abdomen is flat. Bowel sounds are normal. There is no distension.      Palpations: Abdomen is soft.      Tenderness: There is no abdominal tenderness. There is no guarding or rebound.      Comments: No abdominal pain to palpation   Musculoskeletal:         General: No swelling. Normal range of motion.      Cervical back: Normal range of motion. No  rigidity.      Right lower leg: No edema.      Left lower leg: No edema.      Comments: Left-sided fistula in place, palpable thrill   Skin:     General: Skin is warm and dry.      Capillary Refill: Capillary refill takes less than 2 seconds.      Coloration: Skin is not jaundiced.      Findings: No bruising or lesion.      Comments: Less than 2-second capillary refill on bilateral hands   Neurological:      General: No focal deficit present.      Mental Status: He is alert and oriented to person, place, and time. Mental status is at baseline.   Psychiatric:         Mood and Affect: Mood normal.         Behavior: Behavior normal.         Thought Content: Thought content normal.         Judgment: Judgment normal.         Labs Reviewed   CBC AND DIFFERENTIAL - Abnormal       Result Value    WBC 7.04      RBC 3.46 (*)     Hemoglobin 11.2 (*)     Hematocrit 33.1 (*)     MCV 96      MCH 32.4      MCHC 33.8      RDW 14.5      MPV 9.8      Platelets 255      nRBC 0      Segmented % 63      Immature Grans % 0      Lymphocytes % 25      Monocytes % 9      Eosinophils Relative 2      Basophils Relative 1      Absolute Neutrophils 4.41      Absolute Immature Grans 0.03      Absolute Lymphocytes 1.79      Absolute Monocytes 0.62      Eosinophils Absolute 0.13      Basophils Absolute 0.06     BASIC METABOLIC PANEL - Abnormal    Sodium 133 (*)     Potassium 4.0      Chloride 91 (*)     CO2 30      ANION GAP 12      BUN 30 (*)     Creatinine 9.86 (*)     Glucose 91      Calcium 9.7      eGFR 6      Narrative:     National Kidney Disease Foundation guidelines for Chronic Kidney Disease (CKD):     Stage 1 with normal or high GFR (GFR > 90 mL/min/1.73 square meters)    Stage 2 Mild CKD (GFR = 60-89 mL/min/1.73 square meters)    Stage 3A Moderate CKD (GFR = 45-59 mL/min/1.73 square meters)    Stage 3B Moderate CKD (GFR = 30-44 mL/min/1.73 square meters)    Stage 4 Severe CKD (GFR = 15-29 mL/min/1.73 square meters)    Stage 5 End Stage  CKD (GFR <15 mL/min/1.73 square meters)  Note: GFR calculation is accurate only with a steady state creatinine     No orders to display       Procedures    ED Medication and Procedure Management   Prior to Admission Medications   Prescriptions Last Dose Informant Patient Reported? Taking?   Omeprazole 20 MG TBEC   Yes No   Vitamin D, Cholecalciferol, 50 MCG ( UT) CAPS  Self No No   Sig: Take 1 capsule by mouth in the morning   calcitriol (ROCALTROL) 0.25 mcg capsule  Self No No   Sig: Take 1 capsule (0.25 mcg total) by mouth 3 (three) times a week   calcium acetate (PHOSLO) capsule  Self No No   Sig: Take 3 capsules (2,001 mg total) by mouth 3 (three) times a day with meals   carvedilol (COREG) 6.25 mg tablet  Self No No   Sig: Take 1 tablet (6.25 mg total) by mouth 2 (two) times a day with meals   Patient taking differently: Take 12.5 mg by mouth in the morning   gabapentin (NEURONTIN) 100 mg capsule  Self Yes No   Si mg 3 (three) times a day   heparin 5000 units in sodium chloride 0.9% 1000 mL irrigation  Self Yes No   Sig: Inject 3,000 Units into a catheter in a vein once With Dialysis   ondansetron (ZOFRAN) 4 mg tablet   No No   Sig: Take 1 tablet (4 mg total) by mouth every 8 (eight) hours as needed for nausea or vomiting   traZODone (DESYREL) 50 mg tablet  Self No No   Sig: TAKE 1 TABLET BY MOUTH EVERYDAY AT BEDTIME   triamcinolone (KENALOG) 0.1 % cream   Yes No   Sig: Apply 1 Application topically      Facility-Administered Medications: None     Patient's Medications   Discharge Prescriptions    No medications on file     No discharge procedures on file.     Mary Kay Disla,   [phone]

## 2024-09-24 ENCOUNTER — HOSPITAL ENCOUNTER (OUTPATIENT)
Dept: NON INVASIVE DIAGNOSTICS | Facility: HOSPITAL | Age: 29
Discharge: HOME | End: 2024-09-24
Attending: STUDENT IN AN ORGANIZED HEALTH CARE EDUCATION/TRAINING PROGRAM | Admitting: STUDENT IN AN ORGANIZED HEALTH CARE EDUCATION/TRAINING PROGRAM
Payer: COMMERCIAL

## 2024-09-24 VITALS
HEART RATE: 97 BPM | DIASTOLIC BLOOD PRESSURE: 86 MMHG | TEMPERATURE: 98.2 F | SYSTOLIC BLOOD PRESSURE: 129 MMHG | RESPIRATION RATE: 17 BRPM | OXYGEN SATURATION: 99 %

## 2024-09-24 DIAGNOSIS — N18.6 END STAGE RENAL DISEASE ON DIALYSIS (HCC): ICD-10-CM

## 2024-09-24 DIAGNOSIS — Z99.2 END STAGE RENAL DISEASE ON DIALYSIS (HCC): ICD-10-CM

## 2024-09-24 LAB
ATRIAL RATE: 76 BPM
P AXIS: 48 DEGREES
PR INTERVAL: 166 MS
QRS AXIS: 12 DEGREES
QRSD INTERVAL: 100 MS
QT INTERVAL: 392 MS
QTC INTERVAL: 441 MS
T WAVE AXIS: 21 DEGREES
VENTRICULAR RATE: 76 BPM

## 2024-09-24 PROCEDURE — 36902 INTRO CATH DIALYSIS CIRCUIT: CPT

## 2024-09-24 PROCEDURE — C1894 INTRO/SHEATH, NON-LASER: HCPCS

## 2024-09-24 PROCEDURE — C1725 CATH, TRANSLUMIN NON-LASER: HCPCS

## 2024-09-24 PROCEDURE — 99153 MOD SED SAME PHYS/QHP EA: CPT

## 2024-09-24 PROCEDURE — 36902 INTRO CATH DIALYSIS CIRCUIT: CPT | Performed by: STUDENT IN AN ORGANIZED HEALTH CARE EDUCATION/TRAINING PROGRAM

## 2024-09-24 PROCEDURE — 99152 MOD SED SAME PHYS/QHP 5/>YRS: CPT | Performed by: STUDENT IN AN ORGANIZED HEALTH CARE EDUCATION/TRAINING PROGRAM

## 2024-09-24 PROCEDURE — 76937 US GUIDE VASCULAR ACCESS: CPT | Performed by: STUDENT IN AN ORGANIZED HEALTH CARE EDUCATION/TRAINING PROGRAM

## 2024-09-24 PROCEDURE — 99152 MOD SED SAME PHYS/QHP 5/>YRS: CPT

## 2024-09-24 PROCEDURE — C1769 GUIDE WIRE: HCPCS

## 2024-09-24 PROCEDURE — 93010 ELECTROCARDIOGRAM REPORT: CPT | Performed by: STUDENT IN AN ORGANIZED HEALTH CARE EDUCATION/TRAINING PROGRAM

## 2024-09-24 RX ORDER — FENTANYL CITRATE 50 UG/ML
INJECTION, SOLUTION INTRAMUSCULAR; INTRAVENOUS AS NEEDED
Status: COMPLETED | OUTPATIENT
Start: 2024-09-24 | End: 2024-09-24

## 2024-09-24 RX ORDER — MIDAZOLAM HYDROCHLORIDE 2 MG/2ML
INJECTION, SOLUTION INTRAMUSCULAR; INTRAVENOUS AS NEEDED
Status: COMPLETED | OUTPATIENT
Start: 2024-09-24 | End: 2024-09-24

## 2024-09-24 RX ORDER — LIDOCAINE WITH 8.4% SOD BICARB 0.9%(10ML)
SYRINGE (ML) INJECTION AS NEEDED
Status: COMPLETED | OUTPATIENT
Start: 2024-09-24 | End: 2024-09-24

## 2024-09-24 RX ADMIN — MIDAZOLAM HYDROCHLORIDE 1 MG: 1 INJECTION, SOLUTION INTRAMUSCULAR; INTRAVENOUS at 13:49

## 2024-09-24 RX ADMIN — Medication 5 ML: at 13:46

## 2024-09-24 RX ADMIN — IOHEXOL 40 ML: 350 INJECTION, SOLUTION INTRAVENOUS at 14:16

## 2024-09-24 RX ADMIN — FENTANYL CITRATE 25 MCG: 50 INJECTION, SOLUTION INTRAMUSCULAR; INTRAVENOUS at 13:49

## 2024-09-24 NOTE — H&P
Interventional Radiology Preprocedure Note    History/Indication for procedure:   Milton Padilla is a 29 y.o. male with a PMH of ESRD on HD dialyzed through a L BCF who presents for fistulagraphy for the indication of HAP.    The last intervention was June 2024.    Relevant past medical history:    Past Medical History:   Diagnosis Date    Anxiety     Chronic kidney disease     GERD (gastroesophageal reflux disease)     Hypertension     Kidney stone     Liver disorder     Nephrolithiasis     Primary hyperoxaluria type 1 (HCC)      Patient Active Problem List   Diagnosis    Sebaceous cyst    Anxiety, generalized    Ureteric stone ----> hydroureteronephrosis moderate    Insomnia    Hydronephrosis with urinary obstruction due to ureteral calculus    Primary hyperoxaluria type 1 (HCC)    BMI 30.0-30.9,adult    Chronic kidney disease-mineral and bone disorder    Anemia    Gastroesophageal reflux disease without esophagitis    End stage renal disease on dialysis (HCC)    Neuropathy    Microangiopathy (HCC)       /78   Pulse 78   Temp 97.5 °F (36.4 °C) (Temporal)   Resp 18   SpO2 100%     Medications:    Inpatient Medications:     Scheduled Medications:      Infusions:  No current facility-administered medications for this encounter.      PRN:      Outpatient Medications:  Current Outpatient Medications on File Prior to Encounter   Medication Sig Dispense Refill    calcitriol (ROCALTROL) 0.25 mcg capsule Take 1 capsule (0.25 mcg total) by mouth 3 (three) times a week 12 capsule 0    calcium acetate (PHOSLO) capsule Take 3 capsules (2,001 mg total) by mouth 3 (three) times a day with meals 270 capsule 6    carvedilol (COREG) 6.25 mg tablet Take 1 tablet (6.25 mg total) by mouth 2 (two) times a day with meals (Patient taking differently: Take 12.5 mg by mouth in the morning) 180 tablet 3    gabapentin (NEURONTIN) 100 mg capsule 100 mg 3 (three) times a day      Omeprazole 20 MG TBEC       ondansetron (ZOFRAN) 4  mg tablet Take 1 tablet (4 mg total) by mouth every 8 (eight) hours as needed for nausea or vomiting 18 tablet 3    traZODone (DESYREL) 50 mg tablet TAKE 1 TABLET BY MOUTH EVERYDAY AT BEDTIME 90 tablet 3    triamcinolone (KENALOG) 0.1 % cream Apply 1 Application topically      Vitamin D, Cholecalciferol, 50 MCG (2000 UT) CAPS Take 1 capsule by mouth in the morning      heparin 5000 units in sodium chloride 0.9% 1000 mL irrigation Inject 3,000 Units into a catheter in a vein once With Dialysis       No current facility-administered medications on file prior to encounter.       No Known Allergies    Anticoagulants: none    ASA classification: ASA 3 - Patient with moderate systemic disease with functional limitations    Airway Assessment: II (hard and soft palate, upper portion of tonsils anduvula visible)    Relevant family history: None    Relevant review of systems: None    Prior sedation/anesthesia: yes    Can the patient lie flat? Yes     NPO Status: yes    Labs:   CBC with diff:   Lab Results   Component Value Date    WBC 7.04 09/20/2024    HGB 11.2 (L) 09/20/2024    HCT 33.1 (L) 09/20/2024    MCV 96 09/20/2024     09/20/2024    RBC 3.46 (L) 09/20/2024    MCH 32.4 09/20/2024    MCHC 33.8 09/20/2024    RDW 14.5 09/20/2024    MPV 9.8 09/20/2024    NRBC 0 09/20/2024     BMP/CMP:  Lab Results   Component Value Date    K 4.0 09/20/2024    K 4.5 08/15/2024    CL 91 (L) 09/20/2024    CL 88 (L) 08/15/2024    CO2 30 09/20/2024    CO2 29 08/15/2024    BUN 30 (H) 09/20/2024    BUN 22 (H) 08/15/2024    CREATININE 9.86 (H) 09/20/2024    CREATININE 7.92 (H) 08/15/2024    CALCIUM 9.7 09/20/2024    CALCIUM 10.5 (H) 08/15/2024    AST 15 09/04/2024    AST 25 08/15/2024    ALT 21 09/04/2024    ALT 26 08/15/2024    ALKPHOS 46 09/04/2024    ALKPHOS 45 08/15/2024    EGFR 6 09/20/2024    EGFR 5 (L) 12/18/2023   ,     Coags:   Lab Results   Component Value Date    PT 10.5 08/15/2024    INR 0.94 09/04/2024    INR 0.9 08/15/2024    ,          Relevant imaging studies:   Reviewed.    Directed physical examination:  CONSTITUTIONAL: The patient appeared well in no acute distress.   NEUROLOGICAL: alert, awake, answering questions appropriately.  PSYCHIATRIC: Affect normal.  PULMONARY: No respiratory distress.  CARDIAC: normal sinus rhythm on monitor, without tachycardia.  GASTROINTESTINAL: abdomen was soft, round, nontender.  EXTREMITIES: No cyanosis.  L BCF has harshly pulsatile thrill.    Assessment/Plan:   For diagnostic fistulagram and possible treatment.    Sedation/Anesthesia plan:  Moderate sedation will be used as needed for procedure.    Consent with alternatives to the procedure, risks and benefits have been explained and discussed with the patient/patient's family: yes.    During the informed consent process, the following was discussed, and the patient was educated concerning paclitaxel coated balloons and stents, which may be appropriate for the patient's up-coming fistulagram procedure: Analysis of randomized trials suggest a possible increased death rate after two years in patients treated with paclitaxel-coated balloons and paclitaxel-eluting stents compared to patients treated with control devices (non-coated balloons or bare metal stents) specifically in patients with lower extremity claudication. This has not been corroborated for dialysis access circuits.  The specific cause for this observation is yet to be determined. Currently, the FDA believes that the benefits continue to outweigh the risks for approved paclitaxel-coated balloons and paclitaxel-eluting stents when used in accordance with their indications for use. The patient gave informed consent for the use of these devices if appropriately indicated for treatment.

## 2024-09-24 NOTE — BRIEF OP NOTE (RAD/CATH)
INTERVENTIONAL RADIOLOGY PROCEDURE NOTE    Date: 9/24/2024    Procedure:   Procedure Summary       Date: 09/24/24 Room / Location: [facility]    Anesthesia Start:  Anesthesia Stop:     Procedure: IR AV FISTULAGRAM/GRAFTOGRAM Diagnosis:       End stage renal disease on dialysis (HCC)      (HAP)    Scheduled Providers:  Responsible Provider:     Anesthesia Type: Not recorded ASA Status: Not recorded            Preoperative diagnosis:   1. End stage renal disease on dialysis (HCC)         Postoperative diagnosis: Same.    Surgeon: Ambrosio Dove MD     Assistant: None. No qualified resident was available.    Blood loss: 5 ml    Specimens: none.     Findings:   Fistulagram showed mild AA stenosis and mild outflow cephalic vein redundancy, treated with 6 mm POBA and 9 mm HPB POBA.    Complications: None immediate.    Anesthesia: conscious sedation

## 2024-09-30 ENCOUNTER — NURSE TRIAGE (OUTPATIENT)
Age: 29
End: 2024-09-30

## 2024-09-30 DIAGNOSIS — N18.6 END STAGE RENAL DISEASE ON DIALYSIS (HCC): Primary | ICD-10-CM

## 2024-09-30 DIAGNOSIS — Z99.2 END STAGE RENAL DISEASE ON DIALYSIS (HCC): Primary | ICD-10-CM

## 2024-09-30 NOTE — TELEPHONE ENCOUNTER
Recommend HD duplex to assess LUE AVF site and follow up with Dr. Worthington to review. If he develops worsening motor or sensory deficits, tissue loss, or increased pain, he should go to the ER for evaluation.

## 2024-09-30 NOTE — TELEPHONE ENCOUNTER
Duplex scheduled 10/3/24 in [place]. OV 11/18/24 in [facility]. Advised pt to go to ER for worsening symptoms; pt verbalized understanding. Pt agreeable to times, dates, and locations of all apts. Placed on wait list

## 2024-09-30 NOTE — TELEPHONE ENCOUNTER
"Pt was last seen on 7/15/2024 by Dr. Worthington. He is s/p L AVF on 12/14/2023. Pt was seen in the ED on 9/20 through recommendation from his nephrologist/IR due to pt getting \"high pressure\" alarms on his home dialysis unit for about 5 weeks now. Pt then had an IR fistulagram done on 9/24.    Received call from pt stating he is still getting the high pressure alarms on his home dialysis unit and also still getting  L hand numbness at times when he wakes up in the am. He stated the hand numbness lasts about 10-15 min and has been going on for 5-6 weeks now. He stated after 10-15 min, the feeling goes away and is fine throughout the day. He states he does not see any difference after having the fistulagram. He stated his L hand does look a little more red than his R hand in the am but denies any temp changes nor pain. Denies chest pain, sob, fever, chills. Denies any swelling. States he can make a fist with his hand.     He stated he did call his dialysis RN who follows up with him and was told to contact vascular for further instruction.     Advised pt will send a message to the provider to review and advise.      Reason for Disposition   Numbness (i.e., loss of sensation) in hand or fingers    Answer Assessment - Initial Assessment Questions  1. ONSET: \"When did the pain start?\"      5-6 weeks ago, pt stated his arterial pressures were running higher. Pt is s/p fistulagram on 9/24. Pt calling again stating he is still having the high arterial pressures. Pt stated he called his dialysis RN who informed pt to call vascular   2. LOCATION: \"Where is the pain located?\"      L hand numbness that happens sometimes when he wakes up.   3. PAIN: \"How bad is the pain?\" (Scale 1-10; or mild, moderate, severe)    - MILD (1-3): doesn't interfere with normal activities    - MODERATE (4-7): interferes with normal activities (e.g., work or school) or awakens from sleep    - SEVERE (8-10): excruciating pain, unable to do any normal " "activities, unable to hold a cup of water      Pt states sometimes when he wakes up in the am, his L hand is numb. States the hand looks a little red as well. He states the numbness and redness lasts around 10-15 min. He states he makes an effort not to sleep on that arm. Denies any issues wit his hand during the day   4. WORK OR EXERCISE: \"Has there been any recent work or exercise that involved this part of the body?\"      Denies   5. CAUSE: \"What do you think is causing the arm pain?\"      unsure  6. OTHER SYMPTOMS: \"Do you have any other symptoms?\" (e.g., neck pain, swelling, rash, fever, numbness, weakness)      Denies chest pain, sob, fever, chills. Denies any swelling. States he can make a fist with his hand.    Protocols used: Arm Pain-ADULT-[place]    "

## 2024-09-30 NOTE — TELEPHONE ENCOUNTER
Regarding: L AVF Dialysis issues  ----- Message from Hailey YEPEZ sent at 9/30/2024  1:13 PM EDT -----  Patient w/ L AVF done in December of last year with Dr. Worthington, now reporting trouble making it through at-home dialysis treatments 5 times a week due to his arterial pressure dropping too low. He had a fistulagram done 9/24/24 but he is still having the issues and now has been experiencing numbness in his L hand when he wakes up in the morning.

## 2024-10-01 ENCOUNTER — APPOINTMENT (OUTPATIENT)
Age: 29
End: 2024-10-01
Payer: COMMERCIAL

## 2024-10-01 DIAGNOSIS — E72.53: ICD-10-CM

## 2024-10-01 DIAGNOSIS — Z01.818 PRE-TRANSPLANT EVALUATION FOR CHRONIC LIVER DISEASE: ICD-10-CM

## 2024-10-01 LAB
ALBUMIN SERPL BCG-MCNC: 4.5 G/DL (ref 3.5–5)
ALP SERPL-CCNC: 45 U/L (ref 34–104)
ALT SERPL W P-5'-P-CCNC: 17 U/L (ref 7–52)
ANION GAP SERPL CALCULATED.3IONS-SCNC: 13 MMOL/L (ref 4–13)
AST SERPL W P-5'-P-CCNC: 15 U/L (ref 13–39)
BILIRUB DIRECT SERPL-MCNC: 0.08 MG/DL (ref 0–0.2)
BILIRUB SERPL-MCNC: 0.42 MG/DL (ref 0.2–1)
BUN SERPL-MCNC: 23 MG/DL (ref 5–25)
CALCIUM SERPL-MCNC: 9.4 MG/DL (ref 8.4–10.2)
CHLORIDE SERPL-SCNC: 90 MMOL/L (ref 96–108)
CO2 SERPL-SCNC: 31 MMOL/L (ref 21–32)
CREAT SERPL-MCNC: 8.23 MG/DL (ref 0.6–1.3)
ERYTHROCYTE [DISTWIDTH] IN BLOOD BY AUTOMATED COUNT: 14.5 % (ref 11.6–15.1)
GFR SERPL CREATININE-BSD FRML MDRD: 7 ML/MIN/1.73SQ M
GLUCOSE P FAST SERPL-MCNC: 79 MG/DL (ref 65–99)
HCT VFR BLD AUTO: 35.5 % (ref 36.5–49.3)
HGB BLD-MCNC: 12 G/DL (ref 12–17)
INR PPP: 0.88 (ref 0.85–1.19)
MCH RBC QN AUTO: 32.4 PG (ref 26.8–34.3)
MCHC RBC AUTO-ENTMCNC: 33.8 G/DL (ref 31.4–37.4)
MCV RBC AUTO: 96 FL (ref 82–98)
PLATELET # BLD AUTO: 216 THOUSANDS/UL (ref 149–390)
PMV BLD AUTO: 11 FL (ref 8.9–12.7)
POTASSIUM SERPL-SCNC: 5.1 MMOL/L (ref 3.5–5.3)
PROT SERPL-MCNC: 7.1 G/DL (ref 6.4–8.4)
PROTHROMBIN TIME: 12.3 SECONDS (ref 12.3–15)
RBC # BLD AUTO: 3.7 MILLION/UL (ref 3.88–5.62)
SODIUM SERPL-SCNC: 134 MMOL/L (ref 135–147)
WBC # BLD AUTO: 4.56 THOUSAND/UL (ref 4.31–10.16)

## 2024-10-01 PROCEDURE — 80048 BASIC METABOLIC PNL TOTAL CA: CPT

## 2024-10-01 PROCEDURE — 85610 PROTHROMBIN TIME: CPT

## 2024-10-01 PROCEDURE — 85027 COMPLETE CBC AUTOMATED: CPT

## 2024-10-01 PROCEDURE — 36415 COLL VENOUS BLD VENIPUNCTURE: CPT

## 2024-10-01 PROCEDURE — 80076 HEPATIC FUNCTION PANEL: CPT

## 2024-10-03 ENCOUNTER — HOSPITAL ENCOUNTER (OUTPATIENT)
Dept: NON INVASIVE DIAGNOSTICS | Facility: HOSPITAL | Age: 29
Discharge: HOME | End: 2024-10-03
Payer: COMMERCIAL

## 2024-10-03 DIAGNOSIS — Z99.2 END STAGE RENAL DISEASE ON DIALYSIS (HCC): ICD-10-CM

## 2024-10-03 DIAGNOSIS — N18.6 END STAGE RENAL DISEASE ON DIALYSIS (HCC): ICD-10-CM

## 2024-10-03 DIAGNOSIS — R20.0 NUMBNESS AND TINGLING OF RIGHT HAND: ICD-10-CM

## 2024-10-03 DIAGNOSIS — R20.2 NUMBNESS AND TINGLING OF RIGHT HAND: ICD-10-CM

## 2024-10-03 PROCEDURE — 93990 DOPPLER FLOW TESTING: CPT | Performed by: SURGERY

## 2024-10-03 PROCEDURE — 93931 UPPER EXTREMITY STUDY: CPT | Performed by: SURGERY

## 2024-10-03 PROCEDURE — 93990 DOPPLER FLOW TESTING: CPT

## 2024-10-09 ENCOUNTER — PREP FOR PROCEDURE (OUTPATIENT)
Dept: INTERVENTIONAL RADIOLOGY/VASCULAR | Facility: CLINIC | Age: 29
End: 2024-10-09

## 2024-10-09 DIAGNOSIS — Z99.2 END STAGE RENAL DISEASE ON DIALYSIS (HCC): Primary | ICD-10-CM

## 2024-10-09 DIAGNOSIS — N18.6 END STAGE RENAL DISEASE ON DIALYSIS (HCC): Primary | ICD-10-CM

## 2024-10-09 DIAGNOSIS — T82.898A ARTERIOVENOUS FISTULA OCCLUSION, INITIAL ENCOUNTER (HCC): Primary | ICD-10-CM

## 2024-10-14 ENCOUNTER — HOSPITAL ENCOUNTER (OUTPATIENT)
Dept: NON INVASIVE DIAGNOSTICS | Facility: HOSPITAL | Age: 29
Discharge: HOME | End: 2024-10-14
Attending: STUDENT IN AN ORGANIZED HEALTH CARE EDUCATION/TRAINING PROGRAM | Admitting: STUDENT IN AN ORGANIZED HEALTH CARE EDUCATION/TRAINING PROGRAM
Payer: COMMERCIAL

## 2024-10-14 VITALS
SYSTOLIC BLOOD PRESSURE: 136 MMHG | WEIGHT: 221.12 LBS | BODY MASS INDEX: 29.95 KG/M2 | RESPIRATION RATE: 21 BRPM | TEMPERATURE: 98.6 F | HEART RATE: 69 BPM | OXYGEN SATURATION: 98 % | DIASTOLIC BLOOD PRESSURE: 84 MMHG | HEIGHT: 72 IN

## 2024-10-14 DIAGNOSIS — N18.6 END STAGE RENAL DISEASE ON DIALYSIS (HCC): ICD-10-CM

## 2024-10-14 DIAGNOSIS — Z99.2 END STAGE RENAL DISEASE ON DIALYSIS (HCC): ICD-10-CM

## 2024-10-14 PROCEDURE — C1894 INTRO/SHEATH, NON-LASER: HCPCS

## 2024-10-14 PROCEDURE — C1769 GUIDE WIRE: HCPCS

## 2024-10-14 PROCEDURE — 76937 US GUIDE VASCULAR ACCESS: CPT | Performed by: STUDENT IN AN ORGANIZED HEALTH CARE EDUCATION/TRAINING PROGRAM

## 2024-10-14 PROCEDURE — C1725 CATH, TRANSLUMIN NON-LASER: HCPCS

## 2024-10-14 PROCEDURE — 99152 MOD SED SAME PHYS/QHP 5/>YRS: CPT

## 2024-10-14 PROCEDURE — 99152 MOD SED SAME PHYS/QHP 5/>YRS: CPT | Performed by: STUDENT IN AN ORGANIZED HEALTH CARE EDUCATION/TRAINING PROGRAM

## 2024-10-14 PROCEDURE — 36902 INTRO CATH DIALYSIS CIRCUIT: CPT | Performed by: STUDENT IN AN ORGANIZED HEALTH CARE EDUCATION/TRAINING PROGRAM

## 2024-10-14 PROCEDURE — 36902 INTRO CATH DIALYSIS CIRCUIT: CPT

## 2024-10-14 RX ORDER — LIDOCAINE WITH 8.4% SOD BICARB 0.9%(10ML)
SYRINGE (ML) INJECTION AS NEEDED
Status: COMPLETED | OUTPATIENT
Start: 2024-10-14 | End: 2024-10-14

## 2024-10-14 RX ORDER — FENTANYL CITRATE 50 UG/ML
INJECTION, SOLUTION INTRAMUSCULAR; INTRAVENOUS AS NEEDED
Status: COMPLETED | OUTPATIENT
Start: 2024-10-14 | End: 2024-10-14

## 2024-10-14 RX ORDER — MIDAZOLAM HYDROCHLORIDE 2 MG/2ML
INJECTION, SOLUTION INTRAMUSCULAR; INTRAVENOUS AS NEEDED
Status: COMPLETED | OUTPATIENT
Start: 2024-10-14 | End: 2024-10-14

## 2024-10-14 RX ADMIN — IOHEXOL 35 ML: 350 INJECTION, SOLUTION INTRAVENOUS at 13:04

## 2024-10-14 RX ADMIN — FENTANYL CITRATE 50 MCG: 50 INJECTION, SOLUTION INTRAMUSCULAR; INTRAVENOUS at 12:44

## 2024-10-14 RX ADMIN — MIDAZOLAM HYDROCHLORIDE 1 MG: 1 INJECTION, SOLUTION INTRAMUSCULAR; INTRAVENOUS at 12:44

## 2024-10-14 RX ADMIN — Medication 5 ML: at 12:43

## 2024-10-14 NOTE — H&P
Interventional Radiology Preprocedure Note    History/Indication for procedure:   Milton Padilla is a 29 y.o. male with a PMH of ESRD on HD dialyzed through a [facility] who presents for fistulagraphy for the indication of difficult cannulation.    The last intervention was 3 weeks ago.    Relevant past medical history:    Past Medical History:   Diagnosis Date    Anxiety     Chronic kidney disease     GERD (gastroesophageal reflux disease)     Hypertension     Kidney stone     Liver disorder     Nephrolithiasis     Primary hyperoxaluria type 1 ([facility])      Patient Active Problem List   Diagnosis    Sebaceous cyst    Anxiety, generalized    Ureteric stone ----> hydroureteronephrosis moderate    Insomnia    Hydronephrosis with urinary obstruction due to ureteral calculus    Primary hyperoxaluria type 1 (HCC)    BMI 30.0-30.9,adult    Chronic kidney disease-mineral and bone disorder    Anemia    Gastroesophageal reflux disease without esophagitis    End stage renal disease on dialysis ([facility])    Neuropathy    Microangiopathy ([facility])       /97   Pulse 73   Temp 98.6 °F (37 °C) (Oral)   Resp 19   Ht 6' (1.829 m)   Wt 100 kg (221 lb 1.9 oz)   SpO2 99%   BMI 29.99 kg/m²     Medications:    Inpatient Medications:     Scheduled Medications:      Infusions:  No current facility-administered medications for this encounter.      PRN:      Outpatient Medications:  Current Outpatient Medications on File Prior to Encounter   Medication Sig Dispense Refill    calcium acetate (PHOSLO) capsule Take 3 capsules (2,001 mg total) by mouth 3 (three) times a day with meals 270 capsule 6    carvedilol (COREG) 6.25 mg tablet Take 1 tablet (6.25 mg total) by mouth 2 (two) times a day with meals (Patient taking differently: Take 12.5 mg by mouth in the morning) 180 tablet 3    gabapentin (NEURONTIN) 100 mg capsule 100 mg 3 (three) times a day      ondansetron (ZOFRAN) 4 mg tablet Take 1 tablet (4 mg total) by mouth every 8 (eight) hours  as needed for nausea or vomiting 18 tablet 3    traZODone (DESYREL) 50 mg tablet TAKE 1 TABLET BY MOUTH EVERYDAY AT BEDTIME 90 tablet 3    Vitamin D, Cholecalciferol, 50 MCG (2000 UT) CAPS Take 1 capsule by mouth in the morning      calcitriol (ROCALTROL) 0.25 mcg capsule Take 1 capsule (0.25 mcg total) by mouth 3 (three) times a week 12 capsule 0    heparin 5000 units in sodium chloride 0.9% 1000 mL irrigation Inject 3,000 Units into a catheter in a vein once With Dialysis      Omeprazole 20 MG TBEC       triamcinolone (KENALOG) 0.1 % cream Apply 1 Application topically       No current facility-administered medications on file prior to encounter.       No Known Allergies    Anticoagulants: none    ASA classification: ASA 3 - Patient with moderate systemic disease with functional limitations    Airway Assessment: II (hard and soft palate, upper portion of tonsils anduvula visible)    Relevant family history: None    Relevant review of systems: None    Prior sedation/anesthesia: yes    Can the patient lie flat? Yes     NPO Status: yes    Labs:   CBC with diff:   Lab Results   Component Value Date    WBC 4.56 10/01/2024    HGB 12.0 10/01/2024    HCT 35.5 (L) 10/01/2024    MCV 96 10/01/2024     10/01/2024    RBC 3.70 (L) 10/01/2024    MCH 32.4 10/01/2024    MCHC 33.8 10/01/2024    RDW 14.5 10/01/2024    MPV 11.0 10/01/2024    NRBC 0 09/20/2024     BMP/CMP:  Lab Results   Component Value Date    K 5.1 10/01/2024    K 4.5 08/15/2024    CL 90 (L) 10/01/2024    CL 88 (L) 08/15/2024    CO2 31 10/01/2024    CO2 29 08/15/2024    BUN 23 10/01/2024    BUN 22 (H) 08/15/2024    CREATININE 8.23 (H) 10/01/2024    CREATININE 7.92 (H) 08/15/2024    CALCIUM 9.4 10/01/2024    CALCIUM 10.5 (H) 08/15/2024    AST 15 10/01/2024    AST 25 08/15/2024    ALT 17 10/01/2024    ALT 26 08/15/2024    ALKPHOS 45 10/01/2024    ALKPHOS 45 08/15/2024    EGFR 7 10/01/2024    EGFR 5 (L) 12/18/2023   ,     Coags:   Lab Results   Component Value  Date    PT 10.5 08/15/2024    INR 0.88 10/01/2024    INR 0.9 08/15/2024   ,          Relevant imaging studies:   Reviewed.    Directed physical examination:  CONSTITUTIONAL: The patient appeared well in no acute distress.   NEUROLOGICAL: alert, awake, answering questions appropriately.  PSYCHIATRIC: Affect normal.  PULMONARY: No respiratory distress.  CARDIAC: normal sinus rhythm on monitor, without tachycardia.  GASTROINTESTINAL: abdomen was soft, round, nontender.  EXTREMITIES: No cyanosis.  [facility] has a pulsatile thrill.    Assessment/Plan:   For diagnostic fistulagram and possible treatment.    Sedation/Anesthesia plan:  Moderate sedation will be used as needed for procedure.    Consent with alternatives to the procedure, risks and benefits have been explained and discussed with the patient/patient's family: yes.    During the informed consent process, the following was discussed, and the patient was educated concerning paclitaxel coated balloons and stents, which may be appropriate for the patient's up-coming fistulagram procedure: Analysis of randomized trials suggest a possible increased death rate after two years in patients treated with paclitaxel-coated balloons and paclitaxel-eluting stents compared to patients treated with control devices (non-coated balloons or bare metal stents) specifically in patients with lower extremity claudication. This has not been corroborated for dialysis access circuits.  The specific cause for this observation is yet to be determined. Currently, the FDA believes that the benefits continue to outweigh the risks for approved paclitaxel-coated balloons and paclitaxel-eluting stents when used in accordance with their indications for use. The patient gave informed consent for the use of these devices if appropriately indicated for treatment.

## 2024-10-23 DIAGNOSIS — E87.5 HYPERKALEMIA: Primary | ICD-10-CM

## 2024-10-29 ENCOUNTER — LAB REQUISITION (OUTPATIENT)
Dept: LAB | Facility: HOSPITAL | Age: 29
End: 2024-10-29
Payer: COMMERCIAL

## 2024-10-29 DIAGNOSIS — E87.5 HYPERKALEMIA: ICD-10-CM

## 2024-10-29 LAB — POTASSIUM SERPL-SCNC: 5.1 MMOL/L (ref 3.5–5.3)

## 2024-10-29 PROCEDURE — 84132 ASSAY OF SERUM POTASSIUM: CPT | Performed by: INTERNAL MEDICINE

## 2024-10-31 ENCOUNTER — APPOINTMENT (OUTPATIENT)
Age: 29
End: 2024-10-31
Payer: COMMERCIAL

## 2024-10-31 DIAGNOSIS — E72.53: ICD-10-CM

## 2024-10-31 LAB
ALBUMIN SERPL BCG-MCNC: 4.3 G/DL (ref 3.5–5)
ALP SERPL-CCNC: 39 U/L (ref 34–104)
ALT SERPL W P-5'-P-CCNC: 21 U/L (ref 7–52)
ANION GAP SERPL CALCULATED.3IONS-SCNC: 15 MMOL/L (ref 4–13)
AST SERPL W P-5'-P-CCNC: 19 U/L (ref 13–39)
BILIRUB DIRECT SERPL-MCNC: 0.11 MG/DL (ref 0–0.2)
BILIRUB SERPL-MCNC: 0.39 MG/DL (ref 0.2–1)
BUN SERPL-MCNC: 30 MG/DL (ref 5–25)
CALCIUM SERPL-MCNC: 9.4 MG/DL (ref 8.4–10.2)
CHLORIDE SERPL-SCNC: 90 MMOL/L (ref 96–108)
CO2 SERPL-SCNC: 27 MMOL/L (ref 21–32)
CREAT SERPL-MCNC: 7.29 MG/DL (ref 0.6–1.3)
GFR SERPL CREATININE-BSD FRML MDRD: 9 ML/MIN/1.73SQ M
GLUCOSE P FAST SERPL-MCNC: 85 MG/DL (ref 65–99)
INR PPP: 0.86 (ref 0.85–1.19)
POTASSIUM SERPL-SCNC: 4.6 MMOL/L (ref 3.5–5.3)
PROT SERPL-MCNC: 7.2 G/DL (ref 6.4–8.4)
PROTHROMBIN TIME: 12.1 SECONDS (ref 12.3–15)
SODIUM SERPL-SCNC: 132 MMOL/L (ref 135–147)

## 2024-10-31 PROCEDURE — 80048 BASIC METABOLIC PNL TOTAL CA: CPT

## 2024-10-31 PROCEDURE — 80076 HEPATIC FUNCTION PANEL: CPT

## 2024-10-31 PROCEDURE — 85610 PROTHROMBIN TIME: CPT

## 2024-10-31 PROCEDURE — 36415 COLL VENOUS BLD VENIPUNCTURE: CPT

## 2024-11-04 DIAGNOSIS — N18.6 END STAGE RENAL DISEASE ON DIALYSIS (HCC): Primary | ICD-10-CM

## 2024-11-04 DIAGNOSIS — E87.5 HYPERKALEMIA: ICD-10-CM

## 2024-11-04 DIAGNOSIS — Z99.2 END STAGE RENAL DISEASE ON DIALYSIS (HCC): Primary | ICD-10-CM

## 2024-11-04 RX ORDER — PATIROMER 8.4 G/1
8.4 POWDER, FOR SUSPENSION ORAL DAILY
Qty: 30 EACH | Refills: 3 | Status: SHIPPED | OUTPATIENT
Start: 2024-11-04 | End: 2025-02-02

## 2024-11-26 RX ORDER — FAMOTIDINE 20 MG/1
20 TABLET, FILM COATED ORAL DAILY
COMMUNITY
Start: 2024-11-22

## 2024-11-26 RX ORDER — MYCOPHENOLATE MOFETIL 250 MG/1
500 CAPSULE ORAL 2 TIMES DAILY
COMMUNITY
Start: 2024-11-21

## 2024-11-26 RX ORDER — URSODIOL 300 MG/1
300 CAPSULE ORAL 3 TIMES DAILY
COMMUNITY
Start: 2024-11-21

## 2024-11-26 RX ORDER — NYSTATIN 100000 [USP'U]/ML
5 SUSPENSION ORAL
COMMUNITY
Start: 2024-11-21

## 2024-11-26 RX ORDER — SULFAMETHOXAZOLE AND TRIMETHOPRIM 400; 80 MG/1; MG/1
1 TABLET ORAL DAILY
COMMUNITY
Start: 2024-11-21

## 2024-11-26 RX ORDER — CYCLOBENZAPRINE HCL 10 MG
1 TABLET ORAL EVERY 8 HOURS PRN
COMMUNITY
Start: 2024-11-21

## 2024-11-26 RX ORDER — VALGANCICLOVIR 450 MG/1
900 TABLET, FILM COATED ORAL DAILY
COMMUNITY
Start: 2024-11-21

## 2024-11-26 RX ORDER — TORSEMIDE 20 MG/1
20 TABLET ORAL DAILY
COMMUNITY
Start: 2024-11-23

## 2024-11-26 RX ORDER — QUETIAPINE FUMARATE 25 MG/1
1 TABLET, FILM COATED ORAL
COMMUNITY
Start: 2024-11-21

## 2024-11-26 RX ORDER — TACROLIMUS 5 MG/1
5 CAPSULE ORAL 2 TIMES DAILY
COMMUNITY
Start: 2024-11-21

## 2024-11-26 RX ORDER — PSEUDOEPHEDRINE HCL 30 MG
100 TABLET ORAL
COMMUNITY
Start: 2024-11-21

## 2024-11-26 RX ORDER — OXYCODONE HYDROCHLORIDE 5 MG/1
10 TABLET ORAL
COMMUNITY
Start: 2024-11-21

## 2024-11-26 RX ORDER — ASPIRIN 81 MG/1
81 TABLET ORAL DAILY
COMMUNITY
Start: 2024-11-22

## 2024-11-26 RX ORDER — PREDNISONE 5 MG/1
20 TABLET ORAL DAILY
COMMUNITY
Start: 2024-11-21

## 2024-12-23 ENCOUNTER — APPOINTMENT (OUTPATIENT)
Age: 29
End: 2024-12-23
Payer: COMMERCIAL

## 2024-12-23 DIAGNOSIS — I15.1 HYPERTENSION SECONDARY TO RENAL DISEASE, WITH DELIVERY: ICD-10-CM

## 2024-12-23 DIAGNOSIS — Z94.4 TRANSPLANTED LIVER (HCC): ICD-10-CM

## 2024-12-23 DIAGNOSIS — Z94.0 KIDNEY REPLACED BY TRANSPLANT: ICD-10-CM

## 2024-12-23 DIAGNOSIS — N18.6 END STAGE RENAL DISEASE (HCC): ICD-10-CM

## 2024-12-23 DIAGNOSIS — Z79.899 ENCOUNTER FOR LONG-TERM (CURRENT) USE OF MEDICATIONS: ICD-10-CM

## 2024-12-23 LAB
ALBUMIN SERPL BCG-MCNC: 4.6 G/DL (ref 3.5–5)
ALP SERPL-CCNC: 56 U/L (ref 34–104)
ALT SERPL W P-5'-P-CCNC: 23 U/L (ref 7–52)
ANION GAP SERPL CALCULATED.3IONS-SCNC: 9 MMOL/L (ref 4–13)
AST SERPL W P-5'-P-CCNC: 24 U/L (ref 13–39)
BILIRUB SERPL-MCNC: 0.42 MG/DL (ref 0.2–1)
BUN SERPL-MCNC: 24 MG/DL (ref 5–25)
CALCIUM SERPL-MCNC: 10.1 MG/DL (ref 8.4–10.2)
CHLORIDE SERPL-SCNC: 106 MMOL/L (ref 96–108)
CO2 SERPL-SCNC: 26 MMOL/L (ref 21–32)
CREAT SERPL-MCNC: 1.75 MG/DL (ref 0.6–1.3)
GFR SERPL CREATININE-BSD FRML MDRD: 50 ML/MIN/1.73SQ M
GGT SERPL-CCNC: 32 U/L (ref 9–64)
GLUCOSE SERPL-MCNC: 105 MG/DL (ref 65–140)
INR PPP: 0.92 (ref 0.85–1.19)
MAGNESIUM SERPL-MCNC: 1.5 MG/DL (ref 1.9–2.7)
POTASSIUM SERPL-SCNC: 4.3 MMOL/L (ref 3.5–5.3)
PROT SERPL-MCNC: 6.7 G/DL (ref 6.4–8.4)
PROTHROMBIN TIME: 12.6 SECONDS (ref 12.3–15)
SODIUM SERPL-SCNC: 141 MMOL/L (ref 135–147)
TACROLIMUS BLD-MCNC: 12.6 NG/ML (ref 3–15)

## 2024-12-23 PROCEDURE — 82977 ASSAY OF GGT: CPT

## 2024-12-23 PROCEDURE — 80197 ASSAY OF TACROLIMUS: CPT

## 2024-12-23 PROCEDURE — 83735 ASSAY OF MAGNESIUM: CPT

## 2024-12-23 PROCEDURE — 36415 COLL VENOUS BLD VENIPUNCTURE: CPT

## 2024-12-23 PROCEDURE — 85610 PROTHROMBIN TIME: CPT

## 2024-12-23 PROCEDURE — 80053 COMPREHEN METABOLIC PANEL: CPT

## 2024-12-30 ENCOUNTER — OFFICE VISIT (OUTPATIENT)
Dept: FAMILY MEDICINE CLINIC | Facility: CLINIC | Age: 29
End: 2024-12-30
Payer: COMMERCIAL

## 2024-12-30 ENCOUNTER — APPOINTMENT (OUTPATIENT)
Age: 29
End: 2024-12-30
Payer: COMMERCIAL

## 2024-12-30 VITALS
TEMPERATURE: 98.1 F | RESPIRATION RATE: 14 BRPM | WEIGHT: 202 LBS | SYSTOLIC BLOOD PRESSURE: 120 MMHG | DIASTOLIC BLOOD PRESSURE: 100 MMHG | OXYGEN SATURATION: 100 % | HEIGHT: 72 IN | HEART RATE: 82 BPM | BODY MASS INDEX: 27.36 KG/M2

## 2024-12-30 DIAGNOSIS — M89.9 CHRONIC KIDNEY DISEASE-MINERAL AND BONE DISORDER: ICD-10-CM

## 2024-12-30 DIAGNOSIS — G47.00 INSOMNIA, UNSPECIFIED TYPE: ICD-10-CM

## 2024-12-30 DIAGNOSIS — D84.9 IMMUNOSUPPRESSION (HCC): Primary | ICD-10-CM

## 2024-12-30 DIAGNOSIS — Z94.0 KIDNEY TRANSPLANTED: ICD-10-CM

## 2024-12-30 DIAGNOSIS — N18.9 CHRONIC KIDNEY DISEASE-MINERAL AND BONE DISORDER: ICD-10-CM

## 2024-12-30 DIAGNOSIS — Z94.4 LIVER REPLACED BY TRANSPLANT (HCC): ICD-10-CM

## 2024-12-30 DIAGNOSIS — E83.9 CHRONIC KIDNEY DISEASE-MINERAL AND BONE DISORDER: ICD-10-CM

## 2024-12-30 DIAGNOSIS — Z94.4 LIVER TRANSPLANTED (HCC): ICD-10-CM

## 2024-12-30 PROBLEM — E08.21 DIABETES MELLITUS DUE TO UNDERLYING CONDITION WITH DIABETIC NEPHROPATHY, WITHOUT LONG-TERM CURRENT USE OF INSULIN (HCC): Status: ACTIVE | Noted: 2024-12-30

## 2024-12-30 PROBLEM — E08.21 DIABETES MELLITUS DUE TO UNDERLYING CONDITION WITH DIABETIC NEPHROPATHY, WITHOUT LONG-TERM CURRENT USE OF INSULIN (HCC): Status: RESOLVED | Noted: 2024-12-30 | Resolved: 2024-12-30

## 2024-12-30 LAB
ALBUMIN SERPL BCG-MCNC: 4.4 G/DL (ref 3.5–5)
ALP SERPL-CCNC: 49 U/L (ref 34–104)
ALT SERPL W P-5'-P-CCNC: 23 U/L (ref 7–52)
ANION GAP SERPL CALCULATED.3IONS-SCNC: 10 MMOL/L (ref 4–13)
AST SERPL W P-5'-P-CCNC: 24 U/L (ref 13–39)
BASOPHILS # BLD AUTO: 0.05 THOUSANDS/ΜL (ref 0–0.1)
BASOPHILS NFR BLD AUTO: 1 % (ref 0–1)
BILIRUB SERPL-MCNC: 0.47 MG/DL (ref 0.2–1)
BUN SERPL-MCNC: 22 MG/DL (ref 5–25)
CALCIUM SERPL-MCNC: 9.7 MG/DL (ref 8.4–10.2)
CHLORIDE SERPL-SCNC: 106 MMOL/L (ref 96–108)
CO2 SERPL-SCNC: 22 MMOL/L (ref 21–32)
CREAT SERPL-MCNC: 1.59 MG/DL (ref 0.6–1.3)
EOSINOPHIL # BLD AUTO: 0.05 THOUSAND/ΜL (ref 0–0.61)
EOSINOPHIL NFR BLD AUTO: 1 % (ref 0–6)
ERYTHROCYTE [DISTWIDTH] IN BLOOD BY AUTOMATED COUNT: 15.3 % (ref 11.6–15.1)
GFR SERPL CREATININE-BSD FRML MDRD: 56 ML/MIN/1.73SQ M
GGT SERPL-CCNC: 28 U/L (ref 9–64)
GLUCOSE P FAST SERPL-MCNC: 96 MG/DL (ref 65–99)
HCT VFR BLD AUTO: 36.8 % (ref 36.5–49.3)
HGB BLD-MCNC: 12 G/DL (ref 12–17)
IMM GRANULOCYTES # BLD AUTO: 0.04 THOUSAND/UL (ref 0–0.2)
IMM GRANULOCYTES NFR BLD AUTO: 1 % (ref 0–2)
INR PPP: 0.96 (ref 0.85–1.19)
LYMPHOCYTES # BLD AUTO: 1.81 THOUSANDS/ΜL (ref 0.6–4.47)
LYMPHOCYTES NFR BLD AUTO: 23 % (ref 14–44)
MAGNESIUM SERPL-MCNC: 1.5 MG/DL (ref 1.9–2.7)
MCH RBC QN AUTO: 30.8 PG (ref 26.8–34.3)
MCHC RBC AUTO-ENTMCNC: 32.6 G/DL (ref 31.4–37.4)
MCV RBC AUTO: 94 FL (ref 82–98)
MONOCYTES # BLD AUTO: 0.62 THOUSAND/ΜL (ref 0.17–1.22)
MONOCYTES NFR BLD AUTO: 8 % (ref 4–12)
NEUTROPHILS # BLD AUTO: 5.19 THOUSANDS/ΜL (ref 1.85–7.62)
NEUTS SEG NFR BLD AUTO: 66 % (ref 43–75)
NRBC BLD AUTO-RTO: 0 /100 WBCS
PLATELET # BLD AUTO: 274 THOUSANDS/UL (ref 149–390)
PMV BLD AUTO: 10 FL (ref 8.9–12.7)
POTASSIUM SERPL-SCNC: 3.8 MMOL/L (ref 3.5–5.3)
PROT SERPL-MCNC: 6.6 G/DL (ref 6.4–8.4)
PROTHROMBIN TIME: 13.1 SECONDS (ref 12.3–15)
RBC # BLD AUTO: 3.9 MILLION/UL (ref 3.88–5.62)
SODIUM SERPL-SCNC: 138 MMOL/L (ref 135–147)
TACROLIMUS BLD-MCNC: 11.3 NG/ML (ref 3–15)
WBC # BLD AUTO: 7.76 THOUSAND/UL (ref 4.31–10.16)

## 2024-12-30 PROCEDURE — 80053 COMPREHEN METABOLIC PANEL: CPT

## 2024-12-30 PROCEDURE — 83735 ASSAY OF MAGNESIUM: CPT

## 2024-12-30 PROCEDURE — 85025 COMPLETE CBC W/AUTO DIFF WBC: CPT

## 2024-12-30 PROCEDURE — 82977 ASSAY OF GGT: CPT

## 2024-12-30 PROCEDURE — 80197 ASSAY OF TACROLIMUS: CPT

## 2024-12-30 PROCEDURE — 36415 COLL VENOUS BLD VENIPUNCTURE: CPT

## 2024-12-30 PROCEDURE — 85610 PROTHROMBIN TIME: CPT

## 2024-12-30 PROCEDURE — 99214 OFFICE O/P EST MOD 30 MIN: CPT | Performed by: FAMILY MEDICINE

## 2024-12-30 RX ORDER — TACROLIMUS 1 MG/1
1 CAPSULE ORAL EVERY 12 HOURS SCHEDULED
COMMUNITY

## 2024-12-30 RX ORDER — QUETIAPINE FUMARATE 25 MG/1
25 TABLET, FILM COATED ORAL
Qty: 90 TABLET | Refills: 1 | Status: SHIPPED | OUTPATIENT
Start: 2024-12-30

## 2024-12-30 RX ORDER — MULTIVITAMIN WITH IRON
100 TABLET ORAL DAILY
COMMUNITY
Start: 2024-12-10

## 2024-12-30 NOTE — ASSESSMENT & PLAN NOTE
Refill his Seroquel  Orders:  •  QUEtiapine (SEROquel) 25 mg tablet; Take 1 tablet (25 mg total) by mouth daily at bedtime

## 2024-12-30 NOTE — ASSESSMENT & PLAN NOTE
Continue his current therapy.  Follow-up with his transplant team as scheduled.  I did discuss with him his vaccination needs which he will his transplant team at his next appointment.

## 2024-12-30 NOTE — PROGRESS NOTES
Name: Milton Padilla      : 1995      MRN: [id number]  Encounter Provider: Alessandro Soler DO  Encounter Date: 2024   Encounter department: [facility] [address]      Assessment & Plan  Immunosuppression (HCC)  Continue his current therapy.  Follow-up with his transplant team as scheduled.  I did discuss with him his vaccination needs which he will his transplant team at his next appointment.       Chronic kidney disease-mineral and bone disorder  Lab Results   Component Value Date    EGFR 50 2024    EGFR 44 (L) 2024    EGFR 39 (L) 2024    CREATININE 1.75 (H) 2024    CREATININE 2.03 (H) 2024    CREATININE 2.24 (H) 2024        Improved, status post kidney and liver transplant.  Insomnia, unspecified type  Refill his Seroquel  Orders:  •  QUEtiapine (SEROquel) 25 mg tablet; Take 1 tablet (25 mg total) by mouth daily at bedtime    Liver transplanted (HCC)         Kidney transplanted              History of Present Illness     Patient comes in today for checkup, since his last visit he has received a donor liver and kidney.  He is followed at the [facility] for this.  He is now on CellCept, Prograf and prednisone.  He states he is doing well.  He does follow-up regularly with his transplant team.  He is getting regular blood work done.  He is now taking Seroquel for sleep which was started in the hospital.  His transplant team would like us to take over prescribing this.      Review of Systems   Constitutional:  Negative for chills, fatigue and fever.   HENT:  Negative for congestion, ear pain, hearing loss, postnasal drip, rhinorrhea and sore throat.    Eyes:  Negative for pain and visual disturbance.   Respiratory:  Negative for chest tightness, shortness of breath and wheezing.    Cardiovascular:  Negative for chest pain and leg swelling.   Gastrointestinal:  Negative for abdominal distention, abdominal pain,  constipation, diarrhea and vomiting.   Endocrine: Negative for cold intolerance and heat intolerance.   Genitourinary:  Negative for difficulty urinating, frequency and urgency.   Musculoskeletal:  Negative for arthralgias and gait problem.   Skin:  Negative for color change.   Neurological:  Negative for dizziness, tremors, syncope, numbness and headaches.   Hematological:  Negative for adenopathy.   Psychiatric/Behavioral:  Positive for sleep disturbance. Negative for agitation and confusion. The patient is not nervous/anxious.      Objective     /100   Pulse 82   Temp 98.1 °F (36.7 °C)   Resp 14   Ht 6' (1.829 m)   Wt 91.6 kg (202 lb)   SpO2 100%   BMI 27.40 kg/m²     Physical Exam  Constitutional:       Appearance: He is well-developed.   HENT:      Head: Normocephalic.      Right Ear: External ear normal.      Left Ear: External ear normal.      Nose: Nose normal.   Eyes:      Extraocular Movements: Extraocular movements intact.      Conjunctiva/sclera: Conjunctivae normal.      Pupils: Pupils are equal, round, and reactive to light.   Neck:      Thyroid: No thyromegaly.   Cardiovascular:      Rate and Rhythm: Normal rate and regular rhythm.      Heart sounds: Normal heart sounds.   Pulmonary:      Effort: Pulmonary effort is normal.      Breath sounds: Normal breath sounds.   Abdominal:      General: Bowel sounds are normal.      Palpations: Abdomen is soft.   Musculoskeletal:         General: Normal range of motion.      Cervical back: Normal range of motion.   Skin:     General: Skin is warm and dry.   Neurological:      Mental Status: He is alert and oriented to person, place, and time.   Psychiatric:         Mood and Affect: Mood normal.         Behavior: Behavior normal.         Alessandro Soler DO

## 2024-12-30 NOTE — ASSESSMENT & PLAN NOTE
Lab Results   Component Value Date    EGFR 50 12/23/2024    EGFR 44 (L) 12/18/2024    EGFR 39 (L) 12/09/2024    CREATININE 1.75 (H) 12/23/2024    CREATININE 2.03 (H) 12/18/2024    CREATININE 2.24 (H) 12/09/2024        Improved, status post kidney and liver transplant.

## 2025-01-02 ENCOUNTER — APPOINTMENT (OUTPATIENT)
Age: 30
End: 2025-01-02
Payer: COMMERCIAL

## 2025-01-02 LAB
CREAT 24H UR-MRATE: 2.1 G/24HR (ref 0.8–1.8)
SPECIMEN VOL UR: 4000 ML

## 2025-01-02 PROCEDURE — 82570 ASSAY OF URINE CREATININE: CPT

## 2025-01-08 NOTE — PLAN OF CARE
Problem: Adult Inpatient Plan of Care  Goal: Plan of Care Review  Outcome: Progressing  Flowsheets (Taken 1/8/2025 1002)  Plan of Care Reviewed With: patient  Goal: Patient-Specific Goal (Individualized)  Outcome: Progressing  Goal: Absence of Hospital-Acquired Illness or Injury  Outcome: Progressing  Goal: Optimal Comfort and Wellbeing  Outcome: Progressing  Goal: Readiness for Transition of Care  Outcome: Progressing     Problem: Infection  Goal: Absence of Infection Signs and Symptoms  Outcome: Progressing      Problem: OCCUPATIONAL THERAPY ADULT  Goal: Performs self-care activities at highest level of function for planned discharge setting. See evaluation for individualized goals. Description: Treatment Interventions: ADL retraining, Functional transfer training, UE strengthening/ROM, Endurance training, Cognitive reorientation, Equipment evaluation/education, Patient/family training, Compensatory technique education, Activityengagement, Energy conservation          See flowsheet documentation for full assessment, interventions and recommendations. Note: Limitation: Decreased ADL status, Decreased Safe judgement during ADL, Decreased UE strength, Decreased cognition, Decreased endurance, Decreased self-care trans, Decreased high-level ADLs  Prognosis: Good  Assessment: Pt is a 25 y.o. male seen for OT evaluation s/p admit to [facility] on 11/28/2023 w/ Elevated troponin, sever pain inn b/l feet in toes  Comorbidities affecting pt's functional performance at time of assessment include: intractable vomiting, microangiopathy, neuropathy, ESRD on dialysis, insomnia, anxiety. Personal factors affecting pt at time of IE include:difficulty performing ADLS, difficulty performing IADLS , and decreased initiation and engagement . Prior to admission, pt was living w/ parents and reports increased assistance w/ ADLs and mobility due to pain in b/l feet, however typically independent w/ no AD and works in [employer]  Upon evaluation: Pt requires setup self-feeding, MIN assist UB ADLs, MOD assist LB ADLs, MIN assist supine>sit bed mobility, MOD assist x2 sit<>stand w/ VCs for hand placement and positioning, MOD assist x2 functional mobility w/ RW and cues for sequencing 2* the following deficits impacting occupational performance: increased pain in b/l feet, decreased  strength, decreased UE strength, numbness in b/lfeet at toes and forefoot and reports numbness in b/l hands, fall risk, decreased standing tolerance, impaired postural control, impaired functional reach, decreased coordination. Pt to benefit from continued skilled OT tx while in the hospital to address deficits as defined above and maximize level of functional independence w ADL's and functional mobility. Occupational Performance areas to address include: grooming, bathing/shower, toilet hygiene, dressing, health maintenance, functional mobility, community mobility, clothing management, cleaning, and meal prep. From OT standpoint, recommendation at time of d/c would be level I maximum resources. The patient's raw score on the -PAC Daily Activity Inpatient Short Form is 18. A raw score of less than 19 suggests the patient may benefit from discharge to post-acute rehabilitation services. Please refer to the recommendation of the Occupational Therapist for safe discharge planning.      Rehab Resource Intensity Level, OT: I (Maximum Resource Intensity)

## 2025-01-21 ENCOUNTER — APPOINTMENT (OUTPATIENT)
Age: 30
End: 2025-01-21
Payer: COMMERCIAL

## 2025-01-21 DIAGNOSIS — Z94.4 LIVER REPLACED BY TRANSPLANT (HCC): ICD-10-CM

## 2025-01-21 DIAGNOSIS — Z94.0 KIDNEY REPLACED BY TRANSPLANT: ICD-10-CM

## 2025-01-21 LAB
ALBUMIN SERPL BCG-MCNC: 3.7 G/DL (ref 3.5–5)
ALP SERPL-CCNC: 55 U/L (ref 34–104)
ALT SERPL W P-5'-P-CCNC: 24 U/L (ref 7–52)
ANION GAP SERPL CALCULATED.3IONS-SCNC: 9 MMOL/L (ref 4–13)
AST SERPL W P-5'-P-CCNC: 25 U/L (ref 13–39)
BASOPHILS # BLD MANUAL: 0.18 THOUSAND/UL (ref 0–0.1)
BASOPHILS NFR MAR MANUAL: 5 % (ref 0–1)
BILIRUB SERPL-MCNC: 0.4 MG/DL (ref 0.2–1)
BUN SERPL-MCNC: 19 MG/DL (ref 5–25)
CALCIUM SERPL-MCNC: 10 MG/DL (ref 8.4–10.2)
CHLORIDE SERPL-SCNC: 105 MMOL/L (ref 96–108)
CO2 SERPL-SCNC: 28 MMOL/L (ref 21–32)
CREAT SERPL-MCNC: 1.77 MG/DL (ref 0.6–1.3)
EOSINOPHIL # BLD MANUAL: 0.07 THOUSAND/UL (ref 0–0.4)
EOSINOPHIL NFR BLD MANUAL: 2 % (ref 0–6)
ERYTHROCYTE [DISTWIDTH] IN BLOOD BY AUTOMATED COUNT: 13.7 % (ref 11.6–15.1)
GFR SERPL CREATININE-BSD FRML MDRD: 50 ML/MIN/1.73SQ M
GGT SERPL-CCNC: 19 U/L (ref 9–64)
GIANT PLATELETS BLD QL SMEAR: PRESENT
GLUCOSE P FAST SERPL-MCNC: 98 MG/DL (ref 65–99)
HCT VFR BLD AUTO: 40.9 % (ref 36.5–49.3)
HGB BLD-MCNC: 12.9 G/DL (ref 12–17)
INR PPP: 0.95 (ref 0.85–1.19)
LYMPHOCYTES # BLD AUTO: 2.09 THOUSAND/UL (ref 0.6–4.47)
LYMPHOCYTES # BLD AUTO: 49 % (ref 14–44)
MAGNESIUM SERPL-MCNC: 1.5 MG/DL (ref 1.9–2.7)
MCH RBC QN AUTO: 30 PG (ref 26.8–34.3)
MCHC RBC AUTO-ENTMCNC: 31.5 G/DL (ref 31.4–37.4)
MCV RBC AUTO: 95 FL (ref 82–98)
MONOCYTES # BLD AUTO: 0.4 THOUSAND/UL (ref 0–1.22)
MONOCYTES NFR BLD: 11 % (ref 4–12)
NEUTROPHILS # BLD MANUAL: 0.92 THOUSAND/UL (ref 1.85–7.62)
NEUTS SEG NFR BLD AUTO: 25 % (ref 43–75)
PLATELET # BLD AUTO: 243 THOUSANDS/UL (ref 149–390)
PLATELET BLD QL SMEAR: ADEQUATE
PMV BLD AUTO: 11.9 FL (ref 8.9–12.7)
POTASSIUM SERPL-SCNC: 4.3 MMOL/L (ref 3.5–5.3)
PROT SERPL-MCNC: 6.3 G/DL (ref 6.4–8.4)
PROTHROMBIN TIME: 13 SECONDS (ref 12.3–15)
RBC # BLD AUTO: 4.3 MILLION/UL (ref 3.88–5.62)
RBC MORPH BLD: NORMAL
SODIUM SERPL-SCNC: 142 MMOL/L (ref 135–147)
TACROLIMUS BLD-MCNC: 13 NG/ML (ref 3–15)
VARIANT LYMPHS # BLD AUTO: 8 %
WBC # BLD AUTO: 3.67 THOUSAND/UL (ref 4.31–10.16)

## 2025-01-21 PROCEDURE — 36415 COLL VENOUS BLD VENIPUNCTURE: CPT

## 2025-01-21 PROCEDURE — 83735 ASSAY OF MAGNESIUM: CPT

## 2025-01-21 PROCEDURE — 80053 COMPREHEN METABOLIC PANEL: CPT

## 2025-01-21 PROCEDURE — 82977 ASSAY OF GGT: CPT

## 2025-01-21 PROCEDURE — 80197 ASSAY OF TACROLIMUS: CPT

## 2025-01-21 PROCEDURE — 85007 BL SMEAR W/DIFF WBC COUNT: CPT

## 2025-01-21 PROCEDURE — 85027 COMPLETE CBC AUTOMATED: CPT

## 2025-01-21 PROCEDURE — 85610 PROTHROMBIN TIME: CPT

## 2025-01-22 LAB — CMV DNA SERPL NAA+PROBE-ACNC: NOT DETECTED [IU]/ML

## 2025-01-27 ENCOUNTER — APPOINTMENT (OUTPATIENT)
Age: 30
End: 2025-01-27
Payer: COMMERCIAL

## 2025-01-27 DIAGNOSIS — Z94.4 LIVER REPLACED BY TRANSPLANT (HCC): ICD-10-CM

## 2025-01-27 LAB
ALBUMIN SERPL BCG-MCNC: 4.3 G/DL (ref 3.5–5)
ALP SERPL-CCNC: 54 U/L (ref 34–104)
ALT SERPL W P-5'-P-CCNC: 25 U/L (ref 7–52)
ANION GAP SERPL CALCULATED.3IONS-SCNC: 9 MMOL/L (ref 4–13)
AST SERPL W P-5'-P-CCNC: 27 U/L (ref 13–39)
BASOPHILS # BLD MANUAL: 0.04 THOUSAND/UL (ref 0–0.1)
BASOPHILS NFR MAR MANUAL: 1 % (ref 0–1)
BILIRUB SERPL-MCNC: 0.37 MG/DL (ref 0.2–1)
BUN SERPL-MCNC: 28 MG/DL (ref 5–25)
CALCIUM SERPL-MCNC: 9.9 MG/DL (ref 8.4–10.2)
CHLORIDE SERPL-SCNC: 105 MMOL/L (ref 96–108)
CO2 SERPL-SCNC: 27 MMOL/L (ref 21–32)
CREAT SERPL-MCNC: 1.34 MG/DL (ref 0.6–1.3)
EOSINOPHIL # BLD MANUAL: 0.11 THOUSAND/UL (ref 0–0.4)
EOSINOPHIL NFR BLD MANUAL: 3 % (ref 0–6)
ERYTHROCYTE [DISTWIDTH] IN BLOOD BY AUTOMATED COUNT: 13.5 % (ref 11.6–15.1)
GFR SERPL CREATININE-BSD FRML MDRD: 70 ML/MIN/1.73SQ M
GGT SERPL-CCNC: 18 U/L (ref 9–64)
GLUCOSE P FAST SERPL-MCNC: 101 MG/DL (ref 65–99)
HCT VFR BLD AUTO: 39.4 % (ref 36.5–49.3)
HGB BLD-MCNC: 12.7 G/DL (ref 12–17)
INR PPP: 0.96 (ref 0.85–1.19)
LYMPHOCYTES # BLD AUTO: 1.4 THOUSAND/UL (ref 0.6–4.47)
LYMPHOCYTES # BLD AUTO: 32 % (ref 14–44)
MAGNESIUM SERPL-MCNC: 1.5 MG/DL (ref 1.9–2.7)
MCH RBC QN AUTO: 29.9 PG (ref 26.8–34.3)
MCHC RBC AUTO-ENTMCNC: 32.2 G/DL (ref 31.4–37.4)
MCV RBC AUTO: 93 FL (ref 82–98)
MONOCYTES # BLD AUTO: 0.26 THOUSAND/UL (ref 0–1.22)
MONOCYTES NFR BLD: 7 % (ref 4–12)
MYELOCYTE ABSOLUTE CT: 0.08 THOUSAND/UL (ref 0–0.1)
MYELOCYTES NFR BLD MANUAL: 2 % (ref 0–1)
NEUTROPHILS # BLD MANUAL: 1.89 THOUSAND/UL (ref 1.85–7.62)
NEUTS SEG NFR BLD AUTO: 50 % (ref 43–75)
PLATELET # BLD AUTO: 237 THOUSANDS/UL (ref 149–390)
PLATELET BLD QL SMEAR: ADEQUATE
PMV BLD AUTO: 11.6 FL (ref 8.9–12.7)
POTASSIUM SERPL-SCNC: 4.3 MMOL/L (ref 3.5–5.3)
PROT SERPL-MCNC: 6.8 G/DL (ref 6.4–8.4)
PROTHROMBIN TIME: 13.1 SECONDS (ref 12.3–15)
RBC # BLD AUTO: 4.25 MILLION/UL (ref 3.88–5.62)
RBC MORPH BLD: NORMAL
SODIUM SERPL-SCNC: 141 MMOL/L (ref 135–147)
VARIANT LYMPHS # BLD AUTO: 5 %
WBC # BLD AUTO: 3.78 THOUSAND/UL (ref 4.31–10.16)

## 2025-01-27 PROCEDURE — 85610 PROTHROMBIN TIME: CPT

## 2025-01-27 PROCEDURE — 80197 ASSAY OF TACROLIMUS: CPT

## 2025-01-27 PROCEDURE — 85007 BL SMEAR W/DIFF WBC COUNT: CPT

## 2025-01-27 PROCEDURE — 82977 ASSAY OF GGT: CPT

## 2025-01-27 PROCEDURE — 85027 COMPLETE CBC AUTOMATED: CPT

## 2025-01-27 PROCEDURE — 83735 ASSAY OF MAGNESIUM: CPT

## 2025-01-27 PROCEDURE — 36415 COLL VENOUS BLD VENIPUNCTURE: CPT

## 2025-01-27 PROCEDURE — 80053 COMPREHEN METABOLIC PANEL: CPT

## 2025-01-28 LAB — TACROLIMUS BLD-MCNC: 9.9 NG/ML (ref 3–15)

## 2025-02-12 ENCOUNTER — APPOINTMENT (OUTPATIENT)
Age: 30
End: 2025-02-12
Payer: COMMERCIAL

## 2025-02-12 DIAGNOSIS — Z94.4 LIVER REPLACED BY TRANSPLANT (HCC): ICD-10-CM

## 2025-02-12 LAB
BASOPHILS # BLD MANUAL: 0.05 THOUSAND/UL (ref 0–0.1)
BASOPHILS NFR MAR MANUAL: 1 % (ref 0–1)
EOSINOPHIL # BLD MANUAL: 0.09 THOUSAND/UL (ref 0–0.4)
EOSINOPHIL NFR BLD MANUAL: 2 % (ref 0–6)
ERYTHROCYTE [DISTWIDTH] IN BLOOD BY AUTOMATED COUNT: 13.3 % (ref 11.6–15.1)
GIANT PLATELETS BLD QL SMEAR: PRESENT
HCT VFR BLD AUTO: 42.2 % (ref 36.5–49.3)
HGB BLD-MCNC: 13.5 G/DL (ref 12–17)
INR PPP: 0.94 (ref 0.85–1.19)
LYMPHOCYTES # BLD AUTO: 1.84 THOUSAND/UL (ref 0.6–4.47)
LYMPHOCYTES # BLD AUTO: 31 % (ref 14–44)
MCH RBC QN AUTO: 29.7 PG (ref 26.8–34.3)
MCHC RBC AUTO-ENTMCNC: 32 G/DL (ref 31.4–37.4)
MCV RBC AUTO: 93 FL (ref 82–98)
MONOCYTES # BLD AUTO: 0.24 THOUSAND/UL (ref 0–1.22)
MONOCYTES NFR BLD: 5 % (ref 4–12)
NEUTROPHILS # BLD MANUAL: 2.5 THOUSAND/UL (ref 1.85–7.62)
NEUTS BAND NFR BLD MANUAL: 1 % (ref 0–8)
NEUTS SEG NFR BLD AUTO: 52 % (ref 43–75)
PLATELET # BLD AUTO: 298 THOUSANDS/UL (ref 149–390)
PLATELET BLD QL SMEAR: ADEQUATE
PLATELET CLUMP BLD QL SMEAR: PRESENT
PMV BLD AUTO: 10.7 FL (ref 8.9–12.7)
PROTHROMBIN TIME: 12.9 SECONDS (ref 12.3–15)
RBC # BLD AUTO: 4.54 MILLION/UL (ref 3.88–5.62)
RBC MORPH BLD: NORMAL
SMUDGE CELLS BLD QL SMEAR: PRESENT
VARIANT LYMPHS # BLD AUTO: 8 %
WBC # BLD AUTO: 4.72 THOUSAND/UL (ref 4.31–10.16)

## 2025-02-12 PROCEDURE — 83735 ASSAY OF MAGNESIUM: CPT

## 2025-02-12 PROCEDURE — 80197 ASSAY OF TACROLIMUS: CPT

## 2025-02-12 PROCEDURE — 85027 COMPLETE CBC AUTOMATED: CPT

## 2025-02-12 PROCEDURE — 82977 ASSAY OF GGT: CPT

## 2025-02-12 PROCEDURE — 80053 COMPREHEN METABOLIC PANEL: CPT

## 2025-02-12 PROCEDURE — 85007 BL SMEAR W/DIFF WBC COUNT: CPT

## 2025-02-12 PROCEDURE — 36415 COLL VENOUS BLD VENIPUNCTURE: CPT

## 2025-02-12 PROCEDURE — 85610 PROTHROMBIN TIME: CPT

## 2025-02-13 LAB
ALBUMIN SERPL BCG-MCNC: 4.5 G/DL (ref 3.5–5)
ALP SERPL-CCNC: 63 U/L (ref 34–104)
ALT SERPL W P-5'-P-CCNC: 22 U/L (ref 7–52)
ANION GAP SERPL CALCULATED.3IONS-SCNC: 10 MMOL/L (ref 4–13)
AST SERPL W P-5'-P-CCNC: 25 U/L (ref 13–39)
BILIRUB SERPL-MCNC: 0.29 MG/DL (ref 0.2–1)
BUN SERPL-MCNC: 28 MG/DL (ref 5–25)
CALCIUM SERPL-MCNC: 10.2 MG/DL (ref 8.4–10.2)
CHLORIDE SERPL-SCNC: 106 MMOL/L (ref 96–108)
CO2 SERPL-SCNC: 26 MMOL/L (ref 21–32)
CREAT SERPL-MCNC: 1.28 MG/DL (ref 0.6–1.3)
GFR SERPL CREATININE-BSD FRML MDRD: 74 ML/MIN/1.73SQ M
GGT SERPL-CCNC: 17 U/L (ref 9–64)
GLUCOSE P FAST SERPL-MCNC: 99 MG/DL (ref 65–99)
MAGNESIUM SERPL-MCNC: 1.8 MG/DL (ref 1.9–2.7)
POTASSIUM SERPL-SCNC: 4.8 MMOL/L (ref 3.5–5.3)
PROT SERPL-MCNC: 6.5 G/DL (ref 6.4–8.4)
SODIUM SERPL-SCNC: 142 MMOL/L (ref 135–147)
TACROLIMUS BLD-MCNC: 12.8 NG/ML (ref 3–15)

## 2025-03-03 ENCOUNTER — APPOINTMENT (OUTPATIENT)
Age: 30
End: 2025-03-03
Payer: COMMERCIAL

## 2025-03-03 DIAGNOSIS — Z94.4 LIVER REPLACED BY TRANSPLANT (HCC): ICD-10-CM

## 2025-03-03 LAB
ALBUMIN SERPL BCG-MCNC: 4.4 G/DL (ref 3.5–5)
ALP SERPL-CCNC: 55 U/L (ref 34–104)
ALT SERPL W P-5'-P-CCNC: 20 U/L (ref 7–52)
ANION GAP SERPL CALCULATED.3IONS-SCNC: 9 MMOL/L (ref 4–13)
AST SERPL W P-5'-P-CCNC: 23 U/L (ref 13–39)
BASOPHILS # BLD AUTO: 0.05 THOUSANDS/ÂΜL (ref 0–0.1)
BASOPHILS NFR BLD AUTO: 1 % (ref 0–1)
BILIRUB SERPL-MCNC: 0.41 MG/DL (ref 0.2–1)
BUN SERPL-MCNC: 23 MG/DL (ref 5–25)
CALCIUM SERPL-MCNC: 9.9 MG/DL (ref 8.4–10.2)
CHLORIDE SERPL-SCNC: 107 MMOL/L (ref 96–108)
CO2 SERPL-SCNC: 26 MMOL/L (ref 21–32)
CREAT SERPL-MCNC: 1.33 MG/DL (ref 0.6–1.3)
EOSINOPHIL # BLD AUTO: 0.06 THOUSAND/ÂΜL (ref 0–0.61)
EOSINOPHIL NFR BLD AUTO: 1 % (ref 0–6)
ERYTHROCYTE [DISTWIDTH] IN BLOOD BY AUTOMATED COUNT: 13.6 % (ref 11.6–15.1)
GFR SERPL CREATININE-BSD FRML MDRD: 70 ML/MIN/1.73SQ M
GGT SERPL-CCNC: 20 U/L (ref 9–64)
GLUCOSE P FAST SERPL-MCNC: 97 MG/DL (ref 65–99)
HCT VFR BLD AUTO: 43 % (ref 36.5–49.3)
HGB BLD-MCNC: 13.7 G/DL (ref 12–17)
IMM GRANULOCYTES # BLD AUTO: 0.09 THOUSAND/UL (ref 0–0.2)
IMM GRANULOCYTES NFR BLD AUTO: 2 % (ref 0–2)
INR PPP: 1 (ref 0.85–1.19)
LYMPHOCYTES # BLD AUTO: 1.4 THOUSANDS/ÂΜL (ref 0.6–4.47)
LYMPHOCYTES NFR BLD AUTO: 30 % (ref 14–44)
MAGNESIUM SERPL-MCNC: 1.6 MG/DL (ref 1.9–2.7)
MCH RBC QN AUTO: 29.5 PG (ref 26.8–34.3)
MCHC RBC AUTO-ENTMCNC: 31.9 G/DL (ref 31.4–37.4)
MCV RBC AUTO: 93 FL (ref 82–98)
MONOCYTES # BLD AUTO: 0.31 THOUSAND/ÂΜL (ref 0.17–1.22)
MONOCYTES NFR BLD AUTO: 7 % (ref 4–12)
NEUTROPHILS # BLD AUTO: 2.71 THOUSANDS/ÂΜL (ref 1.85–7.62)
NEUTS SEG NFR BLD AUTO: 59 % (ref 43–75)
NRBC BLD AUTO-RTO: 0 /100 WBCS
PLATELET # BLD AUTO: 271 THOUSANDS/UL (ref 149–390)
PMV BLD AUTO: 10.4 FL (ref 8.9–12.7)
POTASSIUM SERPL-SCNC: 4.3 MMOL/L (ref 3.5–5.3)
PROT SERPL-MCNC: 6.7 G/DL (ref 6.4–8.4)
PROTHROMBIN TIME: 13.5 SECONDS (ref 12.3–15)
RBC # BLD AUTO: 4.65 MILLION/UL (ref 3.88–5.62)
SODIUM SERPL-SCNC: 142 MMOL/L (ref 135–147)
TACROLIMUS BLD-MCNC: 9.4 NG/ML (ref 3–15)
WBC # BLD AUTO: 4.62 THOUSAND/UL (ref 4.31–10.16)

## 2025-03-03 PROCEDURE — 80197 ASSAY OF TACROLIMUS: CPT

## 2025-03-03 PROCEDURE — 85025 COMPLETE CBC W/AUTO DIFF WBC: CPT

## 2025-03-03 PROCEDURE — 36415 COLL VENOUS BLD VENIPUNCTURE: CPT

## 2025-03-03 PROCEDURE — 83735 ASSAY OF MAGNESIUM: CPT

## 2025-03-03 PROCEDURE — 82977 ASSAY OF GGT: CPT

## 2025-03-03 PROCEDURE — 80053 COMPREHEN METABOLIC PANEL: CPT

## 2025-03-03 PROCEDURE — 85610 PROTHROMBIN TIME: CPT

## 2025-03-20 ENCOUNTER — APPOINTMENT (OUTPATIENT)
Age: 30
End: 2025-03-20
Payer: COMMERCIAL

## 2025-03-20 DIAGNOSIS — Z94.4 LIVER REPLACED BY TRANSPLANT (HCC): ICD-10-CM

## 2025-03-20 DIAGNOSIS — D84.9 IMMUNOSUPPRESSION (HCC): Primary | ICD-10-CM

## 2025-03-20 DIAGNOSIS — Z12.83 SCREENING FOR SKIN CANCER: ICD-10-CM

## 2025-03-20 LAB
ALBUMIN SERPL BCG-MCNC: 4.3 G/DL (ref 3.5–5)
ALP SERPL-CCNC: 54 U/L (ref 34–104)
ALT SERPL W P-5'-P-CCNC: 17 U/L (ref 7–52)
ANION GAP SERPL CALCULATED.3IONS-SCNC: 6 MMOL/L (ref 4–13)
ANISOCYTOSIS BLD QL SMEAR: PRESENT
AST SERPL W P-5'-P-CCNC: 21 U/L (ref 13–39)
BASOPHILS # BLD MANUAL: 0.07 THOUSAND/UL (ref 0–0.1)
BASOPHILS NFR MAR MANUAL: 2 % (ref 0–1)
BILIRUB SERPL-MCNC: 0.47 MG/DL (ref 0.2–1)
BUN SERPL-MCNC: 23 MG/DL (ref 5–25)
CALCIUM SERPL-MCNC: 9.7 MG/DL (ref 8.4–10.2)
CHLORIDE SERPL-SCNC: 108 MMOL/L (ref 96–108)
CO2 SERPL-SCNC: 28 MMOL/L (ref 21–32)
CREAT SERPL-MCNC: 1.19 MG/DL (ref 0.6–1.3)
EOSINOPHIL # BLD MANUAL: 0.03 THOUSAND/UL (ref 0–0.4)
EOSINOPHIL NFR BLD MANUAL: 1 % (ref 0–6)
ERYTHROCYTE [DISTWIDTH] IN BLOOD BY AUTOMATED COUNT: 14 % (ref 11.6–15.1)
GFR SERPL CREATININE-BSD FRML MDRD: 80 ML/MIN/1.73SQ M
GGT SERPL-CCNC: 18 U/L (ref 9–64)
GLUCOSE P FAST SERPL-MCNC: 99 MG/DL (ref 65–99)
HCT VFR BLD AUTO: 41.1 % (ref 36.5–49.3)
HGB BLD-MCNC: 13.5 G/DL (ref 12–17)
LYMPHOCYTES # BLD AUTO: 1.21 THOUSAND/UL (ref 0.6–4.47)
LYMPHOCYTES # BLD AUTO: 31 % (ref 14–44)
MAGNESIUM SERPL-MCNC: 1.6 MG/DL (ref 1.9–2.7)
MCH RBC QN AUTO: 30.2 PG (ref 26.8–34.3)
MCHC RBC AUTO-ENTMCNC: 32.8 G/DL (ref 31.4–37.4)
MCV RBC AUTO: 92 FL (ref 82–98)
MONOCYTES # BLD AUTO: 0.13 THOUSAND/UL (ref 0–1.22)
MONOCYTES NFR BLD: 4 % (ref 4–12)
NEUTROPHILS # BLD MANUAL: 1.84 THOUSAND/UL (ref 1.85–7.62)
NEUTS BAND NFR BLD MANUAL: 2 % (ref 0–8)
NEUTS SEG NFR BLD AUTO: 54 % (ref 43–75)
OVALOCYTES BLD QL SMEAR: PRESENT
PLATELET # BLD AUTO: 236 THOUSANDS/UL (ref 149–390)
PLATELET BLD QL SMEAR: ADEQUATE
PMV BLD AUTO: 10.7 FL (ref 8.9–12.7)
POTASSIUM SERPL-SCNC: 4.1 MMOL/L (ref 3.5–5.3)
PROT SERPL-MCNC: 6.3 G/DL (ref 6.4–8.4)
RBC # BLD AUTO: 4.47 MILLION/UL (ref 3.88–5.62)
RBC MORPH BLD: PRESENT
SODIUM SERPL-SCNC: 142 MMOL/L (ref 135–147)
TACROLIMUS BLD-MCNC: 8 NG/ML (ref 3–15)
VARIANT LYMPHS # BLD AUTO: 6 %
WBC # BLD AUTO: 3.28 THOUSAND/UL (ref 4.31–10.16)

## 2025-03-20 PROCEDURE — 83735 ASSAY OF MAGNESIUM: CPT

## 2025-03-20 PROCEDURE — 36415 COLL VENOUS BLD VENIPUNCTURE: CPT

## 2025-03-20 PROCEDURE — 82977 ASSAY OF GGT: CPT

## 2025-03-20 PROCEDURE — 80197 ASSAY OF TACROLIMUS: CPT

## 2025-03-20 PROCEDURE — 85007 BL SMEAR W/DIFF WBC COUNT: CPT

## 2025-03-20 PROCEDURE — 80053 COMPREHEN METABOLIC PANEL: CPT

## 2025-03-20 PROCEDURE — 85027 COMPLETE CBC AUTOMATED: CPT

## 2025-03-31 ENCOUNTER — APPOINTMENT (OUTPATIENT)
Age: 30
End: 2025-03-31
Payer: COMMERCIAL

## 2025-03-31 DIAGNOSIS — Z94.4 LIVER REPLACED BY TRANSPLANT (HCC): ICD-10-CM

## 2025-03-31 LAB
ALBUMIN SERPL BCG-MCNC: 4.5 G/DL (ref 3.5–5)
ALP SERPL-CCNC: 55 U/L (ref 34–104)
ALT SERPL W P-5'-P-CCNC: 21 U/L (ref 7–52)
ANION GAP SERPL CALCULATED.3IONS-SCNC: 11 MMOL/L (ref 4–13)
AST SERPL W P-5'-P-CCNC: 21 U/L (ref 13–39)
BASOPHILS # BLD MANUAL: 0.13 THOUSAND/UL (ref 0–0.1)
BASOPHILS NFR MAR MANUAL: 6 % (ref 0–1)
BILIRUB SERPL-MCNC: 0.41 MG/DL (ref 0.2–1)
BUN SERPL-MCNC: 24 MG/DL (ref 5–25)
CALCIUM SERPL-MCNC: 9.8 MG/DL (ref 8.4–10.2)
CHLORIDE SERPL-SCNC: 103 MMOL/L (ref 96–108)
CO2 SERPL-SCNC: 26 MMOL/L (ref 21–32)
CREAT SERPL-MCNC: 1.36 MG/DL (ref 0.6–1.3)
EOSINOPHIL # BLD MANUAL: 0.17 THOUSAND/UL (ref 0–0.4)
EOSINOPHIL NFR BLD MANUAL: 8 % (ref 0–6)
ERYTHROCYTE [DISTWIDTH] IN BLOOD BY AUTOMATED COUNT: 13.9 % (ref 11.6–15.1)
GFR SERPL CREATININE-BSD FRML MDRD: 68 ML/MIN/1.73SQ M
GGT SERPL-CCNC: 20 U/L (ref 9–64)
GLUCOSE P FAST SERPL-MCNC: 107 MG/DL (ref 65–99)
HCT VFR BLD AUTO: 41 % (ref 36.5–49.3)
HGB BLD-MCNC: 13.8 G/DL (ref 12–17)
INR PPP: 0.9 (ref 0.85–1.19)
LYMPHOCYTES # BLD AUTO: 1.55 THOUSAND/UL (ref 0.6–4.47)
LYMPHOCYTES # BLD AUTO: 73 % (ref 14–44)
MAGNESIUM SERPL-MCNC: 1.7 MG/DL (ref 1.9–2.7)
MCH RBC QN AUTO: 29.6 PG (ref 26.8–34.3)
MCHC RBC AUTO-ENTMCNC: 33.7 G/DL (ref 31.4–37.4)
MCV RBC AUTO: 88 FL (ref 82–98)
MONOCYTES # BLD AUTO: 0.23 THOUSAND/UL (ref 0–1.22)
MONOCYTES NFR BLD: 11 % (ref 4–12)
NEUTROPHILS # BLD MANUAL: 0.04 THOUSAND/UL (ref 1.85–7.62)
NEUTS SEG NFR BLD AUTO: 2 % (ref 43–75)
PLATELET # BLD AUTO: 271 THOUSANDS/UL (ref 149–390)
PLATELET BLD QL SMEAR: ADEQUATE
PMV BLD AUTO: 10 FL (ref 8.9–12.7)
POTASSIUM SERPL-SCNC: 4 MMOL/L (ref 3.5–5.3)
PROT SERPL-MCNC: 6.8 G/DL (ref 6.4–8.4)
PROTHROMBIN TIME: 12.5 SECONDS (ref 12.3–15)
RBC # BLD AUTO: 4.67 MILLION/UL (ref 3.88–5.62)
RBC MORPH BLD: NORMAL
SODIUM SERPL-SCNC: 140 MMOL/L (ref 135–147)
TACROLIMUS BLD-MCNC: 10.2 NG/ML (ref 3–15)
WBC # BLD AUTO: 2.12 THOUSAND/UL (ref 4.31–10.16)

## 2025-03-31 PROCEDURE — 85007 BL SMEAR W/DIFF WBC COUNT: CPT

## 2025-03-31 PROCEDURE — 80053 COMPREHEN METABOLIC PANEL: CPT

## 2025-03-31 PROCEDURE — 85610 PROTHROMBIN TIME: CPT

## 2025-03-31 PROCEDURE — 82977 ASSAY OF GGT: CPT

## 2025-03-31 PROCEDURE — 85027 COMPLETE CBC AUTOMATED: CPT

## 2025-03-31 PROCEDURE — 36415 COLL VENOUS BLD VENIPUNCTURE: CPT

## 2025-03-31 PROCEDURE — 80197 ASSAY OF TACROLIMUS: CPT

## 2025-03-31 PROCEDURE — 83735 ASSAY OF MAGNESIUM: CPT

## 2025-05-02 ENCOUNTER — APPOINTMENT (OUTPATIENT)
Age: 30
End: 2025-05-02
Payer: COMMERCIAL

## 2025-05-02 DIAGNOSIS — Z94.4 LIVER REPLACED BY TRANSPLANT (HCC): ICD-10-CM

## 2025-05-02 LAB
ALBUMIN SERPL BCG-MCNC: 4.3 G/DL (ref 3.5–5)
ALP SERPL-CCNC: 47 U/L (ref 34–104)
ALT SERPL W P-5'-P-CCNC: 20 U/L (ref 7–52)
ANION GAP SERPL CALCULATED.3IONS-SCNC: 8 MMOL/L (ref 4–13)
AST SERPL W P-5'-P-CCNC: 29 U/L (ref 13–39)
BASOPHILS # BLD AUTO: 0.06 THOUSANDS/ÂΜL (ref 0–0.1)
BASOPHILS NFR BLD AUTO: 3 % (ref 0–1)
BILIRUB SERPL-MCNC: 0.45 MG/DL (ref 0.2–1)
BUN SERPL-MCNC: 22 MG/DL (ref 5–25)
CALCIUM SERPL-MCNC: 9.7 MG/DL (ref 8.4–10.2)
CHLORIDE SERPL-SCNC: 107 MMOL/L (ref 96–108)
CO2 SERPL-SCNC: 26 MMOL/L (ref 21–32)
CREAT SERPL-MCNC: 1.28 MG/DL (ref 0.6–1.3)
EOSINOPHIL # BLD AUTO: 0.06 THOUSAND/ÂΜL (ref 0–0.61)
EOSINOPHIL NFR BLD AUTO: 3 % (ref 0–6)
ERYTHROCYTE [DISTWIDTH] IN BLOOD BY AUTOMATED COUNT: 14.6 % (ref 11.6–15.1)
GFR SERPL CREATININE-BSD FRML MDRD: 74 ML/MIN/1.73SQ M
GGT SERPL-CCNC: 16 U/L (ref 9–64)
GLUCOSE P FAST SERPL-MCNC: 105 MG/DL (ref 65–99)
HCT VFR BLD AUTO: 39.3 % (ref 36.5–49.3)
HGB BLD-MCNC: 12.9 G/DL (ref 12–17)
IMM GRANULOCYTES # BLD AUTO: 0.01 THOUSAND/UL (ref 0–0.2)
IMM GRANULOCYTES NFR BLD AUTO: 1 % (ref 0–2)
INR PPP: 0.95 (ref 0.85–1.19)
LYMPHOCYTES # BLD AUTO: 1.09 THOUSANDS/ÂΜL (ref 0.6–4.47)
LYMPHOCYTES NFR BLD AUTO: 50 % (ref 14–44)
MAGNESIUM SERPL-MCNC: 1.9 MG/DL (ref 1.9–2.7)
MCH RBC QN AUTO: 29.8 PG (ref 26.8–34.3)
MCHC RBC AUTO-ENTMCNC: 32.8 G/DL (ref 31.4–37.4)
MCV RBC AUTO: 91 FL (ref 82–98)
MONOCYTES # BLD AUTO: 0.47 THOUSAND/ÂΜL (ref 0.17–1.22)
MONOCYTES NFR BLD AUTO: 22 % (ref 4–12)
NEUTROPHILS # BLD AUTO: 0.44 THOUSANDS/ÂΜL (ref 1.85–7.62)
NEUTS SEG NFR BLD AUTO: 21 % (ref 43–75)
NRBC BLD AUTO-RTO: 0 /100 WBCS
PLATELET # BLD AUTO: 231 THOUSANDS/UL (ref 149–390)
PMV BLD AUTO: 9.9 FL (ref 8.9–12.7)
POTASSIUM SERPL-SCNC: 4.3 MMOL/L (ref 3.5–5.3)
PROT SERPL-MCNC: 6.2 G/DL (ref 6.4–8.4)
PROTHROMBIN TIME: 13 SECONDS (ref 12.3–15)
RBC # BLD AUTO: 4.33 MILLION/UL (ref 3.88–5.62)
SODIUM SERPL-SCNC: 141 MMOL/L (ref 135–147)
WBC # BLD AUTO: 2.13 THOUSAND/UL (ref 4.31–10.16)

## 2025-05-02 PROCEDURE — 82977 ASSAY OF GGT: CPT

## 2025-05-02 PROCEDURE — 85025 COMPLETE CBC W/AUTO DIFF WBC: CPT

## 2025-05-02 PROCEDURE — 85610 PROTHROMBIN TIME: CPT

## 2025-05-02 PROCEDURE — 36415 COLL VENOUS BLD VENIPUNCTURE: CPT

## 2025-05-02 PROCEDURE — 83735 ASSAY OF MAGNESIUM: CPT

## 2025-05-02 PROCEDURE — 80197 ASSAY OF TACROLIMUS: CPT

## 2025-05-02 PROCEDURE — 80053 COMPREHEN METABOLIC PANEL: CPT

## 2025-05-04 LAB — TACROLIMUS BLD-MCNC: 8.5 NG/ML (ref 3–15)

## 2025-05-19 ENCOUNTER — PATIENT MESSAGE (OUTPATIENT)
Dept: FAMILY MEDICINE CLINIC | Facility: CLINIC | Age: 30
End: 2025-05-19

## 2025-06-16 DIAGNOSIS — I10 PRIMARY HYPERTENSION: ICD-10-CM

## 2025-06-16 RX ORDER — CARVEDILOL 6.25 MG/1
6.25 TABLET ORAL 2 TIMES DAILY
Qty: 60 TABLET | Refills: 5 | Status: SHIPPED | OUTPATIENT
Start: 2025-06-16

## 2025-06-23 ENCOUNTER — APPOINTMENT (OUTPATIENT)
Age: 30
End: 2025-06-23
Attending: STUDENT IN AN ORGANIZED HEALTH CARE EDUCATION/TRAINING PROGRAM
Payer: COMMERCIAL

## 2025-06-23 DIAGNOSIS — Z94.4 LIVER REPLACED BY TRANSPLANT (HCC): ICD-10-CM

## 2025-06-23 LAB
ALBUMIN SERPL BCG-MCNC: 4.5 G/DL (ref 3.5–5)
ALP SERPL-CCNC: 51 U/L (ref 34–104)
ALT SERPL W P-5'-P-CCNC: 21 U/L (ref 7–52)
ANION GAP SERPL CALCULATED.3IONS-SCNC: 8 MMOL/L (ref 4–13)
AST SERPL W P-5'-P-CCNC: 32 U/L (ref 13–39)
BASOPHILS # BLD AUTO: 0.05 THOUSANDS/ÂΜL (ref 0–0.1)
BASOPHILS NFR BLD AUTO: 1 % (ref 0–1)
BILIRUB SERPL-MCNC: 0.43 MG/DL (ref 0.2–1)
BUN SERPL-MCNC: 33 MG/DL (ref 5–25)
CALCIUM SERPL-MCNC: 9.7 MG/DL (ref 8.4–10.2)
CHLORIDE SERPL-SCNC: 104 MMOL/L (ref 96–108)
CO2 SERPL-SCNC: 28 MMOL/L (ref 21–32)
CREAT SERPL-MCNC: 1.3 MG/DL (ref 0.6–1.3)
EOSINOPHIL # BLD AUTO: 0.4 THOUSAND/ÂΜL (ref 0–0.61)
EOSINOPHIL NFR BLD AUTO: 10 % (ref 0–6)
ERYTHROCYTE [DISTWIDTH] IN BLOOD BY AUTOMATED COUNT: 13 % (ref 11.6–15.1)
GFR SERPL CREATININE-BSD FRML MDRD: 72 ML/MIN/1.73SQ M
GGT SERPL-CCNC: 14 U/L (ref 9–64)
GLUCOSE P FAST SERPL-MCNC: 97 MG/DL (ref 65–99)
HCT VFR BLD AUTO: 41.4 % (ref 36.5–49.3)
HGB BLD-MCNC: 13.5 G/DL (ref 12–17)
IMM GRANULOCYTES # BLD AUTO: 0.04 THOUSAND/UL (ref 0–0.2)
IMM GRANULOCYTES NFR BLD AUTO: 1 % (ref 0–2)
INR PPP: 0.87 (ref 0.85–1.19)
LYMPHOCYTES # BLD AUTO: 1.24 THOUSANDS/ÂΜL (ref 0.6–4.47)
LYMPHOCYTES NFR BLD AUTO: 32 % (ref 14–44)
MAGNESIUM SERPL-MCNC: 1.7 MG/DL (ref 1.9–2.7)
MCH RBC QN AUTO: 29.2 PG (ref 26.8–34.3)
MCHC RBC AUTO-ENTMCNC: 32.6 G/DL (ref 31.4–37.4)
MCV RBC AUTO: 89 FL (ref 82–98)
MONOCYTES # BLD AUTO: 0.74 THOUSAND/ÂΜL (ref 0.17–1.22)
MONOCYTES NFR BLD AUTO: 19 % (ref 4–12)
NEUTROPHILS # BLD AUTO: 1.44 THOUSANDS/ÂΜL (ref 1.85–7.62)
NEUTS SEG NFR BLD AUTO: 37 % (ref 43–75)
NRBC BLD AUTO-RTO: 0 /100 WBCS
PLATELET # BLD AUTO: 191 THOUSANDS/UL (ref 149–390)
PMV BLD AUTO: 10.6 FL (ref 8.9–12.7)
POTASSIUM SERPL-SCNC: 4.7 MMOL/L (ref 3.5–5.3)
PROT SERPL-MCNC: 6.7 G/DL (ref 6.4–8.4)
PROTHROMBIN TIME: 12.1 SECONDS (ref 12.3–15)
RBC # BLD AUTO: 4.63 MILLION/UL (ref 3.88–5.62)
SODIUM SERPL-SCNC: 140 MMOL/L (ref 135–147)
WBC # BLD AUTO: 3.91 THOUSAND/UL (ref 4.31–10.16)

## 2025-06-23 PROCEDURE — 85610 PROTHROMBIN TIME: CPT

## 2025-06-23 PROCEDURE — 85025 COMPLETE CBC W/AUTO DIFF WBC: CPT

## 2025-06-23 PROCEDURE — 36415 COLL VENOUS BLD VENIPUNCTURE: CPT

## 2025-06-23 PROCEDURE — 80197 ASSAY OF TACROLIMUS: CPT

## 2025-06-23 PROCEDURE — 82977 ASSAY OF GGT: CPT

## 2025-06-23 PROCEDURE — 80053 COMPREHEN METABOLIC PANEL: CPT

## 2025-06-23 PROCEDURE — 83735 ASSAY OF MAGNESIUM: CPT

## 2025-06-24 LAB — TACROLIMUS BLD-MCNC: 8 NG/ML (ref 3–15)

## 2025-06-25 LAB — CMV DNA SERPL NAA+PROBE-ACNC: ABNORMAL [IU]/ML

## 2025-07-04 DIAGNOSIS — G47.00 INSOMNIA, UNSPECIFIED TYPE: ICD-10-CM

## 2025-07-07 ENCOUNTER — OFFICE VISIT (OUTPATIENT)
Dept: FAMILY MEDICINE CLINIC | Facility: CLINIC | Age: 30
End: 2025-07-07
Payer: COMMERCIAL

## 2025-07-07 VITALS
TEMPERATURE: 98.3 F | SYSTOLIC BLOOD PRESSURE: 120 MMHG | WEIGHT: 187 LBS | RESPIRATION RATE: 16 BRPM | OXYGEN SATURATION: 100 % | BODY MASS INDEX: 25.33 KG/M2 | DIASTOLIC BLOOD PRESSURE: 80 MMHG | HEART RATE: 84 BPM | HEIGHT: 72 IN

## 2025-07-07 DIAGNOSIS — E78.2 MIXED HYPERLIPIDEMIA: ICD-10-CM

## 2025-07-07 DIAGNOSIS — R53.83 MALAISE AND FATIGUE: Primary | ICD-10-CM

## 2025-07-07 DIAGNOSIS — R53.81 MALAISE AND FATIGUE: Primary | ICD-10-CM

## 2025-07-07 PROCEDURE — 99213 OFFICE O/P EST LOW 20 MIN: CPT | Performed by: FAMILY MEDICINE

## 2025-07-07 RX ORDER — POTASSIUM CITRATE 15 MEQ/1
TABLET, EXTENDED RELEASE ORAL
COMMUNITY
Start: 2025-07-04

## 2025-07-07 RX ORDER — QUETIAPINE FUMARATE 25 MG/1
25 TABLET, FILM COATED ORAL
Qty: 90 TABLET | Refills: 3 | Status: SHIPPED | OUTPATIENT
Start: 2025-07-07

## 2025-07-07 RX ORDER — ROSUVASTATIN CALCIUM 5 MG/1
5 TABLET, COATED ORAL DAILY
Qty: 30 TABLET | Refills: 5 | Status: SHIPPED | OUTPATIENT
Start: 2025-07-07

## 2025-07-07 NOTE — PROGRESS NOTES
Name: Milton Padilla      : 1995      MRN: [id number]  Encounter Provider: Alessandro Soler DO  Encounter Date: 2025   Encounter department: [facility] [address]  :  Assessment & Plan  Malaise and fatigue  He does have standing blood work ordered by his transplant team which he will do with our blood work.  Orders:  •  TSH, 3rd generation; Future  •  Lyme Total AB W Reflex to IGM/IGG; Future    Mixed hyperlipidemia    Orders:  •  rosuvastatin (CRESTOR) 5 mg tablet; Take 1 tablet (5 mg total) by mouth daily      Assessment & Plan           History of Present Illness   Patient comes in today for a feeling of generalized fatigue, unintentional weight loss, low-grade temperatures.  He is concerned with this because of his recent kidney and liver transplant as well as being on antirejection medications.  He has been getting blood work done every 2 weeks by his transplant team.  It was also suggested by them that he ask us about starting rosuvastatin for his cholesterol.      Review of Systems   Constitutional:  Positive for fatigue and unexpected weight change. Negative for chills and fever.   HENT:  Negative for congestion, ear pain, hearing loss, postnasal drip, rhinorrhea and sore throat.    Eyes:  Negative for pain and visual disturbance.   Respiratory:  Negative for chest tightness, shortness of breath and wheezing.    Cardiovascular:  Negative for chest pain and leg swelling.   Gastrointestinal:  Negative for abdominal distention, abdominal pain, constipation, diarrhea and vomiting.   Endocrine: Negative for cold intolerance and heat intolerance.   Genitourinary:  Negative for difficulty urinating, frequency and urgency.   Musculoskeletal:  Negative for arthralgias and gait problem.   Skin:  Negative for color change.   Neurological:  Negative for dizziness, tremors, syncope, numbness and headaches.   Hematological:  Negative for adenopathy.    Psychiatric/Behavioral:  Negative for agitation, confusion and sleep disturbance. The patient is not nervous/anxious.        Objective   /80 (BP Location: Right arm, Cuff Size: Standard)   Pulse 84   Temp 98.3 °F (36.8 °C)   Resp 16   Ht 6' (1.829 m)   Wt 84.8 kg (187 lb)   SpO2 100%   BMI 25.36 kg/m²      Physical Exam  Constitutional:       Appearance: He is well-developed.   HENT:      Head: Normocephalic.      Right Ear: External ear normal.      Left Ear: External ear normal.      Nose: Nose normal.     Eyes:      Extraocular Movements: Extraocular movements intact.      Conjunctiva/sclera: Conjunctivae normal.      Pupils: Pupils are equal, round, and reactive to light.     Neck:      Thyroid: No thyromegaly.     Cardiovascular:      Rate and Rhythm: Normal rate and regular rhythm.      Heart sounds: Normal heart sounds.   Pulmonary:      Effort: Pulmonary effort is normal.      Breath sounds: Normal breath sounds.   Abdominal:      General: Bowel sounds are normal.      Palpations: Abdomen is soft.     Musculoskeletal:         General: Normal range of motion.      Cervical back: Normal range of motion.     Skin:     General: Skin is warm and dry.     Neurological:      Mental Status: He is alert and oriented to person, place, and time.     Psychiatric:         Mood and Affect: Mood normal.         Behavior: Behavior normal.

## 2025-07-10 ENCOUNTER — APPOINTMENT (OUTPATIENT)
Age: 30
End: 2025-07-10
Attending: NURSE PRACTITIONER
Payer: COMMERCIAL

## 2025-07-10 DIAGNOSIS — R53.83 MALAISE AND FATIGUE: ICD-10-CM

## 2025-07-10 DIAGNOSIS — R53.81 MALAISE AND FATIGUE: ICD-10-CM

## 2025-07-10 DIAGNOSIS — Z94.4 LIVER REPLACED BY TRANSPLANT (HCC): ICD-10-CM

## 2025-07-10 LAB
ALBUMIN SERPL BCG-MCNC: 4.5 G/DL (ref 3.5–5)
ALP SERPL-CCNC: 55 U/L (ref 34–104)
ALT SERPL W P-5'-P-CCNC: 24 U/L (ref 7–52)
ANION GAP SERPL CALCULATED.3IONS-SCNC: 9 MMOL/L (ref 4–13)
AST SERPL W P-5'-P-CCNC: 29 U/L (ref 13–39)
B BURGDOR IGG+IGM SER QL IA: NEGATIVE
BASOPHILS # BLD AUTO: 0.03 THOUSANDS/ÂΜL (ref 0–0.1)
BASOPHILS NFR BLD AUTO: 1 % (ref 0–1)
BILIRUB SERPL-MCNC: 0.57 MG/DL (ref 0.2–1)
BUN SERPL-MCNC: 18 MG/DL (ref 5–25)
CALCIUM SERPL-MCNC: 9.7 MG/DL (ref 8.4–10.2)
CHLORIDE SERPL-SCNC: 104 MMOL/L (ref 96–108)
CO2 SERPL-SCNC: 28 MMOL/L (ref 21–32)
CREAT SERPL-MCNC: 1.32 MG/DL (ref 0.6–1.3)
EOSINOPHIL # BLD AUTO: 0.3 THOUSAND/ÂΜL (ref 0–0.61)
EOSINOPHIL NFR BLD AUTO: 11 % (ref 0–6)
ERYTHROCYTE [DISTWIDTH] IN BLOOD BY AUTOMATED COUNT: 12.7 % (ref 11.6–15.1)
GFR SERPL CREATININE-BSD FRML MDRD: 70 ML/MIN/1.73SQ M
GGT SERPL-CCNC: 16 U/L (ref 9–64)
GLUCOSE P FAST SERPL-MCNC: 107 MG/DL (ref 65–99)
HCT VFR BLD AUTO: 41.4 % (ref 36.5–49.3)
HGB BLD-MCNC: 13.8 G/DL (ref 12–17)
IMM GRANULOCYTES # BLD AUTO: 0.02 THOUSAND/UL (ref 0–0.2)
IMM GRANULOCYTES NFR BLD AUTO: 1 % (ref 0–2)
INR PPP: 1.02 (ref 0.85–1.19)
LYMPHOCYTES # BLD AUTO: 1.1 THOUSANDS/ÂΜL (ref 0.6–4.47)
LYMPHOCYTES NFR BLD AUTO: 41 % (ref 14–44)
MAGNESIUM SERPL-MCNC: 1.8 MG/DL (ref 1.9–2.7)
MCH RBC QN AUTO: 28.9 PG (ref 26.8–34.3)
MCHC RBC AUTO-ENTMCNC: 33.3 G/DL (ref 31.4–37.4)
MCV RBC AUTO: 87 FL (ref 82–98)
MONOCYTES # BLD AUTO: 0.62 THOUSAND/ÂΜL (ref 0.17–1.22)
MONOCYTES NFR BLD AUTO: 23 % (ref 4–12)
NEUTROPHILS # BLD AUTO: 0.63 THOUSANDS/ÂΜL (ref 1.85–7.62)
NEUTS SEG NFR BLD AUTO: 23 % (ref 43–75)
NRBC BLD AUTO-RTO: 0 /100 WBCS
PLATELET # BLD AUTO: 204 THOUSANDS/UL (ref 149–390)
PMV BLD AUTO: 10.3 FL (ref 8.9–12.7)
POTASSIUM SERPL-SCNC: 4.6 MMOL/L (ref 3.5–5.3)
PROT SERPL-MCNC: 6.9 G/DL (ref 6.4–8.4)
PROTHROMBIN TIME: 13.7 SECONDS (ref 12.3–15)
RBC # BLD AUTO: 4.78 MILLION/UL (ref 3.88–5.62)
SODIUM SERPL-SCNC: 141 MMOL/L (ref 135–147)
TACROLIMUS BLD-MCNC: 9 NG/ML (ref 3–15)
TSH SERPL DL<=0.05 MIU/L-ACNC: 1.37 UIU/ML (ref 0.45–4.5)
WBC # BLD AUTO: 2.7 THOUSAND/UL (ref 4.31–10.16)

## 2025-07-10 PROCEDURE — 83735 ASSAY OF MAGNESIUM: CPT

## 2025-07-10 PROCEDURE — 80197 ASSAY OF TACROLIMUS: CPT

## 2025-07-10 PROCEDURE — 82977 ASSAY OF GGT: CPT

## 2025-07-10 PROCEDURE — 85025 COMPLETE CBC W/AUTO DIFF WBC: CPT

## 2025-07-10 PROCEDURE — 80053 COMPREHEN METABOLIC PANEL: CPT

## 2025-07-10 PROCEDURE — 84443 ASSAY THYROID STIM HORMONE: CPT

## 2025-07-10 PROCEDURE — 36415 COLL VENOUS BLD VENIPUNCTURE: CPT

## 2025-07-10 PROCEDURE — 86618 LYME DISEASE ANTIBODY: CPT

## 2025-07-10 PROCEDURE — 85610 PROTHROMBIN TIME: CPT

## 2025-07-17 ENCOUNTER — APPOINTMENT (EMERGENCY)
Dept: RADIOLOGY | Facility: HOSPITAL | Age: 30
End: 2025-07-17
Payer: COMMERCIAL

## 2025-07-17 ENCOUNTER — HOSPITAL ENCOUNTER (EMERGENCY)
Facility: HOSPITAL | Age: 30
Discharge: ANOTHER HOSPITAL | End: 2025-07-17
Attending: EMERGENCY MEDICINE
Payer: COMMERCIAL

## 2025-07-17 VITALS
HEART RATE: 98 BPM | SYSTOLIC BLOOD PRESSURE: 132 MMHG | HEIGHT: 71 IN | BODY MASS INDEX: 26.2 KG/M2 | RESPIRATION RATE: 18 BRPM | WEIGHT: 187.17 LBS | TEMPERATURE: 98.8 F | OXYGEN SATURATION: 95 % | DIASTOLIC BLOOD PRESSURE: 61 MMHG

## 2025-07-17 DIAGNOSIS — R50.9 FEVER, UNSPECIFIED FEVER CAUSE: ICD-10-CM

## 2025-07-17 DIAGNOSIS — D84.9 IMMUNOCOMPROMISED (HCC): Primary | ICD-10-CM

## 2025-07-17 LAB
ALBUMIN SERPL BCG-MCNC: 4.4 G/DL (ref 3.5–5)
ALP SERPL-CCNC: 50 U/L (ref 34–104)
ALT SERPL W P-5'-P-CCNC: 23 U/L (ref 7–52)
ANION GAP SERPL CALCULATED.3IONS-SCNC: 7 MMOL/L (ref 4–13)
ANISOCYTOSIS BLD QL SMEAR: PRESENT
APTT PPP: 29 SECONDS (ref 23–34)
AST SERPL W P-5'-P-CCNC: 25 U/L (ref 13–39)
ATRIAL RATE: 93 BPM
BASOPHILS # BLD MANUAL: 0.15 THOUSAND/UL (ref 0–0.1)
BASOPHILS NFR MAR MANUAL: 2 % (ref 0–1)
BILIRUB SERPL-MCNC: 0.62 MG/DL (ref 0.2–1)
BILIRUB UR QL STRIP: NEGATIVE
BUN SERPL-MCNC: 21 MG/DL (ref 5–25)
CALCIUM SERPL-MCNC: 9.8 MG/DL (ref 8.4–10.2)
CHLORIDE SERPL-SCNC: 102 MMOL/L (ref 96–108)
CLARITY UR: CLEAR
CO2 SERPL-SCNC: 27 MMOL/L (ref 21–32)
COLOR UR: COLORLESS
CREAT SERPL-MCNC: 1.34 MG/DL (ref 0.6–1.3)
EOSINOPHIL # BLD MANUAL: 0.22 THOUSAND/UL (ref 0–0.4)
EOSINOPHIL NFR BLD MANUAL: 3 % (ref 0–6)
ERYTHROCYTE [DISTWIDTH] IN BLOOD BY AUTOMATED COUNT: 12.4 % (ref 11.6–15.1)
FLUAV RNA RESP QL NAA+PROBE: NEGATIVE
FLUBV RNA RESP QL NAA+PROBE: NEGATIVE
GFR SERPL CREATININE-BSD FRML MDRD: 69 ML/MIN/1.73SQ M
GLUCOSE SERPL-MCNC: 110 MG/DL (ref 65–140)
GLUCOSE UR STRIP-MCNC: NEGATIVE MG/DL
HCT VFR BLD AUTO: 40.6 % (ref 36.5–49.3)
HGB BLD-MCNC: 13.6 G/DL (ref 12–17)
HGB UR QL STRIP.AUTO: NEGATIVE
INR PPP: 1.02 (ref 0.85–1.19)
KETONES UR STRIP-MCNC: NEGATIVE MG/DL
LACTATE SERPL-SCNC: 0.9 MMOL/L (ref 0.5–2)
LEUKOCYTE ESTERASE UR QL STRIP: NEGATIVE
LYMPHOCYTES # BLD AUTO: 1.11 THOUSAND/UL (ref 0.6–4.47)
LYMPHOCYTES # BLD AUTO: 10 % (ref 14–44)
MCH RBC QN AUTO: 28.4 PG (ref 26.8–34.3)
MCHC RBC AUTO-ENTMCNC: 33.5 G/DL (ref 31.4–37.4)
MCV RBC AUTO: 85 FL (ref 82–98)
MONOCYTES # BLD AUTO: 0.59 THOUSAND/UL (ref 0–1.22)
MONOCYTES NFR BLD: 8 % (ref 4–12)
NEUTROPHILS # BLD MANUAL: 5.34 THOUSAND/UL (ref 1.85–7.62)
NEUTS BAND NFR BLD MANUAL: 1 % (ref 0–8)
NEUTS SEG NFR BLD AUTO: 71 % (ref 43–75)
NITRITE UR QL STRIP: NEGATIVE
P AXIS: 63 DEGREES
PH UR STRIP.AUTO: 6 [PH]
PLATELET # BLD AUTO: 191 THOUSANDS/UL (ref 149–390)
PLATELET BLD QL SMEAR: ADEQUATE
PMV BLD AUTO: 9.9 FL (ref 8.9–12.7)
POTASSIUM SERPL-SCNC: 4.5 MMOL/L (ref 3.5–5.3)
PR INTERVAL: 150 MS
PROCALCITONIN SERPL-MCNC: 0.13 NG/ML
PROT SERPL-MCNC: 7.2 G/DL (ref 6.4–8.4)
PROT UR STRIP-MCNC: NEGATIVE MG/DL
PROTHROMBIN TIME: 14.1 SECONDS (ref 12.3–15)
QRS AXIS: 28 DEGREES
QRSD INTERVAL: 82 MS
QT INTERVAL: 334 MS
QTC INTERVAL: 415 MS
RBC # BLD AUTO: 4.79 MILLION/UL (ref 3.88–5.62)
RBC MORPH BLD: PRESENT
RSV RNA RESP QL NAA+PROBE: NEGATIVE
SARS-COV-2 RNA RESP QL NAA+PROBE: NEGATIVE
SODIUM SERPL-SCNC: 136 MMOL/L (ref 135–147)
SP GR UR STRIP.AUTO: 1.01 (ref 1–1.03)
T WAVE AXIS: 33 DEGREES
UROBILINOGEN UR STRIP-ACNC: <2 MG/DL
VARIANT LYMPHS # BLD AUTO: 5 %
VENTRICULAR RATE: 93 BPM
WBC # BLD AUTO: 7.42 THOUSAND/UL (ref 4.31–10.16)

## 2025-07-17 PROCEDURE — 71046 X-RAY EXAM CHEST 2 VIEWS: CPT

## 2025-07-17 PROCEDURE — 83605 ASSAY OF LACTIC ACID: CPT

## 2025-07-17 PROCEDURE — 87478 BORRELIA MIYAMOTOI AMP PRB: CPT | Performed by: EMERGENCY MEDICINE

## 2025-07-17 PROCEDURE — 87801 DETECT AGNT MULT DNA AMPLI: CPT | Performed by: EMERGENCY MEDICINE

## 2025-07-17 PROCEDURE — 36415 COLL VENOUS BLD VENIPUNCTURE: CPT

## 2025-07-17 PROCEDURE — 87040 BLOOD CULTURE FOR BACTERIA: CPT

## 2025-07-17 PROCEDURE — 85007 BL SMEAR W/DIFF WBC COUNT: CPT

## 2025-07-17 PROCEDURE — 96365 THER/PROPH/DIAG IV INF INIT: CPT

## 2025-07-17 PROCEDURE — 84145 PROCALCITONIN (PCT): CPT

## 2025-07-17 PROCEDURE — 81003 URINALYSIS AUTO W/O SCOPE: CPT

## 2025-07-17 PROCEDURE — 96366 THER/PROPH/DIAG IV INF ADDON: CPT

## 2025-07-17 PROCEDURE — 85610 PROTHROMBIN TIME: CPT

## 2025-07-17 PROCEDURE — 99284 EMERGENCY DEPT VISIT MOD MDM: CPT

## 2025-07-17 PROCEDURE — 87468 ANAPLSMA PHGCYTOPHLM AMP PRB: CPT | Performed by: EMERGENCY MEDICINE

## 2025-07-17 PROCEDURE — 80197 ASSAY OF TACROLIMUS: CPT | Performed by: EMERGENCY MEDICINE

## 2025-07-17 PROCEDURE — 87484 EHRLICHA CHAFFEENSIS AMP PRB: CPT | Performed by: EMERGENCY MEDICINE

## 2025-07-17 PROCEDURE — 93005 ELECTROCARDIOGRAM TRACING: CPT

## 2025-07-17 PROCEDURE — 87637 SARSCOV2&INF A&B&RSV AMP PRB: CPT | Performed by: EMERGENCY MEDICINE

## 2025-07-17 PROCEDURE — 85027 COMPLETE CBC AUTOMATED: CPT

## 2025-07-17 PROCEDURE — 99291 CRITICAL CARE FIRST HOUR: CPT | Performed by: EMERGENCY MEDICINE

## 2025-07-17 PROCEDURE — 87469 BABESIA MICROTI AMP PRB: CPT | Performed by: EMERGENCY MEDICINE

## 2025-07-17 PROCEDURE — 96368 THER/DIAG CONCURRENT INF: CPT

## 2025-07-17 PROCEDURE — 80053 COMPREHEN METABOLIC PANEL: CPT

## 2025-07-17 PROCEDURE — 93010 ELECTROCARDIOGRAM REPORT: CPT | Performed by: INTERNAL MEDICINE

## 2025-07-17 PROCEDURE — 85730 THROMBOPLASTIN TIME PARTIAL: CPT

## 2025-07-17 RX ORDER — SODIUM CHLORIDE, SODIUM GLUCONATE, SODIUM ACETATE, POTASSIUM CHLORIDE, MAGNESIUM CHLORIDE, SODIUM PHOSPHATE, DIBASIC, AND POTASSIUM PHOSPHATE .53; .5; .37; .037; .03; .012; .00082 G/100ML; G/100ML; G/100ML; G/100ML; G/100ML; G/100ML; G/100ML
1000 INJECTION, SOLUTION INTRAVENOUS ONCE
Status: COMPLETED | OUTPATIENT
Start: 2025-07-17 | End: 2025-07-17

## 2025-07-17 RX ADMIN — DEXTROSE MONOHYDRATE 2000 MG: 50 INJECTION, SOLUTION INTRAVENOUS at 12:10

## 2025-07-17 RX ADMIN — SODIUM CHLORIDE, SODIUM GLUCONATE, SODIUM ACETATE, POTASSIUM CHLORIDE, MAGNESIUM CHLORIDE, SODIUM PHOSPHATE, DIBASIC, AND POTASSIUM PHOSPHATE 1000 ML: .53; .5; .37; .037; .03; .012; .00082 INJECTION, SOLUTION INTRAVENOUS at 12:46

## 2025-07-17 RX ADMIN — SODIUM CHLORIDE, SODIUM GLUCONATE, SODIUM ACETATE, POTASSIUM CHLORIDE, MAGNESIUM CHLORIDE, SODIUM PHOSPHATE, DIBASIC, AND POTASSIUM PHOSPHATE 1000 ML: .53; .5; .37; .037; .03; .012; .00082 INJECTION, SOLUTION INTRAVENOUS at 12:01

## 2025-07-17 RX ADMIN — SODIUM CHLORIDE, SODIUM GLUCONATE, SODIUM ACETATE, POTASSIUM CHLORIDE, MAGNESIUM CHLORIDE, SODIUM PHOSPHATE, DIBASIC, AND POTASSIUM PHOSPHATE 1000 ML: .53; .5; .37; .037; .03; .012; .00082 INJECTION, SOLUTION INTRAVENOUS at 13:11

## 2025-07-17 NOTE — ED NOTES
[facility] ([facility]) called for intake information on this pt.      Telma Francois RN  07/17/25 1722

## 2025-07-17 NOTE — ED PROVIDER NOTES
Time reflects when diagnosis was documented in both MDM as applicable and the Disposition within this note       Time User Action Codes Description Comment    7/17/2025  1:18 PM Britni Sy Add [D84.9] Immunocompromised (HCC)     7/17/2025  1:18 PM Britni Sy Add [R50.9] Fever, unspecified fever cause           ED Disposition       ED Disposition   Transfer to Another Facility - Out of Network    Condition   --    Date/Time   Thu Jul 17, 2025  1:18 PM    Comment   Milton Padilla should be transferred out to [facility] - Dr. Castro.               Assessment & Plan       Medical Decision Making  Patient is a 30-year-old male who is status post liver and kidney transplant.  Over the last 2 weeks he has noticed significantly increasing fatigue, which time his PCP did test him for Lyme.  He stated this was negative.  The tickborne panel was not included.   Patient states that he has since developed persistent fever for 2 days. His [facility] transplant team recommended evaluation at a local ER. He denies any URI complaints other than mild cough. No abdominal pain, nausea, vomiting. No change in urine output. Concern for sepsis (potential for multiple sources) in immunocompromised patient, neutropenia, rejection, viral syndrome.   Full sepsis workup has been ordered. Broad spectrum antibiotics provided. Will discuss further with the transplant team pending results.     Problems Addressed:  Fever, unspecified fever cause: acute illness or injury that poses a threat to life or bodily functions    Amount and/or Complexity of Data Reviewed  External Data Reviewed: labs, ECG and notes.     Details: Previous visits and labs obtained by PCP reviewed .   [facility] team reccs reviewed  Labs: ordered. Decision-making details documented in ED Course.  Radiology: ordered.  ECG/medicine tests: independent interpretation performed.     Details: Normal sinus rhythm at 93, normal axis, normal intervals, no acute ischemia similar  to prior  Discussion of management or test interpretation with external provider(s): See ED course. Case reviewed and discussed with     Risk  Prescription drug management.  Decision regarding hospitalization.  Risk Details: Critical Care Time Statement: Upon my evaluation, this patient had a high probability of imminent or life-threatening deterioration due to possible sepsis, immunocompromised patient, transplant rejection, which required my direct attention, intervention, and personal management.  I spent a total of 45 minutes directly providing critical care services, including: obtaining history including history from alternate historians, reviewing prior chart, examining the patient, ordering & review of studies, goals of care discussion as necessary to develop management plan, evaluation of patient's response to treatment, frequent reassessment, and discussions with consultants. Additional interventions include: interpretation of complex medical databases and evaluating for the presence of life-threatening injuries or illnesses. This time is exclusive of procedures, teaching, treating other patients, family meetings, and any prior time recorded by providers other than myself.              ED Course as of 07/17/25 1953   Thu Jul 17, 2025   1229 Comprehensive metabolic panel(!)  Baseline renal function, stable LFTs   1229 Lactic acid, plasma (w/reflex if result > 2.0)   1229 UA w Reflex to Microscopic w Reflex to Culture   1229 Procalcitonin   1229 CBC and differential   1312 Case discussed with transplant team at [facility] for disposition planning based on labs, cultures.   Accepted at [facility] in transfer for admission, Dr. Castro, for further monitoring. No additional med recommendations.        Medications   multi-electrolyte (Plasmalyte-A/Isolyte-S PH 7.4/Normosol-R) IV bolus 1,000 mL (0 mL Intravenous Stopped 7/17/25 1310)     Followed by   multi-electrolyte (Plasmalyte-A/Isolyte-S PH 7.4/Normosol-R) IV bolus  1,000 mL (0 mL Intravenous Stopped 7/17/25 1420)     Followed by   multi-electrolyte (Plasmalyte-A/Isolyte-S PH 7.4/Normosol-R) IV bolus 1,000 mL (0 mL Intravenous Stopped 7/17/25 1420)   ceftriaxone (ROCEPHIN) 2 g/50 mL in dextrose IVPB (0 mg Intravenous Stopped 7/17/25 1246)       ED Risk Strat Scores                    No data recorded        SBIRT 22yo+      Flowsheet Row Most Recent Value   Initial Alcohol Screen: US AUDIT-C     1. How often do you have a drink containing alcohol? 0 Filed at: 07/17/2025 1749   2. How many drinks containing alcohol do you have on a typical day you are drinking?  0 Filed at: 07/17/2025 1749   3a. Male UNDER 65: How often do you have five or more drinks on one occasion? 0 Filed at: 07/17/2025 1749   Audit-C Score 0 Filed at: 07/17/2025 1749   RADHA: How many times in the past year have you...    Used an illegal drug or used a prescription medication for non-medical reasons? Never Filed at: 07/17/2025 1749                            History of Present Illness       Chief Complaint   Patient presents with    Fever Immunocompromised     8 months ago had a liver and kidney transplant. The last 2 days patient has been having fevers of over 101 and elevated heart rate. Patient was told to come to the ER for evaluation by [facility] transplant team        Past Medical History[1]   Past Surgical History[2]   Family History[3]   Social History[4]   E-Cigarette/Vaping    E-Cigarette Use Never User       E-Cigarette/Vaping Substances    Nicotine No     THC No     CBD No     Flavoring No     Other No     Unknown No       I have reviewed and agree with the history as documented.     Patient  presents to the ED with febrile illness of uncertain source in setting of recent transplant and immunocompromise      History provided by:  Medical records and patient  Fever Immunocompromised      Review of Systems   Allergic/Immunologic: Positive for immunocompromised state.           Objective       ED Triage  Vitals [07/17/25 1040]   Temperature Pulse Blood Pressure Respirations SpO2 Patient Position - Orthostatic VS   (!) 100.6 °F (38.1 °C) 90 133/74 18 100 % Sitting      Temp Source Heart Rate Source BP Location FiO2 (%) Pain Score    Oral Monitor Right arm -- --      Vitals      Date and Time Temp Pulse SpO2 Resp BP Pain Score FACES Pain Rating User   07/17/25 1828 98.8 °F (37.1 °C) 98 95 % -- 132/61 -- -- KW   07/17/25 1602 -- 84 100 % -- 126/62 -- -- KW   07/17/25 1420 98.2 °F (36.8 °C) 89 98 % 18 137/72 -- -- JK   07/17/25 1040 100.6 °F (38.1 °C) 90 100 % 18 133/74 -- -- CT            Physical Exam    Results Reviewed       Procedure Component Value Units Date/Time    Blood culture #1 [[id number]] Collected: 07/17/25 1208    Lab Status: Preliminary result Specimen: Blood from Hand, Right Updated: 07/17/25 1813     Blood Culture Received in Microbiology Lab. Culture in Progress.    Blood culture #2 [[id number]] Collected: 07/17/25 1152    Lab Status: Preliminary result Specimen: Blood from Arm, Right Updated: 07/17/25 1801     Blood Culture Received in Microbiology Lab. Culture in Progress.    Manual Differential(PHLEBS Do Not Order) [[id number]]  (Abnormal) Collected: 07/17/25 1152    Lab Status: Final result Specimen: Blood from Arm, Right Updated: 07/17/25 1243     Segmented % 71 %      Bands % 1 %      Lymphocytes % 10 %      Monocytes % 8 %      Eosinophils % 3 %      Basophils % 2 %      Atypical Lymphocytes % 5 %      Absolute Neutrophils 5.34 Thousand/uL      Absolute Lymphocytes 1.11 Thousand/uL      Absolute Monocytes 0.59 Thousand/uL      Absolute Eosinophils 0.22 Thousand/uL      Absolute Basophils 0.15 Thousand/uL      Total Counted --     RBC Morphology Present     Platelet Estimate Adequate     Anisocytosis Present    FLU/RSV/COVID - if FLU/RSV clinically relevant (2hr TAT) [[id number]]  (Normal) Collected: 07/17/25 1152    Lab Status: Final result Specimen: Nares from Nose Updated: 07/17/25 1237      SARS-CoV-2 Negative     INFLUENZA A PCR Negative     INFLUENZA B PCR Negative     RSV PCR Negative    Narrative:      This test has been performed using the CoV-2/Flu/RSV plus assay on the [employer] Top-Level Domain platform. This test has been validated by the  and verified by the performing laboratory.     This test is designed to amplify and detect the following: nucleocapsid (N), envelope (E), and RNA-dependent RNA polymerase (RdRP) genes of the SARS-CoV-2 genome; matrix (M), basic polymerase (PB2), and acidic protein (PA) segments of the influenza A genome; matrix (M) and non-structural protein (NS) segments of the influenza B genome, and the nucleocapsid genes of RSV A and RSV B.     Positive results are indicative of the presence of Flu A, Flu B, RSV, and/or SARS-CoV-2 RNA. Positive results for SARS-CoV-2 or suspected novel influenza should be reported to state, local, or federal health departments according to local reporting requirements.      All results should be assessed in conjunction with clinical presentation and other laboratory markers for clinical management.     FOR PEDIATRIC PATIENTS - copy/paste COVID Guidelines URL to browser: [email or web address]       Procalcitonin [[id number]]  (Normal) Collected: 07/17/25 1152    Lab Status: Final result Specimen: Blood from Arm, Right Updated: 07/17/25 1228     Procalcitonin 0.13 ng/ml     APTT [[id number]]  (Normal) Collected: 07/17/25 1152    Lab Status: Final result Specimen: Blood from Arm, Right Updated: 07/17/25 1222     PTT 29 seconds     Protime-INR [[id number]]  (Normal) Collected: 07/17/25 1152    Lab Status: Final result Specimen: Blood from Arm, Right Updated: 07/17/25 1222     Protime 14.1 seconds      INR 1.02    Narrative:      INR Therapeutic Range    Indication                                             INR Range      Atrial Fibrillation                                                2.0-3.0  Hypercoagulable State                                    2.0.2.3  Left Ventricular Asist Device                            2.0-3.0  Mechanical Heart Valve                                  -    Aortic(with afib, MI, embolism, HF, LA enlargement,    and/or coagulopathy)                                     2.0-3.0 (2.5-3.5)     Mitral                                                             2.5-3.5  Prosthetic/Bioprosthetic Heart Valve               2.0-3.0  Venous thromboembolism (VTE: VT, PE        2.0-3.0    Comprehensive metabolic panel [[id number]]  (Abnormal) Collected: 07/17/25 1152    Lab Status: Final result Specimen: Blood from Arm, Right Updated: 07/17/25 1217     Sodium 136 mmol/L      Potassium 4.5 mmol/L      Chloride 102 mmol/L      CO2 27 mmol/L      ANION GAP 7 mmol/L      BUN 21 mg/dL      Creatinine 1.34 mg/dL      Glucose 110 mg/dL      Calcium 9.8 mg/dL      AST 25 U/L      ALT 23 U/L      Alkaline Phosphatase 50 U/L      Total Protein 7.2 g/dL      Albumin 4.4 g/dL      Total Bilirubin 0.62 mg/dL      eGFR 69 ml/min/1.73sq m     Narrative:      National Kidney Disease Foundation guidelines for Chronic Kidney Disease (CKD):     Stage 1 with normal or high GFR (GFR > 90 mL/min/1.73 square meters)    Stage 2 Mild CKD (GFR = 60-89 mL/min/1.73 square meters)    Stage 3A Moderate CKD (GFR = 45-59 mL/min/1.73 square meters)    Stage 3B Moderate CKD (GFR = 30-44 mL/min/1.73 square meters)    Stage 4 Severe CKD (GFR = 15-29 mL/min/1.73 square meters)    Stage 5 End Stage CKD (GFR <15 mL/min/1.73 square meters)  Note: GFR calculation is accurate only with a steady state creatinine    Lactic acid, plasma (w/reflex if result > 2.0) [[id number]]  (Normal) Collected: 07/17/25 1152    Lab Status: Final result Specimen: Blood from Arm, Right Updated: 07/17/25 1216     LACTIC ACID 0.9 mmol/L     Narrative:      Result may be elevated if tourniquet was used during collection.    CBC and differential  [[id number]]  (Normal) Collected: 07/17/25 1152    Lab Status: Final result Specimen: Blood from Arm, Right Updated: 07/17/25 1206     WBC 7.42 Thousand/uL      RBC 4.79 Million/uL      Hemoglobin 13.6 g/dL      Hematocrit 40.6 %      MCV 85 fL      MCH 28.4 pg      MCHC 33.5 g/dL      RDW 12.4 %      MPV 9.9 fL      Platelets 191 Thousands/uL     UA w Reflex to Microscopic w Reflex to Culture [[id number]] Collected: 07/17/25 1154    Lab Status: Final result Specimen: Urine, Clean Catch Updated: 07/17/25 1205     Color, UA Colorless     Clarity, UA Clear     Specific Gravity, UA 1.007     pH, UA 6.0     Leukocytes, UA Negative     Nitrite, UA Negative     Protein, UA Negative mg/dl      Glucose, UA Negative mg/dl      Ketones, UA Negative mg/dl      Urobilinogen, UA <2.0 mg/dl      Bilirubin, UA Negative     Occult Blood, UA Negative    Tacrolimus level [[id number]] Collected: 07/17/25 1152    Lab Status: In process Specimen: Blood from Arm, Right Updated: 07/17/25 1158    TICK BORNE DISEASE, ACUTE MOLECULAR PANEL [[id number]] Collected: 07/17/25 1152    Lab Status: In process Specimen: Blood from Arm, Right Updated: 07/17/25 1158            XR chest 2 views    (Results Pending)       Procedures    ED Medication and Procedure Management   Prior to Admission Medications   Prescriptions Last Dose Informant Patient Reported? Taking?   Potassium Citrate ER 15 MEQ (1620 MG) TBCR 7/17/2025  Yes Yes   QUEtiapine (SEROquel) 25 mg tablet 7/16/2025  No Yes   Sig: Take 1 tablet (25 mg total) by mouth daily at bedtime   carvedilol (COREG) 6.25 mg tablet 7/17/2025  No Yes   Sig: Take 1 tablet by mouth every 12 hours.   famotidine (PEPCID) 20 mg tablet 7/17/2025  Yes Yes   Sig: Take 20 mg by mouth in the morning.   mycophenolate (CELLCEPT) 250 mg capsule 7/17/2025  Yes Yes   Sig: Take 500 mg by mouth in the morning and 500 mg in the evening.   predniSONE 5 mg tablet 7/17/2025  Yes Yes   Sig: Take 5 mg by mouth in the morning. Take 1  tablet by mouth .   pyridoxine (VITAMIN B6) 100 mg tablet 7/16/2025  Yes Yes   Sig: Take 100 mg by mouth in the morning.   rosuvastatin (CRESTOR) 5 mg tablet 7/17/2025  No Yes   Sig: Take 1 tablet (5 mg total) by mouth daily   sulfamethoxazole-trimethoprim (BACTRIM) 400-80 mg per tablet 7/17/2025  Yes Yes   Sig: Take 1 tablet by mouth in the morning.   tacrolimus (Prograf) 1 mg capsule 7/17/2025  Yes Yes   Sig: Take 1 mg by mouth every 12 (twelve) hours Take 3 mg in the morning, 2.5 mg at night      Facility-Administered Medications: None     Discharge Medication List as of 7/17/2025  7:05 PM        CONTINUE these medications which have NOT CHANGED    Details   carvedilol (COREG) 6.25 mg tablet Take 1 tablet by mouth every 12 hours., Starting Mon 6/16/2025, Normal      famotidine (PEPCID) 20 mg tablet Take 20 mg by mouth in the morning., Starting Fri 11/22/2024, Historical Med      mycophenolate (CELLCEPT) 250 mg capsule Take 500 mg by mouth in the morning and 500 mg in the evening., Starting Thu 11/21/2024, Historical Med      Potassium Citrate ER 15 MEQ (1620 MG) TBCR Historical Med      predniSONE 5 mg tablet Take 5 mg by mouth in the morning. Take 1 tablet by mouth ., Starting Thu 11/21/2024, Historical Med      pyridoxine (VITAMIN B6) 100 mg tablet Take 100 mg by mouth in the morning., Starting Tue 12/10/2024, Historical Med      QUEtiapine (SEROquel) 25 mg tablet Take 1 tablet (25 mg total) by mouth daily at bedtime, Starting Mon 7/7/2025, Normal      rosuvastatin (CRESTOR) 5 mg tablet Take 1 tablet (5 mg total) by mouth daily, Starting Mon 7/7/2025, Normal      sulfamethoxazole-trimethoprim (BACTRIM) 400-80 mg per tablet Take 1 tablet by mouth in the morning., Starting Thu 11/21/2024, Historical Med      tacrolimus (Prograf) 1 mg capsule Take 1 mg by mouth every 12 (twelve) hours Take 3 mg in the morning, 2.5 mg at night, Historical Med           No discharge procedures on file.  ED SEPSIS DOCUMENTATION    Time reflects when diagnosis was documented in both MDM as applicable and the Disposition within this note       Time User Action Codes Description Comment    7/17/2025  1:18 PM Britni Sy Add [D84.9] Immunocompromised (HCC)     7/17/2025  1:18 PM Britni Sy Add [R50.9] Fever, unspecified fever cause                    [1]   Past Medical History:  Diagnosis Date    Anxiety     Chronic kidney disease     GERD (gastroesophageal reflux disease)     Hypertension     Kidney stone     Liver disorder     Nephrolithiasis     Primary hyperoxaluria type 1 (HCC)    [2]   Past Surgical History:  Procedure Laterality Date    ANKLE SURGERY Right 2017    ligament repair    CARDIAC CATHETERIZATION Left 12/1/2023    Procedure: Cardiac catheterization;  Surgeon: Teresa Mireles MD;  Location: [facility];  Service: Cardiology    CARDIAC CATHETERIZATION N/A 12/1/2023    Procedure: Cardiac Coronary Angiogram;  Surgeon: Teresa Mireles MD;  Location: [facility];  Service: Cardiology    CARDIAC CATHETERIZATION Left 12/1/2023    Procedure: Cardiac Left Heart Cath;  Surgeon: Teresa Mireles MD;  Location: [facility];  Service: Cardiology    CYSTOSCOPY      FL RETROGRADE PYELOGRAM  10/24/2020    FL RETROGRADE PYELOGRAM  02/08/2021    HERNIA REPAIR      IR AV FISTULAGRAM/GRAFTOGRAM  6/25/2024    IR AV FISTULAGRAM/GRAFTOGRAM  9/24/2024    IR AV FISTULAGRAM/GRAFTOGRAM  10/14/2024    IR TUNNELED DIALYSIS CATHETER CHECK/CHANGE/REPOSITION/ANGIOPLASTY  12/4/2023    IR TUNNELED DIALYSIS CATHETER PLACEMENT  8/24/2023    IR TUNNELED DIALYSIS CATHETER REMOVAL  3/13/2024    KIDNEY STONE SURGERY      LITHOTRIPSY      MASS EXCISION Left 02/17/2017    Procedure: BACK/SHOULDER CYST EXCISION ;  Surgeon: John Muhammad MD;  Location: [facility];  Service:     ND ARTERIOVENOUS ANASTOMOSIS OPEN DIRECT Left 12/14/2023    Procedure: CREATION FISTULA ARTERIOVENOUS (AV);  Surgeon: Lencho Worthington MD;   Location: [facility];  Service: Vascular    SC CYSTO/URETERO W/LITHOTRIPSY &INDWELL STENT INSRT Left 10/24/2020    Procedure: CYSTOSCOPY URETEROSCOPY WITH LITHOTRIPSY HOLMIUM LASER, RETROGRADE PYELOGRAM AND INSERTION STENT URETERAL;  Surgeon: Carlos Le MD;  Location: [facility];  Service: Urology    SC CYSTO/URETERO W/LITHOTRIPSY &INDWELL STENT INSRT Right 02/08/2021    Procedure: CYSTOSCOPY URETEROSCOPY WITH LITHOTRIPSY HOLMIUM LASER, RETROGRADE PYELOGRAM AND INSERTION STENT URETERAL;  Surgeon: Brian Merchant MD;  Location: [facility];  Service: Urology   [3]   Family History  Problem Relation Name Age of Onset    Hypertension Mother Corrine Murphy     No Known Problems Father      Cancer Maternal Grandmother Rachel Duboiss         Bladder    Diabetes Maternal Grandmother Rachel Archana     Alzheimer's disease Maternal Grandfather     [4]   Social History  Tobacco Use    Smoking status: Never    Smokeless tobacco: Never   Vaping Use    Vaping status: Never Used   Substance Use Topics    Alcohol use: Yes     Comment: rarely    Drug use: No        Britni Sy MD  07/17/25 1953

## 2025-07-17 NOTE — EMTALA/ACUTE CARE TRANSFER
[facility] EMERGENCY DEPARTMENT  [address]  [place] [address]  Dept: [phone]      EMTALA TRANSFER CONSENT    NAME Milton Padilla                                         1995                              MRN [id number]    I have been informed of my rights regarding examination, treatment, and transfer   by Dr. Britni Sy MD    Benefits:   higher level of care, availability of transplant services    Risks:  decompensation en route      Consent for Transfer:  I acknowledge that my medical condition has been evaluated and explained to me by the emergency department physician or other qualified medical person and/or my attending physician, who has recommended that I be transferred to the service of Transplant   at [facility] . The above potential benefits of such transfer, the potential risks associated with such transfer, and the probable risks of not being transferred have been explained to me, and I fully understand them.  The doctor has explained that, in my case, the benefits of transfer outweigh the risks.  I agree to be transferred.    I authorize the performance of emergency medical procedures and treatments upon me in both transit and upon arrival at the receiving facility.  Additionally, I authorize the release of any and all medical records to the receiving facility and request they be transported with me, if possible.  I understand that the safest mode of transportation during a medical emergency is an ambulance and that the Hospital advocates the use of this mode of transport. Risks of traveling to the receiving facility by car, including absence of medical control, life sustaining equipment, such as oxygen, and medical personnel has been explained to me and I fully understand them.    (KENISHA CORRECT BOX BELOW)  [  ]  I consent to the stated transfer and to be transported by ambulance/helicopter.  [  ]  I consent to the stated transfer, but refuse  transportation by ambulance and accept full responsibility for my transportation by car.  I understand the risks of non-ambulance transfers and I exonerate the Hospital and its staff from any deterioration in my condition that results from this refusal.    X___________________________________________    DATE  25  TIME________  Signature of patient or legally responsible individual signing on patient behalf           RELATIONSHIP TO PATIENT_________________________          Provider Certification    NAME Milton Padilla                                         1995                              MRN [id number]    A medical screening exam was performed on the above named patient.  Based on the examination:    Condition Necessitating Transfer The primary encounter diagnosis was Immunocompromised (HCC). A diagnosis of Fever, unspecified fever cause was also pertinent to this visit.    Patient Condition:  stable    Reason for Transfer:   higher level of care, specialty care unavailable    Transfer Requirements: Facility     Space available and qualified personnel available for treatment as acknowledged by  Dr. Castro  Agreed to accept transfer and to provide appropriate medical treatment as acknowledged by - same          Appropriate medical records of the examination and treatment of the patient are provided at the time of transfer   STAFF INITIAL WHEN COMPLETED _______  Transfer will be performed by qualified personnel from    and appropriate transfer equipment as required, including the use of necessary and appropriate life support measures.    Provider Certification: I have examined the patient and explained the following risks and benefits of being transferred/refusing transfer to the patient/family:         Based on these reasonable risks and benefits to the patient and/or the unborn child(yulia), and based upon the information available at the time of the patient’s examination, I certify that the  medical benefits reasonably to be expected from the provision of appropriate medical treatments at another medical facility outweigh the increasing risks, if any, to the individual’s medical condition, and in the case of labor to the unborn child, from effecting the transfer.    X____________________________________________ DATE 07/17/25        TIME_______      ORIGINAL - SEND TO MEDICAL RECORDS   COPY - SEND WITH PATIENT DURING TRANSFER

## 2025-07-17 NOTE — ED NOTES
Called [phone], spoke to Sandhya ([facility]), gave report about the pt, history, meds given, vital signs, and assessment.     Telma Francois RN  07/17/25 6624

## 2025-07-18 LAB — TACROLIMUS BLD-MCNC: 20.5 NG/ML (ref 3–15)

## 2025-07-20 LAB
BACTERIA BLD CULT: NORMAL
BACTERIA BLD CULT: NORMAL

## 2025-07-21 LAB
A PHAGOCYTOPH DNA BLD QL NAA+PROBE: NOT DETECTED
B MICROTI DNA BLD QL NAA+PROBE: NOT DETECTED
B MIYAMOTOI FLAB SPEC QL NAA+PROBE: NOT DETECTED
BORRELIA DNA BLD QL NAA+PROBE: NOT DETECTED
COMMENT: NORMAL
E CHAFFEENSIS DNA BLD QL NAA+PROBE: NOT DETECTED

## 2025-07-22 LAB
BACTERIA BLD CULT: NORMAL
BACTERIA BLD CULT: NORMAL

## (undated) DEVICE — GLOVE INDICATOR PI UNDERGLOVE SZ 7.5 BLUE

## (undated) DEVICE — GLOVE SRG BIOGEL ECLIPSE 7.5

## (undated) DEVICE — SCD SEQUENTIAL COMPRESSION COMFORT SLEEVE MEDIUM KNEE LENGTH: Brand: KENDALL SCD

## (undated) DEVICE — LIGACLIP MCA MULTIPLE CLIP APPLIERS, 20 SMALL CLIPS: Brand: LIGACLIP

## (undated) DEVICE — STRL UNIVERSAL MINOR GENERAL: Brand: CARDINAL HEALTH

## (undated) DEVICE — PETRI DISH STERILE

## (undated) DEVICE — PACK TUR

## (undated) DEVICE — UROCATCH BAG

## (undated) DEVICE — SUT SILK 4-0 18 IN A183H

## (undated) DEVICE — LASER HOLMIUM FIBER 365 MIC

## (undated) DEVICE — GLOVE SRG BIOGEL 7.5

## (undated) DEVICE — SUT MONOCRYL 3-0 SH 27 IN Y416H

## (undated) DEVICE — PAD GROUNDING ADULT

## (undated) DEVICE — BASKET STONE RTRVL 4 WIRE HELICAL

## (undated) DEVICE — 3M™ STERI-STRIP™ REINFORCED ADHESIVE SKIN CLOSURES, R1547, 1/2 IN X 4 IN (12 MM X 100 MM), 6 STRIPS/ENVELOPE: Brand: 3M™ STERI-STRIP™

## (undated) DEVICE — CATH DIAG 5FR IMPULSE 100CM FL3.5

## (undated) DEVICE — SHEATH URETERAL ACCESS 12/14FR 35CM PROXIS

## (undated) DEVICE — SUT SILK 2-0 18 IN A185H

## (undated) DEVICE — INVIEW CLEAR LEGGINGS: Brand: CONVERTORS

## (undated) DEVICE — ADHESIVE SKIN HIGH VISCOSITY EXOFIN 1ML

## (undated) DEVICE — CATH DIAG 5FR IMPULSE 100CM FL4

## (undated) DEVICE — NEEDLE 25G X 1 1/2

## (undated) DEVICE — SPONGE 4 X 4 XRAY 16 PLY STRL LF RFD

## (undated) DEVICE — CHLORAPREP HI-LITE 26ML ORANGE

## (undated) DEVICE — STRL BETHLEHEM A V FISTULA PK: Brand: CARDINAL HEALTH

## (undated) DEVICE — INTENDED FOR TISSUE SEPARATION, AND OTHER PROCEDURES THAT REQUIRE A SHARP SURGICAL BLADE TO PUNCTURE OR CUT.: Brand: BARD-PARKER SAFETY BLADES SIZE 15, STERILE

## (undated) DEVICE — LASER FIBER HOLMIUM 272MICRON

## (undated) DEVICE — LIGHT HANDLE COVER CAMERA DISP

## (undated) DEVICE — SUT VICRYL 3-0 SH 27 IN J416H

## (undated) DEVICE — DECANTER: Brand: UNBRANDED

## (undated) DEVICE — PINNACLE R/O II INTRODUCER SHEATH WITH RADIOPAQUE MARKER: Brand: PINNACLE

## (undated) DEVICE — CATH DIAG 5FR IMPULSE 100CM FR4

## (undated) DEVICE — CATH URETERAL 5FR X 70 CM FLEX TIP POLYUR BARD

## (undated) DEVICE — LIGHT HANDLE COVER SLEEVE DISP BLUE STELLAR

## (undated) DEVICE — 3M™ TEGADERM™ TRANSPARENT FILM DRESSING FRAME STYLE, 1624W, 2-3/8 IN X 2-3/4 IN (6 CM X 7 CM), 100/CT 4CT/CASE: Brand: 3M™ TEGADERM™

## (undated) DEVICE — GAUZE SPONGES,USP TYPE VII GAUZE, 12 PLY: Brand: CURITY

## (undated) DEVICE — SUT MONOCRYL 4-0 PS-2 18 IN Y496G

## (undated) DEVICE — DGW .035 FC J3MM 260CM TEF: Brand: EMERALD

## (undated) DEVICE — GUIDEWIRE STRGHT TIP 0.038 IN SOLO PLUS

## (undated) DEVICE — GUIDEWIRE STRGHT TIP 0.035 IN  SOLO PLUS

## (undated) DEVICE — REM POLYHESIVE ADULT PATIENT RETURN ELECTRODE: Brand: VALLEYLAB

## (undated) DEVICE — GLOVE SRG BIOGEL 8

## (undated) DEVICE — CHLORHEXIDINE 4PCT 4 OZ

## (undated) DEVICE — CATH DIAG 5FR IMPULSE 100CM FL4.5

## (undated) DEVICE — BASKET STONE RTRVL PLTN CL 3FR 115CM

## (undated) DEVICE — CATH URET .038 10FR 50CM DUAL LUMEN

## (undated) DEVICE — SUT PROLENE 5-0 C-1/C-1 24 IN 8725H